# Patient Record
Sex: MALE | NOT HISPANIC OR LATINO | Employment: OTHER | URBAN - METROPOLITAN AREA
[De-identification: names, ages, dates, MRNs, and addresses within clinical notes are randomized per-mention and may not be internally consistent; named-entity substitution may affect disease eponyms.]

---

## 2018-10-22 ENCOUNTER — HOSPITAL ENCOUNTER (OUTPATIENT)
Dept: GENERAL RADIOLOGY | Facility: HOSPITAL | Age: 83
Discharge: HOME | End: 2018-10-22
Attending: INTERNAL MEDICINE | Admitting: INTERNAL MEDICINE

## 2018-10-22 ENCOUNTER — TRANSCRIBE ORDERS (OUTPATIENT)
Dept: ADMINISTRATIVE | Facility: HOSPITAL | Age: 83
End: 2018-10-22

## 2018-10-22 DIAGNOSIS — R05.9 COUGH: Primary | ICD-10-CM

## 2018-10-22 PROCEDURE — 71046 X-RAY EXAM CHEST 2 VIEWS: CPT

## 2019-03-25 PROBLEM — I10 ESSENTIAL HYPERTENSION: Status: ACTIVE | Noted: 2019-03-25

## 2019-03-25 PROBLEM — R73.01 IMPAIRED FASTING GLUCOSE: Status: ACTIVE | Noted: 2019-03-25

## 2019-03-25 PROBLEM — E78.5 HYPERLIPIDEMIA LDL GOAL <100: Status: ACTIVE | Noted: 2019-03-25

## 2019-03-25 PROBLEM — R26.9 GAIT ABNORMALITY: Status: ACTIVE | Noted: 2019-03-25

## 2019-03-25 PROBLEM — I25.10 ASHD (ARTERIOSCLEROTIC HEART DISEASE): Status: ACTIVE | Noted: 2019-03-25

## 2019-03-25 PROBLEM — C44.310 FACIAL BASAL CELL CANCER: Status: ACTIVE | Noted: 2019-03-25

## 2019-04-16 PROBLEM — K63.5 POLYP, COLONIC: Status: ACTIVE | Noted: 2019-04-16

## 2019-04-16 PROBLEM — R05.9 COUGH: Status: ACTIVE | Noted: 2019-04-16

## 2019-04-16 PROBLEM — J45.909 ASTHMA: Status: ACTIVE | Noted: 2019-04-16

## 2019-04-16 PROBLEM — E66.9 OBESITY (BMI 30.0-34.9): Status: ACTIVE | Noted: 2019-04-16

## 2019-04-16 PROBLEM — R31.9 HEMATURIA, UNSPECIFIED: Status: ACTIVE | Noted: 2019-04-16

## 2019-04-16 PROBLEM — J30.9 ALLERGIC RHINITIS: Status: ACTIVE | Noted: 2019-04-16

## 2019-04-16 PROBLEM — E55.9 VITAMIN D DEFICIENCY: Status: ACTIVE | Noted: 2019-04-16

## 2019-04-16 PROBLEM — N40.0 BPH WITHOUT URINARY OBSTRUCTION: Status: ACTIVE | Noted: 2019-04-16

## 2019-04-16 PROBLEM — Z00.00 MEDICARE ANNUAL WELLNESS VISIT, SUBSEQUENT: Status: ACTIVE | Noted: 2019-04-16

## 2019-04-16 PROBLEM — M54.50 LOW BACK PAIN AT MULTIPLE SITES: Status: ACTIVE | Noted: 2019-04-16

## 2019-04-16 PROBLEM — R80.9 PROTEINURIA: Status: ACTIVE | Noted: 2019-04-16

## 2019-04-16 PROBLEM — E66.811 OBESITY (BMI 30.0-34.9): Status: ACTIVE | Noted: 2019-04-16

## 2019-04-16 PROBLEM — G56.01 RIGHT CARPAL TUNNEL SYNDROME: Status: ACTIVE | Noted: 2019-04-16

## 2019-04-16 PROBLEM — R60.9 EDEMA: Status: ACTIVE | Noted: 2019-04-16

## 2019-04-16 PROBLEM — E66.9 OBESITY (BMI 30-39.9): Status: ACTIVE | Noted: 2019-04-16

## 2019-04-25 ENCOUNTER — OFFICE VISIT (OUTPATIENT)
Dept: INTERNAL MEDICINE | Facility: CLINIC | Age: 84
End: 2019-04-25

## 2019-04-25 VITALS
BODY MASS INDEX: 34.55 KG/M2 | HEIGHT: 61 IN | HEART RATE: 66 BPM | TEMPERATURE: 98.4 F | DIASTOLIC BLOOD PRESSURE: 66 MMHG | WEIGHT: 183 LBS | SYSTOLIC BLOOD PRESSURE: 126 MMHG

## 2019-04-25 DIAGNOSIS — E78.5 HYPERLIPIDEMIA LDL GOAL <100: ICD-10-CM

## 2019-04-25 DIAGNOSIS — R73.01 IMPAIRED FASTING GLUCOSE: ICD-10-CM

## 2019-04-25 DIAGNOSIS — E66.9 OBESITY (BMI 30.0-34.9): ICD-10-CM

## 2019-04-25 DIAGNOSIS — I10 ESSENTIAL HYPERTENSION: Primary | ICD-10-CM

## 2019-04-25 PROCEDURE — 99213 OFFICE O/P EST LOW 20 MIN: CPT | Performed by: INTERNAL MEDICINE

## 2019-04-25 RX ORDER — MULTIVITAMIN
TABLET ORAL DAILY
COMMUNITY
Start: 2013-03-14 | End: 2019-11-01 | Stop reason: SDUPTHER

## 2019-04-25 RX ORDER — FLUOCINONIDE TOPICAL SOLUTION USP, 0.05% 0.5 MG/ML
SOLUTION TOPICAL
COMMUNITY
Start: 2019-03-01 | End: 2019-08-26

## 2019-04-25 RX ORDER — FLUTICASONE PROPIONATE 50 MCG
SPRAY, SUSPENSION (ML) NASAL
COMMUNITY
Start: 2019-02-08 | End: 2019-08-26

## 2019-04-25 RX ORDER — AZELASTINE 1 MG/ML
SPRAY, METERED NASAL
COMMUNITY
Start: 2019-03-22 | End: 2019-08-26 | Stop reason: SDUPTHER

## 2019-04-25 RX ORDER — PRAVASTATIN SODIUM 40 MG
40 TABLET ORAL DAILY
Qty: 90 TABLET | Refills: 3 | Status: SHIPPED | OUTPATIENT
Start: 2019-04-25 | End: 2019-08-26 | Stop reason: SDUPTHER

## 2019-04-25 NOTE — PROGRESS NOTES
Chief Complaint   Patient presents with   • Follow-up   Follow up hypertension.    History of Present Illness  89 y.o. white male presents for follow up visit. He overall is doing well. He exercises 6 days a week. He is doing well with walking and balance. He does not need help with ADLs. Breathing when exercising doing well.     Review of Systems   Constitutional: Negative for chills, fatigue and fever.   HENT: Negative for congestion, ear pain and sinus pressure.    Respiratory: Negative for cough, chest tightness, shortness of breath and wheezing.    Cardiovascular: Negative for chest pain and palpitations.   Gastrointestinal: Negative for abdominal pain, blood in stool and constipation.   Skin: Negative for color change.   Allergic/Immunologic: Negative for environmental allergies.   Neurological: Negative for dizziness, speech difficulty and headaches.   Psychiatric/Behavioral: Negative for confusion. The patient is not nervous/anxious.      All other ROS reviewed and negative.    PMSFH:  The following portions of the patient's history were reviewed and updated as appropriate: allergies, current medications, past family history, past medical history, past social history, past surgical history and problem list.      Current Outpatient Medications:   •  aspirin (ASPIRIN LOW DOSE) 81 MG tablet, Take  by mouth Daily., Disp: , Rfl:   •  azelastine (ASTELIN) 0.1 % nasal spray, , Disp: , Rfl:   •  Cholecalciferol (VITAMIN D3) 1000 units capsule, Take  by mouth., Disp: , Rfl:   •  fluocinonide (LIDEX) 0.05 % external solution, , Disp: , Rfl:   •  fluticasone (FLONASE) 50 MCG/ACT nasal spray, , Disp: , Rfl:   •  Menthol, Topical Analgesic, 4 % gel, Apply  topically to the appropriate area as directed., Disp: , Rfl:   •  Multiple Vitamin (MULTI-VITAMIN DAILY) tablet, Take  by mouth Daily., Disp: , Rfl:   •  pravastatin (PRAVACHOL) 40 MG tablet, Take 1 tablet by mouth Daily., Disp: 90 tablet, Rfl: 3    VITALS:  /66  "(BP Location: Right arm, Patient Position: Sitting, Cuff Size: Adult)   Pulse 66   Temp 98.4 °F (36.9 °C)   Ht 156.2 cm (61.5\")   Wt 83 kg (183 lb)   BMI 34.02 kg/m²     Physical Exam   Constitutional: He is oriented to person, place, and time. He appears well-developed and well-nourished.   HENT:   Head: Normocephalic.   Mouth/Throat: Oropharynx is clear and moist.   Eyes: Conjunctivae and EOM are normal.   Neck: No JVD present.   Cardiovascular: Normal rate and regular rhythm.   Murmur (Ii/VI SM) heard.  Pulmonary/Chest: Effort normal and breath sounds normal. No respiratory distress.   Abdominal: Soft. Bowel sounds are normal. There is no tenderness.   Musculoskeletal: He exhibits no edema.   Lymphadenopathy:     He has no cervical adenopathy.   Neurological: He is alert and oriented to person, place, and time.   Skin: Skin is warm and dry.   Psychiatric: He has a normal mood and affect. His behavior is normal.   Nursing note and vitals reviewed.      LABS:  No results found for this or any previous visit.    Procedures         ASSESSMENT/PLAN:  Problem List Items Addressed This Visit        Cardiovascular and Mediastinum    Hyperlipidemia LDL goal <100     Discussed improving diet and exercise. Specifically, decrease saturated fats and trans fats and avoid bad carbohydrates (sweet, breads , potatoes, and high caloric drinks).  Also aim for 150 minutes aerobic exercise weekly and resistance exercises 2-3x/week.         Relevant Medications    pravastatin (PRAVACHOL) 40 MG tablet    Essential hypertension - Primary     Discussed with the patient a low sodium diet (<2000mg/day) and discussed increasing regular aerobic exercise.  Recommend monitoring blood pressures with goal < 130 systolic and < 80 diastolic.            Endocrine    Impaired fasting glucose     Discussed decreasing bad carbohydrates, specifically sweets, breads, potatoes, corn and high caloric drinks (juices, sodas, sweet tea).  Also recommend " increasing physical activity, ideally 150 minutes aerobic exercise weekly and resistance exercises 2-3x/week.            Other    Obesity (BMI 30.0-34.9)     Goal to consume large amounts of vegetables and fruits,heart healthy fats and low carbohydrate choices. Encourage aerobic exercise of walking, jogging or biking gradually up to 150 minute a week and 2-3 times of weight resistance. Employe behavioral modifications such as daily meditation and stopping eating and drinking calories after 7 pm.                FOLLOW-UP:  Return in about 4 months (around 8/25/2019) for Labs with next visit.      Electronically signed by:    Dirk Russ MD

## 2019-04-25 NOTE — ASSESSMENT & PLAN NOTE
Discussed improving diet and exercise. Specifically, decrease saturated fats and trans fats and avoid bad carbohydrates (sweet, breads , potatoes, and high caloric drinks).  Also aim for 150 minutes aerobic exercise weekly and resistance exercises 2-3x/week.

## 2019-08-26 ENCOUNTER — OFFICE VISIT (OUTPATIENT)
Dept: INTERNAL MEDICINE | Facility: CLINIC | Age: 84
End: 2019-08-26

## 2019-08-26 ENCOUNTER — TELEPHONE (OUTPATIENT)
Dept: INTERNAL MEDICINE | Facility: CLINIC | Age: 84
End: 2019-08-26

## 2019-08-26 VITALS
SYSTOLIC BLOOD PRESSURE: 122 MMHG | BODY MASS INDEX: 34.78 KG/M2 | WEIGHT: 189 LBS | HEIGHT: 62 IN | DIASTOLIC BLOOD PRESSURE: 74 MMHG | HEART RATE: 80 BPM

## 2019-08-26 DIAGNOSIS — E78.5 HYPERLIPIDEMIA LDL GOAL <100: ICD-10-CM

## 2019-08-26 DIAGNOSIS — E66.01 MORBID OBESITY DUE TO EXCESS CALORIES (HCC): ICD-10-CM

## 2019-08-26 DIAGNOSIS — E55.9 VITAMIN D DEFICIENCY: ICD-10-CM

## 2019-08-26 DIAGNOSIS — I10 ESSENTIAL HYPERTENSION: Primary | ICD-10-CM

## 2019-08-26 DIAGNOSIS — R73.01 IMPAIRED FASTING GLUCOSE: ICD-10-CM

## 2019-08-26 PROCEDURE — 99214 OFFICE O/P EST MOD 30 MIN: CPT | Performed by: INTERNAL MEDICINE

## 2019-08-26 RX ORDER — PRAVASTATIN SODIUM 40 MG
40 TABLET ORAL DAILY
Qty: 90 TABLET | Refills: 3 | Status: SHIPPED | OUTPATIENT
Start: 2019-08-26 | End: 2019-10-07

## 2019-08-26 RX ORDER — AZELASTINE 1 MG/ML
2 SPRAY, METERED NASAL 2 TIMES DAILY
Qty: 30 ML | Refills: 11 | Status: SHIPPED | OUTPATIENT
Start: 2019-08-26 | End: 2019-11-25 | Stop reason: ALTCHOICE

## 2019-08-26 NOTE — ASSESSMENT & PLAN NOTE
Hypertension is improving with lifestyle modifications.  Continue current treatment regimen.  Dietary sodium restriction.  Weight loss.  Regular aerobic exercise.  Blood pressure will be reassessed at the next regular appointment.

## 2019-08-26 NOTE — PROGRESS NOTES
Chief Complaint   Patient presents with   • Hypertension   • Hyperlipidemia     He is fasting       History of Present Illness  89 y.o. white male presents for follow up of HTN. His is exercising 6 days a week and his breathing is ok.He denies chest pain. He has bilateral carpal tunnel improved with biofreeze and splint    Review of Systems   Constitutional: Negative for chills, fatigue and fever.   HENT: Positive for hearing loss and postnasal drip. Negative for congestion, ear pain and sinus pressure.    Respiratory: Negative for cough, chest tightness, shortness of breath and wheezing.    Cardiovascular: Negative for chest pain and palpitations.   Gastrointestinal: Negative for abdominal pain, blood in stool and constipation.   Musculoskeletal: Positive for arthralgias and myalgias.   Skin: Negative for color change.   Allergic/Immunologic: Negative for environmental allergies.   Neurological: Positive for numbness (numbness of hands). Negative for dizziness, speech difficulty and headaches.   Hematological: Does not bruise/bleed easily.   Psychiatric/Behavioral: Negative for confusion. The patient is not nervous/anxious.      All other ROS reviewed and negative.    PMSFH:  The following portions of the patient's history were reviewed and updated as appropriate: allergies, current medications, past family history, past medical history, past social history, past surgical history and problem list.      Current Outpatient Medications:   •  aspirin (ASPIRIN LOW DOSE) 81 MG tablet, Take  by mouth Daily., Disp: , Rfl:   •  azelastine (ASTELIN) 0.1 % nasal spray, 2 sprays into the nostril(s) as directed by provider 2 (Two) Times a Day., Disp: 30 mL, Rfl: 11  •  Cholecalciferol (VITAMIN D3) 1000 units capsule, Take  by mouth., Disp: , Rfl:   •  Menthol, Topical Analgesic, 4 % gel, Apply  topically to the appropriate area as directed., Disp: , Rfl:   •  Multiple Vitamin (MULTI-VITAMIN DAILY) tablet, Take  by mouth Daily.,  "Disp: , Rfl:   •  pravastatin (PRAVACHOL) 40 MG tablet, Take 1 tablet by mouth Daily., Disp: 90 tablet, Rfl: 3    VITALS:  /74 (BP Location: Left arm, Patient Position: Sitting, Cuff Size: Adult)   Pulse 80   Ht 156.2 cm (61.5\")   Wt 85.7 kg (189 lb)   BMI 35.13 kg/m²     Physical Exam   Constitutional: He is oriented to person, place, and time. He appears well-developed and well-nourished.   HENT:   Head: Normocephalic.   Right Ear: External ear normal.   Left Ear: External ear normal.   Mouth/Throat: Oropharynx is clear and moist.   Eyes: Conjunctivae and EOM are normal.   Neck: No JVD present.   Cardiovascular: Normal rate and regular rhythm.   Murmur (II/VI Sm) heard.  Pulmonary/Chest: Effort normal and breath sounds normal. No respiratory distress.   Abdominal: Soft. Bowel sounds are normal. There is no tenderness.   obese   Musculoskeletal: He exhibits tenderness (mild wrist djd with mild pain).   Lymphadenopathy:     He has no cervical adenopathy.   Neurological: He is alert and oriented to person, place, and time.   Fair get up and go but pretty steady with cane   Skin: Skin is warm and dry.   Psychiatric: He has a normal mood and affect. His behavior is normal.   Nursing note and vitals reviewed.      LABS:  No results found for this or any previous visit.    Procedures         ASSESSMENT/PLAN:  Problem List Items Addressed This Visit        Cardiovascular and Mediastinum    Hyperlipidemia LDL goal <100    Relevant Medications    pravastatin (PRAVACHOL) 40 MG tablet    Essential hypertension - Primary     Hypertension is improving with lifestyle modifications.  Continue current treatment regimen.  Dietary sodium restriction.  Weight loss.  Regular aerobic exercise.  Blood pressure will be reassessed at the next regular appointment.            Digestive    Vitamin D deficiency     Check labs.          Morbid obesity due to excess calories (CMS/HCC)     .His blood pressure and joints are worsened by " extra weight and the comorbidies does give him diagnosis with BMI of 35 of morbid obesity. Goal to consume large amounts of vegetables and fruits,heart healthy fats and low carbohydrate choices. Encourage aerobic exercise of walking, jogging or biking gradually up to 150 minute a week and 2-3 times of weight resistance. Employe behavioral modifications such as daily meditation and stopping eating and drinking calories after 7 pm.             Endocrine    Impaired fasting glucose     Discussed decreasing bad carbohydrates, specifically sweets, breads, potatoes, corn and high caloric drinks (juices, sodas, sweet tea).  Also recommend increasing physical activity, ideally 150 minutes aerobic exercise weekly and resistance exercises 2-3x/week.               FOLLOW-UP:  Return in about 4 months (around 12/19/2019).      Electronically signed by:    Dirk Russ MD

## 2019-08-26 NOTE — TELEPHONE ENCOUNTER
Please call the patient and tell him apparently I had not signed the labs and the order was not waiting for him and apologize he can get it later this week or do it at his next visit in Dec

## 2019-08-26 NOTE — ASSESSMENT & PLAN NOTE
.His blood pressure and joints are worsened by extra weight and the comorbidies does give him diagnosis with BMI of 35 of morbid obesity. Goal to consume large amounts of vegetables and fruits,heart healthy fats and low carbohydrate choices. Encourage aerobic exercise of walking, jogging or biking gradually up to 150 minute a week and 2-3 times of weight resistance. Employe behavioral modifications such as daily meditation and stopping eating and drinking calories after 7 pm.

## 2019-08-27 ENCOUNTER — LAB (OUTPATIENT)
Dept: LAB | Facility: HOSPITAL | Age: 84
End: 2019-08-27

## 2019-08-27 DIAGNOSIS — R73.01 IMPAIRED FASTING GLUCOSE: ICD-10-CM

## 2019-08-27 DIAGNOSIS — E78.5 HYPERLIPIDEMIA LDL GOAL <100: ICD-10-CM

## 2019-08-27 DIAGNOSIS — E55.9 VITAMIN D DEFICIENCY: ICD-10-CM

## 2019-08-27 DIAGNOSIS — I10 ESSENTIAL HYPERTENSION: ICD-10-CM

## 2019-08-27 LAB
25(OH)D3 SERPL-MCNC: 44 NG/ML (ref 30–100)
ALBUMIN SERPL-MCNC: 4 G/DL (ref 3.5–5.2)
ALBUMIN/GLOB SERPL: 1.5 G/DL
ALP SERPL-CCNC: 80 U/L (ref 39–117)
ALT SERPL W P-5'-P-CCNC: 9 U/L (ref 1–41)
ANION GAP SERPL CALCULATED.3IONS-SCNC: 12.5 MMOL/L (ref 5–15)
AST SERPL-CCNC: 22 U/L (ref 1–40)
BASOPHILS # BLD AUTO: 0.03 10*3/MM3 (ref 0–0.2)
BASOPHILS NFR BLD AUTO: 0.4 % (ref 0–1.5)
BILIRUB SERPL-MCNC: 1.4 MG/DL (ref 0.2–1.2)
BUN BLD-MCNC: 16 MG/DL (ref 8–23)
BUN/CREAT SERPL: 16.2 (ref 7–25)
CALCIUM SPEC-SCNC: 9.3 MG/DL (ref 8.2–9.6)
CHLORIDE SERPL-SCNC: 102 MMOL/L (ref 98–107)
CHOLEST SERPL-MCNC: 171 MG/DL (ref 0–200)
CO2 SERPL-SCNC: 26.5 MMOL/L (ref 22–29)
CREAT BLD-MCNC: 0.99 MG/DL (ref 0.76–1.27)
DEPRECATED RDW RBC AUTO: 52.9 FL (ref 37–54)
EOSINOPHIL # BLD AUTO: 0.1 10*3/MM3 (ref 0–0.4)
EOSINOPHIL NFR BLD AUTO: 1.5 % (ref 0.3–6.2)
ERYTHROCYTE [DISTWIDTH] IN BLOOD BY AUTOMATED COUNT: 14 % (ref 12.3–15.4)
GFR SERPL CREATININE-BSD FRML MDRD: 70 ML/MIN/1.73
GFR SERPL CREATININE-BSD FRML MDRD: 85 ML/MIN/1.73
GLOBULIN UR ELPH-MCNC: 2.7 GM/DL
GLUCOSE BLD-MCNC: 91 MG/DL (ref 65–99)
HBA1C MFR BLD: 5.9 % (ref 4.8–5.6)
HCT VFR BLD AUTO: 43.5 % (ref 37.5–51)
HDLC SERPL-MCNC: 70 MG/DL (ref 40–60)
HGB BLD-MCNC: 13.4 G/DL (ref 13–17.7)
IMM GRANULOCYTES # BLD AUTO: 0.02 10*3/MM3 (ref 0–0.05)
IMM GRANULOCYTES NFR BLD AUTO: 0.3 % (ref 0–0.5)
LDLC SERPL CALC-MCNC: 78 MG/DL (ref 0–100)
LDLC/HDLC SERPL: 1.11 {RATIO}
LYMPHOCYTES # BLD AUTO: 0.86 10*3/MM3 (ref 0.7–3.1)
LYMPHOCYTES NFR BLD AUTO: 12.7 % (ref 19.6–45.3)
MCH RBC QN AUTO: 31.5 PG (ref 26.6–33)
MCHC RBC AUTO-ENTMCNC: 30.8 G/DL (ref 31.5–35.7)
MCV RBC AUTO: 102.1 FL (ref 79–97)
MONOCYTES # BLD AUTO: 0.88 10*3/MM3 (ref 0.1–0.9)
MONOCYTES NFR BLD AUTO: 13 % (ref 5–12)
NEUTROPHILS # BLD AUTO: 4.86 10*3/MM3 (ref 1.7–7)
NEUTROPHILS NFR BLD AUTO: 72.1 % (ref 42.7–76)
NRBC BLD AUTO-RTO: 0 /100 WBC (ref 0–0.2)
PLATELET # BLD AUTO: 261 10*3/MM3 (ref 140–450)
PMV BLD AUTO: 10.8 FL (ref 6–12)
POTASSIUM BLD-SCNC: 4.5 MMOL/L (ref 3.5–5.2)
PROT SERPL-MCNC: 6.7 G/DL (ref 6–8.5)
RBC # BLD AUTO: 4.26 10*6/MM3 (ref 4.14–5.8)
SODIUM BLD-SCNC: 141 MMOL/L (ref 136–145)
TRIGL SERPL-MCNC: 116 MG/DL (ref 0–150)
TSH SERPL DL<=0.05 MIU/L-ACNC: 1.39 MIU/ML (ref 0.27–4.2)
VLDLC SERPL-MCNC: 23.2 MG/DL (ref 5–40)
WBC NRBC COR # BLD: 6.75 10*3/MM3 (ref 3.4–10.8)

## 2019-08-27 PROCEDURE — 82043 UR ALBUMIN QUANTITATIVE: CPT

## 2019-08-27 PROCEDURE — 83036 HEMOGLOBIN GLYCOSYLATED A1C: CPT

## 2019-08-27 PROCEDURE — 82570 ASSAY OF URINE CREATININE: CPT

## 2019-08-27 PROCEDURE — 85025 COMPLETE CBC W/AUTO DIFF WBC: CPT

## 2019-08-27 PROCEDURE — 80061 LIPID PANEL: CPT

## 2019-08-27 PROCEDURE — 80053 COMPREHEN METABOLIC PANEL: CPT

## 2019-08-27 PROCEDURE — 82306 VITAMIN D 25 HYDROXY: CPT

## 2019-08-27 PROCEDURE — 84443 ASSAY THYROID STIM HORMONE: CPT

## 2019-08-28 LAB
ALBUMIN UR-MCNC: 39.3 MG/DL
CREAT UR-MCNC: 123.3 MG/DL
MICROALBUMIN/CREAT UR: 318.7 MG/G

## 2019-09-02 ENCOUNTER — APPOINTMENT (OUTPATIENT)
Dept: GENERAL RADIOLOGY | Facility: HOSPITAL | Age: 84
End: 2019-09-02

## 2019-09-02 ENCOUNTER — HOSPITAL ENCOUNTER (INPATIENT)
Facility: HOSPITAL | Age: 84
LOS: 13 days | Discharge: NURSING, REHAB OR LONG-TERM CARE | End: 2019-09-16
Attending: EMERGENCY MEDICINE | Admitting: UROLOGY

## 2019-09-02 DIAGNOSIS — N17.9 AKI (ACUTE KIDNEY INJURY) (HCC): Primary | ICD-10-CM

## 2019-09-02 DIAGNOSIS — R77.8 ELEVATED TROPONIN: ICD-10-CM

## 2019-09-02 DIAGNOSIS — Z78.9 IMPAIRED MOBILITY AND ADLS: ICD-10-CM

## 2019-09-02 DIAGNOSIS — R31.9 HEMATURIA, UNSPECIFIED TYPE: ICD-10-CM

## 2019-09-02 DIAGNOSIS — N40.0 BPH (BENIGN PROSTATIC HYPERPLASIA): ICD-10-CM

## 2019-09-02 DIAGNOSIS — R13.10 DYSPHAGIA, UNSPECIFIED TYPE: ICD-10-CM

## 2019-09-02 DIAGNOSIS — Z74.09 IMPAIRED MOBILITY AND ADLS: ICD-10-CM

## 2019-09-02 DIAGNOSIS — N32.0 BLADDER OUTLET OBSTRUCTION: ICD-10-CM

## 2019-09-02 LAB
ALBUMIN SERPL-MCNC: 4 G/DL (ref 3.5–5.2)
ALBUMIN/GLOB SERPL: 1.3 G/DL
ALP SERPL-CCNC: 83 U/L (ref 39–117)
ALT SERPL W P-5'-P-CCNC: 12 U/L (ref 1–41)
ANION GAP SERPL CALCULATED.3IONS-SCNC: 16 MMOL/L (ref 5–15)
AST SERPL-CCNC: 27 U/L (ref 1–40)
BASOPHILS # BLD AUTO: 0.01 10*3/MM3 (ref 0–0.2)
BASOPHILS NFR BLD AUTO: 0.1 % (ref 0–1.5)
BILIRUB SERPL-MCNC: 1 MG/DL (ref 0.2–1.2)
BUN BLD-MCNC: 33 MG/DL (ref 8–23)
BUN/CREAT SERPL: 18.6 (ref 7–25)
CALCIUM SPEC-SCNC: 9.5 MG/DL (ref 8.2–9.6)
CHLORIDE SERPL-SCNC: 102 MMOL/L (ref 98–107)
CO2 SERPL-SCNC: 24 MMOL/L (ref 22–29)
CREAT BLD-MCNC: 1.77 MG/DL (ref 0.76–1.27)
D-LACTATE SERPL-SCNC: 2.3 MMOL/L (ref 0.5–2)
DEPRECATED RDW RBC AUTO: 47.2 FL (ref 37–54)
EOSINOPHIL # BLD AUTO: 0 10*3/MM3 (ref 0–0.4)
EOSINOPHIL NFR BLD AUTO: 0 % (ref 0.3–6.2)
ERYTHROCYTE [DISTWIDTH] IN BLOOD BY AUTOMATED COUNT: 13.2 % (ref 12.3–15.4)
GFR SERPL CREATININE-BSD FRML MDRD: 36 ML/MIN/1.73
GFR SERPL CREATININE-BSD FRML MDRD: 43 ML/MIN/1.73
GLOBULIN UR ELPH-MCNC: 3.1 GM/DL
GLUCOSE BLD-MCNC: 168 MG/DL (ref 65–99)
HCT VFR BLD AUTO: 38.7 % (ref 37.5–51)
HGB BLD-MCNC: 12.4 G/DL (ref 13–17.7)
IMM GRANULOCYTES # BLD AUTO: 0.06 10*3/MM3 (ref 0–0.05)
IMM GRANULOCYTES NFR BLD AUTO: 0.5 % (ref 0–0.5)
LYMPHOCYTES # BLD AUTO: 0.64 10*3/MM3 (ref 0.7–3.1)
LYMPHOCYTES NFR BLD AUTO: 5.5 % (ref 19.6–45.3)
MCH RBC QN AUTO: 31.2 PG (ref 26.6–33)
MCHC RBC AUTO-ENTMCNC: 32 G/DL (ref 31.5–35.7)
MCV RBC AUTO: 97.2 FL (ref 79–97)
MONOCYTES # BLD AUTO: 1.05 10*3/MM3 (ref 0.1–0.9)
MONOCYTES NFR BLD AUTO: 9.1 % (ref 5–12)
NEUTROPHILS # BLD AUTO: 9.83 10*3/MM3 (ref 1.7–7)
NEUTROPHILS NFR BLD AUTO: 84.8 % (ref 42.7–76)
NRBC BLD AUTO-RTO: 0 /100 WBC (ref 0–0.2)
NT-PROBNP SERPL-MCNC: 1189 PG/ML (ref 5–1800)
PLATELET # BLD AUTO: 336 10*3/MM3 (ref 140–450)
PMV BLD AUTO: 10 FL (ref 6–12)
POTASSIUM BLD-SCNC: 4.8 MMOL/L (ref 3.5–5.2)
PROT SERPL-MCNC: 7.1 G/DL (ref 6–8.5)
RBC # BLD AUTO: 3.98 10*6/MM3 (ref 4.14–5.8)
SODIUM BLD-SCNC: 142 MMOL/L (ref 136–145)
TROPONIN T SERPL-MCNC: 0.15 NG/ML (ref 0–0.03)
TROPONIN T SERPL-MCNC: 0.16 NG/ML (ref 0–0.03)
WBC NRBC COR # BLD: 11.59 10*3/MM3 (ref 3.4–10.8)

## 2019-09-02 PROCEDURE — 71046 X-RAY EXAM CHEST 2 VIEWS: CPT

## 2019-09-02 PROCEDURE — 51702 INSERT TEMP BLADDER CATH: CPT

## 2019-09-02 PROCEDURE — 51798 US URINE CAPACITY MEASURE: CPT

## 2019-09-02 PROCEDURE — 83880 ASSAY OF NATRIURETIC PEPTIDE: CPT | Performed by: NURSE PRACTITIONER

## 2019-09-02 PROCEDURE — 83605 ASSAY OF LACTIC ACID: CPT | Performed by: NURSE PRACTITIONER

## 2019-09-02 PROCEDURE — 85025 COMPLETE CBC W/AUTO DIFF WBC: CPT | Performed by: NURSE PRACTITIONER

## 2019-09-02 PROCEDURE — 84484 ASSAY OF TROPONIN QUANT: CPT | Performed by: NURSE PRACTITIONER

## 2019-09-02 PROCEDURE — 80053 COMPREHEN METABOLIC PANEL: CPT | Performed by: NURSE PRACTITIONER

## 2019-09-02 PROCEDURE — 87040 BLOOD CULTURE FOR BACTERIA: CPT | Performed by: NURSE PRACTITIONER

## 2019-09-02 PROCEDURE — 93005 ELECTROCARDIOGRAM TRACING: CPT | Performed by: NURSE PRACTITIONER

## 2019-09-02 PROCEDURE — 99285 EMERGENCY DEPT VISIT HI MDM: CPT

## 2019-09-02 RX ORDER — SODIUM CHLORIDE 0.9 % (FLUSH) 0.9 %
10 SYRINGE (ML) INJECTION AS NEEDED
Status: DISCONTINUED | OUTPATIENT
Start: 2019-09-02 | End: 2019-09-16 | Stop reason: HOSPADM

## 2019-09-02 RX ADMIN — SODIUM CHLORIDE 1000 ML: 9 INJECTION, SOLUTION INTRAVENOUS at 21:49

## 2019-09-03 ENCOUNTER — APPOINTMENT (OUTPATIENT)
Dept: CARDIOLOGY | Facility: HOSPITAL | Age: 84
End: 2019-09-03

## 2019-09-03 ENCOUNTER — APPOINTMENT (OUTPATIENT)
Dept: CT IMAGING | Facility: HOSPITAL | Age: 84
End: 2019-09-03

## 2019-09-03 PROBLEM — N39.0 ACUTE UTI (URINARY TRACT INFECTION): Status: ACTIVE | Noted: 2019-09-03

## 2019-09-03 PROBLEM — N28.9 ACUTE RENAL INSUFFICIENCY: Status: ACTIVE | Noted: 2019-09-03

## 2019-09-03 PROBLEM — R79.89 ELEVATED TROPONIN: Status: ACTIVE | Noted: 2019-09-03

## 2019-09-03 PROBLEM — R31.0 GROSS HEMATURIA: Status: ACTIVE | Noted: 2019-09-03

## 2019-09-03 PROBLEM — A41.9 SEPSIS DUE TO URINARY TRACT INFECTION: Status: ACTIVE | Noted: 2019-09-03

## 2019-09-03 PROBLEM — R77.8 ELEVATED TROPONIN: Status: ACTIVE | Noted: 2019-09-03

## 2019-09-03 PROBLEM — N39.0 SEPSIS DUE TO URINARY TRACT INFECTION: Status: ACTIVE | Noted: 2019-09-03

## 2019-09-03 LAB
ANION GAP SERPL CALCULATED.3IONS-SCNC: 13 MMOL/L (ref 5–15)
AV HCM GRAD VALS: 346 MMHG
AV LVOT PEAK GRADIENT: 225 MMHG
BACTERIA UR QL AUTO: ABNORMAL /HPF
BASOPHILS # BLD AUTO: 0.01 10*3/MM3 (ref 0–0.2)
BASOPHILS NFR BLD AUTO: 0.1 % (ref 0–1.5)
BH CV ECHO MEAS - AO MAX PG (FULL): 2 MMHG
BH CV ECHO MEAS - AO MAX PG: 14.9 MMHG
BH CV ECHO MEAS - AO MEAN PG (FULL): 5.5 MMHG
BH CV ECHO MEAS - AO MEAN PG: 8.5 MMHG
BH CV ECHO MEAS - AO ROOT AREA (BSA CORRECTED): 1.4
BH CV ECHO MEAS - AO ROOT AREA: 5.3 CM^2
BH CV ECHO MEAS - AO ROOT DIAM: 2.6 CM
BH CV ECHO MEAS - AO V2 MAX: 193.3 CM/SEC
BH CV ECHO MEAS - AO V2 MEAN: 138.6 CM/SEC
BH CV ECHO MEAS - AO V2 VTI: 45.9 CM
BH CV ECHO MEAS - AVA(I,A): 1.6 CM^2
BH CV ECHO MEAS - AVA(I,D): 1.6 CM^2
BH CV ECHO MEAS - AVA(V,A): 3 CM^2
BH CV ECHO MEAS - AVA(V,D): 3 CM^2
BH CV ECHO MEAS - BSA(HAYCOCK): 2 M^2
BH CV ECHO MEAS - BSA: 1.9 M^2
BH CV ECHO MEAS - BZI_BMI: 30 KILOGRAMS/M^2
BH CV ECHO MEAS - BZI_METRIC_HEIGHT: 165.1 CM
BH CV ECHO MEAS - BZI_METRIC_WEIGHT: 81.6 KG
BH CV ECHO MEAS - EDV(CUBED): 48 ML
BH CV ECHO MEAS - EDV(TEICH): 55.7 ML
BH CV ECHO MEAS - EF(CUBED): 79.1 %
BH CV ECHO MEAS - EF(TEICH): 72.3 %
BH CV ECHO MEAS - ESV(CUBED): 10 ML
BH CV ECHO MEAS - ESV(TEICH): 15.4 ML
BH CV ECHO MEAS - FS: 40.7 %
BH CV ECHO MEAS - IVS/LVPW: 0.96
BH CV ECHO MEAS - IVSD: 1.2 CM
BH CV ECHO MEAS - LA DIMENSION: 3.9 CM
BH CV ECHO MEAS - LA/AO: 1.5
BH CV ECHO MEAS - LAD MAJOR: 6.7 CM
BH CV ECHO MEAS - LAT PEAK E' VEL: 4.3 CM/SEC
BH CV ECHO MEAS - LATERAL E/E' RATIO: 19.3
BH CV ECHO MEAS - LV MASS(C)D: 131.2 GRAMS
BH CV ECHO MEAS - LV MASS(C)DI: 69.3 GRAMS/M^2
BH CV ECHO MEAS - LV MAX PG: 13 MMHG
BH CV ECHO MEAS - LV MEAN PG: 3 MMHG
BH CV ECHO MEAS - LV V1 MAX: 180 CM/SEC
BH CV ECHO MEAS - LV V1 MEAN: 75.6 CM/SEC
BH CV ECHO MEAS - LV V1 VTI: 22.5 CM
BH CV ECHO MEAS - LVIDD: 3.6 CM
BH CV ECHO MEAS - LVIDS: 2.2 CM
BH CV ECHO MEAS - LVOT AREA (M): 3.1 CM^2
BH CV ECHO MEAS - LVOT AREA: 3.2 CM^2
BH CV ECHO MEAS - LVOT DIAM: 2 CM
BH CV ECHO MEAS - LVPWD: 1.2 CM
BH CV ECHO MEAS - MED PEAK E' VEL: 4.9 CM/SEC
BH CV ECHO MEAS - MEDIAL E/E' RATIO: 16.8
BH CV ECHO MEAS - MV A MAX VEL: 145.1 CM/SEC
BH CV ECHO MEAS - MV DEC SLOPE: 352.4 CM/SEC^2
BH CV ECHO MEAS - MV DEC TIME: 0.36 SEC
BH CV ECHO MEAS - MV E MAX VEL: 85.4 CM/SEC
BH CV ECHO MEAS - MV E/A: 0.59
BH CV ECHO MEAS - MV MAX PG: 14.7 MMHG
BH CV ECHO MEAS - MV MEAN PG: 5.1 MMHG
BH CV ECHO MEAS - MV P1/2T MAX VEL: 99.1 CM/SEC
BH CV ECHO MEAS - MV P1/2T: 82.4 MSEC
BH CV ECHO MEAS - MV V2 MAX: 191.4 CM/SEC
BH CV ECHO MEAS - MV V2 MEAN: 103.2 CM/SEC
BH CV ECHO MEAS - MV V2 VTI: 39.6 CM
BH CV ECHO MEAS - MVA P1/2T LCG: 2.2 CM^2
BH CV ECHO MEAS - MVA(P1/2T): 2.7 CM^2
BH CV ECHO MEAS - MVA(VTI): 1.8 CM^2
BH CV ECHO MEAS - PA ACC SLOPE: 426.4 CM/SEC^2
BH CV ECHO MEAS - PA ACC TIME: 0.17 SEC
BH CV ECHO MEAS - PA PR(ACCEL): 2.9 MMHG
BH CV ECHO MEAS - RV MAX PG: 2.3 MMHG
BH CV ECHO MEAS - RV V1 MAX: 76.5 CM/SEC
BH CV ECHO MEAS - SI(AO): 128.6 ML/M^2
BH CV ECHO MEAS - SI(CUBED): 20.1 ML/M^2
BH CV ECHO MEAS - SI(LVOT): 38 ML/M^2
BH CV ECHO MEAS - SI(TEICH): 21.3 ML/M^2
BH CV ECHO MEAS - SV(AO): 243.3 ML
BH CV ECHO MEAS - SV(CUBED): 38 ML
BH CV ECHO MEAS - SV(LVOT): 71.8 ML
BH CV ECHO MEAS - SV(TEICH): 40.3 ML
BH CV ECHO MEAS - TAPSE (>1.6): 2.2 CM2
BH CV ECHO MEASUREMENTS AVERAGE E/E' RATIO: 18.57
BH CV XLRA - RV BASE: 4.1 CM
BH CV XLRA - RV LENGTH: 6 CM
BH CV XLRA - RV MID: 3.4 CM
BH CV XLRA - TDI S': 19.9 CM/SEC
BILIRUB UR QL STRIP: ABNORMAL
BUN BLD-MCNC: 36 MG/DL (ref 8–23)
BUN/CREAT SERPL: 21.3 (ref 7–25)
CALCIUM SPEC-SCNC: 8.4 MG/DL (ref 8.2–9.6)
CHLORIDE SERPL-SCNC: 108 MMOL/L (ref 98–107)
CHOLEST SERPL-MCNC: 122 MG/DL (ref 0–200)
CLARITY UR: ABNORMAL
CO2 SERPL-SCNC: 21 MMOL/L (ref 22–29)
COLOR UR: ABNORMAL
CREAT BLD-MCNC: 1.69 MG/DL (ref 0.76–1.27)
D-LACTATE SERPL-SCNC: 1.7 MMOL/L (ref 0.5–2)
DEPRECATED RDW RBC AUTO: 47.6 FL (ref 37–54)
EOSINOPHIL # BLD AUTO: 0 10*3/MM3 (ref 0–0.4)
EOSINOPHIL NFR BLD AUTO: 0 % (ref 0.3–6.2)
ERYTHROCYTE [DISTWIDTH] IN BLOOD BY AUTOMATED COUNT: 13.3 % (ref 12.3–15.4)
GFR SERPL CREATININE-BSD FRML MDRD: 38 ML/MIN/1.73
GFR SERPL CREATININE-BSD FRML MDRD: 46 ML/MIN/1.73
GLUCOSE BLD-MCNC: 141 MG/DL (ref 65–99)
GLUCOSE UR STRIP-MCNC: NEGATIVE MG/DL
HCT VFR BLD AUTO: 31.1 % (ref 37.5–51)
HDLC SERPL-MCNC: 49 MG/DL (ref 40–60)
HGB BLD-MCNC: 10 G/DL (ref 13–17.7)
HGB UR QL STRIP.AUTO: ABNORMAL
HOLD SPECIMEN: NORMAL
IMM GRANULOCYTES # BLD AUTO: 0.07 10*3/MM3 (ref 0–0.05)
IMM GRANULOCYTES NFR BLD AUTO: 0.7 % (ref 0–0.5)
KETONES UR QL STRIP: NEGATIVE
LDLC SERPL CALC-MCNC: 59 MG/DL (ref 0–100)
LDLC/HDLC SERPL: 1.2 {RATIO}
LEFT ATRIUM VOLUME INDEX: 19 ML/M^2
LEFT ATRIUM VOLUME: 36 ML
LEUKOCYTE ESTERASE UR QL STRIP.AUTO: ABNORMAL
LV EF 2D ECHO EST: 72 %
LYMPHOCYTES # BLD AUTO: 0.91 10*3/MM3 (ref 0.7–3.1)
LYMPHOCYTES NFR BLD AUTO: 8.8 % (ref 19.6–45.3)
MCH RBC QN AUTO: 31.5 PG (ref 26.6–33)
MCHC RBC AUTO-ENTMCNC: 32.2 G/DL (ref 31.5–35.7)
MCV RBC AUTO: 98.1 FL (ref 79–97)
MONOCYTES # BLD AUTO: 1.62 10*3/MM3 (ref 0.1–0.9)
MONOCYTES NFR BLD AUTO: 15.6 % (ref 5–12)
NEUTROPHILS # BLD AUTO: 7.76 10*3/MM3 (ref 1.7–7)
NEUTROPHILS NFR BLD AUTO: 74.8 % (ref 42.7–76)
NITRITE UR QL STRIP: POSITIVE
NRBC BLD AUTO-RTO: 0 /100 WBC (ref 0–0.2)
PH UR STRIP.AUTO: 6.5 [PH] (ref 5–8)
PLATELET # BLD AUTO: 276 10*3/MM3 (ref 140–450)
PMV BLD AUTO: 10.4 FL (ref 6–12)
POTASSIUM BLD-SCNC: 4.5 MMOL/L (ref 3.5–5.2)
PROT UR QL STRIP: ABNORMAL
RBC # BLD AUTO: 3.17 10*6/MM3 (ref 4.14–5.8)
RBC # UR: ABNORMAL /HPF
REF LAB TEST METHOD: ABNORMAL
SODIUM BLD-SCNC: 142 MMOL/L (ref 136–145)
SP GR UR STRIP: 1.03 (ref 1–1.03)
SQUAMOUS #/AREA URNS HPF: ABNORMAL /HPF
TRIGL SERPL-MCNC: 71 MG/DL (ref 0–150)
TROPONIN T SERPL-MCNC: 0.17 NG/ML (ref 0–0.03)
UROBILINOGEN UR QL STRIP: ABNORMAL
VLDLC SERPL-MCNC: 14.2 MG/DL
WBC NRBC COR # BLD: 10.37 10*3/MM3 (ref 3.4–10.8)
WBC UR QL AUTO: ABNORMAL /HPF

## 2019-09-03 PROCEDURE — 25010000002 CEFTRIAXONE PER 250 MG: Performed by: NURSE PRACTITIONER

## 2019-09-03 PROCEDURE — 93010 ELECTROCARDIOGRAM REPORT: CPT | Performed by: INTERNAL MEDICINE

## 2019-09-03 PROCEDURE — 93306 TTE W/DOPPLER COMPLETE: CPT

## 2019-09-03 PROCEDURE — 84153 ASSAY OF PSA TOTAL: CPT | Performed by: FAMILY MEDICINE

## 2019-09-03 PROCEDURE — 81001 URINALYSIS AUTO W/SCOPE: CPT | Performed by: NURSE PRACTITIONER

## 2019-09-03 PROCEDURE — 99223 1ST HOSP IP/OBS HIGH 75: CPT | Performed by: FAMILY MEDICINE

## 2019-09-03 PROCEDURE — 84484 ASSAY OF TROPONIN QUANT: CPT | Performed by: NURSE PRACTITIONER

## 2019-09-03 PROCEDURE — 84154 ASSAY OF PSA FREE: CPT | Performed by: FAMILY MEDICINE

## 2019-09-03 PROCEDURE — 80048 BASIC METABOLIC PNL TOTAL CA: CPT | Performed by: NURSE PRACTITIONER

## 2019-09-03 PROCEDURE — 85025 COMPLETE CBC W/AUTO DIFF WBC: CPT | Performed by: NURSE PRACTITIONER

## 2019-09-03 PROCEDURE — 80061 LIPID PANEL: CPT | Performed by: NURSE PRACTITIONER

## 2019-09-03 PROCEDURE — 74176 CT ABD & PELVIS W/O CONTRAST: CPT

## 2019-09-03 PROCEDURE — 87086 URINE CULTURE/COLONY COUNT: CPT | Performed by: NURSE PRACTITIONER

## 2019-09-03 PROCEDURE — 93005 ELECTROCARDIOGRAM TRACING: CPT | Performed by: NURSE PRACTITIONER

## 2019-09-03 PROCEDURE — 83605 ASSAY OF LACTIC ACID: CPT | Performed by: NURSE PRACTITIONER

## 2019-09-03 PROCEDURE — 51702 INSERT TEMP BLADDER CATH: CPT

## 2019-09-03 PROCEDURE — 93306 TTE W/DOPPLER COMPLETE: CPT | Performed by: INTERNAL MEDICINE

## 2019-09-03 RX ORDER — ASPIRIN 81 MG/1
81 TABLET ORAL DAILY
Status: DISCONTINUED | OUTPATIENT
Start: 2019-09-03 | End: 2019-09-03

## 2019-09-03 RX ORDER — SODIUM CHLORIDE 0.9 % (FLUSH) 0.9 %
10 SYRINGE (ML) INJECTION EVERY 12 HOURS SCHEDULED
Status: DISCONTINUED | OUTPATIENT
Start: 2019-09-03 | End: 2019-09-16 | Stop reason: HOSPADM

## 2019-09-03 RX ORDER — ONDANSETRON 4 MG/1
4 TABLET, FILM COATED ORAL EVERY 6 HOURS PRN
Status: DISCONTINUED | OUTPATIENT
Start: 2019-09-03 | End: 2019-09-16 | Stop reason: HOSPADM

## 2019-09-03 RX ORDER — SODIUM CHLORIDE 0.9 % (FLUSH) 0.9 %
10 SYRINGE (ML) INJECTION AS NEEDED
Status: DISCONTINUED | OUTPATIENT
Start: 2019-09-03 | End: 2019-09-16 | Stop reason: HOSPADM

## 2019-09-03 RX ORDER — ONDANSETRON 2 MG/ML
4 INJECTION INTRAMUSCULAR; INTRAVENOUS EVERY 6 HOURS PRN
Status: DISCONTINUED | OUTPATIENT
Start: 2019-09-03 | End: 2019-09-16 | Stop reason: HOSPADM

## 2019-09-03 RX ORDER — SODIUM CHLORIDE 9 MG/ML
75 INJECTION, SOLUTION INTRAVENOUS ONCE
Status: COMPLETED | OUTPATIENT
Start: 2019-09-03 | End: 2019-09-03

## 2019-09-03 RX ORDER — ATORVASTATIN CALCIUM 10 MG/1
10 TABLET, FILM COATED ORAL DAILY
Status: DISCONTINUED | OUTPATIENT
Start: 2019-09-03 | End: 2019-09-16 | Stop reason: HOSPADM

## 2019-09-03 RX ADMIN — LIDOCAINE HYDROCHLORIDE 1 ML: 20 JELLY TOPICAL at 00:45

## 2019-09-03 RX ADMIN — SODIUM CHLORIDE 75 ML/HR: 9 INJECTION, SOLUTION INTRAVENOUS at 04:00

## 2019-09-03 RX ADMIN — ATORVASTATIN CALCIUM 10 MG: 10 TABLET, FILM COATED ORAL at 08:25

## 2019-09-03 RX ADMIN — CEFTRIAXONE 1 G: 1 INJECTION, POWDER, FOR SOLUTION INTRAMUSCULAR; INTRAVENOUS at 03:32

## 2019-09-03 RX ADMIN — ASPIRIN 81 MG: 81 TABLET, COATED ORAL at 08:25

## 2019-09-03 RX ADMIN — SODIUM CHLORIDE, PRESERVATIVE FREE 10 ML: 5 INJECTION INTRAVENOUS at 22:01

## 2019-09-03 NOTE — H&P
[facility] Medicine Services  HISTORY AND PHYSICAL    Patient Name: Chaitanya Browne  : 1923  MRN: [id number]  Primary Care Physician: Dirk Russ MD    Subjective   Subjective     Chief Complaint:  hematuria    HPI:  Chaitanya Browne is a 89 y.o. male with a past medical history significant for hyperlipidemia, BPH, and essential hypertension presents to the ED with complaints of hematuria.  Patient reports gross hematuria that has been ongoing for three days. He reports that he is very active and goes to the [facility] six days a week but has not been able to because of dysuria. Non-productive cough (improved) and hematuria.  He reports a prior episode of hematuria but reports this was secondary to prostatitis.  He denies any fever, chills, shortness of air, chest pain, nausea, vomiting, diarrhea or flank pain.  He does however note lower abdominal pain.  UA obtained tonight reveals large amount of blood, positive nitrites and moderate leuks.  Patient was started on rocephin in the  ED with continuous bladder irrigation.  Patient will be admitted to [facility] under the care of the Hospitalist for further evaluation and treatment.     Review of Systems   Constitutional: Negative for activity change, appetite change, chills, diaphoresis, fatigue, fever and unexpected weight change.   HENT: Positive for congestion.    Respiratory: Positive for cough (persistent cough, dry ). Negative for shortness of breath and wheezing.    Cardiovascular: Positive for leg swelling. Negative for chest pain and palpitations.   Gastrointestinal: Positive for abdominal pain. Negative for blood in stool, diarrhea, nausea and vomiting.   Genitourinary: Positive for difficulty urinating, frequency, hematuria and urgency.   Musculoskeletal: Negative for back pain.   Skin: Negative.  Negative for wound.   Neurological: Negative for dizziness, tremors and weakness.   Psychiatric/Behavioral: Negative.         Otherwise  10-system ROS reviewed and is negative except as mentioned in the HPI.    Personal History     Past Medical History:   Diagnosis Date   • Cataracts, bilateral     bilateralcataract extraction and lens implantation 2013   • History of disease     bilateral lacrimal gland dysfunction per Dr Fajardo   • Infection     infected big toe; I and D DR Alvares 2013   • Pneumonia 2009       Past Surgical History:   Procedure Laterality Date   • APPENDECTOMY     • CATARACT EXTRACTION, BILATERAL  2013    and lens implantation   • CYSTOSCOPY TRANSURETHRAL RESECTION OF PROSTATE  1989   • INCISION AND DRAINAGE FOOT  2013    infected big toe Dr Alvares       Family History: family history includes Coronary artery disease in his brother and father.     Social History:  reports that he has never smoked. He does not have any smokeless tobacco history on file. He reports that he does not drink alcohol or use drugs.    Medications:    No current facility-administered medications on file prior to encounter.      Current Outpatient Medications on File Prior to Encounter   Medication Sig Dispense Refill   • aspirin (ASPIRIN LOW DOSE) 81 MG tablet Take  by mouth Daily.     • azelastine (ASTELIN) 0.1 % nasal spray 2 sprays into the nostril(s) as directed by provider 2 (Two) Times a Day. 30 mL 11   • Cholecalciferol (VITAMIN D3) 1000 units capsule Take  by mouth.     • Menthol, Topical Analgesic, 4 % gel Apply  topically to the appropriate area as directed.     • Multiple Vitamin (MULTI-VITAMIN DAILY) tablet Take  by mouth Daily.     • pravastatin (PRAVACHOL) 40 MG tablet Take 1 tablet by mouth Daily. 90 tablet 3         No Known Allergies    Objective   Objective     Vital Signs:   Temp:  [98.8 °F (37.1 °C)] 98.8 °F (37.1 °C)  Heart Rate:  [] 101  Resp:  [20] 20  BP: (117-143)/(56-81) 117/56        Physical Exam   Constitutional: He is oriented to person, place, and time. He appears well-developed and well-nourished. No distress.    Alert and oriented x4   HENT:   Head: Normocephalic and atraumatic.   Eyes: Conjunctivae and EOM are normal. Pupils are equal, round, and reactive to light. Right eye exhibits no discharge. Left eye exhibits no discharge. No scleral icterus.   Neck: Normal range of motion. Neck supple. No JVD present. No tracheal deviation present. No thyromegaly present.   Cardiovascular: Normal rate, regular rhythm and intact distal pulses. Exam reveals no gallop and no friction rub.   Murmur heard.  Pulmonary/Chest: Effort normal and breath sounds normal. No stridor. No respiratory distress. He has no wheezes. He has no rales. He exhibits no tenderness.   Abdominal: Soft. Bowel sounds are normal. He exhibits no distension and no mass. There is tenderness. There is no rebound and no guarding. No hernia.   Suprapubic and LUQ tenderness with palpation.    Musculoskeletal: Normal range of motion. He exhibits edema. He exhibits no tenderness or deformity.   2+ pitting edema to bilateral lower extremities L>R   Lymphadenopathy:     He has no cervical adenopathy.   Neurological: He is alert and oriented to person, place, and time.   Skin: Skin is warm and dry. No rash noted. He is not diaphoretic. No erythema. No pallor.   Psychiatric: He has a normal mood and affect. His behavior is normal. Judgment and thought content normal.   Nursing note and vitals reviewed.       Results Reviewed:  I have personally reviewed current lab, radiology, and data and agree.    Results from last 7 days   Lab Units 09/02/19 2052   WBC 10*3/mm3 11.59*   HEMOGLOBIN g/dL 12.4*   HEMATOCRIT % 38.7   PLATELETS 10*3/mm3 336     Results from last 7 days   Lab Units 09/02/19 2052   SODIUM mmol/L 142   POTASSIUM mmol/L 4.8   CHLORIDE mmol/L 102   CO2 mmol/L 24.0   BUN mg/dL 33*   CREATININE mg/dL 1.77*   GLUCOSE mg/dL 168*   CALCIUM mg/dL 9.5   ALT (SGPT) U/L 12   AST (SGOT) U/L 27     No results found for: BNP  No results found for: PHART, PCO2  Imaging  Results (last 24 hours)     Procedure Component Value Units Date/Time    XR Chest 2 View [[id number]] Collected:  09/02/19 2131     Updated:  09/02/19 2133    Narrative:       CR Chest 2 Vws    INDICATION:    89-year-old male in the ED tonight with cough and hypoxia symptoms. Symptoms last couple of days. Paralyzed right diaphragm.    COMPARISON:    10/22/2018    FINDINGS:   PA and lateral views of the chest.  Cardiac silhouette is stable with a tortuous thoracic aorta. Unremarkable vascularity. Low lung volumes. Persistent eventration/elevation of the right hemidiaphragm and probable right basilar atelectasis and chronic  pleural reaction or chronic right effusion. No pneumothorax. Chronic rib fractures. Old healed granulomatous disease. Lateral view demonstrates imaging findings suggestive of diffuse idiopathic skeletal hyperostosis or ankylosing spondylitis.        Impression:         1. Low lung volumes with chronic eventration/elevation of the right hemidiaphragm and persistent opacities in the right lung base. No definite superimposed active disease. Old healed granulomatous disease.    Signer Name: Abe Atkins MD   Signed: 9/2/2019 9:31 PM   Workstation Name: [id number]    [facility]             Assessment/Plan   Assessment / Plan     Active Hospital Problems    Diagnosis   • **Sepsis due to urinary tract infection (CMS/HCC)   • Gross hematuria   • Acute UTI (urinary tract infection)   • BPH without urinary obstruction   • Essential hypertension   • Hyperlipidemia LDL goal <100         Assessment & Plan:  89 year old male presents with gross hematuria ongoing for three days.     1) Urosepsis  -UA as noted above  -Urine culture pending  -continue rocephin  -lactic acid 2.3-1.7 improved with IVF  -continue IVF  -repeat labs in am    2) Gross hematuria  -etiology unclear  -3 way FC placed in the ED, continue CBI  -consult urology in am  -CT of abdomen and pelvis pending  -H&H  stable  -type and screen    3) Elevated troponin  -troponin 0.162, 0.148  -continue ASA  -EKG unremarkable  -consult cardiology in am  -likely type II in setting of sepsis  -continue to trend troponin and EKG  -Get 2D echo in the am    4) ABBY  -creatinine baseline 0.99, currently 1.7  -likely secondary to #1  -continue IVF  -hold nephrotoxic medications  -repeat labs in am    5) Hyperlipidemia  -on pravachol          DVT prophylaxis:scds    CODE STATUS:  Full code   Code Status and Medical Interventions:   Ordered at: 09/03/19 0248     Level Of Support Discussed With:    Patient     Code Status:    CPR     Medical Interventions (Level of Support Prior to Arrest):    Full       Admission Status:  I believe this patient meets INPATIENT status due to UTI and gross hematuria and elevated trop.  I feel patient’s risk for adverse outcomes and need for care warrant INPATIENT evaluation and predict the patient’s care encounter to likely last beyond 2 midnights.    BRUCE Petersen   09/03/19   2:47 AM      Brief Attending Admission Attestation     I have seen and examined the patient, performing an independent face-to-face diagnostic evaluation with plan of care reviewed and developed with the advanced practice clinician (APC).      Brief Summary Statement:   Chaitanya Browne is a vibrant pleasant 89 y.o. male with PMHx basal cell carcinoma, HLD, BPH and HTN. 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 Pt presents to [facility] ED with gross hematuria for 3 days. Also with new nonproductive cough and nasal congestion. Pt has had some suprapubic abdominal pain but not N/V/C/D. UA in ED noted large blood and positive nitrites and LE. Pt was having retension and when he did void passed a large clot. Therefore CBI was initiated and a large amount of blood was noted. Pt was also not to have UTI. Pt will be admitted and we will consult urology in the am. Also will  consult cardiology due to elevated trop.       Remainder of detailed HPI is as noted above and has been reviewed and/or edited by me for completeness.      Attending Physical Exam:  Constitutional: No acute distress, awake, alert  HENT: NCAT, mucous membranes moist  Respiratory: Clear to auscultation bilaterally, respiratory effort normal   Cardiovascular: RRR, no murmurs, rubs, or gallops, palpable pedal pulses bilaterally  Gastrointestinal: Positive bowel sounds, soft, nontender, nondistended  Musculoskeletal: 2 plus pitting edema   Psychiatric: Appropriate affect, cooperative  Neurologic: Oriented x 3, strength symmetric in all extremities, Cranial Nerves grossly intact to confrontation, speech clear  Skin: No rashes        Brief Assessment/Plan :  See above for further detailed assessment and plan developed with APC which I have reviewed and/or edited for completeness.      Electronically signed by Socorro Olson DO, 09/03/19, 3:30 AM.

## 2019-09-03 NOTE — PLAN OF CARE
Problem: Patient Care Overview  Goal: Plan of Care Review  Outcome: Ongoing (interventions implemented as appropriate)   09/03/19 1706   Coping/Psychosocial   Plan of Care Reviewed With patient   Plan of Care Review   Progress no change   OTHER   Outcome Summary Continuous bladder irrigation continues with return cherry to pink. IVF's infusing at 75 per hour. Remains sinus on the monitor.        Problem: Fall Risk (Adult)  Goal: Identify Related Risk Factors and Signs and Symptoms  Outcome: Outcome(s) achieved Date Met: 09/03/19 09/03/19 0522   Fall Risk (Adult)   Related Risk Factors (Fall Risk) age-related changes;bladder function altered;depression/anxiety;gait/mobility problems;sleep pattern alteration;environment unfamiliar   Signs and Symptoms (Fall Risk) presence of risk factors     Goal: Absence of Fall  Outcome: Ongoing (interventions implemented as appropriate)   09/03/19 1706   Fall Risk (Adult)   Absence of Fall making progress toward outcome       Problem: Skin Injury Risk (Adult)  Goal: Identify Related Risk Factors and Signs and Symptoms  Outcome: Ongoing (interventions implemented as appropriate)   09/03/19 0522   Skin Injury Risk (Adult)   Related Risk Factors (Skin Injury Risk) advanced age;fluid intake inadequate;infection;mobility impaired;moisture;nutritional deficiencies;tissue perfusion altered     Goal: Skin Health and Integrity  Outcome: Ongoing (interventions implemented as appropriate)   09/03/19 1706   Skin Injury Risk (Adult)   Skin Health and Integrity making progress toward outcome

## 2019-09-03 NOTE — PROGRESS NOTES
Discharge Planning Assessment  [facility]     Patient Name: Chaitanya Browne  MRN: [id number]  Today's Date: 9/3/2019    Admit Date: 9/2/2019    Discharge Needs Assessment     Row Name 09/03/19 1626       Living Environment    Lives With  alone    Current Living Arrangements  home/apartment/condo    Family Caregiver if Needed  child(aguila), adult    Family Caregiver Names  Daughter:  Nkechi    Quality of Family Relationships  helpful;involved;supportive    Able to Return to Prior Arrangements  yes    Living Arrangement Comments  Mr. Browne lives alone in a one story home, one step to enter, in [facility].        Resource/Environmental Concerns    Transportation Concerns  car, none       Transition Planning    Patient/Family Anticipates Transition to  home with family    Patient/Family Anticipated Services at Transition  none    Transportation Anticipated  family or friend will provide       Discharge Needs Assessment    Readmission Within the Last 30 Days  no previous admission in last 30 days    Equipment Currently Used at Home  none    Equipment Needed After Discharge  none        Discharge Plan     Row Name 09/03/19 1627       Plan    Plan  Home with daughter's assistance    Patient/Family in Agreement with Plan  yes    Plan Comments  Met with Mr. Browne and his daughter, Nkechi, at the bedside for discharge planing.  Mr. Browne lives alone, ambulated independently, prior to admission.  He is a very active 89 yo and excercises 6X per week.  He denies any DME or home health needs.  Nkechi stated that she can assist him at home as needed and will transport him home at discharge.  Mr. Browne has a urology consult pending.  CM will continue to follow and assist with DC needs.      Final Discharge Disposition Code  01 - home or self-care        Destination      No service coordination in this encounter.      Durable Medical Equipment      No service coordination in this encounter.      Dialysis/Infusion      No  service coordination in this encounter.      Home Medical Care      No service coordination in this encounter.      Therapy      No service coordination in this encounter.      Community Resources      No service coordination in this encounter.        Expected Discharge Date and Time     Expected Discharge Date Expected Discharge Time    Sep 5, 2019         Demographic Summary     Row Name 09/03/19 1622       General Information    Admission Type  inpatient    Arrived From  home    Reason for Consult  discharge planning    General Information Comments  PCP:  Dirk Russ       Contact Information    Permission Granted to Share Info With      Contact Information Comments  Daughter:  Nkechi Browne, ph [phone]        Functional Status     Row Name 09/03/19 1625       Functional Status    Usual Activity Tolerance  good    Current Activity Tolerance  good       Functional Status, IADL    Medications  independent    Meal Preparation  independent    Housekeeping  independent    Laundry  independent    Shopping  independent       Mental Status    General Appearance WDL  WDL        Psychosocial    No documentation.       Abuse/Neglect    No documentation.       Legal     Row Name 09/03/19 1626       Financial/Legal    Who Manages Finances if Patient Unable  Advance directives scanned to chart.    Finance Comments  Mr. Browne has prescription drug coverage [facility] Medicare.  Verified insurance with patient.        Substance Abuse    No documentation.       Patient Forms    No documentation.           Lupe Yeager RN

## 2019-09-03 NOTE — ED PROVIDER NOTES
Subjective   Mr. Chaitanya Browne is a 89 y.o. male who presents to the ED with c/o hematuria. He reports for the past 3 days he has experienced hematuria he describes as bright red blood in his urine. He also complains of dysuria, chronic back pain, and nonproductive cough for the past 6 days. He states the cough has been improving and he has not been treated for it. He denies fever, chills, abdominal pain, shortness of breath, chest pain, leg swelling, and flank pain. He has a history of prostate issues. He denies a history of UTI, cancer, COPD, and emphysema. There are no other acute complaints at this time. He takes Aspirin daily and is not on antibiotics or anticoagulants. He had blood and urine testing performed at the office of his primary care provider, Dr. Russ, 5 days ago. He is a nonsmoker. There are no other acute complaints at this time.        History provided by:  Patient  Blood in Urine   This is a new problem. The current episode started in the past 7 days. The problem is unchanged. Urine color: bright red. Associated symptoms include dysuria. Pertinent negatives include no abdominal pain, chills, fever, flank pain, nausea or vomiting. There is no history of tobacco use.       Review of Systems   Constitutional: Negative for chills and fever.   Respiratory: Positive for cough. Negative for shortness of breath.    Cardiovascular: Negative for chest pain.   Gastrointestinal: Negative for abdominal pain, nausea and vomiting.   Genitourinary: Positive for dysuria and hematuria. Negative for flank pain.   Musculoskeletal: Positive for back pain (chronic).   All other systems reviewed and are negative.      Past Medical History:   Diagnosis Date   • Cataracts, bilateral     bilateralcataract extraction and lens implantation 2013   • History of disease     bilateral lacrimal gland dysfunction per Dr Fajardo   • Infection     infected big toe; I and D DR Alvares 2013   • Pneumonia 2009       No Known  Allergies    Past Surgical History:   Procedure Laterality Date   • APPENDECTOMY     • CATARACT EXTRACTION, BILATERAL  2013    and lens implantation   • CYSTOSCOPY TRANSURETHRAL RESECTION OF PROSTATE  1989   • INCISION AND DRAINAGE FOOT  2013    infected big toe Dr Alvares       Family History   Problem Relation Age of Onset   • Coronary artery disease Father    • Coronary artery disease Brother        Social History     Socioeconomic History   • Marital status:      Spouse name: Not on file   • Number of children: Not on file   • Years of education: Not on file   • Highest education level: Not on file   Tobacco Use   • Smoking status: Never Smoker         Objective   Physical Exam   Constitutional: He is oriented to person, place, and time. He appears well-developed and well-nourished. He is cooperative.  Non-toxic appearance. No distress.   HENT:   Head: Normocephalic and atraumatic.   Nose: Nose normal.   Mouth/Throat: Oropharynx is clear and moist.   Eyes: Conjunctivae, EOM and lids are normal. Pupils are equal, round, and reactive to light.   Neck: Trachea normal, normal range of motion and full passive range of motion without pain.   Cardiovascular: Regular rhythm, normal heart sounds, intact distal pulses and normal pulses.   Pulmonary/Chest: Effort normal. No respiratory distress. He has no decreased breath sounds. He has no wheezes. He has rhonchi in the right upper field and the right lower field. He has no rales.   Abdominal: Soft. Normal appearance and bowel sounds are normal. He exhibits no distension. There is no tenderness.   Musculoskeletal: Normal range of motion.   Neurological: He is alert and oriented to person, place, and time. He has normal strength. No cranial nerve deficit.   Skin: Skin is warm, dry and intact. No rash noted.   Psychiatric: He has a normal mood and affect. His speech is normal and behavior is normal.   Nursing note and vitals reviewed.      Procedures         ED  Course  ED Course as of Sep 03 0324   Mon Sep 02, 2019   2140 Lactate: (!!) 2.3 [KG]   2140 Troponin T: (!!) 0.162 [KG]   2140 WBC: (!) 11.59 [KG]   2338 Bladder scanned 314ml.   [KG]   2359 Arnaldo Hilton MD.  I saw and evaluated this patient in the emergency department.  I feel he needs to be admitted for several reasons to include hematuria/bladder outlet obstruction, acute renal insufficiency, elevated troponin.  Patient is nontoxic at this point.  He is agreeable with plan.  [MS]      ED Course User Index  [KG] Tessa Phelps, APRN  [MS] Arnaldo Hilton MD       Recent Results (from the past 24 hour(s))   Comprehensive Metabolic Panel    Collection Time: 09/02/19  8:52 PM   Result Value Ref Range    Glucose 168 (H) 65 - 99 mg/dL    BUN 33 (H) 8 - 23 mg/dL    Creatinine 1.77 (H) 0.76 - 1.27 mg/dL    Sodium 142 136 - 145 mmol/L    Potassium 4.8 3.5 - 5.2 mmol/L    Chloride 102 98 - 107 mmol/L    CO2 24.0 22.0 - 29.0 mmol/L    Calcium 9.5 8.2 - 9.6 mg/dL    Total Protein 7.1 6.0 - 8.5 g/dL    Albumin 4.00 3.50 - 5.20 g/dL    ALT (SGPT) 12 1 - 41 U/L    AST (SGOT) 27 1 - 40 U/L    Alkaline Phosphatase 83 39 - 117 U/L    Total Bilirubin 1.0 0.2 - 1.2 mg/dL    eGFR Non African Amer 36 (L) >60 mL/min/1.73    eGFR  African Amer 43 (L) >60 mL/min/1.73    Globulin 3.1 gm/dL    A/G Ratio 1.3 g/dL    BUN/Creatinine Ratio 18.6 7.0 - 25.0    Anion Gap 16.0 (H) 5.0 - 15.0 mmol/L   CBC Auto Differential    Collection Time: 09/02/19  8:52 PM   Result Value Ref Range    WBC 11.59 (H) 3.40 - 10.80 10*3/mm3    RBC 3.98 (L) 4.14 - 5.80 10*6/mm3    Hemoglobin 12.4 (L) 13.0 - 17.7 g/dL    Hematocrit 38.7 37.5 - 51.0 %    MCV 97.2 (H) 79.0 - 97.0 fL    MCH 31.2 26.6 - 33.0 pg    MCHC 32.0 31.5 - 35.7 g/dL    RDW 13.2 12.3 - 15.4 %    RDW-SD 47.2 37.0 - 54.0 fl    MPV 10.0 6.0 - 12.0 fL    Platelets 336 140 - 450 10*3/mm3    Neutrophil % 84.8 (H) 42.7 - 76.0 %    Lymphocyte % 5.5 (L) 19.6 - 45.3 %    Monocyte % 9.1 5.0 - 12.0 %     Eosinophil % 0.0 (L) 0.3 - 6.2 %    Basophil % 0.1 0.0 - 1.5 %    Immature Grans % 0.5 0.0 - 0.5 %    Neutrophils, Absolute 9.83 (H) 1.70 - 7.00 10*3/mm3    Lymphocytes, Absolute 0.64 (L) 0.70 - 3.10 10*3/mm3    Monocytes, Absolute 1.05 (H) 0.10 - 0.90 10*3/mm3    Eosinophils, Absolute 0.00 0.00 - 0.40 10*3/mm3    Basophils, Absolute 0.01 0.00 - 0.20 10*3/mm3    Immature Grans, Absolute 0.06 (H) 0.00 - 0.05 10*3/mm3    nRBC 0.0 0.0 - 0.2 /100 WBC   Lactic Acid, Plasma    Collection Time: 09/02/19  8:52 PM   Result Value Ref Range    Lactate 2.3 (C) 0.5 - 2.0 mmol/L   BNP    Collection Time: 09/02/19  8:52 PM   Result Value Ref Range    proBNP 1,189.0 5.0-1,800.0 pg/mL   Troponin    Collection Time: 09/02/19  8:52 PM   Result Value Ref Range    Troponin T 0.162 (C) 0.000 - 0.030 ng/mL   Lactic Acid, Reflex Timer (This will reflex a repeat order 3-3:15 hours after ordered.)    Collection Time: 09/02/19  8:52 PM   Result Value Ref Range    Extra Tube Hold for add-ons.    Troponin    Collection Time: 09/02/19 11:03 PM   Result Value Ref Range    Troponin T 0.148 (C) 0.000 - 0.030 ng/mL   Lactic Acid, Reflex    Collection Time: 09/03/19  1:06 AM   Result Value Ref Range    Lactate 1.7 0.5 - 2.0 mmol/L   Urinalysis With Culture If Indicated - Urine, Catheter    Collection Time: 09/03/19  1:12 AM   Result Value Ref Range    Color, UA Orange (A) Yellow, Straw    Appearance, UA Turbid (A) Clear    pH, UA 6.5 5.0 - 8.0    Specific Gravity, UA 1.028 1.001 - 1.030    Glucose, UA Negative Negative    Ketones, UA Negative Negative    Bilirubin, UA Moderate (2+) (A) Negative    Blood, UA Large (3+) (A) Negative    Protein, UA >=300 mg/dL (3+) (A) Negative    Leuk Esterase, UA Moderate (2+) (A) Negative    Nitrite, UA Positive (A) Negative    Urobilinogen, UA 0.2 E.U./dL 0.2 - 1.0 E.U./dL   Urinalysis, Microscopic Only - Urine, Catheter    Collection Time: 09/03/19  1:12 AM   Result Value Ref Range    RBC, UA Too Numerous to Count  (A) None Seen, 0-2 /HPF    WBC, UA Unable to determine due to loaded field (A) None Seen, 0-2 /HPF    Bacteria, UA Unable to determine due to loaded field (A) None Seen, Trace /HPF    Squamous Epithelial Cells, UA Unable to determine due to loaded field (A) None Seen, 0-2 /HPF    Methodology Automated Microscopy      Note: In addition to lab results from this visit, the labs listed above may include labs taken at another facility or during a different encounter within the last 24 hours. Please correlate lab times with ED admission and discharge times for further clarification of the services performed during this visit.    XR Chest 2 View   Final Result      1. Low lung volumes with chronic eventration/elevation of the right hemidiaphragm and persistent opacities in the right lung base. No definite superimposed active disease. Old healed granulomatous disease.      Signer Name: Abe Atkins MD    Signed: 9/2/2019 9:31 PM    Workstation Name: [facility]     [facility]      CT Abdomen Pelvis Without Contrast    (Results Pending)     Vitals:    09/03/19 0000 09/03/19 0200 09/03/19 0230 09/03/19 0300   BP: 140/81 121/57 117/56 103/60   Pulse:  100 101 100   Resp:       Temp:       TempSrc:       SpO2: 95% 95% 92% 93%   Weight:       Height:         Medications   sodium chloride 0.9 % flush 10 mL (not administered)   cefTRIAXone (ROCEPHIN) 1 g/100 mL 0.9% NS (MBP) (not administered)   sodium chloride 0.9 % bolus 1,000 mL (0 mL Intravenous Stopped 9/3/19 0118)   lidocaine (XYLOCAINE) 2 % jelly (1 mL Topical Given 9/3/19 0045)     ECG/EMG Results (last 24 hours)     ** No results found for the last 24 hours. **        ECG 12 Lead         ECG 12 Lead                         MDM    Final diagnoses:   ABBY (acute kidney injury) (CMS/[facility])   Hematuria, unspecified type   Bladder outlet obstruction   Elevated troponin       Documentation assistance provided by yane Lopez.  Information  recorded by the scribe was done at my direction and has been verified and validated by me.     Marlee Lopez  09/02/19 2052       Marlee Lopez  09/02/19 2110       Tessa Phelps APRN  09/03/19 8400

## 2019-09-03 NOTE — PLAN OF CARE
Problem: Patient Care Overview  Goal: Plan of Care Review  Outcome: Ongoing (interventions implemented as appropriate)   09/03/19 0522   Coping/Psychosocial   Plan of Care Reviewed With patient   Plan of Care Review   Progress no change      09/03/19 0522   Coping/Psychosocial   Plan of Care Reviewed With patient   Plan of Care Review   Progress no change   OTHER   Outcome Summary received report from ED staff. Patient arrived to room via ED gurney, AAO, VSS and in no apparent distress. rojas in place and bladder being irrigated draining strawberry colored urine. Rocephin infusing via IV access in RAC. pt in good spirits denies pain. pt oriented to Call button system and room orientation. will continue to monitor       Problem: Fall Risk (Adult)  Goal: Identify Related Risk Factors and Signs and Symptoms  Outcome: Ongoing (interventions implemented as appropriate)    Goal: Absence of Fall  Outcome: Ongoing (interventions implemented as appropriate)      Problem: Skin Injury Risk (Adult)  Goal: Identify Related Risk Factors and Signs and Symptoms  Outcome: Ongoing (interventions implemented as appropriate)

## 2019-09-03 NOTE — CONSULTS
Urology Consult    Referring Provider:Petty  Reason for Consultation:   Hematuria    Patient Care Team:  Dirk Russ MD as PCP - General (Internal Medicine)    Chief complaint blood in the urine for 3 days    Subjective .     History of present illness: Patient presents with a 3-day history of gross hematuria per there is no history of previous bleeding.  Other than prostatitis many years ago the patient has a negative urologic history.  There is no history of previous kidney or bladder problems.  He has been voiding with a fairly good stream he denied any hematuria.  He denied dysuria.  He seemed to void without significant straining.  Nocturia 1-2 times at night.  He has had some blood clot formation since the onset of the bleeding    Review of Systems  Pertinent items are noted in HPI, all other systems reviewed and negative    History  Past Medical History:   Diagnosis Date   • Basal cell carcinoma (BCC) of left side of nose    • BPH (benign prostatic hyperplasia)    • Cataracts, bilateral     bilateralcataract extraction and lens implantation 2013   • High cholesterol    • History of disease     bilateral lacrimal gland dysfunction per Dr Fajardo   • Hypertension    • Infection     infected big toe; I and D DR Alvares 2013   • Pneumonia 2009       Objective     Vital Signs   Temp:  [98 °F (36.7 °C)-98.8 °F (37.1 °C)] 98 °F (36.7 °C)  Heart Rate:  [] 89  Resp:  [16-20] 16  BP: (103-143)/(52-81) 107/69    Physical Exam:     General Appearance:    Alert, cooperative, in no acute distress   Head:    Normocephalic, without obvious abnormality, atraumatic   Eyes:            Lids and lashes normal, conjunctivae and sclerae normal, no   icterus, no pallor, corneas clear, PERRLA   Ears:    Ears appear intact with no abnormalities noted   Throat:   No oral lesions, no thrush, oral mucosa moist   Neck:   No adenopathy, supple, trachea midline, no thyromegaly, no   carotid bruit, no JVD   Back:     No  kyphosis present, no scoliosis present, no skin lesions,      erythema or scars, no tenderness to percussion or                   palpation,   range of motion normal   Lungs:     Clear to auscultation,respirations regular, even and                  unlabored    Heart:    Regular rhythm and normal rate, normal S1 and S2, no            murmur, no gallop, no rub, no click   Chest Wall:    No abnormalities observed   Abdomen:     Normal bowel sounds, no masses, no organomegaly, soft        non-tender, non-distended, no guarding, no rebound                tenderness   Rectal:     Deferred   Extremities:   Moves all extremities well, no edema, no cyanosis, no             redness   Pulses:   Pulses palpable and equal bilaterally   Skin:   No bleeding, bruising or rash   Lymph nodes:   No palpable adenopathy   Neurologic:   Cranial nerves 2 - 12 grossly intact, sensation intact, DTR       present and equal bilaterally       Results Review:   I reviewed the patient's new clinical results.    Lab Results (last 72 hours)     Procedure Component Value Units Date/Time    CBC Auto Differential [[id number]]  (Abnormal) Collected:  09/03/19 0451    Specimen:  Blood Updated:  09/03/19 0637     WBC 10.37 10*3/mm3      RBC 3.17 10*6/mm3      Hemoglobin 10.0 g/dL      Hematocrit 31.1 %      MCV 98.1 fL      MCH 31.5 pg      MCHC 32.2 g/dL      RDW 13.3 %      RDW-SD 47.6 fl      MPV 10.4 fL      Platelets 276 10*3/mm3      Neutrophil % 74.8 %      Lymphocyte % 8.8 %      Monocyte % 15.6 %      Eosinophil % 0.0 %      Basophil % 0.1 %      Immature Grans % 0.7 %      Neutrophils, Absolute 7.76 10*3/mm3      Lymphocytes, Absolute 0.91 10*3/mm3      Monocytes, Absolute 1.62 10*3/mm3      Eosinophils, Absolute 0.00 10*3/mm3      Basophils, Absolute 0.01 10*3/mm3      Immature Grans, Absolute 0.07 10*3/mm3      nRBC 0.0 /100 WBC     Basic Metabolic Panel [[id number]]  (Abnormal) Collected:  09/03/19 0451    Specimen:  Blood Updated:  09/03/19  0623     Glucose 141 mg/dL      BUN 36 mg/dL      Creatinine 1.69 mg/dL      Sodium 142 mmol/L      Potassium 4.5 mmol/L      Chloride 108 mmol/L      CO2 21.0 mmol/L      Calcium 8.4 mg/dL      eGFR  African Amer 46 mL/min/1.73      eGFR Non African Amer 38 mL/min/1.73      BUN/Creatinine Ratio 21.3     Anion Gap 13.0 mmol/L     Narrative:       GFR Normal >60  Chronic Kidney Disease <60  Kidney Failure <15    Lipid Panel [[id number]] Collected:  09/03/19 0451    Specimen:  Blood Updated:  09/03/19 0623     Total Cholesterol 122 mg/dL      Triglycerides 71 mg/dL      HDL Cholesterol 49 mg/dL      LDL Cholesterol  59 mg/dL      VLDL Cholesterol 14.2 mg/dL      LDL/HDL Ratio 1.20    Narrative:       Cholesterol Reference Ranges  (U.S. Department of Health and Human Services ATP III Classifications)    Desirable          <200 mg/dL  Borderline High    200-239 mg/dL  High Risk          >240 mg/dL      Triglyceride Reference Ranges  (U.S. Department of Health and Human Services ATP III Classifications)    Normal           <150 mg/dL  Borderline High  150-199 mg/dL  High             200-499 mg/dL  Very High        >500 mg/dL    HDL Reference Ranges  (U.S. Department of Health and Human Services ATP III Classifcations)    Low     <40 mg/dl (major risk factor for CHD)  High    >60 mg/dl ('negative' risk factor for CHD)        LDL Reference Ranges  (U.S. Department of Health and Human Services ATP III Classifcations)    Optimal          <100 mg/dL  Near Optimal     100-129 mg/dL  Borderline High  130-159 mg/dL  High             160-189 mg/dL  Very High        >189 mg/dL    Troponin [[id number]]  (Abnormal) Collected:  09/03/19 0451    Specimen:  Blood Updated:  09/03/19 0611     Troponin T 0.166 ng/mL     Narrative:       Troponin T Reference Range:  <= 0.03 ng/mL-   Negative for AMI  >0.03 ng/mL-     Abnormal for myocardial necrosis.  Clinicians would have to utilize clinical acumen, EKG, Troponin and serial changes to  determine if it is an Acute Myocardial Infarction or myocardial injury due to an underlying chronic condition.     PSA, Total & Free [[id number]] Collected:  09/03/19 0451    Specimen:  Blood Updated:  09/03/19 0541    Urinalysis, Microscopic Only - Urine, Catheter [[id number]]  (Abnormal) Collected:  09/03/19 0112    Specimen:  Urine, Catheter Updated:  09/03/19 0242     RBC, UA Too Numerous to Count /HPF      WBC, UA       Unable to determine due to loaded field     /HPF     Bacteria, UA       Unable to determine due to loaded field     /HPF     Squamous Epithelial Cells, UA       Unable to determine due to loaded field     /HPF     Methodology Automated Microscopy    Urine Culture - Urine, Urine, Catheter [[id number]] Collected:  09/03/19 0112    Specimen:  Urine, Catheter Updated:  09/03/19 0242    Urinalysis With Culture If Indicated - Urine, Catheter [[id number]]  (Abnormal) Collected:  09/03/19 0112    Specimen:  Urine, Catheter Updated:  09/03/19 0229     Color, UA Dorado     Appearance, UA Turbid     pH, UA 6.5     Specific Gravity, UA 1.028     Glucose, UA Negative     Ketones, UA Negative     Bilirubin, UA Moderate (2+)     Blood, UA Large (3+)     Protein, UA >=300 mg/dL (3+)     Leuk Esterase, UA Moderate (2+)     Nitrite, UA Positive     Urobilinogen, UA 0.2 E.U./dL    Lactic Acid, Reflex [[id number]]  (Normal) Collected:  09/03/19 0106    Specimen:  Blood from Arm, Left Updated:  09/03/19 0212     Lactate 1.7 mmol/L      Comment: Falsely depressed results may occur on samples drawn from patients receiving N-Acetylcysteine (NAC) or Metamizole.       Lactic Acid, Reflex Timer (This will reflex a repeat order 3-3:15 hours after ordered.) [[id number]] Collected:  09/02/19 2052    Specimen:  Blood Updated:  09/03/19 0030     Extra Tube Hold for add-ons.     Comment: Auto resulted.       Troponin [[id number]]  (Abnormal) Collected:  09/02/19 2303    Specimen:  Blood from Arm, Left Updated:  09/02/19 9727      Troponin T 0.148 ng/mL     Narrative:       Troponin T Reference Range:  <= 0.03 ng/mL-   Negative for AMI  >0.03 ng/mL-     Abnormal for myocardial necrosis.  Clinicians would have to utilize clinical acumen, EKG, Troponin and serial changes to determine if it is an Acute Myocardial Infarction or myocardial injury due to an underlying chronic condition.     Blood Culture - Blood, Arm, Right [[id number]] Collected:  09/02/19 2045    Specimen:  Blood from Arm, Right Updated:  09/02/19 2129    Blood Culture - Blood, Arm, Right [[id number]] Collected:  09/02/19 2050    Specimen:  Blood from Arm, Right Updated:  09/02/19 2129    Lactic Acid, Plasma [[id number]]  (Abnormal) Collected:  09/02/19 2052    Specimen:  Blood Updated:  09/02/19 2128     Lactate 2.3 mmol/L      Comment: Falsely depressed results may occur on samples drawn from patients receiving N-Acetylcysteine (NAC) or Metamizole.       Troponin [[id number]]  (Abnormal) Collected:  09/02/19 2052    Specimen:  Blood Updated:  09/02/19 2124     Troponin T 0.162 ng/mL     Narrative:       Troponin T Reference Range:  <= 0.03 ng/mL-   Negative for AMI  >0.03 ng/mL-     Abnormal for myocardial necrosis.  Clinicians would have to utilize clinical acumen, EKG, Troponin and serial changes to determine if it is an Acute Myocardial Infarction or myocardial injury due to an underlying chronic condition.     Comprehensive Metabolic Panel [[id number]]  (Abnormal) Collected:  09/02/19 2052    Specimen:  Blood Updated:  09/02/19 2121     Glucose 168 mg/dL      BUN 33 mg/dL      Creatinine 1.77 mg/dL      Sodium 142 mmol/L      Potassium 4.8 mmol/L      Chloride 102 mmol/L      CO2 24.0 mmol/L      Calcium 9.5 mg/dL      Total Protein 7.1 g/dL      Albumin 4.00 g/dL      ALT (SGPT) 12 U/L      AST (SGOT) 27 U/L      Comment: Specimen hemolyzed.  Results may be affected.        Alkaline Phosphatase 83 U/L      Total Bilirubin 1.0 mg/dL      eGFR Non African Amer 36 mL/min/1.73       eGFR   Amer 43 mL/min/1.73      Globulin 3.1 gm/dL      A/G Ratio 1.3 g/dL      BUN/Creatinine Ratio 18.6     Anion Gap 16.0 mmol/L     Narrative:       GFR Normal >60  Chronic Kidney Disease <60  Kidney Failure <15    BNP [[id number]]  (Normal) Collected:  09/02/19 2052    Specimen:  Blood Updated:  09/02/19 2119     proBNP 1,189.0 pg/mL     Narrative:       Among patients with dyspnea, NT-proBNP is highly sensitive for the detection of acute congestive heart failure. In addition NT-proBNP of <300 pg/ml effectively rules out acute congestive heart failure with 99% negative predictive value.    CBC & Differential [[id number]] Collected:  09/02/19 2052    Specimen:  Blood Updated:  09/02/19 2102    Narrative:       The following orders were created for panel order CBC & Differential.  Procedure                               Abnormality         Status                     ---------                               -----------         ------                     CBC Auto Differential[[id number]]        Abnormal            Final result                 Please view results for these tests on the individual orders.    CBC Auto Differential [[id number]]  (Abnormal) Collected:  09/02/19 2052    Specimen:  Blood Updated:  09/02/19 2102     WBC 11.59 10*3/mm3      RBC 3.98 10*6/mm3      Hemoglobin 12.4 g/dL      Hematocrit 38.7 %      MCV 97.2 fL      MCH 31.2 pg      MCHC 32.0 g/dL      RDW 13.2 %      RDW-SD 47.2 fl      MPV 10.0 fL      Platelets 336 10*3/mm3      Neutrophil % 84.8 %      Lymphocyte % 5.5 %      Monocyte % 9.1 %      Eosinophil % 0.0 %      Basophil % 0.1 %      Immature Grans % 0.5 %      Neutrophils, Absolute 9.83 10*3/mm3      Lymphocytes, Absolute 0.64 10*3/mm3      Monocytes, Absolute 1.05 10*3/mm3      Eosinophils, Absolute 0.00 10*3/mm3      Basophils, Absolute 0.01 10*3/mm3      Immature Grans, Absolute 0.06 10*3/mm3      nRBC 0.0 /100 WBC         Imaging Results (last 24 hours)     Procedure  Component Value Units Date/Time    CT Abdomen Pelvis Without Contrast [[id number]] Collected:  09/03/19 0429     Updated:  09/03/19 0431    Narrative:       INDICATION:   Gross hematuria for 2 to 3 days. Bladder outlet obstruction and history of prostatitis.    TECHNIQUE:   CT of the abdomen and pelvis without contrast. Coronal and sagittal reconstructions were obtained.  Radiation dose reduction techniques included automated exposure control or exposure modulation based on body size. Radiation audit for number of CT and  nuclear cardiology exams performed in the last year: 0.      COMPARISON:   Abdomen and pelvis CT from 7/13/2012.    FINDINGS:  Lung bases: There are emphysematous changes lung bases. There is a noncalcified bilobed nodule in the right middle lobe its 9 mm in dimension. It is stable dating back to 7/13/2012 exam. There is thickening of the peribronchial vascular soft tissues to  the lower lungs and there is airspace disease in the right lower lobe right perihilar region and to lesser extent dependently in the left lower lobe. This could be atelectasis. Please exclude concern for early pneumonia or aspiration. There is evidence  for old granulomatous disease.    Abdomen:  There are multiple granulomata within the liver and spleen.    The adrenal glands are unremarkable.    Pancreas is somewhat atrophic.    There is mild renal parenchymal thinning bilaterally. There is no intrarenal calculus. There is only mild fullness of the renal pelves without karl hydronephrosis. There Is mild left side perinephric stranding    There are gallstones in the gallbladder but nothing to suggest acute cholecystitis.    Atherosclerotic vascular calcifications noted abdominal aorta and branch vessels. No abdominal aortic aneurysm. Ectasia of the infrarenal abdominal aorta measures up to about 2.3 cm AP dimension.    There is no evidence for bowel obstruction.    There is colonic diverticulosis predominantly in the left  colon but there are some cecal diverticula. The appendix is not definitely seen but there is nothing to suggest appendicitis. Minimal stranding in the fat adjacent to the bladder. No free  intraperitoneal air. There are degenerative changes of the spine as well as changes of ankylosing spondylitis.      Pelvis: There is a Torres catheter in the bladder. The bladder is distended and there is likely clot within the bladder with hyperdense material filling the majority of the bladder. This is consistent with gross hematuria history. Additionally the  prostate gland is markedly enlarged suspicion of a nodular component protruding into the bladder base. Urology evaluation recommended to exclude neoplastic disease. There is prostatic enlargement to about 7.4 cm in diameter. The prostate gland measures  as much is 7.8 cm SI dimension. There is no free fluid in the pelvis.      Impression:       #1 there is a large amount of clot within the urinary bladder. There is a Torres catheter in the bladder. The prostate gland is markedly enlarged and somewhat nodular protruding into the bladder base. Urology evaluation recommended. There is fullness of  the renal collecting systems without hydronephrosis. There is no evidence for intrarenal calculus.  2. Colonic diverticulosis without CT evidence for diverticulitis.  3. Ectasia of the infrarenal abdominal aorta to 2.3 cm AP dimension.  4. Gallstones without evidence for cholecystitis.  #5 changes of the spine most consistent with  ankylosing spondylitis.      Signer Name: Karo Fairbanks MD   Signed: 9/3/2019 4:29 AM   Workstation Name: [email or web address]    [facility]    XR Chest 2 View [[id number]] Collected:  09/02/19 2131     Updated:  09/02/19 2133    Narrative:       CR Chest 2 Vws    INDICATION:    89-year-old male in the ED tonight with cough and hypoxia symptoms. Symptoms last couple of days. Paralyzed right diaphragm.    COMPARISON:    10/22/2018    FINDINGS:    PA and lateral views of the chest.  Cardiac silhouette is stable with a tortuous thoracic aorta. Unremarkable vascularity. Low lung volumes. Persistent eventration/elevation of the right hemidiaphragm and probable right basilar atelectasis and chronic  pleural reaction or chronic right effusion. No pneumothorax. Chronic rib fractures. Old healed granulomatous disease. Lateral view demonstrates imaging findings suggestive of diffuse idiopathic skeletal hyperostosis or ankylosing spondylitis.        Impression:         1. Low lung volumes with chronic eventration/elevation of the right hemidiaphragm and persistent opacities in the right lung base. No definite superimposed active disease. Old healed granulomatous disease.    Signer Name: Abe Atkins MD   Signed: 9/2/2019 9:31 PM   Workstation Name: [facility]    [facility]          Medications:  Current Facility-Administered Medications   Medication Dose Route Frequency Provider Last Rate Last Dose   • atorvastatin (LIPITOR) tablet 10 mg  10 mg Oral Daily Sheila Wilson APRN   10 mg at 09/03/19 0825   • [START ON 9/4/2019] cefTRIAXone (ROCEPHIN) 1 g/100 mL 0.9% NS (MBP)  1 g Intravenous Q24H Sheila Wilson APRN       • Menthol (Topical Analgesic) 4 % gel 1 application  1 application Topical 4x Daily PRN Emmett Dove MD   1 application at 09/03/19 1647   • ondansetron (ZOFRAN) tablet 4 mg  4 mg Oral Q6H PRN Sheila Wilson APRN        Or   • ondansetron (ZOFRAN) injection 4 mg  4 mg Intravenous Q6H PRN Sheila Wilson APRN       • sodium chloride 0.9 % flush 10 mL  10 mL Intravenous PRN Tessa Phelps APRN       • sodium chloride 0.9 % flush 10 mL  10 mL Intravenous Q12H Sheila Wilson APRN       • sodium chloride 0.9 % flush 10 mL  10 mL Intravenous PRN Sheila Wilson APRN           Assessment/Plan       Gross hematuria    Hyperlipidemia LDL goal <100    Essential hypertension    BPH without  urinary obstruction    Acute UTI (urinary tract infection)    Acute renal insufficiency    Elevated troponin      Impression: Gross hematuria    Plan: Continue antibiotics for now.  Will need cystoscopy    I discussed the patients findings and my recommendations with patient and family    Ck Woods MD  09/03/19  5:56 PM

## 2019-09-03 NOTE — PROGRESS NOTES
Please see history and physical dated today for full details.  I have seen and examined the patient.  Briefly he is a very active and functional 89-year-old male who presents with 3 days of gross hematuria.  He takes a baby aspirin every other day for general health.  Torres catheter placed with a large amount of blood and is now been started on continuous irrigation.  Creatinine acutely up at 1.77 and was 0.99 less than a week ago.  CT scan showed a large amount of clot within the bladder with a markedly enlarged prostate.  He said he saw urology Dr. Ortiz (sp?)  Approximately 10 years ago for prostatitis and may have had a cystoscopy at that time.  Currently still having hematuria in his back but symptomatically feels fine.  Of note troponin was minimally elevated at 0.162 going to 0.148, going to 0.166.  Denies any chest pain or shortness of breath.  EKG has been unremarkable.  I discussed the case with Dr. Schmitz from cardiology who agrees that this troponin elevation is not concerning given lack of symptoms and functional status.  Given his age and hematuria would not be a candidate for any sort of intervention regardless.  Therefore we will cancel cardiology consult.    Pain will be to continue bladder irrigation and await urology evaluation.  Okay to hold aspirin.  Continue Rocephin empirically.  Continue fluids and recheck BMP in the morning.    Electronically signed by Emmett Dove MD, 09/03/19, 9:41 AM.

## 2019-09-04 PROBLEM — J40 BRONCHITIS: Status: ACTIVE | Noted: 2019-09-04

## 2019-09-04 LAB
ANION GAP SERPL CALCULATED.3IONS-SCNC: 12 MMOL/L (ref 5–15)
BACTERIA SPEC AEROBE CULT: NO GROWTH
BUN BLD-MCNC: 24 MG/DL (ref 8–23)
BUN/CREAT SERPL: 25 (ref 7–25)
CALCIUM SPEC-SCNC: 8.3 MG/DL (ref 8.2–9.6)
CHLORIDE SERPL-SCNC: 109 MMOL/L (ref 98–107)
CO2 SERPL-SCNC: 23 MMOL/L (ref 22–29)
CREAT BLD-MCNC: 0.96 MG/DL (ref 0.76–1.27)
GFR SERPL CREATININE-BSD FRML MDRD: 73 ML/MIN/1.73
GFR SERPL CREATININE-BSD FRML MDRD: 88 ML/MIN/1.73
GLUCOSE BLD-MCNC: 118 MG/DL (ref 65–99)
HCT VFR BLD AUTO: 31.6 % (ref 37.5–51)
HGB BLD-MCNC: 9.8 G/DL (ref 13–17.7)
POTASSIUM BLD-SCNC: 4.2 MMOL/L (ref 3.5–5.2)
PSA FREE MFR SERPL: 30.5 %
PSA FREE SERPL-MCNC: 8.1 NG/ML
PSA SERPL-MCNC: 26.6 NG/ML (ref 0–4)
SODIUM BLD-SCNC: 144 MMOL/L (ref 136–145)

## 2019-09-04 PROCEDURE — 99233 SBSQ HOSP IP/OBS HIGH 50: CPT | Performed by: INTERNAL MEDICINE

## 2019-09-04 PROCEDURE — 85014 HEMATOCRIT: CPT | Performed by: INTERNAL MEDICINE

## 2019-09-04 PROCEDURE — 25010000002 CEFTRIAXONE PER 250 MG: Performed by: NURSE PRACTITIONER

## 2019-09-04 PROCEDURE — 85018 HEMOGLOBIN: CPT | Performed by: INTERNAL MEDICINE

## 2019-09-04 PROCEDURE — 80048 BASIC METABOLIC PNL TOTAL CA: CPT | Performed by: INTERNAL MEDICINE

## 2019-09-04 RX ADMIN — ATORVASTATIN CALCIUM 10 MG: 10 TABLET, FILM COATED ORAL at 08:24

## 2019-09-04 RX ADMIN — SODIUM CHLORIDE, PRESERVATIVE FREE 10 ML: 5 INJECTION INTRAVENOUS at 08:31

## 2019-09-04 RX ADMIN — CEFTRIAXONE SODIUM 1 G: 1 INJECTION, POWDER, FOR SOLUTION INTRAMUSCULAR; INTRAVENOUS at 03:05

## 2019-09-04 NOTE — PLAN OF CARE
Problem: Patient Care Overview  Goal: Plan of Care Review  Outcome: Ongoing (interventions implemented as appropriate)   09/04/19 1514   Coping/Psychosocial   Plan of Care Reviewed With patient   OTHER   Outcome Summary Pt is normal sinus rhythm on the monitor. 3 way rojas intact with continuous bladder irrigation of sterile water infusing. Return is pink to cherry. Color intensifies with activity. Getting up to ambulate to bathroom color changes to cherry red. PT and OT consult order obtained per the daughter request today. She wants to see if they can help with mobility and use of pt's hands. States he has carpal tunnel and his hands get stiff without activity.         Problem: Fall Risk (Adult)  Goal: Identify Related Risk Factors and Signs and Symptoms  Outcome: Outcome(s) achieved Date Met: 09/04/19 09/03/19 0522 09/04/19 0249   Fall Risk (Adult)   Related Risk Factors (Fall Risk) age-related changes;bladder function altered;depression/anxiety;gait/mobility problems;sleep pattern alteration;environment unfamiliar --    Signs and Symptoms (Fall Risk) --  presence of risk factors     Goal: Absence of Fall  Outcome: Ongoing (interventions implemented as appropriate)   09/04/19 1514   Fall Risk (Adult)   Absence of Fall making progress toward outcome       Problem: Skin Injury Risk (Adult)  Goal: Identify Related Risk Factors and Signs and Symptoms  Outcome: Outcome(s) achieved Date Met: 09/04/19 09/03/19 0522   Skin Injury Risk (Adult)   Related Risk Factors (Skin Injury Risk) advanced age;fluid intake inadequate;infection;mobility impaired;moisture;nutritional deficiencies;tissue perfusion altered     Goal: Skin Health and Integrity  Outcome: Ongoing (interventions implemented as appropriate)   09/04/19 1514   Skin Injury Risk (Adult)   Skin Health and Integrity making progress toward outcome

## 2019-09-04 NOTE — PROGRESS NOTES
Continued Stay Note  [facility]     Patient Name: Chaitanya Browne  MRN: [id number]  Today's Date: 9/4/2019    Admit Date: 9/2/2019    Discharge Plan     Row Name 09/04/19 1019       Plan    Plan Comments  CM seen pt at bedside, no changes. Pt still plans to return home with her daughters assistance and denies needs at this time. CM will continue to follow.         Discharge Codes    No documentation.       Expected Discharge Date and Time     Expected Discharge Date Expected Discharge Time    Sep 5, 2019             Jennifer An RN

## 2019-09-04 NOTE — PLAN OF CARE
Problem: Patient Care Overview  Goal: Plan of Care Review  Outcome: Ongoing (interventions implemented as appropriate)   09/04/19 0249   Coping/Psychosocial   Plan of Care Reviewed With patient   Plan of Care Review   Progress no change   OTHER   Outcome Summary received report from previous shift. Pt AAO, VSS and in no apparent distress. Bladder irrigation continues with 3000 cc irrigating @ every hour. returning watermelon red to pinkish fluid. procedure being tolerated well. pt remains in NSR on monitor. daughter to visit for a couple hours. patient in good spirits. patient asking questions about cystoscopy and general answers were given. patient able to rest/sleep >6hours this shift. remains in good spirits. will continue to monitor patient       Problem: Fall Risk (Adult)  Goal: Identify Related Risk Factors and Signs and Symptoms  Outcome: Ongoing (interventions implemented as appropriate)    Goal: Absence of Fall  Outcome: Ongoing (interventions implemented as appropriate)      Problem: Skin Injury Risk (Adult)  Goal: Identify Related Risk Factors and Signs and Symptoms  Outcome: Ongoing (interventions implemented as appropriate)    Goal: Skin Health and Integrity  Outcome: Ongoing (interventions implemented as appropriate)

## 2019-09-04 NOTE — PROGRESS NOTES
[facility] Medicine Services  PROGRESS NOTE    Patient Name: Chaitanya Browne  : 1923  MRN: [id number]    Date of Admission: 2019  Length of Stay: 1  Primary Care Physician: Dirk Russ MD    Subjective   Subjective     CC:  F/u hematuria    HPI:  Dtr present.  He has no complaints.  CBI continues, has not cleared.  He wants to get up and move.  Wants to know when cysto will be.  Continues to have cough, non-productive.    Review of Systems  Gen- No fevers, chills  CV- No chest pain, palpitations  Resp- + cough, - dyspnea  GI- No N/V/D, abd pain    All other systems reviewed and negative except any additional pertinent positives and negatives discussed in HPI.     Objective   Objective     Vital Signs:   Temp:  [97.9 °F (36.6 °C)-98.4 °F (36.9 °C)] 98.4 °F (36.9 °C)  Heart Rate:  [] 121  Resp:  [16-18] 18  BP: (107-133)/(60-75) 128/68        Physical Exam:  Constitutional: No acute distress, awake, alert, looks younger than age  HENT: NCAT, mucous membranes moist  Respiratory: mild bilateral rhonchi, respiratory effort normal   Cardiovascular: tachy, regular, no murmurs, rubs, or gallops   Gastrointestinal: Positive bowel sounds, soft, nontender, nondistended  Musculoskeletal: No bilateral ankle edema  Psychiatric: Appropriate affect, cooperative  Neurologic: Oriented x 3, no focal deficits  Skin: No rashes  : rojas in place, CBI going has not cleared      Results Reviewed:    Results from last 7 days   Lab Units 19  0651 19   WBC 10*3/mm3  --  10.37 11.59*   HEMOGLOBIN g/dL 9.8* 10.0* 12.4*   HEMATOCRIT % 31.6* 31.1* 38.7   PLATELETS 10*3/mm3  --  276 336     Results from last 7 days   Lab Units 19  0651 19  0451 19  2303 19   SODIUM mmol/L 144 142  --  142   POTASSIUM mmol/L 4.2 4.5  --  4.8   CHLORIDE mmol/L 109* 108*  --  102   CO2 mmol/L 23.0 21.0*  --  24.0   BUN mg/dL 24* 36*  --  33*    CREATININE mg/dL 0.96 1.69*  --  1.77*   GLUCOSE mg/dL 118* 141*  --  168*   CALCIUM mg/dL 8.3 8.4  --  9.5   ALT (SGPT) U/L  --   --   --  12   AST (SGOT) U/L  --   --   --  27   TROPONIN T ng/mL  --  0.166* 0.148* 0.162*   PROBNP pg/mL  --   --   --  1,189.0     Estimated Creatinine Clearance: 44.4 mL/min (by C-G formula based on SCr of 0.96 mg/dL).    Microbiology Results Abnormal     Procedure Component Value - Date/Time    Blood Culture - Blood, Arm, Right [[id number]] Collected:  09/02/19 2050    Lab Status:  Preliminary result Specimen:  Blood from Arm, Right Updated:  09/03/19 2141     Blood Culture No growth at 24 hours    Blood Culture - Blood, Arm, Right [[id number]] Collected:  09/02/19 2045    Lab Status:  Preliminary result Specimen:  Blood from Arm, Right Updated:  09/03/19 2141     Blood Culture No growth at 24 hours          Imaging Results (last 24 hours)     ** No results found for the last 24 hours. **          Results for orders placed during the hospital encounter of 09/02/19   Adult Transthoracic Echo Complete W/ Cont if Necessary Per Protocol    Narrative · Mild-to-moderate mitral valve regurgitation is present.  · Estimated EF = 72%.  · Left ventricular systolic function is hyperdynamic (EF > 70).  · Left ventricular diastolic dysfunction (grade I) consistent with   impaired relaxation.  · Left ventricular wall thickness is consistent with mild concentric   hypertrophy.  · Left atrial cavity size is borderline dilated.  · Normal right ventricular cavity size, wall thickness, systolic function   and septal motion noted.  · Mild mitral valve stenosis is present with functional MAC.  · The aortic valve exhibits moderate sclerosis without stenosis.  · No evidence of pulmonary hypertension is present.  · There is no evidence of pericardial effusion.          I have reviewed the medications:  Scheduled Meds:  atorvastatin 10 mg Oral Daily   ceftriaxone 1 g Intravenous Q24H   sodium chloride 10 mL  "Intravenous Q12H     Continuous Infusions:   PRN Meds:.Menthol (Topical Analgesic)  •  ondansetron **OR** ondansetron  •  [COMPLETED] Insert peripheral IV **AND** sodium chloride  •  sodium chloride      Assessment/Plan   Assessment / Plan     Active Hospital Problems    Diagnosis  POA   • **Gross hematuria [R31.0]  Yes     Priority: Medium   • Acute renal insufficiency [N28.9]  Yes     Priority: Medium   • Acute UTI (urinary tract infection) [N39.0]  Yes   • Elevated troponin [R74.8]  Yes   • BPH without urinary obstruction [N40.0]  Yes   • Essential hypertension [I10]  Yes   • Hyperlipidemia LDL goal <100 [E78.5]  Yes      Resolved Hospital Problems   No resolved problems to display.        Brief Hospital Course to date:  Chaitanya Browne is a 89 y.o. male with history of hypertension, hyperlipidemia, but remains very functional and active at his age.  Presented to emergency room with complaints of gross hematuria.  Found to have some acute renal insufficiency and clot in his bladder.  Continuous irrigation started and admitted to hospitalist service.    - appreciate  eval.  Dr. Woods plans cysto in \"next day or two\" per his note today.  - continue CBI.  Was on 81mg qod at home for general health, that has been held.  - he had minimal trop elevation on arrival but no CP or EKG changes.  D/w cardiology and they agree no role for them to see, so cancelled consult.  Echo reviewed.  Some chronic diastolic dysfunction.  - ARF has resolved, suspect related to acute obstruction.  - urine culture no growth, continue rocephin for now.  - Hgb stable this AM  - likely has a viral bronchitis, stable    DVT Prophylaxis:  Mechanical    Disposition: I expect the patient to be discharged home when ok from urology standpoint..    CODE STATUS:   Code Status and Medical Interventions:   Ordered at: 09/03/19 0248     Level Of Support Discussed With:    Patient     Code Status:    CPR     Medical Interventions (Level of Support " Prior to Arrest):    Full       Electronically signed by Emmett Dove MD, 09/04/19, 11:09 AM.

## 2019-09-05 PROCEDURE — 97162 PT EVAL MOD COMPLEX 30 MIN: CPT

## 2019-09-05 PROCEDURE — 97116 GAIT TRAINING THERAPY: CPT

## 2019-09-05 PROCEDURE — 97535 SELF CARE MNGMENT TRAINING: CPT | Performed by: OCCUPATIONAL THERAPIST

## 2019-09-05 PROCEDURE — 25010000002 CEFTRIAXONE PER 250 MG: Performed by: NURSE PRACTITIONER

## 2019-09-05 PROCEDURE — 97165 OT EVAL LOW COMPLEX 30 MIN: CPT | Performed by: OCCUPATIONAL THERAPIST

## 2019-09-05 PROCEDURE — 99233 SBSQ HOSP IP/OBS HIGH 50: CPT | Performed by: HOSPITALIST

## 2019-09-05 RX ADMIN — ATORVASTATIN CALCIUM 10 MG: 10 TABLET, FILM COATED ORAL at 08:40

## 2019-09-05 RX ADMIN — SODIUM CHLORIDE, PRESERVATIVE FREE 10 ML: 5 INJECTION INTRAVENOUS at 14:23

## 2019-09-05 RX ADMIN — POLYETHYLENE GLYCOL 3350 17 G: 17 POWDER, FOR SOLUTION ORAL at 14:19

## 2019-09-05 RX ADMIN — CEFTRIAXONE SODIUM 1 G: 1 INJECTION, POWDER, FOR SOLUTION INTRAMUSCULAR; INTRAVENOUS at 02:44

## 2019-09-05 NOTE — PLAN OF CARE
Problem: Patient Care Overview  Goal: Plan of Care Review  Outcome: Ongoing (interventions implemented as appropriate)   09/05/19 7803   Coping/Psychosocial   Plan of Care Reviewed With patient   OTHER   Outcome Summary PT consult performed. Pt transferred independently from toilet, ambul with r wx and c.g.assist 225 ft with fatigue requiring short standing rest breaks. Pt presents with functional decline from independent mobility with SBQC To benefit from skilled svcs to regain PLOF.Recommend home with HHPT, may need r wx during recovery process to PLOF

## 2019-09-05 NOTE — PROGRESS NOTES
[facility] Medicine Services  PROGRESS NOTE    Patient Name: Chaitanya Browne  : 1923  MRN: [id number]    Date of Admission: 2019  Length of Stay: 2  Primary Care Physician: Dirk Russ MD    Subjective   Subjective     CC:  F/u hematuria    HPI:    No family at bedside. Notes dry cough only. Denies pain. No constipation. Urine appears to be clearing.     Review of Systems  No f/c  No n/v  No constipation  Tolerating PO  Mild dry cough/no shortness of breath    All other systems reviewed and negative except any additional pertinent positives and negatives discussed in HPI.     Objective   Objective     Vital Signs:   Temp:  [98.3 °F (36.8 °C)-99.7 °F (37.6 °C)] 98.3 °F (36.8 °C)  Heart Rate:  [] 102  Resp:  [16-20] 16  BP: (101-136)/(63-96) 130/76        Physical Exam:    NAD, alert and oriented, appears younger than listed age  OP clear, MMM  Neck supple  No LAD  NCAT  PERRL  Tachy  CTAB  +BS, ND, NT  BARAHONA  No c/c/e  ALert and oriented x 3  CBI with rojas in place, urine pink tinged, no clots    Results Reviewed:    Results from last 7 days   Lab Units 19  0651 19   WBC 10*3/mm3  --  10.37 11.59*   HEMOGLOBIN g/dL 9.8* 10.0* 12.4*   HEMATOCRIT % 31.6* 31.1* 38.7   PLATELETS 10*3/mm3  --  276 336     Results from last 7 days   Lab Units 19  0651 19  0451 19  2303 19  205   SODIUM mmol/L 144 142  --  142   POTASSIUM mmol/L 4.2 4.5  --  4.8   CHLORIDE mmol/L 109* 108*  --  102   CO2 mmol/L 23.0 21.0*  --  24.0   BUN mg/dL 24* 36*  --  33*   CREATININE mg/dL 0.96 1.69*  --  1.77*   GLUCOSE mg/dL 118* 141*  --  168*   CALCIUM mg/dL 8.3 8.4  --  9.5   ALT (SGPT) U/L  --   --   --  12   AST (SGOT) U/L  --   --   --  27   TROPONIN T ng/mL  --  0.166* 0.148* 0.162*   PROBNP pg/mL  --   --   --  1,189.0     Estimated Creatinine Clearance: 44.4 mL/min (by C-G formula based on SCr of 0.96 mg/dL).    Microbiology Results  Abnormal     Procedure Component Value - Date/Time    Blood Culture - Blood, Arm, Right [[id number]] Collected:  09/02/19 2050    Lab Status:  Preliminary result Specimen:  Blood from Arm, Right Updated:  09/04/19 2212     Blood Culture No growth at 2 days    Blood Culture - Blood, Arm, Right [[id number]] Collected:  09/02/19 2045    Lab Status:  Preliminary result Specimen:  Blood from Arm, Right Updated:  09/04/19 2212     Blood Culture No growth at 2 days    Urine Culture - Urine, Urine, Catheter [[id number]]  (Normal) Collected:  09/03/19 0112    Lab Status:  Final result Specimen:  Urine, Catheter Updated:  09/04/19 1125     Urine Culture No growth          Imaging Results (last 24 hours)     ** No results found for the last 24 hours. **          Results for orders placed during the hospital encounter of 09/02/19   Adult Transthoracic Echo Complete W/ Cont if Necessary Per Protocol    Narrative · Mild-to-moderate mitral valve regurgitation is present.  · Estimated EF = 72%.  · Left ventricular systolic function is hyperdynamic (EF > 70).  · Left ventricular diastolic dysfunction (grade I) consistent with   impaired relaxation.  · Left ventricular wall thickness is consistent with mild concentric   hypertrophy.  · Left atrial cavity size is borderline dilated.  · Normal right ventricular cavity size, wall thickness, systolic function   and septal motion noted.  · Mild mitral valve stenosis is present with functional MAC.  · The aortic valve exhibits moderate sclerosis without stenosis.  · No evidence of pulmonary hypertension is present.  · There is no evidence of pericardial effusion.          I have reviewed the medications:  Scheduled Meds:    atorvastatin 10 mg Oral Daily   ceftriaxone 1 g Intravenous Q24H   sodium chloride 10 mL Intravenous Q12H     Continuous Infusions:   PRN Meds:.Menthol (Topical Analgesic)  •  ondansetron **OR** ondansetron  •  [COMPLETED] Insert peripheral IV **AND** sodium chloride  •   sodium chloride      Assessment/Plan   Assessment / Plan     Active Hospital Problems    Diagnosis  POA   • **Gross hematuria [R31.0]  Yes   • Bronchitis [J40]  Yes   • Acute UTI (urinary tract infection) [N39.0]  Yes   • Acute renal insufficiency [N28.9]  Yes   • Elevated troponin [R74.8]  Yes   • BPH without urinary obstruction [N40.0]  Yes   • Essential hypertension [I10]  Yes   • Hyperlipidemia LDL goal <100 [E78.5]  Yes      Resolved Hospital Problems   No resolved problems to display.        Brief Hospital Course to date:  Chaitanya Browne is a 89 y.o. male with history of hypertension, hyperlipidemia, but remains very functional and active at his age.  Presented to emergency room with complaints of gross hematuria.  Found to have some acute renal insufficiency and clot in his bladder.  Continuous irrigation started and admitted to hospitalist service.    Hematuria  -CBI per urology, awaiting cystoscopy pending clearing of urine per urology notes    ABBY  -resolved, likely due to obstruction    Possible UTI  -Rocephin, culture negative to date    Mild Anemia    Viral URI  -resolving      DVT Prophylaxis:  Mechanical    Disposition: I expect the patient to be discharged home when ok from urology standpoint..    CODE STATUS:   Code Status and Medical Interventions:   Ordered at: 09/03/19 0248     Level Of Support Discussed With:    Patient     Code Status:    CPR     Medical Interventions (Level of Support Prior to Arrest):    Full       Electronically signed by Bryce Vargas MD, 09/05/19, 7:52 AM.

## 2019-09-05 NOTE — PLAN OF CARE
Problem: Patient Care Overview  Goal: Plan of Care Review  Outcome: Ongoing (interventions implemented as appropriate)   09/05/19 7561   Coping/Psychosocial   Plan of Care Reviewed With patient   Plan of Care Review   Progress improving   OTHER   Outcome Summary Pt reports mild discomfort at rojas catheter site. Pt required supervision for toileting, transfer training and required assist with LB ADLS. Recommend DC home with assist from daughter as needed.

## 2019-09-05 NOTE — THERAPY EVALUATION
Patient Name: Chaitanya Browne  : 1923    MRN: [id number]                              Today's Date: 2019       Admit Date: 2019    Visit Dx:     ICD-10-CM ICD-9-CM   1. ABBY (acute kidney injury) (CMS/[facility]) N17.9 584.9   2. Hematuria, unspecified type R31.9 599.70   3. Bladder outlet obstruction N32.0 596.0   4. Elevated troponin R74.8 790.6     Patient Active Problem List   Diagnosis   • Facial basal cell cancer   • Hyperlipidemia LDL goal <100   • Essential hypertension   • ASHD (arteriosclerotic heart disease)   • Gait abnormality   • Impaired fasting glucose   • Polyp, colonic   • Vitamin D deficiency   • Low back pain at multiple sites   • Proteinuria   • Right carpal tunnel syndrome   • Obesity (BMI 30-39.9)   • Hematuria, unspecified   • Medicare annual wellness visit, subsequent   • BPH without urinary obstruction   • Edema   • Cough   • Morbid obesity due to excess calories (CMS/[facility])   • Asthma   • Allergic rhinitis   • Gross hematuria   • Acute UTI (urinary tract infection)   • Acute renal insufficiency   • Elevated troponin   • Bronchitis     Past Medical History:   Diagnosis Date   • Aortic valve sclerosis    • Basal cell carcinoma (BCC) of left side of nose    • BPH (benign prostatic hyperplasia)    • Cataracts, bilateral     bilateralcataract extraction and lens implantation    • Diastolic dysfunction    • High cholesterol    • History of disease     bilateral lacrimal gland dysfunction per Dr Fajardo   • Hypertension    • Infection     infected big toe; I and D DR Alvares    • Pneumonia      Past Surgical History:   Procedure Laterality Date   • APPENDECTOMY     • CATARACT EXTRACTION, BILATERAL  2013    and lens implantation   • CYSTOSCOPY TRANSURETHRAL RESECTION OF PROSTATE     • HEAD & NECK SKIN LESION EXCISIONAL BIOPSY      Basal cell removed from nose    • INCISION AND DRAINAGE FOOT  2013    infected big toe Dr Alvares     General Information     Row Name 19  0944          PT Evaluation Time/Intention    Document Type  evaluation  -KM     Mode of Treatment  physical therapy  -KM     Row Name 09/05/19 0944          General Information    Patient Profile Reviewed?  yes  -KM     Prior Level of Function  independent:;community mobility;ADL's;driving  -KM     Existing Precautions/Restrictions  fall  -KM     Barriers to Rehab  none identified  -KM     Row Name 09/05/19 0944          Relationship/Environment    Lives With  alone  -KM     Row Name 09/05/19 0944          Resource/Environmental Concerns    Current Living Arrangements  home/apartment/condo  -KM     Row Name 09/05/19 0944          Home Main Entrance    Number of Stairs, Main Entrance  none  -KM     Row Name 09/05/19 0944          Stairs Within Home, Primary    Number of Stairs, Within Home, Primary  none  -KM     Row Name 09/05/19 0944          Cognitive Assessment/Intervention- PT/OT    Orientation Status (Cognition)  oriented x 4  -KM       User Key  (r) = Recorded By, (t) = Taken By, (c) = Cosigned By    Initials Name Provider Type    KM Josselin Caro, PT Physical Therapist        Mobility     Row Name 09/05/19 0946          Bed Mobility Assessment/Treatment    Comment (Bed Mobility)  Pt was oob and in bathroom  -KM     Row Name 09/05/19 0946          Sit-Stand Transfer    Sit-Stand [place] (Transfers)  independent  -KM     Assistive Device (Sit-Stand Transfers)  walker, front-wheeled  -KM     Row Name 09/05/19 0946          Gait/Stairs Assessment/Training    85464 - Gait Training Minutes   8  -KM     Gait/Stairs Assessment/Training  gait/ambulation independence  -KM     [place] Level (Gait)  contact guard;1 person to manage equipment assist for iv pole  -KM     Assistive Device (Gait)  walker, front-wheeled  -KM     Distance in Feet (Gait)  225  -KM     Pattern (Gait)  step-through  -KM     Deviations/Abnormal Patterns (Gait)  gait speed decreased  -KM     Bilateral Gait Deviations  forward  flexed posture  -KM     Comment (Gait/Stairs)  2 brief standing rest breaks  -KM       User Key  (r) = Recorded By, (t) = Taken By, (c) = Cosigned By    Initials Name Provider Type    Josselin Gil, PT Physical Therapist        Obj/Interventions     Row Name 09/05/19 0948          General ROM    GENERAL ROM COMMENTS  B l/es wfls  -KM     Row Name 09/05/19 0948          MMT (Manual Muscle Testing)    General MMT Comments  b l/es 4/5  -KM     Row Name 09/05/19 0948          Static Sitting Balance    Level of [place] (Unsupported Sitting, Static Balance)  independent  -KM     Sitting Position (Unsupported Sitting, Static Balance)  sitting in chair  -KM     Row Name 09/05/19 0948          Static Standing Balance    Level of [place] (Supported Standing, Static Balance)  independent  -KM     Assistive Device Utilized (Supported Standing, Static Balance)  walker, rolling  -KM     Row Name 09/05/19 0948          Dynamic Standing Balance    Level of [place], Reaches Outside Midline (Standing, Dynamic Balance)  contact guard assist  -KM     Assistive Device Utilized (Supported Standing, Dynamic Balance)  walker, rolling  -KM       User Key  (r) = Recorded By, (t) = Taken By, (c) = Cosigned By    Initials Name Provider Type    Josselin Gil, PT Physical Therapist        Goals/Plan     Row Name 09/05/19 0952          Bed Mobility Goal 1 (PT)    Activity/Assistive Device (Bed Mobility Goal 1, PT)  bed mobility activities, all  -KM     [place] Level/Cues Needed (Bed Mobility Goal 1, PT)  independent  -KM     Time Frame (Bed Mobility Goal 1, PT)  10 days  -KM     Row Name 09/05/19 0952          Transfer Goal 1 (PT)    Activity/Assistive Device (Transfer Goal 1, PT)  sit-to-stand/stand-to-sit;bed-to-chair/chair-to-bed;walker, rolling  -KM     [place] Level/Cues Needed (Transfer Goal 1, PT)  independent  -KM     Time Frame (Transfer Goal 1, PT)  10 days  -KM     Row Name 09/05/19 0952           Gait Training Goal 1 (PT)    Activity/Assistive Device (Gait Training Goal 1, PT)  gait (walking locomotion);increase endurance/gait distance;walker, rolling  -KM     [place] Level (Gait Training Goal 1, PT)  independent  -KM     Distance (Gait Goal 1, PT)  300  -KM     Time Frame (Gait Training Goal 1, PT)  10 days  -KM       User Key  (r) = Recorded By, (t) = Taken By, (c) = Cosigned By    Initials Name Provider Type    Josselin Gil, PT Physical Therapist        Clinical Impression     Row Name 09/05/19 0948          Pain Assessment    Additional Documentation  Pain Scale: Numbers Pre/Post-Treatment (Group)  -KM     Row Name 09/05/19 0948          Pain Scale: Numbers Pre/Post-Treatment    Pain Scale: Numbers, Pretreatment  0/10 - no pain  -KM     Pain Scale: Numbers, Post-Treatment  0/10 - no pain  -KM     Row Name 09/05/19 0948          Plan of Care Review    Plan of Care Reviewed With  patient  -KM     Row Name 09/05/19 0948          Physical Therapy Clinical Impression    Patient/Family Goals Statement (PT Clinical Impression)  return to PLOF  -KM     Criteria for Skilled Interventions Met (PT Clinical Impression)  yes  -KM     Rehab Potential (PT Clinical Summary)  good, to achieve stated therapy goals  -KM     Row Name 09/05/19 0948          Positioning and Restraints    Pre-Treatment Position  bathroom  -KM     Post Treatment Position  chair  -KM     In Chair  reclined;call light within reach;encouraged to call for assist;exit alarm on  -KM       User Key  (r) = Recorded By, (t) = Taken By, (c) = Cosigned By    Initials Name Provider Type    Josselin Gil, PT Physical Therapist        Outcome Measures     Row Name 09/05/19 0957          How much help from another person do you currently need...    Turning from your back to your side while in flat bed without using bedrails?  4  -KM     Moving from lying on back to sitting on the side of a flat bed without bedrails?  3   -KM     Moving to and from a bed to a chair (including a wheelchair)?  3  -KM     Standing up from a chair using your arms (e.g., wheelchair, bedside chair)?  3  -KM     Climbing 3-5 steps with a railing?  3  -KM     To walk in hospital room?  3  -KM     AM-PAC 6 Clicks Score (PT)  19  -KM     Row Name 09/05/19 0957          Functional Assessment    Outcome Measure Options  AM-PAC 6 Clicks Basic Mobility (PT)  -KM       User Key  (r) = Recorded By, (t) = Taken By, (c) = Cosigned By    Initials Name Provider Type    Josselin Gil, PT Physical Therapist        Physical Therapy Education     Title: PT OT SLP Therapies (Done)     Topic: Physical Therapy (Done)     Point: Mobility training (Done)     Learning Progress Summary           Patient Raymond ROBERTO ROBISON by  at 9/5/2019  9:54 AM    Comment:  discussed plan of care and dc planning                   Point: Home exercise program (Done)     Learning Progress Summary           Patient ENRIQUETA Andrade VU by  at 9/5/2019  9:54 AM    Comment:  discussed plan of care and dc planning                   Point: Body mechanics (Done)     Learning Progress Summary           Patient ENRIQUETA Andrade VU by  at 9/5/2019  9:54 AM    Comment:  discussed plan of care and dc planning                   Point: Precautions (Done)     Learning Progress Summary           Patient AugustinchandaENRIQUETA VU by  at 9/5/2019  9:54 AM    Comment:  discussed plan of care and dc planning                               User Key     Initials Effective Dates Name Provider Type [facility] 06/19/15 -  Josselin Caro, PT Physical Therapist PT              PT Recommendation and Plan     Outcome Summary/Treatment Plan (PT)  Anticipated Equipment Needs at Discharge (PT): front wheeled walker  Anticipated Discharge Disposition (PT): home with assist, home with home health  Plan of Care Reviewed With: patient  Outcome Summary: PT consult performed. Pt transferred independently from toilet, ambul with r wx  and c.g.assist 225 ft with fatigue requiring short standing rest breaks. Pt presents with functional decline from independent mobility with SBQC To benefit from skilled svcs to regain PLOF.Recommend home with HHPT, may need r wx during recovery process to PLOF     Time Calculation:   PT Charges     Row Name 09/05/19 0957 09/05/19 0946          Time Calculation    Start Time  0831  -KM  --     PT Received On  09/05/19  -KM  --     PT Goal Re-Cert Due Date  09/15/19  -KM  --        Time Calculation- PT    Total Timed Code Minutes- PT  8 minute(s)  -KM  --        Timed Charges    88780 - Gait Training Minutes   --  8  -KM       User Key  (r) = Recorded By, (t) = Taken By, (c) = Cosigned By    Initials Name Provider Type    Josselin Gil, PT Physical Therapist        Therapy Charges for Today     Code Description Service Date Service Provider Modifiers Qty    20691825901 HC GAIT TRAINING EA 15 MIN 9/5/2019 Josselin Caro, PT GP 1    19316852323 HC PT EVAL MOD COMPLEXITY 4 9/5/2019 Josselin Caro, PT GP 1          PT G-Codes  Outcome Measure Options: AM-PAC 6 Clicks Basic Mobility (PT)  AM-PAC 6 Clicks Score (PT): 19    Josselin Caro, PT  9/5/2019

## 2019-09-05 NOTE — THERAPY TREATMENT NOTE
Acute Care - Occupational Therapy Treatment Note  [facility]     Patient Name: Chaitanya Browne  : 1923  MRN: [id number]  Today's Date: 2019  Onset of Illness/Injury or Date of Surgery: 19  Date of Referral to OT: 19  Referring Physician: Dr. Dove    Admit Date: 2019       ICD-10-CM ICD-9-CM   1. ABBY (acute kidney injury) (CMS/[facility]) N17.9 584.9   2. Hematuria, unspecified type R31.9 599.70   3. Bladder outlet obstruction N32.0 596.0   4. Elevated troponin R74.8 790.6   5. Impaired mobility and ADLs Z74.09 799.89     Patient Active Problem List   Diagnosis   • Facial basal cell cancer   • Hyperlipidemia LDL goal <100   • Essential hypertension   • ASHD (arteriosclerotic heart disease)   • Gait abnormality   • Impaired fasting glucose   • Polyp, colonic   • Vitamin D deficiency   • Low back pain at multiple sites   • Proteinuria   • Right carpal tunnel syndrome   • Obesity (BMI 30-39.9)   • Hematuria, unspecified   • Medicare annual wellness visit, subsequent   • BPH without urinary obstruction   • Edema   • Cough   • Morbid obesity due to excess calories (CMS/[facility])   • Asthma   • Allergic rhinitis   • Gross hematuria   • Acute UTI (urinary tract infection)   • Acute renal insufficiency   • Elevated troponin   • Bronchitis     Past Medical History:   Diagnosis Date   • Aortic valve sclerosis    • Basal cell carcinoma (BCC) of left side of nose    • BPH (benign prostatic hyperplasia)    • Cataracts, bilateral     bilateralcataract extraction and lens implantation    • Diastolic dysfunction    • High cholesterol    • History of disease     bilateral lacrimal gland dysfunction per Dr Fajardo   • Hypertension    • Infection     infected big toe; I and D DR Alvares    • Pneumonia      Past Surgical History:   Procedure Laterality Date   • APPENDECTOMY     • CATARACT EXTRACTION, BILATERAL      and lens implantation   • CYSTOSCOPY TRANSURETHRAL RESECTION OF PROSTATE     •  HEAD & NECK SKIN LESION EXCISIONAL BIOPSY      Basal cell removed from nose    • INCISION AND DRAINAGE FOOT  2013    infected big toe Dr Alvares       Therapy Treatment    Rehabilitation Treatment Summary     Row Name 09/05/19 1500             Treatment Time/Intention    Discipline  occupational therapist  -AR      Recorded by [AR] Peyton Eaton, YESENIA 09/05/19 1506        User Key  (r) = Recorded By, (t) = Taken By, (c) = Cosigned By    Initials Name Effective Dates Discipline    Peyton Dowd, YESENIA 06/22/15 -  OT           Rehab Goal Summary     Row Name 09/05/19 0952 09/05/19 0845          Bed Mobility Goal 1 (PT)    Activity/Assistive Device (Bed Mobility Goal 1, PT)  bed mobility activities, all  -KM  --     [place] Level/Cues Needed (Bed Mobility Goal 1, PT)  independent  -KM  --     Time Frame (Bed Mobility Goal 1, PT)  10 days  -KM  --        Transfer Goal 1 (PT)    Activity/Assistive Device (Transfer Goal 1, PT)  sit-to-stand/stand-to-sit;bed-to-chair/chair-to-bed;walker, rolling  -KM  --     [place] Level/Cues Needed (Transfer Goal 1, PT)  independent  -KM  --     Time Frame (Transfer Goal 1, PT)  10 days  -KM  --        Gait Training Goal 1 (PT)    Activity/Assistive Device (Gait Training Goal 1, PT)  gait (walking locomotion);increase endurance/gait distance;walker, rolling  -KM  --     [place] Level (Gait Training Goal 1, PT)  independent  -KM  --     Distance (Gait Goal 1, PT)  300  -KM  --     Time Frame (Gait Training Goal 1, PT)  10 days  -KM  --        Occupational Therapy Goals    Transfer Goal Selection (OT)  --  transfer, OT goal 1  -AR     Dressing Goal Selection (OT)  --  dressing, OT goal 1  -AR     Toileting Goal Selection (OT)  --  toileting, OT goal 1  -AR        Transfer Goal 1 (OT)    Activity/Assistive Device (Transfer Goal 1, OT)  --  sit-to-stand/stand-to-sit;toilet  -AR     [place] Level/Cues Needed (Transfer Goal 1, OT)  --  conditional independence   -AR     Time Frame (Transfer Goal 1, OT)  --  short term goal (STG);4 days  -AR     Progress/Outcome (Transfer Goal 1, OT)  --  goal ongoing  -AR        Dressing Goal 1 (OT)    Activity/Assistive Device (Dressing Goal 1, OT)  --  lower body dressing  -AR     [place]/Cues Needed (Dressing Goal 1, OT)  --  supervision required;verbal cues required  -AR     Time Frame (Dressing Goal 1, OT)  --  short term goal (STG);5 days  -AR     Progress/Outcome (Dressing Goal 1, OT)  --  goal ongoing  -AR        Toileting Goal 1 (OT)    Activity/Device (Toileting Goal 1, OT)  --  toileting skills, all  -AR     [place] Level/Cues Needed (Toileting Goal 1, OT)  --  conditional independence  -AR     Time Frame (Toileting Goal 1, OT)  --  short term goal (STG);5 days  -AR     Progress/Outcome (Toileting Goal 1, OT)  --  goal ongoing  -AR       User Key  (r) = Recorded By, (t) = Taken By, (c) = Cosigned By    Initials Name Provider Type Discipline    Josselin Gil, PT Physical Therapist PT    Peyton Dowd, OT Occupational Therapist OT        Occupational Therapy Education     Title: PT OT SLP Therapies (Done)     Topic: Occupational Therapy (Done)     Point: ADL training (Done)     Description: Instruct learner(s) on proper safety adaptation and remediation techniques during self care or transfers.   Instruct in proper use of assistive devices.    Learning Progress Summary           Patient Eager, E,TB,D, VU,NR by AR at 9/5/2019  8:45 AM   Family Raymond, E,TB,D, VU,NR by AR at 9/5/2019  8:45 AM                   Point: Home exercise program (Done)     Description: Instruct learner(s) on appropriate technique for monitoring, assisting and/or progressing therapeutic exercises/activities.    Learning Progress Summary           Patient Eager, E,TB,D, VU,NR by AR at 9/5/2019  8:45 AM   Family Eager, E,TB,D, VU,NR by AR at 9/5/2019  8:45 AM                   Point: Precautions (Done)     Description: Instruct  learner(s) on prescribed precautions during self-care and functional transfers.    Learning Progress Summary           Patient Eager, E,TB,D, VU,NR by AR at 9/5/2019  8:45 AM   Family Eager, E,TB,D, VU,NR by AR at 9/5/2019  8:45 AM                   Point: Body mechanics (Done)     Description: Instruct learner(s) on proper positioning and spine alignment during self-care, functional mobility activities and/or exercises.    Learning Progress Summary           Patient Eager, E,TB,D, VU,NR by AR at 9/5/2019  8:45 AM   Family Eager, E,TB,D, VU,NR by AR at 9/5/2019  8:45 AM                               User Key     Initials Effective Dates Name Provider Type Discipline    AR 06/22/15 -  Peyton Eaton, OT Occupational Therapist OT                OT Recommendation and Plan  Outcome Summary/Treatment Plan (OT)  Anticipated Discharge Disposition (OT): home with assist  Therapy Frequency (OT Eval): 3 times/wk  Plan of Care Review  Plan of Care Reviewed With: patient  Plan of Care Reviewed With: patient  Outcome Summary: Pt reports mild discomfort at rojas catheter site. Pt required supervision for toileting, transfer training and required assist with LB ADLS. Recommend DC home with assist from daughter as needed.   Outcome Measures     Row Name 09/05/19 0845             How much help from another is currently needed...    Putting on and taking off regular lower body clothing?  3  -AR      Bathing (including washing, rinsing, and drying)  3  -AR      Toileting (which includes using toilet bed pan or urinal)  3  -AR      Putting on and taking off regular upper body clothing  3  -AR      Taking care of personal grooming (such as brushing teeth)  3  -AR      Eating meals  3  -AR      AM-PAC 6 Clicks Score (OT)  18  -AR         Functional Assessment    Outcome Measure Options  AM-PAC 6 Clicks Daily Activity (OT)  -AR        User Key  (r) = Recorded By, (t) = Taken By, (c) = Cosigned By    Initials Name Provider Type    AR  Peyton Eaton OT Occupational Therapist           Time Calculation:   Time Calculation- OT     Row Name 09/05/19 1504 09/05/19 0946 09/05/19 0845       Time Calculation- OT    OT Start Time  --  --  0845  -AR    Total Timed Code Minutes- OT  --  -AR  --  -AR  9 minute(s)  -AR    OT Received On  --  --  09/05/19  -AR    OT Goal Re-Cert Due Date  --  --  09/15/19  -AR       Timed Charges    88789 - Gait Training Minutes   --  8  -KM  --    73109 - OT Self Care/Mgmt Minutes  --  --  9  -AR      User Key  (r) = Recorded By, (t) = Taken By, (c) = Cosigned By    Initials Name Provider Type    Josselin Gil, PT Physical Therapist    AR Peyton Eaton OT Occupational Therapist        Therapy Charges for Today     Code Description Service Date Service Provider Modifiers Qty    [id number]  OT EVAL LOW COMPLEXITY 4 9/5/2019 Peyton Eaton OT GO 1    [id number]  OT SELF CARE/MGMT/TRAIN EA 15 MIN 9/5/2019 Peyton Eaton OT GO 1               Peyton Eaton OT  9/5/2019

## 2019-09-05 NOTE — PLAN OF CARE
Problem: Patient Care Overview  Goal: Plan of Care Review   09/05/19 1643   Coping/Psychosocial   Plan of Care Reviewed With patient   Plan of Care Review   Progress no change   OTHER   Outcome Summary Anxioiusly waiting cystoscopy procedure scheduled for tomorrow. Pt to be NPO after midnight. Continuous bladder irrigation continues. Return is pink tinged. Using biofreeze for his hands. Pt c/o constipation. Miralax given. Will monitor.        Problem: Fall Risk (Adult)  Goal: Absence of Fall  Outcome: Ongoing (interventions implemented as appropriate)   09/05/19 1643   Fall Risk (Adult)   Absence of Fall making progress toward outcome       Problem: Skin Injury Risk (Adult)  Goal: Skin Health and Integrity  Outcome: Ongoing (interventions implemented as appropriate)   09/05/19 1643   Skin Injury Risk (Adult)   Skin Health and Integrity making progress toward outcome

## 2019-09-06 ENCOUNTER — ANESTHESIA (OUTPATIENT)
Dept: PERIOP | Facility: HOSPITAL | Age: 84
End: 2019-09-06

## 2019-09-06 ENCOUNTER — ANESTHESIA EVENT (OUTPATIENT)
Dept: PERIOP | Facility: HOSPITAL | Age: 84
End: 2019-09-06

## 2019-09-06 PROCEDURE — 25010000002 FENTANYL CITRATE (PF) 100 MCG/2ML SOLUTION: Performed by: NURSE ANESTHETIST, CERTIFIED REGISTERED

## 2019-09-06 PROCEDURE — 25010000002 PHENYLEPHRINE PER 1 ML: Performed by: NURSE ANESTHETIST, CERTIFIED REGISTERED

## 2019-09-06 PROCEDURE — 25010000002 DEXAMETHASONE PER 1 MG: Performed by: NURSE ANESTHETIST, CERTIFIED REGISTERED

## 2019-09-06 PROCEDURE — 25010000002 ONDANSETRON PER 1 MG: Performed by: NURSE ANESTHETIST, CERTIFIED REGISTERED

## 2019-09-06 PROCEDURE — 25010000002 PROPOFOL 10 MG/ML EMULSION: Performed by: NURSE ANESTHETIST, CERTIFIED REGISTERED

## 2019-09-06 PROCEDURE — P9612 CATHETERIZE FOR URINE SPEC: HCPCS

## 2019-09-06 PROCEDURE — 0TCB8ZZ EXTIRPATION OF MATTER FROM BLADDER, VIA NATURAL OR ARTIFICIAL OPENING ENDOSCOPIC: ICD-10-PCS | Performed by: UROLOGY

## 2019-09-06 PROCEDURE — 0V508ZZ DESTRUCTION OF PROSTATE, VIA NATURAL OR ARTIFICIAL OPENING ENDOSCOPIC: ICD-10-PCS | Performed by: UROLOGY

## 2019-09-06 PROCEDURE — 99233 SBSQ HOSP IP/OBS HIGH 50: CPT | Performed by: HOSPITALIST

## 2019-09-06 RX ORDER — LIDOCAINE HYDROCHLORIDE 10 MG/ML
0.5 INJECTION, SOLUTION EPIDURAL; INFILTRATION; INTRACAUDAL; PERINEURAL ONCE AS NEEDED
Status: CANCELLED | OUTPATIENT
Start: 2019-09-06

## 2019-09-06 RX ORDER — NALOXONE HCL 0.4 MG/ML
0.4 VIAL (ML) INJECTION AS NEEDED
Status: DISCONTINUED | OUTPATIENT
Start: 2019-09-06 | End: 2019-09-06

## 2019-09-06 RX ORDER — DEXAMETHASONE SODIUM PHOSPHATE 4 MG/ML
INJECTION, SOLUTION INTRA-ARTICULAR; INTRALESIONAL; INTRAMUSCULAR; INTRAVENOUS; SOFT TISSUE AS NEEDED
Status: DISCONTINUED | OUTPATIENT
Start: 2019-09-06 | End: 2019-09-06 | Stop reason: SURG

## 2019-09-06 RX ORDER — ACETAMINOPHEN 325 MG/1
650 TABLET ORAL EVERY 6 HOURS PRN
Status: DISCONTINUED | OUTPATIENT
Start: 2019-09-06 | End: 2019-09-16 | Stop reason: HOSPADM

## 2019-09-06 RX ORDER — PROMETHAZINE HYDROCHLORIDE 25 MG/1
25 SUPPOSITORY RECTAL ONCE AS NEEDED
Status: DISCONTINUED | OUTPATIENT
Start: 2019-09-06 | End: 2019-09-06

## 2019-09-06 RX ORDER — PROMETHAZINE HYDROCHLORIDE 25 MG/ML
6.25 INJECTION, SOLUTION INTRAMUSCULAR; INTRAVENOUS ONCE AS NEEDED
Status: DISCONTINUED | OUTPATIENT
Start: 2019-09-06 | End: 2019-09-06

## 2019-09-06 RX ORDER — LABETALOL HYDROCHLORIDE 5 MG/ML
5 INJECTION, SOLUTION INTRAVENOUS
Status: DISCONTINUED | OUTPATIENT
Start: 2019-09-06 | End: 2019-09-06

## 2019-09-06 RX ORDER — PROMETHAZINE HYDROCHLORIDE 25 MG/1
25 TABLET ORAL ONCE AS NEEDED
Status: DISCONTINUED | OUTPATIENT
Start: 2019-09-06 | End: 2019-09-06

## 2019-09-06 RX ORDER — ONDANSETRON 2 MG/ML
INJECTION INTRAMUSCULAR; INTRAVENOUS AS NEEDED
Status: DISCONTINUED | OUTPATIENT
Start: 2019-09-06 | End: 2019-09-06 | Stop reason: SURG

## 2019-09-06 RX ORDER — SODIUM CHLORIDE, SODIUM LACTATE, POTASSIUM CHLORIDE, CALCIUM CHLORIDE 600; 310; 30; 20 MG/100ML; MG/100ML; MG/100ML; MG/100ML
9 INJECTION, SOLUTION INTRAVENOUS CONTINUOUS
Status: DISCONTINUED | OUTPATIENT
Start: 2019-09-06 | End: 2019-09-08

## 2019-09-06 RX ORDER — FENTANYL CITRATE 50 UG/ML
25 INJECTION, SOLUTION INTRAMUSCULAR; INTRAVENOUS
Status: DISCONTINUED | OUTPATIENT
Start: 2019-09-06 | End: 2019-09-06

## 2019-09-06 RX ORDER — SODIUM CHLORIDE 0.9 % (FLUSH) 0.9 %
3 SYRINGE (ML) INJECTION EVERY 12 HOURS SCHEDULED
Status: CANCELLED | OUTPATIENT
Start: 2019-09-06

## 2019-09-06 RX ORDER — MAGNESIUM HYDROXIDE 1200 MG/15ML
LIQUID ORAL AS NEEDED
Status: DISCONTINUED | OUTPATIENT
Start: 2019-09-06 | End: 2019-09-06 | Stop reason: HOSPADM

## 2019-09-06 RX ORDER — PROPOFOL 10 MG/ML
VIAL (ML) INTRAVENOUS AS NEEDED
Status: DISCONTINUED | OUTPATIENT
Start: 2019-09-06 | End: 2019-09-06 | Stop reason: SURG

## 2019-09-06 RX ORDER — LIDOCAINE HYDROCHLORIDE 10 MG/ML
INJECTION, SOLUTION EPIDURAL; INFILTRATION; INTRACAUDAL; PERINEURAL AS NEEDED
Status: DISCONTINUED | OUTPATIENT
Start: 2019-09-06 | End: 2019-09-06 | Stop reason: SURG

## 2019-09-06 RX ORDER — SODIUM CHLORIDE 0.9 % (FLUSH) 0.9 %
3 SYRINGE (ML) INJECTION EVERY 12 HOURS SCHEDULED
Status: DISCONTINUED | OUTPATIENT
Start: 2019-09-06 | End: 2019-09-06

## 2019-09-06 RX ORDER — FAMOTIDINE 20 MG/1
20 TABLET, FILM COATED ORAL ONCE
Status: COMPLETED | OUTPATIENT
Start: 2019-09-06 | End: 2019-09-06

## 2019-09-06 RX ORDER — FAMOTIDINE 20 MG/1
20 TABLET, FILM COATED ORAL ONCE
Status: DISCONTINUED | OUTPATIENT
Start: 2019-09-06 | End: 2019-09-11

## 2019-09-06 RX ORDER — IPRATROPIUM BROMIDE AND ALBUTEROL SULFATE 2.5; .5 MG/3ML; MG/3ML
3 SOLUTION RESPIRATORY (INHALATION) ONCE AS NEEDED
Status: DISCONTINUED | OUTPATIENT
Start: 2019-09-06 | End: 2019-09-06

## 2019-09-06 RX ORDER — HYDRALAZINE HYDROCHLORIDE 20 MG/ML
5 INJECTION INTRAMUSCULAR; INTRAVENOUS
Status: DISCONTINUED | OUTPATIENT
Start: 2019-09-06 | End: 2019-09-06

## 2019-09-06 RX ORDER — ONDANSETRON 2 MG/ML
4 INJECTION INTRAMUSCULAR; INTRAVENOUS ONCE AS NEEDED
Status: DISCONTINUED | OUTPATIENT
Start: 2019-09-06 | End: 2019-09-06

## 2019-09-06 RX ORDER — SODIUM CHLORIDE 0.9 % (FLUSH) 0.9 %
3-10 SYRINGE (ML) INJECTION AS NEEDED
Status: CANCELLED | OUTPATIENT
Start: 2019-09-06

## 2019-09-06 RX ORDER — SODIUM CHLORIDE 0.9 % (FLUSH) 0.9 %
3-10 SYRINGE (ML) INJECTION AS NEEDED
Status: DISCONTINUED | OUTPATIENT
Start: 2019-09-06 | End: 2019-09-06

## 2019-09-06 RX ORDER — FAMOTIDINE 10 MG/ML
20 INJECTION, SOLUTION INTRAVENOUS ONCE
Status: CANCELLED | OUTPATIENT
Start: 2019-09-06 | End: 2019-09-06

## 2019-09-06 RX ORDER — MEPERIDINE HYDROCHLORIDE 25 MG/ML
12.5 INJECTION INTRAMUSCULAR; INTRAVENOUS; SUBCUTANEOUS
Status: DISCONTINUED | OUTPATIENT
Start: 2019-09-06 | End: 2019-09-06

## 2019-09-06 RX ADMIN — PHENYLEPHRINE HYDROCHLORIDE 80 MCG: 10 INJECTION INTRAVENOUS at 10:23

## 2019-09-06 RX ADMIN — PHENYLEPHRINE HYDROCHLORIDE 80 MCG: 10 INJECTION INTRAVENOUS at 10:30

## 2019-09-06 RX ADMIN — PROPOFOL 30 MG: 10 INJECTION, EMULSION INTRAVENOUS at 10:46

## 2019-09-06 RX ADMIN — SODIUM CHLORIDE, POTASSIUM CHLORIDE, SODIUM LACTATE AND CALCIUM CHLORIDE 9 ML/HR: 600; 310; 30; 20 INJECTION, SOLUTION INTRAVENOUS at 09:10

## 2019-09-06 RX ADMIN — PROPOFOL 100 MG: 10 INJECTION, EMULSION INTRAVENOUS at 10:23

## 2019-09-06 RX ADMIN — FENTANYL CITRATE 25 MCG: 50 INJECTION INTRAMUSCULAR; INTRAVENOUS at 12:00

## 2019-09-06 RX ADMIN — SODIUM CHLORIDE 500 ML: 9 INJECTION, SOLUTION INTRAVENOUS at 23:12

## 2019-09-06 RX ADMIN — FAMOTIDINE 20 MG: 20 TABLET ORAL at 09:17

## 2019-09-06 RX ADMIN — DEXAMETHASONE SODIUM PHOSPHATE 8 MG: 4 INJECTION, SOLUTION INTRAMUSCULAR; INTRAVENOUS at 10:33

## 2019-09-06 RX ADMIN — ONDANSETRON 4 MG: 2 INJECTION INTRAMUSCULAR; INTRAVENOUS at 10:58

## 2019-09-06 RX ADMIN — PHENYLEPHRINE HYDROCHLORIDE 100 MCG: 10 INJECTION INTRAVENOUS at 10:48

## 2019-09-06 RX ADMIN — PHENYLEPHRINE HYDROCHLORIDE 200 MCG: 10 INJECTION INTRAVENOUS at 10:55

## 2019-09-06 RX ADMIN — LIDOCAINE HYDROCHLORIDE 50 MG: 10 INJECTION, SOLUTION EPIDURAL; INFILTRATION; INTRACAUDAL; PERINEURAL at 10:23

## 2019-09-06 RX ADMIN — PHENYLEPHRINE HYDROCHLORIDE 150 MCG: 10 INJECTION INTRAVENOUS at 10:54

## 2019-09-06 RX ADMIN — PHENYLEPHRINE HYDROCHLORIDE 80 MCG: 10 INJECTION INTRAVENOUS at 10:34

## 2019-09-06 RX ADMIN — ACETAMINOPHEN 650 MG: 325 TABLET ORAL at 13:03

## 2019-09-06 NOTE — OP NOTE
Preop diagnosis: Hematuria with clot retention    Postop diagnosis same    Procedure: Cystoscopy, evacuation of clot, fulguration of prostate    After the induction of general anesthesia patient was prepped and draped in the dorsolithotomy position.  A 21 and scope sheath was inserted under direct vision.  Urethra was normal.  Prostate was quite long and obstructing.  The bladder was full of clot.  Bladder evacuator was used to irrigate the clot free from the bladder.  After the clot was removed the bladder was examined.  There is no evidence of bladder tumor.  Ureteral orifices were normal position configuration with clear reflux urine bilaterally.  The bladder wall was trabeculated.  There was active bleeding from the prostatic tissue.  The scope was removed.  A #25 resectoscope sheath was inserted.  A roller-bar was used to fulgurate the bleeding points in the prostate.  At the end of the procedure there was no bleeding.  The bladder was drained of irrigant fluid and the resectoscope was removed.  Patient tolerated procedure well at the operating satisfactory condition

## 2019-09-06 NOTE — PROGRESS NOTES
Continued Stay Note  [facility]     Patient Name: Chaitanya Browne  MRN: [id number]  Today's Date: 9/6/2019    Admit Date: 9/2/2019    Discharge Plan     Row Name 09/06/19 1516       Plan    Plan  Home with daughter's assistance and [facility]    Patient/Family in Agreement with Plan  yes    Plan Comments  Followed up with Mr. Browne and his daughter, Nkechi, at the bedside for discharge planning.  Mr. Browne was evaluated prior to surgery by PT and recommendation was for home health PT.  Discussed with Mr. Browne and he requested [facility].  Referral given to Lauren with [facility].  Mr. Browne has a tripod walker and cane at home.  If Mr. Browne is evaluated by PT and a rolling walker is recommended, please contact weekend CM at .  Mr. Browne will have sufficient assistance from his daughter when he returns home and she will be transporting him home when discharged.    CM will continue to follow.    Final Discharge Disposition Code  06 - home with home health care        Discharge Codes    No documentation.       Expected Discharge Date and Time     Expected Discharge Date Expected Discharge Time    Sep 7, 2019             Lupe Yeager RN

## 2019-09-06 NOTE — ANESTHESIA PREPROCEDURE EVALUATION
Anesthesia Evaluation     Patient summary reviewed and Nursing notes reviewed   NPO Solid Status: > 8 hours  NPO Liquid Status: > 8 hours           Airway   Mallampati: II  TM distance: >3 FB  Neck ROM: full  Possible difficult intubation  Dental      Pulmonary    (+) asthma (MIld ),   (-) COPD, shortness of breath, recent URI, not a smoker, lung cancer    ROS comment: RLL chronic changes on CXR   Cardiovascular     ECG reviewed    (+) hypertension, hyperlipidemia,   (-) CAD (doubt CAD ), dysrhythmias, angina    ROS comment: EKG NSR   ECHO moderate MVR  EF = 72%.  LVDD (grade I) consistent with impaired relaxation.   mild concentric LV hypertrophy. Aortic sclerosis s stenosis    Neuro/Psych  (-) seizures, CVA  GI/Hepatic/Renal/Endo    (+) obesity,     (-) morbid obesity, liver disease, no renal disease, diabetes, hypothyroidism    Musculoskeletal     Abdominal    Substance History      OB/GYN          Other      history of cancer (skin )    ROS/Med Hx Other: Creat normal now ( was elevated )  Hct 31  K normal                 Anesthesia Plan    ASA 3     general     intravenous induction   Anesthetic plan, all risks, benefits, and alternatives have been provided, discussed and informed consent has been obtained with: patient.    Plan discussed with CRNA.

## 2019-09-06 NOTE — ANESTHESIA PROCEDURE NOTES
Airway  Urgency: elective    Date/Time: 9/6/2019 10:25 AM  Airway not difficult    General Information and Staff    Patient location during procedure: OR  CRNA: Hector Zendejas CRNA    Indications and Patient Condition  Indications for airway management: airway protection    Preoxygenated: yes  Mask difficulty assessment: 0 - not attempted    Final Airway Details  Final airway type: supraglottic airway      Successful airway: I-gel  Size 4    Number of attempts at approach: 1    Additional Comments  LMA placed without difficulty, ventilation with assist, equal breath sounds and symmetric chest rise and fall

## 2019-09-06 NOTE — PLAN OF CARE
Problem: Patient Care Overview  Goal: Plan of Care Review  Outcome: Ongoing (interventions implemented as appropriate)   09/06/19 9661   Coping/Psychosocial   Plan of Care Reviewed With patient   Plan of Care Review   Progress improving   OTHER   Outcome Summary Pt had cystscopy today. Torres removed and CBI d/c. Pt has voided small amount, will cont to monitor. Moderate amount of blood noted from urethra will cont to monitor. VSS. Up x1 assist. Will cont to monitor.

## 2019-09-06 NOTE — PROGRESS NOTES
[facility] Medicine Services  PROGRESS NOTE    Patient Name: Chaitanya Browne  : 1923  MRN: [id number]    Date of Admission: 2019  Length of Stay: 3  Primary Care Physician: Dirk Russ MD    Subjective   Subjective     CC:  F/u hematuria    HPI:    Daughter at bedside today. No f/c. No n/v. Occasional dry cough, but no shortness of breath. Notes burning at rojas cath insertion site.    Review of Systems  No f/c  No n/v  No constipation  Tolerating PO, but poor appetite per daugher  Mild dry cough/no shortness of breath    All other systems reviewed and negative except any additional pertinent positives and negatives discussed in HPI.     Objective   Objective     Vital Signs:   Temp:  [98.2 °F (36.8 °C)-99.8 °F (37.7 °C)] 98.5 °F (36.9 °C)  Heart Rate:  [] 106  Resp:  [18-20] 18  BP: (115-138)/(49-74) 128/74        Physical Exam:    NAD, alert and oriented, appears younger than listed age, daughter at bedside today  OP clear, MMM  Neck supple  No LAD  NCAT  PERRL  Tachy  CTAB  +BS, ND, NT  BARAHONA  No c/c/e  ALert and oriented x 3  CBI with rojas in place, clearing and yellow now with minimal debris in tubing    Results Reviewed:    Results from last 7 days   Lab Units 19  0651 19   WBC 10*3/mm3  --  10.37 11.59*   HEMOGLOBIN g/dL 9.8* 10.0* 12.4*   HEMATOCRIT % 31.6* 31.1* 38.7   PLATELETS 10*3/mm3  --  276 336     Results from last 7 days   Lab Units 19  0651 19  0451 19  2303 19   SODIUM mmol/L 144 142  --  142   POTASSIUM mmol/L 4.2 4.5  --  4.8   CHLORIDE mmol/L 109* 108*  --  102   CO2 mmol/L 23.0 21.0*  --  24.0   BUN mg/dL 24* 36*  --  33*   CREATININE mg/dL 0.96 1.69*  --  1.77*   GLUCOSE mg/dL 118* 141*  --  168*   CALCIUM mg/dL 8.3 8.4  --  9.5   ALT (SGPT) U/L  --   --   --  12   AST (SGOT) U/L  --   --   --  27   TROPONIN T ng/mL  --  0.166* 0.148* 0.162*   PROBNP pg/mL  --   --   --  1,189.0      Estimated Creatinine Clearance: 44.4 mL/min (by C-G formula based on SCr of 0.96 mg/dL).    Microbiology Results Abnormal     Procedure Component Value - Date/Time    Blood Culture - Blood, Arm, Right [[id number]] Collected:  09/02/19 2050    Lab Status:  Preliminary result Specimen:  Blood from Arm, Right Updated:  09/05/19 2137     Blood Culture No growth at 3 days    Blood Culture - Blood, Arm, Right [[id number]] Collected:  09/02/19 2045    Lab Status:  Preliminary result Specimen:  Blood from Arm, Right Updated:  09/05/19 2137     Blood Culture No growth at 3 days    Urine Culture - Urine, Urine, Catheter [[id number]]  (Normal) Collected:  09/03/19 0112    Lab Status:  Final result Specimen:  Urine, Catheter Updated:  09/04/19 1125     Urine Culture No growth          Imaging Results (last 24 hours)     ** No results found for the last 24 hours. **          Results for orders placed during the hospital encounter of 09/02/19   Adult Transthoracic Echo Complete W/ Cont if Necessary Per Protocol    Narrative · Mild-to-moderate mitral valve regurgitation is present.  · Estimated EF = 72%.  · Left ventricular systolic function is hyperdynamic (EF > 70).  · Left ventricular diastolic dysfunction (grade I) consistent with   impaired relaxation.  · Left ventricular wall thickness is consistent with mild concentric   hypertrophy.  · Left atrial cavity size is borderline dilated.  · Normal right ventricular cavity size, wall thickness, systolic function   and septal motion noted.  · Mild mitral valve stenosis is present with functional MAC.  · The aortic valve exhibits moderate sclerosis without stenosis.  · No evidence of pulmonary hypertension is present.  · There is no evidence of pericardial effusion.          I have reviewed the medications:  Scheduled Meds:    atorvastatin 10 mg Oral Daily   ceftriaxone 1 g Intravenous Q24H   sodium chloride 10 mL Intravenous Q12H     Continuous Infusions:   PRN Meds:.Menthol  (Topical Analgesic)  •  ondansetron **OR** ondansetron  •  polyethylene glycol  •  [COMPLETED] Insert peripheral IV **AND** sodium chloride  •  sodium chloride      Assessment/Plan   Assessment / Plan     Active Hospital Problems    Diagnosis  POA   • **Gross hematuria [R31.0]  Yes   • Bronchitis [J40]  Yes   • Acute UTI (urinary tract infection) [N39.0]  Yes   • Acute renal insufficiency [N28.9]  Yes   • Elevated troponin [R74.8]  Yes   • BPH without urinary obstruction [N40.0]  Yes   • Essential hypertension [I10]  Yes   • Hyperlipidemia LDL goal <100 [E78.5]  Yes      Resolved Hospital Problems   No resolved problems to display.        Brief Hospital Course to date:  Chaitanya Browne is a 89 y.o. male with history of hypertension, hyperlipidemia, but remains very functional and active at his age.  Presented to emergency room with complaints of gross hematuria.  Found to have some acute renal insufficiency and clot in his bladder.  Continuous irrigation started and admitted to hospitalist service.    Hematuria  -CBI per urology, tentatively today per scheduling    ABBY  -resolved, likely due to obstruction    Possible UTI  -Rocephin, cultures negative to date    Mild Anemia  -stable    Viral URI  -resolving    Tachycardia, monitor      DVT Prophylaxis:  Mechanical    Disposition: I expect the patient to be discharged home when ok from urology standpoint..    CODE STATUS:   Code Status and Medical Interventions:   Ordered at: 09/03/19 0248     Level Of Support Discussed With:    Patient     Code Status:    CPR     Medical Interventions (Level of Support Prior to Arrest):    Full       Electronically signed by Bryce Vargas MD, 09/06/19, 8:46 AM.

## 2019-09-06 NOTE — ANESTHESIA POSTPROCEDURE EVALUATION
Patient: Chaitanya Browne    Procedure Summary     Date:  09/06/19 Room / Location:   [facility]    Anesthesia Start:  1016 Anesthesia Stop:  1116    Procedures:       CYSTOSCOPY (N/A Urethra)      EVACUATION OF BLOOD CLOT, FULGERATION OF PROSTATE (N/A Bladder) Diagnosis:      Surgeon:  Ck Woods MD Provider:  Kash Rasmussen MD    Anesthesia Type:  general ASA Status:  3          Anesthesia Type: general  Last vitals  BP   109/64 (09/06/2019 1117)   Temp   98.8 (09/06/2019 1117)   Pulse   90 (09/06/2019 1117)   Resp   16(09/06/2019 1117)     SpO2   100% (09/06/2019 1117)     Post Anesthesia Care and Evaluation    Patient location during evaluation: PACU  Patient participation: complete - patient participated  Level of consciousness: awake and alert  Pain score: 0  Pain management: adequate  Airway patency: patent  Anesthetic complications: No anesthetic complications  PONV Status: none  Cardiovascular status: hemodynamically stable and acceptable  Respiratory status: nonlabored ventilation, acceptable and face mask  Hydration status: acceptable

## 2019-09-07 LAB
ANION GAP SERPL CALCULATED.3IONS-SCNC: 10 MMOL/L (ref 5–15)
BACTERIA SPEC AEROBE CULT: NORMAL
BACTERIA SPEC AEROBE CULT: NORMAL
BUN BLD-MCNC: 24 MG/DL (ref 8–23)
BUN/CREAT SERPL: 25.3 (ref 7–25)
CALCIUM SPEC-SCNC: 7.8 MG/DL (ref 8.2–9.6)
CHLORIDE SERPL-SCNC: 106 MMOL/L (ref 98–107)
CO2 SERPL-SCNC: 23 MMOL/L (ref 22–29)
CREAT BLD-MCNC: 0.95 MG/DL (ref 0.76–1.27)
DEPRECATED RDW RBC AUTO: 48.7 FL (ref 37–54)
ERYTHROCYTE [DISTWIDTH] IN BLOOD BY AUTOMATED COUNT: 13.2 % (ref 12.3–15.4)
GFR SERPL CREATININE-BSD FRML MDRD: 74 ML/MIN/1.73
GFR SERPL CREATININE-BSD FRML MDRD: 89 ML/MIN/1.73
GLUCOSE BLD-MCNC: 149 MG/DL (ref 65–99)
HCT VFR BLD AUTO: 27.7 % (ref 37.5–51)
HGB BLD-MCNC: 8.5 G/DL (ref 13–17.7)
MCH RBC QN AUTO: 31.3 PG (ref 26.6–33)
MCHC RBC AUTO-ENTMCNC: 30.7 G/DL (ref 31.5–35.7)
MCV RBC AUTO: 101.8 FL (ref 79–97)
PLATELET # BLD AUTO: 301 10*3/MM3 (ref 140–450)
PMV BLD AUTO: 10.9 FL (ref 6–12)
POTASSIUM BLD-SCNC: 4.3 MMOL/L (ref 3.5–5.2)
RBC # BLD AUTO: 2.72 10*6/MM3 (ref 4.14–5.8)
SODIUM BLD-SCNC: 139 MMOL/L (ref 136–145)
WBC NRBC COR # BLD: 18.1 10*3/MM3 (ref 3.4–10.8)

## 2019-09-07 PROCEDURE — 97110 THERAPEUTIC EXERCISES: CPT

## 2019-09-07 PROCEDURE — 94799 UNLISTED PULMONARY SVC/PX: CPT

## 2019-09-07 PROCEDURE — 97116 GAIT TRAINING THERAPY: CPT

## 2019-09-07 PROCEDURE — 94640 AIRWAY INHALATION TREATMENT: CPT

## 2019-09-07 PROCEDURE — 25010000002 CEFTRIAXONE PER 250 MG: Performed by: NURSE PRACTITIONER

## 2019-09-07 PROCEDURE — 80048 BASIC METABOLIC PNL TOTAL CA: CPT | Performed by: HOSPITALIST

## 2019-09-07 PROCEDURE — 85027 COMPLETE CBC AUTOMATED: CPT | Performed by: HOSPITALIST

## 2019-09-07 PROCEDURE — 99233 SBSQ HOSP IP/OBS HIGH 50: CPT | Performed by: HOSPITALIST

## 2019-09-07 RX ORDER — FINASTERIDE 5 MG/1
5 TABLET, FILM COATED ORAL DAILY
Status: DISCONTINUED | OUTPATIENT
Start: 2019-09-07 | End: 2019-09-16 | Stop reason: HOSPADM

## 2019-09-07 RX ORDER — IPRATROPIUM BROMIDE AND ALBUTEROL SULFATE 2.5; .5 MG/3ML; MG/3ML
3 SOLUTION RESPIRATORY (INHALATION)
Status: DISCONTINUED | OUTPATIENT
Start: 2019-09-07 | End: 2019-09-16 | Stop reason: HOSPADM

## 2019-09-07 RX ADMIN — IPRATROPIUM BROMIDE AND ALBUTEROL SULFATE 3 ML: 2.5; .5 SOLUTION RESPIRATORY (INHALATION) at 16:39

## 2019-09-07 RX ADMIN — SODIUM CHLORIDE, PRESERVATIVE FREE 10 ML: 5 INJECTION INTRAVENOUS at 20:17

## 2019-09-07 RX ADMIN — SODIUM CHLORIDE 500 ML: 9 INJECTION, SOLUTION INTRAVENOUS at 05:00

## 2019-09-07 RX ADMIN — IPRATROPIUM BROMIDE AND ALBUTEROL SULFATE 3 ML: 2.5; .5 SOLUTION RESPIRATORY (INHALATION) at 13:39

## 2019-09-07 RX ADMIN — CEFTRIAXONE SODIUM 1 G: 1 INJECTION, POWDER, FOR SOLUTION INTRAMUSCULAR; INTRAVENOUS at 02:15

## 2019-09-07 RX ADMIN — FINASTERIDE 5 MG: 5 TABLET, FILM COATED ORAL at 11:28

## 2019-09-07 RX ADMIN — IPRATROPIUM BROMIDE AND ALBUTEROL SULFATE 3 ML: 2.5; .5 SOLUTION RESPIRATORY (INHALATION) at 19:49

## 2019-09-07 NOTE — PLAN OF CARE
Problem: Patient Care Overview  Goal: Plan of Care Review  Outcome: Ongoing (interventions implemented as appropriate)   09/07/19 0502   Coping/Psychosocial   Plan of Care Reviewed With patient   Plan of Care Review   Progress no change   OTHER   Outcome Summary Pt has had hypotension throughout this shift. 500ml bolus given x2 and BP up to 100s systolic, continuing to monitor. Otherwise VSS on room air. Pt was unable to void spontaneously, attempted to i/o cath and catheter large clots blocked return. Urology notified and CBI reordered. Pt urine now light pink in color after several large clots were evacuated. Up with assist x1, walker. A&O. Plan is to d/c home with daughter when medically ready.

## 2019-09-07 NOTE — PROGRESS NOTES
Patient is doing well this morning  Had significant hematuria following procedure yesterday and three-way catheter reinserted last night  Now on CBI    CBI is clear on moderate drip      Our plan is to wean the CBI over the next couple of days and we will decide about discharge disposition in regards to with or without catheter over the next 24 to 48 hours as we monitor hematuria    Start  finasteride

## 2019-09-07 NOTE — THERAPY TREATMENT NOTE
Patient Name: Chaitanya Browne  : 1923    MRN: [id number]                              Today's Date: 2019       Admit Date: 2019    Visit Dx:     ICD-10-CM ICD-9-CM   1. ABBY (acute kidney injury) (CMS/[facility]) N17.9 584.9   2. Hematuria, unspecified type R31.9 599.70   3. Bladder outlet obstruction N32.0 596.0   4. Elevated troponin R74.8 790.6   5. Impaired mobility and ADLs Z74.09 799.89     Patient Active Problem List   Diagnosis   • Facial basal cell cancer   • Hyperlipidemia LDL goal <100   • Essential hypertension   • ASHD (arteriosclerotic heart disease)   • Gait abnormality   • Impaired fasting glucose   • Polyp, colonic   • Vitamin D deficiency   • Low back pain at multiple sites   • Proteinuria   • Right carpal tunnel syndrome   • Obesity (BMI 30-39.9)   • Hematuria, unspecified   • Medicare annual wellness visit, subsequent   • BPH without urinary obstruction   • Edema   • Cough   • Morbid obesity due to excess calories (CMS/[facility])   • Asthma   • Allergic rhinitis   • Gross hematuria   • Acute UTI (urinary tract infection)   • Acute renal insufficiency   • Elevated troponin   • Bronchitis     Past Medical History:   Diagnosis Date   • Aortic valve sclerosis    • Basal cell carcinoma (BCC) of left side of nose    • BPH (benign prostatic hyperplasia)    • Cataracts, bilateral     bilateralcataract extraction and lens implantation    • Diastolic dysfunction    • High cholesterol    • History of disease     bilateral lacrimal gland dysfunction per Dr Fajardo   • Hypertension    • Infection     infected big toe; I and D DR Alvares    • Pneumonia      Past Surgical History:   Procedure Laterality Date   • APPENDECTOMY     • CATARACT EXTRACTION, BILATERAL  2013    and lens implantation   • CYSTOSCOPY TRANSURETHRAL RESECTION OF PROSTATE     • HEAD & NECK SKIN LESION EXCISIONAL BIOPSY      Basal cell removed from nose    • INCISION AND DRAINAGE FOOT  2013    infected big toe Dr Alvares      General Information     [facility] Name 09/07/19 1053          PT Evaluation Time/Intention    Document Type  therapy note (daily note)  -AS     Mode of Treatment  physical therapy  -AS     [facility] Name 09/07/19 1053          General Information    Patient Profile Reviewed?  yes  -AS     Existing Precautions/Restrictions  fall  -AS     [facility] Name 09/07/19 1053          Cognitive Assessment/Intervention- PT/OT    Orientation Status (Cognition)  oriented x 4  -AS     Row Name 09/07/19 1053          Safety Issues, Functional Mobility    Safety Issues Affecting Function (Mobility)  safety precaution awareness;safety precautions follow-through/compliance  -AS     Impairments Affecting Function (Mobility)  strength;cognition;endurance/activity tolerance  -AS       User Key  (r) = Recorded By, (t) = Taken By, (c) = Cosigned By    Initials Name Provider Type    AS Monie Fletcher PTA Physical Therapy Assistant        Mobility     [facility] Name 09/07/19 1054          Bed Mobility Assessment/Treatment    Supine-Sit [place] (Bed Mobility)  verbal cues;minimum assist (75% patient effort)  -AS     Assistive Device (Bed Mobility)  bed rails;head of bed elevated  -AS     Comment (Bed Mobility)  assist at trunk as patient with bilateral carpal tunnel   -AS     [facility] Name 09/07/19 1054          Transfer Assessment/Treatment    Comment (Transfers)  verbal cues for hand placement  -AS     Row Name 09/07/19 1054          Sit-Stand Transfer    Sit-Stand [place] (Transfers)  verbal cues;conditional independence  -AS     Assistive Device (Sit-Stand Transfers)  walker, front-wheeled  -AS     [facility] Name 09/07/19 1054          Gait/Stairs Assessment/Training    Gait/Stairs Assessment/Training  gait/ambulation assistive device  -AS     [place] Level (Gait)  verbal cues;contact guard  -AS     Assistive Device (Gait)  walker, front-wheeled  -AS     Distance in Feet (Gait)  200  -AS     Deviations/Abnormal Patterns (Gait)  bilateral  deviations;nida decreased;gait speed decreased  -AS     Bilateral Gait Deviations  forward flexed posture  -AS     Comment (Gait/Stairs)  patient ambulated 200 feet with rolling walker for support and CGA x1, verbal cues to stay close to walker and to improve posture. Distance limited by weakness.  -AS       User Key  (r) = Recorded By, (t) = Taken By, (c) = Cosigned By    Initials Name Provider Type    AS Monie Fletcher PTA Physical Therapy Assistant        Obj/Interventions     Row Name 09/07/19 1056          Therapeutic Exercise    Lower Extremity (Therapeutic Exercise)  LAQ (long arc quad), bilateral;marching while seated  -AS     Lower Extremity Range of Motion (Therapeutic Exercise)  ankle dorsiflexion/plantar flexion, bilateral  -AS     Exercise Type (Therapeutic Exercise)  AROM (active range of motion)  -AS     Position (Therapeutic Exercise)  seated  -AS     Sets/Reps (Therapeutic Exercise)  1/10  -AS       User Key  (r) = Recorded By, (t) = Taken By, (c) = Cosigned By    Initials Name Provider Type    AS Monie Fletcher PTA Physical Therapy Assistant        Goals/Plan    No documentation.       Clinical Impression     Row Name 09/07/19 1056          Pain Assessment    Additional Documentation  Pain Scale: Numbers Pre/Post-Treatment (Group)  -AS     Row Name 09/07/19 1056          Pain Scale: Numbers Pre/Post-Treatment    Pain Scale: Numbers, Pretreatment  0/10 - no pain  -AS     Pain Scale: Numbers, Post-Treatment  0/10 - no pain  -AS       User Key  (r) = Recorded By, (t) = Taken By, (c) = Cosigned By    Initials Name Provider Type    AS Monie Fletcher PTA Physical Therapy Assistant        Outcome Measures     Row Name 09/07/19 1057          How much help from another person do you currently need...    Turning from your back to your side while in flat bed without using bedrails?  3  -AS     Moving from lying on back to sitting on the side of a flat bed without bedrails?  3  -AS      Moving to and from a bed to a chair (including a wheelchair)?  3  -AS     Standing up from a chair using your arms (e.g., wheelchair, bedside chair)?  3  -AS     Climbing 3-5 steps with a railing?  2  -AS     To walk in hospital room?  3  -AS     AM-PAC 6 Clicks Score (PT)  17  -AS     Row Name 09/07/19 1057          Functional Assessment    Outcome Measure Options  AM-PAC 6 Clicks Basic Mobility (PT)  -AS       User Key  (r) = Recorded By, (t) = Taken By, (c) = Cosigned By    Initials Name Provider Type    AS Monie Fletcher, RODRICK Physical Therapy Assistant        Physical Therapy Education     Title: PT OT SLP Therapies (In Progress)     Topic: Physical Therapy (In Progress)     Point: Mobility training (In Progress)     Learning Progress Summary           Patient Acceptance, E, NR by AS at 9/7/2019 10:56 AM    ENRIQUETA Andrade VU by KM at 9/5/2019  9:54 AM    Comment:  discussed plan of care and dc planning                   Point: Home exercise program (In Progress)     Learning Progress Summary           Patient Acceptance, E, NR by AS at 9/7/2019 10:56 AM    ENRIQUETA Andrade VU by KM at 9/5/2019  9:54 AM    Comment:  discussed plan of care and dc planning                   Point: Body mechanics (In Progress)     Learning Progress Summary           Patient Acceptance, E, NR by AS at 9/7/2019 10:56 AM    ENRIQUETA Andrade VU by VALERIE at 9/5/2019  9:54 AM    Comment:  discussed plan of care and dc planning                   Point: Precautions (In Progress)     Learning Progress Summary           Patient Acceptance, E, NR by AS at 9/7/2019 10:56 AM    ENRIQUETA Andrade VU by KM at 9/5/2019  9:54 AM    Comment:  discussed plan of care and dc planning                               User Key     Initials Effective Dates Name Provider Type Discipline     06/19/15 -  Josselin Caro, PT Physical Therapist PT    AS 06/22/15 -  Monie Fletcher PTA Physical Therapy Assistant PT              PT Recommendation and Plan     Plan of Care  Reviewed With: patient  Progress: improving  Outcome Summary: patient ambulated 200 feet with rolling walker for support and CGA x1, verbal cues to stay close to walker and to improve posture. Distance limited by weakness     Time Calculation:   PT Charges     Row Name 09/07/19 1057             Time Calculation    Start Time  0958  -AS      PT Received On  09/07/19  -AS      PT Goal Re-Cert Due Date  09/15/19  -AS         Timed Charges    18290 - PT Therapeutic Exercise Minutes  8  -AS      80762 - Gait Training Minutes   15  -AS        User Key  (r) = Recorded By, (t) = Taken By, (c) = Cosigned By    Initials Name Provider Type    AS Monie Fletcher PTA Physical Therapy Assistant        Therapy Charges for Today     Code Description Service Date Service Provider Modifiers Qty    32040108584 HC PT THER PROC EA 15 MIN 9/7/2019 Monie Fletcher PTA GP 1    95102038395 HC GAIT TRAINING EA 15 MIN 9/7/2019 Monie Fletcher PTA GP 1          PT G-Codes  Outcome Measure Options: AM-PAC 6 Clicks Basic Mobility (PT)  AM-PAC 6 Clicks Score (PT): 17  AM-PAC 6 Clicks Score (OT): 18    Monie Fletcher PTA  9/7/2019

## 2019-09-07 NOTE — PROGRESS NOTES
[facility] Medicine Services  PROGRESS NOTE    Patient Name: Chaitanya Browne  : 1923  MRN: [id number]    Date of Admission: 2019  Length of Stay: 4  Primary Care Physician: Dirk Russ MD    Subjective   Subjective     CC:  F/u hematuria    HPI:    No family at bedside. Cystoscopy note reviewed. Rojas replaced with CBI ongoing. Bright red blood noted in rojas tubing. Denies f/c. Cough noted. Pain at rojas insertion site only.     Review of Systems  No f/c  No n/v  No constipation  Tolerating PO, but poor appetite   Pain at rojas site  Cough noted, but no SOA.    All other systems reviewed and negative except any additional pertinent positives and negatives discussed in HPI.     Objective   Objective     Vital Signs:   Temp:  [97.5 °F (36.4 °C)-100 °F (37.8 °C)] 97.9 °F (36.6 °C)  Heart Rate:  [] 76  Resp:  [14-18] 14  BP: ()/(46-73) 111/52        Physical Exam:    NAD, alert and oriented, appears younger than listed age, no family at bedside today  OP clear, MMM  Neck supple  No LAD  NCAT  PERRL  RrR  CTAB  +BS, ND, NT  BARAHONA  No c/c/e  ALert and oriented x 3  CBI with rojas in place, with bright red blood in rojas    Results Reviewed:    Results from last 7 days   Lab Units 19  0651 19   WBC 10*3/mm3  --  10.37 11.59*   HEMOGLOBIN g/dL 9.8* 10.0* 12.4*   HEMATOCRIT % 31.6* 31.1* 38.7   PLATELETS 10*3/mm3  --  276 336     Results from last 7 days   Lab Units 19  0651 191 19  2303 19   SODIUM mmol/L 144 142  --  142   POTASSIUM mmol/L 4.2 4.5  --  4.8   CHLORIDE mmol/L 109* 108*  --  102   CO2 mmol/L 23.0 21.0*  --  24.0   BUN mg/dL 24* 36*  --  33*   CREATININE mg/dL 0.96 1.69*  --  1.77*   GLUCOSE mg/dL 118* 141*  --  168*   CALCIUM mg/dL 8.3 8.4  --  9.5   ALT (SGPT) U/L  --   --   --  12   AST (SGOT) U/L  --   --   --  27   TROPONIN T ng/mL  --  0.166* 0.148* 0.162*   PROBNP pg/mL  --   --    --  1,189.0     Estimated Creatinine Clearance: 44.4 mL/min (by C-G formula based on SCr of 0.96 mg/dL).    Microbiology Results Abnormal     Procedure Component Value - Date/Time    Blood Culture - Blood, Arm, Right [[id number]] Collected:  09/02/19 2050    Lab Status:  Preliminary result Specimen:  Blood from Arm, Right Updated:  09/06/19 2250     Blood Culture No growth at 4 days    Blood Culture - Blood, Arm, Right [[id number]] Collected:  09/02/19 2045    Lab Status:  Preliminary result Specimen:  Blood from Arm, Right Updated:  09/06/19 2250     Blood Culture No growth at 4 days    Urine Culture - Urine, Urine, Catheter [[id number]]  (Normal) Collected:  09/03/19 0112    Lab Status:  Final result Specimen:  Urine, Catheter Updated:  09/04/19 1125     Urine Culture No growth          Imaging Results (last 24 hours)     ** No results found for the last 24 hours. **          Results for orders placed during the hospital encounter of 09/02/19   Adult Transthoracic Echo Complete W/ Cont if Necessary Per Protocol    Narrative · Mild-to-moderate mitral valve regurgitation is present.  · Estimated EF = 72%.  · Left ventricular systolic function is hyperdynamic (EF > 70).  · Left ventricular diastolic dysfunction (grade I) consistent with   impaired relaxation.  · Left ventricular wall thickness is consistent with mild concentric   hypertrophy.  · Left atrial cavity size is borderline dilated.  · Normal right ventricular cavity size, wall thickness, systolic function   and septal motion noted.  · Mild mitral valve stenosis is present with functional MAC.  · The aortic valve exhibits moderate sclerosis without stenosis.  · No evidence of pulmonary hypertension is present.  · There is no evidence of pericardial effusion.          I have reviewed the medications:  Scheduled Meds:    atorvastatin 10 mg Oral Daily   ceftriaxone 1 g Intravenous Q24H   famotidine 20 mg Oral Once   sodium chloride 10 mL Intravenous Q12H      Continuous Infusions:    lactated ringers 9 mL/hr Last Rate: 9 mL/hr (09/06/19 0910)     PRN Meds:.•  acetaminophen  •  Menthol (Topical Analgesic)  •  ondansetron **OR** ondansetron  •  polyethylene glycol  •  [COMPLETED] Insert peripheral IV **AND** sodium chloride  •  sodium chloride      Assessment/Plan   Assessment / Plan     Active Hospital Problems    Diagnosis  POA   • **Gross hematuria [R31.0]  Yes   • Bronchitis [J40]  Yes   • Acute UTI (urinary tract infection) [N39.0]  Yes   • Acute renal insufficiency [N28.9]  Yes   • Elevated troponin [R74.8]  Yes   • BPH without urinary obstruction [N40.0]  Yes   • Essential hypertension [I10]  Yes   • Hyperlipidemia LDL goal <100 [E78.5]  Yes      Resolved Hospital Problems   No resolved problems to display.        Brief Hospital Course to date:  Chaitanya Browne is a 89 y.o. male with history of hypertension, hyperlipidemia, but remains very functional and active at his age.  Presented to emergency room with complaints of gross hematuria.  Found to have some acute renal insufficiency and clot in his bladder.  Continuous irrigation started and admitted to hospitalist service.    Hematuria  -s/p cystoscopy with fulguration of prostate  -post-procedure obstruction with clots, rojas replaced back on CBI    ABBY  -resolved, likely due to obstruction    Possible UTI  -Rocephin, cultures negative to date, continue antibiotics given hematuria, instrumentation    Mild Anemia  -stable    Viral URI  -resolving    Tachycardia, monitor, resolving      DVT Prophylaxis:  Mechanical    Disposition: I expect the patient to be discharged home when ok from urology standpoint..    CODE STATUS:   Code Status and Medical Interventions:   Ordered at: 09/03/19 0248     Level Of Support Discussed With:    Patient     Code Status:    CPR     Medical Interventions (Level of Support Prior to Arrest):    Full       Electronically signed by Bryce Vargas MD, 09/07/19, 7:48 AM.

## 2019-09-07 NOTE — PLAN OF CARE
Problem: Patient Care Overview  Goal: Plan of Care Review  Outcome: Ongoing (interventions implemented as appropriate)   09/07/19 1051   Coping/Psychosocial   Plan of Care Reviewed With patient   Plan of Care Review   Progress improving   OTHER   Outcome Summary patient ambulated 200 feet with rolling walker for support and CGA x1, verbal cues to stay close to walker and to improve posture. Distance limited by weakness

## 2019-09-07 NOTE — PLAN OF CARE
Problem: Patient Care Overview  Goal: Plan of Care Review  Outcome: Ongoing (interventions implemented as appropriate)   09/07/19 1641   Coping/Psychosocial   Plan of Care Reviewed With patient   Plan of Care Review   Progress improving   OTHER   Outcome Summary Pt had uneventful shift. Torres still in place with CBI. Color of urine is improving, clots visible at times. No c/o pain. VSS. BP on lower side, non-symptomatic. Ambulates x1 assist with walker. Will cont to monitor.

## 2019-09-08 ENCOUNTER — APPOINTMENT (OUTPATIENT)
Dept: GENERAL RADIOLOGY | Facility: HOSPITAL | Age: 84
End: 2019-09-08

## 2019-09-08 LAB
ANION GAP SERPL CALCULATED.3IONS-SCNC: 10 MMOL/L (ref 5–15)
BUN BLD-MCNC: 23 MG/DL (ref 8–23)
BUN/CREAT SERPL: 27.7 (ref 7–25)
CALCIUM SPEC-SCNC: 8 MG/DL (ref 8.2–9.6)
CHLORIDE SERPL-SCNC: 107 MMOL/L (ref 98–107)
CO2 SERPL-SCNC: 25 MMOL/L (ref 22–29)
CREAT BLD-MCNC: 0.83 MG/DL (ref 0.76–1.27)
GFR SERPL CREATININE-BSD FRML MDRD: 104 ML/MIN/1.73
GFR SERPL CREATININE-BSD FRML MDRD: 86 ML/MIN/1.73
GLUCOSE BLD-MCNC: 121 MG/DL (ref 65–99)
POTASSIUM BLD-SCNC: 4.2 MMOL/L (ref 3.5–5.2)
SODIUM BLD-SCNC: 142 MMOL/L (ref 136–145)

## 2019-09-08 PROCEDURE — 94799 UNLISTED PULMONARY SVC/PX: CPT

## 2019-09-08 PROCEDURE — 25010000002 CEFTRIAXONE PER 250 MG: Performed by: NURSE PRACTITIONER

## 2019-09-08 PROCEDURE — 99233 SBSQ HOSP IP/OBS HIGH 50: CPT | Performed by: HOSPITALIST

## 2019-09-08 PROCEDURE — 80048 BASIC METABOLIC PNL TOTAL CA: CPT | Performed by: HOSPITALIST

## 2019-09-08 PROCEDURE — 71045 X-RAY EXAM CHEST 1 VIEW: CPT

## 2019-09-08 PROCEDURE — 25010000002 FUROSEMIDE PER 20 MG: Performed by: HOSPITALIST

## 2019-09-08 RX ORDER — BISACODYL 10 MG
10 SUPPOSITORY, RECTAL RECTAL ONCE
Status: DISCONTINUED | OUTPATIENT
Start: 2019-09-08 | End: 2019-09-16 | Stop reason: HOSPADM

## 2019-09-08 RX ORDER — FUROSEMIDE 10 MG/ML
20 INJECTION INTRAMUSCULAR; INTRAVENOUS ONCE
Status: COMPLETED | OUTPATIENT
Start: 2019-09-08 | End: 2019-09-08

## 2019-09-08 RX ORDER — GUAIFENESIN 600 MG/1
600 TABLET, EXTENDED RELEASE ORAL EVERY 12 HOURS SCHEDULED
Status: DISCONTINUED | OUTPATIENT
Start: 2019-09-08 | End: 2019-09-10

## 2019-09-08 RX ORDER — DOCUSATE SODIUM 100 MG/1
100 CAPSULE, LIQUID FILLED ORAL 2 TIMES DAILY
Status: DISCONTINUED | OUTPATIENT
Start: 2019-09-08 | End: 2019-09-16 | Stop reason: HOSPADM

## 2019-09-08 RX ADMIN — SODIUM CHLORIDE, PRESERVATIVE FREE 10 ML: 5 INJECTION INTRAVENOUS at 19:54

## 2019-09-08 RX ADMIN — FINASTERIDE 5 MG: 5 TABLET, FILM COATED ORAL at 09:04

## 2019-09-08 RX ADMIN — GUAIFENESIN 600 MG: 600 TABLET, EXTENDED RELEASE ORAL at 19:54

## 2019-09-08 RX ADMIN — CEFTRIAXONE SODIUM 1 G: 1 INJECTION, POWDER, FOR SOLUTION INTRAMUSCULAR; INTRAVENOUS at 03:20

## 2019-09-08 RX ADMIN — IPRATROPIUM BROMIDE AND ALBUTEROL SULFATE 3 ML: 2.5; .5 SOLUTION RESPIRATORY (INHALATION) at 09:16

## 2019-09-08 RX ADMIN — IPRATROPIUM BROMIDE AND ALBUTEROL SULFATE 3 ML: 2.5; .5 SOLUTION RESPIRATORY (INHALATION) at 13:23

## 2019-09-08 RX ADMIN — SODIUM CHLORIDE, PRESERVATIVE FREE 10 ML: 5 INJECTION INTRAVENOUS at 09:05

## 2019-09-08 RX ADMIN — GUAIFENESIN 600 MG: 600 TABLET, EXTENDED RELEASE ORAL at 09:04

## 2019-09-08 RX ADMIN — IPRATROPIUM BROMIDE AND ALBUTEROL SULFATE 3 ML: 2.5; .5 SOLUTION RESPIRATORY (INHALATION) at 16:51

## 2019-09-08 RX ADMIN — ATORVASTATIN CALCIUM 10 MG: 10 TABLET, FILM COATED ORAL at 09:04

## 2019-09-08 RX ADMIN — FUROSEMIDE 20 MG: 10 INJECTION, SOLUTION INTRAMUSCULAR; INTRAVENOUS at 09:04

## 2019-09-08 RX ADMIN — DOCUSATE SODIUM 100 MG: 100 CAPSULE, LIQUID FILLED ORAL at 09:04

## 2019-09-08 NOTE — PLAN OF CARE
Problem: Patient Care Overview  Goal: Plan of Care Review  Outcome: Ongoing (interventions implemented as appropriate)   09/08/19 7413   Coping/Psychosocial   Plan of Care Reviewed With patient   Plan of Care Review   Progress no change   OTHER   Outcome Summary Pt VSS. CBI continued, urine light pink but a darker pink at times requiring titration to irrigation. Pt with some resistance, flushed with 60ml and irrigation flowing fine. Pt c/o some burning at insertion site of rojas catheter, repositioned and pt reported feeling better. Pt ambulates with 1. Wet productive cough noted but pt encouraged to cough up phlegm and pt stated he will tomorrow. Cough becoming more frequent and wet, CXR ordered and Mucinex BID. Breathing txs already ordered 4 times/day. IS encouraged. Pt buttock without discoloration. 2L NC applied r/t desat when asleep. Rocephin given per order. Cont to monitor.        Problem: Fall Risk (Adult)  Goal: Absence of Fall  Outcome: Ongoing (interventions implemented as appropriate)      Problem: Skin Injury Risk (Adult)  Goal: Skin Health and Integrity  Outcome: Ongoing (interventions implemented as appropriate)

## 2019-09-08 NOTE — PROGRESS NOTES
Patient is doing well this morning  Now on CBI at a slow drip     CBI is pink-tinged       At this point the patient has a very light pink tinge to his constant bladder irrigation.  Continue to wean and when Dr. Woods sees the patient tomorrow he may consider voiding trial.    Patient did undergo chest x-ray,  disposition per primary team

## 2019-09-08 NOTE — PROGRESS NOTES
[facility]  PROGRESS NOTE    Patient Name: Chaitanya Browne  : 1923  MRN: [id number]    Date of Admission: 2019  Length of Stay: 5  Primary Care Physician: Dirk Russ MD    Subjective   Subjective     CC:  F/u hematuria    HPI:    No family at bedside. CBI mildly pink tinged only. No f/c. Notes cough/congestion, but non-productive. Denies F/C. No n/v. Notes constipation.    Review of Systems  No f/c  No n/v  Notes constipation  Tolerating PO, but poor appetite   No pain today  Cough noted, but no SOA, and no f/c    All other systems reviewed and negative except any additional pertinent positives and negatives discussed in HPI.     Objective   Objective     Vital Signs:   Temp:  [97.9 °F (36.6 °C)-98.4 °F (36.9 °C)] 98.3 °F (36.8 °C)  Heart Rate:  [] 81  Resp:  [16-24] 17  BP: ()/(49-69) 127/69        Physical Exam:    NAD, alert and oriented, appears younger than listed age, no family at bedside today  Coarse cough noted  OP clear, MMM  Neck supple  No LAD  NCAT  PERRL  RRR  Decreased at R base, no rhonchi/wheezes/rales  +BS, ND, NT  BARAHONA  No c/c/e  ALert and oriented x 3  CBI with rojas in place, with mild pink tinge only    Results Reviewed:    Results from last 7 days   Lab Units 19  0810 19  0651 19  04519   WBC 10*3/mm3 18.10*  --  10.37 11.59*   HEMOGLOBIN g/dL 8.5* 9.8* 10.0* 12.4*   HEMATOCRIT % 27.7* 31.6* 31.1* 38.7   PLATELETS 10*3/mm3 301  --  276 336     Results from last 7 days   Lab Units 19  0810 19  0651 19  0451 19  2303 19   SODIUM mmol/L 139 144 142  --  142   POTASSIUM mmol/L 4.3 4.2 4.5  --  4.8   CHLORIDE mmol/L 106 109* 108*  --  102   CO2 mmol/L 23.0 23.0 21.0*  --  24.0   BUN mg/dL 24* 24* 36*  --  33*   CREATININE mg/dL 0.95 0.96 1.69*  --  1.77*   GLUCOSE mg/dL 149* 118* 141*  --  168*   CALCIUM mg/dL 7.8* 8.3 8.4  --  9.5   ALT (SGPT) U/L  --   --    --   --  12   AST (SGOT) U/L  --   --   --   --  27   TROPONIN T ng/mL  --   --  0.166* 0.148* 0.162*   PROBNP pg/mL  --   --   --   --  1,189.0     Estimated Creatinine Clearance: 44.8 mL/min (by C-G formula based on SCr of 0.95 mg/dL).    Microbiology Results Abnormal     Procedure Component Value - Date/Time    Blood Culture - Blood, Arm, Right [[id number]] Collected:  09/02/19 2050    Lab Status:  Final result Specimen:  Blood from Arm, Right Updated:  09/07/19 2130     Blood Culture No growth at 5 days    Blood Culture - Blood, Arm, Right [[id number]] Collected:  09/02/19 2045    Lab Status:  Final result Specimen:  Blood from Arm, Right Updated:  09/07/19 2130     Blood Culture No growth at 5 days    Urine Culture - Urine, Urine, Catheter [[id number]]  (Normal) Collected:  09/03/19 0112    Lab Status:  Final result Specimen:  Urine, Catheter Updated:  09/04/19 1125     Urine Culture No growth          Imaging Results (last 24 hours)     Procedure Component Value Units Date/Time    XR Chest 1 View [[id number]] Collected:  09/08/19 0740     Updated:  09/08/19 0742    Narrative:          EXAMINATION: XR CHEST 1 VW-      INDICATION: cough; N17.9-Acute kidney failure, unspecified;  R31.9-Hematuria, unspecified; N32.0-Bladder-neck obstruction;  R74.8-Abnormal levels of other serum enzymes; Z74.09-Other reduced  mobility      COMPARISON: Chest radiograph 9/2/2019     FINDINGS: Single portable chest     A submitted for review. There is been interval worsening of right lower  lobe airspace disease with this trace right pleural effusion. Trace left  pleural effusion suggested. The remainder the lungs appear well aerated.  The heart is obscured by the right lower lobe airspace changes and  volume loss. Similar pulmonary nodule in the left midlung. Visualized  osseous structures appear intact.           Impression:          Interval worsening of right lower lobe airspace disease with a small  right pleural effusion.  Additional questionable trace left pleural  effusion noted. The remainder the lungs appear well aerated.                Results for orders placed during the hospital encounter of 09/02/19   Adult Transthoracic Echo Complete W/ Cont if Necessary Per Protocol    Narrative · Mild-to-moderate mitral valve regurgitation is present.  · Estimated EF = 72%.  · Left ventricular systolic function is hyperdynamic (EF > 70).  · Left ventricular diastolic dysfunction (grade I) consistent with   impaired relaxation.  · Left ventricular wall thickness is consistent with mild concentric   hypertrophy.  · Left atrial cavity size is borderline dilated.  · Normal right ventricular cavity size, wall thickness, systolic function   and septal motion noted.  · Mild mitral valve stenosis is present with functional MAC.  · The aortic valve exhibits moderate sclerosis without stenosis.  · No evidence of pulmonary hypertension is present.  · There is no evidence of pericardial effusion.          I have reviewed the medications:  Scheduled Meds:    atorvastatin 10 mg Oral Daily   bisacodyl 10 mg Rectal Once   ceftriaxone 1 g Intravenous Q24H   docusate sodium 100 mg Oral BID   famotidine 20 mg Oral Once   finasteride 5 mg Oral Daily   furosemide 20 mg Intravenous Once   guaiFENesin 600 mg Oral Q12H   ipratropium-albuterol 3 mL Nebulization 4x Daily - RT   sodium chloride 10 mL Intravenous Q12H     Continuous Infusions:     PRN Meds:.•  acetaminophen  •  Menthol (Topical Analgesic)  •  ondansetron **OR** ondansetron  •  polyethylene glycol  •  [COMPLETED] Insert peripheral IV **AND** sodium chloride  •  sodium chloride      Assessment/Plan   Assessment / Plan     Active Hospital Problems    Diagnosis  POA   • **Gross hematuria [R31.0]  Yes   • Bronchitis [J40]  Yes   • Acute UTI (urinary tract infection) [N39.0]  Yes   • Acute renal insufficiency [N28.9]  Yes   • Elevated troponin [R74.8]  Yes   • BPH without urinary obstruction [N40.0]  Yes    • Essential hypertension [I10]  Yes   • Hyperlipidemia LDL goal <100 [E78.5]  Yes      Resolved Hospital Problems   No resolved problems to display.        Brief Hospital Course to date:  Chaitanya Browne is a 89 y.o. male with history of hypertension, hyperlipidemia, but remains very functional and active at his age.  Presented to emergency room with complaints of gross hematuria.  Found to have some acute renal insufficiency and clot in his bladder.  Continuous irrigation started and admitted to hospitalist service.    Hematuria  -s/p cystoscopy with fulguration of prostate  -post-procedure obstruction with clots, rojas replaced back on CBI  -improving, follow up with Chuck tomorrow    ABBY  -resolved, likely due to obstruction    Possible UTI  -Rocephin, cultures negative to date, continue antibiotics given hematuria, instrumentation    Mild Anemia  -stable    Constipation  -mild bowel regimen    Cough/congestion  -IC  -nebs  -mobilize  -CXR suggests atelectasis, small effusion  -lasix x 1, repeat CXR in AM    Tachycardia, resolved      DVT Prophylaxis:  Mechanical    Disposition: I expect the patient to be discharged home when ok from urology standpoint..    CODE STATUS:   Code Status and Medical Interventions:   Ordered at: 09/03/19 0248     Level Of Support Discussed With:    Patient     Code Status:    CPR     Medical Interventions (Level of Support Prior to Arrest):    Full       Electronically signed by Bryce Vargas MD, 09/08/19, 8:29 AM.

## 2019-09-08 NOTE — PLAN OF CARE
Problem: Patient Care Overview  Goal: Plan of Care Review  Outcome: Ongoing (interventions implemented as appropriate)   09/08/19 9256   Coping/Psychosocial   Plan of Care Reviewed With patient;daughter   Plan of Care Review   Progress no change   OTHER   Outcome Summary Patient alert and oriented. VSS. CBI continued. Attemped to titrate down three times and was only mildly successful. Returned fluid continues to be dark pink to red especially after ambulation. Daughter at bedside and concerned about elevated WBC count and new pleural effusion diagnosis. Ambulates well with standby assist.

## 2019-09-09 ENCOUNTER — APPOINTMENT (OUTPATIENT)
Dept: GENERAL RADIOLOGY | Facility: HOSPITAL | Age: 84
End: 2019-09-09

## 2019-09-09 LAB
ANION GAP SERPL CALCULATED.3IONS-SCNC: 12 MMOL/L (ref 5–15)
BUN BLD-MCNC: 20 MG/DL (ref 8–23)
BUN/CREAT SERPL: 21.5 (ref 7–25)
CALCIUM SPEC-SCNC: 8.3 MG/DL (ref 8.2–9.6)
CHLORIDE SERPL-SCNC: 107 MMOL/L (ref 98–107)
CO2 SERPL-SCNC: 24 MMOL/L (ref 22–29)
CREAT BLD-MCNC: 0.93 MG/DL (ref 0.76–1.27)
DEPRECATED RDW RBC AUTO: 46.6 FL (ref 37–54)
ERYTHROCYTE [DISTWIDTH] IN BLOOD BY AUTOMATED COUNT: 13 % (ref 12.3–15.4)
GFR SERPL CREATININE-BSD FRML MDRD: 75 ML/MIN/1.73
GFR SERPL CREATININE-BSD FRML MDRD: 91 ML/MIN/1.73
GLUCOSE BLD-MCNC: 121 MG/DL (ref 65–99)
HCT VFR BLD AUTO: 30.1 % (ref 37.5–51)
HGB BLD-MCNC: 9.3 G/DL (ref 13–17.7)
MCH RBC QN AUTO: 30.7 PG (ref 26.6–33)
MCHC RBC AUTO-ENTMCNC: 30.9 G/DL (ref 31.5–35.7)
MCV RBC AUTO: 99.3 FL (ref 79–97)
PLATELET # BLD AUTO: 374 10*3/MM3 (ref 140–450)
PMV BLD AUTO: 10.1 FL (ref 6–12)
POTASSIUM BLD-SCNC: 4.3 MMOL/L (ref 3.5–5.2)
RBC # BLD AUTO: 3.03 10*6/MM3 (ref 4.14–5.8)
SODIUM BLD-SCNC: 143 MMOL/L (ref 136–145)
WBC NRBC COR # BLD: 11.14 10*3/MM3 (ref 3.4–10.8)

## 2019-09-09 PROCEDURE — 97535 SELF CARE MNGMENT TRAINING: CPT

## 2019-09-09 PROCEDURE — 94799 UNLISTED PULMONARY SVC/PX: CPT

## 2019-09-09 PROCEDURE — 25010000002 CEFTRIAXONE PER 250 MG: Performed by: NURSE PRACTITIONER

## 2019-09-09 PROCEDURE — 71045 X-RAY EXAM CHEST 1 VIEW: CPT

## 2019-09-09 PROCEDURE — 80048 BASIC METABOLIC PNL TOTAL CA: CPT | Performed by: HOSPITALIST

## 2019-09-09 PROCEDURE — 97110 THERAPEUTIC EXERCISES: CPT

## 2019-09-09 PROCEDURE — 85027 COMPLETE CBC AUTOMATED: CPT | Performed by: HOSPITALIST

## 2019-09-09 PROCEDURE — 99232 SBSQ HOSP IP/OBS MODERATE 35: CPT | Performed by: HOSPITALIST

## 2019-09-09 PROCEDURE — 92610 EVALUATE SWALLOWING FUNCTION: CPT

## 2019-09-09 PROCEDURE — 97116 GAIT TRAINING THERAPY: CPT

## 2019-09-09 RX ADMIN — IPRATROPIUM BROMIDE AND ALBUTEROL SULFATE 3 ML: 2.5; .5 SOLUTION RESPIRATORY (INHALATION) at 19:42

## 2019-09-09 RX ADMIN — CEFTRIAXONE SODIUM 1 G: 1 INJECTION, POWDER, FOR SOLUTION INTRAMUSCULAR; INTRAVENOUS at 02:13

## 2019-09-09 RX ADMIN — ATORVASTATIN CALCIUM 10 MG: 10 TABLET, FILM COATED ORAL at 09:20

## 2019-09-09 RX ADMIN — IPRATROPIUM BROMIDE AND ALBUTEROL SULFATE 3 ML: 2.5; .5 SOLUTION RESPIRATORY (INHALATION) at 12:42

## 2019-09-09 RX ADMIN — IPRATROPIUM BROMIDE AND ALBUTEROL SULFATE 3 ML: 2.5; .5 SOLUTION RESPIRATORY (INHALATION) at 16:07

## 2019-09-09 RX ADMIN — SODIUM CHLORIDE, PRESERVATIVE FREE 10 ML: 5 INJECTION INTRAVENOUS at 09:23

## 2019-09-09 RX ADMIN — FINASTERIDE 5 MG: 5 TABLET, FILM COATED ORAL at 09:20

## 2019-09-09 RX ADMIN — IPRATROPIUM BROMIDE AND ALBUTEROL SULFATE 3 ML: 2.5; .5 SOLUTION RESPIRATORY (INHALATION) at 08:10

## 2019-09-09 RX ADMIN — DOCUSATE SODIUM 100 MG: 100 CAPSULE, LIQUID FILLED ORAL at 20:28

## 2019-09-09 RX ADMIN — SODIUM CHLORIDE, PRESERVATIVE FREE 10 ML: 5 INJECTION INTRAVENOUS at 20:28

## 2019-09-09 NOTE — THERAPY TREATMENT NOTE
Acute Care - Occupational Therapy Treatment Note  [facility]     Patient Name: Chaitanya Browne  : 1923  MRN: [id number]  Today's Date: 2019  Onset of Illness/Injury or Date of Surgery: 19  Date of Referral to OT: 19  Referring Physician: Dr. Dove    Admit Date: 2019       ICD-10-CM ICD-9-CM   1. ABBY (acute kidney injury) (CMS/Formerly [facility]) N17.9 584.9   2. Hematuria, unspecified type R31.9 599.70   3. Bladder outlet obstruction N32.0 596.0   4. Elevated troponin R74.8 790.6   5. Impaired mobility and ADLs Z74.09 799.89     Patient Active Problem List   Diagnosis   • Facial basal cell cancer   • Hyperlipidemia LDL goal <100   • Essential hypertension   • ASHD (arteriosclerotic heart disease)   • Gait abnormality   • Impaired fasting glucose   • Polyp, colonic   • Vitamin D deficiency   • Low back pain at multiple sites   • Proteinuria   • Right carpal tunnel syndrome   • Obesity (BMI 30-39.9)   • Hematuria, unspecified   • Medicare annual wellness visit, subsequent   • BPH without urinary obstruction   • Edema   • Cough   • Morbid obesity due to excess calories (CMS/Formerly [facility])   • Asthma   • Allergic rhinitis   • Gross hematuria   • Acute UTI (urinary tract infection)   • Acute renal insufficiency   • Elevated troponin   • Bronchitis     Past Medical History:   Diagnosis Date   • Aortic valve sclerosis    • Basal cell carcinoma (BCC) of left side of nose    • BPH (benign prostatic hyperplasia)    • Cataracts, bilateral     bilateralcataract extraction and lens implantation    • Diastolic dysfunction    • High cholesterol    • History of disease     bilateral lacrimal gland dysfunction per Dr Fajardo   • Hypertension    • Infection     infected big toe; I and D DR Alvares    • Pneumonia      Past Surgical History:   Procedure Laterality Date   • APPENDECTOMY     • CATARACT EXTRACTION, BILATERAL      and lens implantation   • CYSTOSCOPY N/A 2019    Procedure: CYSTOSCOPY;  Surgeon:  Ck Woods MD;  Location:  [facility];  Service: Urology   • CYSTOSCOPY TRANSURETHRAL RESECTION OF PROSTATE  1989   • HEAD & NECK SKIN LESION EXCISIONAL BIOPSY      Basal cell removed from nose    • INCISION AND DRAINAGE FOOT  2013    infected big toe Dr Alvares   • TRANSURETHRAL RESECTION OF BLADDER TUMOR N/A 9/6/2019    Procedure: EVACUATION OF BLOOD CLOT, FULGERATION OF PROSTATE;  Surgeon: Ck Woods MD;  Location:  [facility];  Service: Urology       Therapy Treatment    Rehabilitation Treatment Summary     Row Name 09/09/19 0951             Treatment Time/Intention    Discipline  occupational therapist  -      Document Type  therapy note (daily note)  -      Subjective Information  complains of;pain  -      Mode of Treatment  individual therapy;occupational therapy  -      Patient/Family Observations  Daughter present, Pt. UIC with IV and nasal cannula intact  -      Care Plan Review  patient/other agree to care plan  -LC      Patient Effort  good  -LC      Comment  RN aware  -LC      Recorded by [LC] Karina Zuluaga OT 09/09/19 1110      Row Name 09/09/19 0951             Cognitive Assessment/Intervention    Additional Documentation  Cognitive Assessment/Intervention (Group)  -LC      Recorded by [LC] Karina Zuluaga OT 09/09/19 1110      Row Name 09/09/19 0951             Cognitive Assessment/Intervention- PT/OT    Affect/Mental Status (Cognitive)  WNL  -LC      Orientation Status (Cognition)  oriented x 4  -LC      Follows Commands (Cognition)  WNL  -LC      Cognitive Function (Cognitive)  WNL  -LC      Recorded by [LC] Karina Zuluaga OT 09/09/19 1110      Row Name 09/09/19 0951             Safety Issues, Functional Mobility    Safety Issues Affecting Function (Mobility)  safety precaution awareness;safety precautions follow-through/compliance  -LC      Impairments Affecting Function (Mobility)  strength;endurance/activity tolerance  -LC      Recorded by [LC] Karina Zuluaga, OT  09/09/19 1110      Row Name 09/09/19 0951             Functional Mobility    Functional Mobility- Ind. Level  supervision required  -LC      Functional Mobility- Device  rolling walker  -LC      Functional Mobility-Distance (Feet)  5  -LC      Functional Mobility- Comment  Pt. ambulated from chair to sink in room to complete grooming tasks   -LC      Recorded by [LC] Karina Zuluaga, OT 09/09/19 1110      Row Name 09/09/19 0951             Transfer Assessment/Treatment    Transfer Assessment/Treatment  sit-stand transfer;stand-sit transfer  -LC      Comment (Transfers)  vc's for hand placement and sequencing t/f. Cues to push up with BUE to stand and to reach back with BUE to lower to sit.   -LC      Recorded by [LC] Karina Zulugaa, OT 09/09/19 1110      Row Name 09/09/19 0951             Sit-Stand Transfer    Sit-Stand [place] (Transfers)  supervision;verbal cues  -LC      Assistive Device (Sit-Stand Transfers)  walker, front-wheeled  -LC      Recorded by [LC] Karina Zuluaga OT 09/09/19 1110      Row Name 09/09/19 0951             Stand-Sit Transfer    Stand-Sit [place] (Transfers)  supervision;verbal cues  -LC      Assistive Device (Stand-Sit Transfers)  walker, front-wheeled  -LC      Recorded by [LC] Karina Zuluaga, OT 09/09/19 1110      Row Name 09/09/19 0951             ADL Assessment/Intervention    81269 - OT Self Care/Mgmt Minutes  20  -LC      BADL Assessment/Intervention  lower body dressing;grooming  -LC      Recorded by [LC] Karina Zuluaga OT 09/09/19 1110      Row Name 09/09/19 0951             Lower Body Dressing Assessment/Training    Lower Body Dressing [place] Level  don;shoes/slippers;moderate assist (50% patient effort)  -LC      Lower Body Dressing Position  supported sitting  -LC      Recorded by [LC] Karina Zuluaga OT 09/09/19 1110      Row Name 09/09/19 0951             Grooming Assessment/Training    [place] Level (Grooming)  wash face, hands;oral care regimen;other  (see comments) SBA  -LC      Grooming Position  supported standing  -LC      Comment (Grooming)  Pt. stood at sink to complete grooming tasks. SBA   -LC      Recorded by [LC] Karina Zuluaga OT 09/09/19 1110      Row Name 09/09/19 0951             BADL Safety/Performance    Impairments, BADL Safety/Performance  balance;endurance/activity tolerance;strength  -LC      Recorded by [] Karina Zuluaga OT 09/09/19 1110      Row Name 09/09/19 0951             Motor Skills Assessment/Interventions    Additional Documentation  Therapeutic Exercise (Group)  -LC      Recorded by [] Karina Zuluaga OT 09/09/19 1110      Row Name 09/09/19 0951             Therapeutic Exercise    87126 - OT Therapeutic Exercise Minutes  5  -LC      Recorded by [] Karina Zuluaga OT 09/09/19 1110      Row Name 09/09/19 0951             Therapeutic Exercise    Comment (Therapeutic Exercise)  Eduated pt. and daughter on therex's using putty to improve hand strength needed for self care tasks.   -LC      Recorded by [LC] Karina Zuluaga OT 09/09/19 1110      Row Name 09/09/19 0951             Dynamic Standing Balance    Level of [place], Reaches Outside Midline (Standing, Dynamic Balance)  standby assist  -LC      Assistive Device Utilized (Supported Standing, Dynamic Balance)  walker, rolling  -LC      Recorded by [LC] Karina Zuluaga OT 09/09/19 1110      Row Name 09/09/19 0951             Positioning and Restraints    Pre-Treatment Position  sitting in chair/recliner  -LC      Post Treatment Position  chair  -LC      In Chair  reclined;call light within reach;encouraged to call for assist;exit alarm on;with family/caregiver;legs elevated  -LC      Recorded by [LC] Karina Zuluaga OT 09/09/19 1110      Row Name 09/09/19 0951             Pain Assessment    Additional Documentation  Pain Scale: Numbers Pre/Post-Treatment (Group)  -LC      Recorded by [] Karina Zuluaga OT 09/09/19 1110      Row Name 09/09/19 0951             Pain Scale:  Numbers Pre/Post-Treatment    Pain Scale: Numbers, Pretreatment  2/10  -LC      Pain Scale: Numbers, Post-Treatment  2/10  -LC      Pain Location - Orientation  lower  -LC      Pain Location  back  -LC      Pain Intervention(s)  Repositioned;Ambulation/increased activity  -      Recorded by [CONTRERAS] Karina Zuluaga OT 09/09/19 1110        User Key  (r) = Recorded By, (t) = Taken By, (c) = Cosigned By    Initials Name Effective Dates Discipline     Karina Zuluaga OT 07/18/19 -  OT             Occupational Therapy Education     Title: PT OT SLP Therapies (In Progress)     Topic: Occupational Therapy (Done)     Point: ADL training (Done)     Description: Instruct learner(s) on proper safety adaptation and remediation techniques during self care or transfers.   Instruct in proper use of assistive devices.    Learning Progress Summary           Patient Acceptance, E, VU by CONTRERAS at 9/9/2019  9:51 AM    Eager, E,TB,D, VU,NR by AR at 9/5/2019  8:45 AM   Family Eager, E,TB,D, VU,NR by AR at 9/5/2019  8:45 AM                   Point: Home exercise program (Done)     Description: Instruct learner(s) on appropriate technique for monitoring, assisting and/or progressing therapeutic exercises/activities.    Learning Progress Summary           Patient Eager, E,TB,D, VU,NR by AR at 9/5/2019  8:45 AM   Family Eager, E,TB,D, VU,NR by AR at 9/5/2019  8:45 AM                   Point: Precautions (Done)     Description: Instruct learner(s) on prescribed precautions during self-care and functional transfers.    Learning Progress Summary           Patient Eager, E,TB,D, VU,NR by AR at 9/5/2019  8:45 AM   Family Eager, E,TB,D, VU,NR by AR at 9/5/2019  8:45 AM                   Point: Body mechanics (Done)     Description: Instruct learner(s) on proper positioning and spine alignment during self-care, functional mobility activities and/or exercises.    Learning Progress Summary           Patient Acceptance, E, VU by CONTRERAS at 9/9/2019  9:51 AM     ENRIQUETA Andrade,MARY KAY,GARTH, VU,NR by AR at 9/5/2019  8:45 AM   Family ENRIQUETA Andrade,MARY KAY,GARTH, VU,NR by AR at 9/5/2019  8:45 AM                               User Key     Initials Effective Dates Name Provider Type Discipline    AR 06/22/15 -  Peyton Eaton OT Occupational Therapist OT    CONTRERAS 07/18/19 -  Karina Zuluaga OT Occupational Therapist OT                OT Recommendation and Plan     Plan of Care Review  Plan of Care Reviewed With: patient, daughter  Plan of Care Reviewed With: patient, daughter  Outcome Summary: Pt. completed STS and mobility w/ SBA. Demonstrated standing at sink to complete grooming tasks w/ S/U and SBA. Recommend Home with assistance and home health at discharge.   Outcome Measures     Row Name 09/09/19 0951             How much help from another is currently needed...    Putting on and taking off regular lower body clothing?  3  -LC      Bathing (including washing, rinsing, and drying)  3  -LC      Toileting (which includes using toilet bed pan or urinal)  3  -LC      Putting on and taking off regular upper body clothing  3  -LC      Taking care of personal grooming (such as brushing teeth)  3  -LC      Eating meals  4  -      AM-PAC 6 Clicks Score (OT)  19  -        User Key  (r) = Recorded By, (t) = Taken By, (c) = Cosigned By    Initials Name Provider Type    Karina Coello OT Occupational Therapist           Time Calculation:   Time Calculation- OT     Row Name 09/09/19 0951             Time Calculation- OT    OT Start Time  0951  -      OT Received On  09/09/19  -      OT Goal Re-Cert Due Date  09/15/19  -         Timed Charges    75093 - OT Therapeutic Exercise Minutes  5  -      24517 - OT Self Care/Mgmt Minutes  20  -        User Key  (r) = Recorded By, (t) = Taken By, (c) = Cosigned By    Initials Name Provider Type    Karina Coello OT Occupational Therapist        Therapy Charges for Today     Code Description Service Date Service Provider Modifiers Qty     [id number]  OT SELF CARE/MGMT/TRAIN EA 15 MIN 9/9/2019 Karina Zuluaga, OT GO 1               Karina Zuluaga OT  9/9/2019

## 2019-09-09 NOTE — PROGRESS NOTES
Continued Stay Note  [facility]     Patient Name: Chaitanya Browne  MRN: [id number]  Today's Date: 9/9/2019    Admit Date: 9/2/2019    Discharge Plan     Row Name 09/09/19 1542       Plan    Plan  [facility] or [facility]    Patient/Family in Agreement with Plan  yes    Plan Comments  Followed up with Dr. Browne and his daughter, Nkechi, at the bedside for dischrge planning.  Although Mr. Browne was quite active, prior to admission, his daughter Nkechi feels that her Father would benefit from rehab.  Dr. Browne and Nkechi were agreeable to referrals to [facility]  or [facility].  Referrals made to facilities.  The patient's rehab admission will require a prior auth from his [facility] Medicare.    CM will follow up.    Final Discharge Disposition Code  03 - skilled nursing facility (SNF)        Discharge Codes    No documentation.       Expected Discharge Date and Time     Expected Discharge Date Expected Discharge Time    Sep 10, 2019             Lupe Yeager RN

## 2019-09-09 NOTE — PROGRESS NOTES
[facility] Medicine Services  PROGRESS NOTE    Patient Name: Chaitanya Browne  : 1923  MRN: [id number]    Date of Admission: 2019  Length of Stay: 6  Primary Care Physician: Dirk Russ MD    Subjective   Subjective     CC:  F/u hematuria    HPI:    Daughter at bedside. No f/c. No n/v. Constipation resolved. Urine clearing. Dry cough only. Nursing concerned about choking when eating.    Review of Systems  No f/c  No n/v  Constipation resolved  Tolerating PO, but poor appetite   No pain today  Cough noted, but no SOA, and no f/c    All other systems reviewed and negative except any additional pertinent positives and negatives discussed in HPI.     Objective   Objective     Vital Signs:   Temp:  [98 °F (36.7 °C)-98.4 °F (36.9 °C)] 98 °F (36.7 °C)  Heart Rate:  [] 94  Resp:  [16-22] 16  BP: (107-121)/(59-72) 120/72        Physical Exam:    NAD, alert and oriented, appears younger than listed age, no family at bedside today  No cough noted today  OP clear, MMM  Neck supple  No LAD  NCAT  PERRL  RRR  Improved BS at bases bilaterally  +BS, ND, NT  BARAHONA  No c/c/e  Alert and oriented x 3  CBI with rojas in place, with mild pink tinge only    Results Reviewed:    Results from last 7 days   Lab Units 19  0558 19  0810 19  0651 19  0451   WBC 10*3/mm3 11.14* 18.10*  --  10.37   HEMOGLOBIN g/dL 9.3* 8.5* 9.8* 10.0*   HEMATOCRIT % 30.1* 27.7* 31.6* 31.1*   PLATELETS 10*3/mm3 374 301  --  276     Results from last 7 days   Lab Units 19  0558 19  0753 19  0810  19  0451 19  2303 19  2052   SODIUM mmol/L 143 142 139   < > 142  --  142   POTASSIUM mmol/L 4.3 4.2 4.3   < > 4.5  --  4.8   CHLORIDE mmol/L 107 107 106   < > 108*  --  102   CO2 mmol/L 24.0 25.0 23.0   < > 21.0*  --  24.0   BUN mg/dL 20 23 24*   < > 36*  --  33*   CREATININE mg/dL 0.93 0.83 0.95   < > 1.69*  --  1.77*   GLUCOSE mg/dL 121* 121* 149*   < > 141*  --   168*   CALCIUM mg/dL 8.3 8.0* 7.8*   < > 8.4  --  9.5   ALT (SGPT) U/L  --   --   --   --   --   --  12   AST (SGOT) U/L  --   --   --   --   --   --  27   TROPONIN T ng/mL  --   --   --   --  0.166* 0.148* 0.162*   PROBNP pg/mL  --   --   --   --   --   --  1,189.0    < > = values in this interval not displayed.     Estimated Creatinine Clearance: 45.8 mL/min (by C-G formula based on SCr of 0.93 mg/dL).    Microbiology Results Abnormal     Procedure Component Value - Date/Time    Blood Culture - Blood, Arm, Right [[id number]] Collected:  09/02/19 2050    Lab Status:  Final result Specimen:  Blood from Arm, Right Updated:  09/07/19 2130     Blood Culture No growth at 5 days    Blood Culture - Blood, Arm, Right [[id number]] Collected:  09/02/19 2045    Lab Status:  Final result Specimen:  Blood from Arm, Right Updated:  09/07/19 2130     Blood Culture No growth at 5 days    Urine Culture - Urine, Urine, Catheter [[id number]]  (Normal) Collected:  09/03/19 0112    Lab Status:  Final result Specimen:  Urine, Catheter Updated:  09/04/19 1125     Urine Culture No growth          Imaging Results (last 24 hours)     Procedure Component Value Units Date/Time    XR Chest 1 View [[id number]] Collected:  09/09/19 0835     Updated:  09/09/19 0836    Narrative:          EXAMINATION: XR CHEST 1 VW-      INDICATION: DYSPNEA, ON EXERTION      COMPARISON: 9/8/2019     FINDINGS: Portable chest reveals calcification of them identified within  the left midlung which is stable. Some improvement seen in the racial  the right lung base. Elevation similar hemidiaphragm with small right  pleural effusion. Mild increased markings again seen within the left  lung base. Spurring of the upper lung fields bilaterally.           Impression:       Slight improvement seen in the racial lung bases  bilaterally. Continued follow up recommended as indicated.          XR Chest 1 View [[id number]] Collected:  09/08/19 0740     Updated:  09/08/19 7053     Narrative:          EXAMINATION: XR CHEST 1 VW - 09/08/2019      INDICATION: N17.9-Acute kidney failure, unspecified; R31.9-Hematuria,  unspecified; N32.0-Bladder-neck obstruction; R74.8-Abnormal levels of  other serum enzymes; Z74.09-Other reduced mobility.      COMPARISON: Chest radiograph 09/02/2019.     FINDINGS: Single portable chest radiograph is submitted for review.  There has been interval worsening of right lower lobe airspace disease  with trace right pleural effusion. Trace left pleural effusion  suggested. The remainder of the lungs appear well aerated. The heart is  obscured by the right lower lobe airspace changes and volume loss.  Similar pulmonary nodule in the left midlung. Visualized osseous  structures appear intact.           Impression:          Interval worsening of right lower lobe airspace disease with a small  right pleural effusion. Additional questionable trace left pleural  effusion noted. The remainder of the lungs appear well aerated.     DICTATED:   09/08/2019  EDITED/ls :   09/08/2019              Results for orders placed during the hospital encounter of 09/02/19   Adult Transthoracic Echo Complete W/ Cont if Necessary Per Protocol    Narrative · Mild-to-moderate mitral valve regurgitation is present.  · Estimated EF = 72%.  · Left ventricular systolic function is hyperdynamic (EF > 70).  · Left ventricular diastolic dysfunction (grade I) consistent with   impaired relaxation.  · Left ventricular wall thickness is consistent with mild concentric   hypertrophy.  · Left atrial cavity size is borderline dilated.  · Normal right ventricular cavity size, wall thickness, systolic function   and septal motion noted.  · Mild mitral valve stenosis is present with functional MAC.  · The aortic valve exhibits moderate sclerosis without stenosis.  · No evidence of pulmonary hypertension is present.  · There is no evidence of pericardial effusion.          I have reviewed the  medications:  Scheduled Meds:    atorvastatin 10 mg Oral Daily   bisacodyl 10 mg Rectal Once   ceftriaxone 1 g Intravenous Q24H   docusate sodium 100 mg Oral BID   famotidine 20 mg Oral Once   finasteride 5 mg Oral Daily   guaiFENesin 600 mg Oral Q12H   ipratropium-albuterol 3 mL Nebulization 4x Daily - RT   sodium chloride 10 mL Intravenous Q12H     Continuous Infusions:     PRN Meds:.•  acetaminophen  •  Menthol (Topical Analgesic)  •  ondansetron **OR** ondansetron  •  polyethylene glycol  •  [COMPLETED] Insert peripheral IV **AND** sodium chloride  •  sodium chloride      Assessment/Plan   Assessment / Plan     Active Hospital Problems    Diagnosis  POA   • **Gross hematuria [R31.0]  Yes   • Bronchitis [J40]  Yes   • Acute UTI (urinary tract infection) [N39.0]  Yes   • Acute renal insufficiency [N28.9]  Yes   • Elevated troponin [R74.8]  Yes   • BPH without urinary obstruction [N40.0]  Yes   • Essential hypertension [I10]  Yes   • Hyperlipidemia LDL goal <100 [E78.5]  Yes      Resolved Hospital Problems   No resolved problems to display.        Brief Hospital Course to date:  Chaitanya Browne is a 89 y.o. male with history of hypertension, hyperlipidemia, but remains very functional and active at his age.  Presented to emergency room with complaints of gross hematuria.  Found to have some acute renal insufficiency and clot in his bladder.  Continuous irrigation started and admitted to hospitalist service.    Hematuria  -s/p cystoscopy with fulguration of prostate  -post-procedure obstruction with clots, rojas replaced back on CBI  -improving, follow up urology today, possible home with rojas  -finasteride ordered over the weekend    ABBY  -resolved, likely due to obstruction    Possible UTI  -s/p Rocephin    Mild Anemia  -stable    Constipation  -mild bowel regimen, better    Cough/congestion  -IC  -nebs  -mobilize  -s/p Lasix  -CXR improved    Tachycardia, resolved      DVT Prophylaxis:   Mechanical    Disposition: I expect the patient to be discharged home when ok from urology standpoint..    CODE STATUS:   Code Status and Medical Interventions:   Ordered at: 09/03/19 0248     Level Of Support Discussed With:    Patient     Code Status:    CPR     Medical Interventions (Level of Support Prior to Arrest):    Full       Electronically signed by Bryce Vargas MD, 09/09/19, 9:30 AM.

## 2019-09-09 NOTE — PLAN OF CARE
Problem: Patient Care Overview  Goal: Plan of Care Review  Outcome: Ongoing (interventions implemented as appropriate)   09/09/19 7172   Coping/Psychosocial   Plan of Care Reviewed With patient;daughter   OTHER   Outcome Summary Pt. completed STS and mobility w/ SBA. Demonstrated standing at sink to complete grooming tasks w/ S/U and SBA. Recommend Home with assistance and home health at discharge.

## 2019-09-09 NOTE — PLAN OF CARE
Problem: Patient Care Overview  Goal: Plan of Care Review  Outcome: Ongoing (interventions implemented as appropriate)   09/09/19 0816   Coping/Psychosocial   Plan of Care Reviewed With patient;daughter   Plan of Care Review   Progress improving   OTHER   Outcome Summary Pt perform STS with SPV and RW with cues for sequencing. Pt ambulated with RW and SBA/ feet with oxygen sat 90% on RA. HEP reviewed with pt and daughter. Recommend DC home with assist and home health

## 2019-09-09 NOTE — PLAN OF CARE
Problem: Patient Care Overview  Goal: Plan of Care Review  Outcome: Ongoing (interventions implemented as appropriate)   09/09/19 4289   Coping/Psychosocial   Plan of Care Reviewed With patient   Plan of Care Review   Progress no change   OTHER   Outcome Summary pt with little rest this shift. pt with multiple bm's. cbi continued but required frequent titrating to keep from becoming dark pink to red. no clots noted. alert and oriented. denies pain.

## 2019-09-09 NOTE — PLAN OF CARE
Problem: Patient Care Overview  Goal: Plan of Care Review  Outcome: Ongoing (interventions implemented as appropriate)   09/09/19 9313   Coping/Psychosocial   Plan of Care Reviewed With patient   SLP evaluation completed. Will continue to address dysphagia through diet tolerance. Please see note for further details and recommendations.

## 2019-09-09 NOTE — THERAPY EVALUATION
Acute Care - Speech Language Pathology   Swallow Initial Evaluation [facility]   Clinical Swallow Evaluation       Patient Name: Chaitanya Browne  : 1923  MRN: [id number]  Today's Date: 2019  Onset of Illness/Injury or Date of Surgery: 19     Referring Physician: Dr. Dove      Admit Date: 2019    Visit Dx:     ICD-10-CM ICD-9-CM   1. ABBY (acute kidney injury) (CMS/[facility]) N17.9 584.9   2. Hematuria, unspecified type R31.9 599.70   3. Bladder outlet obstruction N32.0 596.0   4. Elevated troponin R74.8 790.6   5. Impaired mobility and ADLs Z74.09 799.89     Patient Active Problem List   Diagnosis   • Facial basal cell cancer   • Hyperlipidemia LDL goal <100   • Essential hypertension   • ASHD (arteriosclerotic heart disease)   • Gait abnormality   • Impaired fasting glucose   • Polyp, colonic   • Vitamin D deficiency   • Low back pain at multiple sites   • Proteinuria   • Right carpal tunnel syndrome   • Obesity (BMI 30-39.9)   • Hematuria, unspecified   • Medicare annual wellness visit, subsequent   • BPH without urinary obstruction   • Edema   • Cough   • Morbid obesity due to excess calories (CMS/[facility])   • Asthma   • Allergic rhinitis   • Gross hematuria   • Acute UTI (urinary tract infection)   • Acute renal insufficiency   • Elevated troponin   • Bronchitis     Past Medical History:   Diagnosis Date   • Aortic valve sclerosis    • Basal cell carcinoma (BCC) of left side of nose    • BPH (benign prostatic hyperplasia)    • Cataracts, bilateral     bilateralcataract extraction and lens implantation    • Diastolic dysfunction    • High cholesterol    • History of disease     bilateral lacrimal gland dysfunction per Dr Fajardo   • Hypertension    • Infection     infected big toe; I and D DR Alvares    • Pneumonia      Past Surgical History:   Procedure Laterality Date   • APPENDECTOMY     • CATARACT EXTRACTION, BILATERAL      and lens implantation   • CYSTOSCOPY N/A 2019     Procedure: CYSTOSCOPY;  Surgeon: Ck Woods MD;  Location: [facility] OR;  Service: Urology   • CYSTOSCOPY TRANSURETHRAL RESECTION OF PROSTATE  1989   • HEAD & NECK SKIN LESION EXCISIONAL BIOPSY      Basal cell removed from nose    • INCISION AND DRAINAGE FOOT  2013    infected big toe Dr Alvares   • TRANSURETHRAL RESECTION OF BLADDER TUMOR N/A 9/6/2019    Procedure: EVACUATION OF BLOOD CLOT, FULGERATION OF PROSTATE;  Surgeon: Ck Woods MD;  Location: [facility] OR;  Service: Urology        SWALLOW EVALUATION (last 72 hours)      SLP Adult Swallow Evaluation     Row Name 09/09/19 1300                   Rehab Evaluation    Document Type  evaluation  -VO        Subjective Information  no complaints  -VO        Patient Observations  alert;cooperative  -VO        Patient/Family Observations  sitting in chair  -VO        Patient Effort  good  -VO           General Information    Patient Profile Reviewed  yes  -VO        Pertinent History Of Current Problem  89 yr adm w/ hematuria, UTI. pleural effusion on CXR. Consulted for choking episode w/ meds this AM.  -VO        Current Method of Nutrition  regular textures;thin liquids  -VO        Precautions/Limitations, Vision  WFL with corrective lenses;for purposes of eval  -VO        Precautions/Limitations, Hearing  hearing aid, bilaterally;for purposes of eval  -VO        Prior Level of Function-Communication  WFL  -VO        Prior Level of Function-Swallowing  no diet consistency restrictions  -VO        Plans/Goals Discussed with  patient;agreed upon  -VO        Barriers to Rehab  none identified  -VO        Patient's Goals for Discharge  eat/drink without coughing/choking  -VO           Pain Scale: Numbers Pre/Post-Treatment    Pain Scale: Numbers, Pretreatment  0/10 - no pain  -VO        Pain Scale: Numbers, Post-Treatment  0/10 - no pain  -VO           Oral Motor and Function    Dentition Assessment  natural, present and adequate  -VO        Secretion  Management  WNL/WFL  -VO        Mucosal Quality  moist, healthy  -VO        Volitional Swallow  WFL  -VO        Volitional Cough  WFL  -VO           Oral Musculature and Cranial Nerve Assessment    Oral Motor General Assessment  WFL  -VO           General Eating/Swallowing Observations    Respiratory Support Currently in Use  nasal cannula  -VO        Eating/Swallowing Skills  fed by SLP;other (see comments) difficulty holding utensils/cup  -VO        Positioning During Eating  upright 90 degree;upright in chair  -VO        Utensils Used  spoon;cup;straw  -VO        Consistencies Trialed  regular textures;thin liquids;pureed  -VO           Respiratory    Respiratory Status  reduction in SpO2  -VO           Clinical Swallow Eval    Oral Prep Phase  WFL  -VO        Oral Transit  WFL  -VO        Oral Residue  WFL  -VO        Pharyngeal Phase  WFL  -VO        Esophageal Phase  unremarkable  -VO        Clinical Swallow Evaluation Summary  CSE completed this PM: Pt w/ c/o choking on pill this AM. Coughing prior to PO. Trialed thins via cup/str, puree, and cracker. Oral phase WFL. Inconsistent delayed coughing t/o evaluation w/ and w/o PO (pt w/ pleural effusion, RN reports coughing t/o day). Given concerns w/ choking and inconsistent coughing during eval. Rec: cont regular diet, thin liquids w/ DT. Meds whole w/ pudding. Crush large pills.  -VO           Clinical Impression    SLP Swallowing Diagnosis  functional oral phase;functional pharyngeal phase  -VO        Functional Impact  risk of aspiration/pneumonia  -VO        Rehab Potential/Prognosis, Swallowing  good, to achieve stated therapy goals  -VO        Swallow Criteria for Skilled Therapeutic Interventions Met  demonstrates skilled criteria  -VO           Recommendations    Therapy Frequency (Swallow)  PRN  -VO        Predicted Duration Therapy Intervention (Days)  1 week  -VO        SLP Diet Recommendation  regular textures;thin liquids  -VO        Recommended  Precautions and Strategies  upright posture during/after eating;small bites of food and sips of liquid  -VO        SLP Rec. for Method of Medication Administration  meds whole;meds crushed;with pudding or applesauce;as tolerated  -VO        Monitor for Signs of Aspiration  yes;notify SLP if any concerns  -VO        Anticipated Dischage Disposition  unknown  -          User Key  (r) = Recorded By, (t) = Taken By, (c) = Cosigned By    Initials Name Effective Dates    VO Bri Taveras MA,CCC-SLP 10/24/18 -           EDUCATION  The patient has been educated in the following areas:   Dysphagia (Swallowing Impairment) Oral Care/Hydration.    SLP Recommendation and Plan  SLP Swallowing Diagnosis: functional oral phase, functional pharyngeal phase  SLP Diet Recommendation: regular textures, thin liquids  Recommended Precautions and Strategies: upright posture during/after eating, small bites of food and sips of liquid  SLP Rec. for Method of Medication Administration: meds whole, meds crushed, with pudding or applesauce, as tolerated     Monitor for Signs of Aspiration: yes, notify SLP if any concerns     Swallow Criteria for Skilled Therapeutic Interventions Met: demonstrates skilled criteria  Anticipated Dischage Disposition: unknown  Rehab Potential/Prognosis, Swallowing: good, to achieve stated therapy goals  Therapy Frequency (Swallow): PRN  Predicted Duration Therapy Intervention (Days): 1 week       Plan of Care Reviewed With: patient           Time Calculation:   Time Calculation- SLP     Row Name 09/09/19 1447             Time Calculation- SLP    SLP Start Time  1300  -VO      SLP Received On  09/09/19  -        User Key  (r) = Recorded By, (t) = Taken By, (c) = Cosigned By    Initials Name Provider Type    VO Bri Taveras MA,CCC-SLP Speech and Language Pathologist          Therapy Charges for Today     Code Description Service Date Service Provider Modifiers Qty    [id number] [facility] ORAL  PHARYNG SWALLOW 3 9/9/2019 Bri Taveras MA,CCC-SLP GN 1               Bri Taveras MA,CCC-SLP  9/9/2019

## 2019-09-09 NOTE — THERAPY TREATMENT NOTE
Patient Name: Chaitanya Browne  : 1923    MRN: [id number]                              Today's Date: 2019       Admit Date: 2019    Visit Dx:     ICD-10-CM ICD-9-CM   1. ABBY (acute kidney injury) (CMS/[facility]) N17.9 584.9   2. Hematuria, unspecified type R31.9 599.70   3. Bladder outlet obstruction N32.0 596.0   4. Elevated troponin R74.8 790.6   5. Impaired mobility and ADLs Z74.09 799.89     Patient Active Problem List   Diagnosis   • Facial basal cell cancer   • Hyperlipidemia LDL goal <100   • Essential hypertension   • ASHD (arteriosclerotic heart disease)   • Gait abnormality   • Impaired fasting glucose   • Polyp, colonic   • Vitamin D deficiency   • Low back pain at multiple sites   • Proteinuria   • Right carpal tunnel syndrome   • Obesity (BMI 30-39.9)   • Hematuria, unspecified   • Medicare annual wellness visit, subsequent   • BPH without urinary obstruction   • Edema   • Cough   • Morbid obesity due to excess calories (CMS/[facility])   • Asthma   • Allergic rhinitis   • Gross hematuria   • Acute UTI (urinary tract infection)   • Acute renal insufficiency   • Elevated troponin   • Bronchitis     Past Medical History:   Diagnosis Date   • Aortic valve sclerosis    • Basal cell carcinoma (BCC) of left side of nose    • BPH (benign prostatic hyperplasia)    • Cataracts, bilateral     bilateralcataract extraction and lens implantation    • Diastolic dysfunction    • High cholesterol    • History of disease     bilateral lacrimal gland dysfunction per Dr Fajardo   • Hypertension    • Infection     infected big toe; I and D DR Alvares    • Pneumonia      Past Surgical History:   Procedure Laterality Date   • APPENDECTOMY     • CATARACT EXTRACTION, BILATERAL      and lens implantation   • CYSTOSCOPY N/A 2019    Procedure: CYSTOSCOPY;  Surgeon: Ck Woods MD;  Location: [facility];  Service: Urology   • CYSTOSCOPY TRANSURETHRAL RESECTION OF PROSTATE     • HEAD & NECK SKIN  LESION EXCISIONAL BIOPSY      Basal cell removed from nose    • INCISION AND DRAINAGE FOOT  2013    infected big toe Dr Alvares   • TRANSURETHRAL RESECTION OF BLADDER TUMOR N/A 9/6/2019    Procedure: EVACUATION OF BLOOD CLOT, FULGERATION OF PROSTATE;  Surgeon: Ck Woods MD;  Location: [facility];  Service: Urology     General Information     Row Name 09/09/19 1153          PT Evaluation Time/Intention    Document Type  therapy note (daily note)  -AA     Mode of Treatment  physical therapy;individual therapy  -AA     Row Name 09/09/19 1153          General Information    Patient Profile Reviewed?  yes  -AA     Existing Precautions/Restrictions  fall  -AA     Row Name 09/09/19 1153          Cognitive Assessment/Intervention- PT/OT    Orientation Status (Cognition)  oriented x 4  -AA     Row Name 09/09/19 1153          Safety Issues, Functional Mobility    Safety Issues Affecting Function (Mobility)  insight into deficits/self awareness;safety precautions follow-through/compliance;awareness of need for assistance  -AA     Impairments Affecting Function (Mobility)  strength;endurance/activity tolerance  -AA       User Key  (r) = Recorded By, (t) = Taken By, (c) = Cosigned By    Initials Name Provider Type    AA Aleah Lala, PT Physical Therapist        Mobility     Row Name 09/09/19 1154          Bed Mobility Assessment/Treatment    Comment (Bed Mobility)  pt UIC  -AA     Row Name 09/09/19 1154          Transfer Assessment/Treatment    Comment (Transfers)  VC for hand placement and RW placement  -AA     Row Name 09/09/19 1154          Sit-Stand Transfer    Sit-Stand [place] (Transfers)  supervision;verbal cues  -AA     Assistive Device (Sit-Stand Transfers)  walker, front-wheeled  -AA     Row Name 09/09/19 1154          Gait/Stairs Assessment/Training    Gait/Stairs Assessment/Training  gait/ambulation assistive device;distance ambulated;gait/ambulation independence  -AA     [place] Level (Gait)   verbal cues;contact guard  -AA     Assistive Device (Gait)  walker, front-wheeled  -AA     Distance in Feet (Gait)  260  -AA     Pattern (Gait)  step-through  -AA     Deviations/Abnormal Patterns (Gait)  bilateral deviations;nida decreased;gait speed decreased  -AA     Bilateral Gait Deviations  forward flexed posture  -AA     Comment (Gait/Stairs)  pt ambulated 260 feet with RW and CGA with VC for posture.  Pt oxygen sat 90% post gait on RA.  Increased SOB noted.  No LOB  -AA       User Key  (r) = Recorded By, (t) = Taken By, (c) = Cosigned By    Initials Name Provider Type    AA Aleah Lala, PT Physical Therapist        Obj/Interventions     Row Name 09/09/19 1155          Therapeutic Exercise    Lower Extremity (Therapeutic Exercise)  gluteal sets;LAQ (long arc quad), bilateral;marching while seated;quad sets, bilateral  -AA     Lower Extremity Range of Motion (Therapeutic Exercise)  hip abduction/adduction, bilateral;ankle dorsiflexion/plantar flexion, bilateral  -AA     Exercise Type (Therapeutic Exercise)  AROM (active range of motion)  -AA     Position (Therapeutic Exercise)  seated  -AA     Sets/Reps (Therapeutic Exercise)  15  -AA     Comment (Therapeutic Exercise)  educated pt and daughter to perform 12x/day outside of therapy for strengthening  -AA     Row Name 09/09/19 1155          Static Sitting Balance    Level of [place] (Unsupported Sitting, Static Balance)  independent  -AA     Sitting Position (Unsupported Sitting, Static Balance)  sitting in chair  -AA     Time Able to Maintain Position (Unsupported Sitting, Static Balance)  more than 5 minutes  -AA     Row Name 09/09/19 1155          Static Standing Balance    Level of [place] (Supported Standing, Static Balance)  independent  -AA     Time Able to Maintain Position (Supported Standing, Static Balance)  3 to 4 minutes  -AA     Assistive Device Utilized (Supported Standing, Static Balance)  walker, rolling  -AA     Row Name  09/09/19 1155          Dynamic Standing Balance    Level of [place], Reaches Outside Midline (Standing, Dynamic Balance)  standby assist;contact guard assist  -AA     Assistive Device Utilized (Supported Standing, Dynamic Balance)  walker, rolling  -       User Key  (r) = Recorded By, (t) = Taken By, (c) = Cosigned By    Initials Name Provider Type    Aleah Abarca L, PT Physical Therapist        Goals/Plan     [facility] Name 09/09/19 1157          Bed Mobility Goal 1 (PT)    Progress/Outcomes (Bed Mobility Goal 1, PT)  goal ongoing  -[facility] Name 09/09/19 1157          Transfer Goal 1 (PT)    Progress/Outcome (Transfer Goal 1, PT)  goal ongoing  -[facility] Name 09/09/19 1157          Gait Training Goal 1 (PT)    Progress/Outcome (Gait Training Goal 1, PT)  goal ongoing  [facility]       User Key  (r) = Recorded By, (t) = Taken By, (c) = Cosigned By    Initials Name Provider Type     Aleah Lala, PT Physical Therapist        Clinical Impression     [facility] Name 09/09/19 1156          Pain Assessment    Additional Documentation  Pain Scale: Numbers Pre/Post-Treatment (Group)  -[facility] Name 09/09/19 1156          Pain Scale: Numbers Pre/Post-Treatment    Pain Scale: Numbers, Pretreatment  0/10 - no pain  -AA     Pain Scale: Numbers, Post-Treatment  0/10 - no pain  -AA     Pain Intervention(s)  Repositioned;Ambulation/increased activity  -AA     Row Name 09/09/19 1156          Plan of Care Review    Plan of Care Reviewed With  patient;daughter  -[facility] Name 09/09/19 1156          Vital Signs    Pre SpO2 (%)  96  -AA     O2 Delivery Pre Treatment  supplemental O2  -AA     Intra SpO2 (%)  90  -AA     O2 Delivery Intra Treatment  room air  -AA     Post SpO2 (%)  95  -AA     O2 Delivery Post Treatment  room air  -AA     Pre Patient Position  Sitting  -AA     Intra Patient Position  Standing  -AA     Post Patient Position  Sitting  -AA     [facility] Name 09/09/19 1156          Positioning and Restraints    Pre-Treatment  Position  sitting in chair/recliner  -AA     Post Treatment Position  chair  -AA     In Chair  notified nsg;exit alarm on;reclined;with family/caregiver;call light within reach;encouraged to call for assist  -AA       User Key  (r) = Recorded By, (t) = Taken By, (c) = Cosigned By    Initials Name Provider Type    Aleah Abarca, PT Physical Therapist        Outcome Measures     Row Name 09/09/19 1159          How much help from another person do you currently need...    Turning from your back to your side while in flat bed without using bedrails?  3  -AA     Moving from lying on back to sitting on the side of a flat bed without bedrails?  3  -AA     Moving to and from a bed to a chair (including a wheelchair)?  3  -AA     Standing up from a chair using your arms (e.g., wheelchair, bedside chair)?  3  -AA     Climbing 3-5 steps with a railing?  3  -AA     To walk in hospital room?  3  -AA     AM-PAC 6 Clicks Score (PT)  18  -AA     Row Name 09/09/19 1159          Functional Assessment    Outcome Measure Options  AM-PAC 6 Clicks Basic Mobility (PT)  -AA       User Key  (r) = Recorded By, (t) = Taken By, (c) = Cosigned By    Initials Name Provider Type    Aleah Abarca PT Physical Therapist        Physical Therapy Education     Title: PT OT SLP Therapies (Done)     Topic: Physical Therapy (Done)     Point: Mobility training (Done)     Learning Progress Summary           Patient Acceptance, E,D, VU,DU by ESPERANZA at 9/9/2019 11:58 AM    Acceptance, E, NR by AS at 9/7/2019 10:56 AM    ENRIQUETA Andrade VU by VALERIE at 9/5/2019  9:54 AM    Comment:  discussed plan of care and dc planning   Family Acceptance, E,D, VU,DU by ESPERANZA at 9/9/2019 11:58 AM                   Point: Home exercise program (Done)     Learning Progress Summary           Patient Acceptance, E,D, VU,DU by ESPERANZA at 9/9/2019 11:58 AM    Acceptance, E, NR by AS at 9/7/2019 10:56 AM    ENRIQUETA Andrade VU by VALERIE at 9/5/2019  9:54 AM    Comment:  discussed plan of care and dc  planning   Family Acceptance, E,D, VU,DU by AA at 9/9/2019 11:58 AM                   Point: Body mechanics (Done)     Learning Progress Summary           Patient Acceptance, E,D, VU,DU by AA at 9/9/2019 11:58 AM    Acceptance, E, NR by AS at 9/7/2019 10:56 AM    Eager, ENRIQUETA, VU by KM at 9/5/2019  9:54 AM    Comment:  discussed plan of care and dc planning   Family Acceptance, E,D, VU,DU by AA at 9/9/2019 11:58 AM                   Point: Precautions (Done)     Learning Progress Summary           Patient Acceptance, E,D, VU,DU by AA at 9/9/2019 11:58 AM    Acceptance, E, NR by AS at 9/7/2019 10:56 AM    EagerENRIQUETA, VU by KM at 9/5/2019  9:54 AM    Comment:  discussed plan of care and dc planning   Family Acceptance, E,D, VU,DU by AA at 9/9/2019 11:58 AM                               User Key     Initials Effective Dates Name Provider Type Discipline     06/19/15 -  Josselin Caro, PT Physical Therapist PT    AS 06/22/15 -  Monie Fletcher, PTA Physical Therapy Assistant PT     04/02/18 -  Aleah Lala, PT Physical Therapist PT              PT Recommendation and Plan     Outcome Summary/Treatment Plan (PT)  Anticipated Equipment Needs at Discharge (PT): front wheeled walker  Anticipated Discharge Disposition (PT): home with assist, home with home health  Plan of Care Reviewed With: patient, daughter  Progress: improving  Outcome Summary: Pt perform STS with SPV and RW with cues for sequencing.  Pt ambulated with RW and SBA/ feet with oxygen sat 90% on RA.  HEP reviewed with pt and daughter.  Recommend DC home with assist and home health     Time Calculation:   PT Charges     Row Name 09/09/19 1159             Time Calculation    Start Time  1125  -AA      PT Received On  09/09/19  -AA      PT Goal Re-Cert Due Date  09/15/19  -AA         Time Calculation- PT    Total Timed Code Minutes- PT  25 minute(s)  -AA         Timed Charges    35018 - PT Therapeutic Exercise Minutes  10  -AA      71970 - Gait  Training Minutes   15  -AA        User Key  (r) = Recorded By, (t) = Taken By, (c) = Cosigned By    Initials Name Provider Type    AA Aleah Lala, PT Physical Therapist        Therapy Charges for Today     Code Description Service Date Service Provider Modifiers Qty    36201575471 HC PT THER PROC EA 15 MIN 9/9/2019 Aleah Lala, PT GP 1    42120807257 HC GAIT TRAINING EA 15 MIN 9/9/2019 Aleah Lala, PT GP 1          PT G-Codes  Outcome Measure Options: AM-PAC 6 Clicks Basic Mobility (PT)  AM-PAC 6 Clicks Score (PT): 18  AM-PAC 6 Clicks Score (OT): 19 April KYM Lala PT  9/9/2019

## 2019-09-10 PROCEDURE — 92526 ORAL FUNCTION THERAPY: CPT

## 2019-09-10 PROCEDURE — 94799 UNLISTED PULMONARY SVC/PX: CPT

## 2019-09-10 PROCEDURE — 97110 THERAPEUTIC EXERCISES: CPT

## 2019-09-10 PROCEDURE — 97116 GAIT TRAINING THERAPY: CPT

## 2019-09-10 PROCEDURE — 99232 SBSQ HOSP IP/OBS MODERATE 35: CPT | Performed by: HOSPITALIST

## 2019-09-10 RX ORDER — TEMAZEPAM 15 MG/1
15 CAPSULE ORAL NIGHTLY PRN
Status: DISCONTINUED | OUTPATIENT
Start: 2019-09-10 | End: 2019-09-16 | Stop reason: HOSPADM

## 2019-09-10 RX ORDER — CHOLECALCIFEROL (VITAMIN D3) 125 MCG
5 CAPSULE ORAL NIGHTLY PRN
Status: DISCONTINUED | OUTPATIENT
Start: 2019-09-10 | End: 2019-09-16 | Stop reason: HOSPADM

## 2019-09-10 RX ADMIN — IPRATROPIUM BROMIDE AND ALBUTEROL SULFATE 3 ML: 2.5; .5 SOLUTION RESPIRATORY (INHALATION) at 19:51

## 2019-09-10 RX ADMIN — IPRATROPIUM BROMIDE AND ALBUTEROL SULFATE 3 ML: 2.5; .5 SOLUTION RESPIRATORY (INHALATION) at 07:50

## 2019-09-10 RX ADMIN — TEMAZEPAM 15 MG: 15 CAPSULE ORAL at 20:46

## 2019-09-10 RX ADMIN — GUAIFENESIN 200 MG: 100 SOLUTION ORAL at 11:58

## 2019-09-10 RX ADMIN — IPRATROPIUM BROMIDE AND ALBUTEROL SULFATE 3 ML: 2.5; .5 SOLUTION RESPIRATORY (INHALATION) at 16:27

## 2019-09-10 RX ADMIN — ATORVASTATIN CALCIUM 10 MG: 10 TABLET, FILM COATED ORAL at 08:51

## 2019-09-10 RX ADMIN — GUAIFENESIN 200 MG: 100 SOLUTION ORAL at 20:46

## 2019-09-10 RX ADMIN — GUAIFENESIN 200 MG: 100 SOLUTION ORAL at 17:11

## 2019-09-10 RX ADMIN — SODIUM CHLORIDE, PRESERVATIVE FREE 10 ML: 5 INJECTION INTRAVENOUS at 08:51

## 2019-09-10 RX ADMIN — FINASTERIDE 5 MG: 5 TABLET, FILM COATED ORAL at 08:51

## 2019-09-10 RX ADMIN — IPRATROPIUM BROMIDE AND ALBUTEROL SULFATE 3 ML: 2.5; .5 SOLUTION RESPIRATORY (INHALATION) at 12:33

## 2019-09-10 RX ADMIN — DOCUSATE SODIUM 100 MG: 100 CAPSULE, LIQUID FILLED ORAL at 08:51

## 2019-09-10 NOTE — PLAN OF CARE
Problem: Patient Care Overview  Goal: Plan of Care Review  Outcome: Ongoing (interventions implemented as appropriate)   09/10/19 1159   Coping/Psychosocial   Plan of Care Reviewed With patient;family   SLP treatment completed. Will continue to address for diet change and tolerance. Please see note for further details and recommendations.

## 2019-09-10 NOTE — PLAN OF CARE
"Problem: Patient Care Overview  Goal: Plan of Care Review  Outcome: Ongoing (interventions implemented as appropriate)   09/10/19 5915   Coping/Psychosocial   Plan of Care Reviewed With patient   Plan of Care Review   Progress improving   OTHER   Outcome Summary unable to sleep this shift. no c/o pain just \" cant sleep\". cbi cont . any attempt to titrate down unsuccessful . urine would become cranberry color. no clots noted. vss. tele nsr. will cont to titrate         "

## 2019-09-10 NOTE — PROGRESS NOTES
Continued Stay Note  [facility]     Patient Name: Chaitanya Browne  MRN: [id number]  Today's Date: 9/10/2019    Admit Date: 9/2/2019    Discharge Plan     Row Name 09/10/19 1420       Plan    Plan  [facility]    Patient/Family in Agreement with Plan  yes    Plan Comments  Followed up with Dr. Boggs and his daughter, Nkechi, at the bedside for discharge planning.  [facility] has offered a skilled bed and they will begin prior auth with [facility] Medicare.  [facility] cannot do bladder irrigations, so Mr. Browne's transfer is still dependent on Urology plan.    [facility] is tentatively scheduled for tomorrow, Wednesday, 9/10/19, at 1:30pm.     will follow up.    Final Discharge Disposition Code  03 - skilled nursing facility (SNF)        Discharge Codes    No documentation.       Expected Discharge Date and Time     Expected Discharge Date Expected Discharge Time    Sep 10, 2019             Lupe Yeager RN

## 2019-09-10 NOTE — PLAN OF CARE
Problem: Patient Care Overview  Goal: Plan of Care Review  Outcome: Ongoing (interventions implemented as appropriate)   09/10/19 0994   Coping/Psychosocial   Plan of Care Reviewed With patient;daughter   Plan of Care Review   Progress improving   OTHER   Outcome Summary Pt ambulated 220 feet with CGA and RW, limited by fatigue. Pt demo good participation with ther ex and mobility. Will continue to progress mobility as able.

## 2019-09-10 NOTE — THERAPY TREATMENT NOTE
Acute Care - Physical Therapy Treatment Note  [facility]     Patient Name: Chaitanya Browne  : 1923  MRN: [id number]  Today's Date: 9/10/2019  Onset of Illness/Injury or Date of Surgery: 19     Referring Physician: Dr. Dove    Admit Date: 2019    Visit Dx:    ICD-10-CM ICD-9-CM   1. ABBY (acute kidney injury) (CMS/[facility]) N17.9 584.9   2. Hematuria, unspecified type R31.9 599.70   3. Bladder outlet obstruction N32.0 596.0   4. Elevated troponin R74.8 790.6   5. Impaired mobility and ADLs Z74.09 799.89     Patient Active Problem List   Diagnosis   • Facial basal cell cancer   • Hyperlipidemia LDL goal <100   • Essential hypertension   • ASHD (arteriosclerotic heart disease)   • Gait abnormality   • Impaired fasting glucose   • Polyp, colonic   • Vitamin D deficiency   • Low back pain at multiple sites   • Proteinuria   • Right carpal tunnel syndrome   • Obesity (BMI 30-39.9)   • Hematuria, unspecified   • Medicare annual wellness visit, subsequent   • BPH without urinary obstruction   • Edema   • Cough   • Morbid obesity due to excess calories (CMS/[facility])   • Asthma   • Allergic rhinitis   • Gross hematuria   • Acute UTI (urinary tract infection)   • Acute renal insufficiency   • Elevated troponin   • Bronchitis       Therapy Treatment    Rehabilitation Treatment Summary     Row Name 09/10/19 0942             Treatment Time/Intention    Discipline  physical therapist  -      Document Type  therapy note (daily note)  -MJ      Subjective Information  no complaints  -MJ      Mode of Treatment  physical therapy  -MJ      Patient/Family Observations  Pt sitting UIC  -      Care Plan Review  patient/other agree to care plan  -MJ      Care Plan Review, Other Participant(s)  daughter  -MJ      Patient Effort  excellent  -MJ      Existing Precautions/Restrictions  fall  -MJ      Recorded by [MJ] Adolph Arenas, PT 09/10/19 1011      Row Name 09/10/19 0942             Vital Signs    Pre SpO2 (%)  95  -MJ       O2 Delivery Pre Treatment  room air  -MJ      Post SpO2 (%)  91  -MJ      O2 Delivery Post Treatment  room air  -MJ      Pre Patient Position  Sitting  -MJ      Post Patient Position  Sitting  -MJ      Recorded by [MJ] Adolph Arenas, PT 09/10/19 1014      Row Name 09/10/19 0942             Cognitive Assessment/Intervention- PT/OT    Affect/Mental Status (Cognitive)  WNL  -MJ      Orientation Status (Cognition)  oriented x 4  -MJ      Follows Commands (Cognition)  WNL  -MJ      Recorded by [MJ] Adolph Arenas, PT 09/10/19 1011      Row Name 09/10/19 0942             Bed Mobility Assessment/Treatment    Comment (Bed Mobility)  NT- pt UIC  -MJ      Recorded by [MJ] Adolph Arenas, PT 09/10/19 1011      Row Name 09/10/19 0942             Transfer Assessment/Treatment    Comment (Transfers)  Verbal cues for correct hand placement  -MJ      Recorded by [MJ] Adolph Arenas, PT 09/10/19 1011      Row Name 09/10/19 0942             Sit-Stand Transfer    Sit-Stand [place] (Transfers)  supervision;verbal cues  -MJ      Assistive Device (Sit-Stand Transfers)  walker, front-wheeled  -MJ      Recorded by [MJ] Adolph Arenas, PT 09/10/19 1011      Row Name 09/10/19 0942             Stand-Sit Transfer    Stand-Sit [place] (Transfers)  supervision;verbal cues  -MJ      Assistive Device (Stand-Sit Transfers)  walker, front-wheeled  -MJ      Recorded by [MJ] Adolph Arenas, PT 09/10/19 1011      Row Name 09/10/19 0942             Gait/Stairs Assessment/Training    88211 - Gait Training Minutes   12  -MJ      [place] Level (Gait)  contact guard;verbal cues  -MJ      Assistive Device (Gait)  walker, front-wheeled  -MJ      Distance in Feet (Gait)  220  -MJ      Pattern (Gait)  step-through  -MJ      Deviations/Abnormal Patterns (Gait)  bilateral deviations;nida decreased;stride length decreased  -MJ      Bilateral Gait Deviations  forward flexed posture;heel strike decreased  -MJ      Comment (Gait/Stairs)  Pt demo step through  gait pattern at slow pace. Verbal cues for upright posture and to stay in middle of RW as pt tends to let RW get too far ahead with fatigue. Gait limited by fatigue  -MJ      Recorded by [MJ] Adolph Arenas, PT 09/10/19 1011      Row Name 09/10/19 0942             Therapeutic Exercise    87319 - PT Therapeutic Exercise Minutes  11  -MJ      Recorded by [MJ] Adolph Arenas, PT 09/10/19 1011      Row Name 09/10/19 0942             Therapeutic Exercise    Lower Extremity (Therapeutic Exercise)  gluteal sets;heel slides, bilateral;LAQ (long arc quad), bilateral;SLR (straight leg raise), bilateral  -MJ      Lower Extremity Range of Motion (Therapeutic Exercise)  hip flexion/extension, bilateral;hip abduction/adduction, bilateral;ankle dorsiflexion/plantar flexion, bilateral  -MJ      Exercise Type (Therapeutic Exercise)  AROM (active range of motion)  -MJ      Position (Therapeutic Exercise)  seated;supine  -MJ      Sets/Reps (Therapeutic Exercise)  15x each  -MJ      Comment (Therapeutic Exercise)  Cues for technique. No physical assist required  -MJ      Recorded by [MJ] Adolph Arenas, PT 09/10/19 1011      Row Name 09/10/19 0942             Positioning and Restraints    Pre-Treatment Position  sitting in chair/recliner  -MJ      Post Treatment Position  chair  -MJ      In Chair  notified nsg;reclined;call light within reach;encouraged to call for assist;exit alarm on;with family/caregiver  -MJ      Recorded by [MJ] Adolph Arenas, PT 09/10/19 1011      Row Name 09/10/19 0942             Plan of Care Review    Plan of Care Reviewed With  patient;daughter  -MJ      Recorded by [MJ] Adolph Arenas, PT 09/10/19 1011      Row Name 09/10/19 0942             Outcome Summary/Treatment Plan (PT)    Daily Summary of Progress (PT)  progress toward functional goals is good  -MJ      Recorded by [ANNELISE] Adolph Arenas, PT 09/10/19 1011        User Key  (r) = Recorded By, (t) = Taken By, (c) = Cosigned By    Initials Name Effective Dates  Discipline    Adolph Schofield, PT 04/03/18 -  PT                   Physical Therapy Education     Title: PT OT SLP Therapies (Done)     Topic: Physical Therapy (Done)     Point: Mobility training (Done)     Learning Progress Summary           Patient Acceptance, E,D, VU,NR by ANNELISE at 9/10/2019  9:42 AM    Comment:  Reviewed HEP, gait mechanics, benefits of mobility    Acceptance, E,D, VU,DU by ESPERANZA at 9/9/2019 11:58 AM    Acceptance, E, NR by AS at 9/7/2019 10:56 AM    Augustiner E, VU by VALERIE at 9/5/2019  9:54 AM    Comment:  discussed plan of care and dc planning   Family Acceptance, E,D, VU,NR by ANNELISE at 9/10/2019  9:42 AM    Comment:  Reviewed HEP, gait mechanics, benefits of mobility    Acceptance, E,D, VU,DU by ESPERANZA at 9/9/2019 11:58 AM                   Point: Home exercise program (Done)     Learning Progress Summary           Patient Acceptance, E,D, VU,NR by ANNELISE at 9/10/2019  9:42 AM    Comment:  Reviewed HEP, gait mechanics, benefits of mobility    Acceptance, E,D, VU,DU by ESPERANZA at 9/9/2019 11:58 AM    Acceptance, E, NR by AS at 9/7/2019 10:56 AM    Raymond E, VU by VALERIE at 9/5/2019  9:54 AM    Comment:  discussed plan of care and dc planning   Family Acceptance, E,D, VU,NR by ANNELISE at 9/10/2019  9:42 AM    Comment:  Reviewed HEP, gait mechanics, benefits of mobility    Acceptance, E,D, VU,DU by ESPERANZA at 9/9/2019 11:58 AM                   Point: Body mechanics (Done)     Learning Progress Summary           Patient Acceptance, E,D, VU,NR by ANNELISE at 9/10/2019  9:42 AM    Comment:  Reviewed HEP, gait mechanics, benefits of mobility    Acceptance, E,D, VU,DU by ESPERANZA at 9/9/2019 11:58 AM    Acceptance, E, NR by AS at 9/7/2019 10:56 AM    Augustiner, E, VU by VALERIE at 9/5/2019  9:54 AM    Comment:  discussed plan of care and dc planning   Family Acceptance, E,D, VU,NR by ANNELISE at 9/10/2019  9:42 AM    Comment:  Reviewed HEP, gait mechanics, benefits of mobility    Acceptance, E,D, VU,DU by ESPERANZA at 9/9/2019 11:58 AM                   Point: Precautions  (Done)     Learning Progress Summary           Patient Acceptance, E,D, VU,NR by ANNELISE at 9/10/2019  9:42 AM    Comment:  Reviewed HEP, gait mechanics, benefits of mobility    Acceptance, E,D, VU,DU by AA at 9/9/2019 11:58 AM    Acceptance, E, NR by AS at 9/7/2019 10:56 AM    Raymond, ENRIQUETA, VU by  at 9/5/2019  9:54 AM    Comment:  discussed plan of care and dc planning   Family Acceptance, E,D, VU,NR by ANNELISE at 9/10/2019  9:42 AM    Comment:  Reviewed HEP, gait mechanics, benefits of mobility    Acceptance, E,D, VU,DU by AA at 9/9/2019 11:58 AM                               User Key     Initials Effective Dates Name Provider Type Discipline     06/19/15 -  Josselin Caro, PT Physical Therapist PT    AS 06/22/15 -  Monie Fletcher, PTA Physical Therapy Assistant PT     04/03/18 -  Adolph Arenas, PT Physical Therapist PT     04/02/18 -  Aleah Lala, PT Physical Therapist PT                PT Recommendation and Plan     Outcome Summary/Treatment Plan (PT)  Daily Summary of Progress (PT): progress toward functional goals is good  Plan of Care Reviewed With: patient, daughter  Progress: improving  Outcome Summary: Pt ambulated 220 feet with CGA and RW, limited by fatigue. Pt demo good participation with ther ex and mobility. Will continue to progress mobility as able.   Outcome Measures     Row Name 09/10/19 0942 09/09/19 0951          How much help from another person do you currently need...    Turning from your back to your side while in flat bed without using bedrails?  3  -MJ  --     Moving from lying on back to sitting on the side of a flat bed without bedrails?  3  -MJ  --     Moving to and from a bed to a chair (including a wheelchair)?  3  -MJ  --     Standing up from a chair using your arms (e.g., wheelchair, bedside chair)?  3  -MJ  --     Climbing 3-5 steps with a railing?  3  -MJ  --     To walk in hospital room?  3  -MJ  --     AM-PAC 6 Clicks Score (PT)  18  -MJ  --        How much help from  another is currently needed...    Putting on and taking off regular lower body clothing?  --  3  -LC     Bathing (including washing, rinsing, and drying)  --  3  -LC     Toileting (which includes using toilet bed pan or urinal)  --  3  -LC     Putting on and taking off regular upper body clothing  --  3  -LC     Taking care of personal grooming (such as brushing teeth)  --  3  -LC     Eating meals  --  4  -LC     AM-PAC 6 Clicks Score (OT)  --  19  -LC        Functional Assessment    Outcome Measure Options  AM-PAC 6 Clicks Basic Mobility (PT)  -  --       User Key  (r) = Recorded By, (t) = Taken By, (c) = Cosigned By    Initials Name Provider Type    Adolph Schofield, PT Physical Therapist    Karina Coello, OT Occupational Therapist         Time Calculation:   PT Charges     Row Name 09/10/19 0942             Time Calculation    Start Time  0942  -MJ      PT Received On  09/10/19  -      PT Goal Re-Cert Due Date  09/15/19  -MJ         Time Calculation- PT    Total Timed Code Minutes- PT  23 minute(s)  -         Timed Charges    75568 - PT Therapeutic Exercise Minutes  11  -MJ      13815 - Gait Training Minutes   12  -MJ        User Key  (r) = Recorded By, (t) = Taken By, (c) = Cosigned By    Initials Name Provider Type    Adolph Schofield, PT Physical Therapist        Therapy Charges for Today     Code Description Service Date Service Provider Modifiers Qty    85944998238 HC PT THER PROC EA 15 MIN 9/10/2019 Adolph Arenas, PT GP 1    75030804838 HC GAIT TRAINING EA 15 MIN 9/10/2019 Adolph Arenas, PT GP 1          PT G-Codes  Outcome Measure Options: AM-PAC 6 Clicks Basic Mobility (PT)  AM-PAC 6 Clicks Score (PT): 18  AM-PAC 6 Clicks Score (OT): 19    Adolph Arenas PT  9/10/2019

## 2019-09-10 NOTE — PROGRESS NOTES
"                  Clinical Nutrition     Nutrition Assessment  Reason for Visit:   [facility]    Patient Name: Chaitanya Browne  YOB: 1923  MRN: [id number]  Date of Encounter: 09/10/19 1:55 PM  Admission date: 9/2/2019    Nutrition Assessment   Assessment     Admission Diagnosis  Gross hematuria    Hospital Problem List  Bronchitis  Acute UTI  Elevated troponin   Acute renal insufficiency    Additional applicable diagnosis/conditions/procedures this adm:  (9/6) s/p Cystoscopy; and Transurethral resection of bladder tumor    Applicable PMH/PSxH:   HTN  HLD  FREEDOM cataracts   Basal cell carcinoma  BPH without urinary obstruction   Appendectomy  TURP    Reported/Observed/Food/Nutrition Related History:     Anthropometrics     Height: 165.1 cm (65\")  Last filed wt: Weight: 82 kg (180 lb 11.2 oz) (09/03/19 0345)  Weight Method: Bed scale    BMI: BMI (Calculated): 30.1  Obese Class I: 30-34.9kg/m2    Ideal Body Weight (IBW) (kg): 62.51     Labs reviewed   Yes   Results from last 7 days   Lab Units 09/09/19  0558 09/08/19  0753 09/07/19  0810   GLUCOSE mg/dL 121* 121* 149*   BUN mg/dL 20 23 24*   CREATININE mg/dL 0.93 0.83 0.95   SODIUM mmol/L 143 142 139   CHLORIDE mmol/L 107 107 106   POTASSIUM mmol/L 4.3 4.2 4.3     Lab Results   Lab Value Date/Time    HGBA1C 5.90 (H) 08/27/2019 1159     Medications reviewed   Pertinent: dulcolax, colace, pepcid  PRN: [email or web address]     Current Nutrition Prescription     PO: Diet Regular    Intake: 71% of 6 meals (9/4-9/8)    Nutrition Diagnosis     9/10  Problem No nutrition diagnosis at this time   Etiology    Signs/Symptoms      Nutrition Intervention   1.  Follow treatment progress, Care plan reviewed    Goal:   General: Nutrition support treatment  PO: Maintain intake, Continue positive trend    Monitoring/Evaluation:   Per protocol, PO intake, Pertinent labs, Skin status, GI status, Symptoms    Will Continue to follow per protocol    Kayla Lyle RD  Time Spent: 20 minutes       "

## 2019-09-10 NOTE — PLAN OF CARE
Problem: Patient Care Overview  Goal: Plan of Care Review  Outcome: Ongoing (interventions implemented as appropriate)   09/09/19 2036   Coping/Psychosocial   Plan of Care Reviewed With patient   Plan of Care Review   Progress improving   OTHER   Outcome Summary Patient alert and oriented. Requests to remain in recliner the majority of the time. Eager to go home while daughter wants to ensure her dad is 100% ready before he is discharged. Continually tried to titrate bladder irrigation down today, every time it was slowed it would return to dark cranberry color. Close to 9000 administered throughout the 12 hours.

## 2019-09-10 NOTE — PROGRESS NOTES
[facility] Medicine Services  PROGRESS NOTE    Patient Name: Chaitanya Browne  : 1923  MRN: [id number]    Date of Admission: 2019  Length of Stay: 7  Primary Care Physician: Dirk Russ MD    Subjective   Subjective     CC:  F/u hematuria    HPI:    Daughter at bedside. No f/c. No n/v. Constipation recurring. Coarse cough noted, but denies SOA. Large clots in rojas tubing. Tolerating PO. Complaining of insomnia.    Review of Systems  No f/c  No n/v  Constipation again  Okay PO intake  Coarse cough noted, but denies SOA  Insomnia noted  No pain today    All other systems reviewed and negative except any additional pertinent positives and negatives discussed in HPI.     Objective   Objective     Vital Signs:   Temp:  [98.5 °F (36.9 °C)-98.7 °F (37.1 °C)] 98.6 °F (37 °C)  Heart Rate:  [] 102  Resp:  [16-20] 18  BP: (107-146)/(64-95) 129/67        Physical Exam:    NAD, alert and oriented, appears younger than listed age, daughter at bedside, just walked with PT and is up in chair, tachy  OP clear, MMM  Neck supple  No LAD  NCAT  PERRL  Tachy  Coarse upper airway breath sounds, but otherwise clear, no wheezing/rales  +BS, ND, NT  BARAHONA  No c/c/e  Pale  Alert and oriented x 3  CBI with rojas in place, with large dark clots in tubing    Results Reviewed:    Results from last 7 days   Lab Units 19  0810 19  0651   WBC 10*3/mm3 11.14* 18.10*  --    HEMOGLOBIN g/dL 9.3* 8.5* 9.8*   HEMATOCRIT % 30.1* 27.7* 31.6*   PLATELETS 10*3/mm3 374 301  --      Results from last 7 days   Lab Units 1958 19  0753 19  0810   SODIUM mmol/L 143 142 139   POTASSIUM mmol/L 4.3 4.2 4.3   CHLORIDE mmol/L 107 107 106   CO2 mmol/L 24.0 25.0 23.0   BUN mg/dL 20 23 24*   CREATININE mg/dL 0.93 0.83 0.95   GLUCOSE mg/dL 121* 121* 149*   CALCIUM mg/dL 8.3 8.0* 7.8*     Estimated Creatinine Clearance: 45.8 mL/min (by C-G formula based on SCr of 0.93  mg/dL).    Microbiology Results Abnormal     Procedure Component Value - Date/Time    Blood Culture - Blood, Arm, Right [[id number]] Collected:  09/02/19 2050    Lab Status:  Final result Specimen:  Blood from Arm, Right Updated:  09/07/19 2130     Blood Culture No growth at 5 days    Blood Culture - Blood, Arm, Right [[id number]] Collected:  09/02/19 2045    Lab Status:  Final result Specimen:  Blood from Arm, Right Updated:  09/07/19 2130     Blood Culture No growth at 5 days    Urine Culture - Urine, Urine, Catheter [[id number]]  (Normal) Collected:  09/03/19 0112    Lab Status:  Final result Specimen:  Urine, Catheter Updated:  09/04/19 1125     Urine Culture No growth          Imaging Results (last 24 hours)     Procedure Component Value Units Date/Time    XR Chest 1 View [[id number]] Collected:  09/09/19 0835     Updated:  09/09/19 1756    Narrative:       EXAMINATION: XR CHEST 1 VW-09/09/2019:      INDICATION: DYSPNEA ON EXERTION.      COMPARISON: 09/08/2019.     FINDINGS: Portable chest reveals calcified granuloma identified within  the left midlung which is stable. Some improvement seen of the aeration  of the right lung base. Elevation seen of the right hemidiaphragm with  small right pleural effusion. Mild increased markings again seen within  the left lung base. Spurring of the upper lung fields bilaterally.           Impression:       Slight improvement seen of the aeration of the lung bases  bilaterally. Continued followup recommended as indicated.     D:  09/09/2019  E:  09/09/2019     This report was finalized on 9/9/2019 5:53 PM by Dr. Roxanna Carter MD.             Results for orders placed during the hospital encounter of 09/02/19   Adult Transthoracic Echo Complete W/ Cont if Necessary Per Protocol    Narrative · Mild-to-moderate mitral valve regurgitation is present.  · Estimated EF = 72%.  · Left ventricular systolic function is hyperdynamic (EF > 70).  · Left ventricular diastolic dysfunction  (grade I) consistent with   impaired relaxation.  · Left ventricular wall thickness is consistent with mild concentric   hypertrophy.  · Left atrial cavity size is borderline dilated.  · Normal right ventricular cavity size, wall thickness, systolic function   and septal motion noted.  · Mild mitral valve stenosis is present with functional MAC.  · The aortic valve exhibits moderate sclerosis without stenosis.  · No evidence of pulmonary hypertension is present.  · There is no evidence of pericardial effusion.          I have reviewed the medications:  Scheduled Meds:    atorvastatin 10 mg Oral Daily   bisacodyl 10 mg Rectal Once   docusate sodium 100 mg Oral BID   famotidine 20 mg Oral Once   finasteride 5 mg Oral Daily   guaiFENesin 600 mg Oral Q12H   ipratropium-albuterol 3 mL Nebulization 4x Daily - RT   sodium chloride 10 mL Intravenous Q12H     Continuous Infusions:     PRN Meds:.•  acetaminophen  •  melatonin  •  Menthol (Topical Analgesic)  •  ondansetron **OR** ondansetron  •  polyethylene glycol  •  [COMPLETED] Insert peripheral IV **AND** sodium chloride  •  sodium chloride  •  temazepam      Assessment/Plan   Assessment / Plan     Active Hospital Problems    Diagnosis  POA   • **Gross hematuria [R31.0]  Yes   • Bronchitis [J40]  Yes   • Acute UTI (urinary tract infection) [N39.0]  Yes   • Acute renal insufficiency [N28.9]  Yes   • Elevated troponin [R74.8]  Yes   • BPH without urinary obstruction [N40.0]  Yes   • Essential hypertension [I10]  Yes   • Hyperlipidemia LDL goal <100 [E78.5]  Yes      Resolved Hospital Problems   No resolved problems to display.        Brief Hospital Course to date:  Chaitanya Browne is a 89 y.o. male with history of hypertension, hyperlipidemia, but remains very functional and active at his age.  Presented to emergency room with complaints of gross hematuria.  Found to have some acute renal insufficiency and clot in his bladder.  Continuous irrigation started and admitted to  hospitalist service.    Hematuria  -s/p cystoscopy with fulguration of prostate  -post-procedure obstruction with clots, rojas replaced back on CBI  -improving, follow up urology today  -finasteride started over weekend  -on continued irrigation, ask urology for the plan today    ABBY  -resolved, likely due to obstruction    Possible UTI  -s/p Rocephin    Mild Anemia  -stable    Constipation  -mild bowel regimen, overall better, continue regimen, hopefully has BM today    Insomnia  -prn meds ordered    Cough/congestion  -IC  -nebs  -mobilize  -s/p Lasix  -CXR improved  -continue to monitor cough however    Tachycardia, noted with ambulation      DVT Prophylaxis:  Mechanical    Disposition: I expect the patient to be discharged home when ok from urology standpoint..    CODE STATUS:   Code Status and Medical Interventions:   Ordered at: 09/03/19 0248     Level Of Support Discussed With:    Patient     Code Status:    CPR     Medical Interventions (Level of Support Prior to Arrest):    Full       Electronically signed by Bryce Vargas MD, 09/10/19, 9:59 AM.

## 2019-09-10 NOTE — THERAPY TREATMENT NOTE
Acute Care - Speech Language Pathology   Swallow Treatment Note [facility]     Patient Name: Chaitanya Browne  : 1923  MRN: [id number]  Today's Date: 9/10/2019  Onset of Illness/Injury or Date of Surgery: 19     Referring Physician: Dr. Dove      Admit Date: 2019    Visit Dx:      ICD-10-CM ICD-9-CM   1. ABBY (acute kidney injury) (CMS/[facility]) N17.9 584.9   2. Hematuria, unspecified type R31.9 599.70   3. Bladder outlet obstruction N32.0 596.0   4. Elevated troponin R74.8 790.6   5. Impaired mobility and ADLs Z74.09 799.89     Patient Active Problem List   Diagnosis   • Facial basal cell cancer   • Hyperlipidemia LDL goal <100   • Essential hypertension   • ASHD (arteriosclerotic heart disease)   • Gait abnormality   • Impaired fasting glucose   • Polyp, colonic   • Vitamin D deficiency   • Low back pain at multiple sites   • Proteinuria   • Right carpal tunnel syndrome   • Obesity (BMI 30-39.9)   • Hematuria, unspecified   • Medicare annual wellness visit, subsequent   • BPH without urinary obstruction   • Edema   • Cough   • Morbid obesity due to excess calories (CMS/[facility])   • Asthma   • Allergic rhinitis   • Gross hematuria   • Acute UTI (urinary tract infection)   • Acute renal insufficiency   • Elevated troponin   • Bronchitis       Therapy Treatment  Rehabilitation Treatment Summary     Row Name 09/10/19 1600 09/10/19 0942          Treatment Time/Intention    Discipline  speech language pathologist  -  physical therapist  -     Document Type  therapy note (daily note)  -  therapy note (daily note)  -     Subjective Information  no complaints  -  no complaints  -     Mode of Treatment  individual therapy;speech-language pathology  -  physical therapy  -     Patient/Family Observations  Pt sitting upright in chair with daughter present.  -  Pt sitting UI  -     Care Plan Review  care plan/treatment goals reviewed  -  patient/other agree to care plan  -     Care Plan  Review, Other Participant(s)  daughter  -HG  daughter  -MJ     Therapy Frequency (Swallow)  PRN  -HG  --     Patient Effort  excellent  -HG  excellent  -MJ     Existing Precautions/Restrictions  --  fall  -MJ     Recorded by [HG] Zeina Keenan MS CCC-SLP 09/10/19 1607 [MJ] Adolph Arenas, PT 09/10/19 1011     Row Name 09/10/19 0942             Vital Signs    Pre SpO2 (%)  95  -MJ      O2 Delivery Pre Treatment  room air  -MJ      Post SpO2 (%)  91  -MJ      O2 Delivery Post Treatment  room air  -MJ      Pre Patient Position  Sitting  -MJ      Post Patient Position  Sitting  -MJ      Recorded by [MJ] Adolph Arenas, PT 09/10/19 1014      Row Name 09/10/19 0942             Cognitive Assessment/Intervention- PT/OT    Affect/Mental Status (Cognitive)  WNL  -MJ      Orientation Status (Cognition)  oriented x 4  -MJ      Follows Commands (Cognition)  WNL  -MJ      Recorded by [MJ] Adolph Arenas, PT 09/10/19 1011      Row Name 09/10/19 0942             Bed Mobility Assessment/Treatment    Comment (Bed Mobility)  NT- pt UIC  -MJ      Recorded by [MJ] Adolph Arenas, PT 09/10/19 1011      Row Name 09/10/19 0942             Transfer Assessment/Treatment    Comment (Transfers)  Verbal cues for correct hand placement  -MJ      Recorded by [MJ] Adolph Arenas, PT 09/10/19 1011      Row Name 09/10/19 0942             Sit-Stand Transfer    Sit-Stand [place] (Transfers)  supervision;verbal cues  -MJ      Assistive Device (Sit-Stand Transfers)  walker, front-wheeled  -MJ      Recorded by [MJ] Adolph Arenas, PT 09/10/19 1011      Row Name 09/10/19 0942             Stand-Sit Transfer    Stand-Sit [place] (Transfers)  supervision;verbal cues  -MJ      Assistive Device (Stand-Sit Transfers)  walker, front-wheeled  -MJ      Recorded by [MJ] Adolph Arenas, PT 09/10/19 1011      Row Name 09/10/19 0942             Gait/Stairs Assessment/Training    10926 - Gait Training Minutes   12  -MJ      [place] Level (Gait)  contact  guard;verbal cues  -MJ      Assistive Device (Gait)  walker, front-wheeled  -MJ      Distance in Feet (Gait)  220  -MJ      Pattern (Gait)  step-through  -MJ      Deviations/Abnormal Patterns (Gait)  bilateral deviations;nida decreased;stride length decreased  -MJ      Bilateral Gait Deviations  forward flexed posture;heel strike decreased  -MJ      Comment (Gait/Stairs)  Pt demo step through gait pattern at slow pace. Verbal cues for upright posture and to stay in middle of RW as pt tends to let RW get too far ahead with fatigue. Gait limited by fatigue  -MJ      Recorded by [MJ] Adolph Arenas, PT 09/10/19 1011      Row Name 09/10/19 0942             Therapeutic Exercise    59214 - PT Therapeutic Exercise Minutes  11  -MJ      Recorded by [MJ] Adolph Arenas, PT 09/10/19 1011      Row Name 09/10/19 0942             Therapeutic Exercise    Lower Extremity (Therapeutic Exercise)  gluteal sets;heel slides, bilateral;LAQ (long arc quad), bilateral;SLR (straight leg raise), bilateral  -MJ      Lower Extremity Range of Motion (Therapeutic Exercise)  hip flexion/extension, bilateral;hip abduction/adduction, bilateral;ankle dorsiflexion/plantar flexion, bilateral  -MJ      Exercise Type (Therapeutic Exercise)  AROM (active range of motion)  -MJ      Position (Therapeutic Exercise)  seated;supine  -MJ      Sets/Reps (Therapeutic Exercise)  15x each  -MJ      Comment (Therapeutic Exercise)  Cues for technique. No physical assist required  -MJ      Recorded by [MJ] Adolph Arenas, PT 09/10/19 1011      Row Name 09/10/19 0942             Positioning and Restraints    Pre-Treatment Position  sitting in chair/recliner  -MJ      Post Treatment Position  chair  -MJ      In Chair  notified nsg;reclined;call light within reach;encouraged to call for assist;exit alarm on;with family/caregiver  -MJ      Recorded by [MJ] Adolph Arenas, PT 09/10/19 1011      Row Name 09/10/19 1600             Pain Scale: Numbers Pre/Post-Treatment    Pain  Scale: Numbers, Pretreatment  5/10  -HG      Pain Location  shoulder position in chair  -HG      Recorded by [HG] Zeina Keenan MS CCC-SLP 09/10/19 1609      Row Name 09/10/19 0942             Plan of Care Review    Plan of Care Reviewed With  patient;daughter  -MJ      Recorded by [MJ] Adolph Arenas, PT 09/10/19 1011      Row Name 09/10/19 0942             Outcome Summary/Treatment Plan (PT)    Daily Summary of Progress (PT)  progress toward functional goals is good  -MJ      Recorded by [MJ] Adolph Arenas, PT 09/10/19 1011        User Key  (r) = Recorded By, (t) = Taken By, (c) = Cosigned By    Initials Name Effective Dates Discipline     Adolph Arenas, PT 04/03/18 -  PT    HG Zeina Keenan MS CCC-SLP 06/22/15 -  SLP          Outcome Summary         SLP GOALS     Row Name 09/10/19 1600 09/09/19 1300          Oral Nutrition/Hydration Goal 1 (SLP)    Oral Nutrition/Hydration Goal 1, SLP  LTG: Pt will tolerate diet w/o s/sxs aspiration or difficulty.  -HG  LTG: Pt will tolerate diet w/o s/sxs aspiration or difficulty.  -VO     Time Frame (Oral Nutrition/Hydration Goal 1, SLP)  by discharge  -HG  by discharge  -VO     Progress/Outcomes (Oral Nutrition/Hydration Goal 1, SLP)  goal ongoing  -HG  goal ongoing  -VO        Oral Nutrition/Hydration Goal 2 (SLP)    Oral Nutrition/Hydration Goal 2, SLP  Pt will tolerate mechanical soft diet (ground), thin liquids w/o s/sxs aspiration.  -HG  Pt will tolerate regular diet, thin liquids w/o s/sxs aspiration.  -VO     Time Frame (Oral Nutrition/Hydration Goal 2, SLP)  short term goal (STG)  -HG  short term goal (STG)  -VO     Progress/Outcomes (Oral Nutrition/Hydration Goal 2, SLP)  goal revised this date;goal ongoing  -HG  goal ongoing  -VO       User Key  (r) = Recorded By, (t) = Taken By, (c) = Cosigned By    Initials Name Provider Type    Zeina Paul, MS CCC-SLP Speech and Language Pathologist    VO Bri Taveras MA,CCC-SLP Speech and Language  Pathologist          EDUCATION  The patient has been educated in the following areas:   Dysphagia (Swallowing Impairment).    SLP Recommendation and Plan                                Time Calculation:   Time Calculation- SLP     Row Name 09/10/19 1622             Time Calculation- SLP    SLP Start Time  1600  -HG      SLP Received On  09/10/19  -HG        User Key  (r) = Recorded By, (t) = Taken By, (c) = Cosigned By    Initials Name Provider Type     Zeina Keenan MS CCC-SLP Speech and Language Pathologist          Therapy Charges for Today     Code Description Service Date Service Provider Modifiers Qty    [id number] [facility] TREATMENT SWALLOW 2 9/10/2019 Zeina Keenan MS CCC-SLP GN 1                 Zeina Keenan MS CCC-JUANCARLOS  9/10/2019

## 2019-09-11 ENCOUNTER — ANESTHESIA (OUTPATIENT)
Dept: PERIOP | Facility: HOSPITAL | Age: 84
End: 2019-09-11

## 2019-09-11 ENCOUNTER — ANESTHESIA EVENT (OUTPATIENT)
Dept: PERIOP | Facility: HOSPITAL | Age: 84
End: 2019-09-11

## 2019-09-11 LAB
ANION GAP SERPL CALCULATED.3IONS-SCNC: 9 MMOL/L (ref 5–15)
BUN BLD-MCNC: 14 MG/DL (ref 8–23)
BUN/CREAT SERPL: 15.6 (ref 7–25)
CALCIUM SPEC-SCNC: 8.4 MG/DL (ref 8.2–9.6)
CHLORIDE SERPL-SCNC: 103 MMOL/L (ref 98–107)
CO2 SERPL-SCNC: 29 MMOL/L (ref 22–29)
CREAT BLD-MCNC: 0.9 MG/DL (ref 0.76–1.27)
DEPRECATED RDW RBC AUTO: 46.1 FL (ref 37–54)
ERYTHROCYTE [DISTWIDTH] IN BLOOD BY AUTOMATED COUNT: 13.1 % (ref 12.3–15.4)
GFR SERPL CREATININE-BSD FRML MDRD: 78 ML/MIN/1.73
GFR SERPL CREATININE-BSD FRML MDRD: 95 ML/MIN/1.73
GLUCOSE BLD-MCNC: 116 MG/DL (ref 65–99)
HCT VFR BLD AUTO: 27.5 % (ref 37.5–51)
HGB BLD-MCNC: 8.7 G/DL (ref 13–17.7)
MCH RBC QN AUTO: 31.1 PG (ref 26.6–33)
MCHC RBC AUTO-ENTMCNC: 31.6 G/DL (ref 31.5–35.7)
MCV RBC AUTO: 98.2 FL (ref 79–97)
PLATELET # BLD AUTO: 354 10*3/MM3 (ref 140–450)
PMV BLD AUTO: 10.1 FL (ref 6–12)
POTASSIUM BLD-SCNC: 4.2 MMOL/L (ref 3.5–5.2)
RBC # BLD AUTO: 2.8 10*6/MM3 (ref 4.14–5.8)
SODIUM BLD-SCNC: 141 MMOL/L (ref 136–145)
WBC NRBC COR # BLD: 11.09 10*3/MM3 (ref 3.4–10.8)

## 2019-09-11 PROCEDURE — 25010000002 ONDANSETRON PER 1 MG: Performed by: NURSE ANESTHETIST, CERTIFIED REGISTERED

## 2019-09-11 PROCEDURE — 94799 UNLISTED PULMONARY SVC/PX: CPT

## 2019-09-11 PROCEDURE — 25010000002 DEXAMETHASONE PER 1 MG: Performed by: NURSE ANESTHETIST, CERTIFIED REGISTERED

## 2019-09-11 PROCEDURE — 25010000002 PHENYLEPHRINE PER 1 ML: Performed by: NURSE ANESTHETIST, CERTIFIED REGISTERED

## 2019-09-11 PROCEDURE — 99233 SBSQ HOSP IP/OBS HIGH 50: CPT | Performed by: HOSPITALIST

## 2019-09-11 PROCEDURE — 25010000002 LEVOFLOXACIN PER 250 MG: Performed by: UROLOGY

## 2019-09-11 PROCEDURE — 25010000002 FENTANYL CITRATE (PF) 100 MCG/2ML SOLUTION: Performed by: NURSE ANESTHETIST, CERTIFIED REGISTERED

## 2019-09-11 PROCEDURE — 85027 COMPLETE CBC AUTOMATED: CPT | Performed by: HOSPITALIST

## 2019-09-11 PROCEDURE — 25010000002 PROPOFOL 10 MG/ML EMULSION: Performed by: NURSE ANESTHETIST, CERTIFIED REGISTERED

## 2019-09-11 PROCEDURE — 0T9B8ZZ DRAINAGE OF BLADDER, VIA NATURAL OR ARTIFICIAL OPENING ENDOSCOPIC: ICD-10-PCS | Performed by: UROLOGY

## 2019-09-11 PROCEDURE — 0VT08ZZ RESECTION OF PROSTATE, VIA NATURAL OR ARTIFICIAL OPENING ENDOSCOPIC: ICD-10-PCS | Performed by: UROLOGY

## 2019-09-11 PROCEDURE — 80048 BASIC METABOLIC PNL TOTAL CA: CPT | Performed by: HOSPITALIST

## 2019-09-11 PROCEDURE — 0T9B70Z DRAINAGE OF BLADDER WITH DRAINAGE DEVICE, VIA NATURAL OR ARTIFICIAL OPENING: ICD-10-PCS | Performed by: UROLOGY

## 2019-09-11 PROCEDURE — 88305 TISSUE EXAM BY PATHOLOGIST: CPT | Performed by: UROLOGY

## 2019-09-11 RX ORDER — LIDOCAINE HYDROCHLORIDE 10 MG/ML
0.2 INJECTION, SOLUTION INFILTRATION; PERINEURAL ONCE
Status: COMPLETED | OUTPATIENT
Start: 2019-09-11 | End: 2019-09-11

## 2019-09-11 RX ORDER — PROPOFOL 10 MG/ML
VIAL (ML) INTRAVENOUS AS NEEDED
Status: DISCONTINUED | OUTPATIENT
Start: 2019-09-11 | End: 2019-09-11 | Stop reason: SURG

## 2019-09-11 RX ORDER — FENTANYL CITRATE 50 UG/ML
50 INJECTION, SOLUTION INTRAMUSCULAR; INTRAVENOUS
Status: DISCONTINUED | OUTPATIENT
Start: 2019-09-11 | End: 2019-09-11 | Stop reason: HOSPADM

## 2019-09-11 RX ORDER — FAMOTIDINE 20 MG/1
20 TABLET, FILM COATED ORAL ONCE
Status: COMPLETED | OUTPATIENT
Start: 2019-09-11 | End: 2019-09-11

## 2019-09-11 RX ORDER — DEXAMETHASONE SODIUM PHOSPHATE 4 MG/ML
INJECTION, SOLUTION INTRA-ARTICULAR; INTRALESIONAL; INTRAMUSCULAR; INTRAVENOUS; SOFT TISSUE AS NEEDED
Status: DISCONTINUED | OUTPATIENT
Start: 2019-09-11 | End: 2019-09-11 | Stop reason: SURG

## 2019-09-11 RX ORDER — IPRATROPIUM BROMIDE AND ALBUTEROL SULFATE 2.5; .5 MG/3ML; MG/3ML
3 SOLUTION RESPIRATORY (INHALATION) ONCE
Status: COMPLETED | OUTPATIENT
Start: 2019-09-11 | End: 2019-09-11

## 2019-09-11 RX ORDER — FENTANYL CITRATE 50 UG/ML
INJECTION, SOLUTION INTRAMUSCULAR; INTRAVENOUS AS NEEDED
Status: DISCONTINUED | OUTPATIENT
Start: 2019-09-11 | End: 2019-09-11 | Stop reason: SURG

## 2019-09-11 RX ORDER — SODIUM CHLORIDE, SODIUM LACTATE, POTASSIUM CHLORIDE, CALCIUM CHLORIDE 600; 310; 30; 20 MG/100ML; MG/100ML; MG/100ML; MG/100ML
9 INJECTION, SOLUTION INTRAVENOUS CONTINUOUS
Status: DISCONTINUED | OUTPATIENT
Start: 2019-09-11 | End: 2019-09-12

## 2019-09-11 RX ORDER — LIDOCAINE HYDROCHLORIDE 10 MG/ML
INJECTION, SOLUTION EPIDURAL; INFILTRATION; INTRACAUDAL; PERINEURAL AS NEEDED
Status: DISCONTINUED | OUTPATIENT
Start: 2019-09-11 | End: 2019-09-11 | Stop reason: SURG

## 2019-09-11 RX ORDER — MAGNESIUM HYDROXIDE 1200 MG/15ML
LIQUID ORAL AS NEEDED
Status: DISCONTINUED | OUTPATIENT
Start: 2019-09-11 | End: 2019-09-11 | Stop reason: HOSPADM

## 2019-09-11 RX ORDER — LEVOFLOXACIN 5 MG/ML
500 INJECTION, SOLUTION INTRAVENOUS ONCE
Status: COMPLETED | OUTPATIENT
Start: 2019-09-11 | End: 2019-09-11

## 2019-09-11 RX ORDER — ONDANSETRON 2 MG/ML
INJECTION INTRAMUSCULAR; INTRAVENOUS AS NEEDED
Status: DISCONTINUED | OUTPATIENT
Start: 2019-09-11 | End: 2019-09-11 | Stop reason: SURG

## 2019-09-11 RX ORDER — GLYCINE 1.5 G/100ML
SOLUTION IRRIGATION AS NEEDED
Status: DISCONTINUED | OUTPATIENT
Start: 2019-09-11 | End: 2019-09-11 | Stop reason: HOSPADM

## 2019-09-11 RX ORDER — ONDANSETRON 2 MG/ML
4 INJECTION INTRAMUSCULAR; INTRAVENOUS ONCE AS NEEDED
Status: DISCONTINUED | OUTPATIENT
Start: 2019-09-11 | End: 2019-09-11 | Stop reason: HOSPADM

## 2019-09-11 RX ADMIN — IPRATROPIUM BROMIDE AND ALBUTEROL SULFATE 3 ML: 2.5; .5 SOLUTION RESPIRATORY (INHALATION) at 20:19

## 2019-09-11 RX ADMIN — LIDOCAINE HYDROCHLORIDE 50 MG: 10 INJECTION, SOLUTION EPIDURAL; INFILTRATION; INTRACAUDAL; PERINEURAL at 15:11

## 2019-09-11 RX ADMIN — FENTANYL CITRATE 50 MCG: 50 INJECTION, SOLUTION INTRAMUSCULAR; INTRAVENOUS at 15:11

## 2019-09-11 RX ADMIN — FENTANYL CITRATE 50 MCG: 50 INJECTION, SOLUTION INTRAMUSCULAR; INTRAVENOUS at 16:08

## 2019-09-11 RX ADMIN — LEVOFLOXACIN 500 MG: 5 INJECTION, SOLUTION INTRAVENOUS at 15:26

## 2019-09-11 RX ADMIN — PROPOFOL 80 MG: 10 INJECTION, EMULSION INTRAVENOUS at 15:11

## 2019-09-11 RX ADMIN — SODIUM CHLORIDE, POTASSIUM CHLORIDE, SODIUM LACTATE AND CALCIUM CHLORIDE 9 ML/HR: 600; 310; 30; 20 INJECTION, SOLUTION INTRAVENOUS at 13:40

## 2019-09-11 RX ADMIN — FAMOTIDINE 20 MG: 20 TABLET ORAL at 13:56

## 2019-09-11 RX ADMIN — IPRATROPIUM BROMIDE AND ALBUTEROL SULFATE 3 ML: 2.5; .5 SOLUTION RESPIRATORY (INHALATION) at 12:12

## 2019-09-11 RX ADMIN — PHENYLEPHRINE HYDROCHLORIDE 100 MCG: 10 INJECTION INTRAVENOUS at 15:30

## 2019-09-11 RX ADMIN — LIDOCAINE HYDROCHLORIDE 1 ML: 20 JELLY TOPICAL at 01:45

## 2019-09-11 RX ADMIN — DEXAMETHASONE SODIUM PHOSPHATE 4 MG: 4 INJECTION, SOLUTION INTRAMUSCULAR; INTRAVENOUS at 15:19

## 2019-09-11 RX ADMIN — TEMAZEPAM 15 MG: 15 CAPSULE ORAL at 23:02

## 2019-09-11 RX ADMIN — IPRATROPIUM BROMIDE AND ALBUTEROL SULFATE 3 ML: 2.5; .5 SOLUTION RESPIRATORY (INHALATION) at 07:31

## 2019-09-11 RX ADMIN — EPHEDRINE SULFATE 10 MG: 50 INJECTION INTRAMUSCULAR; INTRAVENOUS; SUBCUTANEOUS at 15:11

## 2019-09-11 RX ADMIN — GUAIFENESIN 200 MG: 100 SOLUTION ORAL at 21:23

## 2019-09-11 RX ADMIN — ONDANSETRON 4 MG: 2 INJECTION INTRAMUSCULAR; INTRAVENOUS at 16:15

## 2019-09-11 RX ADMIN — FENTANYL CITRATE 50 MCG: 50 INJECTION, SOLUTION INTRAMUSCULAR; INTRAVENOUS at 15:15

## 2019-09-11 RX ADMIN — DOCUSATE SODIUM 100 MG: 100 CAPSULE, LIQUID FILLED ORAL at 21:23

## 2019-09-11 RX ADMIN — SODIUM CHLORIDE, POTASSIUM CHLORIDE, SODIUM LACTATE AND CALCIUM CHLORIDE: 600; 310; 30; 20 INJECTION, SOLUTION INTRAVENOUS at 16:05

## 2019-09-11 RX ADMIN — LIDOCAINE HYDROCHLORIDE 0.2 ML: 10 INJECTION, SOLUTION EPIDURAL; INFILTRATION; INTRACAUDAL; PERINEURAL at 13:40

## 2019-09-11 RX ADMIN — IPRATROPIUM BROMIDE AND ALBUTEROL SULFATE 3 ML: 2.5; .5 SOLUTION RESPIRATORY (INHALATION) at 17:19

## 2019-09-11 RX ADMIN — SODIUM CHLORIDE, POTASSIUM CHLORIDE, SODIUM LACTATE AND CALCIUM CHLORIDE: 600; 310; 30; 20 INJECTION, SOLUTION INTRAVENOUS at 15:05

## 2019-09-11 NOTE — OP NOTE
Preop diagnosis: BPH with obstruction    Postop diagnosis: Same    Procedure: Cystoscopy transurethral resection of the prostate    After induction of general anesthesia patient was prepped and draped in the dorsolithotomy position.  27 reset scope sheath was inserted.  The prostate showed anatomical obstruction from trilobar hyperplasia.  The bladder mucosa was normal without evidence of tumor.  Ureteral orifices were normal position configuration.  Resection was begun by making a cut through the anterior surface of the prostate.  Next the right lateral lobe was resected.  Following this the left lateral lobe was resected.  The floor the prostate was resected finally.  Bleeding points coagulated letter cautery.  Prostate fragments irrigated free from the bladder using bladder evacuator.  The reset scope was removed.  A 22 three-way Torres catheter was inserted.  Catheter was placed on traction.  The urine draining was light pink in color patients are procedure well at the operating satisfactory condition

## 2019-09-11 NOTE — PROGRESS NOTES
[facility] Medicine Services  PROGRESS NOTE    Patient Name: Chaitanya Browne  : 1923  MRN: [id number]    Date of Admission: 2019  Length of Stay: 8  Primary Care Physician: Dirk Russ MD    Subjective   Subjective     CC:  F/u hematuria    HPI:    No family at bedside. Unable to void. Rojas replaced. Still bleeding. Going for TURP today. No f/c. Slept better with restoril. No dyspnea. No n/v. Just tired.    Review of Systems  Gen- No fevers, chills  CV- No chest pain, palpitations  Resp- Dry cough  GI- No N/V/D, abd pain  As above    All other systems reviewed and negative except any additional pertinent positives and negatives discussed in HPI.       Objective   Objective     Vital Signs:   Temp:  [98 °F (36.7 °C)-98.3 °F (36.8 °C)] 98 °F (36.7 °C)  Heart Rate:  [] 102  Resp:  [16-20] 18  BP: ()/(60-97) 137/97        Physical Exam:    NAD, alert and oriented, appears younger than listed age, no family at bedside  OP clear, MMM  Neck supple  No LAD  NCAT  PERRL  Tachy  CTAB  +BS, ND, NT  BARAHONA  No c/c/e  Pale  Alert and oriented x 3  CBI with rojas in place, blood tinged urine    Results Reviewed:    Results from last 7 days   Lab Units 19  0558 19  0810   WBC 10*3/mm3 11.14* 18.10*   HEMOGLOBIN g/dL 9.3* 8.5*   HEMATOCRIT % 30.1* 27.7*   PLATELETS 10*3/mm3 374 301     Results from last 7 days   Lab Units 19  0558 19  0753 19  0810   SODIUM mmol/L 143 142 139   POTASSIUM mmol/L 4.3 4.2 4.3   CHLORIDE mmol/L 107 107 106   CO2 mmol/L 24.0 25.0 23.0   BUN mg/dL 20 23 24*   CREATININE mg/dL 0.93 0.83 0.95   GLUCOSE mg/dL 121* 121* 149*   CALCIUM mg/dL 8.3 8.0* 7.8*     Estimated Creatinine Clearance: 45.8 mL/min (by C-G formula based on SCr of 0.93 mg/dL).    Microbiology Results Abnormal     Procedure Component Value - Date/Time    Blood Culture - Blood, Arm, Right [[id number]] Collected:  19    Lab Status:  Final result  Specimen:  Blood from Arm, Right Updated:  09/07/19 2130     Blood Culture No growth at 5 days    Blood Culture - Blood, Arm, Right [[id number]] Collected:  09/02/19 2045    Lab Status:  Final result Specimen:  Blood from Arm, Right Updated:  09/07/19 2130     Blood Culture No growth at 5 days    Urine Culture - Urine, Urine, Catheter [[id number]]  (Normal) Collected:  09/03/19 0112    Lab Status:  Final result Specimen:  Urine, Catheter Updated:  09/04/19 1125     Urine Culture No growth          Imaging Results (last 24 hours)     ** No results found for the last 24 hours. **          Results for orders placed during the hospital encounter of 09/02/19   Adult Transthoracic Echo Complete W/ Cont if Necessary Per Protocol    Narrative · Mild-to-moderate mitral valve regurgitation is present.  · Estimated EF = 72%.  · Left ventricular systolic function is hyperdynamic (EF > 70).  · Left ventricular diastolic dysfunction (grade I) consistent with   impaired relaxation.  · Left ventricular wall thickness is consistent with mild concentric   hypertrophy.  · Left atrial cavity size is borderline dilated.  · Normal right ventricular cavity size, wall thickness, systolic function   and septal motion noted.  · Mild mitral valve stenosis is present with functional MAC.  · The aortic valve exhibits moderate sclerosis without stenosis.  · No evidence of pulmonary hypertension is present.  · There is no evidence of pericardial effusion.          I have reviewed the medications:  Scheduled Meds:    atorvastatin 10 mg Oral Daily   bisacodyl 10 mg Rectal Once   docusate sodium 100 mg Oral BID   famotidine 20 mg Oral Once   finasteride 5 mg Oral Daily   guaiFENesin 200 mg Oral 4x Daily   ipratropium-albuterol 3 mL Nebulization 4x Daily - RT   sodium chloride 10 mL Intravenous Q12H     Continuous Infusions:     PRN Meds:.•  acetaminophen  •  lidocaine  •  melatonin  •  Menthol (Topical Analgesic)  •  ondansetron **OR** ondansetron  •   polyethylene glycol  •  [COMPLETED] Insert peripheral IV **AND** sodium chloride  •  sodium chloride  •  temazepam      Assessment/Plan   Assessment / Plan     Active Hospital Problems    Diagnosis  POA   • **Gross hematuria [R31.0]  Yes   • Bronchitis [J40]  Yes   • Acute UTI (urinary tract infection) [N39.0]  Yes   • Acute renal insufficiency [N28.9]  Yes   • Elevated troponin [R74.8]  Yes   • BPH without urinary obstruction [N40.0]  Yes   • Essential hypertension [I10]  Yes   • Hyperlipidemia LDL goal <100 [E78.5]  Yes      Resolved Hospital Problems   No resolved problems to display.        Brief Hospital Course to date:  Chaitanya Browne is a 89 y.o. male with history of hypertension, hyperlipidemia, but remains very functional and active at his age.  Presented to emergency room with complaints of gross hematuria.  Found to have some acute renal insufficiency and clot in his bladder.  Continuous irrigation started and admitted to hospitalist service.    Hematuria  -s/p cystoscopy with fulguration of prostate  -post-procedure obstruction with clots, rojas replaced back on CBI  -for TURP today    ABBY  -resolved, likely due to obstruction    Possible UTI  -s/p Rocephin  -monitor for recurrent infection/multiple instrumentation with obstruction    Mild Anemia  -stable    Constipation  -mild bowel regimen, overall better, continue regimen    Insomnia  -prn meds ordered, better    Cough/congestion  -IC  -nebs  -mobilize  -s/p Lasix  -CXR improved  -continue to monitor cough however    Tachycardia, noted with ambulation      DVT Prophylaxis:  Mechanical    Disposition: I expect the patient to be discharged home when ok from urology standpoint..    CODE STATUS:   Code Status and Medical Interventions:   Ordered at: 09/03/19 0248     Level Of Support Discussed With:    Patient     Code Status:    CPR     Medical Interventions (Level of Support Prior to Arrest):    Full       Electronically signed by Bryce Vargas MD,  09/11/19, 7:52 AM.

## 2019-09-11 NOTE — PROGRESS NOTES
Continued Stay Note  [facility]     Patient Name: Chaitanya Browne  MRN: [id number]  Today's Date: 9/11/2019    Admit Date: 9/2/2019    Discharge Plan     Row Name 09/11/19 1348       Plan    Plan Comments  Pt not medically ready for discharge today. CM updated Elza at the [facility] pt to have TURP procedure today.  [facility] van transportation cancelled and CM will need to arrange [facility] Van when pt medically ready for discharge. CM will continue to follow.         Discharge Codes    No documentation.       Expected Discharge Date and Time     Expected Discharge Date Expected Discharge Time    Sep 10, 2019             Jennifer An RN

## 2019-09-11 NOTE — SIGNIFICANT NOTE
09/11/19 1316   SLP Deferred Reason   SLP Deferred Reason Patient unavailable for treatment  (Pt NPO for TURP this PM, so unable to see for dysphagia treatment/diet tolerance. SLP will follow-up as pt appropriate/able.)

## 2019-09-11 NOTE — PLAN OF CARE
Problem: Patient Care Overview  Goal: Plan of Care Review  Outcome: Ongoing (interventions implemented as appropriate)   09/10/19 2130   Coping/Psychosocial   Plan of Care Reviewed With patient   Plan of Care Review   Progress no change   OTHER   Outcome Summary no complaints of pain, mucinex changed to liquid, pt willing to take, has been refusing the pill form. Cough improving, remains on 2L. CBI drip slowed down to a minimum, urine became dark red, dr sommers notified, came and saw pt this evening. Torres cath d/c'd, if unable to void or having bloody urine will schedule for TURP vee, NPO after midnight. Able to take a 2-3 nap this pm. VSS, Small BM today       Problem: Fall Risk (Adult)  Goal: Absence of Fall  Outcome: Ongoing (interventions implemented as appropriate)   09/10/19 2130   Fall Risk (Adult)   Absence of Fall making progress toward outcome       Problem: Skin Injury Risk (Adult)  Goal: Skin Health and Integrity  Outcome: Ongoing (interventions implemented as appropriate)   09/10/19 2130   Skin Injury Risk (Adult)   Skin Health and Integrity making progress toward outcome

## 2019-09-11 NOTE — ANESTHESIA PROCEDURE NOTES
Airway  Urgency: elective    Date/Time: 9/11/2019 3:12 PM  Airway not difficult    General Information and Staff    Patient location during procedure: OR  CRNA: Mckenna Wilder CRNA    Indications and Patient Condition  Indications for airway management: airway protection    Preoxygenated: yes  Mask difficulty assessment: 1 - vent by mask    Final Airway Details  Final airway type: supraglottic airway      Successful airway: classic and I-gel  Size 4    Number of attempts at approach: 1    Additional Comments  LMA placed without difficulty, ventilation with assist, equal breath sounds and symmetric chest rise and fall

## 2019-09-11 NOTE — ANESTHESIA POSTPROCEDURE EVALUATION
Patient: Chaitanya Browne    Procedure Summary     Date:  09/11/19 Room / Location:   [facility] 07 / [facility]    Anesthesia Start:  1505 Anesthesia Stop:  1630    Procedure:  CYSTOSCOPY TRANSURETHRAL RESECTION OF PROSTATE (N/A ) Diagnosis:      Surgeon:  Ck Woods MD Provider:  Reynaldo Torres MD    Anesthesia Type:  general ASA Status:  4          Anesthesia Type: general  Last vitals  BP   94/43   Temp   97.0   Pulse   88   Resp   16     SpO2   90     Post Anesthesia Care and Evaluation    Patient location during evaluation: PACU  Patient participation: complete - patient participated  Level of consciousness: sleepy but conscious  Pain score: 0  Pain management: adequate  Airway patency: patent  Anesthetic complications: No anesthetic complications  PONV Status: none  Cardiovascular status: hemodynamically stable and acceptable  Respiratory status: nonlabored ventilation, acceptable, nasal cannula and oral airway  Hydration status: acceptable

## 2019-09-11 NOTE — ANESTHESIA PREPROCEDURE EVALUATION
Anesthesia Evaluation                  Airway   Mallampati: II  Dental      Pulmonary    Cardiovascular     (+) hypertension,       Neuro/Psych  GI/Hepatic/Renal/Endo      Musculoskeletal     Abdominal    Substance History      OB/GYN          Other                        Anesthesia Plan    ASA 4     general     intravenous induction   Anesthetic plan, all risks, benefits, and alternatives have been provided, discussed and informed consent has been obtained with: patient.    Plan discussed with CRNA.

## 2019-09-12 ENCOUNTER — APPOINTMENT (OUTPATIENT)
Dept: GENERAL RADIOLOGY | Facility: HOSPITAL | Age: 84
End: 2019-09-12

## 2019-09-12 LAB
ALBUMIN SERPL-MCNC: 2.4 G/DL (ref 3.5–5.2)
ANION GAP SERPL CALCULATED.3IONS-SCNC: 10 MMOL/L (ref 5–15)
BUN BLD-MCNC: 19 MG/DL (ref 8–23)
BUN/CREAT SERPL: 19.4 (ref 7–25)
CALCIUM SPEC-SCNC: 8 MG/DL (ref 8.2–9.6)
CHLORIDE SERPL-SCNC: 103 MMOL/L (ref 98–107)
CO2 SERPL-SCNC: 28 MMOL/L (ref 22–29)
CREAT BLD-MCNC: 0.98 MG/DL (ref 0.76–1.27)
DEPRECATED RDW RBC AUTO: 47.2 FL (ref 37–54)
ERYTHROCYTE [DISTWIDTH] IN BLOOD BY AUTOMATED COUNT: 13.2 % (ref 12.3–15.4)
GFR SERPL CREATININE-BSD FRML MDRD: 71 ML/MIN/1.73
GFR SERPL CREATININE-BSD FRML MDRD: 86 ML/MIN/1.73
GLUCOSE BLD-MCNC: 134 MG/DL (ref 65–99)
HCT VFR BLD AUTO: 24.5 % (ref 37.5–51)
HGB BLD-MCNC: 7.6 G/DL (ref 13–17.7)
MCH RBC QN AUTO: 30.5 PG (ref 26.6–33)
MCHC RBC AUTO-ENTMCNC: 31 G/DL (ref 31.5–35.7)
MCV RBC AUTO: 98.4 FL (ref 79–97)
PHOSPHATE SERPL-MCNC: 3.4 MG/DL (ref 2.5–4.5)
PLATELET # BLD AUTO: 356 10*3/MM3 (ref 140–450)
PMV BLD AUTO: 10 FL (ref 6–12)
POTASSIUM BLD-SCNC: 4.8 MMOL/L (ref 3.5–5.2)
RBC # BLD AUTO: 2.49 10*6/MM3 (ref 4.14–5.8)
SODIUM BLD-SCNC: 141 MMOL/L (ref 136–145)
WBC NRBC COR # BLD: 14.83 10*3/MM3 (ref 3.4–10.8)

## 2019-09-12 PROCEDURE — 94799 UNLISTED PULMONARY SVC/PX: CPT

## 2019-09-12 PROCEDURE — 80069 RENAL FUNCTION PANEL: CPT | Performed by: HOSPITALIST

## 2019-09-12 PROCEDURE — 71046 X-RAY EXAM CHEST 2 VIEWS: CPT

## 2019-09-12 PROCEDURE — 85027 COMPLETE CBC AUTOMATED: CPT | Performed by: HOSPITALIST

## 2019-09-12 PROCEDURE — 99233 SBSQ HOSP IP/OBS HIGH 50: CPT | Performed by: INTERNAL MEDICINE

## 2019-09-12 PROCEDURE — 25010000002 CEFTRIAXONE PER 250 MG: Performed by: INTERNAL MEDICINE

## 2019-09-12 RX ORDER — L.ACID,PARA/B.BIFIDUM/S.THERM 8B CELL
1 CAPSULE ORAL DAILY
Status: DISCONTINUED | OUTPATIENT
Start: 2019-09-12 | End: 2019-09-16 | Stop reason: HOSPADM

## 2019-09-12 RX ADMIN — IPRATROPIUM BROMIDE AND ALBUTEROL SULFATE 3 ML: 2.5; .5 SOLUTION RESPIRATORY (INHALATION) at 12:17

## 2019-09-12 RX ADMIN — ATORVASTATIN CALCIUM 10 MG: 10 TABLET, FILM COATED ORAL at 08:20

## 2019-09-12 RX ADMIN — SODIUM CHLORIDE, PRESERVATIVE FREE 10 ML: 5 INJECTION INTRAVENOUS at 21:03

## 2019-09-12 RX ADMIN — IPRATROPIUM BROMIDE AND ALBUTEROL SULFATE 3 ML: 2.5; .5 SOLUTION RESPIRATORY (INHALATION) at 16:20

## 2019-09-12 RX ADMIN — IPRATROPIUM BROMIDE AND ALBUTEROL SULFATE 3 ML: 2.5; .5 SOLUTION RESPIRATORY (INHALATION) at 19:16

## 2019-09-12 RX ADMIN — Medication 1 CAPSULE: at 10:52

## 2019-09-12 RX ADMIN — GUAIFENESIN 200 MG: 100 SOLUTION ORAL at 21:03

## 2019-09-12 RX ADMIN — GUAIFENESIN 200 MG: 100 SOLUTION ORAL at 08:20

## 2019-09-12 RX ADMIN — GUAIFENESIN 200 MG: 100 SOLUTION ORAL at 17:43

## 2019-09-12 RX ADMIN — GUAIFENESIN 200 MG: 100 SOLUTION ORAL at 11:47

## 2019-09-12 RX ADMIN — FINASTERIDE 5 MG: 5 TABLET, FILM COATED ORAL at 08:20

## 2019-09-12 RX ADMIN — IPRATROPIUM BROMIDE AND ALBUTEROL SULFATE 3 ML: 2.5; .5 SOLUTION RESPIRATORY (INHALATION) at 07:37

## 2019-09-12 RX ADMIN — DOCUSATE SODIUM 100 MG: 100 CAPSULE, LIQUID FILLED ORAL at 21:03

## 2019-09-12 RX ADMIN — TEMAZEPAM 15 MG: 15 CAPSULE ORAL at 21:03

## 2019-09-12 RX ADMIN — CEFTRIAXONE 1 G: 1 INJECTION, POWDER, FOR SOLUTION INTRAMUSCULAR; INTRAVENOUS at 10:54

## 2019-09-12 RX ADMIN — SODIUM CHLORIDE, PRESERVATIVE FREE 10 ML: 5 INJECTION INTRAVENOUS at 08:20

## 2019-09-12 RX ADMIN — DOCUSATE SODIUM 100 MG: 100 CAPSULE, LIQUID FILLED ORAL at 08:20

## 2019-09-12 NOTE — PLAN OF CARE
Problem: Patient Care Overview  Goal: Plan of Care Review  Outcome: Ongoing (interventions implemented as appropriate)   09/12/19 0943   Coping/Psychosocial   Plan of Care Reviewed With patient;family   Plan of Care Review   Progress improving   OTHER   Outcome Summary Patient rested well with requested sleep aid medication. VSS. Tolerated 3L nasal cannula well throughout the night shift. No complaints of pain. CBI titrated down to a slower drip rate and output remains clear pink.        Problem: Fall Risk (Adult)  Goal: Absence of Fall  Outcome: Ongoing (interventions implemented as appropriate)

## 2019-09-12 NOTE — PLAN OF CARE
Problem: Patient Care Overview  Goal: Plan of Care Review  Outcome: Ongoing (interventions implemented as appropriate)   09/12/19 3865   Coping/Psychosocial   Plan of Care Reviewed With patient   Plan of Care Review   Progress improving   OTHER   Outcome Summary Pt. alert, oriented x 4, and pleasant. VSS. His CBI is still and is a light pink in color. He has had no c/o pain. He has rested well. He has O2 at 3 L per NC. Will continue to monitor.       Problem: Fall Risk (Adult)  Goal: Absence of Fall  Outcome: Ongoing (interventions implemented as appropriate)      Problem: Skin Injury Risk (Adult)  Goal: Skin Health and Integrity  Outcome: Ongoing (interventions implemented as appropriate)

## 2019-09-12 NOTE — SIGNIFICANT NOTE
09/12/19 1528   SLP Deferred Reason   SLP Deferred Reason (Pt going down for xray. No concerns with swallowing, per RN. SLP will check back tomorrow to ensure no further issues/concerns. )

## 2019-09-12 NOTE — PROGRESS NOTES
[facility] Medicine Services  PROGRESS NOTE    Patient Name: Chaitanya Browne  : 1923  MRN: [id number]    Date of Admission: 2019  Primary Care Physician: Dirk Russ MD    Subjective   Subjective     CC:  Follow-up hematuria and lower abdominal pain    HPI:  Patient says he feels much better.  Denies new complaints.  Tolerating continuous bladder irrigation but finds the Torres cath irritating.  Hoping he will get the catheter removed in the near future.  Also hoping to transfer to rehabilitation as soon as possible.  No other new complaints.    Review of Systems  No current fevers or chills  No current shortness of breath or cough  No current nausea, vomiting, or diarrhea  No current chest pain or palpitations    Objective   Objective     Vital Signs:   Temp:  [96.6 °F (35.9 °C)-99.5 °F (37.5 °C)] 97.9 °F (36.6 °C)  Heart Rate:  [] 91  Resp:  [16-20] 18  BP: ()/(37-78) 109/56        Physical Exam:  Constitutional:Awake, alert  HENT: NCAT, mucous membranes moist, neck supple  Respiratory: Clear to auscultation bilaterally, respiratory effort normal, nonlabored breathing   Cardiovascular: RRR,  normal radial pulses  Gastrointestinal: Positive bowel sounds, soft, nontender, nondistended  Musculoskeletal: Elderly but normal musculature for age, no lower extremity edema, BMI 30  Psychiatric: Appropriate affect, cooperative, conversational  Neurologic: No slurred speech or facial droop, follows commands, not confused   Skin: No rashes or jaundice, warm    Results Reviewed:    Results from last 7 days   Lab Units 19  0712 19  0711 19  0558   WBC 10*3/mm3 14.83* 11.09* 11.14*   HEMOGLOBIN g/dL 7.6* 8.7* 9.3*   HEMATOCRIT % 24.5* 27.5* 30.1*   PLATELETS 10*3/mm3 356 354 374     Results from last 7 days   Lab Units 19  0711 19  0558 19  0753   SODIUM mmol/L 141 143 142   POTASSIUM mmol/L 4.2 4.3 4.2   CHLORIDE mmol/L 103 107 107    CO2 mmol/L 29.0 24.0 25.0   BUN mg/dL 14 20 23   CREATININE mg/dL 0.90 0.93 0.83   GLUCOSE mg/dL 116* 121* 121*   CALCIUM mg/dL 8.4 8.3 8.0*     Estimated Creatinine Clearance: 47.3 mL/min (by C-G formula based on SCr of 0.9 mg/dL).    Microbiology Results Abnormal     Procedure Component Value - Date/Time    Blood Culture - Blood, Arm, Right [[id number]] Collected:  09/02/19 2050    Lab Status:  Final result Specimen:  Blood from Arm, Right Updated:  09/07/19 2130     Blood Culture No growth at 5 days    Blood Culture - Blood, Arm, Right [[id number]] Collected:  09/02/19 2045    Lab Status:  Final result Specimen:  Blood from Arm, Right Updated:  09/07/19 2130     Blood Culture No growth at 5 days    Urine Culture - Urine, Urine, Catheter [[id number]]  (Normal) Collected:  09/03/19 0112    Lab Status:  Final result Specimen:  Urine, Catheter Updated:  09/04/19 1125     Urine Culture No growth          Imaging Results (last 24 hours)     Procedure Component Value Units Date/Time    XR Chest 1 View [[id number]] Collected:  09/08/19 0740     Updated:  09/11/19 1117    Narrative:          EXAMINATION: XR CHEST 1 VW - 09/08/2019      INDICATION: N17.9-Acute kidney failure, unspecified; R31.9-Hematuria,  unspecified; N32.0-Bladder-neck obstruction; R74.8-Abnormal levels of  other serum enzymes; Z74.09-Other reduced mobility.      COMPARISON: Chest radiograph 09/02/2019.     FINDINGS: Single portable chest radiograph is submitted for review.  There has been interval worsening of right lower lobe airspace disease  with trace right pleural effusion. Trace left pleural effusion  suggested. The remainder of the lungs appear well aerated. The heart is  obscured by the right lower lobe airspace changes and volume loss.  Similar pulmonary nodule in the left midlung. Visualized osseous  structures appear intact.           Impression:          Interval worsening of right lower lobe airspace disease with a small  right pleural  effusion. Additional questionable trace left pleural  effusion noted. The remainder of the lungs appear well aerated.     DICTATED:   09/08/2019  EDITED/ls :   09/08/2019      This report was finalized on 9/11/2019 11:14 AM by Dr. Osvaldo Ordaz MD.             Results for orders placed during the hospital encounter of 09/02/19   Adult Transthoracic Echo Complete W/ Cont if Necessary Per Protocol    Narrative · Mild-to-moderate mitral valve regurgitation is present.  · Estimated EF = 72%.  · Left ventricular systolic function is hyperdynamic (EF > 70).  · Left ventricular diastolic dysfunction (grade I) consistent with   impaired relaxation.  · Left ventricular wall thickness is consistent with mild concentric   hypertrophy.  · Left atrial cavity size is borderline dilated.  · Normal right ventricular cavity size, wall thickness, systolic function   and septal motion noted.  · Mild mitral valve stenosis is present with functional MAC.  · The aortic valve exhibits moderate sclerosis without stenosis.  · No evidence of pulmonary hypertension is present.  · There is no evidence of pericardial effusion.          I have reviewed the medications:  Scheduled Meds:  atorvastatin 10 mg Oral Daily   bisacodyl 10 mg Rectal Once   ceftriaxone 1 g Intravenous Q24H   docusate sodium 100 mg Oral BID   finasteride 5 mg Oral Daily   guaiFENesin 200 mg Oral 4x Daily   ipratropium-albuterol 3 mL Nebulization 4x Daily - RT   lactobacillus acidophilus 1 capsule Oral Daily   sodium chloride 10 mL Intravenous Q12H     Continuous Infusions:   PRN Meds:.•  acetaminophen  •  lidocaine  •  melatonin  •  Menthol (Topical Analgesic)  •  ondansetron **OR** ondansetron  •  polyethylene glycol  •  [COMPLETED] Insert peripheral IV **AND** sodium chloride  •  sodium chloride  •  temazepam      Assessment/Plan   Assessment / Plan     Active Hospital Problems    Diagnosis  POA   • **Gross hematuria [R31.0]  Yes   • Bronchitis [J40]  Yes   • Acute  "UTI (urinary tract infection) [N39.0]  Yes   • Acute renal insufficiency [N28.9]  Yes   • Elevated troponin [R74.8]  Yes   • BPH without urinary obstruction [N40.0]  Yes   • Essential hypertension [I10]  Yes   • Hyperlipidemia LDL goal <100 [E78.5]  Yes      Resolved Hospital Problems   No resolved problems to display.        Brief Hospital Course to date:  Chaitanya Browne is a 89 y.o. male \"with history of hypertension, hyperlipidemia, but remains very functional and active at his age.  Presented to emergency room with complaints of gross hematuria.  Found to have some acute renal insufficiency and clot in his bladder.  Continuous irrigation started and admitted to hospitalist service.\"  Patient has completed cystoscopy with evacuation of clot fulguration of prostate on 9/6 and TURP for BPH with obstruction and hematuria on 9/11.  He has been treated with ceftriaxone for possible urinary tract infection and possible prostatitis.  He also has chest x-ray for earlier dyspnea that showed bilateral possible infiltrate with effusion most notable on the right and CT scan was concerning for possible early pneumonia.    Gross hematuria:  Status post urology procedures as per above.  Required continuous bladder irrigation.  Postoperative management as per urology.    Urinary tract infection and possible prostatitis:  Continue ceftriaxone.  May require prolonged course with possible prostatitis.  Abdominal pain symptoms have improved.    Bronchitis with possible early right lower lobe pneumonia:  Continue ceftriaxone as per above.  Imaging findings of possible pneumonia noted on initial CT of abdomen on 9/3.  Breathing improved.    Anemia, acute blood loss present on admission due to hematuria:  Continue to trend blood counts.  Likely loss is from hematuria and surgical.  May need transfusion soon if continues.    Constipation: Continue bowel regimen.  Better.    Insomnia: Continue current medication.  Patient reports " improvement.    Tachycardia with ambulation: Likely related to anemia and deconditioning.    Elevated troponin without chest pain: Felt to be supply demand mismatch due to acute illness.  Denies current complaints.  Stable.    40 minutes spent on chart review, coronation care, and counseling.  Patient seen in evaluation twice this morning and 20 minutes spent total at the bedside during 2 visits in direct counseling regarding imaging findings, infection, and treatment/rehabilitation plans.    DVT Prophylaxis: Mechanical in the setting of bleeding    Disposition: I expect the patient to be discharged to skilled facility once cleared by urology    CODE STATUS:   Code Status and Medical Interventions:   Ordered at: 09/03/19 0248     Level Of Support Discussed With:    Patient     Code Status:    CPR     Medical Interventions (Level of Support Prior to Arrest):    Full         Electronically signed by Dirk Cortes MD, 09/12/19, 9:01 AM.

## 2019-09-12 NOTE — PROGRESS NOTES
Urology Progress note     LOS: 9 days   Patient Care Team:  Dirk Russ MD as PCP - General (Internal Medicine)    Chief Complaint:  hematuria    Subjective     Interval History:     Patient Complaints: none      Review of Systems:    The following systems were reviewed and negative;  gastrointestinal    Objective     Vital Signs  Temp:  [96.6 °F (35.9 °C)-99.5 °F (37.5 °C)] 97.9 °F (36.6 °C)  Heart Rate:  [] 91  Resp:  [16-20] 18  BP: ()/(37-78) 109/56    Physical Exam:     General Appearance:    Alert, cooperative, in no acute distress   Head:    Normocephalic, without obvious abnormality, atraumatic   Eyes:            Lids and lashes normal, conjunctivae and sclerae normal, no   icterus, no pallor, corneas clear, PERRLA   Ears:    Ears appear intact with no abnormalities noted   Throat:   No oral lesions, no thrush, oral mucosa moist   Neck:   No adenopathy, supple, trachea midline, no thyromegaly, no   carotid bruit, no JVD   Back:     No kyphosis present, no scoliosis present, no skin lesions,      erythema or scars, no tenderness to percussion or                   palpation,   range of motion normal   Lungs:     Clear to auscultation,respirations regular, even and                  unlabored    Heart:    Regular rhythm and normal rate, normal S1 and S2, no            murmur, no gallop, no rub, no click   Chest Wall:    No abnormalities observed   Abdomen:     Normal bowel sounds, no masses, no organomegaly, soft        non-tender, non-distended, no guarding, no rebound                tenderness   Rectal:     Deferred   Extremities:   Moves all extremities well, no edema, no cyanosis, no             redness   Pulses:   Pulses palpable and equal bilaterally   Skin:   No bleeding, bruising or rash   Lymph nodes:   No palpable adenopathy   Neurologic:   Cranial nerves 2 - 12 grossly intact, sensation intact, DTR       present and equal bilaterally        Results Review:     I reviewed the  patient's new clinical results.  Lab Results (last 24 hours)     Procedure Component Value Units Date/Time    Renal Function Panel [[id number]]  (Abnormal) Collected:  09/12/19 0712    Specimen:  Blood Updated:  09/12/19 0906     Glucose 134 mg/dL      BUN 19 mg/dL      Creatinine 0.98 mg/dL      Sodium 141 mmol/L      Potassium 4.8 mmol/L      Chloride 103 mmol/L      CO2 28.0 mmol/L      Calcium 8.0 mg/dL      Albumin 2.40 g/dL      Phosphorus 3.4 mg/dL      Anion Gap 10.0 mmol/L      BUN/Creatinine Ratio 19.4     eGFR Non African Amer 71 mL/min/1.73      eGFR  African Amer 86 mL/min/1.73     Narrative:       GFR Normal >60  Chronic Kidney Disease <60  Kidney Failure <15    CBC (No Diff) [[id number]]  (Abnormal) Collected:  09/12/19 0712    Specimen:  Blood Updated:  09/12/19 0823     WBC 14.83 10*3/mm3      RBC 2.49 10*6/mm3      Hemoglobin 7.6 g/dL      Hematocrit 24.5 %      MCV 98.4 fL      MCH 30.5 pg      MCHC 31.0 g/dL      RDW 13.2 %      RDW-SD 47.2 fl      MPV 10.0 fL      Platelets 356 10*3/mm3     Tissue Pathology Exam [[id number]] Collected:  09/11/19 1614    Specimen:  Tissue from Prostate Updated:  09/12/19 0648        Imaging Results (last 7 days)     Procedure Component Value Units Date/Time    XR Chest 1 View [[id number]] Collected:  09/08/19 0740     Updated:  09/11/19 1117    Narrative:          EXAMINATION: XR CHEST 1 VW - 09/08/2019      INDICATION: N17.9-Acute kidney failure, unspecified; R31.9-Hematuria,  unspecified; N32.0-Bladder-neck obstruction; R74.8-Abnormal levels of  other serum enzymes; Z74.09-Other reduced mobility.      COMPARISON: Chest radiograph 09/02/2019.     FINDINGS: Single portable chest radiograph is submitted for review.  There has been interval worsening of right lower lobe airspace disease  with trace right pleural effusion. Trace left pleural effusion  suggested. The remainder of the lungs appear well aerated. The heart is  obscured by the right lower lobe airspace  changes and volume loss.  Similar pulmonary nodule in the left midlung. Visualized osseous  structures appear intact.           Impression:          Interval worsening of right lower lobe airspace disease with a small  right pleural effusion. Additional questionable trace left pleural  effusion noted. The remainder of the lungs appear well aerated.     DICTATED:   09/08/2019  EDITED/ls :   09/08/2019      This report was finalized on 9/11/2019 11:14 AM by Dr. Osvaldo Ordaz MD.       XR Chest 1 View [[id number]] Collected:  09/09/19 0835     Updated:  09/09/19 1756    Narrative:       EXAMINATION: XR CHEST 1 VW-09/09/2019:      INDICATION: DYSPNEA ON EXERTION.      COMPARISON: 09/08/2019.     FINDINGS: Portable chest reveals calcified granuloma identified within  the left midlung which is stable. Some improvement seen of the aeration  of the right lung base. Elevation seen of the right hemidiaphragm with  small right pleural effusion. Mild increased markings again seen within  the left lung base. Spurring of the upper lung fields bilaterally.           Impression:       Slight improvement seen of the aeration of the lung bases  bilaterally. Continued followup recommended as indicated.     D:  09/09/2019  E:  09/09/2019     This report was finalized on 9/9/2019 5:53 PM by Dr. Roxanna Carter MD.           Medication Review:   Current Facility-Administered Medications   Medication Dose Route Frequency Provider Last Rate Last Dose   • acetaminophen (TYLENOL) tablet 650 mg  650 mg Oral Q6H PRN Bryce Vargas MD   650 mg at 09/06/19 1303   • atorvastatin (LIPITOR) tablet 10 mg  10 mg Oral Daily Sheila Wilson, APRN   10 mg at 09/12/19 0820   • bisacodyl (DULCOLAX) suppository 10 mg  10 mg Rectal Once Bryce Vargas MD       • cefTRIAXone (ROCEPHIN) 1 g/100 mL 0.9% NS (MBP)  1 g Intravenous Q24H Dirk Cortes MD   1 g at 09/12/19 1054   • docusate sodium (COLACE) capsule 100 mg  100 mg Oral BID  Bryce Vargas MD   100 mg at 09/12/19 0820   • finasteride (PROSCAR) tablet 5 mg  5 mg Oral Daily Fabricio Lim Jr., MD   5 mg at 09/12/19 0820   • guaiFENesin (ROBITUSSIN) 100 MG/5ML oral solution 200 mg  200 mg Oral 4x Daily Bryce Vargas MD   200 mg at 09/12/19 0820   • ipratropium-albuterol (DUO-NEB) nebulizer solution 3 mL  3 mL Nebulization 4x Daily - RT Bryce Vargas MD   3 mL at 09/12/19 0737   • lactobacillus acidophilus (RISAQUAD) capsule 1 capsule  1 capsule Oral Daily Dirk Cortes MD   1 capsule at 09/12/19 1052   • lidocaine (XYLOCAINE) 2 % jelly   Topical PRN Ck Woods MD   1 mL at 09/11/19 0145   • melatonin tablet 5 mg  5 mg Oral Nightly PRN Bryce Vargas MD       • Menthol (Topical Analgesic) 4 % gel 1 application  1 application Topical 4x Daily PRN Emmett Dove MD   1 application at 09/05/19 1419   • ondansetron (ZOFRAN) tablet 4 mg  4 mg Oral Q6H PRN Sheila Wilson APRN        Or   • ondansetron (ZOFRAN) injection 4 mg  4 mg Intravenous Q6H PRN Sheila Wilson APRN       • polyethylene glycol 3350 powder (packet)  17 g Oral Daily PRN Bryce Vargas MD   17 g at 09/05/19 1419   • sodium chloride 0.9 % flush 10 mL  10 mL Intravenous PRN Tessa Phelps APRN       • sodium chloride 0.9 % flush 10 mL  10 mL Intravenous Q12H Sheila Wilson APRN   10 mL at 09/12/19 0820   • sodium chloride 0.9 % flush 10 mL  10 mL Intravenous PRN Sheila Wilson APRN       • temazepam (RESTORIL) capsule 15 mg  15 mg Oral Nightly PRN Bryce Vargas MD   15 mg at 09/11/19 2302       Assessment/Plan       Gross hematuria    Hyperlipidemia LDL goal <100    Essential hypertension    BPH without urinary obstruction    Acute UTI (urinary tract infection)    Acute renal insufficiency    Elevated troponin    Bronchitis      Urine minimally bloody.  H&H low but hopefully no further blood loss. Will continue irrigation    Plan for disposition:Where:  home    Ck Woods MD  09/12/19  11:03 AM

## 2019-09-13 PROBLEM — R53.81 DEBILITY: Status: ACTIVE | Noted: 2019-09-13

## 2019-09-13 PROBLEM — R33.8 ACUTE URINARY RETENTION: Status: ACTIVE | Noted: 2019-09-13

## 2019-09-13 PROBLEM — J18.9 PNEUMONIA OF LEFT LOWER LOBE DUE TO INFECTIOUS ORGANISM: Status: ACTIVE | Noted: 2019-09-13

## 2019-09-13 LAB
DEPRECATED RDW RBC AUTO: 48.1 FL (ref 37–54)
ERYTHROCYTE [DISTWIDTH] IN BLOOD BY AUTOMATED COUNT: 13.2 % (ref 12.3–15.4)
HCT VFR BLD AUTO: 26 % (ref 37.5–51)
HGB BLD-MCNC: 8 G/DL (ref 13–17.7)
MCH RBC QN AUTO: 30.7 PG (ref 26.6–33)
MCHC RBC AUTO-ENTMCNC: 30.8 G/DL (ref 31.5–35.7)
MCV RBC AUTO: 99.6 FL (ref 79–97)
PLATELET # BLD AUTO: 354 10*3/MM3 (ref 140–450)
PMV BLD AUTO: 9.9 FL (ref 6–12)
RBC # BLD AUTO: 2.61 10*6/MM3 (ref 4.14–5.8)
WBC NRBC COR # BLD: 14.03 10*3/MM3 (ref 3.4–10.8)

## 2019-09-13 PROCEDURE — 85027 COMPLETE CBC AUTOMATED: CPT | Performed by: INTERNAL MEDICINE

## 2019-09-13 PROCEDURE — 25010000002 CEFTRIAXONE PER 250 MG: Performed by: INTERNAL MEDICINE

## 2019-09-13 PROCEDURE — 92526 ORAL FUNCTION THERAPY: CPT

## 2019-09-13 PROCEDURE — 99233 SBSQ HOSP IP/OBS HIGH 50: CPT | Performed by: INTERNAL MEDICINE

## 2019-09-13 PROCEDURE — 94799 UNLISTED PULMONARY SVC/PX: CPT

## 2019-09-13 RX ADMIN — IPRATROPIUM BROMIDE AND ALBUTEROL SULFATE 3 ML: 2.5; .5 SOLUTION RESPIRATORY (INHALATION) at 19:41

## 2019-09-13 RX ADMIN — IPRATROPIUM BROMIDE AND ALBUTEROL SULFATE 3 ML: 2.5; .5 SOLUTION RESPIRATORY (INHALATION) at 16:12

## 2019-09-13 RX ADMIN — GUAIFENESIN 200 MG: 100 SOLUTION ORAL at 08:23

## 2019-09-13 RX ADMIN — DOXYCYCLINE 100 MG: 100 INJECTION, POWDER, LYOPHILIZED, FOR SOLUTION INTRAVENOUS at 21:45

## 2019-09-13 RX ADMIN — Medication 1 CAPSULE: at 08:23

## 2019-09-13 RX ADMIN — GUAIFENESIN 200 MG: 100 SOLUTION ORAL at 13:17

## 2019-09-13 RX ADMIN — SODIUM CHLORIDE, PRESERVATIVE FREE 10 ML: 5 INJECTION INTRAVENOUS at 21:45

## 2019-09-13 RX ADMIN — IPRATROPIUM BROMIDE AND ALBUTEROL SULFATE 3 ML: 2.5; .5 SOLUTION RESPIRATORY (INHALATION) at 07:29

## 2019-09-13 RX ADMIN — CEFTRIAXONE 1 G: 1 INJECTION, POWDER, FOR SOLUTION INTRAMUSCULAR; INTRAVENOUS at 08:23

## 2019-09-13 RX ADMIN — DOXYCYCLINE 100 MG: 100 INJECTION, POWDER, LYOPHILIZED, FOR SOLUTION INTRAVENOUS at 10:55

## 2019-09-13 RX ADMIN — DOCUSATE SODIUM 100 MG: 100 CAPSULE, LIQUID FILLED ORAL at 21:45

## 2019-09-13 RX ADMIN — POLYETHYLENE GLYCOL 3350 17 G: 17 POWDER, FOR SOLUTION ORAL at 08:23

## 2019-09-13 RX ADMIN — ATORVASTATIN CALCIUM 10 MG: 10 TABLET, FILM COATED ORAL at 08:23

## 2019-09-13 RX ADMIN — GUAIFENESIN 200 MG: 100 SOLUTION ORAL at 21:45

## 2019-09-13 RX ADMIN — DOCUSATE SODIUM 100 MG: 100 CAPSULE, LIQUID FILLED ORAL at 08:23

## 2019-09-13 RX ADMIN — GUAIFENESIN 200 MG: 100 SOLUTION ORAL at 17:57

## 2019-09-13 RX ADMIN — FINASTERIDE 5 MG: 5 TABLET, FILM COATED ORAL at 08:23

## 2019-09-13 NOTE — PLAN OF CARE
Problem: Patient Care Overview  Goal: Plan of Care Review  Outcome: Ongoing (interventions implemented as appropriate)   09/13/19 1128   Coping/Psychosocial   Plan of Care Reviewed With patient;daughter   SLP treatment completed. Will continue to address/monitor for dysphagia. Please see note for further details and recommendations.

## 2019-09-13 NOTE — PLAN OF CARE
Problem: Patient Care Overview  Goal: Plan of Care Review  Outcome: Ongoing (interventions implemented as appropriate)   09/13/19 0353   Coping/Psychosocial   Plan of Care Reviewed With patient   Plan of Care Review   Progress improving   OTHER   Outcome Summary VSS, pt refuses to be woke up between the hours of 11pm and 6 am. CBI continued, urine pale pink tinged. oxygen 3L per NC.        Problem: Fall Risk (Adult)  Goal: Absence of Fall  Outcome: Ongoing (interventions implemented as appropriate)      Problem: Skin Injury Risk (Adult)  Goal: Skin Health and Integrity  Outcome: Ongoing (interventions implemented as appropriate)

## 2019-09-13 NOTE — PROGRESS NOTES
Continued Stay Note  [facility]     Patient Name: Chaitanya Browne  MRN: [id number]  Today's Date: 9/13/2019    Admit Date: 9/2/2019    Discharge Plan     Row Name 09/13/19 1331       Plan    Plan  [facility] [place]    Patient/Family in Agreement with Plan  yes    Plan Comments  Met with Dr. Neely and his daughter, Nkechi, at the bedside for discharge planning.  Dr. Browne will have a skilled bed at [facility] [place] on Monday, 9/16/19, if medically ready.  Insurance authorization has been received.  The patients transfer will be pending clearance by urology.    CM will continue to follow.    Final Discharge Disposition Code  03 - skilled nursing facility (SNF)                                          Expected Discharge Date and Time     Expected Discharge Date Expected Discharge Time    Sep 14, 2019             Lupe Yeager RN

## 2019-09-13 NOTE — PROGRESS NOTES
[facility] Medicine Services  PROGRESS NOTE    Patient Name: Chaitanya Browne  : 1923  MRN: [id number]    Date of Admission: 2019  Primary Care Physician: Dirk Russ MD    Subjective   Subjective     CC:  Follow-up hematuria and lower abdominal pain    HPI:  Lower abdominal pain mostly resolved.  Tolerating continuous bladder irrigation but does not like Torres catheter.  Requests Torres catheter removed in the near future.  Continuing to look forward to transfer to rehabilitation soon.  Denies other new complaints.    Review of Systems  No current fevers or chills  No current shortness of breath or cough  No current nausea, vomiting, or diarrhea  No current chest pain or palpitations    Objective   Objective     Vital Signs:   Temp:  [97.9 °F (36.6 °C)-99.1 °F (37.3 °C)] 99.1 °F (37.3 °C)  Heart Rate:  [] 105  Resp:  [16-20] 16  BP: (100-109)/(56-64) 108/57        Physical Exam:  Constitutional:Awake, alert  HENT: NCAT, mucous membranes moist, neck supple  Respiratory: Clear to auscultation bilaterally, respiratory effort normal, nonlabored breathing   Cardiovascular: RRR,  normal radial pulses  Gastrointestinal: Positive bowel sounds, soft, nontender, nondistended  Musculoskeletal: Elderly but normal musculature for age, no lower extremity edema, BMI 30  Psychiatric: Appropriate affect, cooperative, conversational  Neurologic: No slurred speech or facial droop, follows commands, not confused   Skin: No rashes or jaundice, warm    Results Reviewed:    Results from last 7 days   Lab Units 19  0558   WBC 10*3/mm3 14.83* 11.09* 11.14*   HEMOGLOBIN g/dL 7.6* 8.7* 9.3*   HEMATOCRIT % 24.5* 27.5* 30.1*   PLATELETS 10*3/mm3 356 354 374     Results from last 7 days   Lab Units 19  0558   SODIUM mmol/L 141 141 143   POTASSIUM mmol/L 4.8 4.2 4.3   CHLORIDE mmol/L 103 103 107   CO2 mmol/L 28.0 29.0 24.0    BUN mg/dL 19 14 20   CREATININE mg/dL 0.98 0.90 0.93   GLUCOSE mg/dL 134* 116* 121*   CALCIUM mg/dL 8.0* 8.4 8.3     Estimated Creatinine Clearance: 43.5 mL/min (by C-G formula based on SCr of 0.98 mg/dL).    Microbiology Results Abnormal     Procedure Component Value - Date/Time    Blood Culture - Blood, Arm, Right [[id number]] Collected:  09/02/19 2050    Lab Status:  Final result Specimen:  Blood from Arm, Right Updated:  09/07/19 2130     Blood Culture No growth at 5 days    Blood Culture - Blood, Arm, Right [[id number]] Collected:  09/02/19 2045    Lab Status:  Final result Specimen:  Blood from Arm, Right Updated:  09/07/19 2130     Blood Culture No growth at 5 days    Urine Culture - Urine, Urine, Catheter [[id number]]  (Normal) Collected:  09/03/19 0112    Lab Status:  Final result Specimen:  Urine, Catheter Updated:  09/04/19 1125     Urine Culture No growth          Imaging Results (last 24 hours)     Procedure Component Value Units Date/Time    XR Chest PA & Lateral [[id number]] Collected:  09/12/19 1818     Updated:  09/12/19 1830    Narrative:          EXAMINATION: XR CHEST, PA AND LATERAL - 09/12/2019     INDICATION:  N17.9-Acute kidney failure, unspecified; R31.9-Hematuria,  unspecified; N32.0-Bladder-neck obstruction; R74.8-Abnormal levels of  other serum enzymes; Z74.09-Other reduced mobility; N40.0-Benign  prostatic hyperplasia without lower urinary tract symptoms.      COMPARISON: 09/09/2019.     FINDINGS: Elevated right hemidiaphragm and densely calcified left lung  nodule. Left hilar lymph nodes are again noted, present back to at least  2012. Lung volumes are decreased overall from the prior study, which may  account for mild left basilar discoid atelectasis. No new pulmonary  parenchymal disease is seen elsewhere. No effusion or pneumothorax is  seen.       Impression:       1. Mild increased patchy disease in the left lung base, perhaps  atelectasis due to lower lung volumes. If pneumonia is  present. It is  very limited in extent.  2. 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     DICTATED:   09/12/2019  EDITED/ls :   09/12/2019              Results for orders placed during the hospital encounter of 09/02/19   Adult Transthoracic Echo Complete W/ Cont if Necessary Per Protocol    Narrative · Mild-to-moderate mitral valve regurgitation is present.  · Estimated EF = 72%.  · Left ventricular systolic function is hyperdynamic (EF > 70).  · Left ventricular diastolic dysfunction (grade I) consistent with   impaired relaxation.  · Left ventricular wall thickness is consistent with mild concentric   hypertrophy.  · Left atrial cavity size is borderline dilated.  · Normal right ventricular cavity size, wall thickness, systolic function   and septal motion noted.  · Mild mitral valve stenosis is present with functional MAC.  · The aortic valve exhibits moderate sclerosis without stenosis.  · No evidence of pulmonary hypertension is present.  · There is no evidence of pericardial effusion.          I have reviewed the medications:  Scheduled Meds:    atorvastatin 10 mg Oral Daily   bisacodyl 10 mg Rectal Once   ceftriaxone 1 g Intravenous Q24H   docusate sodium 100 mg Oral BID   finasteride 5 mg Oral Daily   guaiFENesin 200 mg Oral 4x Daily   ipratropium-albuterol 3 mL Nebulization 4x Daily - RT   lactobacillus acidophilus 1 capsule Oral Daily   sodium chloride 10 mL Intravenous Q12H     Continuous Infusions:   PRN Meds:.•  acetaminophen  •  lidocaine  •  melatonin  •  Menthol (Topical Analgesic)  •  ondansetron **OR** ondansetron  •  polyethylene glycol  •  [COMPLETED] Insert peripheral IV **AND** sodium chloride  •  sodium chloride  •  temazepam      Assessment/Plan   Assessment / Plan     Active Hospital Problems    Diagnosis  POA   • **Gross hematuria [R31.0]  Yes   • Acute urinary retention [R33.8]  Yes   • Possible pneumonia of left lower lobe due to infectious  "organism (CMS/HCC) [J18.1]  Yes   • Debility [R53.81]  Yes   • Bronchitis [J40]  Yes   • Acute UTI (urinary tract infection) [N39.0]  Yes   • Acute renal insufficiency [N28.9]  Yes   • Elevated troponin [R74.8]  Yes   • BPH without urinary obstruction [N40.0]  Yes   • Asthma [J45.909]  Yes   • Essential hypertension [I10]  Yes   • Hyperlipidemia LDL goal <100 [E78.5]  Yes      Resolved Hospital Problems   No resolved problems to display.        Brief Hospital Course to date:  Chaitanya Browne is a 89 y.o. male \"with history of hypertension, hyperlipidemia, but remains very functional and active at his age.  Presented to emergency room with complaints of gross hematuria.  Found to have some acute renal insufficiency and clot in his bladder.  Continuous irrigation started and admitted to hospitalist service.\"  Patient has completed cystoscopy with evacuation of clot fulguration of prostate on 9/6 and TURP for BPH with obstruction and hematuria on 9/11.  He has been treated with ceftriaxone for possible urinary tract infection and possible prostatitis.  He also has chest x-ray for earlier dyspnea that showed bilateral possible infiltrate with effusion most notable on the right and CT scan was concerning for possible early pneumonia.    Gross hematuria with BPH and urinary obstruction:  Status post urology procedures as per above.  Required continuous bladder irrigation.  Postoperative management as per urology.    Urinary tract infection and possible prostatitis:  Continue ceftriaxone.  May require prolonged course with possible prostatitis.  Abdominal pain symptoms have improved.    Bronchitis with possible early right lower lobe pneumonia:  Continue ceftriaxone as per above.  Imaging findings of possible pneumonia noted on initial CT of abdomen on 9/3.  Repeat x-ray on 9/12 also concerning for left lower lobe pneumonia.  Breathing improved.    Anemia, acute blood loss present on admission due to hematuria:  Continue " to trend blood counts.  Likely loss is from hematuria and surgical.  May need transfusion soon if continues.    Constipation: Continue bowel regimen.  Better.    Insomnia: Continue current medication.  Patient reports improvement.    Tachycardia with ambulation: Likely related to anemia and deconditioning.    Elevated troponin without chest pain: Felt to be supply demand mismatch due to acute illness.  Denies current complaints.  Stable.    Debility: PT OT.  Skilled rehab when possible.    Chest x-ray images personally reviewed and felt to be consistent with possible left lower lobe pneumonia.  Plan to discuss case with urology today.  Continue bladder irrigation for now.  Laboratory studies currently pending we will follow-up on results.  If leukocytosis continues to trend up could consider further broadening antibiotic coverage.    DVT Prophylaxis: Mechanical in the setting of bleeding    Disposition: I expect the patient to be discharged to skilled facility once cleared by urology    CODE STATUS:   Code Status and Medical Interventions:   Ordered at: 09/03/19 0248     Level Of Support Discussed With:    Patient     Code Status:    CPR     Medical Interventions (Level of Support Prior to Arrest):    Full         Electronically signed by Dirk Cortes MD, 09/13/19, 7:36 AM.

## 2019-09-13 NOTE — THERAPY TREATMENT NOTE
Acute Care - Speech Language Pathology   Swallow Treatment Note [facility]     Patient Name: Chaitanya Browne  : 1923  MRN: [id number]  Today's Date: 2019  Onset of Illness/Injury or Date of Surgery: 19     Referring Physician: Dr. Dove      Admit Date: 2019    Visit Dx:      ICD-10-CM ICD-9-CM   1. ABBY (acute kidney injury) (CMS/[facility]) N17.9 584.9   2. Hematuria, unspecified type R31.9 599.70   3. Bladder outlet obstruction N32.0 596.0   4. Elevated troponin R74.8 790.6   5. Impaired mobility and ADLs Z74.09 799.89   6. BPH (benign prostatic hyperplasia) N40.0 600.00     Patient Active Problem List   Diagnosis   • Facial basal cell cancer   • Hyperlipidemia LDL goal <100   • Essential hypertension   • ASHD (arteriosclerotic heart disease)   • Gait abnormality   • Impaired fasting glucose   • Polyp, colonic   • Vitamin D deficiency   • Low back pain at multiple sites   • Proteinuria   • Right carpal tunnel syndrome   • Obesity (BMI 30-39.9)   • Hematuria, unspecified   • Medicare annual wellness visit, subsequent   • BPH without urinary obstruction   • Edema   • Cough   • Morbid obesity due to excess calories (CMS/[facility])   • Asthma   • Allergic rhinitis   • Gross hematuria   • Acute UTI (urinary tract infection)   • Acute renal insufficiency   • Elevated troponin   • Bronchitis   • Acute urinary retention   • Possible pneumonia of left lower lobe due to infectious organism (CMS/[facility])   • Debility       Therapy Treatment  Rehabilitation Treatment Summary     Row Name 19 1045             Treatment Time/Intention    Discipline  speech language pathologist  -      Document Type  therapy note (daily note)  -      Subjective Information  complains of;pain  -      Care Plan Review  evaluation/treatment results reviewed;care plan/treatment goals reviewed;risks/benefits reviewed;current/potential barriers reviewed;patient/other agree to care plan  -      Care Plan Review, Other  Participant(s)  daughter  -SM      Patient Effort  good  -SM      Recorded by [SM] Marilu Garcia MS CCC-SLP 09/13/19 1126      Row Name 09/13/19 1045             Pain Scale: Numbers Pre/Post-Treatment    Pain Scale: Numbers, Pretreatment  4/10  -SM      Pain Scale: Numbers, Post-Treatment  4/10  -SM      Pain Location  -- IV site  -SM      Pain Intervention(s)  -- Notified RN  -SM      Recorded by [SM] Marilu Garcia, MS CCC-SLP 09/13/19 1126      Row Name 09/13/19 1045             Outcome Summary/Treatment Plan (SLP)    Plan for Continued Treatment (SLP)  Continue to monitor diet tolerance. Pt does not wish to currently pursue MBS. Willing to further discuss if any change/concern, but first wishes to address IV and catheter issues. Pt/dtr understands risks of potential aspiration, pt's wife had to undergo swallow studies in past.   -SM      Anticipated Dischage Disposition  unknown  -SM      Recorded by [] Marilu Garcia MS CCC-SLP 09/13/19 1126        User Key  (r) = Recorded By, (t) = Taken By, (c) = Cosigned By    Initials Name Effective Dates Discipline     Marilu Garcia, MS CCC-SLP 06/22/15 -  SLP          Outcome Summary  Outcome Summary/Treatment Plan (SLP)  Plan for Continued Treatment (SLP): Continue to monitor diet tolerance. Pt does not wish to currently pursue MBS. Willing to further discuss if any change/concern, but first wishes to address IV and catheter issues. Pt/dtr understands risks of potential aspiration, pt's wife had to undergo swallow studies in past.  (09/13/19 1045 : Marilu Garcia, MS CCC-SLP)  Anticipated Dischage Disposition: unknown (09/13/19 1045 : Marilu Garcia, MS [facility]-SLP)      SLP GOALS     Row Name 09/13/19 1045 09/10/19 1600          Oral Nutrition/Hydration Goal 1 (SLP)    Oral Nutrition/Hydration Goal 1, SLP  LTG: Pt will tolerate diet w/o s/sxs aspiration or difficulty.  -  LTG: Pt will tolerate diet w/o s/sxs aspiration or  difficulty.  -HG     Time Frame (Oral Nutrition/Hydration Goal 1, SLP)  by discharge  -SM  by discharge  -HG     Progress/Outcomes (Oral Nutrition/Hydration Goal 1, SLP)  continuing progress toward goal  -  goal ongoing  -HG        Oral Nutrition/Hydration Goal 2 (SLP)    Oral Nutrition/Hydration Goal 2, SLP  Pt will tolerate mechanical soft diet (ground), thin liquids w/o s/sxs aspiration.  -SM  Pt will tolerate mechanical soft diet (ground), thin liquids w/o s/sxs aspiration.  -HG     Time Frame (Oral Nutrition/Hydration Goal 2, SLP)  short term goal (STG)  -SM  short term goal (STG)  -HG     Barriers (Oral Nutrition/Hydration Goal 2, SLP)  No concerns per RN. Increased opacities on CXR. Wet cough baseline, cannot rule out aspiration. Discussed results, options, and risks with both pt/dtr.   -SM  --     Progress/Outcomes (Oral Nutrition/Hydration Goal 2, SLP)  continuing progress toward goal  -SM  goal revised this date;goal ongoing  -HG       User Key  (r) = Recorded By, (t) = Taken By, (c) = Cosigned By    Initials Name Provider Type    Marilu Laura, MS CCC-SLP Speech and Language Pathologist    Zeina Paul, MS CCC-SLP Speech and Language Pathologist          EDUCATION  The patient has been educated in the following areas:   Dysphagia (Swallowing Impairment).    SLP Recommendation and Plan        Plan for Continued Treatment (SLP): Continue to monitor diet tolerance. Pt does not wish to currently pursue MBS. Willing to further discuss if any change/concern, but first wishes to address IV and catheter issues. Pt/dtr understands risks of potential aspiration, pt's wife had to undergo swallow studies in past.   Anticipated Dischage Disposition: unknown                    Time Calculation:   Time Calculation- SLP     Row Name 09/13/19 1128             Time Calculation- SLP    SLP Start Time  1045  -      SLP Received On  09/13/19  -        User Key  (r) = Recorded By, (t) = Taken By, (c) =  Cosigned By    Initials Name Provider Type     Marilu Garcia, MS CCC-SLP Speech and Language Pathologist          Therapy Charges for Today     Code Description Service Date Service Provider Modifiers Qty    [id number]  ST TREATMENT SWALLOW 2 9/13/2019 Marilu Garcia, MS CCC-SLP GN 1                 Marilu Garcia MS CCC-SLP  9/13/2019

## 2019-09-14 PROBLEM — R13.10 DYSPHAGIA: Status: ACTIVE | Noted: 2019-09-14

## 2019-09-14 LAB
DEPRECATED RDW RBC AUTO: 49.2 FL (ref 37–54)
ERYTHROCYTE [DISTWIDTH] IN BLOOD BY AUTOMATED COUNT: 13.3 % (ref 12.3–15.4)
HCT VFR BLD AUTO: 26.1 % (ref 37.5–51)
HGB BLD-MCNC: 7.8 G/DL (ref 13–17.7)
MCH RBC QN AUTO: 30.4 PG (ref 26.6–33)
MCHC RBC AUTO-ENTMCNC: 29.9 G/DL (ref 31.5–35.7)
MCV RBC AUTO: 101.6 FL (ref 79–97)
PLATELET # BLD AUTO: 293 10*3/MM3 (ref 140–450)
PMV BLD AUTO: 9.6 FL (ref 6–12)
RBC # BLD AUTO: 2.57 10*6/MM3 (ref 4.14–5.8)
WBC NRBC COR # BLD: 11.48 10*3/MM3 (ref 3.4–10.8)

## 2019-09-14 PROCEDURE — 99232 SBSQ HOSP IP/OBS MODERATE 35: CPT | Performed by: INTERNAL MEDICINE

## 2019-09-14 PROCEDURE — 85027 COMPLETE CBC AUTOMATED: CPT | Performed by: INTERNAL MEDICINE

## 2019-09-14 PROCEDURE — 94799 UNLISTED PULMONARY SVC/PX: CPT

## 2019-09-14 PROCEDURE — 97164 PT RE-EVAL EST PLAN CARE: CPT

## 2019-09-14 PROCEDURE — 97110 THERAPEUTIC EXERCISES: CPT

## 2019-09-14 PROCEDURE — 25010000002 CEFTRIAXONE PER 250 MG: Performed by: INTERNAL MEDICINE

## 2019-09-14 RX ADMIN — DOXYCYCLINE 100 MG: 100 INJECTION, POWDER, LYOPHILIZED, FOR SOLUTION INTRAVENOUS at 21:18

## 2019-09-14 RX ADMIN — SODIUM CHLORIDE, PRESERVATIVE FREE 10 ML: 5 INJECTION INTRAVENOUS at 08:47

## 2019-09-14 RX ADMIN — DOCUSATE SODIUM 100 MG: 100 CAPSULE, LIQUID FILLED ORAL at 08:44

## 2019-09-14 RX ADMIN — IPRATROPIUM BROMIDE AND ALBUTEROL SULFATE 3 ML: 2.5; .5 SOLUTION RESPIRATORY (INHALATION) at 19:34

## 2019-09-14 RX ADMIN — GUAIFENESIN 200 MG: 100 SOLUTION ORAL at 08:46

## 2019-09-14 RX ADMIN — GUAIFENESIN 200 MG: 100 SOLUTION ORAL at 12:05

## 2019-09-14 RX ADMIN — FINASTERIDE 5 MG: 5 TABLET, FILM COATED ORAL at 08:46

## 2019-09-14 RX ADMIN — SODIUM CHLORIDE, PRESERVATIVE FREE 10 ML: 5 INJECTION INTRAVENOUS at 21:18

## 2019-09-14 RX ADMIN — CEFTRIAXONE 1 G: 1 INJECTION, POWDER, FOR SOLUTION INTRAMUSCULAR; INTRAVENOUS at 09:10

## 2019-09-14 RX ADMIN — LIDOCAINE HYDROCHLORIDE: 20 JELLY TOPICAL at 17:00

## 2019-09-14 RX ADMIN — Medication 1 CAPSULE: at 08:45

## 2019-09-14 RX ADMIN — ATORVASTATIN CALCIUM 10 MG: 10 TABLET, FILM COATED ORAL at 08:45

## 2019-09-14 RX ADMIN — GUAIFENESIN 200 MG: 100 SOLUTION ORAL at 18:42

## 2019-09-14 RX ADMIN — DOXYCYCLINE 100 MG: 100 INJECTION, POWDER, LYOPHILIZED, FOR SOLUTION INTRAVENOUS at 11:17

## 2019-09-14 RX ADMIN — TEMAZEPAM 15 MG: 15 CAPSULE ORAL at 23:23

## 2019-09-14 RX ADMIN — DOCUSATE SODIUM 100 MG: 100 CAPSULE, LIQUID FILLED ORAL at 21:17

## 2019-09-14 RX ADMIN — GUAIFENESIN 200 MG: 100 SOLUTION ORAL at 21:17

## 2019-09-14 RX ADMIN — IPRATROPIUM BROMIDE AND ALBUTEROL SULFATE 3 ML: 2.5; .5 SOLUTION RESPIRATORY (INHALATION) at 12:34

## 2019-09-14 RX ADMIN — IPRATROPIUM BROMIDE AND ALBUTEROL SULFATE 3 ML: 2.5; .5 SOLUTION RESPIRATORY (INHALATION) at 16:39

## 2019-09-14 RX ADMIN — IPRATROPIUM BROMIDE AND ALBUTEROL SULFATE 3 ML: 2.5; .5 SOLUTION RESPIRATORY (INHALATION) at 06:26

## 2019-09-14 NOTE — PLAN OF CARE
Problem: Patient Care Overview  Goal: Plan of Care Review  Outcome: Ongoing (interventions implemented as appropriate)   09/14/19 8661   Coping/Psychosocial   Plan of Care Reviewed With patient   Plan of Care Review   Progress improving   OTHER   Outcome Summary CBI continues without clots and urine is clear. VSS       Problem: Fall Risk (Adult)  Goal: Absence of Fall  Outcome: Ongoing (interventions implemented as appropriate)      Problem: Skin Injury Risk (Adult)  Goal: Skin Health and Integrity  Outcome: Ongoing (interventions implemented as appropriate)

## 2019-09-14 NOTE — THERAPY RE-EVALUATION
Patient Name: Chaitanya Browne  : 1923    MRN: [id number]                              Today's Date: 2019       Admit Date: 2019    Visit Dx:     ICD-10-CM ICD-9-CM   1. ABBY (acute kidney injury) (CMS/[facility]) N17.9 584.9   2. Hematuria, unspecified type R31.9 599.70   3. Bladder outlet obstruction N32.0 596.0   4. Elevated troponin R74.8 790.6   5. Impaired mobility and ADLs Z74.09 799.89   6. BPH (benign prostatic hyperplasia) N40.0 600.00     Patient Active Problem List   Diagnosis   • Facial basal cell cancer   • Hyperlipidemia LDL goal <100   • Essential hypertension   • ASHD (arteriosclerotic heart disease)   • Gait abnormality   • Impaired fasting glucose   • Polyp, colonic   • Vitamin D deficiency   • Low back pain at multiple sites   • Proteinuria   • Right carpal tunnel syndrome   • Obesity (BMI 30-39.9)   • Hematuria, unspecified   • Medicare annual wellness visit, subsequent   • BPH without urinary obstruction   • Edema   • Cough   • Morbid obesity due to excess calories (CMS/[facility])   • Asthma   • Allergic rhinitis   • Gross hematuria   • Acute UTI (urinary tract infection)   • Acute renal insufficiency   • Elevated troponin   • Bronchitis   • Acute urinary retention   • Possible pneumonia of left lower lobe due to infectious organism (CMS/[facility])   • Debility   • Dysphagia     Past Medical History:   Diagnosis Date   • Aortic valve sclerosis    • Basal cell carcinoma (BCC) of left side of nose    • BPH (benign prostatic hyperplasia)    • Cataracts, bilateral     bilateralcataract extraction and lens implantation    • Diastolic dysfunction    • High cholesterol    • History of disease     bilateral lacrimal gland dysfunction per Dr Fajardo   • Hypertension    • Infection     infected big toe; I and D DR Alvares    • Pneumonia      Past Surgical History:   Procedure Laterality Date   • APPENDECTOMY     • CATARACT EXTRACTION, BILATERAL      and lens implantation   • CYSTOSCOPY N/A  9/6/2019    Procedure: CYSTOSCOPY;  Surgeon: Ck Woods MD;  Location:  [facility];  Service: Urology   • CYSTOSCOPY TRANSURETHRAL RESECTION OF PROSTATE  1989   • CYSTOSCOPY TRANSURETHRAL RESECTION OF PROSTATE N/A 9/11/2019    Procedure: CYSTOSCOPY TRANSURETHRAL RESECTION OF PROSTATE;  Surgeon: Ck Woods MD;  Location:  [facility];  Service: Urology   • HEAD & NECK SKIN LESION EXCISIONAL BIOPSY      Basal cell removed from nose    • INCISION AND DRAINAGE FOOT  2013    infected big toe Dr Alvares   • TRANSURETHRAL RESECTION OF BLADDER TUMOR N/A 9/6/2019    Procedure: EVACUATION OF BLOOD CLOT, FULGERATION OF PROSTATE;  Surgeon: Ck Woods MD;  Location:  [facility];  Service: Urology     General Information     Row Name 09/14/19 1606          PT Evaluation Time/Intention    Document Type  re-evaluation  -SR     Mode of Treatment  physical therapy  -SR     Row Name 09/14/19 1606          General Information    Patient Profile Reviewed?  yes  -SR     Prior Level of Function  -- see eval for PLOF and environment  -SR     Row Name 09/14/19 1606          Cognitive Assessment/Intervention- PT/OT    Orientation Status (Cognition)  oriented x 3  -SR     Row Name 09/14/19 1606          Safety Issues, Functional Mobility    Safety Issues Affecting Function (Mobility)  ability to follow commands;insight into deficits/self awareness;judgment;safety precaution awareness;awareness of need for assistance  -SR     Impairments Affecting Function (Mobility)  balance;cognition;endurance/activity tolerance;strength  -SR       User Key  (r) = Recorded By, (t) = Taken By, (c) = Cosigned By    Initials Name Provider Type    SR Leslie Fajardo, PT Physical Therapist        Mobility     Row Name 09/14/19 1607          Bed Mobility Assessment/Treatment    Comment (Bed Mobility)  UIC  -SR     Row Name 09/14/19 1607          Transfer Assessment/Treatment    Comment (Transfers)  cues for hand placement and safety awareness  -SR      Row Name 09/14/19 1607          Bed-Chair Transfer    Bed-Chair Index (Transfers)  moderate assist (50% patient effort);2 person assist toilet transfer  -SR     Assistive Device (Bed-Chair Transfers)  walker, front-wheeled  -SR     Row Name 09/14/19 1607          Sit-Stand Transfer    Sit-Stand Index (Transfers)  minimum assist (75% patient effort);2 person assist  -SR     Assistive Device (Sit-Stand Transfers)  walker, front-wheeled  -SR     Row Name 09/14/19 1607          Gait/Stairs Assessment/Training    Gait/Stairs Assessment/Training  gait/ambulation assistive device  -SR     Index Level (Gait)  minimum assist (75% patient effort);2 person assist  -SR     Assistive Device (Gait)  walker, front-wheeled  -SR     Distance in Feet (Gait)  200  -SR     Pattern (Gait)  step-to  -SR     Deviations/Abnormal Patterns (Gait)  nida decreased;stride length decreased;gait speed decreased  -SR     Bilateral Gait Deviations  forward flexed posture  -SR       User Key  (r) = Recorded By, (t) = Taken By, (c) = Cosigned By    Initials Name Provider Type    SR Leslie Fajardo, PT Physical Therapist        Obj/Interventions     Row Name 09/14/19 1609          General ROM    GENERAL ROM COMMENTS  BLE AROM WFL  -SR     Row Name 09/14/19 1609          MMT (Manual Muscle Testing)    General MMT Comments  BLE grossly 4/5  -SR     Row Name 09/14/19 1609          Therapeutic Exercise    Upper Extremity (Therapeutic Exercise)  bicep curl, bilateral  -SR     Upper Extremity Range of Motion (Therapeutic Exercise)  shoulder flexion/extension, bilateral;shoulder abduction/adduction, bilateral  -SR     Lower Extremity (Therapeutic Exercise)  marching while seated;LAQ (long arc quad), bilateral  -SR     Lower Extremity Range of Motion (Therapeutic Exercise)  ankle dorsiflexion/plantar flexion, bilateral  -SR     Sets/Reps (Therapeutic Exercise)  10 x each   -SR     Comment (Therapeutic Exercise)  PT also educated pt  on stretches and pain relief mechanisms for carpal tunnel of R wrist per pt request.   -SR     Row Name 09/14/19 1609          Static Sitting Balance    Level of [place] (Unsupported Sitting, Static Balance)  contact guard assist  -SR     Sitting Position (Unsupported Sitting, Static Balance)  sitting in chair;other (see comments) toilet  -SR     Time Able to Maintain Position (Unsupported Sitting, Static Balance)  more than 5 minutes  -SR     Row Name 09/14/19 1609          Sensory Assessment/Intervention    Sensory General Assessment  no sensation deficits identified  -SR       User Key  (r) = Recorded By, (t) = Taken By, (c) = Cosigned By    Initials Name Provider Type    SR Leslie Fajardo, PT Physical Therapist        Goals/Plan     Row Name 09/14/19 1613          Bed Mobility Goal 1 (PT)    Activity/Assistive Device (Bed Mobility Goal 1, PT)  bed mobility activities, all  -SR     [place] Level/Cues Needed (Bed Mobility Goal 1, PT)  contact guard assist  -SR     Time Frame (Bed Mobility Goal 1, PT)  short term goal (STG);5 days  -SR     Progress/Outcomes (Bed Mobility Goal 1, PT)  goal ongoing  -SR     Row Name 09/14/19 1613          Transfer Goal 1 (PT)    Activity/Assistive Device (Transfer Goal 1, PT)  sit-to-stand/stand-to-sit;bed-to-chair/chair-to-bed;walker, rolling  -SR     [place] Level/Cues Needed (Transfer Goal 1, PT)  contact guard assist  -SR     Time Frame (Transfer Goal 1, PT)  short term goal (STG);5 days  -SR     Progress/Outcome (Transfer Goal 1, PT)  goal ongoing  -SR     Row Name 09/14/19 1613          Gait Training Goal 1 (PT)    Activity/Assistive Device (Gait Training Goal 1, PT)  gait (walking locomotion);assistive device use;walker, rolling  -SR     [place] Level (Gait Training Goal 1, PT)  contact guard assist  -SR     Distance (Gait Goal 1, PT)  300  -SR     Time Frame (Gait Training Goal 1, PT)  long term goal (LTG);10 days  -SR     Progress/Outcome (Gait Training  Goal 1, PT)  goal ongoing  -SR       User Key  (r) = Recorded By, (t) = Taken By, (c) = Cosigned By    Initials Name Provider Type    SR Leslie Fajardo, PT Physical Therapist        Clinical Impression     Row Name 09/14/19 1611          Pain Scale: Numbers Pre/Post-Treatment    Pain Scale: Numbers, Pretreatment  0/10 - no pain  -SR     Pain Scale: Numbers, Post-Treatment  0/10 - no pain  -SR     Pain Intervention(s)  Repositioned;Ambulation/increased activity  -SR     Row Name 09/14/19 1611          Plan of Care Review    Plan of Care Reviewed With  patient  -SR     Row Name 09/14/19 1611          Physical Therapy Clinical Impression    Patient/Family Goals Statement (PT Clinical Impression)  to get stronger, to be able to urinate  -SR     Criteria for Skilled Interventions Met (PT Clinical Impression)  yes;treatment indicated  -SR     Rehab Potential (PT Clinical Summary)  good, to achieve stated therapy goals  -SR     Row Name 09/14/19 1611          Vital Signs    Pre Systolic BP Rehab  -- VSS, RN cleared for tx  -SR     Row Name 09/14/19 1611          Positioning and Restraints    Pre-Treatment Position  sitting in chair/recliner  -SR     Post Treatment Position  chair  -SR     In Chair  notified nsg;sitting;reclined;call light within reach;encouraged to call for assist;exit alarm on;waffle cushion  -SR       User Key  (r) = Recorded By, (t) = Taken By, (c) = Cosigned By    Initials Name Provider Type    SR Leslie Fajardo, PT Physical Therapist        Outcome Measures     Row Name 09/14/19 1616          How much help from another person do you currently need...    Turning from your back to your side while in flat bed without using bedrails?  4  -SR     Moving from lying on back to sitting on the side of a flat bed without bedrails?  3  -SR     Moving to and from a bed to a chair (including a wheelchair)?  2  -SR     Standing up from a chair using your arms (e.g., wheelchair, bedside chair)?  3  -SR      Climbing 3-5 steps with a railing?  2  -SR     To walk in hospital room?  3  -SR     AM-PAC 6 Clicks Score (PT)  17  -SR     Row Name 09/14/19 1616          Functional Assessment    Outcome Measure Options  AM-PAC 6 Clicks Basic Mobility (PT)  -SR       User Key  (r) = Recorded By, (t) = Taken By, (c) = Cosigned By    Initials Name Provider Type    Leslie Mascorro PT Physical Therapist        Physical Therapy Education     Title: PT OT SLP Therapies (In Progress)     Topic: Physical Therapy (In Progress)     Point: Mobility training (In Progress)     Learning Progress Summary           Patient Acceptance, E,TB, NR by  at 9/14/2019  4:15 PM    Acceptance, E,D, VU,NR by ANNELISE at 9/10/2019  9:42 AM    Comment:  Reviewed HEP, gait mechanics, benefits of mobility    Acceptance, E,D, VU,DU by ESPERANZA at 9/9/2019 11:58 AM    Acceptance, E, NR by AS at 9/7/2019 10:56 AM    Augustiner E, VU by VALERIE at 9/5/2019  9:54 AM    Comment:  discussed plan of care and dc planning   Family Acceptance, E,D, VU,NR by ANNELISE at 9/10/2019  9:42 AM    Comment:  Reviewed HEP, gait mechanics, benefits of mobility    Acceptance, E,D, VU,DU by ESPERANZA at 9/9/2019 11:58 AM                   Point: Home exercise program (In Progress)     Learning Progress Summary           Patient Acceptance, E,TB, NR by  at 9/14/2019  4:15 PM    Acceptance, E,D, VU,NR by ANNELISE at 9/10/2019  9:42 AM    Comment:  Reviewed HEP, gait mechanics, benefits of mobility    Acceptance, E,D, VU,DU by ESPERANZA at 9/9/2019 11:58 AM    Acceptance, E, NR by AS at 9/7/2019 10:56 AM    Augustiner E, VU by VALERIE at 9/5/2019  9:54 AM    Comment:  discussed plan of care and dc planning   Family Acceptance, E,D, VU,NR by ANNELISE at 9/10/2019  9:42 AM    Comment:  Reviewed HEP, gait mechanics, benefits of mobility    Acceptance, E,D, VU,DU by ESPERANZA at 9/9/2019 11:58 AM                   Point: Body mechanics (In Progress)     Learning Progress Summary           Patient Acceptance, E,TB, NR by  at 9/14/2019  4:15 PM     Acceptance, E,D, VU,NR by ANNELISE at 9/10/2019  9:42 AM    Comment:  Reviewed HEP, gait mechanics, benefits of mobility    Acceptance, E,D, VU,DU by ESPERANZA at 9/9/2019 11:58 AM    Acceptance, E, NR by AS at 9/7/2019 10:56 AM    Eager, E, VU by VALERIE at 9/5/2019  9:54 AM    Comment:  discussed plan of care and dc planning   Family Acceptance, E,D, VU,NR by ANNELISE at 9/10/2019  9:42 AM    Comment:  Reviewed HEP, gait mechanics, benefits of mobility    Acceptance, E,D, VU,DU by AA at 9/9/2019 11:58 AM                   Point: Precautions (In Progress)     Learning Progress Summary           Patient Acceptance, E,TB, NR by  at 9/14/2019  4:15 PM    Acceptance, E,D, VU,NR by ANNELISE at 9/10/2019  9:42 AM    Comment:  Reviewed HEP, gait mechanics, benefits of mobility    Acceptance, E,D, VU,DU by ESPERANZA at 9/9/2019 11:58 AM    Acceptance, E, NR by AS at 9/7/2019 10:56 AM    Eager, E, VU by VALERIE at 9/5/2019  9:54 AM    Comment:  discussed plan of care and dc planning   Family Acceptance, E,D, VU,NR by ANNELISE at 9/10/2019  9:42 AM    Comment:  Reviewed HEP, gait mechanics, benefits of mobility    Acceptance, E,D, VU,DU by ESPERANZA at 9/9/2019 11:58 AM                               User Key     Initials Effective Dates Name Provider Type Discipline     06/19/15 -  Josselin Caro, PT Physical Therapist PT    SR 06/19/15 -  Leslie Fajardo, PT Physical Therapist PT    AS 06/22/15 -  Monie Fletcher, PTA Physical Therapy Assistant PT     04/03/18 -  Adolph Arenas, PT Physical Therapist PT     04/02/18 -  Aleah Lala, PT Physical Therapist PT              PT Recommendation and Plan  Planned Therapy Interventions (PT Eval): balance training, bed mobility training, gait training, home exercise program, patient/family education, strengthening, transfer training  Outcome Summary/Treatment Plan (PT)  Anticipated Discharge Disposition (PT): skilled nursing facility  Plan of Care Reviewed With: patient  Progress: improving  Outcome Summary: PT  re-eval completed. Pt needs mod A x 2 with tranfers using rw and min A x 2 with rw to ambulate x 200ft. Freq cues needed for safety awareness. Cont with IPPT, recommend SNF to improve independence and safety with mobility.      Time Calculation:   PT Charges     Row Name 09/14/19 1617             Time Calculation    Start Time  1400  -SR      PT Received On  09/14/19  -SR      PT Goal Re-Cert Due Date  09/24/19  -SR         Time Calculation- PT    Total Timed Code Minutes- PT  10 minute(s)  -SR        User Key  (r) = Recorded By, (t) = Taken By, (c) = Cosigned By    Initials Name Provider Type    SR Leslie Fajardo, PT Physical Therapist        Therapy Charges for Today     Code Description Service Date Service Provider Modifiers Qty    82386466966 HC PT THER PROC EA 15 MIN 9/14/2019 Leslie Fajardo, PT GP 1    42146367052 HC PT RE-EVAL ESTABLISHED PLAN 2 9/14/2019 Leslie Fajardo, PT GP 1    37997672964 HC PT THER SUPP EA 15 MIN 9/14/2019 Leslie Fajardo, PT GP 2          PT G-Codes  Outcome Measure Options: AM-PAC 6 Clicks Basic Mobility (PT)  AM-PAC 6 Clicks Score (PT): 17  AM-PAC 6 Clicks Score (OT): 19    Leslie Fajardo PT  9/14/2019

## 2019-09-14 NOTE — PLAN OF CARE
Problem: Patient Care Overview  Goal: Plan of Care Review  Outcome: Ongoing (interventions implemented as appropriate)   09/14/19 6209   Coping/Psychosocial   Plan of Care Reviewed With patient   Plan of Care Review   Progress improving   OTHER   Outcome Summary PT re-eval completed. Pt needs mod A x 2 with tranfers using rw and min A x 2 with rw to ambulate x 200ft. Freq cues needed for safety awareness. Cont with IPPT, recommend SNF to improve independence and safety with mobility.

## 2019-09-14 NOTE — PROGRESS NOTES
[facility]  PROGRESS NOTE    Patient Name: Chaitanya Browne  : 1923  MRN: [id number]    Date of Admission: 2019  Primary Care Physician: Dirk Russ MD    Subjective   Subjective     CC:  Follow-up hematuria and lower abdominal pain    HPI:  Abdominal pain resolved.  Patient is pleased the catheter is removed however he is nervous that he may have trouble voiding later today.  He continues to want to go to rehabilitation soon.  He denies other new complaints.      Review of Systems  No current fevers or chills  No current shortness of breath or cough  No current nausea, vomiting, or diarrhea  No current chest pain or palpitations    Objective   Objective     Vital Signs:   Temp:  [98.1 °F (36.7 °C)-99.4 °F (37.4 °C)] 98.1 °F (36.7 °C)  Heart Rate:  [] 109  Resp:  [15-20] 16  BP: (101-111)/(47-55) 106/51        Physical Exam:  Constitutional:Awake, alert  HENT: NCAT, mucous membranes moist, neck supple  Respiratory: Clear to auscultation bilaterally, respiratory effort normal, nonlabored breathing   Cardiovascular: RRR,  normal radial pulses  Gastrointestinal: Positive bowel sounds, soft, nontender, nondistended  Musculoskeletal: Elderly but normal musculature for age, no lower extremity edema, BMI 30  Psychiatric: Appropriate affect, cooperative, conversational  Neurologic: No slurred speech or facial droop, follows commands, not confused   Skin: No rashes or jaundice, warm    Results Reviewed:    Results from last 7 days   Lab Units 19  0821 19  0703 19  0712   WBC 10*3/mm3 11.48* 14.03* 14.83*   HEMOGLOBIN g/dL 7.8* 8.0* 7.6*   HEMATOCRIT % 26.1* 26.0* 24.5*   PLATELETS 10*3/mm3 293 354 356     Results from last 7 days   Lab Units 19  0712 19  0711 19  0558   SODIUM mmol/L 141 141 143   POTASSIUM mmol/L 4.8 4.2 4.3   CHLORIDE mmol/L 103 103 107   CO2 mmol/L 28.0 29.0 24.0   BUN mg/dL 19 14 20   CREATININE mg/dL  0.98 0.90 0.93   GLUCOSE mg/dL 134* 116* 121*   CALCIUM mg/dL 8.0* 8.4 8.3     Estimated Creatinine Clearance: 43.5 mL/min (by C-G formula based on SCr of 0.98 mg/dL).    Microbiology Results Abnormal     Procedure Component Value - Date/Time    Blood Culture - Blood, Arm, Right [[id number]] Collected:  09/02/19 2050    Lab Status:  Final result Specimen:  Blood from Arm, Right Updated:  09/07/19 2130     Blood Culture No growth at 5 days    Blood Culture - Blood, Arm, Right [[id number]] Collected:  09/02/19 2045    Lab Status:  Final result Specimen:  Blood from Arm, Right Updated:  09/07/19 2130     Blood Culture No growth at 5 days    Urine Culture - Urine, Urine, Catheter [[id number]]  (Normal) Collected:  09/03/19 0112    Lab Status:  Final result Specimen:  Urine, Catheter Updated:  09/04/19 1125     Urine Culture No growth          Imaging Results (last 24 hours)     ** No results found for the last 24 hours. **          Results for orders placed during the hospital encounter of 09/02/19   Adult Transthoracic Echo Complete W/ Cont if Necessary Per Protocol    Narrative · Mild-to-moderate mitral valve regurgitation is present.  · Estimated EF = 72%.  · Left ventricular systolic function is hyperdynamic (EF > 70).  · Left ventricular diastolic dysfunction (grade I) consistent with   impaired relaxation.  · Left ventricular wall thickness is consistent with mild concentric   hypertrophy.  · Left atrial cavity size is borderline dilated.  · Normal right ventricular cavity size, wall thickness, systolic function   and septal motion noted.  · Mild mitral valve stenosis is present with functional MAC.  · The aortic valve exhibits moderate sclerosis without stenosis.  · No evidence of pulmonary hypertension is present.  · There is no evidence of pericardial effusion.          I have reviewed the medications:  Scheduled Meds:    atorvastatin 10 mg Oral Daily   bisacodyl 10 mg Rectal Once   ceftriaxone 1 g Intravenous  "Q24H   docusate sodium 100 mg Oral BID   doxycycline 100 mg Intravenous Q12H   finasteride 5 mg Oral Daily   guaiFENesin 200 mg Oral 4x Daily   ipratropium-albuterol 3 mL Nebulization 4x Daily - RT   lactobacillus acidophilus 1 capsule Oral Daily   sodium chloride 10 mL Intravenous Q12H     Continuous Infusions:   PRN Meds:.•  acetaminophen  •  lidocaine  •  melatonin  •  Menthol (Topical Analgesic)  •  ondansetron **OR** ondansetron  •  polyethylene glycol  •  [COMPLETED] Insert peripheral IV **AND** sodium chloride  •  sodium chloride  •  temazepam      Assessment/Plan   Assessment / Plan     Active Hospital Problems    Diagnosis  POA   • **Gross hematuria [R31.0]  Yes   • Dysphagia [R13.10]  Yes   • Acute urinary retention [R33.8]  Yes   • Possible pneumonia of left lower lobe due to infectious organism (CMS/HCC) [J18.1]  Yes   • Debility [R53.81]  Yes   • Bronchitis [J40]  Yes   • Acute UTI (urinary tract infection) [N39.0]  Yes   • Acute renal insufficiency [N28.9]  Yes   • Elevated troponin [R74.8]  Yes   • BPH without urinary obstruction [N40.0]  Yes   • Asthma [J45.909]  Yes   • Essential hypertension [I10]  Yes   • Hyperlipidemia LDL goal <100 [E78.5]  Yes      Resolved Hospital Problems   No resolved problems to display.        Brief Hospital Course to date:  Chaitanya Browne is a 89 y.o. male \"with history of hypertension, hyperlipidemia, but remains very functional and active at his age.  Presented to emergency room with complaints of gross hematuria.  Found to have some acute renal insufficiency and clot in his bladder.  Continuous irrigation started and admitted to hospitalist service.\"  Patient has completed cystoscopy with evacuation of clot fulguration of prostate on 9/6 and TURP for BPH with obstruction and hematuria on 9/11.  He has been treated with ceftriaxone for possible urinary tract infection and possible prostatitis.  He also has chest x-ray for earlier dyspnea that showed bilateral possible " infiltrate with effusion most notable on the right and CT scan was concerning for possible early pneumonia.    Gross hematuria with BPH and urinary obstruction:  Status post urology procedures as per above.  Required continuous bladder irrigation.  Postoperative management as per urology.  Hematuria now resolved.    Urinary tract infection and possible prostatitis:  Continue ceftriaxone.  May require prolonged antibiotic course with possible prostatitis and prostate surgery.  Abdominal pain symptoms have improved.    Bronchitis with possible early right lower lobe pneumonia:  Continue ceftriaxone as per above.  Imaging findings of possible pneumonia noted on initial CT of abdomen on 9/3.  Repeat x-ray on 9/12 also concerning for left lower lobe pneumonia with infiltrate present.  Breathing improved.  Doxycycline added on 9/13 for broader coverage    Anemia, acute blood loss present on admission due to hematuria:  Continue to trend blood counts.  Likely loss is from hematuria and surgical.  May need transfusion soon if continues.    Constipation: Continue bowel regimen.  Better.    Insomnia: Continue current medication.  Patient reports improvement.    Tachycardia with ambulation: Likely related to anemia and deconditioning.    Elevated troponin without chest pain: Felt to be supply demand mismatch due to anemia and acute illness.  Denies current complaints.  Stable.    Debility: PT OT.  Skilled rehab when possible.    Torres catheter removed.  Continue IV antibiotics.  Leukocytosis improved now with the addition of doxycycline.  Plan to follow-up voiding trial following catheter removal.  Laboratory studies reviewed today.  Possible discharge soon.    DVT Prophylaxis: Mechanical in the setting of bleeding    Disposition: I expect the patient to be discharged to skilled facility once cleared by urology    CODE STATUS:   Code Status and Medical Interventions:   Ordered at: 09/03/19 0248     Level Of Support Discussed  With:    Patient     Code Status:    CPR     Medical Interventions (Level of Support Prior to Arrest):    Full         Electronically signed by Dirk Cortes MD, 09/14/19, 9:57 AM.

## 2019-09-14 NOTE — PROGRESS NOTES
Torres catheter was removed earlier today.  Patient was unable to void significantly.  Initially he was without suprapubic discomfort.  After several hours of not voiding he had over 600 mL per bladder scan.  He had attempted to void several times unsuccessfully and was developing some discomfort.  An 18 [place] coudé tip Torres catheter was placed and approximately 700 cc of old blood-tinged urine was obtained.    He is afebrile  Hemoglobin was 7.9    Status post TUR P continue Torres catheter for now.

## 2019-09-15 PROBLEM — N13.8 BPH WITH OBSTRUCTION/LOWER URINARY TRACT SYMPTOMS: Status: ACTIVE | Noted: 2019-09-15

## 2019-09-15 PROBLEM — N40.1 BPH WITH OBSTRUCTION/LOWER URINARY TRACT SYMPTOMS: Status: ACTIVE | Noted: 2019-09-15

## 2019-09-15 LAB
DEPRECATED RDW RBC AUTO: 47.2 FL (ref 37–54)
ERYTHROCYTE [DISTWIDTH] IN BLOOD BY AUTOMATED COUNT: 13.2 % (ref 12.3–15.4)
HCT VFR BLD AUTO: 25.8 % (ref 37.5–51)
HGB BLD-MCNC: 7.9 G/DL (ref 13–17.7)
MCH RBC QN AUTO: 30.2 PG (ref 26.6–33)
MCHC RBC AUTO-ENTMCNC: 30.6 G/DL (ref 31.5–35.7)
MCV RBC AUTO: 98.5 FL (ref 79–97)
PLATELET # BLD AUTO: 291 10*3/MM3 (ref 140–450)
PMV BLD AUTO: 9.5 FL (ref 6–12)
RBC # BLD AUTO: 2.62 10*6/MM3 (ref 4.14–5.8)
WBC NRBC COR # BLD: 11.05 10*3/MM3 (ref 3.4–10.8)

## 2019-09-15 PROCEDURE — 94799 UNLISTED PULMONARY SVC/PX: CPT

## 2019-09-15 PROCEDURE — 99232 SBSQ HOSP IP/OBS MODERATE 35: CPT | Performed by: INTERNAL MEDICINE

## 2019-09-15 PROCEDURE — 85027 COMPLETE CBC AUTOMATED: CPT | Performed by: INTERNAL MEDICINE

## 2019-09-15 PROCEDURE — 25010000002 CEFTRIAXONE PER 250 MG: Performed by: INTERNAL MEDICINE

## 2019-09-15 RX ORDER — ECHINACEA PURPUREA EXTRACT 125 MG
1 TABLET ORAL AS NEEDED
Status: DISCONTINUED | OUTPATIENT
Start: 2019-09-15 | End: 2019-09-16 | Stop reason: HOSPADM

## 2019-09-15 RX ORDER — FLUTICASONE PROPIONATE 50 MCG
2 SPRAY, SUSPENSION (ML) NASAL DAILY
Status: DISCONTINUED | OUTPATIENT
Start: 2019-09-15 | End: 2019-09-15

## 2019-09-15 RX ORDER — FLUTICASONE PROPIONATE 50 MCG
1 SPRAY, SUSPENSION (ML) NASAL DAILY
Status: DISCONTINUED | OUTPATIENT
Start: 2019-09-15 | End: 2019-09-16 | Stop reason: HOSPADM

## 2019-09-15 RX ORDER — DOXYCYCLINE 100 MG/1
100 CAPSULE ORAL EVERY 12 HOURS SCHEDULED
Status: DISCONTINUED | OUTPATIENT
Start: 2019-09-15 | End: 2019-09-16 | Stop reason: HOSPADM

## 2019-09-15 RX ADMIN — CEFTRIAXONE 1 G: 1 INJECTION, POWDER, FOR SOLUTION INTRAMUSCULAR; INTRAVENOUS at 09:57

## 2019-09-15 RX ADMIN — GUAIFENESIN 200 MG: 100 SOLUTION ORAL at 13:17

## 2019-09-15 RX ADMIN — IPRATROPIUM BROMIDE AND ALBUTEROL SULFATE 3 ML: 2.5; .5 SOLUTION RESPIRATORY (INHALATION) at 15:51

## 2019-09-15 RX ADMIN — Medication 1 CAPSULE: at 09:57

## 2019-09-15 RX ADMIN — GUAIFENESIN 200 MG: 100 SOLUTION ORAL at 17:31

## 2019-09-15 RX ADMIN — DOXYCYCLINE 100 MG: 100 CAPSULE ORAL at 09:57

## 2019-09-15 RX ADMIN — MELATONIN TAB 5 MG 5 MG: 5 TAB at 21:10

## 2019-09-15 RX ADMIN — DOXYCYCLINE 100 MG: 100 CAPSULE ORAL at 21:10

## 2019-09-15 RX ADMIN — ATORVASTATIN CALCIUM 10 MG: 10 TABLET, FILM COATED ORAL at 09:57

## 2019-09-15 RX ADMIN — DOCUSATE SODIUM 100 MG: 100 CAPSULE, LIQUID FILLED ORAL at 09:57

## 2019-09-15 RX ADMIN — IPRATROPIUM BROMIDE AND ALBUTEROL SULFATE 3 ML: 2.5; .5 SOLUTION RESPIRATORY (INHALATION) at 06:35

## 2019-09-15 RX ADMIN — IPRATROPIUM BROMIDE AND ALBUTEROL SULFATE 3 ML: 2.5; .5 SOLUTION RESPIRATORY (INHALATION) at 11:45

## 2019-09-15 RX ADMIN — TEMAZEPAM 15 MG: 15 CAPSULE ORAL at 21:10

## 2019-09-15 RX ADMIN — FLUTICASONE PROPIONATE 1 SPRAY: 50 SPRAY, METERED NASAL at 10:08

## 2019-09-15 RX ADMIN — FINASTERIDE 5 MG: 5 TABLET, FILM COATED ORAL at 09:57

## 2019-09-15 RX ADMIN — GUAIFENESIN 200 MG: 100 SOLUTION ORAL at 09:57

## 2019-09-15 RX ADMIN — DOCUSATE SODIUM 100 MG: 100 CAPSULE, LIQUID FILLED ORAL at 21:10

## 2019-09-15 RX ADMIN — GUAIFENESIN 200 MG: 100 SOLUTION ORAL at 21:10

## 2019-09-15 NOTE — PLAN OF CARE
Problem: Patient Care Overview  Goal: Plan of Care Review  Outcome: Ongoing (interventions implemented as appropriate)   09/15/19 2660   Plan of Care Review   Progress improving   OTHER   Outcome Summary VSS, Pt ambulates with assist x1 and walker with minimal assist. F/C contines to drain bloody urine, no clots, patent and draining. Scrotum edamatous. Hard of hearing despite bilateral hearing aids.        Problem: Fall Risk (Adult)  Goal: Absence of Fall  Outcome: Ongoing (interventions implemented as appropriate)      Problem: Skin Injury Risk (Adult)  Goal: Skin Health and Integrity  Outcome: Ongoing (interventions implemented as appropriate)

## 2019-09-15 NOTE — PROGRESS NOTES
Patient is comfortable.  He has had no catheter obstruction.  He is catheter tubing has cleared with increase p.o. fluid consumption.  Impression urinary retention following drainage with resection of the prostate.  Continue catheter drainage for now.  Dr. Woods will see tomorrow to discuss further options and plan.

## 2019-09-15 NOTE — PLAN OF CARE
Problem: Patient Care Overview  Goal: Plan of Care Review  Outcome: Ongoing (interventions implemented as appropriate)   09/15/19 7970   Coping/Psychosocial   Plan of Care Reviewed With patient   Plan of Care Review   Progress improving   OTHER   Outcome Summary Patient doing well this shift. Ambulates with assist x1 and walker. 3L nasal cannula in place, humidification added. Nasal spray also ordered per pt request. Torres in place, urine is pink but clearing up. No significant clots noted. Scrotal edema noted. Will continue to monitor.

## 2019-09-15 NOTE — PROGRESS NOTES
[facility] Medicine Services  PROGRESS NOTE    Patient Name: Chaitanya Browne  : 1923  MRN: [id number]    Date of Admission: 2019  Primary Care Physician: Dirk Russ MD    Subjective   Subjective     CC:  Follow-up hematuria and lower abdominal pain    HPI:  Denies abdominal pain.  States he was disappointed that he was unable to urinate without catheter yesterday.  Currently tolerating catheter well.  Reports his appetite has been a little poor recently.  States his daughter will help him feed later today.  Still eager to go to rehabilitation soon.  Requesting Flonase.     Review of Systems  No current fevers or chills  No current shortness of breath or cough  No current nausea, vomiting, or diarrhea  No current chest pain or palpitations    Objective   Objective     Vital Signs:   Temp:  [97.9 °F (36.6 °C)-98.2 °F (36.8 °C)] 98.1 °F (36.7 °C)  Heart Rate:  [] 99  Resp:  [16-20] 18  BP: (111-124)/(53-66) 111/61        Physical Exam:  Constitutional:Awake, alert  HENT: Poor hearing noted, NCAT, mucous membranes moist, neck supple  Respiratory: Clear to auscultation bilaterally, respiratory effort normal, nonlabored breathing   Cardiovascular: RRR,  normal radial pulses  Gastrointestinal: Positive bowel sounds, soft, nontender, nondistended  Musculoskeletal: Elderly but normal musculature for age, no lower extremity edema, BMI 30  Psychiatric: Appropriate affect, cooperative, conversational  Neurologic: No slurred speech or facial droop, follows commands, oriented  Skin: No rashes or jaundice, warm    Results Reviewed:    Results from last 7 days   Lab Units 19  0821 19  0703 19  0712   WBC 10*3/mm3 11.48* 14.03* 14.83*   HEMOGLOBIN g/dL 7.8* 8.0* 7.6*   HEMATOCRIT % 26.1* 26.0* 24.5*   PLATELETS 10*3/mm3 293 354 356     Results from last 7 days   Lab Units 19  0712 19  0711 19  0558   SODIUM mmol/L 141 141 143   POTASSIUM mmol/L  4.8 4.2 4.3   CHLORIDE mmol/L 103 103 107   CO2 mmol/L 28.0 29.0 24.0   BUN mg/dL 19 14 20   CREATININE mg/dL 0.98 0.90 0.93   GLUCOSE mg/dL 134* 116* 121*   CALCIUM mg/dL 8.0* 8.4 8.3     Estimated Creatinine Clearance: 43.5 mL/min (by C-G formula based on SCr of 0.98 mg/dL).    Microbiology Results Abnormal     Procedure Component Value - Date/Time    Blood Culture - Blood, Arm, Right [[id number]] Collected:  09/02/19 2050    Lab Status:  Final result Specimen:  Blood from Arm, Right Updated:  09/07/19 2130     Blood Culture No growth at 5 days    Blood Culture - Blood, Arm, Right [[id number]] Collected:  09/02/19 2045    Lab Status:  Final result Specimen:  Blood from Arm, Right Updated:  09/07/19 2130     Blood Culture No growth at 5 days    Urine Culture - Urine, Urine, Catheter [[id number]]  (Normal) Collected:  09/03/19 0112    Lab Status:  Final result Specimen:  Urine, Catheter Updated:  09/04/19 1125     Urine Culture No growth          Imaging Results (last 24 hours)     Procedure Component Value Units Date/Time    XR Chest PA & Lateral [[id number]] Collected:  09/12/19 1818     Updated:  09/14/19 0958    Narrative:          EXAMINATION: XR CHEST, PA AND LATERAL - 09/12/2019     INDICATION:  N17.9-Acute kidney failure, unspecified; R31.9-Hematuria,  unspecified; N32.0-Bladder-neck obstruction; R74.8-Abnormal levels of  other serum enzymes; Z74.09-Other reduced mobility; N40.0-Benign  prostatic hyperplasia without lower urinary tract symptoms.      COMPARISON: 09/09/2019.     FINDINGS: Elevated right hemidiaphragm and densely calcified left lung  nodule. Left hilar lymph nodes are again noted, present back to at least  2012. Lung volumes are decreased overall from the prior study, which may  account for mild left basilar discoid atelectasis. No new pulmonary  parenchymal disease is seen elsewhere. No effusion or pneumothorax is  seen.       Impression:       1. Mildly increased patchy disease in the left lung  base, perhaps  atelectasis due to lower lung volumes. If pneumonia is present, it is  very limited in extent.  2. 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     DICTATED:   09/12/2019  EDITED/ls :   09/12/2019      This report was finalized on 9/14/2019 9:55 AM by DR. Bryce Pedroza MD.             Results for orders placed during the hospital encounter of 09/02/19   Adult Transthoracic Echo Complete W/ Cont if Necessary Per Protocol    Narrative · Mild-to-moderate mitral valve regurgitation is present.  · Estimated EF = 72%.  · Left ventricular systolic function is hyperdynamic (EF > 70).  · Left ventricular diastolic dysfunction (grade I) consistent with   impaired relaxation.  · Left ventricular wall thickness is consistent with mild concentric   hypertrophy.  · Left atrial cavity size is borderline dilated.  · Normal right ventricular cavity size, wall thickness, systolic function   and septal motion noted.  · Mild mitral valve stenosis is present with functional MAC.  · The aortic valve exhibits moderate sclerosis without stenosis.  · No evidence of pulmonary hypertension is present.  · There is no evidence of pericardial effusion.          I have reviewed the medications:  Scheduled Meds:    atorvastatin 10 mg Oral Daily   bisacodyl 10 mg Rectal Once   ceftriaxone 1 g Intravenous Q24H   docusate sodium 100 mg Oral BID   doxycycline 100 mg Oral Q12H   finasteride 5 mg Oral Daily   guaiFENesin 200 mg Oral 4x Daily   ipratropium-albuterol 3 mL Nebulization 4x Daily - RT   lactobacillus acidophilus 1 capsule Oral Daily   sodium chloride 10 mL Intravenous Q12H     Continuous Infusions:   PRN Meds:.•  acetaminophen  •  lidocaine  •  lidocaine  •  melatonin  •  Menthol (Topical Analgesic)  •  ondansetron **OR** ondansetron  •  polyethylene glycol  •  [COMPLETED] Insert peripheral IV **AND** sodium chloride  •  sodium chloride  •  temazepam      Assessment/Plan   Assessment /  "Plan     Active Hospital Problems    Diagnosis  POA   • **Gross hematuria [R31.0]  Yes   • BPH with obstruction/lower urinary tract symptoms [N40.1, N13.8]  Yes   • Dysphagia [R13.10]  Yes   • Acute urinary retention [R33.8]  Yes   • Possible pneumonia of left lower lobe due to infectious organism (CMS/HCC) [J18.1]  Yes   • Debility [R53.81]  Yes   • Bronchitis [J40]  Yes   • Acute UTI (urinary tract infection) [N39.0]  Yes   • Acute renal insufficiency [N28.9]  Yes   • Elevated troponin [R74.8]  Yes   • Asthma [J45.909]  Yes   • Essential hypertension [I10]  Yes   • Hyperlipidemia LDL goal <100 [E78.5]  Yes      Resolved Hospital Problems   No resolved problems to display.        Brief Hospital Course to date:  Chaitanya Browne is a 89 y.o. male \"with history of hypertension, hyperlipidemia, but remains very functional and active at his age.  Presented to emergency room with complaints of gross hematuria.  Found to have some acute renal insufficiency and clot in his bladder.  Continuous irrigation started and admitted to hospitalist service.\"  Patient has completed cystoscopy with evacuation of clot fulguration of prostate on 9/6 and TURP for BPH with obstruction and hematuria on 9/11.  He has been treated with ceftriaxone for possible urinary tract infection and possible prostatitis.  He also has chest x-ray for earlier dyspnea that showed bilateral possible infiltrate with effusion most notable on the right and CT scan was concerning for possible early pneumonia.    Gross hematuria with BPH and urinary obstruction:  Status post urology procedures as per above.  Required continuous bladder irrigation which is now been discontinued  Postoperative management as per urology.  Voiding trial on 9/14 was unsuccessful.  Torres catheter replaced.    Urinary tract infection and possible prostatitis:  Continue ceftriaxone.  May require prolonged antibiotic course with possible prostatitis and prostate surgery.  Abdominal pain " symptoms have improved.    Bronchitis with possible early right lower lobe pneumonia:  Continue ceftriaxone as per above.  Imaging findings of possible pneumonia noted on initial CT of abdomen on 9/3.  Repeat x-ray on 9/12 also concerning for left lower lobe pneumonia with infiltrate present.  Breathing improved.  Doxycycline added on 9/13 for broader coverage    Anemia, acute blood loss present on admission due to hematuria:  Continue to trend blood counts.  Likely loss is from hematuria and surgical.  May need transfusion soon if continues.    Constipation: Continue bowel regimen.  Better.    Insomnia: Continue current medication.  Patient reports improvement.    Tachycardia with ambulation: Likely related to anemia and deconditioning.    Elevated troponin without chest pain: Felt to be supply demand mismatch due to anemia and acute illness.  Denies current complaints.  Stable.    Debility: PT OT.  Skilled rehab when possible.    Voiding trial unsuccessful.  Torres catheter replaced and some hematuria noted without clots currently.  Plan to repeat CBC today.  Continue doxycycline and ceftriaxone for now.  Possible discharge soon to skilled facility.  Nasal spray and Flonase added for nasal congestion today.    DVT Prophylaxis: Mechanical in the setting of bleeding    Disposition: I expect the patient to be discharged to skilled facility once cleared by urology    CODE STATUS:   Code Status and Medical Interventions:   Ordered at: 09/03/19 0248     Level Of Support Discussed With:    Patient     Code Status:    CPR     Medical Interventions (Level of Support Prior to Arrest):    Full         Electronically signed by Dirk Cortes MD, 09/15/19, 9:19 AM.

## 2019-09-16 VITALS
OXYGEN SATURATION: 96 % | WEIGHT: 180.7 LBS | DIASTOLIC BLOOD PRESSURE: 46 MMHG | HEIGHT: 65 IN | HEART RATE: 94 BPM | SYSTOLIC BLOOD PRESSURE: 119 MMHG | BODY MASS INDEX: 30.1 KG/M2 | TEMPERATURE: 98 F | RESPIRATION RATE: 16 BRPM

## 2019-09-16 PROBLEM — N41.9 PROSTATITIS: Status: ACTIVE | Noted: 2019-09-16

## 2019-09-16 LAB
ANION GAP SERPL CALCULATED.3IONS-SCNC: 11 MMOL/L (ref 5–15)
BUN BLD-MCNC: 14 MG/DL (ref 8–23)
BUN/CREAT SERPL: 17.5 (ref 7–25)
CALCIUM SPEC-SCNC: 8.6 MG/DL (ref 8.2–9.6)
CHLORIDE SERPL-SCNC: 102 MMOL/L (ref 98–107)
CO2 SERPL-SCNC: 27 MMOL/L (ref 22–29)
CREAT BLD-MCNC: 0.8 MG/DL (ref 0.76–1.27)
CYTO UR: NORMAL
DEPRECATED RDW RBC AUTO: 51.1 FL (ref 37–54)
ERYTHROCYTE [DISTWIDTH] IN BLOOD BY AUTOMATED COUNT: 13.2 % (ref 12.3–15.4)
GFR SERPL CREATININE-BSD FRML MDRD: 109 ML/MIN/1.73
GFR SERPL CREATININE-BSD FRML MDRD: 90 ML/MIN/1.73
GLUCOSE BLD-MCNC: 161 MG/DL (ref 65–99)
HCT VFR BLD AUTO: 30.7 % (ref 37.5–51)
HGB BLD-MCNC: 8.6 G/DL (ref 13–17.7)
LAB AP CASE REPORT: NORMAL
LAB AP CLINICAL INFORMATION: NORMAL
MCH RBC QN AUTO: 29.8 PG (ref 26.6–33)
MCHC RBC AUTO-ENTMCNC: 28 G/DL (ref 31.5–35.7)
MCV RBC AUTO: 106.2 FL (ref 79–97)
PATH REPORT.FINAL DX SPEC: NORMAL
PATH REPORT.GROSS SPEC: NORMAL
PLATELET # BLD AUTO: 287 10*3/MM3 (ref 140–450)
PMV BLD AUTO: 9.7 FL (ref 6–12)
POTASSIUM BLD-SCNC: 4.4 MMOL/L (ref 3.5–5.2)
RBC # BLD AUTO: 2.89 10*6/MM3 (ref 4.14–5.8)
SODIUM BLD-SCNC: 140 MMOL/L (ref 136–145)
WBC NRBC COR # BLD: 9.92 10*3/MM3 (ref 3.4–10.8)

## 2019-09-16 PROCEDURE — 92526 ORAL FUNCTION THERAPY: CPT

## 2019-09-16 PROCEDURE — 85027 COMPLETE CBC AUTOMATED: CPT | Performed by: INTERNAL MEDICINE

## 2019-09-16 PROCEDURE — 97116 GAIT TRAINING THERAPY: CPT

## 2019-09-16 PROCEDURE — 80048 BASIC METABOLIC PNL TOTAL CA: CPT | Performed by: INTERNAL MEDICINE

## 2019-09-16 PROCEDURE — 97530 THERAPEUTIC ACTIVITIES: CPT

## 2019-09-16 PROCEDURE — 25010000002 CEFTRIAXONE PER 250 MG: Performed by: INTERNAL MEDICINE

## 2019-09-16 PROCEDURE — 99239 HOSP IP/OBS DSCHRG MGMT >30: CPT | Performed by: INTERNAL MEDICINE

## 2019-09-16 RX ORDER — TAMSULOSIN HYDROCHLORIDE 0.4 MG/1
0.4 CAPSULE ORAL DAILY
Status: DISCONTINUED | OUTPATIENT
Start: 2019-09-16 | End: 2019-09-16 | Stop reason: HOSPADM

## 2019-09-16 RX ORDER — FINASTERIDE 5 MG/1
5 TABLET, FILM COATED ORAL DAILY
Start: 2019-09-17 | End: 2022-02-22 | Stop reason: SDUPTHER

## 2019-09-16 RX ORDER — PSEUDOEPHEDRINE HCL 30 MG
100 TABLET ORAL 2 TIMES DAILY
Start: 2019-09-16 | End: 2020-02-05 | Stop reason: ALTCHOICE

## 2019-09-16 RX ORDER — ACETAMINOPHEN 325 MG/1
650 TABLET ORAL EVERY 6 HOURS PRN
Start: 2019-09-16 | End: 2019-11-25

## 2019-09-16 RX ORDER — CEFPODOXIME PROXETIL 200 MG/1
200 TABLET, FILM COATED ORAL EVERY 12 HOURS
Start: 2019-09-16 | End: 2019-09-30

## 2019-09-16 RX ORDER — L.ACID,PARA/B.BIFIDUM/S.THERM 8B CELL
1 CAPSULE ORAL DAILY
Start: 2019-09-17 | End: 2019-11-01

## 2019-09-16 RX ORDER — IPRATROPIUM BROMIDE AND ALBUTEROL SULFATE 2.5; .5 MG/3ML; MG/3ML
3 SOLUTION RESPIRATORY (INHALATION)
Qty: 360 ML
Start: 2019-09-16 | End: 2019-10-09

## 2019-09-16 RX ORDER — CHOLECALCIFEROL (VITAMIN D3) 125 MCG
5 CAPSULE ORAL NIGHTLY PRN
Start: 2019-09-16 | End: 2019-11-01

## 2019-09-16 RX ORDER — TAMSULOSIN HYDROCHLORIDE 0.4 MG/1
0.4 CAPSULE ORAL DAILY
Qty: 30 CAPSULE
Start: 2019-09-17 | End: 2019-10-07

## 2019-09-16 RX ORDER — ONDANSETRON 4 MG/1
4 TABLET, FILM COATED ORAL EVERY 6 HOURS PRN
Start: 2019-09-16 | End: 2019-11-01 | Stop reason: SDUPTHER

## 2019-09-16 RX ADMIN — DOXYCYCLINE 100 MG: 100 CAPSULE ORAL at 08:32

## 2019-09-16 RX ADMIN — SODIUM CHLORIDE, PRESERVATIVE FREE 10 ML: 5 INJECTION INTRAVENOUS at 08:33

## 2019-09-16 RX ADMIN — CEFTRIAXONE 1 G: 1 INJECTION, POWDER, FOR SOLUTION INTRAMUSCULAR; INTRAVENOUS at 08:45

## 2019-09-16 RX ADMIN — Medication 1 CAPSULE: at 08:32

## 2019-09-16 RX ADMIN — GUAIFENESIN 200 MG: 100 SOLUTION ORAL at 08:32

## 2019-09-16 RX ADMIN — FINASTERIDE 5 MG: 5 TABLET, FILM COATED ORAL at 08:34

## 2019-09-16 RX ADMIN — TAMSULOSIN HYDROCHLORIDE 0.4 MG: 0.4 CAPSULE ORAL at 12:03

## 2019-09-16 RX ADMIN — ATORVASTATIN CALCIUM 10 MG: 10 TABLET, FILM COATED ORAL at 08:33

## 2019-09-16 RX ADMIN — DOCUSATE SODIUM 100 MG: 100 CAPSULE, LIQUID FILLED ORAL at 08:32

## 2019-09-16 RX ADMIN — FLUTICASONE PROPIONATE 1 SPRAY: 50 SPRAY, METERED NASAL at 08:32

## 2019-09-16 NOTE — DISCHARGE SUMMARY
"    [facility]  DISCHARGE SUMMARY    Patient Name: Chaitanya Browne  : 1923  MRN: [id number]    Date of Admission: 2019  Date of Discharge:  2019  Primary Care Physician: Dirk Russ MD    Consults     Date and Time Order Name Status Description    9/3/2019 0359 Inpatient Urology Consult            Hospital Course     Presenting Problem:   Gross hematuria [R31.0]    Active Hospital Problems    Diagnosis  POA   • **Gross hematuria [R31.0]  Yes   • Prostatitis [N41.9]  Yes   • BPH with obstruction/lower urinary tract symptoms [N40.1, N13.8]  Yes   • Dysphagia [R13.10]  Yes   • Acute urinary retention [R33.8]  Yes   • Possible pneumonia of left lower lobe due to infectious organism (CMS/HCC) [J18.1]  Yes   • Debility [R53.81]  Yes   • Bronchitis [J40]  Yes   • Acute UTI (urinary tract infection) [N39.0]  Yes   • Acute renal insufficiency [N28.9]  Yes   • Elevated troponin [R74.8]  Yes   • Asthma [J45.909]  Yes   • Essential hypertension [I10]  Yes   • Hyperlipidemia LDL goal <100 [E78.5]  Yes      Resolved Hospital Problems   No resolved problems to display.          Hospital Course:  Chaitanya Browne is a 89 y.o. male \"with history of hypertension, hyperlipidemia, but remains very functional and active at his age.  Presented to emergency room with complaints of gross hematuria.  Found to have some acute renal insufficiency and clot in his bladder.  Continuous irrigation started and admitted to hospitalist service.\"  Patient has completed cystoscopy with evacuation of clot fulguration of prostate on  and TURP for BPH with obstruction and hematuria on .  He has been treated with ceftriaxone for possible urinary tract infection and possible prostatitis.  He also has chest x-ray for earlier dyspnea that showed bilateral possible infiltrate with effusion most notable on the right and CT scan was concerning for possible early pneumonia.     Gross " hematuria with BPH and urinary obstruction:  Status post urology procedures as per above.  Required continuous bladder irrigation which is now been discontinued  Postoperative management as per urology.  Voiding trial on 9/14 was unsuccessful.  Torres catheter with coudé tip replaced.  [facility]rology, Dr Woods, recommends to continue Torres catheter at transfer.  He has been in contact with patient's daughter and has asked the patient's daughter notify him of patient's clinical update in 1 week.  He is considering voiding trial at the skilled facility that he will coordinate per my conversation.  Alternatively if patient and his daughter prefer they may follow-up in his office in 1 week.     Urinary tract infection and possible prostatitis:  Continued ceftriaxone and plan to transition to oral Vantin 200 mg twice daily for 14 more days recommended by urology.  [place] to require prolonged antibiotic course with possible prostatitis and prostate surgery.  Abdominal pain symptoms have improved with further antibiotic therapy.     Bronchitis with possible early right lower lobe pneumonia:  Continued ceftriaxone with transition to oral Vantin at discharge.  Imaging findings of possible pneumonia noted on initial CT of abdomen on 9/3.  Repeat x-ray on 9/12 also concerning for left lower lobe pneumonia with infiltrate present.  Breathing improved.  Doxycycline added short-term for broader coverage of possible pneumonia and is felt to be completed at discharge and he will continue on antibiotic monotherapy.  respiratory status now greatly improved.     Anemia, acute blood loss present on admission due to hematuria:  Continue to trend blood counts.  Likely loss is from hematuria and surgical.    Blood counts most recently stable and bleeding has stopped.     Constipation: Continue bowel regimen.  Better.     Insomnia: Continue current medication.  Patient reports improvement with melatonin.     Tachycardia with  ambulation: Likely related to anemia and deconditioning.  Improving     Elevated troponin without chest pain: Felt to be supply demand mismatch due to anemia and acute illness.  Denies current complaints.  Stable.  Continue statin.    Debility: PT OT.  Skilled rehab when possible.     Flomax added today and discussed with urology who agreed.  Continue Proscar as well..  Case discussed over the phone with urologist at length as per above. Nasal congestion improved with nasal spray and Flonase.      Cleared to transfer per urology.  Discussed with patient and his daughter at the bedside who also agree with transfer plan and understand the need to be in contact closely with urology office.    At the time of discharge patient was told to take all medications as prescribed, keep all follow-up appointments, and call their doctor or return to the hospital with any worsening or concerning symptoms.    Please note that dragon voice recognition software was used to create this note and that transcription errors are possible.    Discharge Follow Up Recommendations for labs/diagnostics:  Urologist Dr. Woods is requesting that patient's daughter call him in a week and update patient's condition for possible voiding trial at skilled facility in 1 week.  Alternatively voiding trial could be at urology's office depending on their decision.  I explained this to his daughter who agrees with the plan.  Also recommend primary care follow-up within 1 week after discharge from skilled facility.    Day of Discharge     HPI:   Patient states he is tolerating the Torres catheter pretty well currently.  He denies any pain.  He states his appetite is about the same but that he is eating.  He is eager to go to his rehab facility when possible.  He says his nose feels better with the Flonase.      Vital Signs:   Temp:  [97.7 °F (36.5 °C)-99 °F (37.2 °C)] 98 °F (36.7 °C)  Heart Rate:  [] 94  Resp:  [16-20] 16  BP: (102-129)/(46-73) 119/46      Physical Exam:  Constitutional:Awake, alert  HENT: Poor hearing noted, NCAT, mucous membranes moist, neck supple  Respiratory: Clear to auscultation bilaterally, respiratory effort normal, nonlabored breathing   Cardiovascular: RRR,  normal radial pulses  Gastrointestinal: Positive bowel sounds, soft, nontender, nondistended  Musculoskeletal: Elderly but normal musculature for age, no lower extremity edema, BMI 30  Psychiatric: Appropriate affect, cooperative, conversational  Neurologic: No slurred speech or facial droop, follows commands, oriented  Skin: No rashes or jaundice, warm    Pertinent  and/or Most Recent Results     Results from last 7 days   Lab Units 09/15/19  1010 09/14/19  0821 09/13/19  0703 09/12/19  0712 09/11/19  0711   WBC 10*3/mm3 11.05* 11.48* 14.03* 14.83* 11.09*   HEMOGLOBIN g/dL 7.9* 7.8* 8.0* 7.6* 8.7*   HEMATOCRIT % 25.8* 26.1* 26.0* 24.5* 27.5*   PLATELETS 10*3/mm3 291 293 354 356 354   SODIUM mmol/L  --   --   --  141 141   POTASSIUM mmol/L  --   --   --  4.8 4.2   CHLORIDE mmol/L  --   --   --  103 103   CO2 mmol/L  --   --   --  28.0 29.0   BUN mg/dL  --   --   --  19 14   CREATININE mg/dL  --   --   --  0.98 0.90   GLUCOSE mg/dL  --   --   --  134* 116*   CALCIUM mg/dL  --   --   --  8.0* 8.4           Invalid input(s): PROT, LABALBU        Invalid input(s): TG, LDLCALC, LDLREALC        Brief Urine Lab Results  (Last result in the past 365 days)      Color   Clarity   Blood   Leuk Est   Nitrite   Protein   CREAT   Urine HCG        09/03/19 0112 Orange Turbid Large (3+) Moderate (2+) Positive >=300 mg/dL (3+)               Microbiology Results Abnormal     Procedure Component Value - Date/Time    Blood Culture - Blood, Arm, Right [[id number]] Collected:  09/02/19 2050    Lab Status:  Final result Specimen:  Blood from Arm, Right Updated:  09/07/19 2130     Blood Culture No growth at 5 days    Blood Culture - Blood, Arm, Right [[id number]] Collected:  09/02/19 2045    Lab Status:   Final result Specimen:  Blood from Arm, Right Updated:  09/07/19 2130     Blood Culture No growth at 5 days    Urine Culture - Urine, Urine, Catheter [[id number]]  (Normal) Collected:  09/03/19 0112    Lab Status:  Final result Specimen:  Urine, Catheter Updated:  09/04/19 1125     Urine Culture No growth          Imaging Results (all)     Procedure Component Value Units Date/Time    XR Chest PA & Lateral [[id number]] Collected:  09/12/19 1818     Updated:  09/14/19 0958    Narrative:          EXAMINATION: XR CHEST, PA AND LATERAL - 09/12/2019     INDICATION:  N17.9-Acute kidney failure, unspecified; R31.9-Hematuria,  unspecified; N32.0-Bladder-neck obstruction; R74.8-Abnormal levels of  other serum enzymes; Z74.09-Other reduced mobility; N40.0-Benign  prostatic hyperplasia without lower urinary tract symptoms.      COMPARISON: 09/09/2019.     FINDINGS: Elevated right hemidiaphragm and densely calcified left lung  nodule. Left hilar lymph nodes are again noted, present back to at least  2012. Lung volumes are decreased overall from the prior study, which may  account for mild left basilar discoid atelectasis. No new pulmonary  parenchymal disease is seen elsewhere. No effusion or pneumothorax is  seen.       Impression:       1. Mildly increased patchy disease in the left lung base, perhaps  atelectasis due to lower lung volumes. If pneumonia is present, it is  very limited in extent.  2. 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     DICTATED:   09/12/2019  EDITED/ls :   09/12/2019      This report was finalized on 9/14/2019 9:55 AM by DR. Bryce Pedroza MD.       XR Chest 1 View [[id number]] Collected:  09/08/19 0740     Updated:  09/11/19 1117    Narrative:          EXAMINATION: XR CHEST 1 VW - 09/08/2019      INDICATION: N17.9-Acute kidney failure, unspecified; R31.9-Hematuria,  unspecified; N32.0-Bladder-neck obstruction; R74.8-Abnormal levels of  other serum enzymes;  Z74.09-Other reduced mobility.      COMPARISON: Chest radiograph 09/02/2019.     FINDINGS: Single portable chest radiograph is submitted for review.  There has been interval worsening of right lower lobe airspace disease  with trace right pleural effusion. Trace left pleural effusion  suggested. The remainder of the lungs appear well aerated. The heart is  obscured by the right lower lobe airspace changes and volume loss.  Similar pulmonary nodule in the left midlung. Visualized osseous  structures appear intact.           Impression:          Interval worsening of right lower lobe airspace disease with a small  right pleural effusion. Additional questionable trace left pleural  effusion noted. The remainder of the lungs appear well aerated.     DICTATED:   09/08/2019  EDITED/ls :   09/08/2019      This report was finalized on 9/11/2019 11:14 AM by Dr. Osvaldo Ordaz MD.       XR Chest 1 View [[id number]] Collected:  09/09/19 0835     Updated:  09/09/19 1756    Narrative:       EXAMINATION: XR CHEST 1 VW-09/09/2019:      INDICATION: DYSPNEA ON EXERTION.      COMPARISON: 09/08/2019.     FINDINGS: Portable chest reveals calcified granuloma identified within  the left midlung which is stable. Some improvement seen of the aeration  of the right lung base. Elevation seen of the right hemidiaphragm with  small right pleural effusion. Mild increased markings again seen within  the left lung base. Spurring of the upper lung fields bilaterally.           Impression:       Slight improvement seen of the aeration of the lung bases  bilaterally. Continued followup recommended as indicated.     D:  09/09/2019  E:  09/09/2019     This report was finalized on 9/9/2019 5:53 PM by Dr. Roxanna Carter MD.       CT Abdomen Pelvis Without Contrast [[id number]] Collected:  09/03/19 0429     Updated:  09/03/19 0431    Narrative:       INDICATION:   Gross hematuria for 2 to 3 days. Bladder outlet obstruction and history of  prostatitis.    TECHNIQUE:   CT of the abdomen and pelvis without contrast. Coronal and sagittal reconstructions were obtained.  Radiation dose reduction techniques included automated exposure control or exposure modulation based on body size. Radiation audit for number of CT and  nuclear cardiology exams performed in the last year: 0.      COMPARISON:   Abdomen and pelvis CT from 7/13/2012.    FINDINGS:  Lung bases: There are emphysematous changes lung bases. There is a noncalcified bilobed nodule in the right middle lobe its 9 mm in dimension. It is stable dating back to 7/13/2012 exam. There is thickening of the peribronchial vascular soft tissues to  the lower lungs and there is airspace disease in the right lower lobe right perihilar region and to lesser extent dependently in the left lower lobe. This could be atelectasis. Please exclude concern for early pneumonia or aspiration. There is evidence  for old granulomatous disease.    Abdomen:  There are multiple granulomata within the liver and spleen.    The adrenal glands are unremarkable.    Pancreas is somewhat atrophic.    There is mild renal parenchymal thinning bilaterally. There is no intrarenal calculus. There is only mild fullness of the renal pelves without karl hydronephrosis. There Is mild left side perinephric stranding    There are gallstones in the gallbladder but nothing to suggest acute cholecystitis.    Atherosclerotic vascular calcifications noted abdominal aorta and branch vessels. No abdominal aortic aneurysm. Ectasia of the infrarenal abdominal aorta measures up to about 2.3 cm AP dimension.    There is no evidence for bowel obstruction.    There is colonic diverticulosis predominantly in the left colon but there are some cecal diverticula. The appendix is not definitely seen but there is nothing to suggest appendicitis. Minimal stranding in the fat adjacent to the bladder. No free  intraperitoneal air. There are degenerative changes of  the spine as well as changes of ankylosing spondylitis.      Pelvis: There is a Torres catheter in the bladder. The bladder is distended and there is likely clot within the bladder with hyperdense material filling the majority of the bladder. This is consistent with gross hematuria history. Additionally the  prostate gland is markedly enlarged suspicion of a nodular component protruding into the bladder base. Urology evaluation recommended to exclude neoplastic disease. There is prostatic enlargement to about 7.4 cm in diameter. The prostate gland measures  as much is 7.8 cm SI dimension. There is no free fluid in the pelvis.      Impression:       #1 there is a large amount of clot within the urinary bladder. There is a Torres catheter in the bladder. The prostate gland is markedly enlarged and somewhat nodular protruding into the bladder base. Urology evaluation recommended. There is fullness of  the renal collecting systems without hydronephrosis. There is no evidence for intrarenal calculus.  2. Colonic diverticulosis without CT evidence for diverticulitis.  3. Ectasia of the infrarenal abdominal aorta to 2.3 cm AP dimension.  4. Gallstones without evidence for cholecystitis.  #5 changes of the spine most consistent with  ankylosing spondylitis.      Signer Name: Karo Fairbanks MD   Signed: 9/3/2019 4:29 AM   Workstation Name: [email or web address]    [facility]    XR Chest 2 View [[id number]] Collected:  09/02/19 2131     Updated:  09/02/19 2133    Narrative:       CR Chest 2 Vws    INDICATION:    89-year-old male in the ED tonight with cough and hypoxia symptoms. Symptoms last couple of days. Paralyzed right diaphragm.    COMPARISON:    10/22/2018    FINDINGS:   PA and lateral views of the chest.  Cardiac silhouette is stable with a tortuous thoracic aorta. Unremarkable vascularity. Low lung volumes. Persistent eventration/elevation of the right hemidiaphragm and probable right basilar atelectasis and  chronic  pleural reaction or chronic right effusion. No pneumothorax. Chronic rib fractures. Old healed granulomatous disease. Lateral view demonstrates imaging findings suggestive of diffuse idiopathic skeletal hyperostosis or ankylosing spondylitis.        Impression:         1. Low lung volumes with chronic eventration/elevation of the right hemidiaphragm and persistent opacities in the right lung base. No definite superimposed active disease. Old healed granulomatous disease.    Signer Name: Abe Atkins MD   Signed: 9/2/2019 9:31 PM   Workstation Name: [email or web address]    [facility]                  Results for orders placed during the hospital encounter of 09/02/19   Adult Transthoracic Echo Complete W/ Cont if Necessary Per Protocol    Narrative · Mild-to-moderate mitral valve regurgitation is present.  · Estimated EF = 72%.  · Left ventricular systolic function is hyperdynamic (EF > 70).  · Left ventricular diastolic dysfunction (grade I) consistent with   impaired relaxation.  · Left ventricular wall thickness is consistent with mild concentric   hypertrophy.  · Left atrial cavity size is borderline dilated.  · Normal right ventricular cavity size, wall thickness, systolic function   and septal motion noted.  · Mild mitral valve stenosis is present with functional MAC.  · The aortic valve exhibits moderate sclerosis without stenosis.  · No evidence of pulmonary hypertension is present.  · There is no evidence of pericardial effusion.           Order Current Status    Basic Metabolic Panel In process    CBC (No Diff) In process        Discharge Details        Discharge Medications      New Medications      Instructions Start Date   acetaminophen 325 MG tablet  Commonly known as:  TYLENOL   650 mg, Oral, Every 6 Hours PRN      cefpodoxime 200 MG tablet  Commonly known as:  VANTIN   200 mg, Oral, Every 12 Hours, Take for 14 more days      docusate sodium 100 MG capsule   100 mg, Oral, 2  Times Daily      finasteride 5 MG tablet  Commonly known as:  PROSCAR   5 mg, Oral, Daily   Start Date:  9/17/2019     guaiFENesin 100 MG/5ML solution oral solution  Commonly known as:  ROBITUSSIN   200 mg, Oral, Every 6 Hours PRN      ipratropium-albuterol 0.5-2.5 mg/3 ml nebulizer  Commonly known as:  DUO-NEB   3 mL, Nebulization, 4 Times Daily - RT      lactobacillus acidophilus capsule capsule   1 capsule, Oral, Daily   Start Date:  9/17/2019     melatonin 5 MG tablet tablet   5 mg, Oral, Nightly PRN      ondansetron 4 MG tablet  Commonly known as:  ZOFRAN   4 mg, Oral, Every 6 Hours PRN      polyethylene glycol pack packet  Commonly known as:  MIRALAX   17 g, Oral, Daily PRN      tamsulosin 0.4 MG capsule 24 hr capsule  Commonly known as:  FLOMAX   0.4 mg, Oral, Daily   Start Date:  9/17/2019        Continue These Medications      Instructions Start Date   ASPIRIN LOW DOSE 81 MG tablet  Generic drug:  aspirin   40.5 mg, Oral, Every Other Day, Pt takes 1/2 tab every other day      azelastine 0.1 % nasal spray  Commonly known as:  ASTELIN   2 sprays, Nasal, 2 Times Daily      Menthol (Topical Analgesic) 4 % gel   Topical      MULTI-VITAMIN DAILY tablet   Oral, Daily      pravastatin 40 MG tablet  Commonly known as:  PRAVACHOL   40 mg, Oral, Daily      Vitamin D3 1000 units capsule   Oral             No Known Allergies      Discharge Disposition:  Skilled Nursing Facility (DC - External)    Diet:  Hospital:  Diet Order   Procedures   • Diet Soft Texture; [employer]; Cardiac     Discharge:   Diet Instructions     Diet: Regular, Cardiac      Discharge Diet:   Regular  Cardiac       Soft texture diet, whole foods          Discharge Activity:   Activity Instructions     Up WIth Assist              CODE STATUS:    Code Status and Medical Interventions:   Ordered at: 09/03/19 0248     Level Of Support Discussed With:    Patient     Code Status:    CPR     Medical Interventions (Level of Support Prior to Arrest):     Full         Future Appointments   Date Time Provider Department Center   1/2/2020  9:45 AM Dirk Russ MD [facility]       Additional Instructions for the Follow-ups that You Need to Schedule     Ambulatory Referral to Home Health   As directed      Face to Face Visit Date:  9/6/2019    Follow-up provider for Plan of Care?:  I treated the patient in an acute care facility and will not continue treatment after discharge.    Follow-up provider:  DIRK RUSS [[id number]]    Reason/Clinical Findings:  s/p hospitalization for gross hematuria    Describe mobility limitations that make leaving home difficult:  Impaired functional mobility, balance, gait and endurance.    Nursing/Therapeutic Services Requested:  Physical Therapy    PT orders:  Therapeutic exercise Gait Training Transfer training Strengthening Home safety assessment    Weight Bearing Status:  As Tolerated    Frequency:  Other         Discharge Follow-up with PCP   As directed       Currently Documented PCP:    Dirk Russ MD    PCP Phone Number:    [phone]     Follow Up Details:  Recommend follow-up with primary care provider within 1 week after discharge from skilled facility         Discharge Follow-up with Specified Provider: Urology follow-up recommended in their clinic within 2 to 4 weeks.  As we discussed Dr Woods your urologist requests update from your daughter in 1 week for possible voiding trial at skilled facility.   As directed      To:  Urology follow-up recommended in their clinic within 2 to 4 weeks.  As we discussed Dr Woods your urologist requests update from your daughter in 1 week for possible voiding trial at skilled facility.               Time Spent on Discharge:  45 minutes    Electronically signed by Dirk Cortes MD, 09/16/19, 12:50 PM.

## 2019-09-16 NOTE — THERAPY TREATMENT NOTE
Acute Care - Physical Therapy Treatment Note  [facility]     Patient Name: Chaitanya Browne  : 1923  MRN: [id number]  Today's Date: 2019  Onset of Illness/Injury or Date of Surgery: 19     Referring Physician: Dr. Dove    Admit Date: 2019    Visit Dx:    ICD-10-CM ICD-9-CM   1. ABBY (acute kidney injury) (CMS/[facility]) N17.9 584.9   2. Hematuria, unspecified type R31.9 599.70   3. Bladder outlet obstruction N32.0 596.0   4. Elevated troponin R74.8 790.6   5. Impaired mobility and ADLs Z74.09 799.89   6. BPH (benign prostatic hyperplasia) N40.0 600.00   7. Dysphagia, unspecified type R13.10 787.20     Patient Active Problem List   Diagnosis   • Facial basal cell cancer   • Hyperlipidemia LDL goal <100   • Essential hypertension   • ASHD (arteriosclerotic heart disease)   • Gait abnormality   • Impaired fasting glucose   • Polyp, colonic   • Vitamin D deficiency   • Low back pain at multiple sites   • Proteinuria   • Right carpal tunnel syndrome   • Obesity (BMI 30-39.9)   • Hematuria, unspecified   • Medicare annual wellness visit, subsequent   • Edema   • Cough   • Morbid obesity due to excess calories (CMS/[facility])   • Asthma   • Allergic rhinitis   • Gross hematuria   • Acute UTI (urinary tract infection)   • Acute renal insufficiency   • Elevated troponin   • Bronchitis   • Acute urinary retention   • Possible pneumonia of left lower lobe due to infectious organism (CMS/[facility])   • Debility   • Dysphagia   • BPH with obstruction/lower urinary tract symptoms   • Prostatitis       Therapy Treatment    Rehabilitation Treatment Summary     Row Name 19 1059 19 0930          Treatment Time/Intention    Discipline  physical therapist  -MJ  speech language pathologist  -CH     Document Type  therapy note (daily note)  -MJ  therapy note (daily note)  -CH     Subjective Information  no complaints  -MJ  no complaints  -CH     Mode of Treatment  physical therapy  -MJ  --     Patient/Family  Observations  Pt sitting UIC  -MJ  Patient sitting upright in chair  -     Care Plan Review  patient/other agree to care plan  -  care plan/treatment goals reviewed;risks/benefits reviewed  -     Patient Effort  excellent  -MJ  --     Existing Precautions/Restrictions  fall;other (see comments) [place]  -MJ  --     Recorded by [MJ] Adolph Arenas, PT 09/16/19 1314 [] Melodie Mosqueda, MS CCC-SLP 09/16/19 1036     Row Name 09/16/19 1059             Cognitive Assessment/Intervention- PT/OT    Affect/Mental Status (Cognitive)  WNL  -MJ      Orientation Status (Cognition)  oriented x 4  -MJ      Follows Commands (Cognition)  WNL  -MJ      Recorded by [MJ] Adolph Arenas, PT 09/16/19 1314      Row Name 09/16/19 1059             Bed Mobility Assessment/Treatment    Comment (Bed Mobility)  NT- pt UIC  -MJ      Recorded by [] Adolph Arenas, PT 09/16/19 1314      Row Name 09/16/19 1059             Transfer Assessment/Treatment    Comment (Transfers)  Verbal cues for correct hand placement. Assisted pt to bathroom  -MJ      Recorded by [MJ] Adolph Arenas, PT 09/16/19 1314      Row Name 09/16/19 1059             Sit-Stand Transfer    Sit-Stand [place] (Transfers)  contact guard;verbal cues  -MJ      Assistive Device (Sit-Stand Transfers)  walker, front-wheeled  -MJ      Recorded by [] Adolph Arenas, PT 09/16/19 1314      Row Name 09/16/19 1059             Stand-Sit Transfer    Stand-Sit [place] (Transfers)  contact guard;verbal cues  -      Assistive Device (Stand-Sit Transfers)  walker, front-wheeled  -MJ      Recorded by [MJ] Adolph Arenas, PT 09/16/19 1314      Row Name 09/16/19 1059             Gait/Stairs Assessment/Training    13738 - Gait Training Minutes   12  -MJ      [place] Level (Gait)  contact guard;verbal cues  -MJ      Assistive Device (Gait)  walker, front-wheeled  -MJ      Distance in Feet (Gait)  160  -MJ      Pattern (Gait)  step-through  -MJ      Deviations/Abnormal Patterns (Gait)   bilateral deviations;nida decreased;stride length decreased  -MJ      Bilateral Gait Deviations  heel strike decreased;weight shift ability decreased  -MJ      Comment (Gait/Stairs)  Pt demo step through gait pattern at slow pace. Verbal cues for upright posture and to stay in middle of RW. Gait limited by fatigue  -MJ      Recorded by [MJ] Adolph Arenas, PT 09/16/19 1314      Row Name 09/16/19 1059             Motor Skills Assessment/Interventions    Additional Documentation  Therapeutic Exercise (Group);Balance (Group)  -MJ      Recorded by [MJ] Adolph Arenas, PT 09/16/19 1314      Row Name 09/16/19 1059             Therapeutic Exercise    64845 - PT Therapeutic Activity Minutes  12  -MJ      Recorded by [MJ] Adolph Arenas, PT 09/16/19 1314      Row Name 09/16/19 1059             Balance    Balance  static standing balance  -MJ      Recorded by [MJ] Adolph Arenas, PT 09/16/19 1314      Row Name 09/16/19 1059             Static Standing Balance    Level of [place] (Supported Standing, Static Balance)  supervision  -MJ      Time Able to Maintain Position (Supported Standing, Static Balance)  more than 5 minutes  -MJ      Assistive Device Utilized (Supported Standing, Static Balance)  walker, rolling  -MJ      Comment (Supported Standing, Static Balance)  Pt stood at sink to shave  -MJ      Recorded by [MJ] Adolph Arenas, PT 09/16/19 1314      Row Name 09/16/19 1059             Positioning and Restraints    Pre-Treatment Position  sitting in chair/recliner  -MJ      Post Treatment Position  chair  -MJ      In Chair  notified nsg;reclined;call light within reach;encouraged to call for assist;exit alarm on  -MJ      Recorded by [MJ] Adolph Arenas, PT 09/16/19 1314      Row Name 09/16/19 0930             Pain Assessment    Additional Documentation  Pain Scale: FACES Pre/Post-Treatment (Group)  -CH      Recorded by [CH] Melodie Mosqueda MS CCC-SLP 09/16/19 1036      Row Name 09/16/19 1059             Pain Scale:  Numbers Pre/Post-Treatment    Pain Scale: Numbers, Pretreatment  0/10 - no pain  -MJ      Pain Scale: Numbers, Post-Treatment  0/10 - no pain  -MJ      Recorded by [MJ] Adolph Arenas, PT 09/16/19 1314      Row Name 09/16/19 0930             Pain Scale: FACES Pre/Post-Treatment    Pain: FACES Scale, Pretreatment  0-->no hurt  -CH      Pain: FACES Scale, Post-Treatment  0-->no hurt  -CH      Recorded by [CH] Melodie Mosqueda, MS CCC-SLP 09/16/19 1036      Row Name 09/16/19 1059             Plan of Care Review    Plan of Care Reviewed With  patient  -MJ      Recorded by [MJ] Adolph Arenas, PT 09/16/19 1314      Row Name 09/16/19 1059             Outcome Summary/Treatment Plan (PT)    Daily Summary of Progress (PT)  progress toward functional goals is good  -      Recorded by [MJ] Adolph Arenas, PT 09/16/19 1314      Row Name 09/16/19 0930             Outcome Summary/Treatment Plan (SLP)    Daily Summary of Progress (SLP)  progress toward functional goals as expected  -      Plan for Continued Treatment (SLP)  Continue to monitor for diet tolerance as patient reported difficulty swallowing a large pill over the weekend. He reported no difficulty with MS and thin liquids. ST reviewed s/s of aspiration with patient who voiced understanding. Pt continues with a wet cough at baseline. Tolerated MS and thin trials without difficulty or s/s of aspiration.   -CH2      Anticipated Dischage Disposition  unknown  -      Recorded by [CH] Melodie Mosqueda MS CCC-SLP 09/16/19 1036  [CH2] Melodie Mosqueda MS CCC-SLP 09/16/19 1037        User Key  (r) = Recorded By, (t) = Taken By, (c) = Cosigned By    Initials Name Effective Dates Discipline    Adolph Schofield, PT 04/03/18 -  PT     Melodie Mosqueda, MS CCC-SLP 02/14/19 -  SLP               Rehab Goal Summary     Row Name 09/16/19 0930             Swallow Goals (SLP)    Oral Nutrition/Hydration Goal Selection (SLP)  oral nutrition/hydration, SLP goal 1;oral  nutrition/hydration, SLP goal 2  -CH         Oral Nutrition/Hydration Goal 1 (SLP)    Oral Nutrition/Hydration Goal 1, SLP  LTG: Pt will tolerate diet w/o s/sxs aspiration or difficulty.  -CH      Time Frame (Oral Nutrition/Hydration Goal 1, SLP)  by discharge  -CH      Progress/Outcomes (Oral Nutrition/Hydration Goal 1, SLP)  continuing progress toward goal  -CH         Oral Nutrition/Hydration Goal 2 (SLP)    Oral Nutrition/Hydration Goal 2, SLP  Pt will tolerate mechanical soft diet (ground), thin liquids w/o s/sxs aspiration.  -CH      Time Frame (Oral Nutrition/Hydration Goal 2, SLP)  short term goal (STG)  -CH      Barriers (Oral Nutrition/Hydration Goal 2, SLP)  Pt. reported difficulty swallowing a large pill over the weekend. He reported no difficulty with MS and thin liquids. ST reviewed s/s of aspiration with patient who voiced understanding. Pt continues with a wet cough at baseline. Tolerated MS and thin trials without difficulty or s/s of aspiration.   -CH      Progress/Outcomes (Oral Nutrition/Hydration Goal 2, SLP)  continuing progress toward goal  -CH        User Key  (r) = Recorded By, (t) = Taken By, (c) = Cosigned By    Initials Name Provider Type Discipline    Melodie Bajwa MS CCC-SLP Speech and Language Pathologist SLP          Physical Therapy Education     Title: PT OT SLP Therapies (Done)     Topic: Physical Therapy (Done)     Point: Mobility training (Done)     Learning Progress Summary           Patient Acceptance, E,D, VU,NR by ANNELISE at 9/16/2019 10:59 AM    Comment:  Reviewed gait mechanics, benefits of mobility    Acceptance, E,TB, NR by SR at 9/14/2019  4:15 PM    Acceptance, E,D, VU,NR by ANNELISE at 9/10/2019  9:42 AM    Comment:  Reviewed HEP, gait mechanics, benefits of mobility    Acceptance, E,D, VU,DU by ESPERANZA at 9/9/2019 11:58 AM    Acceptance, E, NR by AS at 9/7/2019 10:56 AM    Eager, E, VU by VALERIE at 9/5/2019  9:54 AM    Comment:  discussed plan of care and dc planning   Family  Acceptance, E,D, VU,NR by ANNELISE at 9/10/2019  9:42 AM    Comment:  Reviewed HEP, gait mechanics, benefits of mobility    Acceptance, E,D, VU,DU by ESPERANZA at 9/9/2019 11:58 AM                   Point: Home exercise program (Done)     Learning Progress Summary           Patient Acceptance, E,D, VU,NR by ANNELISE at 9/16/2019 10:59 AM    Comment:  Reviewed gait mechanics, benefits of mobility    Acceptance, E,TB, NR by SR at 9/14/2019  4:15 PM    Acceptance, E,D, VU,NR by ANNELISE at 9/10/2019  9:42 AM    Comment:  Reviewed HEP, gait mechanics, benefits of mobility    Acceptance, E,D, VU,DU by ESPERANZA at 9/9/2019 11:58 AM    Acceptance, E, NR by AS at 9/7/2019 10:56 AM    Eager, E, VU by VALERIE at 9/5/2019  9:54 AM    Comment:  discussed plan of care and dc planning   Family Acceptance, E,D, VU,NR by ANNELISE at 9/10/2019  9:42 AM    Comment:  Reviewed HEP, gait mechanics, benefits of mobility    Acceptance, E,D, VU,DU by ESPERANZA at 9/9/2019 11:58 AM                   Point: Body mechanics (Done)     Learning Progress Summary           Patient Acceptance, E,D, VU,NR by ANNELISE at 9/16/2019 10:59 AM    Comment:  Reviewed gait mechanics, benefits of mobility    Acceptance, E,TB, NR by  at 9/14/2019  4:15 PM    Acceptance, E,D, VU,NR by ANNELISE at 9/10/2019  9:42 AM    Comment:  Reviewed HEP, gait mechanics, benefits of mobility    Acceptance, E,D, VU,DU by ESPERANZA at 9/9/2019 11:58 AM    Acceptance, E, NR by AS at 9/7/2019 10:56 AM    [place], E, VU by VALERIE at 9/5/2019  9:54 AM    Comment:  discussed plan of care and dc planning   Family Acceptance, E,D, VU,NR by ANNELISE at 9/10/2019  9:42 AM    Comment:  Reviewed HEP, gait mechanics, benefits of mobility    Acceptance, E,D, VU,DU by ESPERANZA at 9/9/2019 11:58 AM                   Point: Precautions (Done)     Learning Progress Summary           Patient Acceptance, E,D, VU,NR by ANNELISE at 9/16/2019 10:59 AM    Comment:  Reviewed gait mechanics, benefits of mobility    Acceptance, MARY KAY ROBISON, NR by SR at 9/14/2019  4:15 PM    Acceptance, GARTH ROBISON,  ROBERTO,NR by ANNELISE at 9/10/2019  9:42 AM    Comment:  Reviewed HEP, gait mechanics, benefits of mobility    Acceptance, E,D, VU,DU by AA at 9/9/2019 11:58 AM    Acceptance, ENRIQUETA, NR by AS at 9/7/2019 10:56 AM    ENRIQUETA Andrade VU by KM at 9/5/2019  9:54 AM    Comment:  discussed plan of care and dc planning   Family Acceptance, GARTH ROBISON, ROBERTO,NR by ANNELISE at 9/10/2019  9:42 AM    Comment:  Reviewed HEP, gait mechanics, benefits of mobility    Acceptance, E,D, VU,DU by AA at 9/9/2019 11:58 AM                               User Key     Initials Effective Dates Name Provider Type Discipline    KM 06/19/15 -  Josselin Caro, PT Physical Therapist PT    SR 06/19/15 -  Leslie Fajardo, PT Physical Therapist PT    AS 06/22/15 -  Monie Fletcher, PTA Physical Therapy Assistant PT     04/03/18 -  Adolph Arenas, PT Physical Therapist PT     04/02/18 -  Aleah Lala, PT Physical Therapist PT                PT Recommendation and Plan     Outcome Summary/Treatment Plan (PT)  Daily Summary of Progress (PT): progress toward functional goals is good  Plan of Care Reviewed With: patient  Progress: improving  Outcome Summary: Pt ambulated 160 feet with CGA and RW. Pt required less assist with all functional mobility. Will continue to progress mobility as able.   Outcome Measures     Row Name 09/16/19 1058             How much help from another person do you currently need...    Turning from your back to your side while in flat bed without using bedrails?  3  -MJ      Moving from lying on back to sitting on the side of a flat bed without bedrails?  3  -MJ      Moving to and from a bed to a chair (including a wheelchair)?  3  -MJ      Standing up from a chair using your arms (e.g., wheelchair, bedside chair)?  3  -MJ      Climbing 3-5 steps with a railing?  2  -MJ      To walk in hospital room?  3  -MJ      AM-PAC 6 Clicks Score (PT)  17  -MJ         Functional Assessment    Outcome Measure Options  AM-PAC 6 Clicks Basic Mobility (PT)  -MJ         User Key  (r) = Recorded By, (t) = Taken By, (c) = Cosigned By    Initials Name Provider Type    Adolph Schofield PT Physical Therapist         Time Calculation:   PT Charges     Row Name 09/16/19 1059             Time Calculation    Start Time  1059  -MJ      PT Received On  09/16/19  -MJ      PT Goal Re-Cert Due Date  09/24/19  -MJ         Time Calculation- PT    Total Timed Code Minutes- PT  24 minute(s)  -MJ         Timed Charges    11701 - Gait Training Minutes   12  -MJ      87063 - PT Therapeutic Activity Minutes  12  -MJ        User Key  (r) = Recorded By, (t) = Taken By, (c) = Cosigned By    Initials Name Provider Type    Adolph Schofield, PT Physical Therapist        Therapy Charges for Today     Code Description Service Date Service Provider Modifiers Qty    80436221255 HC GAIT TRAINING EA 15 MIN 9/16/2019 Adolph Arenas, PT GP 1    15864313557 HC PT THERAPEUTIC ACT EA 15 MIN 9/16/2019 Adolph Arenas, PT GP 1          PT G-Codes  Outcome Measure Options: AM-PAC 6 Clicks Basic Mobility (PT)  AM-PAC 6 Clicks Score (PT): 17  AM-PAC 6 Clicks Score (OT): 19    Adolph Arenas PT  9/16/2019

## 2019-09-16 NOTE — THERAPY TREATMENT NOTE
Acute Care - Speech Language Pathology   Swallow Treatment Note [facility]     Patient Name: Chaitanya Browne  : 1923  MRN: [id number]  Today's Date: 2019  Onset of Illness/Injury or Date of Surgery: 19     Referring Physician: Dr. Dove      Admit Date: 2019    Visit Dx:      ICD-10-CM ICD-9-CM   1. ABBY (acute kidney injury) (CMS/[facility]) N17.9 584.9   2. Hematuria, unspecified type R31.9 599.70   3. Bladder outlet obstruction N32.0 596.0   4. Elevated troponin R74.8 790.6   5. Impaired mobility and ADLs Z74.09 799.89   6. BPH (benign prostatic hyperplasia) N40.0 600.00   7. Dysphagia, unspecified type R13.10 787.20     Patient Active Problem List   Diagnosis   • Facial basal cell cancer   • Hyperlipidemia LDL goal <100   • Essential hypertension   • ASHD (arteriosclerotic heart disease)   • Gait abnormality   • Impaired fasting glucose   • Polyp, colonic   • Vitamin D deficiency   • Low back pain at multiple sites   • Proteinuria   • Right carpal tunnel syndrome   • Obesity (BMI 30-39.9)   • Hematuria, unspecified   • Medicare annual wellness visit, subsequent   • Edema   • Cough   • Morbid obesity due to excess calories (CMS/[facility])   • Asthma   • Allergic rhinitis   • Gross hematuria   • Acute UTI (urinary tract infection)   • Acute renal insufficiency   • Elevated troponin   • Bronchitis   • Acute urinary retention   • Possible pneumonia of left lower lobe due to infectious organism (CMS/[facility])   • Debility   • Dysphagia   • BPH with obstruction/lower urinary tract symptoms       Therapy Treatment  Rehabilitation Treatment Summary     Row Name 19 0930             Treatment Time/Intention    Discipline  speech language pathologist  -      Document Type  therapy note (daily note)  -      Subjective Information  no complaints  -      Patient/Family Observations  Patient sitting upright in chair  -      Care Plan Review  care plan/treatment goals reviewed;risks/benefits reviewed  -       Recorded by [CH] Melodie Mosqueda MS CCC-SLP 09/16/19 1036      Row Name 09/16/19 0930             Pain Assessment    Additional Documentation  Pain Scale: FACES Pre/Post-Treatment (Group)  -CH      Recorded by [] Jaylin Melodie MS CCC-SLP 09/16/19 1036      Row Name 09/16/19 0930             Pain Scale: FACES Pre/Post-Treatment    Pain: FACES Scale, Pretreatment  0-->no hurt  -CH      Pain: FACES Scale, Post-Treatment  0-->no hurt  -CH      Recorded by [] JaylinMelodie MS CCC-SLP 09/16/19 1036      Row Name 09/16/19 0930             Outcome Summary/Treatment Plan (SLP)    Daily Summary of Progress (SLP)  progress toward functional goals as expected  -      Plan for Continued Treatment (SLP)  Continue to monitor for diet tolerance as patient reported difficulty swallowing a large pill over the weekend. He reported no difficulty with MS and thin liquids. ST reviewed s/s of aspiration with patient who voiced understanding. Pt continues with a wet cough at baseline. Tolerated MS and thin trials without difficulty or s/s of aspiration.   -CH2      Anticipated Dischage Disposition  unknown  -CH      Recorded by [CH] Melodie Mosquead MS CCC-SLP 09/16/19 1036  [CH2] Melodie Mosqueda MS CCC-SLP 09/16/19 1037        User Key  (r) = Recorded By, (t) = Taken By, (c) = Cosigned By    Initials Name Effective Dates Discipline    CH Jaylin Melodie MS CCC-SLP 02/14/19 -  [facility]          Outcome Summary  Outcome Summary/Treatment Plan (SLP)  Daily Summary of Progress (SLP): progress toward functional goals as expected (09/16/19 0930 : Melodie Mosqueda MS CCC-SLP)  Plan for Continued Treatment (SLP): Continue to monitor for diet tolerance as patient reported difficulty swallowing a large pill over the weekend. He reported no difficulty with MS and thin liquids. ST reviewed s/s of aspiration with patient who voiced understanding. Pt continues with a wet cough at baseline. Tolerated MS and thin trials  without difficulty or s/s of aspiration.  (09/16/19 0930 : Melodie Mosqueda, MS CCC-SLP)  Anticipated Dischage Disposition: unknown (09/16/19 0930 : Melodie Mosqueda, MS CCC-SLP)      SLP GOALS     Row Name 09/16/19 0930 09/13/19 1045          Oral Nutrition/Hydration Goal 1 (SLP)    Oral Nutrition/Hydration Goal 1, SLP  LTG: Pt will tolerate diet w/o s/sxs aspiration or difficulty.  -CH  LTG: Pt will tolerate diet w/o s/sxs aspiration or difficulty.  -SM     Time Frame (Oral Nutrition/Hydration Goal 1, SLP)  by discharge  -CH  by discharge  -SM     Progress/Outcomes (Oral Nutrition/Hydration Goal 1, SLP)  continuing progress toward goal  -CH  continuing progress toward goal  -SM        Oral Nutrition/Hydration Goal 2 (SLP)    Oral Nutrition/Hydration Goal 2, SLP  Pt will tolerate mechanical soft diet (ground), thin liquids w/o s/sxs aspiration.  -CH  Pt will tolerate mechanical soft diet (ground), thin liquids w/o s/sxs aspiration.  -SM     Time Frame (Oral Nutrition/Hydration Goal 2, SLP)  short term goal (STG)  -CH  short term goal (STG)  -SM     Barriers (Oral Nutrition/Hydration Goal 2, SLP)  Pt. reported difficulty swallowing a large pill over the weekend. He reported no difficulty with MS and thin liquids. ST reviewed s/s of aspiration with patient who voiced understanding. Pt continues with a wet cough at baseline. Tolerated MS and thin trials without difficulty or s/s of aspiration.   -CH  No concerns per RN. Increased opacities on CXR. Wet cough baseline, cannot rule out aspiration. Discussed results, options, and risks with both pt/dtr.   -SM     Progress/Outcomes (Oral Nutrition/Hydration Goal 2, SLP)  continuing progress toward goal  -CH  continuing progress toward goal  -SM       User Key  (r) = Recorded By, (t) = Taken By, (c) = Cosigned By    Initials Name Provider Type    Marilu Laura MS CCC-SLP Speech and Language Pathologist    CH Melodie Mosqueda, MS CCC-SLP Speech and Language  Pathologist          EDUCATION  The patient has been educated in the following areas:   Dysphagia (Swallowing Impairment) Oral Care/Hydration Modified Diet Instruction.    SLP Recommendation and Plan  Daily Summary of Progress (SLP): progress toward functional goals as expected     Plan for Continued Treatment (SLP): Continue to monitor for diet tolerance as patient reported difficulty swallowing a large pill over the weekend. He reported no difficulty with MS and thin liquids. ST reviewed s/s of aspiration with patient who voiced understanding. Pt continues with a wet cough at baseline. Tolerated MS and thin trials without difficulty or s/s of aspiration.   Anticipated Dischage Disposition: unknown                    Time Calculation:   Time Calculation- SLP     Row Name 09/16/19 1038             Time Calculation- SLP    SLP Start Time  0930  -      SLP Received On  09/16/19  -        User Key  (r) = Recorded By, (t) = Taken By, (c) = Cosigned By    Initials Name Provider Type    Melodie Bajwa MS CCC-SLP Speech and Language Pathologist          Therapy Charges for Today     Code Description Service Date Service Provider Modifiers Qty    19613985098  ST TREATMENT SWALLOW 3 9/16/2019 Melodie Mosqueda MS CCC-SLP GN 1                 Melodie Mosqueda MS CCC-JUANCARLOS  9/16/2019

## 2019-09-16 NOTE — PROGRESS NOTES
[facility] Medicine Services  PROGRESS NOTE    Patient Name: Chaitanya Browne  : 1923  MRN: [id number]    Date of Admission: 2019  Primary Care Physician: Dirk Russ MD    Subjective   Subjective     CC:  Follow-up hematuria and lower abdominal pain    HPI:  Patient states he is tolerating the Torres catheter pretty well currently.  He denies any pain.  He states his appetite is about the same but that he is eating.  He is eager to go to his rehab facility when possible.  He says his nose feels better with the Flonase.      Review of Systems  No current fevers or chills  No current shortness of breath or cough  No current nausea, vomiting, or diarrhea  No current chest pain or palpitations    Objective   Objective     Vital Signs:   Temp:  [97.7 °F (36.5 °C)-99 °F (37.2 °C)] 98.7 °F (37.1 °C)  Heart Rate:  [] 94  Resp:  [16-20] 16  BP: (102-129)/(52-73) 129/73        Physical Exam:  Constitutional:Awake, alert  HENT: Poor hearing noted, NCAT, mucous membranes moist, neck supple  Respiratory: Clear to auscultation bilaterally, respiratory effort normal, nonlabored breathing   Cardiovascular: RRR,  normal radial pulses  Gastrointestinal: Positive bowel sounds, soft, nontender, nondistended  Musculoskeletal: Elderly but normal musculature for age, no lower extremity edema, BMI 30  Psychiatric: Appropriate affect, cooperative, conversational  Neurologic: No slurred speech or facial droop, follows commands, oriented  Skin: No rashes or jaundice, warm    Results Reviewed:    Results from last 7 days   Lab Units 09/15/19  1010 19  0821 19  0703   WBC 10*3/mm3 11.05* 11.48* 14.03*   HEMOGLOBIN g/dL 7.9* 7.8* 8.0*   HEMATOCRIT % 25.8* 26.1* 26.0*   PLATELETS 10*3/mm3 291 293 354     Results from last 7 days   Lab Units 19  0712 19  0711   SODIUM mmol/L 141 141   POTASSIUM mmol/L 4.8 4.2   CHLORIDE mmol/L 103 103   CO2 mmol/L 28.0 29.0   BUN mg/dL 19  14   CREATININE mg/dL 0.98 0.90   GLUCOSE mg/dL 134* 116*   CALCIUM mg/dL 8.0* 8.4     Estimated Creatinine Clearance: 43.5 mL/min (by C-G formula based on SCr of 0.98 mg/dL).    Microbiology Results Abnormal     Procedure Component Value - Date/Time    Blood Culture - Blood, Arm, Right [[id number]] Collected:  09/02/19 2050    Lab Status:  Final result Specimen:  Blood from Arm, Right Updated:  09/07/19 2130     Blood Culture No growth at 5 days    Blood Culture - Blood, Arm, Right [[id number]] Collected:  09/02/19 2045    Lab Status:  Final result Specimen:  Blood from Arm, Right Updated:  09/07/19 2130     Blood Culture No growth at 5 days    Urine Culture - Urine, Urine, Catheter [[id number]]  (Normal) Collected:  09/03/19 0112    Lab Status:  Final result Specimen:  Urine, Catheter Updated:  09/04/19 1125     Urine Culture No growth          Imaging Results (last 24 hours)     ** No results found for the last 24 hours. **          Results for orders placed during the hospital encounter of 09/02/19   Adult Transthoracic Echo Complete W/ Cont if Necessary Per Protocol    Narrative · Mild-to-moderate mitral valve regurgitation is present.  · Estimated EF = 72%.  · Left ventricular systolic function is hyperdynamic (EF > 70).  · Left ventricular diastolic dysfunction (grade I) consistent with   impaired relaxation.  · Left ventricular wall thickness is consistent with mild concentric   hypertrophy.  · Left atrial cavity size is borderline dilated.  · Normal right ventricular cavity size, wall thickness, systolic function   and septal motion noted.  · Mild mitral valve stenosis is present with functional MAC.  · The aortic valve exhibits moderate sclerosis without stenosis.  · No evidence of pulmonary hypertension is present.  · There is no evidence of pericardial effusion.          I have reviewed the medications:  Scheduled Meds:    atorvastatin 10 mg Oral Daily   bisacodyl 10 mg Rectal Once   ceftriaxone 1 g  "Intravenous Q24H   docusate sodium 100 mg Oral BID   doxycycline 100 mg Oral Q12H   finasteride 5 mg Oral Daily   fluticasone 1 spray Each Nare Daily   guaiFENesin 200 mg Oral 4x Daily   ipratropium-albuterol 3 mL Nebulization 4x Daily - RT   lactobacillus acidophilus 1 capsule Oral Daily   sodium chloride 10 mL Intravenous Q12H   tamsulosin 0.4 mg Oral Daily     Continuous Infusions:   PRN Meds:.•  acetaminophen  •  lidocaine  •  lidocaine  •  melatonin  •  Menthol (Topical Analgesic)  •  ondansetron **OR** ondansetron  •  polyethylene glycol  •  [COMPLETED] Insert peripheral IV **AND** sodium chloride  •  sodium chloride  •  sodium chloride  •  temazepam      Assessment/Plan   Assessment / Plan     Active Hospital Problems    Diagnosis  POA   • **Gross hematuria [R31.0]  Yes   • BPH with obstruction/lower urinary tract symptoms [N40.1, N13.8]  Yes   • Dysphagia [R13.10]  Yes   • Acute urinary retention [R33.8]  Yes   • Possible pneumonia of left lower lobe due to infectious organism (CMS/HCC) [J18.1]  Yes   • Debility [R53.81]  Yes   • Bronchitis [J40]  Yes   • Acute UTI (urinary tract infection) [N39.0]  Yes   • Acute renal insufficiency [N28.9]  Yes   • Elevated troponin [R74.8]  Yes   • Asthma [J45.909]  Yes   • Essential hypertension [I10]  Yes   • Hyperlipidemia LDL goal <100 [E78.5]  Yes      Resolved Hospital Problems   No resolved problems to display.        Brief Hospital Course to date:  Chaitanya Browne is a 89 y.o. male \"with history of hypertension, hyperlipidemia, but remains very functional and active at his age.  Presented to emergency room with complaints of gross hematuria.  Found to have some acute renal insufficiency and clot in his bladder.  Continuous irrigation started and admitted to hospitalist service.\"  Patient has completed cystoscopy with evacuation of clot fulguration of prostate on 9/6 and TURP for BPH with obstruction and hematuria on 9/11.  He has been treated with ceftriaxone for " possible urinary tract infection and possible prostatitis.  He also has chest x-ray for earlier dyspnea that showed bilateral possible infiltrate with effusion most notable on the right and CT scan was concerning for possible early pneumonia.    Gross hematuria with BPH and urinary obstruction:  Status post urology procedures as per above.  Required continuous bladder irrigation which is now been discontinued  Postoperative management as per urology.  Voiding trial on 9/14 was unsuccessful.  Torres catheter with coudé tip replaced.  Flomax initiated    Urinary tract infection and possible prostatitis:  Continue ceftriaxone.  May require prolonged antibiotic course with possible prostatitis and prostate surgery.  Abdominal pain symptoms have improved with further antibiotic therapy.    Bronchitis with possible early right lower lobe pneumonia:  Continue ceftriaxone as per above.  Imaging findings of possible pneumonia noted on initial CT of abdomen on 9/3.  Repeat x-ray on 9/12 also concerning for left lower lobe pneumonia with infiltrate present.  Breathing improved.  Doxycycline added on 9/13 for broader coverage which has improved respiratory status    Anemia, acute blood loss present on admission due to hematuria:  Continue to trend blood counts.  Likely loss is from hematuria and surgical.  May need transfusion soon if continues.    Constipation: Continue bowel regimen.  Better.    Insomnia: Continue current medication.  Patient reports improvement.    Tachycardia with ambulation: Likely related to anemia and deconditioning.    Elevated troponin without chest pain: Felt to be supply demand mismatch due to anemia and acute illness.  Denies current complaints.  Stable.    Debility: PT OT.  Skilled rehab when possible.    Flomax added today.  Plan to discuss urinary management with urology today.  Repeat laboratory studies.  Continue antibiotics for now.  Nasal congestion improved with nasal spray and Flonase.     Plan for transfer once cleared by urology.    DVT Prophylaxis: Mechanical in the setting of bleeding    Disposition: I expect the patient to be discharged to skilled facility once cleared by urology    CODE STATUS:   Code Status and Medical Interventions:   Ordered at: 09/03/19 0248     Level Of Support Discussed With:    Patient     Code Status:    CPR     Medical Interventions (Level of Support Prior to Arrest):    Full         Electronically signed by Dirk Cortes MD, 09/16/19, 10:37 AM.

## 2019-09-16 NOTE — PLAN OF CARE
Problem: Patient Care Overview  Goal: Plan of Care Review  Outcome: Ongoing (interventions implemented as appropriate)   09/16/19 6090   Coping/Psychosocial   Plan of Care Reviewed With patient   Plan of Care Review   Progress no change   OTHER   Outcome Summary 3L NC, VSS, rojas remains in place, pt ambulates to the bathroom, will continue to monitor for changes.

## 2019-09-16 NOTE — PLAN OF CARE
Problem: Patient Care Overview  Goal: Plan of Care Review  Outcome: Ongoing (interventions implemented as appropriate)   09/16/19 1040   Coping/Psychosocial   Plan of Care Reviewed With patient   SLP treatment completed. Will continue to address dysphagia through diet consistency adjustments and education. Please see note for further details and recommendations.

## 2019-09-16 NOTE — PLAN OF CARE
Problem: Patient Care Overview  Goal: Plan of Care Review  Outcome: Ongoing (interventions implemented as appropriate)   09/16/19 8113   Coping/Psychosocial   Plan of Care Reviewed With patient   Plan of Care Review   Progress improving   OTHER   Outcome Summary Pt ambulated 160 feet with CGA and RW. Pt required less assist with all functional mobility. Will continue to progress mobility as able.

## 2019-09-24 ENCOUNTER — TELEPHONE (OUTPATIENT)
Dept: INTERNAL MEDICINE | Facility: CLINIC | Age: 84
End: 2019-09-24

## 2019-09-24 NOTE — TELEPHONE ENCOUNTER
PT'S DAUGHTER CALLED STATING THAT PT WAS IN [facility] FOR 2 WEEKS FOR PROSTATE AND URINARY ISSUES AND IS NOW IN REHAB. SHE SAID WHILE IN THE HOSPITAL PT CONTRACTED A SLIGHT BIT OF PNEUMONIA AND HIS HEMOGLOBIN WENT WAY DOWN   SHE WANTS TO KNOW  HOW LONG THEY HAVE TO WAIT TO BE ABLE TO SEE YOU

## 2019-10-04 NOTE — PROGRESS NOTES
[facility]      Encounter Date:10/07/2019     Chaitanya Browne  [address] [facility] [address]  [unfilled]    2/23/1923    Dirk Russ MD    Chaitanya Browne is a 89 y.o. male.      Subjective:     Chief Complaint:  Establish Care (fluid overload)       HPI   Patient is a 89-year-old male with past medical history significant for hypertension, hyperlipidemia, asthma and BPH who [facility] for evaluation of fluid overload.  He was recently admitted 9/2-9/16/2019 with hematuria status post cystoscopy with evacuation of clot and TURP.  He had a chest x-ray ordered for dyspnea that showed bilateral possible infiltrates with effusion and CT scan was concerning for possible early pneumonia.  Patient was discharged to the [facility].  His daughter reports that he has had a chronic cough and chest congestion since prior to hospitalization.  She feels the cough may be getting worse.  It is typically nonproductive but he has audible chest congestion.  He denies shortness of breath or orthopnea, but sleeps on an inclined mattress.  Daughter notes worsening bilateral lower extremity swelling.  He previously was on Lasix and recently was changed to Bumex, which was at 1mg decreased to 0.5 mg after worsening renal insufficiency.  He notes poor appetite and fatigue/weakness.  His daughter believes that he is gained 4 pounds as he is been at the [facility]. He has had 2 CXRs at the [facility] due to persistent chest congestion and recently started on augmentin. He has been on multiple antibiotics for possible PNA. He did aspirate on a pill prior to hospitalization. Denies dysphagia.  He also does breathing treatments    Patient Active Problem List   Diagnosis   • Facial basal cell cancer   • Hyperlipidemia LDL goal <100   • Essential hypertension   • ASHD (arteriosclerotic heart disease)   • Gait abnormality   • Impaired fasting glucose   • Polyp, colonic   •  Vitamin D deficiency   • Low back pain at multiple sites   • Proteinuria   • Right carpal tunnel syndrome   • Obesity (BMI 30-39.9)   • Hematuria, unspecified   • Medicare annual wellness visit, subsequent   • Edema   • Cough   • Morbid obesity due to excess calories (CMS/HCC)   • Asthma   • Allergic rhinitis   • Gross hematuria   • Acute UTI (urinary tract infection)   • Acute renal insufficiency   • Elevated troponin   • Bronchitis   • Acute urinary retention   • Possible pneumonia of left lower lobe due to infectious organism (CMS/HCC)   • Debility   • Dysphagia   • BPH with obstruction/lower urinary tract symptoms   • Prostatitis       Past Medical History:   Diagnosis Date   • Aortic valve sclerosis    • Basal cell carcinoma (BCC) of left side of nose    • BPH (benign prostatic hyperplasia)    • Cataracts, bilateral     bilateralcataract extraction and lens implantation 2013   • Diastolic dysfunction    • High cholesterol    • History of disease     bilateral lacrimal gland dysfunction per Dr Fajardo   • Hypertension    • Infection     infected big toe; I and D DR Alvares 2013   • Pneumonia 2009       Past Surgical History:   Procedure Laterality Date   • APPENDECTOMY     • CATARACT EXTRACTION, BILATERAL  2013    and lens implantation   • CYSTOSCOPY N/A 9/6/2019    Procedure: CYSTOSCOPY;  Surgeon: Ck Woods MD;  Location: [facility];  Service: Urology   • CYSTOSCOPY TRANSURETHRAL RESECTION OF PROSTATE  1989   • CYSTOSCOPY TRANSURETHRAL RESECTION OF PROSTATE N/A 9/11/2019    Procedure: CYSTOSCOPY TRANSURETHRAL RESECTION OF PROSTATE;  Surgeon: Ck Woods MD;  Location: [facility];  Service: Urology   • HEAD & NECK SKIN LESION EXCISIONAL BIOPSY      Basal cell removed from nose    • INCISION AND DRAINAGE FOOT  2013    infected big toe Dr Alvares   • TRANSURETHRAL RESECTION OF BLADDER TUMOR N/A 9/6/2019    Procedure: EVACUATION OF BLOOD CLOT, FULGERATION OF PROSTATE;  Surgeon: Ck Woods MD;   Location: [facility] OR;  Service: Urology       Family History   Problem Relation Age of Onset   • Coronary artery disease Father    • Coronary artery disease Brother    • Stroke Brother        Social History     Socioeconomic History   • Marital status:      Spouse name: Not on file   • Number of children: Not on file   • Years of education: Not on file   • Highest education level: Not on file   Tobacco Use   • Smoking status: Never Smoker   • Smokeless tobacco: Never Used   Substance and Sexual Activity   • Alcohol use: No     Frequency: Never   • Drug use: No   • Sexual activity: Not Currently   Social History Narrative    Retired [employer] no caffeine use       No Known Allergies      Current Outpatient Medications:   •  acetaminophen (TYLENOL) 325 MG tablet, Take 2 tablets by mouth Every 6 (Six) Hours As Needed for Mild Pain ., Disp: , Rfl:   •  aspirin (ASPIRIN LOW DOSE) 81 MG tablet, Take 40.5 mg by mouth Every Other Day. Pt takes 1/2 tab every other day, Disp: , Rfl:   •  azelastine (ASTELIN) 0.1 % nasal spray, 2 sprays into the nostril(s) as directed by provider 2 (Two) Times a Day., Disp: 30 mL, Rfl: 11  •  bumetanide (BUMEX) 0.5 MG tablet, Take 0.5 mg by mouth Daily., Disp: , Rfl:   •  Camphor-Menthol 0.2-3.5 % gel, As Needed., Disp: , Rfl:   •  cefpodoxime (VANTIN) 200 MG tablet, Take 1 tablet by mouth Every 12 (Twelve) Hours., Disp: , Rfl:   •  Cholecalciferol (VITAMIN D3) 1000 units capsule, Take  by mouth., Disp: , Rfl:   •  docusate sodium (COLACE) 100 MG capsule, Take 1 capsule by mouth Daily., Disp: , Rfl:   •  docusate sodium 100 MG capsule, Take 100 mg by mouth 2 (Two) Times a Day., Disp: , Rfl:   •  finasteride (PROSCAR) 5 MG tablet, Take 1 tablet by mouth Daily., Disp: , Rfl:   •  fluocinonide (LIDEX) 0.05 % external solution, Apply 1 application topically to the appropriate area as directed As Needed., Disp: , Rfl:   •  guaiFENesin (ROBITUSSIN) 100 MG/5ML solution oral solution,  Take 10 mL by mouth Every 6 (Six) Hours As Needed (cough)., Disp: , Rfl:   •  guaiFENesin 200 MG tablet, Take 1 tablet by mouth Every 4 (Four) Hours., Disp: , Rfl:   •  ipratropium-albuterol (DUO-NEB) 0.5-2.5 mg/3 ml nebulizer, Take 3 mL by nebulization 4 (Four) Times a Day., Disp: 360 mL, Rfl:   •  ketoconazole (NIZORAL) 2 % cream, Apply 1 application topically to the appropriate area as directed As Needed., Disp: , Rfl:   •  Lactobacillus (PROBIOTIC ACIDOPHILUS) capsule, Take 1 capsule by mouth Daily., Disp: , Rfl:   •  lactobacillus acidophilus (RISAQUAD) capsule capsule, Take 1 capsule by mouth Daily., Disp: , Rfl:   •  melatonin 5 MG tablet tablet, Take 1 tablet by mouth At Night As Needed (sleep)., Disp: , Rfl:   •  Multiple Vitamin (MULTI-VITAMIN DAILY) tablet, Take  by mouth Daily., Disp: , Rfl:   •  Multiple Vitamins-Minerals (CENTRUM FLAVOR BURST ADULT PO), Take 1 tablet by mouth Daily., Disp: , Rfl:   •  ondansetron (ZOFRAN) 4 MG tablet, Take 1 tablet by mouth Every 6 (Six) Hours As Needed for Nausea or Vomiting., Disp: , Rfl:   •  ondansetron ODT (ZOFRAN-ODT) 4 MG disintegrating tablet, Take 2 tablets by mouth Every 12 (Twelve) Hours., Disp: , Rfl:   •  polyethylene glycol (MIRALAX) pack packet, Take 17 g by mouth Daily As Needed (constipation)., Disp: , Rfl:   •  pravastatin (PRAVACHOL) 40 MG tablet, Take 1 tablet by mouth Daily., Disp: , Rfl:   •  tamsulosin (FLOMAX) 0.4 MG capsule 24 hr capsule, Take 1 capsule by mouth Daily., Disp: , Rfl:   •  traZODone (DESYREL) 50 MG tablet, Take 1 tablet by mouth Daily., Disp: , Rfl:   •  tuberculin (APLISOL) 5 UNIT/0.1ML injection, Aplisol 5 tub. unit/0.1 mL intradermal injection solution  Inject by intradermal route., Disp: , Rfl:     The following portions of the patient's history were reviewed today and updated as appropriate: allergies, current medications, past family history, past medical history, past social history, past surgical history and problem list  "    Review of Systems   Constitution: Positive for weakness and malaise/fatigue. Negative for chills and fever.   HENT: Positive for congestion.    Eyes: Negative.    Cardiovascular: Positive for dyspnea on exertion, leg swelling, orthopnea and paroxysmal nocturnal dyspnea. Negative for chest pain, claudication, cyanosis, irregular heartbeat, near-syncope, palpitations and syncope.   Respiratory: Positive for cough, shortness of breath and wheezing. Negative for snoring.    Endocrine: Negative.    Hematologic/Lymphatic: Bruises/bleeds easily.   Skin: Negative for poor wound healing.   Musculoskeletal: Negative.    Gastrointestinal: Positive for constipation. Negative for abdominal pain, heartburn, hematemesis, melena, nausea and vomiting.   Genitourinary: Negative.  Negative for hematuria.   Psychiatric/Behavioral: Negative.    Allergic/Immunologic: Negative.        Objective:     Vitals:    10/07/19 1055 10/07/19 1057   BP: 118/63 120/62   BP Location: Right arm Left arm   Patient Position: Sitting Sitting   Cuff Size: Adult Adult   Pulse: 94 96   Resp: 18    Temp: 97.8 °F (36.6 °C)    TempSrc: Temporal    SpO2: 95% 94%   Weight: 82.1 kg (181 lb)    Height: 165.1 cm (65\")        Physical Exam   Constitutional: He is oriented to person, place, and time. He appears well-developed and well-nourished. No distress.   HENT:   Head: Normocephalic.   Eyes: Conjunctivae are normal. Pupils are equal, round, and reactive to light.   Neck: Neck supple. No JVD present. No thyromegaly present.   Cardiovascular: Normal rate, regular rhythm and intact distal pulses. Exam reveals no gallop and no friction rub.   Murmur heard.  Pulmonary/Chest: Effort normal and breath sounds normal. No respiratory distress. He has no wheezes. He has no rales. He exhibits no tenderness.   Abdominal: Soft. Bowel sounds are normal.   Musculoskeletal: Normal range of motion. He exhibits edema (3+ BLE).   Neurological: He is alert and oriented to " person, place, and time.   Skin: Skin is warm and dry.   Psychiatric: He has a normal mood and affect. His behavior is normal. Thought content normal.   Vitals reviewed.      Lab and Diagnostic Review:  Echo 9/3/19  · Mild-to-moderate mitral valve regurgitation is present.  · Estimated EF = 72%.  · Left ventricular systolic function is hyperdynamic (EF > 70).  · Left ventricular diastolic dysfunction (grade I) consistent with impaired relaxation.  · Left ventricular wall thickness is consistent with mild concentric hypertrophy.  · Left atrial cavity size is borderline dilated.  · Normal right ventricular cavity size, wall thickness, systolic function and septal motion noted.  · Mild mitral valve stenosis is present with functional MAC.  · The aortic valve exhibits moderate sclerosis without stenosis.  · No evidence of pulmonary hypertension is present.  · There is no evidence of pericardial effusion.     Lab Results   Component Value Date    GLUCOSE 161 (H) 09/16/2019    CALCIUM 8.6 09/16/2019     09/16/2019    K 4.4 09/16/2019    CO2 27.0 09/16/2019     09/16/2019    BUN 14 09/16/2019    CREATININE 0.80 09/16/2019    EGFRIFAFRI 109 09/16/2019    EGFRIFNONA 90 09/16/2019    BCR 17.5 09/16/2019    ANIONGAP 11.0 09/16/2019         Assessment and Plan:   1. Acute diastolic congestive heart failure (CMS/HCC)  IV diuresis today in office. Patient received 40mg Lasix today through a butterfly in the RAC over slow IV push. During IV diuresis, vitals were monitored and stable. Please see IV diuresis record for those vitals. Patient voided 150ml in the office prior to discharge from the office. Butterfly was d/c'd and area was free of erythema, ecchymosis, or drainage.  Patient will receive a follow up call from the HF center in 24 hours to evaluate urinary output and reassess signs and symptoms.   HR elevated today, likely secondary to anemia and FVO. May benefit from beta blocker if elevated HR contributing to  FVO. However, prone to hypotension so would need to make sure BP stable   - BNP  - Basic Metabolic Panel    2. Essential hypertension  Controlled without medications. Prone to hypotension    3. VHD (valvular heart disease)  Mild to moderate MR, mild mitral valve stenosis    4. Anemia, unspecified type  CBC this morning  Likely contributing to FVO    5. Bronchitis  Currently on antibiotic. ? Aspiration PNA treated in the hospital  Recommend pulmonary evaluation for chronic cough/chest congestion. Hopefully diuresis will help    Keep fu with Dr. Ledbetter  Further plans pending how he responds to IV diuresis    It has been a pleasure to participate in the care of this patient.  Patient was instructed to call the [facility] with any questions, concerns, or worsening symptoms.    *Please note that portions of this note were completed with a voice recognition program. Efforts were made to edit the dictations, but occasionally words are mistranscribed.

## 2019-10-07 ENCOUNTER — OFFICE VISIT (OUTPATIENT)
Dept: CARDIOLOGY | Facility: HOSPITAL | Age: 84
End: 2019-10-07

## 2019-10-07 VITALS
OXYGEN SATURATION: 94 % | RESPIRATION RATE: 18 BRPM | TEMPERATURE: 97.8 F | SYSTOLIC BLOOD PRESSURE: 120 MMHG | BODY MASS INDEX: 30.16 KG/M2 | HEIGHT: 65 IN | DIASTOLIC BLOOD PRESSURE: 62 MMHG | HEART RATE: 96 BPM | WEIGHT: 181 LBS

## 2019-10-07 DIAGNOSIS — D64.9 ANEMIA, UNSPECIFIED TYPE: ICD-10-CM

## 2019-10-07 DIAGNOSIS — I50.31 ACUTE DIASTOLIC CONGESTIVE HEART FAILURE (HCC): Primary | ICD-10-CM

## 2019-10-07 DIAGNOSIS — I38 VHD (VALVULAR HEART DISEASE): ICD-10-CM

## 2019-10-07 DIAGNOSIS — I10 ESSENTIAL HYPERTENSION: ICD-10-CM

## 2019-10-07 DIAGNOSIS — J40 BRONCHITIS: ICD-10-CM

## 2019-10-07 LAB
ANION GAP SERPL CALCULATED.3IONS-SCNC: 13.5 MMOL/L (ref 5–15)
BUN BLD-MCNC: 23 MG/DL (ref 8–23)
BUN/CREAT SERPL: 13.5 (ref 7–25)
CALCIUM SPEC-SCNC: 9 MG/DL (ref 8.2–9.6)
CHLORIDE SERPL-SCNC: 97 MMOL/L (ref 98–107)
CO2 SERPL-SCNC: 29.5 MMOL/L (ref 22–29)
CREAT BLD-MCNC: 1.71 MG/DL (ref 0.76–1.27)
GFR SERPL CREATININE-BSD FRML MDRD: 37 ML/MIN/1.73
GFR SERPL CREATININE-BSD FRML MDRD: 45 ML/MIN/1.73
GLUCOSE BLD-MCNC: 95 MG/DL (ref 65–99)
NT-PROBNP SERPL-MCNC: 637.9 PG/ML (ref 5–1800)
POTASSIUM BLD-SCNC: 4.5 MMOL/L (ref 3.5–5.2)
SODIUM BLD-SCNC: 140 MMOL/L (ref 136–145)

## 2019-10-07 PROCEDURE — 83880 ASSAY OF NATRIURETIC PEPTIDE: CPT | Performed by: NURSE PRACTITIONER

## 2019-10-07 PROCEDURE — 99214 OFFICE O/P EST MOD 30 MIN: CPT | Performed by: NURSE PRACTITIONER

## 2019-10-07 PROCEDURE — 80048 BASIC METABOLIC PNL TOTAL CA: CPT | Performed by: NURSE PRACTITIONER

## 2019-10-07 RX ORDER — TRAZODONE HYDROCHLORIDE 50 MG/1
1 TABLET ORAL DAILY
COMMUNITY
End: 2019-11-25 | Stop reason: ALTCHOICE

## 2019-10-07 RX ORDER — FINASTERIDE 5 MG/1
1 TABLET, FILM COATED ORAL DAILY
COMMUNITY
End: 2019-10-07

## 2019-10-07 RX ORDER — CHOLECALCIFEROL (VITAMIN D3) 125 MCG
1 CAPSULE ORAL DAILY
COMMUNITY
End: 2020-02-05 | Stop reason: ALTCHOICE

## 2019-10-07 RX ORDER — BUMETANIDE 0.5 MG/1
1 TABLET ORAL DAILY
COMMUNITY
End: 2019-11-01 | Stop reason: DRUGHIGH

## 2019-10-07 RX ORDER — CEFPODOXIME PROXETIL 200 MG/1
1 TABLET, FILM COATED ORAL EVERY 12 HOURS
COMMUNITY
End: 2019-11-01

## 2019-10-07 RX ORDER — POLYETHYLENE GLYCOL 3350 17 G/17G
17 POWDER, FOR SOLUTION ORAL AS NEEDED
COMMUNITY
End: 2019-10-07

## 2019-10-07 RX ORDER — FLUOCINONIDE TOPICAL SOLUTION USP, 0.05% 0.5 MG/ML
1 SOLUTION TOPICAL AS NEEDED
COMMUNITY
Start: 2019-08-30 | End: 2019-11-01

## 2019-10-07 RX ORDER — DOCUSATE SODIUM 100 MG/1
1 CAPSULE, LIQUID FILLED ORAL DAILY
COMMUNITY
End: 2019-11-01 | Stop reason: SDUPTHER

## 2019-10-07 RX ORDER — ONDANSETRON 4 MG/1
2 TABLET, ORALLY DISINTEGRATING ORAL EVERY 12 HOURS
COMMUNITY
End: 2019-11-25 | Stop reason: ALTCHOICE

## 2019-10-07 RX ORDER — KETOCONAZOLE 20 MG/G
1 CREAM TOPICAL AS NEEDED
COMMUNITY
Start: 2019-08-30 | End: 2019-11-01

## 2019-10-07 RX ORDER — TAMSULOSIN HYDROCHLORIDE 0.4 MG/1
1 CAPSULE ORAL DAILY
COMMUNITY
End: 2019-11-25 | Stop reason: ALTCHOICE

## 2019-10-07 RX ORDER — PRAVASTATIN SODIUM 40 MG
1 TABLET ORAL DAILY
COMMUNITY
End: 2021-02-16 | Stop reason: SDUPTHER

## 2019-10-07 RX ORDER — GUAIFENESIN 200 MG/1
1 TABLET ORAL EVERY 4 HOURS
COMMUNITY
End: 2019-11-01

## 2019-10-08 ENCOUNTER — TELEPHONE (OUTPATIENT)
Dept: CARDIOLOGY | Facility: HOSPITAL | Age: 84
End: 2019-10-08

## 2019-10-08 NOTE — TELEPHONE ENCOUNTER
Results for orders placed or performed in visit on 10/07/19   BNP   Result Value Ref Range    proBNP 637.9 5.0-1,800.0 pg/mL   Basic Metabolic Panel   Result Value Ref Range    Glucose 95 65 - 99 mg/dL    BUN 23 8 - 23 mg/dL    Creatinine 1.71 (H) 0.76 - 1.27 mg/dL    Sodium 140 136 - 145 mmol/L    Potassium 4.5 3.5 - 5.2 mmol/L    Chloride 97 (L) 98 - 107 mmol/L    CO2 29.5 (H) 22.0 - 29.0 mmol/L    Calcium 9.0 8.2 - 9.6 mg/dL    eGFR  African Amer 45 (L) >60 mL/min/1.73    eGFR Non African Amer 37 (L) >60 mL/min/1.73    BUN/Creatinine Ratio 13.5 7.0 - 25.0    Anion Gap 13.5 5.0 - 15.0 mmol/L     Called patient's daughter over the phone. She has not seen him yet to see if he is doing better after IV diuresis. Based on his labs, he actually looks dry. He is going to see pulmonary tomorrow. Sent order to the [facility] to hold bumex for 3 days and resume at 0.5mg daily. Repeat BMP on Monday. Continue protein supplementation in diet. Swelling likely exacerbated by hypoalbuminemia

## 2019-10-09 ENCOUNTER — OFFICE VISIT (OUTPATIENT)
Dept: PULMONOLOGY | Facility: CLINIC | Age: 84
End: 2019-10-09

## 2019-10-09 VITALS
DIASTOLIC BLOOD PRESSURE: 78 MMHG | HEART RATE: 91 BPM | HEIGHT: 66 IN | OXYGEN SATURATION: 93 % | TEMPERATURE: 98.8 F | WEIGHT: 181 LBS | SYSTOLIC BLOOD PRESSURE: 122 MMHG | BODY MASS INDEX: 29.09 KG/M2

## 2019-10-09 DIAGNOSIS — R13.10 DYSPHAGIA, UNSPECIFIED TYPE: ICD-10-CM

## 2019-10-09 DIAGNOSIS — J30.89 SEASONAL ALLERGIC RHINITIS DUE TO OTHER ALLERGIC TRIGGER: ICD-10-CM

## 2019-10-09 DIAGNOSIS — E43 EDEMA DUE TO MALNUTRITION, DUE TO UNSPECIFIED MALNUTRITION TYPE (HCC): ICD-10-CM

## 2019-10-09 DIAGNOSIS — I50.32 CHRONIC DIASTOLIC CHF (CONGESTIVE HEART FAILURE) (HCC): ICD-10-CM

## 2019-10-09 DIAGNOSIS — D50.0 IRON DEFICIENCY ANEMIA DUE TO CHRONIC BLOOD LOSS: ICD-10-CM

## 2019-10-09 DIAGNOSIS — R05.9 COUGH: Primary | ICD-10-CM

## 2019-10-09 PROBLEM — E66.01 MORBID OBESITY DUE TO EXCESS CALORIES: Status: RESOLVED | Noted: 2019-04-16 | Resolved: 2019-10-09

## 2019-10-09 PROBLEM — J18.9 PNEUMONIA OF LEFT LOWER LOBE DUE TO INFECTIOUS ORGANISM: Status: RESOLVED | Noted: 2019-09-13 | Resolved: 2019-10-09

## 2019-10-09 PROBLEM — J45.909 ASTHMA: Status: RESOLVED | Noted: 2019-04-16 | Resolved: 2019-10-09

## 2019-10-09 PROBLEM — R77.8 ELEVATED TROPONIN: Status: RESOLVED | Noted: 2019-09-03 | Resolved: 2019-10-09

## 2019-10-09 PROBLEM — R79.89 ELEVATED TROPONIN: Status: RESOLVED | Noted: 2019-09-03 | Resolved: 2019-10-09

## 2019-10-09 PROBLEM — J40 BRONCHITIS: Status: RESOLVED | Noted: 2019-09-04 | Resolved: 2019-10-09

## 2019-10-09 PROCEDURE — 94729 DIFFUSING CAPACITY: CPT | Performed by: INTERNAL MEDICINE

## 2019-10-09 PROCEDURE — 99204 OFFICE O/P NEW MOD 45 MIN: CPT | Performed by: INTERNAL MEDICINE

## 2019-10-09 PROCEDURE — 94726 PLETHYSMOGRAPHY LUNG VOLUMES: CPT | Performed by: INTERNAL MEDICINE

## 2019-10-09 PROCEDURE — 94375 RESPIRATORY FLOW VOLUME LOOP: CPT | Performed by: INTERNAL MEDICINE

## 2019-10-09 NOTE — PROGRESS NOTES
New Patient Pulmonary Office Visit      Patient Name: Chaitanya Browne    Referring Physician: Fabricio Zavala MD    Chief Complaint:    Chief Complaint   Patient presents with   • Cough       History of Present Illness: Chaitanya Browne is a 89 y.o. male who is here today to establish care with Pulmonary.  He has a past medical history significant for aortic valve sclerosis, diastolic dysfunction, hypertension and aspiration events.  Who was recently hospitalized back on September 16, 2019 with gross hematuria and urinary obstruction.  This is believed to be secondary to urinary tract infection/possible prostatitis.  During that hospitalization he underwent continuous bladder irrigation.  He had a CT scan done of the abdomen during the admission and which showed a left lower lobe infiltrate concerning for possible pneumonia.  He was treated with doxycycline.  As well after the admission he continued to have a cough which is what he is here for today.  He was seen by his cardiologist on 10/7/2019.  Patient was started on Bumex at that time, and an echo was ordered, and the BNP was normal.  He has had history of aspiration events, and was seen by speech pathology during his admission and there was concern for aspiration, but he refused a barium swallow.    He was referred to pulmonary for evaluation of his cough.  He notes the cough has been present for 5 weeks since he was hospitalized.  The cough is minimally productive, worse in the morning.  He has been on Flonase for the last 5 weeks without any significant improvement.  He was treated for pneumonia back in September during his hematuria hospitalization.  He denies any fevers, chest pain, shortness of breath, or abdominal pain.  He denies any orthopnea.  He does note some lower extremity edema that has been present since his hospitalization.  He also notes that he has had significant problems with anemia ever since the hospitalization as well, and has not  had a hemoglobin higher than 8 over the last 5 weeks.  But he denies any recent bleeding episodes.    Review of Systems:   Review of Systems   Constitutional: Negative for activity change, appetite change, chills and diaphoresis.   HENT: Positive for postnasal drip. Negative for congestion, sinus pressure and voice change.    Eyes: Negative for blurred vision.   Respiratory: Positive for cough and choking. Negative for shortness of breath and wheezing.    Cardiovascular: Positive for leg swelling. Negative for chest pain.   Gastrointestinal: Negative for abdominal pain.   Musculoskeletal: Negative for myalgias.   Skin: Negative for color change and dry skin.   Allergic/Immunologic: Negative for environmental allergies.   Neurological: Negative for weakness and confusion.   Hematological: Negative for adenopathy.   Psychiatric/Behavioral: Negative for sleep disturbance and depressed mood.       Past Medical History:   Past Medical History:   Diagnosis Date   • Aortic valve sclerosis    • Basal cell carcinoma (BCC) of left side of nose    • BPH (benign prostatic hyperplasia)    • Cataracts, bilateral     bilateralcataract extraction and lens implantation 2013   • Diastolic dysfunction    • High cholesterol    • History of disease     bilateral lacrimal gland dysfunction per Dr Fajardo   • Hypertension    • Infection     infected big toe; I and D DR Alvares 2013   • Pneumonia 2009       Past Surgical History:   Past Surgical History:   Procedure Laterality Date   • APPENDECTOMY     • CATARACT EXTRACTION, BILATERAL  2013    and lens implantation   • CYSTOSCOPY N/A 9/6/2019    Procedure: CYSTOSCOPY;  Surgeon: Ck Woods MD;  Location: [facility] OR;  Service: Urology   • CYSTOSCOPY TRANSURETHRAL RESECTION OF PROSTATE  1989   • CYSTOSCOPY TRANSURETHRAL RESECTION OF PROSTATE N/A 9/11/2019    Procedure: CYSTOSCOPY TRANSURETHRAL RESECTION OF PROSTATE;  Surgeon: Ck Woods MD;  Location: [facility] OR;  Service:  Urology   • HEAD & NECK SKIN LESION EXCISIONAL BIOPSY      Basal cell removed from nose    • INCISION AND DRAINAGE FOOT  2013    infected big toe Dr Alvares   • TRANSURETHRAL RESECTION OF BLADDER TUMOR N/A 9/6/2019    Procedure: EVACUATION OF BLOOD CLOT, FULGERATION OF PROSTATE;  Surgeon: Ck Woods MD;  Location: [facility];  Service: Urology       Family History:   Family History   Problem Relation Age of Onset   • Coronary artery disease Father    • Coronary artery disease Brother    • Stroke Brother        Social History:   Social History     Socioeconomic History   • Marital status:      Spouse name: Not on file   • Number of children: Not on file   • Years of education: Not on file   • Highest education level: Not on file   Tobacco Use   • Smoking status: Never Smoker   • Smokeless tobacco: Never Used   Substance and Sexual Activity   • Alcohol use: No     Frequency: Never   • Drug use: No   • Sexual activity: Not Currently   Social History Narrative    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 Pt takes 1/2 tab every other day, Disp: , Rfl:   •  azelastine (ASTELIN) 0.1 % nasal spray, 2 sprays into the nostril(s) as directed by provider 2 (Two) Times a Day., Disp: 30 mL, Rfl: 11  •  bumetanide (BUMEX) 0.5 MG tablet, Take 0.5 mg by mouth Daily., Disp: , Rfl:   •  Camphor-Menthol 0.2-3.5 % gel, As Needed., Disp: , Rfl:   •  cefpodoxime (VANTIN) 200 MG tablet, Take 1 tablet by mouth Every 12 (Twelve) Hours., Disp: , Rfl:   •  Cholecalciferol (VITAMIN D3) 1000 units capsule, Take  by mouth., Disp: , Rfl:   •  docusate sodium (COLACE) 100 MG capsule, Take 1 capsule by mouth Daily., Disp: , Rfl:   •  docusate sodium 100 MG capsule, Take 100  mg by mouth 2 (Two) Times a Day., Disp: , Rfl:   •  finasteride (PROSCAR) 5 MG tablet, Take 1 tablet by mouth Daily., Disp: , Rfl:   •  fluocinonide (LIDEX) 0.05 % external solution, Apply 1 application topically to the appropriate area as directed As Needed., Disp: , Rfl:   •  guaiFENesin (ROBITUSSIN) 100 MG/5ML solution oral solution, Take 10 mL by mouth Every 6 (Six) Hours As Needed (cough)., Disp: , Rfl:   •  guaiFENesin 200 MG tablet, Take 1 tablet by mouth Every 4 (Four) Hours., Disp: , Rfl:   •  ketoconazole (NIZORAL) 2 % cream, Apply 1 application topically to the appropriate area as directed As Needed., Disp: , Rfl:   •  Lactobacillus (PROBIOTIC ACIDOPHILUS) capsule, Take 1 capsule by mouth Daily., Disp: , Rfl:   •  lactobacillus acidophilus (RISAQUAD) capsule capsule, Take 1 capsule by mouth Daily., Disp: , Rfl:   •  melatonin 5 MG tablet tablet, Take 1 tablet by mouth At Night As Needed (sleep)., Disp: , Rfl:   •  Multiple Vitamin (MULTI-VITAMIN DAILY) tablet, Take  by mouth Daily., Disp: , Rfl:   •  Multiple Vitamins-Minerals (CENTRUM FLAVOR BURST ADULT PO), Take 1 tablet by mouth Daily., Disp: , Rfl:   •  ondansetron (ZOFRAN) 4 MG tablet, Take 1 tablet by mouth Every 6 (Six) Hours As Needed for Nausea or Vomiting., Disp: , Rfl:   •  ondansetron ODT (ZOFRAN-ODT) 4 MG disintegrating tablet, Take 2 tablets by mouth Every 12 (Twelve) Hours., Disp: , Rfl:   •  polyethylene glycol (MIRALAX) pack packet, Take 17 g by mouth Daily As Needed (constipation)., Disp: , Rfl:   •  pravastatin (PRAVACHOL) 40 MG tablet, Take 1 tablet by mouth Daily., Disp: , Rfl:   •  tamsulosin (FLOMAX) 0.4 MG capsule 24 hr capsule, Take 1 capsule by mouth Daily., Disp: , Rfl:   •  traZODone (DESYREL) 50 MG tablet, Take 1 tablet by mouth Daily., Disp: , Rfl:   •  tuberculin (APLISOL) 5 UNIT/0.1ML injection, Aplisol 5 tub. unit/0.1 mL intradermal injection solution  Inject by intradermal route., Disp: , Rfl:     Allergies:   No Known  "Allergies    Physical Exam:  Vital Signs:   Vitals:    10/09/19 1219   BP: 122/78   BP Location: Left arm   Patient Position: Sitting   Pulse: 91   Temp: 98.8 °F (37.1 °C)   SpO2: 93%  Comment: resting on room air   Weight: 82.1 kg (181 lb)   Height: 167.6 cm (66\")       Physical Exam   Constitutional: He is oriented to person, place, and time. He appears well-developed and well-nourished.   HENT:   Head: Normocephalic and atraumatic.   Nose: Nose normal.   Mouth/Throat: Oropharynx is clear and moist.   Eyes: Conjunctivae and EOM are normal. Pupils are equal, round, and reactive to light.   Neck: Normal range of motion. Neck supple.   Cardiovascular: Normal rate and regular rhythm. Exam reveals no gallop and no friction rub.   No murmur heard.  Pulmonary/Chest: Effort normal. No respiratory distress. He has no wheezes. He has rales.   Abdominal: Soft. Bowel sounds are normal.   Musculoskeletal: Normal range of motion. He exhibits edema.   Neurological: He is alert and oriented to person, place, and time.   Skin: Skin is warm and dry. No erythema.   Psychiatric: He has a normal mood and affect. His behavior is normal.   Nursing note and vitals reviewed.      Results Review:   - I personally reviewed the pts imaging from 9/12/2019 showed increased interstitial markings with left lower lobe airspace disease and bilateral pleural effusions.  Lung windows of CT scan of the abdomen from 9/3/2019 showed lower lobe bilateral dependent airspace disease.  - I personally reviewed the pts labs which was significant for a normal BNP  - I personally reviewed the pts PFT from 10/9/2019 were poor quality and uninterpretable.  - I personally reviewed the pts Echo from 9/3/2019 showed an EF of 72%, with hyperdynamic left ventricular systolic function, left ventricular hypertrophy, and grade 1 diastolic dysfunction.       Assessment / Plan:   Cough  -His cough is currently very acute in nature and is likely secondary to increased " volume status in the setting of poor nutritional status and diastolic heart failure, or also could be secondary to post infectious cough given recent possibility of pneumonia.  Although, the causes of chronic cough could also be in play which would include allergic rhinitis, and less likely asthma or reflux.   -I feel the most likely cause for his cough is secondary to increased volume status as noted above and allergic rhinitis.  -Currently do not feel that the patient has asthma he has never smoked as well as is never had a history of asthma, therefore would recommend to stop the albuterol nebs.  -I agree with the Flonase nightly, and recommended that he add a second generation antihistamine.  I would avoid any decongestants which would likely resolve his symptoms, but given his age and medical comorbidities I do not feel that this is will add much benefit.    Seasonal allergic rhinitis due to other allergic trigger  - Continue Flonase 1 puff per nostril nightly.  -Recommend to add second-generation antihistamine    Dysphagia, unspecified type  -Denies any recent symptoms of dysphagia, although it is possible he is having aspiration events which could also explain the lower lobe infiltrates and cough.  He is already seen speech pathology as an inpatient and had dietary modifications.  I recommend he continue with his dietary modifications.    Edema due to malnutrition, due to unspecified malnutrition type (CMS/HCC)  Chronic diastolic CHF (congestive heart failure) (CMS/HCC)  -It is difficult to tell whether or not his edema secondary to diastolic heart failure which was seen on his echo from 9/3/2019, versus malnutrition in the setting of his age with recent illnesses and anemia.  I agree with nutritional supplementation as you have been doing.  And close monitoring of diuretic therapy.  Most recently he is holding his Bumex which I agree with as his most recent creatinine was 1.7.    Anemia  -He was noted to be  8.1 at his last check in his nursing home records which I did review, I feel that this is likely secondary to acute blood loss other chronic illness is also possible.  I explained to him that in general red blood cells are produced every 90 days, so if there are no signs of active bleeding and his hemoglobin has not started to trend back up after 90 days that he could consider reevaluation with his PCP or hematologist.    I offered to make the patient a follow-up appointment for his cough in 2 to 3 months, but they preferred to call with an appointment if his cough did not resolve by January.    Follow Up:   Return if symptoms worsen or fail to improve.     COLLIN Avila, DO  Pulmonary and Critical Care Medicine  Note Electronically Signed    Please note that portions of this note may have been completed with a voice recognition program. Efforts were made to edit the dictations, but occasionally words are mistranscribed.

## 2019-11-01 ENCOUNTER — CONSULT (OUTPATIENT)
Dept: CARDIOLOGY | Facility: CLINIC | Age: 84
End: 2019-11-01

## 2019-11-01 ENCOUNTER — LAB (OUTPATIENT)
Dept: LAB | Facility: HOSPITAL | Age: 84
End: 2019-11-01

## 2019-11-01 VITALS
SYSTOLIC BLOOD PRESSURE: 112 MMHG | HEIGHT: 65 IN | HEART RATE: 92 BPM | DIASTOLIC BLOOD PRESSURE: 70 MMHG | BODY MASS INDEX: 28.62 KG/M2 | WEIGHT: 171.8 LBS

## 2019-11-01 DIAGNOSIS — I50.32 CHRONIC DIASTOLIC CHF (CONGESTIVE HEART FAILURE) (HCC): Primary | ICD-10-CM

## 2019-11-01 DIAGNOSIS — E43 EDEMA DUE TO MALNUTRITION, DUE TO UNSPECIFIED MALNUTRITION TYPE (HCC): ICD-10-CM

## 2019-11-01 DIAGNOSIS — I50.32 CHRONIC DIASTOLIC CHF (CONGESTIVE HEART FAILURE) (HCC): ICD-10-CM

## 2019-11-01 DIAGNOSIS — I10 ESSENTIAL HYPERTENSION: ICD-10-CM

## 2019-11-01 DIAGNOSIS — E78.5 HYPERLIPIDEMIA LDL GOAL <100: ICD-10-CM

## 2019-11-01 LAB
ANION GAP SERPL CALCULATED.3IONS-SCNC: 10.5 MMOL/L (ref 5–15)
BUN BLD-MCNC: 20 MG/DL (ref 8–23)
BUN/CREAT SERPL: 14.6 (ref 7–25)
CALCIUM SPEC-SCNC: 9.2 MG/DL (ref 8.2–9.6)
CHLORIDE SERPL-SCNC: 101 MMOL/L (ref 98–107)
CO2 SERPL-SCNC: 30.5 MMOL/L (ref 22–29)
CREAT BLD-MCNC: 1.37 MG/DL (ref 0.76–1.27)
DEPRECATED RDW RBC AUTO: 52.4 FL (ref 37–54)
ERYTHROCYTE [DISTWIDTH] IN BLOOD BY AUTOMATED COUNT: 15.8 % (ref 12.3–15.4)
GFR SERPL CREATININE-BSD FRML MDRD: 48 ML/MIN/1.73
GFR SERPL CREATININE-BSD FRML MDRD: 58 ML/MIN/1.73
GLUCOSE BLD-MCNC: 110 MG/DL (ref 65–99)
HCT VFR BLD AUTO: 35.3 % (ref 37.5–51)
HGB BLD-MCNC: 11.3 G/DL (ref 13–17.7)
MCH RBC QN AUTO: 29.5 PG (ref 26.6–33)
MCHC RBC AUTO-ENTMCNC: 32 G/DL (ref 31.5–35.7)
MCV RBC AUTO: 92.2 FL (ref 79–97)
PLATELET # BLD AUTO: 262 10*3/MM3 (ref 140–450)
PMV BLD AUTO: 11 FL (ref 6–12)
POTASSIUM BLD-SCNC: 4.3 MMOL/L (ref 3.5–5.2)
RBC # BLD AUTO: 3.83 10*6/MM3 (ref 4.14–5.8)
SODIUM BLD-SCNC: 142 MMOL/L (ref 136–145)
WBC NRBC COR # BLD: 4.99 10*3/MM3 (ref 3.4–10.8)

## 2019-11-01 PROCEDURE — 93000 ELECTROCARDIOGRAM COMPLETE: CPT | Performed by: INTERNAL MEDICINE

## 2019-11-01 PROCEDURE — 80048 BASIC METABOLIC PNL TOTAL CA: CPT

## 2019-11-01 PROCEDURE — 36415 COLL VENOUS BLD VENIPUNCTURE: CPT

## 2019-11-01 PROCEDURE — 99204 OFFICE O/P NEW MOD 45 MIN: CPT | Performed by: INTERNAL MEDICINE

## 2019-11-01 PROCEDURE — 85027 COMPLETE CBC AUTOMATED: CPT

## 2019-11-01 RX ORDER — FLUTICASONE PROPIONATE 50 MCG
2 SPRAY, SUSPENSION (ML) NASAL DAILY
COMMUNITY
End: 2019-11-25 | Stop reason: ALTCHOICE

## 2019-11-01 RX ORDER — DOXAZOSIN MESYLATE 4 MG/1
4 TABLET ORAL NIGHTLY
COMMUNITY
End: 2019-11-25 | Stop reason: SDUPTHER

## 2019-11-01 RX ORDER — DOXYCYCLINE HYCLATE 100 MG/1
100 CAPSULE ORAL 2 TIMES DAILY
COMMUNITY
End: 2019-11-25 | Stop reason: ALTCHOICE

## 2019-11-01 RX ORDER — METOLAZONE 2.5 MG/1
2.5 TABLET ORAL DAILY PRN
Qty: 30 TABLET | Refills: 11 | Status: SHIPPED | OUTPATIENT
Start: 2019-11-01 | End: 2019-11-25 | Stop reason: ALTCHOICE

## 2019-11-01 RX ORDER — BUMETANIDE 1 MG/1
1 TABLET ORAL DAILY
Qty: 90 TABLET | Refills: 3 | Status: SHIPPED | OUTPATIENT
Start: 2019-11-01 | End: 2019-12-18

## 2019-11-25 ENCOUNTER — LAB (OUTPATIENT)
Dept: LAB | Facility: HOSPITAL | Age: 84
End: 2019-11-25

## 2019-11-25 ENCOUNTER — HOSPITAL ENCOUNTER (OUTPATIENT)
Dept: GENERAL RADIOLOGY | Facility: HOSPITAL | Age: 84
Discharge: HOME | End: 2019-11-25
Admitting: INTERNAL MEDICINE

## 2019-11-25 ENCOUNTER — OFFICE VISIT (OUTPATIENT)
Dept: INTERNAL MEDICINE | Facility: CLINIC | Age: 84
End: 2019-11-25

## 2019-11-25 ENCOUNTER — TELEPHONE (OUTPATIENT)
Dept: INTERNAL MEDICINE | Facility: CLINIC | Age: 84
End: 2019-11-25

## 2019-11-25 VITALS
DIASTOLIC BLOOD PRESSURE: 68 MMHG | TEMPERATURE: 98.6 F | HEART RATE: 91 BPM | HEIGHT: 65 IN | WEIGHT: 172 LBS | BODY MASS INDEX: 28.66 KG/M2 | SYSTOLIC BLOOD PRESSURE: 115 MMHG

## 2019-11-25 DIAGNOSIS — I10 ESSENTIAL HYPERTENSION: ICD-10-CM

## 2019-11-25 DIAGNOSIS — I25.10 ASHD (ARTERIOSCLEROTIC HEART DISEASE): ICD-10-CM

## 2019-11-25 DIAGNOSIS — D50.0 IRON DEFICIENCY ANEMIA DUE TO CHRONIC BLOOD LOSS: ICD-10-CM

## 2019-11-25 DIAGNOSIS — I50.32 CHRONIC DIASTOLIC CHF (CONGESTIVE HEART FAILURE) (HCC): ICD-10-CM

## 2019-11-25 DIAGNOSIS — R05.9 COUGH: Primary | ICD-10-CM

## 2019-11-25 DIAGNOSIS — R05.9 COUGH: ICD-10-CM

## 2019-11-25 DIAGNOSIS — N28.9 ACUTE RENAL INSUFFICIENCY: ICD-10-CM

## 2019-11-25 DIAGNOSIS — R26.9 GAIT ABNORMALITY: ICD-10-CM

## 2019-11-25 LAB
BASOPHILS # BLD AUTO: 0.03 10*3/MM3 (ref 0–0.2)
BASOPHILS NFR BLD AUTO: 0.6 % (ref 0–1.5)
DEPRECATED RDW RBC AUTO: 51.6 FL (ref 37–54)
EOSINOPHIL # BLD AUTO: 0.07 10*3/MM3 (ref 0–0.4)
EOSINOPHIL NFR BLD AUTO: 1.4 % (ref 0.3–6.2)
ERYTHROCYTE [DISTWIDTH] IN BLOOD BY AUTOMATED COUNT: 15.7 % (ref 12.3–15.4)
HCT VFR BLD AUTO: 37.2 % (ref 37.5–51)
HGB BLD-MCNC: 12 G/DL (ref 13–17.7)
IMM GRANULOCYTES # BLD AUTO: 0.01 10*3/MM3 (ref 0–0.05)
IMM GRANULOCYTES NFR BLD AUTO: 0.2 % (ref 0–0.5)
LYMPHOCYTES # BLD AUTO: 0.73 10*3/MM3 (ref 0.7–3.1)
LYMPHOCYTES NFR BLD AUTO: 15.1 % (ref 19.6–45.3)
MCH RBC QN AUTO: 28.8 PG (ref 26.6–33)
MCHC RBC AUTO-ENTMCNC: 32.3 G/DL (ref 31.5–35.7)
MCV RBC AUTO: 89.2 FL (ref 79–97)
MONOCYTES # BLD AUTO: 0.8 10*3/MM3 (ref 0.1–0.9)
MONOCYTES NFR BLD AUTO: 16.6 % (ref 5–12)
NEUTROPHILS # BLD AUTO: 3.19 10*3/MM3 (ref 1.7–7)
NEUTROPHILS NFR BLD AUTO: 66.1 % (ref 42.7–76)
NRBC BLD AUTO-RTO: 0 /100 WBC (ref 0–0.2)
PLATELET # BLD AUTO: 283 10*3/MM3 (ref 140–450)
PMV BLD AUTO: 10.7 FL (ref 6–12)
RBC # BLD AUTO: 4.17 10*6/MM3 (ref 4.14–5.8)
WBC NRBC COR # BLD: 4.83 10*3/MM3 (ref 3.4–10.8)

## 2019-11-25 PROCEDURE — 85025 COMPLETE CBC W/AUTO DIFF WBC: CPT

## 2019-11-25 PROCEDURE — 83540 ASSAY OF IRON: CPT

## 2019-11-25 PROCEDURE — 83880 ASSAY OF NATRIURETIC PEPTIDE: CPT

## 2019-11-25 PROCEDURE — 71046 X-RAY EXAM CHEST 2 VIEWS: CPT

## 2019-11-25 PROCEDURE — 99215 OFFICE O/P EST HI 40 MIN: CPT | Performed by: INTERNAL MEDICINE

## 2019-11-25 PROCEDURE — 80053 COMPREHEN METABOLIC PANEL: CPT

## 2019-11-25 PROCEDURE — 84466 ASSAY OF TRANSFERRIN: CPT

## 2019-11-25 RX ORDER — DOXAZOSIN MESYLATE 4 MG/1
4 TABLET ORAL NIGHTLY
Qty: 90 TABLET | Refills: 3 | Status: SHIPPED | OUTPATIENT
Start: 2019-11-25 | End: 2020-02-05 | Stop reason: ALTCHOICE

## 2019-11-25 NOTE — PROGRESS NOTES
"No chief complaint on file.  follow up hospitalization    History of Present Illness  89 y.o. white male presents for follow up from the hospital.  Presented to emergency room with complaints of gross hematuria.  Found to have some acute renal insufficiency and clot in his bladder.  Continuous irrigation started and admitted to hospitalist service.\"  Patient has completed cystoscopy with evacuation of clot fulguration of prostate on 9/6 and TURP for BPH with obstruction and hematuria on 9/11.  He has been treated with ceftriaxone for possible urinary tract infection and possible prostatitis.  He also has chest x-ray for earlier dyspnea that showed bilateral possible infiltrate with effusion most notable on the right and CT scan was concerning for possible early pneumonia.  He was followed by Dr Woods for gross hematuria and had rojas but is ok now/ He is taking cardura and proscar. He has had some constipation with the iron for his anemia. He has seen Dr Ledbetter and his breathing is ok and on bumex. He has persistent cough and has taken antibiotics.     Review of Systems   Constitutional: Positive for fatigue. Negative for chills, diaphoresis and fever.   HENT: Positive for hearing loss and postnasal drip.    Eyes: Negative for visual disturbance.   Respiratory: Positive for cough. Negative for shortness of breath.    Cardiovascular: Positive for leg swelling (less leg swelling). Negative for chest pain and palpitations.   Gastrointestinal: Positive for constipation. Negative for abdominal pain, diarrhea and nausea.   Endocrine: Negative for polyuria.   Genitourinary: Negative for dysuria and hematuria.   Musculoskeletal: Positive for gait problem. Negative for arthralgias.   Allergic/Immunologic: Positive for environmental allergies.   Neurological: Positive for weakness. Negative for dizziness and headaches.   Hematological: Negative for adenopathy.   Psychiatric/Behavioral: Negative for dysphoric mood and sleep " "disturbance. The patient is not nervous/anxious.      All other ROS reviewed and negative.    PMSFH:  The following portions of the patient's history were reviewed and updated as appropriate: allergies, current medications, past family history, past medical history, past social history, past surgical history and problem list.      Current Outpatient Medications:   •  aspirin (ASPIRIN LOW DOSE) 81 MG tablet, Take 40.5 mg by mouth Every Other Day. Pt takes 1/2 tab every other day, Disp: , Rfl:   •  bumetanide (BUMEX) 1 MG tablet, Take 1 tablet by mouth Daily., Disp: 90 tablet, Rfl: 3  •  docusate sodium 100 MG capsule, Take 100 mg by mouth 2 (Two) Times a Day., Disp: , Rfl:   •  doxazosin (CARDURA) 4 MG tablet, Take 4 mg by mouth Every Night., Disp: , Rfl:   •  finasteride (PROSCAR) 5 MG tablet, Take 1 tablet by mouth Daily., Disp: , Rfl:   •  Lactobacillus (PROBIOTIC ACIDOPHILUS) capsule, Take 1 capsule by mouth Daily., Disp: , Rfl:   •  pravastatin (PRAVACHOL) 40 MG tablet, Take 1 tablet by mouth Daily., Disp: , Rfl:     VITALS:  /68 (BP Location: Left arm)   Pulse 91   Temp 98.6 °F (37 °C)   Ht 165.1 cm (65\")   Wt 78 kg (172 lb)   BMI 28.62 kg/m²     Physical Exam   Constitutional: He is oriented to person, place, and time. He appears well-developed and well-nourished.   HENT:   Head: Normocephalic and atraumatic.   Eyes: Conjunctivae are normal.   Neck: No JVD present.   Pulmonary/Chest: Effort normal. He has no wheezes.   Scatter rhonchi at bases   Abdominal: Soft. Bowel sounds are normal. There is no tenderness.   Musculoskeletal: He exhibits edema (1+ edema).   Lymphadenopathy:     He has no cervical adenopathy.   Neurological: He is alert and oriented to person, place, and time.   Skin: Skin is warm and dry.   Psychiatric: He has a normal mood and affect. His behavior is normal.       LABS:  Results for orders placed or performed in visit on 11/01/19   CBC (No Diff)   Result Value Ref Range    WBC " 4.99 3.40 - 10.80 10*3/mm3    RBC 3.83 (L) 4.14 - 5.80 10*6/mm3    Hemoglobin 11.3 (L) 13.0 - 17.7 g/dL    Hematocrit 35.3 (L) 37.5 - 51.0 %    MCV 92.2 79.0 - 97.0 fL    MCH 29.5 26.6 - 33.0 pg    MCHC 32.0 31.5 - 35.7 g/dL    RDW 15.8 (H) 12.3 - 15.4 %    RDW-SD 52.4 37.0 - 54.0 fl    MPV 11.0 6.0 - 12.0 fL    Platelets 262 140 - 450 10*3/mm3   Basic Metabolic Panel   Result Value Ref Range    Glucose 110 (H) 65 - 99 mg/dL    BUN 20 8 - 23 mg/dL    Creatinine 1.37 (H) 0.76 - 1.27 mg/dL    Sodium 142 136 - 145 mmol/L    Potassium 4.3 3.5 - 5.2 mmol/L    Chloride 101 98 - 107 mmol/L    CO2 30.5 (H) 22.0 - 29.0 mmol/L    Calcium 9.2 8.2 - 9.6 mg/dL    eGFR  African Amer 58 (L) >60 mL/min/1.73    eGFR Non African Amer 48 (L) >60 mL/min/1.73    BUN/Creatinine Ratio 14.6 7.0 - 25.0    Anion Gap 10.5 5.0 - 15.0 mmol/L       Procedures       Current outpatient and discharge medications have been reconciled for the patient.  Reviewed by: Dirk Russ MD  ASSESSMENT/PLAN:  Problem List Items Addressed This Visit        Cardiovascular and Mediastinum    Essential hypertension     Hypertension is unchanged.  Continue current treatment regimen.  Dietary sodium restriction.  Weight loss.  Regular aerobic exercise.  Blood pressure will be reassessed at the next regular appointment.         Relevant Orders    Comprehensive Metabolic Panel    ASHD (arteriosclerotic heart disease)     Coronary artery disease is improving with treatment.  Continue current treatment regimen.  Weight loss.  Regular aerobic exercise.  Cardiac status will be reassessed 2 months.         Chronic diastolic CHF (congestive heart failure) (CMS/HCC)     Congestive heart failure due to hypertension.  Heart failure is improving with treatment.  NYHA Class I.  Continue current treatment regimen.  Encouraged daily monitoring of the patient's weight.  Regular aerobic exercise.  Heart failure will be reassessed 2 months.         Relevant Orders    XR Chest PA  & Lateral    BNP       Respiratory    Cough - Primary     Likely resume the astelin and consider pepcid. Get chest xray.          Relevant Orders    XR Chest PA & Lateral       Genitourinary    Acute renal insufficiency     Renal condition is improving with treatment.  Continue current treatment regimen.  Monitor daily weight.  Return in about 2 months (around 1/25/2020) for Medicare Wellness, Labs this visit. up labs today and consider reducing the bumex.             Hematopoietic and Hemostatic    Iron deficiency anemia due to chronic blood loss     Improved and will check labs today. Stop iron.          Relevant Orders    CBC & Differential    Iron Profile       Other    Gait abnormality     Encourage to get up slowly and get bearings before taking first step. Keep home well lit with clear floors to avoid tripping. Use assist devices for all walking especially from bed to bathroom. Do PT and OT at [facility]. I have spent extensive time face to face with he and his daughter going over [place] and [place] and Dr Anatoly ramirez. Counseling was given to patient and family for the following topics: diagnostic results, instructions for management, risk factor reductions, prognosis, patient and family education and impressions . Total time of the encounter was 45 minutes and 35 minutes was spend counseling which was all face to face with he and daughter.                  FOLLOW-UP:  Return in about 2 months (around 1/25/2020) for Medicare Wellness, Labs this visit.      Electronically signed by:    Dirk Russ MD

## 2019-11-25 NOTE — ASSESSMENT & PLAN NOTE
Encourage to get up slowly and get bearings before taking first step. Keep home well lit with clear floors to avoid tripping. Use assist devices for all walking especially from bed to bathroom. Do PT and OT at [place]. I have spent extensive time face to face with he and his daughter going over [place] and [place] and Dr Anatoly ramirez. Counseling was given to patient and family for the following topics: diagnostic results, instructions for management, risk factor reductions, prognosis, patient and family education and impressions . Total time of the encounter was 45 minutes and 35 minutes was spend counseling which was all face to face with he and daughter.

## 2019-11-25 NOTE — ASSESSMENT & PLAN NOTE
Coronary artery disease is improving with treatment.  Continue current treatment regimen.  Weight loss.  Regular aerobic exercise.  Cardiac status will be reassessed 2 months.

## 2019-11-25 NOTE — ASSESSMENT & PLAN NOTE
Congestive heart failure due to hypertension.  Heart failure is improving with treatment.  NYHA Class I.  Continue current treatment regimen.  Encouraged daily monitoring of the patient's weight.  Regular aerobic exercise.  Heart failure will be reassessed 2 months.

## 2019-11-25 NOTE — ASSESSMENT & PLAN NOTE
Renal condition is improving with treatment.  Continue current treatment regimen.  Monitor daily weight.  Return in about 2 months (around 1/25/2020) for Medicare Wellness, Labs this visit. up labs today and consider reducing the bumex.

## 2019-11-26 LAB
ALBUMIN SERPL-MCNC: 3.9 G/DL (ref 3.5–5.2)
ALBUMIN/GLOB SERPL: 1.3 G/DL
ALP SERPL-CCNC: 76 U/L (ref 39–117)
ALT SERPL W P-5'-P-CCNC: 10 U/L (ref 1–41)
ANION GAP SERPL CALCULATED.3IONS-SCNC: 13.6 MMOL/L (ref 5–15)
AST SERPL-CCNC: 26 U/L (ref 1–40)
BILIRUB SERPL-MCNC: 0.5 MG/DL (ref 0.2–1.2)
BUN BLD-MCNC: 17 MG/DL (ref 8–23)
BUN/CREAT SERPL: 13.7 (ref 7–25)
CALCIUM SPEC-SCNC: 9.5 MG/DL (ref 8.2–9.6)
CHLORIDE SERPL-SCNC: 100 MMOL/L (ref 98–107)
CO2 SERPL-SCNC: 30.4 MMOL/L (ref 22–29)
CREAT BLD-MCNC: 1.24 MG/DL (ref 0.76–1.27)
GFR SERPL CREATININE-BSD FRML MDRD: 54 ML/MIN/1.73
GFR SERPL CREATININE-BSD FRML MDRD: 66 ML/MIN/1.73
GLOBULIN UR ELPH-MCNC: 2.9 GM/DL
GLUCOSE BLD-MCNC: 97 MG/DL (ref 65–99)
IRON 24H UR-MRATE: 36 MCG/DL (ref 59–158)
IRON SATN MFR SERPL: 9 % (ref 20–50)
NT-PROBNP SERPL-MCNC: 254 PG/ML (ref 5–1800)
POTASSIUM BLD-SCNC: 4 MMOL/L (ref 3.5–5.2)
PROT SERPL-MCNC: 6.8 G/DL (ref 6–8.5)
SODIUM BLD-SCNC: 144 MMOL/L (ref 136–145)
TIBC SERPL-MCNC: 416 MCG/DL (ref 298–536)
TRANSFERRIN SERPL-MCNC: 279 MG/DL (ref 200–360)

## 2019-12-02 ENCOUNTER — TELEPHONE (OUTPATIENT)
Dept: INTERNAL MEDICINE | Facility: CLINIC | Age: 84
End: 2019-12-02

## 2019-12-02 NOTE — TELEPHONE ENCOUNTER
"Pt daughter \"Nkechi\" called stating she needs a call regarding pt medication Bumetanide 1mg she stated dr Russ was going to discuss with Dr Ledbetter about taking pt off this med, please advise Nkechi and on medication Doxazosin 4mg she was wanting to let Dr Russ know she took pt off this medication his last dose was last Thursday , please call Nkechi @  [phone]  "

## 2019-12-02 NOTE — TELEPHONE ENCOUNTER
Tell them I emailed Dr Ledbetter and for them to reach out to him later this week for advice and I could see him back if not doing well

## 2019-12-03 ENCOUNTER — TELEPHONE (OUTPATIENT)
Dept: INTERNAL MEDICINE | Facility: CLINIC | Age: 84
End: 2019-12-03

## 2019-12-03 NOTE — TELEPHONE ENCOUNTER
The urine overall did not look bad. If he did a very clean catch I could do a urine culture  But if not suggest follow with Urology and have them do a clean catch  I will do  another urine if need be. I do not want to do urine culture if not a really good clean catch though

## 2019-12-03 NOTE — TELEPHONE ENCOUNTER
"Pt is here in office and was under the impression that he was suppose to come in for urine sample. I read to him what dr. Russ has said and he just asked if someone would give him a call if a urine sample is needed.   Cecilia who is with the pt came up to the window and stated that \"Nkechi\" wants pt checked for UTI. He is waiting in the lobby, please advise?  "

## 2019-12-03 NOTE — TELEPHONE ENCOUNTER
Pt's daughter states she will bring dad by to give a urine. She states she spoke with Katty about this.    Please advise

## 2019-12-18 ENCOUNTER — TELEPHONE (OUTPATIENT)
Dept: CARDIOLOGY | Facility: CLINIC | Age: 84
End: 2019-12-18

## 2019-12-18 NOTE — TELEPHONE ENCOUNTER
Pt's daughter, Nkechi, called and stated that she wanted to know if pt can stop Bumex 1 mg daily. Pt's swelling is gone, as well as his cough.    Per KTS- pt can stop Bumex, they need to monitor his swelling and weight.    Gave pt's daughter KTS recommendations above.    Advised pt's daughter to call us if pt's weight increases and/or he begins to have any swelling.    Pt's daughter verbalizes understanding and agreeable to plan.

## 2019-12-23 RX ORDER — BUMETANIDE 1 MG/1
1 TABLET ORAL DAILY
Qty: 90 TABLET | Refills: 1 | Status: SHIPPED | OUTPATIENT
Start: 2019-12-23 | End: 2019-12-30 | Stop reason: DRUGHIGH

## 2019-12-23 NOTE — TELEPHONE ENCOUNTER
Called pt's daughter and gave KTS recommendations above.    Pt's daughter verbalizes understanding and agreeable to plan.

## 2019-12-23 NOTE — TELEPHONE ENCOUNTER
"Pt's daughter, Nkechi, called back and stated that pt is swelling in his feet and legs that is pitting. They have no been weighing pt so they are unsure if pt has gained weight.     Pt denies chest pain, SOB.    Pt's daughter states \"blood pressure is great\". Last reading 111/60 11/18, the same day, before Bumex was stopped.     Can pt restart Bumex 1 mg daily?    Please advise.        Pt's daughter requests that RX be sent to [facility].   "

## 2019-12-30 RX ORDER — BUMETANIDE 1 MG/1
1 TABLET ORAL 2 TIMES DAILY
Qty: 180 TABLET | Refills: 1
Start: 2019-12-30 | End: 2020-02-14 | Stop reason: DRUGHIGH

## 2019-12-30 NOTE — TELEPHONE ENCOUNTER
Noted; would increase Bumex to 1 mg twice a day and have him limit his sodium intake and update us concerning weight, blood pressure, and edema as well as cough on the morning of 3 January 2019; if symptoms become more progressive and problematic in the interim would have him come to [facility] ED for chest x-ray and laboratory studies.    Thanks!

## 2019-12-30 NOTE — TELEPHONE ENCOUNTER
Pt's daughter, Nkechi, called and stated that in past three days pt's swelling and cough has increased. Cough is non-productive, however sounds congested. Pt does not have fever, appetite has increased.    Pt's daughter states that they have not been checking weights.    Pt is currently taking:  Bumex 1 mg daily  Finasteride 5 mg daily  Vit D daily  ASA 81 mg 0.5 tablet MWF  Pravastatin 40 mg MWF    Pt's daughter states they have no been monitoring BP.    BP currently 121/53 91    Advised pt's daughter to check BP at least once daily.     Please advise.

## 2019-12-30 NOTE — TELEPHONE ENCOUNTER
Called pt's daughter and gave KTS recommendations above.    Daughter will call Friday morning, 1/3/20, to update with requested information. Advised pt's daughter to take pt to [facility] ER if pt's symptoms become more progressive or problematic before Friday.     Pt's daughter verbalizes understanding and agreeable to plan.

## 2020-01-10 ENCOUNTER — APPOINTMENT (OUTPATIENT)
Dept: CT IMAGING | Facility: HOSPITAL | Age: 85
End: 2020-01-10

## 2020-01-10 ENCOUNTER — APPOINTMENT (OUTPATIENT)
Dept: GENERAL RADIOLOGY | Facility: HOSPITAL | Age: 85
End: 2020-01-10

## 2020-01-10 ENCOUNTER — HOSPITAL ENCOUNTER (EMERGENCY)
Facility: HOSPITAL | Age: 85
Discharge: HOME | End: 2020-01-10
Attending: EMERGENCY MEDICINE | Admitting: EMERGENCY MEDICINE

## 2020-01-10 ENCOUNTER — EPISODE CHANGES (OUTPATIENT)
Dept: CASE MANAGEMENT | Facility: OTHER | Age: 85
End: 2020-01-10

## 2020-01-10 VITALS
WEIGHT: 235 LBS | OXYGEN SATURATION: 97 % | HEIGHT: 64 IN | TEMPERATURE: 97.3 F | RESPIRATION RATE: 18 BRPM | SYSTOLIC BLOOD PRESSURE: 124 MMHG | HEART RATE: 77 BPM | DIASTOLIC BLOOD PRESSURE: 65 MMHG | BODY MASS INDEX: 40.12 KG/M2

## 2020-01-10 DIAGNOSIS — R06.02 SHORTNESS OF BREATH: ICD-10-CM

## 2020-01-10 DIAGNOSIS — I50.9 CONGESTIVE HEART FAILURE, UNSPECIFIED HF CHRONICITY, UNSPECIFIED HEART FAILURE TYPE (HCC): Primary | ICD-10-CM

## 2020-01-10 LAB
ALBUMIN SERPL-MCNC: 3.5 G/DL (ref 3.5–5.2)
ALBUMIN/GLOB SERPL: 1.1 G/DL
ALP SERPL-CCNC: 91 U/L (ref 39–117)
ALT SERPL W P-5'-P-CCNC: 9 U/L (ref 1–41)
ANION GAP SERPL CALCULATED.3IONS-SCNC: 11 MMOL/L (ref 5–15)
AST SERPL-CCNC: 20 U/L (ref 1–40)
BACTERIA UR QL AUTO: ABNORMAL /HPF
BASOPHILS # BLD AUTO: 0.04 10*3/MM3 (ref 0–0.2)
BASOPHILS NFR BLD AUTO: 0.8 % (ref 0–1.5)
BILIRUB SERPL-MCNC: 0.5 MG/DL (ref 0.2–1.2)
BILIRUB UR QL STRIP: NEGATIVE
BUN BLD-MCNC: 30 MG/DL (ref 8–23)
BUN/CREAT SERPL: 23.6 (ref 7–25)
CALCIUM SPEC-SCNC: 9.1 MG/DL (ref 8.2–9.6)
CHLORIDE SERPL-SCNC: 98 MMOL/L (ref 98–107)
CLARITY UR: CLEAR
CO2 SERPL-SCNC: 31 MMOL/L (ref 22–29)
COLOR UR: YELLOW
CREAT BLD-MCNC: 1.27 MG/DL (ref 0.76–1.27)
DEPRECATED RDW RBC AUTO: 56.8 FL (ref 37–54)
EOSINOPHIL # BLD AUTO: 0.04 10*3/MM3 (ref 0–0.4)
EOSINOPHIL NFR BLD AUTO: 0.8 % (ref 0.3–6.2)
ERYTHROCYTE [DISTWIDTH] IN BLOOD BY AUTOMATED COUNT: 16.4 % (ref 12.3–15.4)
FLUAV AG NPH QL: NEGATIVE
FLUBV AG NPH QL IA: NEGATIVE
GFR SERPL CREATININE-BSD FRML MDRD: 53 ML/MIN/1.73
GFR SERPL CREATININE-BSD FRML MDRD: 64 ML/MIN/1.73
GLOBULIN UR ELPH-MCNC: 3.2 GM/DL
GLUCOSE BLD-MCNC: 191 MG/DL (ref 65–99)
GLUCOSE UR STRIP-MCNC: NEGATIVE MG/DL
HCT VFR BLD AUTO: 38.8 % (ref 37.5–51)
HGB BLD-MCNC: 12.3 G/DL (ref 13–17.7)
HGB UR QL STRIP.AUTO: NEGATIVE
HOLD SPECIMEN: NORMAL
HOLD SPECIMEN: NORMAL
HYALINE CASTS UR QL AUTO: ABNORMAL /LPF
IMM GRANULOCYTES # BLD AUTO: 0.01 10*3/MM3 (ref 0–0.05)
IMM GRANULOCYTES NFR BLD AUTO: 0.2 % (ref 0–0.5)
KETONES UR QL STRIP: NEGATIVE
LEUKOCYTE ESTERASE UR QL STRIP.AUTO: ABNORMAL
LYMPHOCYTES # BLD AUTO: 0.72 10*3/MM3 (ref 0.7–3.1)
LYMPHOCYTES NFR BLD AUTO: 13.7 % (ref 19.6–45.3)
MCH RBC QN AUTO: 29.9 PG (ref 26.6–33)
MCHC RBC AUTO-ENTMCNC: 31.7 G/DL (ref 31.5–35.7)
MCV RBC AUTO: 94.2 FL (ref 79–97)
MONOCYTES # BLD AUTO: 0.61 10*3/MM3 (ref 0.1–0.9)
MONOCYTES NFR BLD AUTO: 11.6 % (ref 5–12)
NEUTROPHILS # BLD AUTO: 3.84 10*3/MM3 (ref 1.7–7)
NEUTROPHILS NFR BLD AUTO: 72.9 % (ref 42.7–76)
NITRITE UR QL STRIP: NEGATIVE
NRBC BLD AUTO-RTO: 0 /100 WBC (ref 0–0.2)
NT-PROBNP SERPL-MCNC: 271.8 PG/ML (ref 5–1800)
PH UR STRIP.AUTO: 6 [PH] (ref 5–8)
PLATELET # BLD AUTO: 263 10*3/MM3 (ref 140–450)
PMV BLD AUTO: 10.4 FL (ref 6–12)
POTASSIUM BLD-SCNC: 3.7 MMOL/L (ref 3.5–5.2)
PROT SERPL-MCNC: 6.7 G/DL (ref 6–8.5)
PROT UR QL STRIP: NEGATIVE
RBC # BLD AUTO: 4.12 10*6/MM3 (ref 4.14–5.8)
RBC # UR: ABNORMAL /HPF
REF LAB TEST METHOD: ABNORMAL
SODIUM BLD-SCNC: 140 MMOL/L (ref 136–145)
SP GR UR STRIP: 1.01 (ref 1–1.03)
SQUAMOUS #/AREA URNS HPF: ABNORMAL /HPF
TROPONIN T SERPL-MCNC: <0.01 NG/ML (ref 0–0.03)
UROBILINOGEN UR QL STRIP: ABNORMAL
WBC NRBC COR # BLD: 5.26 10*3/MM3 (ref 3.4–10.8)
WBC UR QL AUTO: ABNORMAL /HPF
WHOLE BLOOD HOLD SPECIMEN: NORMAL
WHOLE BLOOD HOLD SPECIMEN: NORMAL

## 2020-01-10 PROCEDURE — 94640 AIRWAY INHALATION TREATMENT: CPT

## 2020-01-10 PROCEDURE — 84484 ASSAY OF TROPONIN QUANT: CPT | Performed by: NURSE PRACTITIONER

## 2020-01-10 PROCEDURE — 81001 URINALYSIS AUTO W/SCOPE: CPT | Performed by: NURSE PRACTITIONER

## 2020-01-10 PROCEDURE — 71045 X-RAY EXAM CHEST 1 VIEW: CPT

## 2020-01-10 PROCEDURE — 85025 COMPLETE CBC W/AUTO DIFF WBC: CPT | Performed by: NURSE PRACTITIONER

## 2020-01-10 PROCEDURE — 80053 COMPREHEN METABOLIC PANEL: CPT | Performed by: NURSE PRACTITIONER

## 2020-01-10 PROCEDURE — 96374 THER/PROPH/DIAG INJ IV PUSH: CPT

## 2020-01-10 PROCEDURE — 83880 ASSAY OF NATRIURETIC PEPTIDE: CPT | Performed by: NURSE PRACTITIONER

## 2020-01-10 PROCEDURE — 71250 CT THORAX DX C-: CPT

## 2020-01-10 PROCEDURE — 99284 EMERGENCY DEPT VISIT MOD MDM: CPT

## 2020-01-10 PROCEDURE — 87804 INFLUENZA ASSAY W/OPTIC: CPT | Performed by: NURSE PRACTITIONER

## 2020-01-10 PROCEDURE — 93005 ELECTROCARDIOGRAM TRACING: CPT | Performed by: NURSE PRACTITIONER

## 2020-01-10 RX ORDER — BUMETANIDE 0.25 MG/ML
1 INJECTION INTRAMUSCULAR; INTRAVENOUS ONCE
Status: COMPLETED | OUTPATIENT
Start: 2020-01-10 | End: 2020-01-10

## 2020-01-10 RX ORDER — SODIUM CHLORIDE 0.9 % (FLUSH) 0.9 %
10 SYRINGE (ML) INJECTION AS NEEDED
Status: DISCONTINUED | OUTPATIENT
Start: 2020-01-10 | End: 2020-01-10 | Stop reason: HOSPADM

## 2020-01-10 RX ORDER — IPRATROPIUM BROMIDE AND ALBUTEROL SULFATE 2.5; .5 MG/3ML; MG/3ML
3 SOLUTION RESPIRATORY (INHALATION) ONCE
Status: COMPLETED | OUTPATIENT
Start: 2020-01-10 | End: 2020-01-10

## 2020-01-10 RX ADMIN — IPRATROPIUM BROMIDE AND ALBUTEROL SULFATE 3 ML: 2.5; .5 SOLUTION RESPIRATORY (INHALATION) at 17:49

## 2020-01-10 RX ADMIN — BUMETANIDE 1 MG: 0.25 INJECTION INTRAMUSCULAR; INTRAVENOUS at 20:12

## 2020-01-10 NOTE — ED PROVIDER NOTES
Subjective   Chaitanya Browne is a 89 y.o. male who presents to the ED with c/o leg swelling. The patient has reportedly gained 10 pounds in the last seven days. The patient's daughter reports shortness of breath and a cough. The patient has a medical history of congestive heart failure, but he denies a medical history of chronic obstructive pulmonary disease and emphysema. The patient's cardiologist is Dr. Ledbetter. His medication list includes Bumetanide and an 81 mg Asprin. He states that he has been on Lasix intermittently, but is not taking any at the moment. He denies the use of any anticoagulation medications. He reportedly presents to the [facility] twice a week for physical therapy. There are no other acute complains at this time.          History provided by:  Patient and relative  Leg Swelling   Location:  Bilateral  Severity:  Moderate  Onset quality:  Sudden  Duration:  1 week  Timing:  Constant  Progression:  Worsening  Chronicity:  Chronic  Associated symptoms: cough and shortness of breath        Review of Systems   Respiratory: Positive for cough and shortness of breath.    Cardiovascular: Positive for leg swelling.   All other systems reviewed and are negative.      Past Medical History:   Diagnosis Date   • Abnormal heart rhythm    • Anemia    • Aortic valve sclerosis    • Asthma    • Basal cell carcinoma (BCC) of left side of nose    • BPH (benign prostatic hyperplasia)    • Cataracts, bilateral     bilateralcataract extraction and lens implantation 2013   • Diastolic dysfunction    • Easy bruising    • Heart murmur    • High cholesterol    • History of disease     bilateral lacrimal gland dysfunction per Dr Fajardo   • Hypertension    • Infection     infected big toe; I and D DR Alvares 2013   • Phlebitis    • Pneumonia 2009       No Known Allergies    Past Surgical History:   Procedure Laterality Date   • APPENDECTOMY     • CATARACT EXTRACTION, BILATERAL  2013    and lens implantation   • CYSTOSCOPY  N/A 9/6/2019    Procedure: CYSTOSCOPY;  Surgeon: Ck Woods MD;  Location:  [facility];  Service: Urology   • CYSTOSCOPY TRANSURETHRAL RESECTION OF PROSTATE  1989   • CYSTOSCOPY TRANSURETHRAL RESECTION OF PROSTATE N/A 9/11/2019    Procedure: CYSTOSCOPY TRANSURETHRAL RESECTION OF PROSTATE;  Surgeon: Ck Woods MD;  Location:  [facility];  Service: Urology   • HEAD & NECK SKIN LESION EXCISIONAL BIOPSY      Basal cell removed from nose    • INCISION AND DRAINAGE FOOT  2013    infected big toe Dr Alvares   • TRANSURETHRAL RESECTION OF BLADDER TUMOR N/A 9/6/2019    Procedure: EVACUATION OF BLOOD CLOT, FULGERATION OF PROSTATE;  Surgeon: Ck Woods MD;  Location:  [facility];  Service: Urology       Family History   Problem Relation Age of Onset   • Coronary artery disease Father    • Heart attack Father    • Coronary artery disease Brother    • Heart disease Brother    • Atrial fibrillation Brother    • Stroke Brother    • Heart attack Brother    • Heart attack Mother        Social History     Socioeconomic History   • Marital status:      Spouse name: Not on file   • Number of children: Not on file   • Years of education: Not on file   • Highest education level: Not on file   Tobacco Use   • Smoking status: Never Smoker   • Smokeless tobacco: Never Used   Substance and Sexual Activity   • Alcohol use: No     Frequency: Never   • Drug use: No   • Sexual activity: Defer   Social History Narrative    Retired [employer] no caffeine use         Objective   Physical Exam   Constitutional: He is oriented to person, place, and time. He appears well-developed and well-nourished. No distress.   Frail, elderly male.   HENT:   Head: Normocephalic and atraumatic.   Nose: Nose normal.   Eyes: Conjunctivae are normal. No scleral icterus.   Neck: Normal range of motion. Neck supple.   Cardiovascular: Normal rate, regular rhythm and normal heart sounds.   Mild to moderate edema to both legs. Good pulses.     Pulmonary/Chest: Effort normal. No respiratory distress.   Decreased lung sounds.   Abdominal: Soft. There is no tenderness.   Musculoskeletal: Normal range of motion. He exhibits edema.   Neurological: He is alert and oriented to person, place, and time.   Skin: Skin is warm and dry.   Psychiatric: He has a normal mood and affect. His behavior is normal.   Nursing note and vitals reviewed.      Procedures         ED Course  ED Course as of Bashir 10 2034   Fri Bashir 10, 2020   2006 Had a very long sitdown conversation with the patient and the family.  He is eager to go home.  His labs are reassuring as well as CT of his chest.  They have decreased his Bumex over the last several days from 1 mg twice a day to 1 mg.  I will give him another dose here before he leaves.  The family declined admission they feel comfortable with him going home he also wants to go home.  I will give him a follow-up to our heart failure clinic.  All of very well aware the signs symptoms of worsening condition.  They understand this is a balancing act between being volume overloaded as well as being dehydrated.  Very well educated family daughters about [facility].  Very responsible.  Patient appears well nontoxic very eager to be discharged.  They describe waiting for a couple hours in the lobby and were close to eloping.  I also spoke with the patient and family members about pulmonary embolism.  He has not tachycardic he is not hypoxic.  The family who is the  also thinks that this is not the underlying problem but still part of the differential.  They are still okay with going home but I did have a conversation on the differential causes of his difficulty breathing.    [JM]   2021 I had another long conversation with the family again after my repeat evaluation.  He is off the oxygen and is satting 93%.  The family tells me that he typically sats around there on room air and his breathing is normal.  He is not having any pain to his  lower extremities and I able to palpate his pulses.  He is very eager to go home.  I did discuss with him possibilities of a pulmonary embolism but they declined further testing at this point would like to go home considering he was essentially at his baseline.  All thankful and agreeable well aware the signs and worse condition.    [JM]      ED Course User Index  [JM] Miller Galvez APRN     Recent Results (from the past 24 hour(s))   Light Blue Top    Collection Time: 01/10/20  1:54 PM   Result Value Ref Range    Extra Tube hold for add-on    Green Top (Gel)    Collection Time: 01/10/20  1:54 PM   Result Value Ref Range    Extra Tube Hold for add-ons.    Lavender Top    Collection Time: 01/10/20  1:54 PM   Result Value Ref Range    Extra Tube hold for add-on    Gold Top - SST    Collection Time: 01/10/20  1:54 PM   Result Value Ref Range    Extra Tube Hold for add-ons.    Comprehensive Metabolic Panel    Collection Time: 01/10/20  1:54 PM   Result Value Ref Range    Glucose 191 (H) 65 - 99 mg/dL    BUN 30 (H) 8 - 23 mg/dL    Creatinine 1.27 0.76 - 1.27 mg/dL    Sodium 140 136 - 145 mmol/L    Potassium 3.7 3.5 - 5.2 mmol/L    Chloride 98 98 - 107 mmol/L    CO2 31.0 (H) 22.0 - 29.0 mmol/L    Calcium 9.1 8.2 - 9.6 mg/dL    Total Protein 6.7 6.0 - 8.5 g/dL    Albumin 3.50 3.50 - 5.20 g/dL    ALT (SGPT) 9 1 - 41 U/L    AST (SGOT) 20 1 - 40 U/L    Alkaline Phosphatase 91 39 - 117 U/L    Total Bilirubin 0.5 0.2 - 1.2 mg/dL    eGFR Non African Amer 53 (L) >60 mL/min/1.73    eGFR  African Amer 64 >60 mL/min/1.73    Globulin 3.2 gm/dL    A/G Ratio 1.1 g/dL    BUN/Creatinine Ratio 23.6 7.0 - 25.0    Anion Gap 11.0 5.0 - 15.0 mmol/L   BNP    Collection Time: 01/10/20  1:54 PM   Result Value Ref Range    proBNP 271.8 5.0-1,800.0 pg/mL   Troponin    Collection Time: 01/10/20  1:54 PM   Result Value Ref Range    Troponin T <0.010 0.000 - 0.030 ng/mL   CBC Auto Differential    Collection Time: 01/10/20  1:54 PM   Result Value Ref  Range    WBC 5.26 3.40 - 10.80 10*3/mm3    RBC 4.12 (L) 4.14 - 5.80 10*6/mm3    Hemoglobin 12.3 (L) 13.0 - 17.7 g/dL    Hematocrit 38.8 37.5 - 51.0 %    MCV 94.2 79.0 - 97.0 fL    MCH 29.9 26.6 - 33.0 pg    MCHC 31.7 31.5 - 35.7 g/dL    RDW 16.4 (H) 12.3 - 15.4 %    RDW-SD 56.8 (H) 37.0 - 54.0 fl    MPV 10.4 6.0 - 12.0 fL    Platelets 263 140 - 450 10*3/mm3    Neutrophil % 72.9 42.7 - 76.0 %    Lymphocyte % 13.7 (L) 19.6 - 45.3 %    Monocyte % 11.6 5.0 - 12.0 %    Eosinophil % 0.8 0.3 - 6.2 %    Basophil % 0.8 0.0 - 1.5 %    Immature Grans % 0.2 0.0 - 0.5 %    Neutrophils, Absolute 3.84 1.70 - 7.00 10*3/mm3    Lymphocytes, Absolute 0.72 0.70 - 3.10 10*3/mm3    Monocytes, Absolute 0.61 0.10 - 0.90 10*3/mm3    Eosinophils, Absolute 0.04 0.00 - 0.40 10*3/mm3    Basophils, Absolute 0.04 0.00 - 0.20 10*3/mm3    Immature Grans, Absolute 0.01 0.00 - 0.05 10*3/mm3    nRBC 0.0 0.0 - 0.2 /100 WBC   Urinalysis With Microscopic If Indicated (No Culture) - Urine, Clean Catch    Collection Time: 01/10/20  5:27 PM   Result Value Ref Range    Color, UA Yellow Yellow, Straw    Appearance, UA Clear Clear    pH, UA 6.0 5.0 - 8.0    Specific Gravity, UA 1.011 1.001 - 1.030    Glucose, UA Negative Negative    Ketones, UA Negative Negative    Bilirubin, UA Negative Negative    Blood, UA Negative Negative    Protein, UA Negative Negative    Leuk Esterase, UA Small (1+) (A) Negative    Nitrite, UA Negative Negative    Urobilinogen, UA 0.2 E.U./dL 0.2 - 1.0 E.U./dL   Urinalysis, Microscopic Only - Urine, Clean Catch    Collection Time: 01/10/20  5:27 PM   Result Value Ref Range    RBC, UA 0-2 None Seen, 0-2 /HPF    WBC, UA 3-5 (A) None Seen, 0-2 /HPF    Bacteria, UA None Seen None Seen, Trace /HPF    Squamous Epithelial Cells, UA 0-2 None Seen, 0-2 /HPF    Hyaline Casts, UA 0-6 0 - 6 /LPF    Methodology Manual Light Microscopy    Influenza Antigen, Rapid - Swab, Nasopharynx    Collection Time: 01/10/20  6:18 PM   Result Value Ref Range     "Influenza A Ag, EIA Negative Negative    Influenza B Ag, EIA Negative Negative     Note: In addition to lab results from this visit, the labs listed above may include labs taken at another facility or during a different encounter within the last 24 hours. Please correlate lab times with ED admission and discharge times for further clarification of the services performed during this visit.    CT Chest Without Contrast   Preliminary Result   No acute disease seen within the chest and upper abdomen.   There is slight progression of the chronic changes seen within the lung   bases bilaterally. Mild bronchiectatic changes again seen centrally. No   definite superimposed infectious process.       DICTATED:   01/10/2020   EDITED/ls :   01/10/2020           XR Chest 1 View   Preliminary Result   Mild increased markings at the lung bases bilaterally   slightly worsened in the interval with slight prominence of the   pulmonary vascularity. Heart remains borderline enlarged. Small   bilateral pleural effusions.       DICTATED:   01/10/2020   EDITED/ls :   01/10/2020             Vitals:    01/10/20 1326 01/10/20 1700 01/10/20 1822 01/10/20 2012   BP: 122/98 108/56 109/65 124/65   BP Location: Left arm      Patient Position: Sitting      Pulse: 96 75 75 77   Resp: 18      Temp: 97.3 °F (36.3 °C)      TempSrc: Oral      SpO2: 95% 98% 97%    Weight: 107 kg (235 lb)      Height: 162.6 cm (64\")        Medications   sodium chloride 0.9 % flush 10 mL (has no administration in time range)   ipratropium-albuterol (DUO-NEB) nebulizer solution 3 mL (3 mL Nebulization Given 1/10/20 1749)   bumetanide (BUMEX) injection 1 mg (1 mg Intravenous Given 1/10/20 2012)     ECG/EMG Results (last 24 hours)     Procedure Component Value Units Date/Time    ECG 12 Lead [[id number]] Collected:  01/10/20 1706     Updated:  01/10/20 1723        ECG 12 Lead                             MDM    Final diagnoses:   Congestive heart failure, unspecified HF " chronicity, unspecified heart failure type (CMS/HCC)   Shortness of breath       Documentation assistance provided by yane Crowley.  Information recorded by the scribe was done at my direction and has been verified and validated by me.     Leslie Crowley  01/10/20 1755       Miller Galvez APRN  01/10/20 2014       Miller Galvez APRN  01/10/20 2034

## 2020-01-13 ENCOUNTER — PATIENT OUTREACH (OUTPATIENT)
Dept: CASE MANAGEMENT | Facility: OTHER | Age: 85
End: 2020-01-13

## 2020-01-13 NOTE — OUTREACH NOTE
Care Coordination Assessment    Documented/Reviewed By:  Kaur King RN Date/time:  1/13/2020  3:11 PM   Assessment completed with:  children (Comment: Daughter, Nkechi)  Enrolled in care management program:  No  Living arrangement:  alone (Comment: Family has arranged people in the home to assist patient, 12-hours/ day.  )  Support system:  family  Type of residence:  private residence  Home care services:  No  Equipment used at home:  walker (Comment: Pulse Oximeter)  Communication device:  No  Bed or wheelchair confined:  No  Inadequate nutrition:  No  Medication adherence problem:  No  Experiencing side effects from current medications:  No  History of fall(s) in last 6 months:  No  Difficulty keeping appointments:  No  Family aware of the patient's advance care planning wishes:  Yes

## 2020-01-13 NOTE — OUTREACH NOTE
Care Plan Note      Responses   Annual Wellness Visit:   -- [AWV is scheduled for 2-5-20]   Care Gaps Addressed  Flu Shot, Pneumonia Vaccine   Flu Shot Status  Up to Date   Pneumonia Vaccine Status  Up to Date   Specific Disease Process Teaching  Heart Failure   Does patient have depression diagnosis?  No   Advanced Directives:  Patient Has   Ed Visits past 12 months:  1   Hospitalizations past 12 months  1   Medication Adherence  Medications understood        The main concerns and/or symptoms the patient would like to address are:   Had ED visit 1-10-20 with increased LE swelling, weight gain- CHF.  Talked with DaughterNkechi, who answered the phone.  She said patient's swelling is less; he still has a cough; does not have home Oxygen; does have a pulse oximeter and states his O2Sat. Is staying up.  Reports patient is at home; family has arranged help in the home 12-hours/day.  Caregivers give him his medications when due every day.  Patient uses a Walker to ambulate; elevates his LE's when sitting or in bed; follows a ESTEVAN diet.  DaughterNkechi goes with patient to appts.    Education/instruction provided by Care Coordinator:   Explained role of Ambulatory  and contact information given.  Discussed ED discharge recommendations.  Daughter reports compliance with medications as directed; [facility]; after Wed's appt, daughter said she will contact Dr. Ledbetter (Cardio) for f/u; stated patient sees PCP on 2-5-20 for Annual Wellness Visit.  Noted, Advanced Directives are on file.  Daughter and family, with caregivers exhibit good management of patient's healthcare needs.  No questions or concerns voiced at this time.    Follow Up Outreach Due:   As needed.    Kaur King RN  Ambulatory     1/13/2020, 3:16 PM

## 2020-01-14 NOTE — PROGRESS NOTES
[facility]  [facility]      01/15/2020       Chaitanya Browne  [address] [facility] [address]  [unfilled]    2/23/1923    Dirk Russ MD Milton ENRIQUETA Browne is a 89 y.o. male.      Subjective:     Chief Complaint:  Congestive Heart Failure and Follow-up       HPI   Patient is a 89-year-old male with past medical history significant for hypertension, hyperlipidemia, asthma and BPH who presents to the [facility] at the request of Miller BARRERA for evaluation of CHF. Patient presented to the ED on 1/10 with complaints of leg swelling.  He reportedly had gained 10 pounds in 7 days.  proBNP was normal, chest x-ray showed small bilateral pleural effusion and slight increase in pulmonary vascularity, CT chest negative for acute disease.  He was given IV Bumex and put back on 1mg and 2mg of bumex QOD. Symptoms have improved, weight back down to 171 (baseline around 170). Edema present but improved.  He is doing PT twice a week at the [facility] and does exercises at home.  He feels very good doing his regular PT exercises and [facility]        Patient Active Problem List   Diagnosis   • Facial basal cell cancer   • Hyperlipidemia LDL goal <100   • Essential hypertension   • ASHD (arteriosclerotic heart disease)   • Gait abnormality   • Impaired fasting glucose   • Polyp, colonic   • Vitamin D deficiency   • Low back pain at multiple sites   • Proteinuria   • Right carpal tunnel syndrome   • Obesity (BMI 30-39.9)   • Hematuria, unspecified   • Medicare annual wellness visit, subsequent   • Edema   • Cough   • Allergic rhinitis   • Gross hematuria   • Acute UTI (urinary tract infection)   • Acute renal insufficiency   • Acute urinary retention   • Debility   • Dysphagia   • BPH with obstruction/lower urinary tract symptoms   • Prostatitis   • Chronic diastolic CHF (congestive heart failure) (CMS/Formerly [facility])   • Iron deficiency anemia due to  chronic blood loss       Past Medical History:   Diagnosis Date   • Abnormal heart rhythm    • Anemia    • Aortic valve sclerosis    • Asthma    • Basal cell carcinoma (BCC) of left side of nose    • BPH (benign prostatic hyperplasia)    • Cataracts, bilateral     bilateralcataract extraction and lens implantation 2013   • Diastolic dysfunction    • Easy bruising    • Heart murmur    • High cholesterol    • History of disease     bilateral lacrimal gland dysfunction per Dr Fajardo   • Hypertension    • Infection     infected big toe; I and D DR Alvares 2013   • Phlebitis    • Pneumonia 2009       Past Surgical History:   Procedure Laterality Date   • APPENDECTOMY     • CATARACT EXTRACTION, BILATERAL  2013    and lens implantation   • CYSTOSCOPY N/A 9/6/2019    Procedure: CYSTOSCOPY;  Surgeon: Ck Woods MD;  Location:  [facility];  Service: Urology   • CYSTOSCOPY TRANSURETHRAL RESECTION OF PROSTATE  1989   • CYSTOSCOPY TRANSURETHRAL RESECTION OF PROSTATE N/A 9/11/2019    Procedure: CYSTOSCOPY TRANSURETHRAL RESECTION OF PROSTATE;  Surgeon: Ck Woods MD;  Location:  [facility];  Service: Urology   • HEAD & NECK SKIN LESION EXCISIONAL BIOPSY      Basal cell removed from nose    • INCISION AND DRAINAGE FOOT  2013    infected big toe Dr Alvares   • TRANSURETHRAL RESECTION OF BLADDER TUMOR N/A 9/6/2019    Procedure: EVACUATION OF BLOOD CLOT, FULGERATION OF PROSTATE;  Surgeon: Ck Woods MD;  Location:  [facility];  Service: Urology       Family History   Problem Relation Age of Onset   • Coronary artery disease Father    • Heart attack Father    • Coronary artery disease Brother    • Heart disease Brother    • Atrial fibrillation Brother    • Stroke Brother    • Heart attack Brother    • Heart attack Mother        Social History     Socioeconomic History   • Marital status:      Spouse name: Not on file   • Number of children: Not on file   • Years of education: Not on file   • Highest education  level: Not on file   Tobacco Use   • Smoking status: Never Smoker   • Smokeless tobacco: Never Used   Substance and Sexual Activity   • Alcohol use: No     Frequency: Never   • Drug use: No   • Sexual activity: Defer   Social History Narrative    Retired [employer] no caffeine use       No Known Allergies      Current Outpatient Medications:   •  aspirin (ASPIRIN LOW DOSE) 81 MG tablet, Take 40.5 mg by mouth Every Other Day. Pt takes 1/2 tab every other day, Disp: , Rfl:   •  Cholecalciferol (VITAMIN D3) 25 MCG (1000 UT) capsule, Take 1,000 Units by mouth Every Other Day., Disp: , Rfl:   •  docusate sodium 100 MG capsule, Take 100 mg by mouth 2 (Two) Times a Day., Disp: , Rfl:   •  doxazosin (CARDURA) 4 MG tablet, Take 1 tablet by mouth Every Night., Disp: 90 tablet, Rfl: 3  •  finasteride (PROSCAR) 5 MG tablet, Take 1 tablet by mouth Daily., Disp: , Rfl:   •  fluocinonide (LIDEX) 0.05 % external solution, , Disp: , Rfl:   •  hydrocortisone 2.5 % ointment, , Disp: , Rfl:   •  ketoconazole (NIZORAL) 2 % shampoo, , Disp: , Rfl:   •  metroNIDAZOLE (METROGEL) 0.75 % gel, , Disp: , Rfl:   •  pravastatin (PRAVACHOL) 40 MG tablet, Take 1 tablet by mouth Daily., Disp: , Rfl:   •  bumetanide (BUMEX) 1 MG tablet, Take 1 tablet by mouth 2 (Two) Times a Day. (Patient taking differently: Take 1 mg by mouth Daily.), Disp: 180 tablet, Rfl: 1  •  Lactobacillus (PROBIOTIC ACIDOPHILUS) capsule, Take 1 capsule by mouth Daily., Disp: , Rfl:     The following portions of the patient's history were reviewed today and updated as appropriate: allergies, current medications, past family history, past medical history, past social history, past surgical history and problem list     Review of Systems   Constitution: Negative for chills, fever and malaise/fatigue.   HENT: Positive for hearing loss.    Eyes: Negative.    Cardiovascular: Positive for dyspnea on exertion and leg swelling. Negative for chest pain, claudication, cyanosis,  "irregular heartbeat, near-syncope, orthopnea, palpitations, paroxysmal nocturnal dyspnea and syncope.   Respiratory: Positive for cough. Negative for shortness of breath, snoring and wheezing.    Endocrine: Negative.    Hematologic/Lymphatic: Bruises/bleeds easily.   Skin: Negative for poor wound healing.   Musculoskeletal: Negative.    Gastrointestinal: Negative for abdominal pain, heartburn, hematemesis, melena, nausea and vomiting.   Genitourinary: Negative.  Negative for hematuria.   Psychiatric/Behavioral: Negative.    Allergic/Immunologic: Negative.        Objective:     Vitals:    01/15/20 1024   BP: 115/54   BP Location: Right arm   Patient Position: Sitting   Cuff Size: Adult   Pulse: 80   Resp: 20   Temp: 97.6 °F (36.4 °C)   TempSrc: Temporal   SpO2: 94%   Weight: 81 kg (178 lb 8 oz)   Height: 162.6 cm (64\")       Physical Exam   Constitutional: He is oriented to person, place, and time. He appears well-developed and well-nourished. No distress.   HENT:   Head: Normocephalic.   Eyes: Pupils are equal, round, and reactive to light. Conjunctivae are normal.   Neck: Neck supple. No JVD present. No thyromegaly present.   Cardiovascular: Normal rate, regular rhythm and intact distal pulses. Exam reveals no gallop and no friction rub.   Murmur heard.  Pulmonary/Chest: Effort normal and breath sounds normal. No respiratory distress. He has no wheezes. He has no rales. He exhibits no tenderness.   Abdominal: Soft. Bowel sounds are normal.   Musculoskeletal: Normal range of motion. He exhibits edema (2+ BLE).   Neurological: He is alert and oriented to person, place, and time.   Skin: Skin is warm and dry.   Psychiatric: He has a normal mood and affect. His behavior is normal. Thought content normal.   Vitals reviewed.      Lab and Diagnostic Review:  Echo 9/3/19  · Mild-to-moderate mitral valve regurgitation is present.  · Estimated EF = 72%.  · Left ventricular systolic function is hyperdynamic (EF > 70).  · Left " ventricular diastolic dysfunction (grade I) consistent with impaired relaxation.  · Left ventricular wall thickness is consistent with mild concentric hypertrophy.  · Left atrial cavity size is borderline dilated.  · Normal right ventricular cavity size, wall thickness, systolic function and septal motion noted.  · Mild mitral valve stenosis is present with functional MAC.  · The aortic valve exhibits moderate sclerosis without stenosis.  · No evidence of pulmonary hypertension is present.  · There is no evidence of pericardial effusion.     Results for orders placed or performed during the hospital encounter of 01/10/20   Influenza Antigen, Rapid - Swab, Nasopharynx   Result Value Ref Range    Influenza A Ag, EIA Negative Negative    Influenza B Ag, EIA Negative Negative   Comprehensive Metabolic Panel   Result Value Ref Range    Glucose 191 (H) 65 - 99 mg/dL    BUN 30 (H) 8 - 23 mg/dL    Creatinine 1.27 0.76 - 1.27 mg/dL    Sodium 140 136 - 145 mmol/L    Potassium 3.7 3.5 - 5.2 mmol/L    Chloride 98 98 - 107 mmol/L    CO2 31.0 (H) 22.0 - 29.0 mmol/L    Calcium 9.1 8.2 - 9.6 mg/dL    Total Protein 6.7 6.0 - 8.5 g/dL    Albumin 3.50 3.50 - 5.20 g/dL    ALT (SGPT) 9 1 - 41 U/L    AST (SGOT) 20 1 - 40 U/L    Alkaline Phosphatase 91 39 - 117 U/L    Total Bilirubin 0.5 0.2 - 1.2 mg/dL    eGFR Non African Amer 53 (L) >60 mL/min/1.73    eGFR  African Amer 64 >60 mL/min/1.73    Globulin 3.2 gm/dL    A/G Ratio 1.1 g/dL    BUN/Creatinine Ratio 23.6 7.0 - 25.0    Anion Gap 11.0 5.0 - 15.0 mmol/L   Urinalysis With Microscopic If Indicated (No Culture) - Urine, Clean Catch   Result Value Ref Range    Color, UA Yellow Yellow, Straw    Appearance, UA Clear Clear    pH, UA 6.0 5.0 - 8.0    Specific Gravity, UA 1.011 1.001 - 1.030    Glucose, UA Negative Negative    Ketones, UA Negative Negative    Bilirubin, UA Negative Negative    Blood, UA Negative Negative    Protein, UA Negative Negative    Leuk Esterase, UA Small (1+) (A)  Negative    Nitrite, UA Negative Negative    Urobilinogen, UA 0.2 E.U./dL 0.2 - 1.0 E.U./dL   BNP   Result Value Ref Range    proBNP 271.8 5.0-1,800.0 pg/mL   Troponin   Result Value Ref Range    Troponin T <0.010 0.000 - 0.030 ng/mL   CBC Auto Differential   Result Value Ref Range    WBC 5.26 3.40 - 10.80 10*3/mm3    RBC 4.12 (L) 4.14 - 5.80 10*6/mm3    Hemoglobin 12.3 (L) 13.0 - 17.7 g/dL    Hematocrit 38.8 37.5 - 51.0 %    MCV 94.2 79.0 - 97.0 fL    MCH 29.9 26.6 - 33.0 pg    MCHC 31.7 31.5 - 35.7 g/dL    RDW 16.4 (H) 12.3 - 15.4 %    RDW-SD 56.8 (H) 37.0 - 54.0 fl    MPV 10.4 6.0 - 12.0 fL    Platelets 263 140 - 450 10*3/mm3    Neutrophil % 72.9 42.7 - 76.0 %    Lymphocyte % 13.7 (L) 19.6 - 45.3 %    Monocyte % 11.6 5.0 - 12.0 %    Eosinophil % 0.8 0.3 - 6.2 %    Basophil % 0.8 0.0 - 1.5 %    Immature Grans % 0.2 0.0 - 0.5 %    Neutrophils, Absolute 3.84 1.70 - 7.00 10*3/mm3    Lymphocytes, Absolute 0.72 0.70 - 3.10 10*3/mm3    Monocytes, Absolute 0.61 0.10 - 0.90 10*3/mm3    Eosinophils, Absolute 0.04 0.00 - 0.40 10*3/mm3    Basophils, Absolute 0.04 0.00 - 0.20 10*3/mm3    Immature Grans, Absolute 0.01 0.00 - 0.05 10*3/mm3    nRBC 0.0 0.0 - 0.2 /100 WBC   Urinalysis, Microscopic Only - Urine, Clean Catch   Result Value Ref Range    RBC, UA 0-2 None Seen, 0-2 /HPF    WBC, UA 3-5 (A) None Seen, 0-2 /HPF    Bacteria, UA None Seen None Seen, Trace /HPF    Squamous Epithelial Cells, UA 0-2 None Seen, 0-2 /HPF    Hyaline Casts, UA 0-6 0 - 6 /LPF    Methodology Manual Light Microscopy      EKG 1/10/20 NSR      Assessment and Plan:   1. Acute on chronic diastolic congestive heart failure (CMS/HCC)  Symptoms significantly improved  Continue alternating 2 mg and 1 mg of Bumex daily.  Advised to take 2 mg with any 3 pound weight gain in 24 hours or 5 pounds in a week and to take 2 mg until he is back to his baseline weight of 170 pounds  Continue daily weights and low-sodium diet  Continue PT and home exercises    2.  Essential hypertension  Controlled without medications. Prone to hypotension    3. VHD (valvular heart disease)  Mild to moderate MR, mild mitral valve stenosis    We will get him a follow-up with Dr. Ledbetter  Patient to follow-up in [facility] as needed      It has been a pleasure to participate in the care of this patient.  Patient was instructed to call the [facility] with any questions, concerns, or worsening symptoms.    *Please note that portions of this note were completed with a voice recognition program. Efforts were made to edit the dictations, but occasionally words are mistranscribed.

## 2020-01-15 ENCOUNTER — OFFICE VISIT (OUTPATIENT)
Dept: CARDIOLOGY | Facility: HOSPITAL | Age: 85
End: 2020-01-15

## 2020-01-15 VITALS
TEMPERATURE: 97.6 F | HEIGHT: 64 IN | HEART RATE: 80 BPM | SYSTOLIC BLOOD PRESSURE: 115 MMHG | DIASTOLIC BLOOD PRESSURE: 54 MMHG | OXYGEN SATURATION: 94 % | WEIGHT: 178.5 LBS | BODY MASS INDEX: 30.48 KG/M2 | RESPIRATION RATE: 20 BRPM

## 2020-01-15 DIAGNOSIS — I10 ESSENTIAL HYPERTENSION: ICD-10-CM

## 2020-01-15 DIAGNOSIS — I38 VHD (VALVULAR HEART DISEASE): ICD-10-CM

## 2020-01-15 DIAGNOSIS — I50.33 ACUTE ON CHRONIC DIASTOLIC CONGESTIVE HEART FAILURE (HCC): Primary | ICD-10-CM

## 2020-01-15 PROCEDURE — 99214 OFFICE O/P EST MOD 30 MIN: CPT | Performed by: NURSE PRACTITIONER

## 2020-01-15 RX ORDER — FLUOCINONIDE TOPICAL SOLUTION USP, 0.05% 0.5 MG/ML
SOLUTION TOPICAL
COMMUNITY
Start: 2019-12-18 | End: 2020-05-27

## 2020-01-15 RX ORDER — METRONIDAZOLE 7.5 MG/G
1 GEL TOPICAL AS NEEDED
COMMUNITY
Start: 2019-12-18 | End: 2023-01-04

## 2020-01-15 RX ORDER — KETOCONAZOLE 20 MG/ML
SHAMPOO TOPICAL AS NEEDED
COMMUNITY
Start: 2019-12-18

## 2020-01-20 ENCOUNTER — EPISODE CHANGES (OUTPATIENT)
Dept: CASE MANAGEMENT | Facility: OTHER | Age: 85
End: 2020-01-20

## 2020-02-05 ENCOUNTER — OFFICE VISIT (OUTPATIENT)
Dept: INTERNAL MEDICINE | Facility: CLINIC | Age: 85
End: 2020-02-05

## 2020-02-05 ENCOUNTER — LAB (OUTPATIENT)
Dept: LAB | Facility: HOSPITAL | Age: 85
End: 2020-02-05

## 2020-02-05 VITALS
DIASTOLIC BLOOD PRESSURE: 68 MMHG | BODY MASS INDEX: 30.05 KG/M2 | HEART RATE: 80 BPM | TEMPERATURE: 98.5 F | HEIGHT: 64 IN | WEIGHT: 176 LBS | SYSTOLIC BLOOD PRESSURE: 112 MMHG

## 2020-02-05 DIAGNOSIS — R73.01 IMPAIRED FASTING GLUCOSE: ICD-10-CM

## 2020-02-05 DIAGNOSIS — R53.83 OTHER FATIGUE: ICD-10-CM

## 2020-02-05 DIAGNOSIS — I10 ESSENTIAL HYPERTENSION: ICD-10-CM

## 2020-02-05 DIAGNOSIS — E55.9 VITAMIN D DEFICIENCY: ICD-10-CM

## 2020-02-05 DIAGNOSIS — I50.32 CHRONIC DIASTOLIC CHF (CONGESTIVE HEART FAILURE) (HCC): ICD-10-CM

## 2020-02-05 DIAGNOSIS — E43 EDEMA DUE TO MALNUTRITION, DUE TO UNSPECIFIED MALNUTRITION TYPE (HCC): ICD-10-CM

## 2020-02-05 DIAGNOSIS — H61.22 IMPACTED CERUMEN OF LEFT EAR: ICD-10-CM

## 2020-02-05 DIAGNOSIS — R05.9 COUGH: ICD-10-CM

## 2020-02-05 DIAGNOSIS — E66.9 OBESITY (BMI 30-39.9): ICD-10-CM

## 2020-02-05 DIAGNOSIS — R26.9 GAIT ABNORMALITY: ICD-10-CM

## 2020-02-05 DIAGNOSIS — H90.6 MIXED CONDUCTIVE AND SENSORINEURAL HEARING LOSS OF BOTH EARS: ICD-10-CM

## 2020-02-05 DIAGNOSIS — D50.0 IRON DEFICIENCY ANEMIA DUE TO CHRONIC BLOOD LOSS: ICD-10-CM

## 2020-02-05 DIAGNOSIS — R60.0 BILATERAL LOWER EXTREMITY EDEMA: ICD-10-CM

## 2020-02-05 DIAGNOSIS — N40.1 BPH WITH OBSTRUCTION/LOWER URINARY TRACT SYMPTOMS: ICD-10-CM

## 2020-02-05 DIAGNOSIS — N13.8 BPH WITH OBSTRUCTION/LOWER URINARY TRACT SYMPTOMS: ICD-10-CM

## 2020-02-05 DIAGNOSIS — Z00.00 MEDICARE ANNUAL WELLNESS VISIT, SUBSEQUENT: Primary | ICD-10-CM

## 2020-02-05 LAB
25(OH)D3 SERPL-MCNC: 48.7 NG/ML (ref 30–100)
ALBUMIN SERPL-MCNC: 3.7 G/DL (ref 3.5–5.2)
ALBUMIN/GLOB SERPL: 1.2 G/DL
ALP SERPL-CCNC: 86 U/L (ref 39–117)
ALT SERPL W P-5'-P-CCNC: 8 U/L (ref 1–41)
ANION GAP SERPL CALCULATED.3IONS-SCNC: 13 MMOL/L (ref 5–15)
AST SERPL-CCNC: 17 U/L (ref 1–40)
BASOPHILS # BLD AUTO: 0.04 10*3/MM3 (ref 0–0.2)
BASOPHILS NFR BLD AUTO: 0.8 % (ref 0–1.5)
BILIRUB SERPL-MCNC: 1.1 MG/DL (ref 0.2–1.2)
BUN BLD-MCNC: 27 MG/DL (ref 8–23)
BUN/CREAT SERPL: 22 (ref 7–25)
CALCIUM SPEC-SCNC: 9.4 MG/DL (ref 8.2–9.6)
CHLORIDE SERPL-SCNC: 102 MMOL/L (ref 98–107)
CHOLEST SERPL-MCNC: 197 MG/DL (ref 0–200)
CO2 SERPL-SCNC: 28 MMOL/L (ref 22–29)
CREAT BLD-MCNC: 1.23 MG/DL (ref 0.76–1.27)
DEPRECATED RDW RBC AUTO: 44.9 FL (ref 37–54)
EOSINOPHIL # BLD AUTO: 0.05 10*3/MM3 (ref 0–0.4)
EOSINOPHIL NFR BLD AUTO: 1 % (ref 0.3–6.2)
ERYTHROCYTE [DISTWIDTH] IN BLOOD BY AUTOMATED COUNT: 13.1 % (ref 12.3–15.4)
GFR SERPL CREATININE-BSD FRML MDRD: 55 ML/MIN/1.73
GFR SERPL CREATININE-BSD FRML MDRD: 66 ML/MIN/1.73
GLOBULIN UR ELPH-MCNC: 3 GM/DL
GLUCOSE BLD-MCNC: 98 MG/DL (ref 65–99)
HBA1C MFR BLD: 6.2 % (ref 4.8–5.6)
HCT VFR BLD AUTO: 38.1 % (ref 37.5–51)
HDLC SERPL-MCNC: 78 MG/DL (ref 40–60)
HGB BLD-MCNC: 13 G/DL (ref 13–17.7)
IMM GRANULOCYTES # BLD AUTO: 0.02 10*3/MM3 (ref 0–0.05)
IMM GRANULOCYTES NFR BLD AUTO: 0.4 % (ref 0–0.5)
IRON 24H UR-MRATE: 123 MCG/DL (ref 59–158)
IRON SATN MFR SERPL: 26 % (ref 20–50)
LDLC SERPL CALC-MCNC: 100 MG/DL (ref 0–100)
LDLC/HDLC SERPL: 1.29 {RATIO}
LYMPHOCYTES # BLD AUTO: 0.91 10*3/MM3 (ref 0.7–3.1)
LYMPHOCYTES NFR BLD AUTO: 18.1 % (ref 19.6–45.3)
MCH RBC QN AUTO: 31.8 PG (ref 26.6–33)
MCHC RBC AUTO-ENTMCNC: 34.1 G/DL (ref 31.5–35.7)
MCV RBC AUTO: 93.2 FL (ref 79–97)
MONOCYTES # BLD AUTO: 0.71 10*3/MM3 (ref 0.1–0.9)
MONOCYTES NFR BLD AUTO: 14.1 % (ref 5–12)
NEUTROPHILS # BLD AUTO: 3.29 10*3/MM3 (ref 1.7–7)
NEUTROPHILS NFR BLD AUTO: 65.6 % (ref 42.7–76)
NRBC BLD AUTO-RTO: 0 /100 WBC (ref 0–0.2)
PLATELET # BLD AUTO: 279 10*3/MM3 (ref 140–450)
PMV BLD AUTO: 11.1 FL (ref 6–12)
POTASSIUM BLD-SCNC: 3.9 MMOL/L (ref 3.5–5.2)
PROT SERPL-MCNC: 6.7 G/DL (ref 6–8.5)
RBC # BLD AUTO: 4.09 10*6/MM3 (ref 4.14–5.8)
SODIUM BLD-SCNC: 143 MMOL/L (ref 136–145)
TIBC SERPL-MCNC: 465 MCG/DL (ref 298–536)
TRANSFERRIN SERPL-MCNC: 312 MG/DL (ref 200–360)
TRIGL SERPL-MCNC: 93 MG/DL (ref 0–150)
TSH SERPL DL<=0.05 MIU/L-ACNC: 1.18 UIU/ML (ref 0.27–4.2)
VIT B12 BLD-MCNC: 533 PG/ML (ref 211–946)
VLDLC SERPL-MCNC: 18.6 MG/DL (ref 5–40)
WBC NRBC COR # BLD: 5.02 10*3/MM3 (ref 3.4–10.8)

## 2020-02-05 PROCEDURE — 83540 ASSAY OF IRON: CPT

## 2020-02-05 PROCEDURE — 83036 HEMOGLOBIN GLYCOSYLATED A1C: CPT

## 2020-02-05 PROCEDURE — 84443 ASSAY THYROID STIM HORMONE: CPT

## 2020-02-05 PROCEDURE — 80061 LIPID PANEL: CPT

## 2020-02-05 PROCEDURE — G0439 PPPS, SUBSEQ VISIT: HCPCS | Performed by: INTERNAL MEDICINE

## 2020-02-05 PROCEDURE — 84466 ASSAY OF TRANSFERRIN: CPT

## 2020-02-05 PROCEDURE — 82306 VITAMIN D 25 HYDROXY: CPT

## 2020-02-05 PROCEDURE — 82607 VITAMIN B-12: CPT

## 2020-02-05 PROCEDURE — 80053 COMPREHEN METABOLIC PANEL: CPT

## 2020-02-05 PROCEDURE — 85025 COMPLETE CBC W/AUTO DIFF WBC: CPT

## 2020-02-05 PROCEDURE — 99397 PER PM REEVAL EST PAT 65+ YR: CPT | Performed by: INTERNAL MEDICINE

## 2020-02-05 NOTE — PROGRESS NOTES
QUICK REFERENCE INFORMATION:  The ABCs of the Annual Wellness Visit    Subsequent Medicare Wellness Visit    HEALTH RISK ASSESSMENT    2/23/1923    Recent Hospitalizations:  Recently treated at the following:  [facility].        Current Medical Providers:  Patient Care Team:  Dirk Russ MD as PCP - General (Internal Medicine)  Fabricio Zavala MD as Consulting Physician (Internal Medicine)        Smoking Status:  Social History     Tobacco Use   Smoking Status Never Smoker   Smokeless Tobacco Never Used       Alcohol Consumption:  Social History     Substance and Sexual Activity   Alcohol Use No   • Frequency: Never       Depression Screen:   PHQ-2/PHQ-9 Depression Screening 2/5/2020   Little interest or pleasure in doing things 0   Feeling down, depressed, or hopeless 0   Total Score 0       Health Habits and Functional and Cognitive Screening:  Functional & Cognitive Status 2/5/2020   Do you have difficulty preparing food and eating? No   Do you have difficulty bathing yourself, getting dressed or grooming yourself? No   Do you have difficulty using the toilet? No   Do you have difficulty moving around from place to place? No   Do you have trouble with steps or getting out of a bed or a chair? No   Current Diet Well Balanced Diet   Dental Exam Up to date   Eye Exam Up to date   Exercise (times per week) 2 times per week   Current Exercise Activities Include Walking   Do you need help using the phone?  No   Are you deaf or do you have serious difficulty hearing?  No   Do you need help with transportation? Yes   Do you need help shopping? Yes   Do you need help preparing meals?  Yes   Do you need help with housework?  Yes   Do you need help with laundry? Yes   Do you need help taking your medications? Yes   Do you need help managing money? No   Do you ever drive or ride in a car without wearing a seat belt? No   Have you felt unusual stress, anger or loneliness in the last month? No   Who  do you live with? Alone   If you need help, do you have trouble finding someone available to you? No   Have you been bothered in the last four weeks by sexual problems? No   Do you have difficulty concentrating, remembering or making decisions? No       Fall Risk Screen:  MURALI Fall Risk Assessment was completed, and patient is at LOW risk for falls.Assessment completed on:2/5/2020    ACE III MINI        Does the patient have evidence of cognitive impairment? Yes    Aspirin use counseling: Taking ASA appropriately as indicated    Recent Lab Results:  CMP:  Lab Results   Component Value Date    BUN 27 (H) 02/05/2020    CREATININE 1.23 02/05/2020    EGFRIFNONA 55 (L) 02/05/2020    EGFRIFAFRI 66 02/05/2020    BCR 22.0 02/05/2020     02/05/2020    K 3.9 02/05/2020    CO2 28.0 02/05/2020    CALCIUM 9.4 02/05/2020    ALBUMIN 3.70 02/05/2020    BILITOT 1.1 02/05/2020    ALKPHOS 86 02/05/2020    AST 17 02/05/2020    ALT 8 02/05/2020     HbA1c:  Lab Results   Component Value Date    HGBA1C 6.20 (H) 02/05/2020    HGBA1C 5.90 (H) 08/27/2019     Microalbumin:  Lab Results   Component Value Date    MICROALBUR 39.3 08/27/2019     Lipid Panel  Lab Results   Component Value Date    CHOL 197 02/05/2020    TRIG 93 02/05/2020    HDL 78 (H) 02/05/2020     02/05/2020    AST 17 02/05/2020    ALT 8 02/05/2020       Visual Acuity:  No exam data present    Age-appropriate Screening Schedule:  Refer to the list below for future screening recommendations based on patient's age, sex and/or medical conditions. Orders for these recommended tests are listed in the plan section. The patient has been provided with a written plan.    Health Maintenance   Topic Date Due   • ZOSTER VACCINE (1 of 2) 02/23/1973   • TDAP/TD VACCINES (2 - Td) 10/18/2018   • LIPID PANEL  09/03/2020   • PNEUMOCOCCAL VACCINE (65+ HIGH RISK)  Completed   • INFLUENZA VACCINE  Completed        Subjective   History of Present Illness. Javier ROBISON  Pavan is a 89 y.o. male who presents for a Subsequent Wellness Visit.He has hearing loss and recently had audiology adjust them He had the cerumen removed today. He does follow with eye exams and is ok. I have gone over his medications with he and his daughter and they voiced understanding. His mood is good overall and encouraged  multimodal approach with healthy nutrition, healthy sleep, regular physical activity, social activities, and meditation.His memory is good with good recall 3 of 3 and a good clock. He saw Dr Hendrix in the past few months for skin cancer surveillance.. He will follow with Dr Ledbetter regarding his diuretic and blood pressure and edema. He was given prescription for compression stockings for the swelling.     CHRONIC CONDITIONS    The following portions of the patient's history were reviewed and updated as appropriate: allergies, current medications, past family history, past medical history, past social history, past surgical history and problem list.    Outpatient Medications Prior to Visit   Medication Sig Dispense Refill   • aspirin (ASPIRIN LOW DOSE) 81 MG tablet Take 40.5 mg by mouth Every Other Day. Pt takes 1/2 tab every other day     • bumetanide (BUMEX) 1 MG tablet Take 1 tablet by mouth 2 (Two) Times a Day. (Patient taking differently: Take 1 mg by mouth Daily.) 180 tablet 1   • Cholecalciferol (VITAMIN D3) 25 MCG (1000 UT) capsule Take 1,000 Units by mouth Every Other Day.     • finasteride (PROSCAR) 5 MG tablet Take 1 tablet by mouth Daily.     • fluocinonide (LIDEX) 0.05 % external solution      • hydrocortisone 2.5 % ointment      • ketoconazole (NIZORAL) 2 % shampoo      • metroNIDAZOLE (METROGEL) 0.75 % gel      • pravastatin (PRAVACHOL) 40 MG tablet Take 1 tablet by mouth Daily.     • docusate sodium 100 MG capsule Take 100 mg by mouth 2 (Two) Times a Day.     • doxazosin (CARDURA) 4 MG tablet Take 1 tablet by mouth Every Night. 90 tablet 3   • Lactobacillus (PROBIOTIC  ACIDOPHILUS) capsule Take 1 capsule by mouth Daily.       No facility-administered medications prior to visit.        Patient Active Problem List   Diagnosis   • Facial basal cell cancer   • Hyperlipidemia LDL goal <100   • Essential hypertension   • ASHD (arteriosclerotic heart disease)   • Gait abnormality   • Impaired fasting glucose   • Polyp, colonic   • Vitamin D deficiency   • Low back pain at multiple sites   • Proteinuria   • Right carpal tunnel syndrome   • Obesity (BMI 30-39.9)   • Hematuria, unspecified   • Medicare annual wellness visit, subsequent   • Edema   • Cough   • Allergic rhinitis   • Gross hematuria   • Acute UTI (urinary tract infection)   • Acute renal insufficiency   • Acute urinary retention   • Debility   • Dysphagia   • BPH with obstruction/lower urinary tract symptoms   • Prostatitis   • Chronic diastolic CHF (congestive heart failure) (CMS/HCC)   • Iron deficiency anemia due to chronic blood loss   • Edema due to malnutrition (CMS/[facility])       Advance Care Planning:  ACP discussion was held with the patient during this visit. Patient has an advance directive that is valid in EMR.     Identification of Risk Factors:  Risk factors include: Advance Directive Discussion  Cardiovascular risk  Hearing Problem  Obesity/Overweight   Polypharmacy.    Review of Systems   Constitutional: Positive for fatigue (enrgy improved). Negative for chills, diaphoresis and fever.   HENT: Positive for hearing loss and postnasal drip.    Eyes: Negative for visual disturbance.   Respiratory: Positive for cough (cough improved). Negative for shortness of breath.    Cardiovascular: Positive for leg swelling. Negative for chest pain and palpitations.   Gastrointestinal: Negative for abdominal pain and diarrhea.   Endocrine: Negative for polyuria.   Musculoskeletal: Positive for arthralgias and gait problem.   Neurological: Negative for dizziness, speech difficulty and headaches.   Hematological: Negative for  "adenopathy.   Psychiatric/Behavioral: Negative for decreased concentration, dysphoric mood and sleep disturbance. The patient is not nervous/anxious.        Compared to one year ago, the patient feels his physical health is the same.  Compared to one year ago, the patient feels his mental health is the same.    Objective     Physical Exam   Constitutional: He is oriented to person, place, and time. He appears well-developed and well-nourished.   HENT:   Head: Normocephalic and atraumatic.   Right Ear: External ear normal.   Left Ear: External ear normal.   Mouth/Throat: Oropharynx is clear and moist.   Cerumen impaction initially  left ear   Eyes: Conjunctivae and EOM are normal.   Neck: Normal range of motion. Neck supple.   Cardiovascular: Normal rate and regular rhythm.   Murmur (II/VI SM ) heard.  Pulmonary/Chest: Effort normal and breath sounds normal.   Abdominal: Soft. Bowel sounds are normal.   obese   Musculoskeletal: Normal range of motion. He exhibits edema (1+ edema of legs ).   Neurological: He is alert and oriented to person, place, and time.   Slow get up and go and good recall 3 of 3 and good clock    Skin: Skin is warm and dry.   Psychiatric: He has a normal mood and affect. His behavior is normal. Thought content normal.        Procedures     Vitals:    02/05/20 1033   BP: 112/68   Pulse: 80   Temp: 98.5 °F (36.9 °C)   Weight: 79.8 kg (176 lb)   Height: 162.6 cm (64.02\")   PainSc: 0-No pain       Patient's Body mass index is 30.2 kg/m². BMI is above normal parameters. Recommendations include: educational material, exercise counseling and nutrition counseling.      Assessment/Plan   Problem List Items Addressed This Visit        Cardiovascular and Mediastinum    Essential hypertension    Current Assessment & Plan     Hypertension is unchanged.  Continue current treatment regimen.  Regular aerobic exercise.  Blood pressure will be reassessed at the next regular appointment.         Relevant " Medications    bumetanide (BUMEX) 1 MG tablet    Other Relevant Orders    Lipid Panel (Completed)    Comprehensive Metabolic Panel (Completed)    Chronic diastolic CHF (congestive heart failure) (CMS/HCC)    Current Assessment & Plan     Congestive heart failure due to hypertension.  Heart failure is unchanged.  NYHA Class I.  Continue current treatment regimen.  Dietary sodium restriction.  Encouraged daily monitoring of the patient's weight.  Regular aerobic exercise.  Heart failure will be reassessed 4 months.            Respiratory    Cough    Current Assessment & Plan     Improved and will follow.             Digestive    Vitamin D deficiency    Current Assessment & Plan     Check labs.          Relevant Orders    Vitamin D 25 Hydroxy    Obesity (BMI 30-39.9)    Current Assessment & Plan     Obesity is unchanged.  Discussed the patient's BMI.  The BMI is above average; BMI management plan is completed.  General weight loss/lifestyle modification strategies discussed (elicit support from others; identify saboteurs; non-food rewards, etc).  Regular aerobic exercise program discussed.Per plan of care to help blood pressure he will try and lose 5 pounds. Goal to consume large amounts of vegetables and fruits,heart healthy fats and low carbohydrate choices. Encourage aerobic exercise of walking, jogging or biking gradually up to 150 minute a week and 2-3 times of weight resistance. Employe behavioral modifications such as daily meditation and stopping eating and drinking calories after 7 pm.          Edema due to malnutrition (CMS/[facility])    Current Assessment & Plan     His nutritional status has improved.             Endocrine    Impaired fasting glucose    Current Assessment & Plan     Discussed decreasing bad carbohydrates, specifically sweets, breads, potatoes, corn and high caloric drinks (juices, sodas, sweet tea).  Also recommend increasing physical activity, ideally 150 minutes aerobic exercise weekly and  resistance exercises 2-3x/week.         Relevant Orders    Hemoglobin A1c (Completed)       Genitourinary    BPH with obstruction/lower urinary tract symptoms    Current Assessment & Plan     Continue medications.          Relevant Medications    finasteride (PROSCAR) 5 MG tablet       Hematopoietic and Hemostatic    Iron deficiency anemia due to chronic blood loss    Current Assessment & Plan     No blood loss         Relevant Orders    Iron Profile (Completed)       Other    Gait abnormality    Current Assessment & Plan     Encourage to get up slowly and get bearings before taking first step. Keep home well lit with clear floors to avoid tripping. Use assist devices for all walking especially from bed to bathroom. Do the [place] for PT and OT.          Medicare annual wellness visit, subsequent - Primary    Current Assessment & Plan     He has hearing loss and recently had audiology adjust them He had the cerumen removed today. He does follow with eye exams and is ok. I have gone over his medications with he and his daughter and they voiced understanding. His mood is good overall and encouraged  multimodal approach with healthy nutrition, healthy sleep, regular physical activity, social activities, and meditation.His memory is good with good recall 3 of 3 and a good clock. He saw Dr Hendrix in the past few months for skin cancer surveillance.. He will follow with Dr Ledbetter regarding his diuretic and blood pressure and edema. He was given prescription for compression stockings for the swelling. Age-appropriate Counseling:  Discussed preventative medicine issues with patient including regular exercise, healthy diet, stress reduction, adequate sleep and recommended age-appropriate screening studies.  Immunizations reviewed.                  Other Visit Diagnoses     Other fatigue        Improved but check labs.     Relevant Orders    Vitamin B12    TSH Rfx On Abnormal To Free T4 (Completed)    CBC & Differential  (Completed)    Impacted cerumen of left ear        Use debrox and remove the wax with water pick    Mixed conductive and sensorineural hearing loss of both ears        Use hearing aides and remove wax to help    Bilateral lower extremity edema        Use compression stockings        Patient Self-Management and Personalized Health Advice  The patient has been provided with information about: diet, exercise, weight management, prevention of cardiac or vascular disease and fall prevention and preventive services including:   · Annual Wellness Visit (AWV).    Outpatient Encounter Medications as of 2/5/2020   Medication Sig Dispense Refill   • aspirin (ASPIRIN LOW DOSE) 81 MG tablet Take 40.5 mg by mouth Every Other Day. Pt takes 1/2 tab every other day     • bumetanide (BUMEX) 1 MG tablet Take 1 tablet by mouth 2 (Two) Times a Day. (Patient taking differently: Take 1 mg by mouth Daily.) 180 tablet 1   • Cholecalciferol (VITAMIN D3) 25 MCG (1000 UT) capsule Take 1,000 Units by mouth Every Other Day.     • finasteride (PROSCAR) 5 MG tablet Take 1 tablet by mouth Daily.     • fluocinonide (LIDEX) 0.05 % external solution      • hydrocortisone 2.5 % ointment      • ketoconazole (NIZORAL) 2 % shampoo      • metroNIDAZOLE (METROGEL) 0.75 % gel      • pravastatin (PRAVACHOL) 40 MG tablet Take 1 tablet by mouth Daily.     • [DISCONTINUED] docusate sodium 100 MG capsule Take 100 mg by mouth 2 (Two) Times a Day.     • [DISCONTINUED] doxazosin (CARDURA) 4 MG tablet Take 1 tablet by mouth Every Night. 90 tablet 3   • [DISCONTINUED] Lactobacillus (PROBIOTIC ACIDOPHILUS) capsule Take 1 capsule by mouth Daily.       No facility-administered encounter medications on file as of 2/5/2020.        Reviewed use of high risk medication in the elderly: yes  Reviewed for potential of harmful drug interactions in the elderly: yes    Follow Up:  Return in about 4 months (around 6/5/2020) for Labs this visit, Recheck.     There are no Patient  Instructions on file for this visit.    An After Visit Summary and PPPS with all of these plans were given to the patient.

## 2020-02-06 ENCOUNTER — TELEPHONE (OUTPATIENT)
Dept: INTERNAL MEDICINE | Facility: CLINIC | Age: 85
End: 2020-02-06

## 2020-02-06 NOTE — ASSESSMENT & PLAN NOTE
Obesity is unchanged.  Discussed the patient's BMI.  The BMI is above average; BMI management plan is completed.  General weight loss/lifestyle modification strategies discussed (elicit support from others; identify saboteurs; non-food rewards, etc).  Regular aerobic exercise program discussed.Per plan of care to help blood pressure he will try and lose 5 pounds. Goal to consume large amounts of vegetables and fruits,heart healthy fats and low carbohydrate choices. Encourage aerobic exercise of walking, jogging or biking gradually up to 150 minute a week and 2-3 times of weight resistance. Employe behavioral modifications such as daily meditation and stopping eating and drinking calories after 7 pm.

## 2020-02-06 NOTE — ASSESSMENT & PLAN NOTE
Encourage to get up slowly and get bearings before taking first step. Keep home well lit with clear floors to avoid tripping. Use assist devices for all walking especially from bed to bathroom. Do the [place] for PT and OT.

## 2020-02-06 NOTE — ASSESSMENT & PLAN NOTE
He has hearing loss and recently had audiology adjust them He had the cerumen removed today. He does follow with eye exams and is ok. I have gone over his medications with he and his daughter and they voiced understanding. His mood is good overall and encouraged  multimodal approach with healthy nutrition, healthy sleep, regular physical activity, social activities, and meditation.His memory is good with good recall 3 of 3 and a good clock. He saw Dr Hendrix in the past few months for skin cancer surveillance.. He will follow with Dr Ledbetter regarding his diuretic and blood pressure and edema. He was given prescription for compression stockings for the swelling. Age-appropriate Counseling:  Discussed preventative medicine issues with patient including regular exercise, healthy diet, stress reduction, adequate sleep and recommended age-appropriate screening studies.  Immunizations reviewed.

## 2020-02-06 NOTE — ASSESSMENT & PLAN NOTE
Congestive heart failure due to hypertension.  Heart failure is unchanged.  NYHA Class I.  Continue current treatment regimen.  Dietary sodium restriction.  Encouraged daily monitoring of the patient's weight.  Regular aerobic exercise.  Heart failure will be reassessed 4 months.

## 2020-02-13 RX ORDER — BUMETANIDE 1 MG/1
1 TABLET ORAL DAILY
Qty: 180 TABLET | Refills: 1 | Status: CANCELLED
Start: 2020-02-13

## 2020-02-13 NOTE — TELEPHONE ENCOUNTER
Pt's daughter called and stated that pt saw BRUCE Doty and was advised to take Bumex 1 mg daily, with additional 1 mg every other evening.    Pt's daughter reports that this regimen is keeping pt's swelling down.    Can I order Bumex with these instructions?

## 2020-02-14 RX ORDER — BUMETANIDE 1 MG/1
1 TABLET ORAL DAILY
Qty: 135 TABLET | Refills: 1 | Status: SHIPPED | OUTPATIENT
Start: 2020-02-14 | End: 2020-08-10

## 2020-02-14 NOTE — TELEPHONE ENCOUNTER
Per [facility]- Bumex 1 mg tablet ever day with additional 1 mg every other day RX can be called in.    RX sent.

## 2020-05-26 ENCOUNTER — TELEPHONE (OUTPATIENT)
Dept: INTERNAL MEDICINE | Facility: CLINIC | Age: 85
End: 2020-05-26

## 2020-05-26 NOTE — TELEPHONE ENCOUNTER
Daughter called pt has hurt his back in his sleep.  Woke up Sunday & today in severe pain.    Took Advil Sunday & was good thru the day.  She is trying to not bring him in due to his age & would prefer to get advice on what he should take OTC.    I didn't notice until I hung up that he has an appt next Friday at 10 AM.

## 2020-05-27 ENCOUNTER — HOSPITAL ENCOUNTER (OUTPATIENT)
Dept: GENERAL RADIOLOGY | Facility: HOSPITAL | Age: 85
Discharge: HOME | End: 2020-05-27
Admitting: NURSE PRACTITIONER

## 2020-05-27 ENCOUNTER — OFFICE VISIT (OUTPATIENT)
Dept: INTERNAL MEDICINE | Facility: CLINIC | Age: 85
End: 2020-05-27

## 2020-05-27 VITALS
WEIGHT: 176.8 LBS | HEART RATE: 72 BPM | TEMPERATURE: 98.7 F | BODY MASS INDEX: 30.19 KG/M2 | DIASTOLIC BLOOD PRESSURE: 62 MMHG | SYSTOLIC BLOOD PRESSURE: 108 MMHG | HEIGHT: 64 IN

## 2020-05-27 DIAGNOSIS — M54.50 ACUTE LEFT-SIDED LOW BACK PAIN WITHOUT SCIATICA: Primary | ICD-10-CM

## 2020-05-27 DIAGNOSIS — M54.50 ACUTE LEFT-SIDED LOW BACK PAIN WITHOUT SCIATICA: ICD-10-CM

## 2020-05-27 PROCEDURE — 72100 X-RAY EXAM L-S SPINE 2/3 VWS: CPT

## 2020-05-27 PROCEDURE — 99213 OFFICE O/P EST LOW 20 MIN: CPT | Performed by: NURSE PRACTITIONER

## 2020-05-27 RX ORDER — KETOCONAZOLE 20 MG/G
1 CREAM TOPICAL DAILY PRN
COMMUNITY
Start: 2020-04-26

## 2020-05-27 RX ORDER — AZELASTINE 1 MG/ML
2 SPRAY, METERED NASAL 2 TIMES DAILY
Qty: 3 EACH | Refills: 3 | Status: SHIPPED | OUTPATIENT
Start: 2020-05-27 | End: 2021-05-25 | Stop reason: SDUPTHER

## 2020-05-27 NOTE — PROGRESS NOTES
Chaitanya Browne  2/23/1923  [id number]  Patient Care Team:  Dirk Russ MD as PCP - General (Internal Medicine)  Fabricio Zavala MD as Consulting Physician (Internal Medicine)    Chaitanya Browne is a pleasant 89 y.o. male who presents for evaluation of Back Pain    This patient is accompanied by their daughter who contributes to the history of their care.  Chief Complaint   Patient presents with   • Back Pain       HPI:   Low back pain starting Sat.  He was walking more outside than he has been in a while. Typically walks around the house indoors. Uses walker in and out of the house.  Also had difficulty getting out of the heavy covers in bed Sun am and struggled somewhat to get them off..  Denies urinary sx or leg pain.  Took one Advil Sun which helped.  Fertile better Monday but Tues am had similar onset of sig pain/spasm trying to get out of chair. Used tylenol 650 mg every 6 hours and heat most of the day. Slept well last night and felt ok this am but another episode of pain this afternoon.  Past Medical History:   Diagnosis Date   • Abnormal heart rhythm    • Anemia    • Aortic valve sclerosis    • Asthma    • Basal cell carcinoma (BCC) of left side of nose    • BPH (benign prostatic hyperplasia)    • Cataracts, bilateral     bilateralcataract extraction and lens implantation 2013   • Diastolic dysfunction    • Easy bruising    • Heart murmur    • High cholesterol    • History of disease     bilateral lacrimal gland dysfunction per Dr Fajardo   • Hypertension    • Infection     infected big toe; I and D DR Alvares 2013   • Phlebitis    • Pneumonia 2009     Past Surgical History:   Procedure Laterality Date   • APPENDECTOMY     • CATARACT EXTRACTION, BILATERAL  2013    and lens implantation   • CYSTOSCOPY N/A 9/6/2019    Procedure: CYSTOSCOPY;  Surgeon: Ck Woods MD;  Location: [facility];  Service: Urology   • CYSTOSCOPY TRANSURETHRAL RESECTION OF PROSTATE  1989   • CYSTOSCOPY TRANSURETHRAL  RESECTION OF PROSTATE N/A 9/11/2019    Procedure: CYSTOSCOPY TRANSURETHRAL RESECTION OF PROSTATE;  Surgeon: Ck Woods MD;  Location:  [facility];  Service: Urology   • HEAD & NECK SKIN LESION EXCISIONAL BIOPSY      Basal cell removed from nose    • INCISION AND DRAINAGE FOOT  2013    infected big toe Dr Alvares   • TRANSURETHRAL RESECTION OF BLADDER TUMOR N/A 9/6/2019    Procedure: EVACUATION OF BLOOD CLOT, FULGERATION OF PROSTATE;  Surgeon: Ck Woods MD;  Location:  [facility];  Service: Urology     Family History   Problem Relation Age of Onset   • Coronary artery disease Father    • Heart attack Father    • Coronary artery disease Brother    • Heart disease Brother    • Atrial fibrillation Brother    • Stroke Brother    • Heart attack Brother    • Heart attack Mother      Social History     Tobacco Use   Smoking Status Never Smoker   Smokeless Tobacco Never Used     No Known Allergies    Current Outpatient Medications:   •  aspirin (ASPIRIN LOW DOSE) 81 MG tablet, Take 40.5 mg by mouth Every Other Day. Pt takes 1/2 tab every other day, Disp: , Rfl:   •  bumetanide (BUMEX) 1 MG tablet, Take 1 tablet by mouth Daily. 1 additional tablet every other evening, Disp: 135 tablet, Rfl: 1  •  Cholecalciferol (VITAMIN D3) 25 MCG (1000 UT) capsule, Take 1,000 Units by mouth Every Other Day., Disp: , Rfl:   •  finasteride (PROSCAR) 5 MG tablet, Take 1 tablet by mouth Daily., Disp: , Rfl:   •  ketoconazole (NIZORAL) 2 % cream, Apply 1 application topically to the appropriate area as directed Daily As Needed., Disp: , Rfl:   •  ketoconazole (NIZORAL) 2 % shampoo, , Disp: , Rfl:   •  metroNIDAZOLE (METROGEL) 0.75 % gel, , Disp: , Rfl:   •  pravastatin (PRAVACHOL) 40 MG tablet, Take 1 tablet by mouth Daily., Disp: , Rfl:   •  azelastine (ASTELIN) 0.1 % nasal spray, 2 sprays into the nostril(s) as directed by provider 2 (Two) Times a Day. Use in each nostril as directed, Disp: 3 each, Rfl: 3  •  valACYclovir  "(VALTREX) 1000 MG tablet, Take 1 tablet by mouth 3 (Three) Times a Day for 7 days., Disp: 21 tablet, Rfl: 0    Review of Systems   Constitutional: Negative for chills, fatigue and fever.   HENT: Negative for congestion, ear pain and sinus pressure.    Respiratory: Negative for cough, chest tightness, shortness of breath and wheezing.    Cardiovascular: Negative for chest pain and palpitations.   Gastrointestinal: Negative for abdominal pain, blood in stool and constipation.   Musculoskeletal: Positive for back pain.   Skin: Negative for color change.   Allergic/Immunologic: Negative for environmental allergies.   Neurological: Negative for dizziness, speech difficulty and headache.   Psychiatric/Behavioral: Negative for decreased concentration. The patient is not nervous/anxious.      /62 (BP Location: Left arm, Patient Position: Sitting, Cuff Size: Adult)   Pulse 72   Temp 98.7 °F (37.1 °C)   Ht 162.6 cm (64.02\")   Wt 80.2 kg (176 lb 12.8 oz) Comment: at home  BMI 30.33 kg/m²     Physical Exam   Constitutional: He appears well-developed and well-nourished.   HENT:   Head: Normocephalic and atraumatic.   Right Ear: External ear normal.   Left Ear: External ear normal.   Mouth/Throat: Oropharynx is clear and moist.   Eyes: Conjunctivae and EOM are normal.   Neck: Normal range of motion. Neck supple.   Cardiovascular: Normal rate, regular rhythm and normal heart sounds.   Pulmonary/Chest: Effort normal and breath sounds normal.   Abdominal: Soft. Bowel sounds are normal.   Musculoskeletal: Normal range of motion.        Lumbar back: He exhibits tenderness. He exhibits no swelling and no deformity.   Neurological: He is alert.   Skin: Skin is warm and dry.   Psychiatric: He has a normal mood and affect. His behavior is normal. Thought content normal.       Results Review:  None    Assessment/Plan:  Chaitanya was seen today for back pain.    Diagnoses and all orders for this visit:    Acute left-sided low back " pain without sciatica  Comments:  tylenol every 4-6 hrs, heat 30min on and 30min off.  xray today.   Orders:  -     XR Spine Lumbar 2 or 3 View; Future    Other orders  -     azelastine (ASTELIN) 0.1 % nasal spray; 2 sprays into the nostril(s) as directed by provider 2 (Two) Times a Day. Use in each nostril as directed       There are no Patient Instructions on file for this visit.  Plan of care reviewed with patient at the conclusion of today's visit. Education was provided regarding diagnosis, management and any prescribed or recommended OTC medications.  Patient verbalizes understanding of and agreement with management plan.    Return if symptoms worsen or fail to improve.    *Note that portions of this note were completed with a voice recognition program.  Efforts were made to edit the dictation but occasionally words are transcribed.    BRUCE Padilla

## 2020-05-28 ENCOUNTER — TELEPHONE (OUTPATIENT)
Dept: INTERNAL MEDICINE | Facility: CLINIC | Age: 85
End: 2020-05-28

## 2020-05-28 RX ORDER — VALACYCLOVIR HYDROCHLORIDE 1 G/1
1000 TABLET, FILM COATED ORAL 3 TIMES DAILY
Qty: 21 TABLET | Refills: 0 | Status: SHIPPED | OUTPATIENT
Start: 2020-05-28 | End: 2020-06-05 | Stop reason: ALTCHOICE

## 2020-05-28 NOTE — TELEPHONE ENCOUNTER
Patient's daughter, Nkechi, states that he had x-rays done yesterday an was wondering if the results were in.  Also, she says he has a rash and thinks it might be shingles.  He can be reached at [phone]

## 2020-05-28 NOTE — TELEPHONE ENCOUNTER
Xray showed some arthriis but basically ok and will treat for shingles valtrex and I will need to see rash tomorrow

## 2020-05-28 NOTE — TELEPHONE ENCOUNTER
Pt daughter advised per Dr. Russ, she verbalized understanding. Sent her to the front to be scheduled for video visit tomorrow.

## 2020-05-29 ENCOUNTER — TELEMEDICINE (OUTPATIENT)
Dept: INTERNAL MEDICINE | Facility: CLINIC | Age: 85
End: 2020-05-29

## 2020-05-29 ENCOUNTER — TELEPHONE (OUTPATIENT)
Dept: INTERNAL MEDICINE | Facility: CLINIC | Age: 85
End: 2020-05-29

## 2020-05-29 DIAGNOSIS — M62.81 GENERALIZED MUSCLE WEAKNESS: ICD-10-CM

## 2020-05-29 DIAGNOSIS — R26.9 GAIT ABNORMALITY: ICD-10-CM

## 2020-05-29 DIAGNOSIS — R21 RASH: ICD-10-CM

## 2020-05-29 DIAGNOSIS — M54.50 LOW BACK PAIN AT MULTIPLE SITES: Primary | ICD-10-CM

## 2020-05-29 PROCEDURE — 99214 OFFICE O/P EST MOD 30 MIN: CPT | Performed by: INTERNAL MEDICINE

## 2020-05-29 NOTE — TELEPHONE ENCOUNTER
LORETO, PT'S DAUGHTER, CALLED TO LET  KNOW THAT [facility] ARE NOT DOING PHYSICAL THERAPY FOR OUT PATIENTS AT THIS TIME.

## 2020-05-29 NOTE — TELEPHONE ENCOUNTER
Sp/w daughter and gave her the [facility] [place] [facility] # [phone] and [facility] # [phone].  Daughter verbalized and understood to call the [facility] to see who can get him in the soonest.

## 2020-05-29 NOTE — PROGRESS NOTES
Chief Complaint   Patient presents with   • Rash   • Back Pain     He agrees yes to consent for zoom visit. This was an audio and video enabled telemedicine encounter.  History of Present Illness  89 y.o. white male presents for low back pain and rash. His pain does have some mild weakness with it. His rash it not draining and is not itching. His back pain improves some with heating pad and worsens with prolonged sitting.     Review of Systems   Constitutional: Negative for chills, diaphoresis and fever.   HENT: Positive for hearing loss.    Respiratory: Negative for cough, shortness of breath and wheezing.    Cardiovascular: Negative for chest pain and palpitations.   Gastrointestinal: Negative for abdominal pain.   Musculoskeletal: Positive for back pain and gait problem.   Skin: Positive for rash.   Allergic/Immunologic: Positive for environmental allergies.   Neurological: Negative for dizziness.   Hematological: Negative for adenopathy.   Psychiatric/Behavioral: Positive for sleep disturbance. Negative for dysphoric mood. The patient is not nervous/anxious.      All other ROS reviewed and negative.    PMSFH:  The following portions of the patient's history were reviewed and updated as appropriate: allergies, current medications, past family history, past medical history, past social history, past surgical history and problem list.      Current Outpatient Medications:   •  aspirin (ASPIRIN LOW DOSE) 81 MG tablet, Take 40.5 mg by mouth Every Other Day. Pt takes 1/2 tab every other day, Disp: , Rfl:   •  azelastine (ASTELIN) 0.1 % nasal spray, 2 sprays into the nostril(s) as directed by provider 2 (Two) Times a Day. Use in each nostril as directed, Disp: 3 each, Rfl: 3  •  bumetanide (BUMEX) 1 MG tablet, Take 1 tablet by mouth Daily. 1 additional tablet every other evening, Disp: 135 tablet, Rfl: 1  •  Cholecalciferol (VITAMIN D3) 25 MCG (1000 UT) capsule, Take 1,000 Units by mouth Every Other Day., Disp: , Rfl:    •  finasteride (PROSCAR) 5 MG tablet, Take 1 tablet by mouth Daily., Disp: , Rfl:   •  ketoconazole (NIZORAL) 2 % cream, Apply 1 application topically to the appropriate area as directed Daily As Needed., Disp: , Rfl:   •  ketoconazole (NIZORAL) 2 % shampoo, , Disp: , Rfl:   •  metroNIDAZOLE (METROGEL) 0.75 % gel, , Disp: , Rfl:   •  pravastatin (PRAVACHOL) 40 MG tablet, Take 1 tablet by mouth Daily., Disp: , Rfl:   •  valACYclovir (VALTREX) 1000 MG tablet, Take 1 tablet by mouth 3 (Three) Times a Day for 7 days., Disp: 21 tablet, Rfl: 0    VITALS:  There were no vitals taken for this visit.    Physical Exam   Constitutional: He is oriented to person, place, and time.   Abdominal: Soft. There is no tenderness (no tenderness to his exam ).   Musculoskeletal: He exhibits tenderness (low  to his daughter palpation).   Neurological: He is alert and oriented to person, place, and time.   Pretty good get up and go and wide based gait. Non focal exam.    Skin:   Mild erythematous patchy area right low back no vessicles appreciated.    Psychiatric: He has a normal mood and affect. His behavior is normal.       LABS:  Results for orders placed or performed in visit on 02/05/20   Vitamin B12   Result Value Ref Range    Vitamin B-12 533 211 - 946 pg/mL   Vitamin D 25 Hydroxy   Result Value Ref Range    25 Hydroxy, Vitamin D 48.7 30.0 - 100.0 ng/ml   TSH Rfx On Abnormal To Free T4   Result Value Ref Range    TSH 1.180 0.270 - 4.200 uIU/mL   Lipid Panel   Result Value Ref Range    Total Cholesterol 197 0 - 200 mg/dL    Triglycerides 93 0 - 150 mg/dL    HDL Cholesterol 78 (H) 40 - 60 mg/dL    LDL Cholesterol  100 0 - 100 mg/dL    VLDL Cholesterol 18.6 5 - 40 mg/dL    LDL/HDL Ratio 1.29    Comprehensive Metabolic Panel   Result Value Ref Range    Glucose 98 65 - 99 mg/dL    BUN 27 (H) 8 - 23 mg/dL    Creatinine 1.23 0.76 - 1.27 mg/dL    Sodium 143 136 - 145 mmol/L    Potassium 3.9 3.5 - 5.2 mmol/L    Chloride 102 98 -  107 mmol/L    CO2 28.0 22.0 - 29.0 mmol/L    Calcium 9.4 8.2 - 9.6 mg/dL    Total Protein 6.7 6.0 - 8.5 g/dL    Albumin 3.70 3.50 - 5.20 g/dL    ALT (SGPT) 8 1 - 41 U/L    AST (SGOT) 17 1 - 40 U/L    Alkaline Phosphatase 86 39 - 117 U/L    Total Bilirubin 1.1 0.2 - 1.2 mg/dL    eGFR Non African Amer 55 (L) >60 mL/min/1.73    eGFR  African Amer 66 >60 mL/min/1.73    Globulin 3.0 gm/dL    A/G Ratio 1.2 g/dL    BUN/Creatinine Ratio 22.0 7.0 - 25.0    Anion Gap 13.0 5.0 - 15.0 mmol/L   Hemoglobin A1c   Result Value Ref Range    Hemoglobin A1C 6.20 (H) 4.80 - 5.60 %   Iron Profile   Result Value Ref Range    Iron 123 59 - 158 mcg/dL    Iron Saturation 26 20 - 50 %    Transferrin 312 200 - 360 mg/dL    TIBC 465 298 - 536 mcg/dL   CBC Auto Differential   Result Value Ref Range    WBC 5.02 3.40 - 10.80 10*3/mm3    RBC 4.09 (L) 4.14 - 5.80 10*6/mm3    Hemoglobin 13.0 13.0 - 17.7 g/dL    Hematocrit 38.1 37.5 - 51.0 %    MCV 93.2 79.0 - 97.0 fL    MCH 31.8 26.6 - 33.0 pg    MCHC 34.1 31.5 - 35.7 g/dL    RDW 13.1 12.3 - 15.4 %    RDW-SD 44.9 37.0 - 54.0 fl    MPV 11.1 6.0 - 12.0 fL    Platelets 279 140 - 450 10*3/mm3    Neutrophil % 65.6 42.7 - 76.0 %    Lymphocyte % 18.1 (L) 19.6 - 45.3 %    Monocyte % 14.1 (H) 5.0 - 12.0 %    Eosinophil % 1.0 0.3 - 6.2 %    Basophil % 0.8 0.0 - 1.5 %    Immature Grans % 0.4 0.0 - 0.5 %    Neutrophils, Absolute 3.29 1.70 - 7.00 10*3/mm3    Lymphocytes, Absolute 0.91 0.70 - 3.10 10*3/mm3    Monocytes, Absolute 0.71 0.10 - 0.90 10*3/mm3    Eosinophils, Absolute 0.05 0.00 - 0.40 10*3/mm3    Basophils, Absolute 0.04 0.00 - 0.20 10*3/mm3    Immature Grans, Absolute 0.02 0.00 - 0.05 10*3/mm3    nRBC 0.0 0.0 - 0.2 /100 WBC       Procedures         ASSESSMENT/PLAN:  Problem List Items Addressed This Visit        Nervous and Auditory    Low back pain at multiple sites - Primary     He does have arthritic changes on xray. Proceed with PT         Relevant Orders    Ambulatory Referral to Physical Therapy  Evaluate and treat       Other    Gait abnormality     Proceed with PT. Encourage to get up slowly and get bearings before taking first step. Keep home well lit with clear floors to avoid tripping. Use assist devices for all walking especially from bed to bathroom.          Relevant Orders    Ambulatory Referral to Physical Therapy Evaluate and treat      Other Visit Diagnoses     Rash        It does not look like typical shingles but will treat with valtrex for 1 week.     Generalized muscle weakness        Proceed with PT    Relevant Orders    Ambulatory Referral to Physical Therapy Evaluate and treat          FOLLOW-UP:  No follow-ups on file.      Electronically signed by:    Dirk Russ MD

## 2020-05-29 NOTE — TELEPHONE ENCOUNTER
Please see message from Dr Russ-  He would like the patient to have PT at [facility] or [facility], whichever can seen him first.  PT order is in the chart.

## 2020-05-29 NOTE — TELEPHONE ENCOUNTER
Please get them the number for [facility] and main campus for [facility] and the order is in the computer for PT. They could call both places and see which can get him in faster

## 2020-06-01 ENCOUNTER — TELEPHONE (OUTPATIENT)
Dept: CARDIOLOGY | Facility: HOSPITAL | Age: 85
End: 2020-06-01

## 2020-06-01 NOTE — TELEPHONE ENCOUNTER
Patient's daughter called and states that patient has had a 6 lb weigh gain since last night. Patient's daughter states that he has not had any SOB but has had some edema in his feet. Patient has been taking a dieretic every other night and did not have any last night. I spoke to Jennifer about patient and she ask that patient takes Bumex 2 mg for 2-3 days. If no improvement, patient's daughter will call office in the middle of the week.

## 2020-06-02 ENCOUNTER — TRANSCRIBE ORDERS (OUTPATIENT)
Dept: PHYSICAL THERAPY | Facility: CLINIC | Age: 85
End: 2020-06-02

## 2020-06-02 ENCOUNTER — TREATMENT (OUTPATIENT)
Dept: PHYSICAL THERAPY | Facility: CLINIC | Age: 85
End: 2020-06-02

## 2020-06-02 DIAGNOSIS — Z78.9 IMPAIRED MOBILITY AND ADLS: Primary | ICD-10-CM

## 2020-06-02 DIAGNOSIS — M54.50 MIDLINE LOW BACK PAIN WITHOUT SCIATICA, UNSPECIFIED CHRONICITY: Primary | ICD-10-CM

## 2020-06-02 DIAGNOSIS — Z74.09 IMPAIRED MOBILITY AND ADLS: Primary | ICD-10-CM

## 2020-06-02 DIAGNOSIS — Z74.09 IMPAIRED FUNCTIONAL MOBILITY, BALANCE, GAIT, AND ENDURANCE: ICD-10-CM

## 2020-06-02 PROCEDURE — 97162 PT EVAL MOD COMPLEX 30 MIN: CPT | Performed by: PHYSICAL THERAPIST

## 2020-06-02 NOTE — PROGRESS NOTES
Physical Therapy Initial Evaluation and Plan of Care      Patient: Chaitanya Browne   : 1923  Diagnosis/ICD-10 Code:  Midline low back pain without sciatica, unspecified chronicity [M54.5]  Referring practitioner: Dirk Russ MD  Date of Initial Visit: 2020  Today's Date: 2020  Patient seen for 1 sessions      Subjective:     Subjective Questionnaire: TUG 24.75  25 Ft Walk 34.06      Subjective Evaluation    History of Present Illness  Mechanism of injury: Patient relates last  he got out of bed and he could barely walk. He could not get himself up and move well at all. His daughter and caregiver gave advil and they had adjust seats for him to move. Monday was better, however Tuesday was bad again. They did visit his PCP and she administered 650mg tylenol. Pt does live alone, but has a caregiver throughout the day. He is by himself at night. His daughter is active in his life and she sees him daily. He is using a rollator but would like to use the cane.     Quality of life: fair    Pain  Current pain rating: 3  At best pain ratin  At worst pain ratin  Quality: dull ache  Relieving factors: heat    Social Support  Lives in: one-story house  Lives with: alone             Objective          Tenderness     Right Hip   Tenderness in the PSIS.     Additional Tenderness Details  PSIS and lateral musculature     Strength/Myotome Testing     Left Hip   Planes of Motion   Flexion: 4+  Abduction: 4    Right Hip   Planes of Motion   Flexion: 4+  Abduction: 4    Left Knee   Flexion: 5  Extension: 5    Right Knee   Flexion: 5  Extension: 5    Left Ankle/Foot   Dorsiflexion: 5    Right Ankle/Foot   Dorsiflexion: 5    Additional Strength Details  Pain with R hip abd     Ambulation     Observational Gait     Additional Observational Gait Details  Forward flexed using rollator for ambulation currently. Has a cane with 3 legs         PT Neuro         Assessment & Plan     Assessment  Impairments:  abnormal coordination, abnormal gait, activity intolerance, impaired balance, impaired physical strength and safety issue  Assessment details: Patient is a 89 YOM that presents with new onset of LBP, as well as generalized LE weakness. He is using a rollator currently, however he would like to progress back to his cane. He will require skilled PT intervention to address deficits in order to improve function and global mobility.   Prognosis: fair  Functional Limitations: carrying objects, walking, standing and stooping  Goals  Plan Goals: STG (6 visits)  1. Patient will report compliance with initial HEP.   2. Patient to perform TUG within 20 sec without LOB for improved functional mobility.  3. Patient to ambulate 25 ft within 30 sec without LOB for improved gait nida and functional mobility.  4. Patient will report pain </= 2/10 throughout the day in his low back.     LTG (12 visits)  1. Patient will be I with final HEP.   2. Patient to perform TUG within 18 sec without LOB for improved functional mobility.  3. Patient to ambulate 25 ft with cane within 25 sec without LOB for improved gait nida and functional mobility.  4. Patient will report no pain throughout the day in LB.           Plan  Therapy options: will be seen for skilled physical therapy services  Planned modality interventions: TENS  Planned therapy interventions: ADL retraining, balance/weight-bearing training, flexibility, gait training, manual therapy, neuromuscular re-education, motor coordination training, postural training, strengthening, stretching, therapeutic activities, transfer training and home exercise program  Duration in visits: 8  Treatment plan discussed with: patient and caregiver  Plan details: Patient will be seen 1x/wk x 8wks with treatment to include strengthening, stretching, manual therapy, neuromuscular re-education, balance, gait and endurance training.           Timed:  Manual Therapy:    0     mins  41657;  Therapeutic  Exercise:    0     mins  69154;     Neuromuscular Tahmina:    0    mins  05299;    Therapeutic Activity:     0     mins  15722;     Gait Trainin     mins  67662;     Electrical Stimulation:    0     mins  39637 ( );    Untimed:  Canalith Repositioning    0     mins 46092    Timed Treatment:   0   mins   Total Treatment:     60   mins    PT SIGNATURE: Vianca Durant, PT, DPT, MSCS, CDP  [place] License #[id number]  DATE TREATMENT INITIATED: 2020    Initial Certification Certification Period: 2020  I certify that the therapy services are furnished while this patient is under my care.  The services outlined above are required by this patient, and will be reviewed every 90 days.     PHYSICIAN: Dirk Russ MD      DATE:     Please sign and return via fax to [phone].   Thank you,   [facility].

## 2020-06-05 ENCOUNTER — OFFICE VISIT (OUTPATIENT)
Dept: INTERNAL MEDICINE | Facility: CLINIC | Age: 85
End: 2020-06-05

## 2020-06-05 ENCOUNTER — LAB (OUTPATIENT)
Dept: LAB | Facility: HOSPITAL | Age: 85
End: 2020-06-05

## 2020-06-05 VITALS
TEMPERATURE: 97.5 F | DIASTOLIC BLOOD PRESSURE: 58 MMHG | HEIGHT: 64 IN | BODY MASS INDEX: 30.39 KG/M2 | WEIGHT: 178 LBS | HEART RATE: 68 BPM | SYSTOLIC BLOOD PRESSURE: 98 MMHG

## 2020-06-05 DIAGNOSIS — M54.50 ACUTE MIDLINE LOW BACK PAIN WITHOUT SCIATICA: ICD-10-CM

## 2020-06-05 DIAGNOSIS — I49.40 PREMATURE HEARTBEATS: ICD-10-CM

## 2020-06-05 DIAGNOSIS — E78.5 HYPERLIPIDEMIA LDL GOAL <100: ICD-10-CM

## 2020-06-05 DIAGNOSIS — I10 ESSENTIAL HYPERTENSION: ICD-10-CM

## 2020-06-05 DIAGNOSIS — R73.01 IMPAIRED FASTING GLUCOSE: ICD-10-CM

## 2020-06-05 DIAGNOSIS — I10 ESSENTIAL HYPERTENSION: Primary | ICD-10-CM

## 2020-06-05 LAB
ANION GAP SERPL CALCULATED.3IONS-SCNC: 9.6 MMOL/L (ref 5–15)
BUN BLD-MCNC: 23 MG/DL (ref 8–23)
BUN/CREAT SERPL: 18.9 (ref 7–25)
CALCIUM SPEC-SCNC: 9.3 MG/DL (ref 8.2–9.6)
CHLORIDE SERPL-SCNC: 98 MMOL/L (ref 98–107)
CO2 SERPL-SCNC: 30.4 MMOL/L (ref 22–29)
CREAT BLD-MCNC: 1.22 MG/DL (ref 0.76–1.27)
GFR SERPL CREATININE-BSD FRML MDRD: 55 ML/MIN/1.73
GFR SERPL CREATININE-BSD FRML MDRD: 67 ML/MIN/1.73
GLUCOSE BLD-MCNC: 101 MG/DL (ref 65–99)
MAGNESIUM SERPL-MCNC: 2.4 MG/DL (ref 1.7–2.3)
POTASSIUM BLD-SCNC: 4 MMOL/L (ref 3.5–5.2)
SODIUM BLD-SCNC: 138 MMOL/L (ref 136–145)

## 2020-06-05 PROCEDURE — 80048 BASIC METABOLIC PNL TOTAL CA: CPT

## 2020-06-05 PROCEDURE — 83735 ASSAY OF MAGNESIUM: CPT

## 2020-06-05 PROCEDURE — 99214 OFFICE O/P EST MOD 30 MIN: CPT | Performed by: INTERNAL MEDICINE

## 2020-06-05 NOTE — ASSESSMENT & PLAN NOTE
Improving with PT. Rash resolved and do not think shingles and he did not take treatment with valtrex

## 2020-06-09 ENCOUNTER — TREATMENT (OUTPATIENT)
Dept: PHYSICAL THERAPY | Facility: CLINIC | Age: 85
End: 2020-06-09

## 2020-06-09 DIAGNOSIS — M54.50 MIDLINE LOW BACK PAIN WITHOUT SCIATICA, UNSPECIFIED CHRONICITY: Primary | ICD-10-CM

## 2020-06-09 DIAGNOSIS — Z74.09 IMPAIRED FUNCTIONAL MOBILITY, BALANCE, GAIT, AND ENDURANCE: ICD-10-CM

## 2020-06-09 PROCEDURE — 97110 THERAPEUTIC EXERCISES: CPT | Performed by: PHYSICAL THERAPIST

## 2020-06-09 PROCEDURE — 97112 NEUROMUSCULAR REEDUCATION: CPT | Performed by: PHYSICAL THERAPIST

## 2020-06-09 NOTE — PROGRESS NOTES
"Physical Therapy Daily Progress Note        Patient: Chaitanya Browne   : 1923  Diagnosis/ICD-10 Code:  Midline low back pain without sciatica, unspecified chronicity [M54.5]  Referring practitioner: Dirk Russ MD  Date of Initial Visit: Type: THERAPY  Noted: 2020  Today's Date: 2020  Patient seen for 2 sessions         Patient reports: no difficulty with HEP; however, piriformis stretch is most challenging and can be uncomfortable but does not increase LBP. Pt states his back feels much better than it did on initial eval. His pain level is 3/10 upon arrival; post-tx 3/10.    Treatments included this date: therapeutic exercise and neuromuscular re-education      Objective   Exercise 1  Exercise Name 1: (P) NuStep  Equipment/Resistance 1: (P) L6  Time: (P) 10  Exercise 2  Exercise Name 2: (P) LTR on TB  Time 2: (P) 3 min  Exercise 3  Exercise Name 3: (P) standing hip ABD  Sets/Reps 3: (P) 10*1 B  Exercise 4  Exercise Name 4: (P) lateral stepping, emphasis on big steps  Sets/Reps 4: (P) 10 laps in // bars  Exercise 5  Exercise Name 5: (P) standing marches // bars  Sets/Reps 5: (P) 10*1 B  Exercise 6  Exercise Name 6: (P) step-ups from solid surface to airex to 4\" step  Sets/Reps 6: (P) 1*10 B  Exercise 7  Exercise Name 7: (P) HEP instruction: clamshelbri, supine to SL to sitting EOM, mod tandem in front of rollator  Exercise 8  Exercise Name 8: (P) stepping over HFR and back  Sets/Reps 8: (P) 1*10 B  Exercise 9  Exercise Name 9: (P) stepping forward/backward w/ standing hip circumduction around cone w/ swing leg  Sets/Reps 9: (P) 1*10 B         See Exercise, Manual, and Modality Logs for complete treatment.       Assessment & Plan     Assessment  Assessment details: Pt's demonstrates improvement in performance of STS this date and did not experience exacerbation with exs performance. As pt performs several supine and standing exs as part of HEP, activities utilized this date emphasized more " dynamic and functional balance re-education and strengthening activities. Pt was able to perform all selected activities this date and required occasional Zac for LOB correction. Pt also demonstrates moderate activity tolerance as he is able to perform 2-3 activities consecutively prior to requiring seated rest break. Pt and pt's daughter was instructed in new HEP activities, to which both verbalized understanding.    Plan  Plan details: Plan to progress dynamic, functional balance re-education and core/LE strengthening activities for decreased LBP and greater safety with functional mobility.                  Timed:  Manual Therapy:    0     mins  25116;  Therapeutic Exercise:    30     mins  81713;     Neuromuscular Tahmina:    15    mins  25871;    Therapeutic Activity:     0     mins  51249;     Gait Trainin     mins  96142;     Ultrasound:     0     mins  06949;    Electrical Stimulation:    0     mins  79258 ( );    Untimed:  Electrical Stimulation:    0     mins  26489 ( );  Mechanical Traction:    0     mins  03503;     Timed Treatment:   45   mins   Total Treatment:     45   mins  Ela Dennis Physical Therapist Student completed treatment under my direct supervision.     2020    Vianca Durant, PT  Physical Therapist

## 2020-06-11 ENCOUNTER — TREATMENT (OUTPATIENT)
Dept: PHYSICAL THERAPY | Facility: CLINIC | Age: 85
End: 2020-06-11

## 2020-06-11 DIAGNOSIS — R27.8 DECREASED COORDINATION: Primary | ICD-10-CM

## 2020-06-11 DIAGNOSIS — R29.898 DECREASED GRIP STRENGTH: ICD-10-CM

## 2020-06-11 DIAGNOSIS — R20.8 DECREASED SENSATION OF HAND AND ARM: ICD-10-CM

## 2020-06-11 PROCEDURE — 97530 THERAPEUTIC ACTIVITIES: CPT | Performed by: OCCUPATIONAL THERAPIST

## 2020-06-11 PROCEDURE — 97166 OT EVAL MOD COMPLEX 45 MIN: CPT | Performed by: OCCUPATIONAL THERAPIST

## 2020-06-11 NOTE — PROGRESS NOTES
Occupational Therapy Initial Evaluation and Plan of Care      Patient: Chaitanya Browne   : 1923  Diagnosis/ICD-10 Code:  Decreased coordination [R27.9]  Referring practitioner: Dirk Russ MD  Date of Initial Visit: Type: THERAPY  Noted: 2020  Today's Date: 2020  Patient seen for 1 sessions      Subjective:     Subjective Questionnaire: 9HPT R: 82.2    L: 87.6  [facility] OT: 20    Subjective Evaluation    History of Present Illness  Mechanism of injury: Pt with recent h/o weakness with visit to PCP who gave pt dose of pain reliever. Pt does live alone, but has a caregiver throughout the day. He is by himself at night but his daughter is active in his life and she sees him daily. He is currently using a rollator but would like to use his quad cane.   He requires assist for donning/doffing compression hose bue can complete all other ADLs on his own with increased time and modifications. He sits to dress himself and has grab bars installed around toilet along with hand-held shower and tub bench for tub shower. He is required to use 2 steps from garage to enter home with no handrail but uses cane for stability. He currently denies trouble with that activity.   Pt sleeps in an adjustable bed and reports no difficulty with getting in/OOB at this time. He finds he has the most difficulty with his fingertips being numb and this affects his ability turning pgs, holding ipad styllus and holding onto spoon, fork and knife. He wears magnetic button front shirts, sweat pants and elastic shoe laces to increase his independence and efficiency with UB and LB dressing.        Patient Occupation: retired pediatric dentist; enjoys reading news on Ipad, playing on computer and watching [employer] Quality of life: fair    Pain  Current pain rating: 3  At best pain ratin  At worst pain ratin  Quality: dull ache  Relieving factors: heat    Social Support  Lives in: one-story house  Lives with: alone    Patient  Goals  Patient goals for therapy: increased strength, independence with ADLs/IADLs, improved balance and increased motion  Patient goal: increase FMC and sensation in fingers          Objective          Static Posture     Comments  Able to perform STS with hand rests from chair     Observations     Additional Observation Details  Light touch, proprioception and stereognosis all impaired bilaterally in fingertips    Active Range of Motion   Left Shoulder   Flexion: 125 degrees     Right Shoulder   Flexion: 137 degrees     Left Elbow   Flexion: WFL  Extension: WFL    Right Elbow   Flexion: WFL  Extension: WFL    Strength/Myotome Testing     Additional Strength Details  Mildly painful wrist movement, 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   Can oppose digits fxnly  Stiff digits with f/e    Ambulation     Ambulation: Level Surfaces     Additional Level Surfaces Ambulation Details  Forward flexed posture with use of rollator    Functional Assessment     Comments  Able to don/doff shoes with elastic laces        OT Neuro         OT Exercises     Row Name 06/11/20 1000             Precautions    Existing Precautions/Restrictions  fall  -ST         Exercise 1    Exercise Name 1  OT IE completed per consult  -ST         Exercise 2    Exercise Name 2  sensory re-ed activity requiring pt to identify varying objects by tactile system only alternating R/L hands; educated pt and caregiver on creating sensory bin for home use and indications  -ST         Exercise 3    Exercise Name 3  FMC activity used for controlling speed/accuracy of movement of pen b/t fingers with progression to increase difficulty as pt able; R/L hands   -ST        User Key  (r) = Recorded By, (t) = Taken By, (c) = Cosigned By    Initials Name Provider Type    Sofya Zacarias OTR Occupational Therapist           Assessment & Plan     Assessment  Impairments: abnormal coordination, abnormal gait, abnormal or restricted ROM, activity intolerance,  impaired balance, impaired physical strength and lacks appropriate home exercise program  Assessment details: Pt presents with deficits in bilateral hand FMC, strength and sensory impairments impacting ability to grasp,  and perform ADL and IADL tasks. Pt in particular has difficulty with handling his iPad stylus and eating utensils and often drops these items. Pt to benefit from skilled OT services to address these areas and promote strengthening, ROM, FMC, sensory re-ed, ADL/IADL retraining and home/activity mods to increase pt QOL, satisfaction, independence and engagement in daily tasks.   Prognosis: fair  Functional Limitations: carrying objects, lifting, walking and unable to perform repetitive tasks  Goals  Plan Goals:   Pt will score 55 seconds or less on the 9HPT to demonstrate improved accuracy and speed with fine motor coordination by 8 wks.     Pt will be independent with hand strengthening HEP to increase independence with ADL/IADL performance tasks by 4 wks.    Pt will be independent with hand FMC HEP to increase independence with ADL/IADL performance tasks by 4 wks.     Pt will be independent with B UE strengthening HEP to increase performance in ADL/IADL tasks by 8 wks.     Pt will be independent with sensory re-ed HEP to increase safety and engagement in ADL/IADL tasks by 8 wks.         Plan  Planned modality interventions: thermotherapy (paraffin bath)  Planned therapy interventions: ADL retraining, balance/weight-bearing training, body mechanics training, fine motor coordination training, flexibility, functional ROM exercises, home exercise program, IADL retraining, motor coordination training, strengthening, stretching, therapeutic activities and transfer training  Frequency: 1x week  Duration in weeks: 10  Treatment plan discussed with: patient and caregiver  Plan details: Est OT POC and goals to reflect above deficits and promote pt satisfaction, independence and safety.          Timed:  Manual Therapy:    0     mins  03959;  Therapeutic Exercise:    0     mins  61392;     Neuromuscular Tahmina:    0    mins  69815;    Therapeutic Activity:     23     mins  94295;     Self-Care/ADL     0     mins  64986;   Sensory Int. Tech      0     mins 23624;  Ultrasound:     0     mins  88586;    Electrical Stimulation:    0     mins  44086 ( );    Untimed:  Electrical Stimulation:    0     mins  15251 ( );    Timed Treatment:   23   mins   Total Treatment:     45   mins    OT Signature: Sofya Bond MS, OTR/L, CDP  [place] License #: [id number]  DATE TREATMENT INITIATED: 6/11/2020    Initial Certification Certification Period: 9/9/2020  I certify that the therapy services are furnished while this patient is under my care. The services outlined above are required by this patient and will be reviewed every 90 days.     Physician Signature: __________________________________  Dirk Russ MD    Please sign and return via fax to [phone]  Thank you,   [facility]

## 2020-06-16 ENCOUNTER — TREATMENT (OUTPATIENT)
Dept: PHYSICAL THERAPY | Facility: CLINIC | Age: 85
End: 2020-06-16

## 2020-06-16 DIAGNOSIS — M54.50 MIDLINE LOW BACK PAIN WITHOUT SCIATICA, UNSPECIFIED CHRONICITY: Primary | ICD-10-CM

## 2020-06-16 DIAGNOSIS — Z74.09 IMPAIRED FUNCTIONAL MOBILITY, BALANCE, GAIT, AND ENDURANCE: ICD-10-CM

## 2020-06-16 PROCEDURE — 97112 NEUROMUSCULAR REEDUCATION: CPT | Performed by: PHYSICAL THERAPIST

## 2020-06-16 PROCEDURE — 97110 THERAPEUTIC EXERCISES: CPT | Performed by: PHYSICAL THERAPIST

## 2020-06-16 NOTE — PROGRESS NOTES
"Physical Therapy Daily Progress Note  Visit: 3  Date of Initial Visit: Type: THERAPY  Noted: 2020    Patient: Chaitanya Browne   : 1923  Diagnosis/ICD-10 Code:  Midline low back pain without sciatica, unspecified chronicity [M54.5]  Referring practitioner: Dirk Russ MD  Date of Initial Visit: Type: THERAPY  Noted: 2020  Today's Date: 2020  Patient seen for 3 sessions      Subjective:   Patient reports: he experienced an increase in LBP w/ performance of STS during HEP; wanting to clarify he was doing this correctly.  Pain: 3/10  Clinical Progress: improved  Home Program Compliance: Yes  Treatment has included: therapeutic exercise and neuromuscular re-education    Objective   Exercise 1  Exercise Name 1: (P) NuStep  Equipment/Resistance 1: (P) L6  Time: (P) 8 mins  Exercise 2  Exercise Name 2: (P) STS from mat table  Sets/Reps 2: (P) 10  Exercise 3  Exercise Name 3: (P) side stepping, stepping over hurdles  Sets/Reps 3: (P) 5 laps each  Exercise 4  Exercise Name 4: (P) side step down/up from airex  Sets/Reps 4: (P) 2  Time 4: (P) 60 s  Exercise 5  Exercise Name 5: (P) gait training with cane  Sets/Reps 5: (P) 101'  Exercise 6  Exercise Name 6: (P) standing marches w/ 3 second count  Equipment/Resistance 6: (P) airex  Sets/Reps 6: (P) 2  Time 6: (P) 60 s              See Exercise, Manual, and Modality Logs for complete treatment.    PT Neuro          Assessment & Plan     Assessment  Assessment details: Pt had difficulty with clearing 6\" obstacles on the floor and required periods of modA for LOB correction. Pt also very challenged with performing functional activities on unstable surfaces. Pt instructed on proper body mechanics with performance of STS as pt had been over-utilizing back mm which be likely cause of increase in LBP with this activity. Pt verbalized and demonstrated correct technique after instruction. Pt performed trial of ambulation around clinic with cane; recommend to " continue using rollator d/t increased WB through RUE and cane to which pt verbalized understanding.    Plan  Plan details: Plan to continue challenging balance activities pt would otherwise not perform at home as well as gradually increasing functional endurance w/ fewer seated rest breaks per pt tolerance.        Timed:  Manual Therapy:            0     mins  46454;  Therapeutic Exercise:    8    mins  47069;     Neuromuscular Tahmina:    37    mins  77162;    Therapeutic Activity:      0     mins  62870;     Gait Trainin    mins  61472;     Electrical Stimulation:    0    mins  65389 ( );     Untimed:  Canalith Repositioning techniques _0_ [id number]      Timed Treatment:   45   mins   Total Treatment:     45   mins    Ela Dennis Physical Therapist Student completed treatment under my direct supervision.     2020  Vianca Durant, PT, DPT, MSCS, CDP  [place] License #: [id number]  Physical Therapist

## 2020-06-18 ENCOUNTER — TREATMENT (OUTPATIENT)
Dept: PHYSICAL THERAPY | Facility: CLINIC | Age: 85
End: 2020-06-18

## 2020-06-18 DIAGNOSIS — R29.898 DECREASED GRIP STRENGTH: ICD-10-CM

## 2020-06-18 DIAGNOSIS — R27.8 DECREASED COORDINATION: Primary | ICD-10-CM

## 2020-06-18 DIAGNOSIS — R20.8 DECREASED SENSATION OF HAND AND ARM: ICD-10-CM

## 2020-06-18 PROCEDURE — 97530 THERAPEUTIC ACTIVITIES: CPT | Performed by: OCCUPATIONAL THERAPIST

## 2020-06-18 NOTE — PROGRESS NOTES
"Occupational Therapy Daily Progress Note  Visit: 2  Date of Initial Visit: Type: THERAPY  Noted: 2020      Patient: Chaitanya Browne   : 1923  Diagnosis/ICD-10 Code:  Decreased coordination [R27.9]  Referring practitioner: Dirk Russ MD  Date of Initial Visit: Type: THERAPY  Noted: 2020  Today's Date: 2020  Patient seen for 2 sessions      Subjective:   Patient reports: \"Man I did that one exercise for 30 mins straight. It made my hand real tired\"  Pain: 0/10  Subjective Questionnaire: n/a  Clinical Progress: improved  Home Program Compliance: Yes  Treatment has included: therapeutic activity    Subjective   Objective     OT Neuro         OT Exercises     Row Name 20 1430             Precautions    Existing Precautions/Restrictions  fall  -ST         Exercise 1    Exercise Name 1  translation of beads from table using pincer grasp and into palm of hand alternating b/t R and L hands  -ST         Exercise 2    Exercise Name 2  sensory re-ed activity requiring pt to identify varying objects by tactile system only alternating R/L hands, increased difficulty by adding additional objects  -ST         Exercise 3    Exercise Name 3  in-hand manipulation and translation to increase accuracy and speed of PIP and DIP joints using flexi puzzle R/L hands   -ST         Exercise 4    Exercise Name 4  tweezing small objects from table surface to improve speed/accuracy with prehension  -ST         Exercise 5    Exercise Name 5  use of olive grabber to increase proprioception, strength and FMC for carry over with daily tasks   -ST        User Key  (r) = Recorded By, (t) = Taken By, (c) = Cosigned By    Initials Name Provider Type    Sofya Zacarias OTR Occupational Therapist           Assessment & Plan     Assessment  Assessment details: Discussed limited exercises to avoid over-fatigue as pt becomes over engrossed in task and has a loss in track of time. Pt demonstrates good control and " accuracy with tweezer tasks but continues to struggle with in-hand manipulation and translation among digits. Pt with significant numbness throughout B hands, primarily in fingertips but pt is improving with discrimination of textures of objects. Will continue to progress sensory re-ed, FMC and strength/ROM to increase independence and satisfaction with daily tasks.     Plan  Plan details: Continue POC and goals as previously est to progress pt engagement in, satisfaction and independence with ADLs and IADLs.         Visit Diagnoses:    ICD-10-CM ICD-9-CM   1. Decreased coordination R27.9 781.3   2. Decreased  strength R29.898 729.89   3. Decreased sensation of hand and arm R20.8 782.0             Timed:  Manual Therapy:    0     mins  09076;  Therapeutic Exercise:    0     mins  78396;     Neuromuscular Tahmina:    0    mins  02398;    Therapeutic Activity:     40     mins  90143;     Self-Care/ADL     0     mins  28881;   Sensory Int. Tech      0     mins 82663;  Ultrasound:     0     mins  40687;    Electrical Stimulation:    0     mins  76963 ( );    Untimed:  Electrical Stimulation:    0     mins  19598 ( );    Timed Treatment:   40   mins   Total Treatment:     40   mins    OT Signature: Sofya Bond MS, OTR/L, CDP  [place] License #: [id number]

## 2020-06-23 ENCOUNTER — TREATMENT (OUTPATIENT)
Dept: PHYSICAL THERAPY | Facility: CLINIC | Age: 85
End: 2020-06-23

## 2020-06-23 DIAGNOSIS — M54.50 MIDLINE LOW BACK PAIN WITHOUT SCIATICA, UNSPECIFIED CHRONICITY: Primary | ICD-10-CM

## 2020-06-23 DIAGNOSIS — Z74.09 IMPAIRED FUNCTIONAL MOBILITY, BALANCE, GAIT, AND ENDURANCE: ICD-10-CM

## 2020-06-23 PROCEDURE — 97112 NEUROMUSCULAR REEDUCATION: CPT | Performed by: PHYSICAL THERAPIST

## 2020-06-23 PROCEDURE — 97110 THERAPEUTIC EXERCISES: CPT | Performed by: PHYSICAL THERAPIST

## 2020-06-23 NOTE — PROGRESS NOTES
Physical Therapy Daily Progress Note  Visit: 4  Date of Initial Visit: Type: THERAPY  Noted: 2020    Patient: Chaitanya Browne   : 1923  Diagnosis/ICD-10 Code:  Midline low back pain without sciatica, unspecified chronicity [M54.5]  Referring practitioner: Dirk Russ MD  Date of Initial Visit: Type: THERAPY  Noted: 2020  Today's Date: 2020  Patient seen for 4 sessions      Subjective:   Patient reports: HEP going well, nothing new to report.  Pain: 2/10  Clinical Progress: improved  Home Program Compliance: Yes  Treatment has included: therapeutic exercise and neuromuscular re-education    Objective   Exercise 1  Exercise Name 1: (P) NuStep  Equipment/Resistance 1: (P) L6  Time: (P) 8 mins  Exercise 2  Exercise Name 2: (P) airex, step to green foam pad, cross body to  object, repeat opp side to place cone cross body  Sets/Reps 2: (P) 7 cones*1  Exercise 3  Exercise Name 3: (P) FSST practicing no UE support in // bars  Time 3: (P) 2 mins B  Exercise 4  Exercise Name 4: (P) airex one foot on green foam pad reaching cross body and OH for cones back to airex trunk rot to stack cross body  Sets/Reps 4: (P) 7 cones B  Exercise 5  Exercise Name 5: (P) stepping over bars in // bars 3 holes between each one limited UE support  Sets/Reps 5: (P) 4 laps  Exercise 6  Exercise Name 6: (P) one foot stepping from airex toe tap box, limited UE support  Sets/Reps 6: (P) 10 B       See Exercise, Manual, and Modality Logs for complete treatment.    PT Neuro          Assessment & Plan     Assessment  Assessment details: Increased emphasis on limiting UE support in parallel bars with challenging dynamic balance activities this date. Pt did well with activities involving reaching OH and across body though pt required adequate seated rest break after performance. Pt demonstrating decreased B LE clearance as evidenced by difficulty with stepping over objects which will continue to be addressed in subsequent  sessions.    Plan  Plan details: Plan to progress LE clearance activities, balance re-education w/o UE support in safe environment, and LE strengthening exercises per pt tolerance for improved safety and efficiency with functional mobility.        Timed:  Manual Therapy:            0     mins  23044;  Therapeutic Exercise:    8    mins  09877;     Neuromuscular Tahmina:    37    mins  43394;    Therapeutic Activity:      0     mins  57628;     Gait Trainin    mins  24977;     Electrical Stimulation:    0    mins  76695 ( );     Untimed:  Canalith Repositioning techniques _0_ 18749      Timed Treatment:   45   mins   Total Treatment:     45   mins    Ela Dennis Physical Therapist Student completed treatment under my direct supervision.     2020  Vianca Durant, PT, DPT, MSCS, CDP  [place] License #: [id number]  Physical Therapist

## 2020-06-25 ENCOUNTER — TREATMENT (OUTPATIENT)
Dept: PHYSICAL THERAPY | Facility: CLINIC | Age: 85
End: 2020-06-25

## 2020-06-25 DIAGNOSIS — R27.8 DECREASED COORDINATION: Primary | ICD-10-CM

## 2020-06-25 DIAGNOSIS — R20.8 DECREASED SENSATION OF HAND AND ARM: ICD-10-CM

## 2020-06-25 DIAGNOSIS — R29.898 DECREASED GRIP STRENGTH: ICD-10-CM

## 2020-06-25 PROCEDURE — 97530 THERAPEUTIC ACTIVITIES: CPT | Performed by: OCCUPATIONAL THERAPIST

## 2020-06-25 NOTE — PROGRESS NOTES
"Occupational Therapy Daily Progress Note  Visit: 3  Date of Initial Visit: Type: THERAPY  Noted: 2020      Patient: Chaitanya Browne   : 1923  Diagnosis/ICD-10 Code:  Decreased coordination [R27.9]  Referring practitioner: Dirk Russ MD  Date of Initial Visit: Type: THERAPY  Noted: 2020  Today's Date: 2020  Patient seen for 3 sessions      Subjective:   Patient reports: no complaints  Pain: 0/10  Subjective Questionnaire: n/a  Clinical Progress: improved  Home Program Compliance: Yes  Treatment has included: therapeutic activity    Subjective   Objective     OT Neuro         OT Exercises     Row Name 20 1115             Exercise 1    Exercise Name 1  paraffin for warm-up to aid with increasing flexibility and ROM prior to stretching and fxnl activity  -ST      Reps 1  5 dips  -ST      Time (Minutes) 1  5  -ST         Exercise 2    Exercise Name 2  f/e using towel and table surface, focus on achieving full extension as possible; progression to individual digits   -ST      Sets 2  2  -ST      Reps 2  10  -ST         Exercise 3    Exercise Name 3  sensory re-ed with identifying objects by feel and sight only R/L hands alternating  -ST         Exercise 4    Exercise Name 4  Prayer stretch/inverted prayer stretch  -ST      Sets 4  2  -ST      Reps 4  5  -ST         Exercise 5    Exercise Name 5  HW activity, identifying letters to improve legibility and consistency with shape and size   -ST        User Key  (r) = Recorded By, (t) = Taken By, (c) = Cosigned By    Initials Name Provider Type    Sofya Zacarias, OTR Occupational Therapist           Assessment & Plan     Assessment  Assessment details: Pt reports ease in stiffness and improved flexibility with use of paraffin. Will continue to use at beginning of sessions in order to improve ROM and flexibility. Pt with improved letter formation and legibility of letters s/p education on forming letters \"p,\" \"I,\" and \"k.\" Pt " demonstrates appropriate teach-back and understanding which will carry over to increased satisfaction at home with list making for his daughter. Pt has been compliant with his HEPs and will continue to progress difficulty and complexity of FMC, ROM and strengthening along with sensory re-ed to aid with fnxl engagement in daily tasks.     Plan  Plan details: Continue with POC and goals as previously est.         Visit Diagnoses:    ICD-10-CM ICD-9-CM   1. Decreased coordination R27.9 781.3   2. Decreased  strength R29.898 729.89   3. Decreased sensation of hand and arm R20.8 782.0             Timed:  Manual Therapy:    0     mins  36319;  Therapeutic Exercise:    0     mins  48709;     Neuromuscular Tahmina:    0    mins  50845;    Therapeutic Activity:     43     mins  41451;     Self-Care/ADL     0     mins  28738;   Sensory Int. Tech      0     mins 79841;  Ultrasound:     0     mins  01093;    Electrical Stimulation:    0     mins  87119 ( );    Untimed:  Electrical Stimulation:    0     mins  23177 ( );    Timed Treatment:   43   mins   Total Treatment:     43   mins    OT Signature: Sofya Bond MS, OTR/L, CDP  [place] License #: [id number]

## 2020-06-30 ENCOUNTER — TREATMENT (OUTPATIENT)
Dept: PHYSICAL THERAPY | Facility: CLINIC | Age: 85
End: 2020-06-30

## 2020-06-30 DIAGNOSIS — Z74.09 IMPAIRED FUNCTIONAL MOBILITY, BALANCE, GAIT, AND ENDURANCE: ICD-10-CM

## 2020-06-30 DIAGNOSIS — M54.50 MIDLINE LOW BACK PAIN WITHOUT SCIATICA, UNSPECIFIED CHRONICITY: Primary | ICD-10-CM

## 2020-06-30 PROCEDURE — 97112 NEUROMUSCULAR REEDUCATION: CPT | Performed by: PHYSICAL THERAPIST

## 2020-06-30 PROCEDURE — 97110 THERAPEUTIC EXERCISES: CPT | Performed by: PHYSICAL THERAPIST

## 2020-06-30 NOTE — PROGRESS NOTES
Physical Therapy Daily Progress Note  Visit: 5  Date of Initial Visit: Type: THERAPY  Noted: 2020    Patient: Chaitanya Browne   : 1923  Diagnosis/ICD-10 Code:  Midline low back pain without sciatica, unspecified chronicity [M54.5]  Referring practitioner: Dirk Russ MD  Date of Initial Visit: Type: THERAPY  Noted: 2020  Today's Date: 2020  Patient seen for 5 sessions      Subjective:   Patient reports: he elected not to perform the bed exercises yesterday due to increased LBP.   Pain: 0/10  Clinical Progress: improved  Home Program Compliance: Yes  Treatment has included: therapeutic exercise and neuromuscular re-education    Objective   TU.13 s  25': 10.34  DIXON/56    See Exercise, Manual, and Modality Logs for complete treatment.    PT Neuro  Exercise 1  Exercise Name 1: (P) NuStep  Equipment/Resistance 1: (P) L6  Time: (P) 8 mins  Exercise 2  Exercise Name 2: (P) Re-Assessment performed - see functional tests  Exercise 3  Exercise Name 3: (P) one foot up on BOSU, one foot down solid surface w/ trunk rot 1# UE bar  Sets/Reps 3: (P) 90 s B  Exercise 4  Exercise Name 4: (P) alternating toe taps  Equipment/Resistance 4: (P) green step  Time 4: (P) 2 mins   Exercise 5  Exercise Name 5: Ambulation around clinic using tripod - 100 ft     Assessment & Plan     Assessment  Impairments: abnormal coordination, abnormal gait, activity intolerance, impaired balance, impaired physical strength and safety issue  Assessment details: Pt demonstrates significantly improved safety and efficiency with functional mobility this date as exhibited by drastically improved TUG and 10 M times. Pt also exhibits greater capacity for dynamic balance as he is tolerating increased challenge without LOB. Pt continues to experience LBP intermittently though his pain level remains nil to minimal throughout the last few sessions. Pt would benefit from continued skilled OP PT services to optimize safety during  community mobility.  Prognosis: fair  Functional Limitations: carrying objects, walking, standing and stooping  Goals  Plan Goals: STG (2 visits)  1. Patient will report compliance with initial HEP. MET  2. Patient to perform TUG within 20 sec without LOB for improved functional mobility. MET  3. Patient to ambulate 25 ft within 30 sec without LOB for improved gait nida and functional mobility. MET  4. Patient will report pain </= 2/10 throughout the day in his low back. ONGOING    LTG (4 visits)  1. Patient will be I with final HEP. ONGOING  2. Patient to perform TUG within 18 sec without LOB for improved functional mobility. MET  3. Patient to ambulate 25 ft with cane within 25 sec without LOB for improved gait nida and functional mobility. MET  4. Patient will report no pain throughout the day in LB. ONGOING  5. Patient will demonstrate a 50/56 on the DIXON in order to improve static and functional balance.         Plan  Therapy options: will be seen for skilled physical therapy services  Planned modality interventions: TENS  Planned therapy interventions: ADL retraining, balance/weight-bearing training, flexibility, gait training, manual therapy, neuromuscular re-education, motor coordination training, postural training, strengthening, stretching, therapeutic activities, transfer training and home exercise program  Frequency: 1x week  Duration in visits: 4  Treatment plan discussed with: patient and caregiver  Plan details: Progress strengthening, stretching, manual therapy, neuromuscular re-education, balance, gait and endurance training to improve safety and reduce LBP.          Timed:  Manual Therapy:            0     mins  07962;  Therapeutic Exercise:    8    mins  40003;     Neuromuscular Tahmina:    37    mins  39668;    Therapeutic Activity:      0     mins  22748;     Gait Trainin    mins  65805;     Electrical Stimulation:    0    mins  94584 ( );     Untimed:  Flor  Repositioning techniques _0_ [id number]      Timed Treatment:   45   mins   Total Treatment:     45   mins      Vianca Durant PT, DPT, MSCS, CDP  [place] License #: [id number]  Physical Therapist

## 2020-07-02 ENCOUNTER — TREATMENT (OUTPATIENT)
Dept: PHYSICAL THERAPY | Facility: CLINIC | Age: 85
End: 2020-07-02

## 2020-07-02 DIAGNOSIS — R29.898 DECREASED GRIP STRENGTH: ICD-10-CM

## 2020-07-02 DIAGNOSIS — R20.8 DECREASED SENSATION OF HAND AND ARM: ICD-10-CM

## 2020-07-02 DIAGNOSIS — R27.8 DECREASED COORDINATION: Primary | ICD-10-CM

## 2020-07-02 PROCEDURE — 97530 THERAPEUTIC ACTIVITIES: CPT | Performed by: OCCUPATIONAL THERAPIST

## 2020-07-02 NOTE — PROGRESS NOTES
Occupational Therapy Daily Progress Note  Visit: 4  Date of Initial Visit: Type: THERAPY  Noted: 2020      Patient: Chaitanya Browne   : 1923  Diagnosis/ICD-10 Code:  Decreased coordination [R27.9]  Referring practitioner: Dirk Russ MD  Date of Initial Visit: Type: THERAPY  Noted: 2020  Today's Date: 2020  Patient seen for 4 sessions      Subjective:   Patient reports: no complaints   Pain: 0/10  Subjective Questionnaire: n/a  Clinical Progress: improved  Home Program Compliance: Yes  Treatment has included: therapeutic activity    Subjective   Objective     OT Neuro         OT Exercises     Row Name 20 1300             Exercise 1    Exercise Name 1  paraffin for warm-up to aid with increasing flexibility and ROM prior to stretching and fxnl activity  -ST      Reps 1  5 dips  -ST      Time (Minutes) 1  7  -ST         Exercise 2    Exercise Name 2  improving FMC speed and accuracy w/focus on translation and in-hand manipulation using notched pegboard, alternating b/t R and L hands  -ST         Exercise 3    Exercise Name 3  HW, focus on cursive, increasing size for improving legibility and pt satisfaction   -ST        User Key  (r) = Recorded By, (t) = Taken By, (c) = Cosigned By    Initials Name Provider Type    Sofya Zacarias OTR Occupational Therapist           Assessment & Plan     Assessment  Assessment details: Pt returned with much improved printed after practice at home and carry over from last session. Pt now wanting to improve legibility of cursive writing; addressed increasing size and clarity of certain letters/shapes. Pt requiring cuing for increasing use of translation vs gross motor pattern when performing notched pegboard. Will continue to progress difficulty and complexity of activities to aid with pt engagement and satis cation with daily tasks.     Plan  Plan details: Continue with POC and goals as previously est.         Visit Diagnoses:    ICD-10-CM  ICD-9-CM   1. Decreased coordination R27.9 781.3   2. Decreased  strength R29.898 729.89   3. Decreased sensation of hand and arm R20.8 782.0             Timed:  Manual Therapy:    0     mins  34295;  Therapeutic Exercise:    0     mins  61470;     Neuromuscular Tahmina:    0    mins  00749;    Therapeutic Activity:     38     mins  50647;     Self-Care/ADL     0     mins  07883;   Sensory Int. Tech      0     mins 62689;  Ultrasound:     0     mins  23642;    Electrical Stimulation:    0     mins  31413 ( );    Untimed:  Electrical Stimulation:    0     mins  16107 ( );    Timed Treatment:   38   mins   Total Treatment:     45   mins    OT Signature: Sofya Bond MS, OTR/L, CDP  [place] License #: [id number]

## 2020-07-07 ENCOUNTER — TREATMENT (OUTPATIENT)
Dept: PHYSICAL THERAPY | Facility: CLINIC | Age: 85
End: 2020-07-07

## 2020-07-07 DIAGNOSIS — Z74.09 IMPAIRED FUNCTIONAL MOBILITY, BALANCE, GAIT, AND ENDURANCE: ICD-10-CM

## 2020-07-07 DIAGNOSIS — M54.50 MIDLINE LOW BACK PAIN WITHOUT SCIATICA, UNSPECIFIED CHRONICITY: Primary | ICD-10-CM

## 2020-07-07 PROCEDURE — 97112 NEUROMUSCULAR REEDUCATION: CPT | Performed by: PHYSICAL THERAPIST

## 2020-07-07 PROCEDURE — 97110 THERAPEUTIC EXERCISES: CPT | Performed by: PHYSICAL THERAPIST

## 2020-07-07 NOTE — PROGRESS NOTES
Physical Therapy Daily Progress Note  Visit: 6  Date of Initial Visit: Type: THERAPY  Noted: 2020    Patient: Chaitanya Browne   : 1923  Diagnosis/ICD-10 Code:  Midline low back pain without sciatica, unspecified chronicity [M54.5]  Referring practitioner: Dirk Russ MD  Date of Initial Visit: Type: THERAPY  Noted: 2020  Today's Date: 2020  Patient seen for 6 sessions      Subjective:   Patient reports: he misplaced his printout for the double knees to chest and supine piriformis stretches and requests a new copy.  Pain: 0/10  Clinical Progress: improved  Home Program Compliance: Yes  Treatment has included: therapeutic exercise and neuromuscular re-education    Objective   Exercise 1  Exercise Name 1: (P) NuStep  Equipment/Resistance 1: (P) L6  Time: (P) 8 mins  Exercise 2  Exercise Name 2: (P) hip IR/ER cone tap  Exercise 3  Exercise Name 3: (P) body blade on foam  Exercise 4  Exercise Name 4: (P) hand weights on foam  Exercise 5  Exercise Name 5: (P) foam medball throwdowns, HTH, toss ups    PT Neuro          Assessment & Plan     Assessment  Assessment details: Pt tolerated incorporation of UE resistance into dynamic balance activities well though he required adequate seated or standing rest breaks between sets. Pt is especially challenged with external perturbations with narrow KRYSTLE on compliant surface.     Plan  Plan details: Continue to incorporate UE resistance exercise into balance re-education; progress LE strengthening exercise; instruct in stretching as needed.        Timed:  Manual Therapy:            0     mins  55306;  Therapeutic Exercise:    8    mins  09811;     Neuromuscular Tahmina:    37    mins  53447;    Therapeutic Activity:      0     mins  64106;     Gait Trainin    mins  87788;     Electrical Stimulation:    0    mins  28277 ( );     Untimed:  Canalith Repositioning techniques _0_ [id number]      Timed Treatment:   45   mins   Total Treatment:      45   mins    Ela Dennis, Physical Therapist Student completed treatment under my direct supervision.     07/07/2020  Vianca Durant, PT, DPT, MSCS, CDP  [place] License #: [id number]  Physical Therapist

## 2020-07-09 ENCOUNTER — TREATMENT (OUTPATIENT)
Dept: PHYSICAL THERAPY | Facility: CLINIC | Age: 85
End: 2020-07-09

## 2020-07-09 DIAGNOSIS — R20.8 DECREASED SENSATION OF HAND AND ARM: ICD-10-CM

## 2020-07-09 DIAGNOSIS — R27.8 DECREASED COORDINATION: Primary | ICD-10-CM

## 2020-07-09 DIAGNOSIS — R29.898 DECREASED GRIP STRENGTH: ICD-10-CM

## 2020-07-09 PROCEDURE — 97530 THERAPEUTIC ACTIVITIES: CPT | Performed by: OCCUPATIONAL THERAPIST

## 2020-07-09 NOTE — PROGRESS NOTES
"Occupational Therapy Daily Progress Note  Visit: 5  Date of Initial Visit: Type: THERAPY  Noted: 2020      Patient: Chaitanya Browne   : 1923  Diagnosis/ICD-10 Code:  Decreased coordination [R27.9]  Referring practitioner: Dirk Russ MD  Date of Initial Visit: Type: THERAPY  Noted: 2020  Today's Date: 2020  Patient seen for 5 sessions      Subjective:   Patient reports: \"I think my HW is getting much better\"  Pain: 0/10  Subjective Questionnaire: n/a  Clinical Progress: improved  Home Program Compliance: Yes  Treatment has included: therapeutic activity    Subjective   Objective     OT Neuro         OT Exercises     Row Name 20 0915             Precautions    Existing Precautions/Restrictions  no known precautions/restrictions  -ST         Exercise 1    Exercise Name 1  paraffin for warm-up to aid with increasing flexibility and ROM prior to stretching and fxnl activity  -ST      Reps 1  5 dips  -ST      Time (Minutes) 1  5  -ST         Exercise 2    Exercise Name 2  HW with focus on cursive to improve legibility with spacing and letter formation; carry over from lg lined paper to regular sized lined paper  -ST         Exercise 3    Exercise Name 3  pincer and lateral grasp strengthening using green tputty to remove small, mult/varied sized items; initially with B hand togethers with eyes closed to include tactile system then alternating from R/L hands with focus on singular digits  -ST        User Key  (r) = Recorded By, (t) = Taken By, (c) = Cosigned By    Initials Name Provider Type    Sofya Zacarias, OTR Occupational Therapist           Assessment & Plan     Assessment  Assessment details: Pt with significantly improved cursive HW s/p use of large lined paper which increased legibility and spacing. Completed appropriate carry over to normal sized writing. Pt continues to demonstrate decreased strength with pincer and lateral grasps with putty activity. Issued with written " HEP for improving overall strength and coordination.     Plan  Plan details: Continue with POC and goals as previously est to promote independence, safety and engagement/satisfaction with daily tasks. POC due next session.        Visit Diagnoses:    ICD-10-CM ICD-9-CM   1. Decreased coordination R27.9 781.3   2. Decreased  strength R29.898 729.89   3. Decreased sensation of hand and arm R20.8 782.0             Timed:  Manual Therapy:    0     mins  32548;  Therapeutic Exercise:    0     mins  83196;     Neuromuscular Tahmina:    0    mins  51704;    Therapeutic Activity:     40     mins  22255;     Self-Care/ADL     0     mins  89872;   Sensory Int. Tech      0     mins 02286;  Ultrasound:     0     mins  71450;    Electrical Stimulation:    0     mins  37227 ( );    Untimed:  Electrical Stimulation:    0     mins  73611 ( );    Timed Treatment:   40   mins   Total Treatment:     45   mins    OT Signature: Sofya Bond MS, OTR/L, CDP  [place] License #: [id number]

## 2020-07-14 ENCOUNTER — TREATMENT (OUTPATIENT)
Dept: PHYSICAL THERAPY | Facility: CLINIC | Age: 85
End: 2020-07-14

## 2020-07-14 DIAGNOSIS — Z74.09 IMPAIRED FUNCTIONAL MOBILITY, BALANCE, GAIT, AND ENDURANCE: Primary | ICD-10-CM

## 2020-07-14 PROCEDURE — 97110 THERAPEUTIC EXERCISES: CPT | Performed by: PHYSICAL THERAPIST

## 2020-07-14 PROCEDURE — 97112 NEUROMUSCULAR REEDUCATION: CPT | Performed by: PHYSICAL THERAPIST

## 2020-07-14 NOTE — PROGRESS NOTES
Physical Therapy Daily Progress Note  Visit: 7  Date of Initial Visit: Type: THERAPY  Noted: 2020    Patient: Chaitanya Browne   : 1923  Diagnosis/ICD-10 Code:  Impaired functional mobility, balance, gait, and endurance [Z74.09]  Referring practitioner: Dirk Russ MD  Date of Initial Visit: Type: THERAPY  Noted: 2020  Today's Date: 2020  Patient seen for 7 sessions      Subjective:   Patient reports: he did not do much this weekend.  Pain: 0/10  Clinical Progress: improved  Home Program Compliance: Yes  Treatment has included: therapeutic exercise and neuromuscular re-education    Objective   Exercise 1  Exercise Name 1: (P) NuStep  Equipment/Resistance 1: (P) L6  Time: (P) 10 mins  Exercise 2  Exercise Name 2: (P) one foot up on 3 stacked pads, other on floor; 1# UE bar asymmtric D2  Sets/Reps 2: (P) 2 B  Time 2: (P) 30 s  Exercise 3  Exercise Name 3: (P) HR foam pad  Sets/Reps 3: (P) 2*10  Exercise 4  Exercise Name 4: (P) chair to chair 10'  Sets/Reps 4: (P) 2*4  Exercise 5  Exercise Name 5: (P) mod sit ups 2# medball from BOSU/pillow  Sets/Reps 5: (P) 2*10    PT Neuro          Assessment & Plan     Assessment  Assessment details: Pt challenged with continuous performance of progressed dynamic balance activities this date, requiring multiple seated rest breaks. Pt instructed in performance of new exs as part of HEP to which pt verbalized understanding.    Plan  Plan details: Plan to progress balance, strengthening, stretching, and endurance per pt tolerance for enhanced functional mobility.        Timed:  Manual Therapy:            0     mins  50147;  Therapeutic Exercise:    23    mins  27946;     Neuromuscular Tahmina:    22    mins  46014;    Therapeutic Activity:      0     mins  40020;     Gait Trainin    mins  84085;     Electrical Stimulation:    0    mins  28273 ( );     Untimed:  Canalith Repositioning techniques _0_ [id number]      Timed Treatment:   45    mins   Total Treatment:     45   mins    Ela Dennis Physical Therapist Student completed treatment under my direct supervision.     07/14/2020  Vianca Durant, PT, DPT, MSCS, CDP  [place] License #: [id number]  Physical Therapist

## 2020-07-16 ENCOUNTER — TREATMENT (OUTPATIENT)
Dept: PHYSICAL THERAPY | Facility: CLINIC | Age: 85
End: 2020-07-16

## 2020-07-16 DIAGNOSIS — R27.8 DECREASED COORDINATION: Primary | ICD-10-CM

## 2020-07-16 DIAGNOSIS — R20.8 DECREASED SENSATION OF HAND AND ARM: ICD-10-CM

## 2020-07-16 DIAGNOSIS — R29.898 DECREASED GRIP STRENGTH: ICD-10-CM

## 2020-07-16 PROCEDURE — 97530 THERAPEUTIC ACTIVITIES: CPT | Performed by: OCCUPATIONAL THERAPIST

## 2020-07-16 NOTE — PROGRESS NOTES
OT Re-Assessment / Re-Certification        Patient: Chaitanya Browne   : 1923  Diagnosis/ICD-10 Code:  Decreased coordination [R27.9]  Referring practitioner: Dirk Russ MD  Date of Initial Visit: Type: THERAPY  Noted: 2020  Today's Date: 2020  Patient seen for 6 sessions      Subjective:   Patient reports: Mild catching in R hand digits II and III once a week/every other week that sounds like trigger finger. Otherwise, pt with no complaints  Pain: 0/10  Subjective Questionnaire: 9HPT R: 71.4 L: 61.2  Clinical Progress: improved  Home Program Compliance: Yes  Treatment has included: therapeutic activity    Subjective   Objective     OT Neuro         Assessment & Plan     Assessment  Impairments: abnormal coordination, abnormal gait, abnormal or restricted ROM, activity intolerance, impaired balance, impaired physical strength and lacks appropriate home exercise program  Assessment details: OT re-assessment completed per POC; pt made significant improvement in times bilaterally with 9HPT, exhibiting better accuracy, speed and DIP/PIP ROM w/peg handling. Pt also functionally is better able to grasp and  items with mild modifications. Pt reports paraffin aids with stiffness in B hands and may purchase a home unit. Pt to benefit from continued skilled OT services to address these areas and promote strengthening, ROM, FMC, sensory re-ed, ADL/IADL retraining and home/activity mods to increase pt QOL, satisfaction, independence and engagement in daily tasks.   Prognosis: fair  Functional Limitations: carrying objects, lifting, walking and unable to perform repetitive tasks  Goals  Plan Goals:   Pt will score 55 seconds or less on the 9HPT to demonstrate improved accuracy and speed with fine motor coordination by 8 wks. PROGRESSING; from 82.2 to 71.4 R hand and from 87.6 to 61.2 left hand    Pt will be independent with hand strengthening HEP to increase independence with ADL/IADL performance  tasks by 4 wks. MET    Pt will be independent with hand FMC HEP to increase independence with ADL/IADL performance tasks by 4 wks. PARTIALLY MET, PROGRESSING DIFFICULTY     Pt will be independent with B UE strengthening HEP to increase performance in ADL/IADL tasks by 8 wks. ONGOING    Pt will be independent with sensory re-ed HEP to increase safety and engagement in ADL/IADL tasks by 8 wks. MET         Plan  Planned modality interventions: thermotherapy (paraffin bath)  Planned therapy interventions: ADL retraining, balance/weight-bearing training, body mechanics training, fine motor coordination training, flexibility, functional ROM exercises, home exercise program, IADL retraining, motor coordination training, strengthening, stretching, therapeutic activities and transfer training  Frequency: 1x week  Duration in weeks: 10  Treatment plan discussed with: patient and caregiver  Plan details: Continue w/ OT POC and goals to reflect above deficits and promote pt satisfaction, independence and safety.       OT Exercises     Row Name 07/16/20 1000             Precautions    Existing Precautions/Restrictions  no known precautions/restrictions  -ST         Exercise 1    Exercise Name 1  paraffin for warm-up to aid with increasing flexibility and ROM prior to stretching and fxnl activity  -ST      Reps 1  5 dips  -ST      Time (Minutes) 1  5  -ST         Exercise 2    Exercise Name 2  OT re-assessment per POC, see flow sheets for additional details   -ST         Exercise 3    Exercise Name 3  focused pincer grasp w/increasing circular closure to improve accuracy and speed of picking up and manipulating small items  -ST         Exercise 4    Exercise Name 4  modifications to paper clips to improve speed and efficiency of grasp  -ST         Exercise 5    Exercise Name 5  education and practice with power grasp on larger/heavier items for joint protection and comfort and prevention of ulnar drift positioning (holding  w/power grasp under handle on mug vs just handle)  -        User Key  (r) = Recorded By, (t) = Taken By, (c) = Cosigned By    Initials Name Provider Type    Sofya Zacarias OTR Occupational Therapist        Visit Diagnoses:    ICD-10-CM ICD-9-CM   1. Decreased coordination R27.9 781.3   2. Decreased  strength R29.898 729.89   3. Decreased sensation of hand and arm R20.8 782.0       Progress toward previous goals: Partially Met      Recommendations: Continue as planned  Timeframe: 1 month  Prognosis to achieve goals: good    OT Signature: Sofya Bond MS, OTR/L, [facility] License #: [id number]    Based upon review of the patient's progress and continued therapy plan, it is my medical opinion that Chaiatnya Browne should continue occupational therapy treatment at [facility]  [address] [place] [id number] [phone].    Signature: __________________________________  Dirk Russ MD    Timed:  Manual Therapy:    0     mins  26363;  Therapeutic Exercise:    0     mins  72292;     Neuromuscular Tahmina:    0    mins  43433;    Therapeutic Activity:     53     mins  19214;     Self-Care/ADL     0     mins  43084;   Sensory Int. Tech      0     mins 18234;  Ultrasound:     0     mins  11405;    Electrical Stimulation:    0     mins  50891 ( );    Untimed:  Electrical Stimulation:    0     mins  94353 ( );    Timed Treatment:   53   mins   Total Treatment:     58   mins

## 2020-07-21 ENCOUNTER — TREATMENT (OUTPATIENT)
Dept: PHYSICAL THERAPY | Facility: CLINIC | Age: 85
End: 2020-07-21

## 2020-07-21 DIAGNOSIS — Z74.09 IMPAIRED FUNCTIONAL MOBILITY, BALANCE, GAIT, AND ENDURANCE: ICD-10-CM

## 2020-07-21 DIAGNOSIS — M54.50 MIDLINE LOW BACK PAIN WITHOUT SCIATICA, UNSPECIFIED CHRONICITY: Primary | ICD-10-CM

## 2020-07-21 PROCEDURE — 97110 THERAPEUTIC EXERCISES: CPT | Performed by: PHYSICAL THERAPIST

## 2020-07-21 PROCEDURE — 97112 NEUROMUSCULAR REEDUCATION: CPT | Performed by: PHYSICAL THERAPIST

## 2020-07-21 NOTE — PROGRESS NOTES
Physical Therapy Daily Progress Note  Visit: 8  Date of Initial Visit: Type: THERAPY  Noted: 2020    Patient: Chaitanya Browne   : 1923  Diagnosis/ICD-10 Code:  Midline low back pain without sciatica, unspecified chronicity [M54.5]  Referring practitioner: Dirk Russ MD  Date of Initial Visit: Type: THERAPY  Noted: 2020  Today's Date: 2020  Patient seen for 8 sessions      Subjective:   Patient reports: he did not get enough sleep last night.  Pain: 2/10 LB  Clinical Progress: improved  Home Program Compliance: Yes  Treatment has included: therapeutic exercise and neuromuscular re-education    Objective   Exercise 1  Exercise Name 1: (P) NuStep  Equipment/Resistance 1: (P) L6  Time: (P) 8 mins  Exercise 2  Exercise Name 2: (P) assisted supine stretching - knee to chest  Sets/Reps 2: (P) 2 B  Time 2: (P) 30 s  Exercise 3  Exercise Name 3: (P) assisted supine stretching - piriformis  Sets/Reps 3: (P) 2 B  Time 3: (P) 30 s  Exercise 4  Exercise Name 4: (P) DLS foam, read TB below knees, STS w/ 2# medball  Sets/Reps 4: (P) 2*8  Exercise 5  Exercise Name 5: (P) DLS foam, trunk rot 2# freeweights B  Sets/Reps 5: (P) 2  Time 5: (P) 60 s  Exercise 6  Exercise Name 6: (P) DLS foam, HABD/ADD/F  Sets/Reps 6: (P) 2  Time 6: (P) 60 s    PT Neuro          Assessment & Plan     Assessment  Assessment details: Pt challenged with incorporation of functional UE strengthening on compliant surface. Pt's LBP decreased after application of assisted stretching techniques. Pt instructed in performance of similar activities to do as part of HEP to which he verbalized understanding.    Plan  Plan details: Progress balance re-education, NMR, functional strengthening, stretching per pt tolerance for improved functional mobility.        Timed:  Manual Therapy:            0     mins  98581;  Therapeutic Exercise:    22    mins  52973;     Neuromuscular Tahmina:    23    mins  93830;    Therapeutic Activity:      0      mins  67246;     Gait Trainin    mins  16897;     Electrical Stimulation:    0    mins  94573 ( );     Untimed:  Canalith Repositioning techniques _0_ [id number]      Timed Treatment:   45   mins   Total Treatment:     45   mins    Ela Dennis, Physical Therapist Student completed treatment under my direct supervision.     2020  Vianca Durant PT, DPT, MSCS, CDP  [place] License #: [id number]  Physical Therapist

## 2020-07-23 ENCOUNTER — TREATMENT (OUTPATIENT)
Dept: PHYSICAL THERAPY | Facility: CLINIC | Age: 85
End: 2020-07-23

## 2020-07-23 DIAGNOSIS — R27.8 DECREASED COORDINATION: Primary | ICD-10-CM

## 2020-07-23 DIAGNOSIS — R29.898 DECREASED GRIP STRENGTH: ICD-10-CM

## 2020-07-23 DIAGNOSIS — R20.8 DECREASED SENSATION OF HAND AND ARM: ICD-10-CM

## 2020-07-23 PROCEDURE — 97530 THERAPEUTIC ACTIVITIES: CPT | Performed by: OCCUPATIONAL THERAPIST

## 2020-07-23 PROCEDURE — 97535 SELF CARE MNGMENT TRAINING: CPT | Performed by: OCCUPATIONAL THERAPIST

## 2020-07-23 NOTE — PROGRESS NOTES
"Occupational Therapy Daily Progress Note  Visit: 7  Date of Initial Visit: Type: THERAPY  Noted: 2020      Patient: Chaitanya Browne   : 1923  Diagnosis/ICD-10 Code:  Decreased coordination [R27.9]  Referring practitioner: Dirk Russ MD  Date of Initial Visit: Type: THERAPY  Noted: 2020  Today's Date: 2020  Patient seen for 7 sessions      Subjective:   Patient reports: \"I'm having trouble still with my phone. I hit the wrong buttons\"  Pain: 0/10  Subjective Questionnaire: n/a  Clinical Progress: improved  Home Program Compliance: Yes  Treatment has included: therapeutic activity and self-care/ADL retraining    Subjective   Objective     OT Neuro         OT Exercises     Row Name 20 0945             Precautions    Existing Precautions/Restrictions  no known precautions/restrictions  -ST         Exercise 1    Exercise Name 1  paraffin for warm-up to aid with increasing flexibility and ROM prior to stretching and fxnl activity  -ST      Reps 1  5 dips  -ST      Time (Minutes) 1  5  -ST         Exercise 2    Exercise Name 2  modified grasp patterns to  and handle land line phone to avoid pt muting and blocking phone numbers on accident d/t pt's poor sensation; discussed varying handles and textures to add to phone to assist pt with hand hold-pt's daughter to explore  -ST         Exercise 3    Exercise Name 3  one-handed knot-tying/untying with  w/focus on translation/in-hand manipulation, pincer grasp, alternating R/L hands  -ST         Exercise 4    Exercise Name 4  digit f/e, ab/adduction and circumduction- R/L   -ST        User Key  (r) = Recorded By, (t) = Taken By, (c) = Cosigned By    Initials Name Provider Type    Sofya Zacarias OTR Occupational Therapist           Assessment & Plan     Assessment  Assessment details: Pt's daughter to check into varying handles/glue dot options to add to pt's land line phone after today's session as pt demonstrates " decreased  on phone d/c slick plastic material and pt's decreased sensation from CTS. In the mean time, pt to work on picking up end of phone with modified grasp to avoid hitting buttons that accidentally mute and block his daughter's number.   Pt demonstrated decreased coordination with speed/accuracy performing one handed knot tying with . Pt to continue to practice at home for improving ROM/flexibitility for carry over with ADL and IADL tasks.     Plan  Plan details: Continue with POC and goals as est.         Visit Diagnoses:    ICD-10-CM ICD-9-CM   1. Decreased coordination R27.9 781.3   2. Decreased  strength R29.898 729.89   3. Decreased sensation of hand and arm R20.8 782.0             Timed:  Manual Therapy:    0     mins  35628;  Therapeutic Exercise:    0     mins  47518;     Neuromuscular Tahmina:    0    mins  58717;    Therapeutic Activity:     39     mins  55082;     Self-Care/ADL     20     mins  86886;   Sensory Int. Tech      0     mins 25056;  Ultrasound:     0     mins  24432;    Electrical Stimulation:    0     mins  14224 ( );    Untimed:  Electrical Stimulation:    0     mins  92088 ( );    Timed Treatment:   54   mins   Total Treatment:     59   mins    OT Signature: Sofya Bond MS, OTR/L, CDP  [place] License #: [id number]

## 2020-07-28 ENCOUNTER — TREATMENT (OUTPATIENT)
Dept: PHYSICAL THERAPY | Facility: CLINIC | Age: 85
End: 2020-07-28

## 2020-07-28 DIAGNOSIS — M54.50 MIDLINE LOW BACK PAIN WITHOUT SCIATICA, UNSPECIFIED CHRONICITY: Primary | ICD-10-CM

## 2020-07-28 DIAGNOSIS — Z74.09 IMPAIRED FUNCTIONAL MOBILITY, BALANCE, GAIT, AND ENDURANCE: ICD-10-CM

## 2020-07-28 PROCEDURE — 97110 THERAPEUTIC EXERCISES: CPT | Performed by: PHYSICAL THERAPIST

## 2020-07-28 PROCEDURE — 97112 NEUROMUSCULAR REEDUCATION: CPT | Performed by: PHYSICAL THERAPIST

## 2020-07-28 NOTE — PROGRESS NOTES
Physical Therapy Daily Note   Visit: 9  Date of Initial Visit: Type: THERAPY  Noted: 2020    Patient: Chaitanya Browne   : 1923  Diagnosis/ICD-10 Code:  Midline low back pain without sciatica, unspecified chronicity [M54.5]  Referring practitioner: Dirk Russ MD  Date of Initial Visit: Type: THERAPY  Noted: 2020  Today's Date: 2020  Patient seen for 9 sessions      Subjective:   Patient reports: his back was sore for a few hours after last session though it resolved after. HEP is not causing him LBP. Pt's daughter stating he sometimes shuffles his feet. Pt's daughter also requesting to work on negotiating 2 stairs with cane w/o use of HR as the fastest way out of the house is 2 steps w/o HR.   Pain: 0/10  Clinical Progress: improved  Home Program Compliance: Yes  Treatment has included: therapeutic exercise and neuromuscular re-education    Objective   25': 10.66  TUG: 15.12  DIXON/56      PT Neuro          Assessment & Plan     Assessment  Impairments: abnormal coordination, abnormal gait, abnormal or restricted ROM, activity intolerance, impaired balance, impaired physical strength, lacks appropriate home exercise program, pain with function and safety issue  Assessment details: As pt has achieved functional goals related to gait speed and safety with functional mobility and experienced consistent relief in LBP over the last 2 progress periods. The pt has appropriate HEP for retention of functional gains. Pt has not achieved DIXON goal as he requires supervision for standing balance tasks. The pt demonstrated good LE clearance with navigating obstacles on the floor with VC to get close to obstacle prior to step initiation. Pt required mod VC for proper cane use with stair negotiation. Pt would benefit from continued skilled OP PT services for optimal safety with functional mobility.  Prognosis: fair  Functional Limitations: carrying objects, lifting, walking, pulling, pushing,  uncomfortable because of pain, standing, stooping and unable to perform repetitive tasks  Goals  Plan Goals: STG (2 visits)  1. Patient will report compliance with initial HEP. MET  2. Patient to perform TUG within 20 sec without LOB for improved functional mobility. MET  3. Patient to ambulate 25 ft within 30 sec without LOB for improved gait nida and functional mobility. MET  4. Patient will report pain </= 2/10 throughout the day in his low back. MET    LTG (4 visits)  1. Patient will be I with final HEP. ONGOING  2. Patient to perform TUG within 18 sec without LOB for improved functional mobility. MET  3. Patient to ambulate 25 ft with cane within 25 sec without LOB for improved gait nida and functional mobility. MET  4. Patient will report no pain throughout the day in LB. MET  5. Patient will demonstrate a 50/56 on the DIXON in order to improve static and functional balance. ONGOING        Plan  Therapy options: will be seen for skilled physical therapy services  Planned modality interventions: cryotherapy and electrical stimulation/Russian stimulation  Planned therapy interventions: abdominal trunk stabilization, ADL retraining, balance/weight-bearing training, body mechanics training, fine motor coordination training, flexibility, functional ROM exercises, gait training, home exercise program, IADL retraining, manual therapy, motor coordination training, neuromuscular re-education, postural training, stretching, strengthening and therapeutic activities  Frequency: 1x week  Duration in visits: 4  Treatment plan discussed with: patient and caregiver  Plan details: Progress strengthening, stretching, manual therapy, neuromuscular re-education, balance, gait and endurance training to improve safety and reduce LBP.          Timed:  Manual Therapy:            0     mins  38919;  Therapeutic Exercise:    8    mins  82375;     Neuromuscular Tahmina:    37    mins  70287;    Therapeutic Activity:      0     mins   12451;     Gait Trainin    mins  84629;     Electrical Stimulation:    0    mins  76208 ( );     Untimed:  Canalith Repositioning techniques _0_ [id number]      Timed Treatment:   45   mins   Total Treatment:     45   mins    Ela Dennis, Physical Therapist Student completed treatment under my direct supervision.     2020  Vianca Durant, PT, DPT, MSCS, CDP  [place] License #: [id number]  Physical Therapist

## 2020-07-30 ENCOUNTER — TREATMENT (OUTPATIENT)
Dept: PHYSICAL THERAPY | Facility: CLINIC | Age: 85
End: 2020-07-30

## 2020-07-30 DIAGNOSIS — R27.8 DECREASED COORDINATION: Primary | ICD-10-CM

## 2020-07-30 DIAGNOSIS — R20.8 DECREASED SENSATION OF HAND AND ARM: ICD-10-CM

## 2020-07-30 DIAGNOSIS — R29.898 DECREASED GRIP STRENGTH: ICD-10-CM

## 2020-07-30 PROCEDURE — 97530 THERAPEUTIC ACTIVITIES: CPT | Performed by: OCCUPATIONAL THERAPIST

## 2020-07-30 NOTE — PROGRESS NOTES
Occupational Therapy Daily Progress Note  Visit: 8  Date of Initial Visit: Type: THERAPY  Noted: 2020      Patient: Chaitanya Browne   : 1923  Diagnosis/ICD-10 Code:  Decreased coordination [R27.9]  Referring practitioner: Dirk Russ MD  Date of Initial Visit: Type: THERAPY  Noted: 2020  Today's Date: 2020  Patient seen for 8 sessions      Subjective:   Patient reports: pt reports no new complaints  Pain: 0/10  Subjective Questionnaire: n/a  Clinical Progress: improved  Home Program Compliance: Yes  Treatment has included: therapeutic activity    Subjective   Objective     OT Neuro         OT Exercises     Row Name 20 1000             Precautions    Existing Precautions/Restrictions  no known precautions/restrictions  -ST         Exercise 1    Exercise Name 1  paraffin for warm-up to aid with increasing flexibility and ROM prior to stretching and fxnl activity  -ST      Reps 1  5 dips  -ST      Time (Minutes) 1  5  -ST         Exercise 2    Exercise Name 2  tweezer prehension w/retrieval of varying sized objects from slots to address steadiness of R/L hands  -ST         Exercise 3    Exercise Name 3  manipulation and retrieval of varying objects (pins, washers, collars) and performing various sequences to increase sensory awareness and pincer/lateral grasp; R/L hands   -ST         Exercise 4    Exercise Name 4  digit extension stretching on table surface   -ST        User Key  (r) = Recorded By, (t) = Taken By, (c) = Cosigned By    Initials Name Provider Type    Sofya Zacarias OTR Occupational Therapist           Assessment & Plan     Assessment  Assessment details: Pt progressing with pincer and lateral grasp accuracy and speed with sustained practice and repetitions bilaterally. R hand is more difficult d/t arthritic changes in joints and straightness of digits. Will continue to progress modifications/changes to activities/environment/home to increase  independence/satisifcation with ADLs/IADLs and FMC skills.     Plan  Plan details: Continue with POC and goals as est.         Visit Diagnoses:    ICD-10-CM ICD-9-CM   1. Decreased coordination R27.9 781.3   2. Decreased  strength R29.898 729.89   3. Decreased sensation of hand and arm R20.8 782.0             Timed:  Manual Therapy:    0     mins  53755;  Therapeutic Exercise:    0     mins  95741;     Neuromuscular Tahmina:    0    mins  49460;    Therapeutic Activity:     45     mins  52170;     Self-Care/ADL     0     mins  88882;   Sensory Int. Tech      0     mins 75741;  Ultrasound:     0     mins  24805;    Electrical Stimulation:    0     mins  58325 ( );    Untimed:  Electrical Stimulation:    0     mins  10570 ( );    Timed Treatment:   45   mins   Total Treatment:     45   mins    OT Signature: Sofya Bond MS, OTR/L, CDP  [place] License #: [id number]

## 2020-08-04 ENCOUNTER — TREATMENT (OUTPATIENT)
Dept: PHYSICAL THERAPY | Facility: CLINIC | Age: 85
End: 2020-08-04

## 2020-08-04 DIAGNOSIS — M54.50 MIDLINE LOW BACK PAIN WITHOUT SCIATICA, UNSPECIFIED CHRONICITY: Primary | ICD-10-CM

## 2020-08-04 DIAGNOSIS — Z74.09 IMPAIRED FUNCTIONAL MOBILITY, BALANCE, GAIT, AND ENDURANCE: ICD-10-CM

## 2020-08-04 PROCEDURE — 97112 NEUROMUSCULAR REEDUCATION: CPT | Performed by: PHYSICAL THERAPIST

## 2020-08-04 PROCEDURE — 97116 GAIT TRAINING THERAPY: CPT | Performed by: PHYSICAL THERAPIST

## 2020-08-04 PROCEDURE — 97110 THERAPEUTIC EXERCISES: CPT | Performed by: PHYSICAL THERAPIST

## 2020-08-04 NOTE — PROGRESS NOTES
Physical Therapy Daily Progress Note  Visit: 10  Date of Initial Visit: Type: THERAPY  Noted: 2020    Patient: Chaitanya Browne   : 1923  Diagnosis/ICD-10 Code:  Midline low back pain without sciatica, unspecified chronicity [M54.5]  Referring practitioner: Dirk Russ MD  Date of Initial Visit: Type: THERAPY  Noted: 2020  Today's Date: 2020  Patient seen for 10 sessions      Subjective:   Patient reports: nothing new to report. He is more rested today.  Pain: 0/10  Clinical Progress: improved  Home Program Compliance: Yes  Treatment has included: therapeutic exercise and neuromuscular re-education    Objective   Exercise 1  Exercise Name 1: (P) NuStep  Equipment/Resistance 1: (P) L6  Time: (P) 8 mins  Exercise 2  Exercise Name 2: (P) simulating large step to get into house  Equipment/Resistance 2: (P) 1 step 2 risers, large HFR  Sets/Reps 2: (P) 8*2  Exercise 3  Exercise Name 3: (P) FSST w/ CDT  Sets/Reps 3: (P) 8 laps B directions  Exercise 4  Exercise Name 4: (P) airex step ups/downs w/ CDT  Sets/Reps 4: (P) 90 s *2  Exercise 5  Exercise Name 5: (P) FT airex 2# medball toss ups CDT  Sets/Reps 5: (P) 2  Time 5: (P) 30 s  Exercise 6  Exercise Name 6: (P) staggered stance 2# medball toss ups CDT  Sets/Reps 6: (P) 2 B  Time 6: (P) 30 s    PT Neuro          Assessment & Plan     Assessment  Assessment details: As pt has made great improvement in performance of challenging balance tasks, emphasis transitioned to performance of verbal and arithmetic cognitive dual tasks with these activities. Pt demonstrating decreased static and dynamic stability with arithmetic>verbal dual-task which will continue to addressed in subsequent sessions.    Plan  Plan details: Plan to progress CDT with balance re-education and NMR and continue functional strengthening to improve safety with functional mobility.        Timed:  Manual Therapy:            0     mins  02219;  Therapeutic Exercise:    8    mins  26485;      Neuromuscular Tahmina:    22    mins  18139;    Therapeutic Activity:      0     mins  49471;     Gait Training:                 15    mins  85014;     Electrical Stimulation:    0    mins  46233 ( );     Untimed:  Canalith Repositioning techniques _0_ [id number]      Timed Treatment:   45   mins   Total Treatment:     45   mins    Ela Dennis Physical Therapist Student completed treatment under my direct supervision.     08/04/2020  Vianca Durant PT, DPT, MSCS, CDP  [place] License #: [id number]  Physical Therapist

## 2020-08-06 ENCOUNTER — TREATMENT (OUTPATIENT)
Dept: PHYSICAL THERAPY | Facility: CLINIC | Age: 85
End: 2020-08-06

## 2020-08-06 DIAGNOSIS — R27.8 DECREASED COORDINATION: Primary | ICD-10-CM

## 2020-08-06 DIAGNOSIS — R20.8 DECREASED SENSATION OF HAND AND ARM: ICD-10-CM

## 2020-08-06 DIAGNOSIS — R29.898 DECREASED GRIP STRENGTH: ICD-10-CM

## 2020-08-06 PROCEDURE — 97535 SELF CARE MNGMENT TRAINING: CPT | Performed by: OCCUPATIONAL THERAPIST

## 2020-08-06 NOTE — PROGRESS NOTES
Occupational Therapy Daily Progress Note  Visit: 9  Date of Initial Visit: Type: THERAPY  Noted: 2020      Patient: Chaitanya Browne   : 1923  Diagnosis/ICD-10 Code:  Decreased coordination [R27.9]  Referring practitioner: Dirk Russ MD  Date of Initial Visit: Type: THERAPY  Noted: 2020  Today's Date: 2020  Patient seen for 9 sessions      Subjective:   Patient reports: difficulty with opening vitamin containers along with milk jug d/t hand weakness  Pain: 3/10 pre and post, B wrist discomfort   Subjective Questionnaire: n/a  Clinical Progress: improved  Home Program Compliance: Yes  Treatment has included: self-care/ADL retraining    Subjective   Objective     OT Neuro         OT Exercises     Row Name 20 1015             Precautions    Existing Precautions/Restrictions  no known precautions/restrictions  -ST         Exercise 1    Exercise Name 1  paraffin for warm-up to aid with increasing flexibility and ROM prior to stretching and fxnl activity  -ST      Reps 1  5 dips  -ST      Time (Minutes) 1  5  -ST         Exercise 2    Exercise Name 2  opening varying sized and shaped jars/containers using jar opening, cuing for correct technique in order to have success  -ST         Exercise 3    Exercise Name 3  identified varying containers with easier on/off lids for home use to avoid increasing discomfort with CTS along with improving independence d/t weakness  -ST         Exercise 4    Exercise Name 4  pincer strengthening using clothes pins; [place] A at times required d/t pt's mild ulnar drift affecting proper digit positioning   -ST         Exercise 5    Exercise Name 5  issued and practiced pincer grasp strengthening using cotton ball; see media tab for details   -ST        User Key  (r) = Recorded By, (t) = Taken By, (c) = Cosigned By    Initials Name Provider Type    ST Sofya Bond OTR Occupational Therapist           Assessment & Plan     Assessment  Assessment details:  Educated on varying methods to modify grasp/ to aid with discomfort and independence. Trialed jar opener in which pt required min cuing for correct use and technique. Pt with improved ability to perform opening mult containers and jars w/minimal strength. Discussed and educated pt and caregiver (daughter) on varying tops (pop tops, thicker lids) for easier grasp and removal for increased overall independence.     Plan  Plan details: Continue with POC and goals as previously est.         Visit Diagnoses:    ICD-10-CM ICD-9-CM   1. Decreased coordination R27.9 781.3   2. Decreased  strength R29.898 729.89   3. Decreased sensation of hand and arm R20.8 782.0             Timed:  Manual Therapy:    0     mins  99265;  Therapeutic Exercise:    0     mins  19300;     Neuromuscular Tahmina:    0    mins  05841;    Therapeutic Activity:     0     mins  09897;     Self-Care/ADL     40     mins  53698;   Sensory Int. Tech      0     mins 20760;  Ultrasound:     0     mins  28533;    Electrical Stimulation:    0     mins  97551 ( );    Untimed:  Electrical Stimulation:    0     mins  27947 ( );    Timed Treatment:   40   mins   Total Treatment:     45   mins    OT Signature: Sofya Bond MS, OTR/L, CDP  [place] License #: [id number]

## 2020-08-10 RX ORDER — BUMETANIDE 1 MG/1
TABLET ORAL
Qty: 135 TABLET | Refills: 1 | Status: SHIPPED | OUTPATIENT
Start: 2020-08-10 | End: 2021-02-09 | Stop reason: SDUPTHER

## 2020-08-10 NOTE — PROGRESS NOTES
Subjective:     Encounter Date:08/13/2020      Patient ID: Chaitanya Browne is a 89 y.o.   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     REFERRING PHYSICIAN: Dirk Russ MD  UROLOGIST: Ck Woods MD  PULMONOLOGIST:  COLLIN Avila DO.    Chief Complaint:   Chief Complaint   Patient presents with   • Congestive Heart Failure     f/u     Problem List:  1. Diastolic congestive heart failure:  a. Treadmill stress test, 10/10/2011: 7 METS, CYRUS -32, acceptable exercise stress test without anginal type chest discomfort or ischemic EKG changes with acceptable Maxwell treadmill score and normal blood pressure response following exercise to 109% predicted maximum heart rate 132% of predicted exercise capacity  b. Echocardiogram, 7/14/2012: LVEF 0.55-0.60, abnormal LV.diastolic filling is observed consistent with impaired LV relaxation, moderate mitral annular calcification with no evidence of mitral stenosis or significant regurgitation  c. Echocardiogram, 9/3/2019: Mild to moderate MR, LVEF 0.72, LV diastolic dysfunction grade 1 consistent with impaired relaxation, LV wall thickness consistent with mild concentric hypertrophy, LA borderline dilated, normal RV cavity size, wall thickness, systolic function and septal motion noted, mild mitral stenosis is present with functional MAC.  Aortic valve exhibits moderate sclerosis without stenosis.  No pulmonary hypertension, no pericardial effusion   d. BNP normal October 2019  e. Residual CCS class 0 angina pectoris/NYHA class II biventricular CHF, November 2019  2. Valvular heart disease/mild to moderate mitral regurgitation, mild mitral stenosis, September 2019  3. Hypertension  4. Hyperlipidemia; on statin therapy  5. Chronic kidney disease stage III  6. Anemia  7. Asthma/COPD  8. Lower extremity edema with negative venous duplex July  2012  9. BPH  10. Bilateral carpal tunnel syndrome  11. History of basal cell carcinoma on left side of nose  12. [facility] 14-day hospitalization September 2019 for TURP, complicated by postoperative aspiration pneumonia, hematuria, and anemia  13. Prediabetes with hemoglobin A1c 6.2% February 2020  14. Surgical history:   a. Appendectomy  b. TURP 1989  c. Peritonitis as a child  d. Basal cell carcinoma excision from nose  e. I&D great toe  f. Cystoscopy  g. TURP 2019  h. Bilateral cataracts     No Known Allergies      Current Outpatient Medications:   •  acetaminophen (TYLENOL) 325 MG tablet, Take 650 mg by mouth At Night As Needed for Mild Pain ., Disp: , Rfl:   •  azelastine (ASTELIN) 0.1 % nasal spray, 2 sprays into the nostril(s) as directed by provider 2 (Two) Times a Day. Use in each nostril as directed, Disp: 3 each, Rfl: 3  •  bumetanide (BUMEX) 1 MG tablet, TAKE 1 TABLET BY MOUTH DAILY. TAKE 1 ADDITIONAL TABLET EVERY OTHER EVENING, Disp: 135 tablet, Rfl: 1  •  Cholecalciferol (VITAMIN D3) 25 MCG (1000 UT) capsule, Take 1,000 Units by mouth Every Other Day., Disp: , Rfl:   •  finasteride (PROSCAR) 5 MG tablet, Take 1 tablet by mouth Daily., Disp: , Rfl:   •  ketoconazole (NIZORAL) 2 % cream, Apply 1 application topically to the appropriate area as directed Daily As Needed., Disp: , Rfl:   •  ketoconazole (NIZORAL) 2 % shampoo, , Disp: , Rfl:   •  metroNIDAZOLE (METROGEL) 0.75 % gel, , Disp: , Rfl:   •  pravastatin (PRAVACHOL) 40 MG tablet, Take 1 tablet by mouth Daily. 3x a week, Disp: , Rfl:     HISTORY OF PRESENT ILLNESS:  The patient is here for 9-month follow-up via telephone visit.  At his last appointment we altered Bumex 1 mg daily and started Zaroxolyn 2.5 mg daily PRN for increased shortness of breath or edema and to use Zaroxolyn sparingly.  The patient has not had any increase in lower extremity edema since we changed his medications.  He is compliant with compression stockings daily.  He has not  "noticed any shortness of breath, chest pain, palpitations, dizziness, presyncope, or syncope.  He has been doing PT/OT twice a week in [place] for back pain which has helped, as well as with his balance.  He sent his blood pressure log in for me to review from February 2020 to August 2020 and his blood pressures have been very stable ranging with an average 100-125/55-65 mmHg.  He has not had any other hospitalizations or surgeries since his last appointment.  He is scheduled to see Dr. Russ in October 2020 for repeat laboratory testing.       Review of Systems   All other systems reviewed and are negative.     All other systems reviewed and otherwise negative.    Procedures       Objective:       Vitals:    08/13/20 1527   BP: 116/64   BP Location: Right arm   Patient Position: Sitting   Pulse: 73   Weight: 80.8 kg (178 lb 3.2 oz)   Height: 162.6 cm (64.02\")     Body mass index is 30.57 kg/m².  Last weight November 2019 was 171 pounds  Physical Exam  Physical exam not able to be performed due to telehealth visit in view of coronavirus.    Lab Review:   Lab Results   Component Value Date    GLUCOSE 101 (H) 06/05/2020    BUN 23 06/05/2020    CREATININE 1.22 06/05/2020    EGFRIFNONA 55 (L) 06/05/2020    EGFRIFAFRI 67 06/05/2020    BCR 18.9 06/05/2020    CO2 30.4 (H) 06/05/2020    CALCIUM 9.3 06/05/2020    ALBUMIN 3.70 02/05/2020    AST 17 02/05/2020    ALT 8 02/05/2020       Lab Results   Component Value Date    WBC 5.02 02/05/2020    HGB 13.0 02/05/2020    HCT 38.1 02/05/2020    MCV 93.2 02/05/2020     02/05/2020       Lab Results   Component Value Date    HGBA1C 6.20 (H) 02/05/2020       Lab Results   Component Value Date    TSH 1.180 02/05/2020       Lab Results   Component Value Date    CHOL 197 02/05/2020    CHOL 122 09/03/2019     Lab Results   Component Value Date    TRIG 93 02/05/2020    TRIG 71 09/03/2019     Lab Results   Component Value Date    HDL 78 (H) 02/05/2020    HDL 49 09/03/2019     Lab " Results   Component Value Date     02/05/2020    LDL 59 09/03/2019     This patient has consented to a telehealth visit via telephone. The visit was scheduled as a telephone visit to comply with patient safety concerns in accordance with CDC recommendations.  All vitals recorded within this visit are reported by the patient.  I spent  25 minutes in total including but not limited to the 5-10 minutes spent in direct conversation with this patient.     Assessment:     Overall continued acceptable course with no new interim cardiopulmonary complaints with acceptable functional status. We will defer additional diagnostic or therapeutic intervention from a cardiac perspective at this time.  I encouraged the patient to continue monitoring his blood pressure at home and to continue using his compression stockings daily.  I am pleased that he is doing Physical Therapy and Occupational Therapy to help with his gait disturbances and lumbar back pain.     Diagnosis Plan   1. Chronic diastolic CHF (congestive heart failure) (CMS/HCC)  No recurrent angina pectoris or CHF on current activity schedule; continue current treatment   2. Essential hypertension   Controlled, continue current cardiac medications, encouraged the patient to continue monitoring his blood pressure at home   3. Hyperlipidemia LDL goal <100   Excellent lipid panel February 2020, continue pravastatin   4. Edema due to malnutrition, due to unspecified malnutrition type (CMS/HCC)   No increased lower extremity edema, patient is compliant with compression stockings daily          Plan:         1. Patient to continue current medications and close follow up with the above providers.  2. Tentative cardiology follow up in February 2021 or patient may return sooner PRN.      Electronically signed by BRUCE Peters, 08/13/20, 3:47 PM.

## 2020-08-11 ENCOUNTER — TREATMENT (OUTPATIENT)
Dept: PHYSICAL THERAPY | Facility: CLINIC | Age: 85
End: 2020-08-11

## 2020-08-11 DIAGNOSIS — M54.50 MIDLINE LOW BACK PAIN WITHOUT SCIATICA, UNSPECIFIED CHRONICITY: Primary | ICD-10-CM

## 2020-08-11 DIAGNOSIS — Z74.09 IMPAIRED FUNCTIONAL MOBILITY, BALANCE, GAIT, AND ENDURANCE: ICD-10-CM

## 2020-08-11 PROCEDURE — 97112 NEUROMUSCULAR REEDUCATION: CPT | Performed by: PHYSICAL THERAPIST

## 2020-08-11 PROCEDURE — 97110 THERAPEUTIC EXERCISES: CPT | Performed by: PHYSICAL THERAPIST

## 2020-08-11 NOTE — PROGRESS NOTES
"Physical Therapy Daily Progress Note  Visit: 11  Date of Initial Visit: Type: THERAPY  Noted: 2020    Patient: Chaitanya Browne   : 1923  Diagnosis/ICD-10 Code:  Midline low back pain without sciatica, unspecified chronicity [M54.5]  Referring practitioner: Dirk Russ MD  Date of Initial Visit: Type: THERAPY  Noted: 2020  Today's Date: 2020  Patient seen for 11 sessions      Subjective:   Patient reports: he has discomfort after his bed exercises and wants to know if he should continue with them.  Pain: 0/10  Clinical Progress: improved  Home Program Compliance: Yes  Treatment has included: therapeutic exercise and neuromuscular re-education    Objective   Exercise 1  Exercise Name 1: (P) NuStep  Equipment/Resistance 1: (P) L6  Time: (P) 8 mins  Exercise 2  Exercise Name 2: (P) simulating large step to get into house using cane in LUE  Equipment/Resistance 2: (P) 1 step 2 risers, airex  Sets/Reps 2: (P) 2*10  Exercise 3  Exercise Name 3: (P) HEP review  Time 3: (P) 10 mins  Exercise 4  Exercise Name 4: (P) tadeo stepping w/o UE support  Sets/Reps 4: (P) 10 laps       PT Neuro    Assessment & Plan     Assessment  Assessment details: Pt demonstrating correct cane use with step negotiation this date. Pt practiced cane use in RUE then LUE to give him options for negotiating step to get into his home. Pt exhibiting improved dynamic balance as he was able to step over 6\" hurdles w/ no UE support.    Plan  Plan details: Progress HEP in preparation for upcoming D/C. Progress stepping activities, functional strengthening, stretching, balance re-ed, NMR for improved household and community safety.        Timed:  Manual Therapy:            0     mins  07842;  Therapeutic Exercise:    22    mins  97137;     Neuromuscular Tahmina:    23    mins  39013;    Therapeutic Activity:      0     mins  70407;     Gait Trainin    mins  46334;     Electrical Stimulation:    0    mins  25656 (MC " );     Untimed:  Canalith Repositioning techniques _0_ 73372      Timed Treatment:   45   mins   Total Treatment:     45   mins    Ela Dennis, Physical Therapist Student completed treatment under my direct supervision.     08/11/2020  Vianca Durant PT, DPT, MSCS, CDP  [place] License #: [id number]  Physical Therapist

## 2020-08-13 ENCOUNTER — TREATMENT (OUTPATIENT)
Dept: PHYSICAL THERAPY | Facility: CLINIC | Age: 85
End: 2020-08-13

## 2020-08-13 ENCOUNTER — OFFICE VISIT (OUTPATIENT)
Dept: CARDIOLOGY | Facility: CLINIC | Age: 85
End: 2020-08-13

## 2020-08-13 VITALS
WEIGHT: 178.2 LBS | DIASTOLIC BLOOD PRESSURE: 64 MMHG | SYSTOLIC BLOOD PRESSURE: 116 MMHG | BODY MASS INDEX: 30.42 KG/M2 | HEIGHT: 64 IN | HEART RATE: 73 BPM

## 2020-08-13 DIAGNOSIS — I50.32 CHRONIC DIASTOLIC CHF (CONGESTIVE HEART FAILURE) (HCC): Primary | ICD-10-CM

## 2020-08-13 DIAGNOSIS — E43 EDEMA DUE TO MALNUTRITION, DUE TO UNSPECIFIED MALNUTRITION TYPE (HCC): ICD-10-CM

## 2020-08-13 DIAGNOSIS — R20.8 DECREASED SENSATION OF HAND AND ARM: ICD-10-CM

## 2020-08-13 DIAGNOSIS — R27.8 DECREASED COORDINATION: Primary | ICD-10-CM

## 2020-08-13 DIAGNOSIS — I10 ESSENTIAL HYPERTENSION: ICD-10-CM

## 2020-08-13 DIAGNOSIS — E78.5 HYPERLIPIDEMIA LDL GOAL <100: ICD-10-CM

## 2020-08-13 DIAGNOSIS — R29.898 DECREASED GRIP STRENGTH: ICD-10-CM

## 2020-08-13 PROCEDURE — 99441 PR PHYS/QHP TELEPHONE EVALUATION 5-10 MIN: CPT | Performed by: NURSE PRACTITIONER

## 2020-08-13 PROCEDURE — 97530 THERAPEUTIC ACTIVITIES: CPT | Performed by: OCCUPATIONAL THERAPIST

## 2020-08-13 RX ORDER — ACETAMINOPHEN 325 MG/1
650 TABLET ORAL NIGHTLY PRN
COMMUNITY
End: 2020-10-06

## 2020-08-13 NOTE — PROGRESS NOTES
"Occupational Therapy Daily Progress Note  Visit: 10  Date of Initial Visit: Type: THERAPY  Noted: 2020      Patient: Chaitanya Browne   : 1923  Diagnosis/ICD-10 Code:  Decreased coordination [R27.9]  Referring practitioner: Dirk Russ MD  Date of Initial Visit: Type: THERAPY  Noted: 2020  Today's Date: 2020  Patient seen for 10 sessions      Subjective:   Patient reports: \"I'm having more sticking in my right hand\"  Pain: 0/10  Subjective Questionnaire: n/a  Clinical Progress: improved  Home Program Compliance: Yes  Treatment has included: therapeutic activity    Subjective   Objective     OT Neuro         OT Exercises     Row Name 20 1015             Precautions    Existing Precautions/Restrictions  no known precautions/restrictions  -ST         Exercise 1    Exercise Name 1  paraffin for warm-up to aid with increasing flexibility and ROM prior to stretching and fxnl activity  -ST      Reps 1  5 dips  -ST      Time (Minutes) 1  5  -ST         Exercise 2    Exercise Name 2  trialed varying rubber grasps to improve grasping on ipad stylus to avoid over-gripping to improve comfort and joint protection; pt reports increased success  -ST         Exercise 3    Exercise Name 3  modified stretching program, extension only d/t increasing trigger finger symptoms  -ST         Exercise 4    Exercise Name 4  sensory re-ed with tactile system only; identifcation of items ALT R/L to improve proprioception, light/deep touch and stereognosis  -ST        User Key  (r) = Recorded By, (t) = Taken By, (c) = Cosigned By    Initials Name Provider Type    Sofya Zacarias OTR Occupational Therapist           Assessment & Plan     Assessment  Assessment details: Modified grasp reinforced/rubber  issued to aid with pt holding ipad stylus to avoid over-gripping and grasping w/timproved tactile awareness. Pt continues to have numbness in hands but able to identify objects w/tactile system only " w/~75% accuracy. Will continue to progress modifications, grasp/, strengthening and coordination to improve overall independence and participation.    Plan  Plan details: Continue OT POC and goals as est.        Visit Diagnoses:    ICD-10-CM ICD-9-CM   1. Decreased coordination R27.9 781.3   2. Decreased  strength R29.898 729.89   3. Decreased sensation of hand and arm R20.8 782.0             Timed:  Manual Therapy:    0     mins  51433;  Therapeutic Exercise:    0     mins  07461;     Neuromuscular Tahmina:    0    mins  44924;    Therapeutic Activity:     40     mins  77795;     Self-Care/ADL     0     mins  14557;   Sensory Int. Tech      0     mins 64768;  Ultrasound:     0     mins  50635;    Electrical Stimulation:    0     mins  21788 ( );    Untimed:  Electrical Stimulation:    0     mins  47535 ( );    Timed Treatment:   40   mins   Total Treatment:     45   mins    OT Signature: Sofya Bond MS, OTR/L, CDP  [place] License #: [id number]

## 2020-08-18 ENCOUNTER — TREATMENT (OUTPATIENT)
Dept: PHYSICAL THERAPY | Facility: CLINIC | Age: 85
End: 2020-08-18

## 2020-08-18 DIAGNOSIS — M54.50 MIDLINE LOW BACK PAIN WITHOUT SCIATICA, UNSPECIFIED CHRONICITY: Primary | ICD-10-CM

## 2020-08-18 DIAGNOSIS — Z74.09 IMPAIRED FUNCTIONAL MOBILITY, BALANCE, GAIT, AND ENDURANCE: ICD-10-CM

## 2020-08-18 PROCEDURE — 97110 THERAPEUTIC EXERCISES: CPT | Performed by: PHYSICAL THERAPIST

## 2020-08-18 PROCEDURE — 97112 NEUROMUSCULAR REEDUCATION: CPT | Performed by: PHYSICAL THERAPIST

## 2020-08-18 NOTE — PROGRESS NOTES
Physical Therapy Re-Assessment  Visit: 12  Date of Initial Visit: Type: THERAPY  Noted: 2020    Patient: Chaitanya Browne   : 1923  Diagnosis/ICD-10 Code:  Midline low back pain without sciatica, unspecified chronicity [M54.5]  Referring practitioner: Dirk Russ MD  Date of Initial Visit: Type: THERAPY  Noted: 2020  Today's Date: 2020  Patient seen for 12 sessions      Subjective:   Patient reports: he feels good.  Pain: 0/10  Clinical Progress: improved  Home Program Compliance: Yes  Treatment has included: therapeutic exercise and neuromuscular re-education    Objective   TU.72 s  25': 11.03 s  DIXON/56     Exercise 1  Exercise Name 1: (P) NuStep  Equipment/Resistance 1: (P) L6  Time: (P) 8 mins  Exercise 2  Exercise Name 2: (P) static standing BOSU  Sets/Reps 2: (P) 2  Time 2: (P) 30 s  Exercise 3  Exercise Name 3: (P) static standing BOSU w/ head turns  Sets/Reps 3: (P) 2  Time 3: (P) 30 s  Exercise 4  Exercise Name 4: (P) BOSU marches  Sets/Reps 4: (P) 2*30  Exercise 5  Exercise Name 5: (P) BOSU step forward/backward  Sets/Reps 5: (P) 8 B  Exercise 6  Exercise Name 6: (P) chair to chair, 25'  Sets/Reps 6: (P) 6 laps  Exercise 7  Exercise Name 7: (P) chair to chair navigating cones  Sets/Reps 7: (P) 6 laps  Exercise 8  Exercise Name 8: (P) Re-Assessment performed - see functional tests  Time 8: (P) 8 mins    PT Neuro     Assessment & Plan     Assessment  Impairments: abnormal coordination, abnormal gait, abnormal or restricted ROM, activity intolerance, impaired balance, impaired physical strength, lacks appropriate home exercise program, pain with function and safety issue  Assessment details: Pt has achieved functional goals related to gait speed and safety with functional mobility and experienced no LBP over the last 2 progress periods. The pt has appropriate HEP for retention of functional gains. Pt has also achieved DIXON goal though he requires supervision for some  standing balance tasks. The pt demonstrated good LE clearance with navigating obstacles on the floor with VC to get close to obstacle prior to step initiation. Pt demonstrates proper cane use with stair negotiation w/o VC to safely enter/exit his home. Plan to discuss d/c next session.  Prognosis: fair  Functional Limitations: carrying objects, lifting, walking, pulling, pushing, uncomfortable because of pain, standing, stooping and unable to perform repetitive tasks  Goals  Plan Goals: STG (2 visits)  1. Patient will report compliance with initial HEP. MET  2. Patient to perform TUG within 20 sec without LOB for improved functional mobility. MET  3. Patient to ambulate 25 ft within 30 sec without LOB for improved gait nida and functional mobility. MET  4. Patient will report pain </= 2/10 throughout the day in his low back. MET    LTG (4 visits)  1. Patient will be I with final HEP. ONGOING  2. Patient to perform TUG within 18 sec without LOB for improved functional mobility. MET  3. Patient to ambulate 25 ft with cane within 25 sec without LOB for improved gait nida and functional mobility. MET  4. Patient will report no pain throughout the day in LB. MET  5. Patient will demonstrate a 50/56 on the DIXON in order to improve static and functional balance. MET        Plan  Therapy options: will be seen for skilled physical therapy services  Planned modality interventions: cryotherapy and electrical stimulation/Russian stimulation  Planned therapy interventions: abdominal trunk stabilization, ADL retraining, balance/weight-bearing training, body mechanics training, fine motor coordination training, flexibility, functional ROM exercises, gait training, home exercise program, IADL retraining, manual therapy, motor coordination training, neuromuscular re-education, postural training, stretching, strengthening and therapeutic activities  Frequency: 1x week  Duration in visits: 4  Treatment plan discussed with:  patient and caregiver  Plan details: Progress HEP strengthening, stretching, manual therapy, neuromuscular re-education, balance, gait and endurance training for optimization of safety and maintenance of LBP relief; plan to d/c next session.        Timed:  Manual Therapy:            0     mins  96021;  Therapeutic Exercise:    8    mins  13082;     Neuromuscular Tahmina:    37    mins  15183;    Therapeutic Activity:      0     mins  04371;     Gait Trainin    mins  83273;     Electrical Stimulation:    0    mins  96242 ( );     Untimed:  Canalith Repositioning techniques _0_ [id number]      Timed Treatment:   45   mins   Total Treatment:     45   mins    Ela Dennis Physical Therapist Student completed treatment under my direct supervision.     2020  Vianca Durant, PT, DPT, MSCS, CDP  [place] License #: [id number]  Physical Therapist

## 2020-08-20 ENCOUNTER — TREATMENT (OUTPATIENT)
Dept: PHYSICAL THERAPY | Facility: CLINIC | Age: 85
End: 2020-08-20

## 2020-08-20 DIAGNOSIS — R29.898 DECREASED GRIP STRENGTH: ICD-10-CM

## 2020-08-20 DIAGNOSIS — R27.8 DECREASED COORDINATION: Primary | ICD-10-CM

## 2020-08-20 DIAGNOSIS — R20.8 DECREASED SENSATION OF HAND AND ARM: ICD-10-CM

## 2020-08-20 PROCEDURE — 97110 THERAPEUTIC EXERCISES: CPT | Performed by: OCCUPATIONAL THERAPIST

## 2020-08-20 PROCEDURE — 97530 THERAPEUTIC ACTIVITIES: CPT | Performed by: OCCUPATIONAL THERAPIST

## 2020-08-20 NOTE — PROGRESS NOTES
OT Re-Assessment / Re-Certification        Patient: Chaitanya Browne   : 1923  Diagnosis/ICD-10 Code:  Decreased coordination [R27.9]  Referring practitioner: Dirk Russ MD  Date of Initial Visit: Type: THERAPY  Noted: 2020  Today's Date: 2020  Patient seen for 11 sessions      Subjective:   Patient reports: pt reports pencil gripper is helping with grasp on stylus.  Pain: 0/10  Subjective Questionnaire: 9HPT R: 68.4 L: 58.92  Clinical Progress: improved  Home Program Compliance: Yes  Treatment has included: therapeutic exercise and therapeutic activity    Subjective   Objective     OT Neuro         Assessment & Plan     Assessment  Impairments: abnormal coordination, abnormal gait, abnormal or restricted ROM, activity intolerance, impaired balance, impaired physical strength and lacks appropriate home exercise program  Assessment details: OT re-assessment completed per POC; pt continues to improve on 9HPT score with increased speed, accuracy and manipulation skills of all digits bilaterally. Pt is most limited by permanent arthritic changes that affect full ROM. Initiated wrist and forearm strengthening with pt tolerating well. Will continue UE strengthening to aid with transfers, bed mobility and general ADLs along with FMC/strengthening program for hands and home/activity/enivornmental mods.   Prognosis: fair  Functional Limitations: carrying objects, lifting, walking and unable to perform repetitive tasks  Goals  Plan Goals:   Pt will score 55 seconds or less on the 9HPT to demonstrate improved accuracy and speed with fine motor coordination by 8 wks. PROGRESSING; from 82.2 to 71.4 R hand and from 87.6 to 61.2 left hand;  pt R: 68.4 & L: 58.92    Pt will be independent with hand strengthening HEP to increase independence with ADL/IADL performance tasks by 4 wks. MET    Pt will be independent with hand FMC HEP to increase independence with ADL/IADL performance tasks by 4 wks. PARTIALLY  MET, PROGRESSING DIFFICULTY     Pt will be independent with B UE strengthening HEP to increase performance in ADL/IADL tasks by 8 wks. PARTIALLY MET    Pt will be independent with sensory re-ed HEP to increase safety and engagement in ADL/IADL tasks by 8 wks. MET         Plan  Planned modality interventions: thermotherapy (paraffin bath)  Planned therapy interventions: ADL retraining, balance/weight-bearing training, body mechanics training, fine motor coordination training, flexibility, functional ROM exercises, home exercise program, IADL retraining, motor coordination training, strengthening, stretching, therapeutic activities and transfer training  Frequency: 1x week  Duration in weeks: 4  Treatment plan discussed with: patient and caregiver  Plan details: Continue w/ OT POC and goals to reflect above deficits and promote pt satisfaction, independence and safety.       OT Exercises     Row Name 08/20/20 1015             Precautions    Existing Precautions/Restrictions  no known precautions/restrictions  -ST         Exercise 1    Exercise Name 1  paraffin for warm-up to aid with increasing flexibility and ROM prior to stretching and fxnl activity  -ST      Reps 1  5 dips  -ST      Time (Minutes) 1  5  -ST         Exercise 2    Exercise Name 2  OT re-assessment completed per POC, see flowsheets for additional details   -ST         Exercise 3    Exercise Name 3  pronation/supination, flexi-bar  -ST      Resistance 3  Yellow  -ST      Sets 3  2  -ST      Reps 3  10  -ST         Exercise 4    Exercise Name 4  FMC pincer grasp focus w/manipulation of pins and washers w/stacking and unstacking for isolating items with sensory re-ed  -ST         Exercise 5    Exercise Name 5  digit flicks unwted ball B hands together  -ST      Reps 5  15  -ST         Exercise 6    Exercise Name 6  power grasp squeezes, blue, unwted ball, progression to individual digits  -ST      Sets 6  2  -ST      Reps 6  10  -ST         Exercise 7     Exercise Name 7  isometric ball squeezes, down and laterally, B hands; use of unwted blue ball   -ST      Sets 7  2  -ST      Reps 7  10  -ST        User Key  (r) = Recorded By, (t) = Taken By, (c) = Cosigned By    Initials Name Provider Type    Sofya Zacarias, OTR Occupational Therapist          Visit Diagnoses:    ICD-10-CM ICD-9-CM   1. Decreased coordination R27.9 781.3   2. Decreased  strength R29.898 729.89   3. Decreased sensation of hand and arm R20.8 782.0       Progress toward previous goals: Partially Met      Recommendations: Continue as planned  Timeframe: 1 month  Prognosis to achieve goals: good    OT Signature: Sofya Bond MS, OTR/L, [facility] License #: [id number]    Based upon review of the patient's progress and continued therapy plan, it is my medical opinion that Chaitanya Browne should continue occupational therapy treatment at [facility]  [address] [place] [id number] [phone].    Signature: __________________________________  Dirk Russ MD    Timed:  Manual Therapy:    0     mins  43858;  Therapeutic Exercise:    17     mins  43962;     Neuromuscular Tahmina:    0    mins  32308;    Therapeutic Activity:     25     mins  04844;     Self-Care/ADL     0     mins  84332;   Sensory Int. Tech      0     mins 53010;  Ultrasound:     0     mins  29640;    Electrical Stimulation:    0     mins  23396 ( );    Untimed:  Electrical Stimulation:    0     mins  33854 ( );    Timed Treatment:   42   mins   Total Treatment:     42   mins

## 2020-08-25 ENCOUNTER — TREATMENT (OUTPATIENT)
Dept: PHYSICAL THERAPY | Facility: CLINIC | Age: 85
End: 2020-08-25

## 2020-08-25 DIAGNOSIS — Z74.09 IMPAIRED FUNCTIONAL MOBILITY, BALANCE, GAIT, AND ENDURANCE: Primary | ICD-10-CM

## 2020-08-25 DIAGNOSIS — M54.50 MIDLINE LOW BACK PAIN WITHOUT SCIATICA, UNSPECIFIED CHRONICITY: ICD-10-CM

## 2020-08-25 PROCEDURE — 97110 THERAPEUTIC EXERCISES: CPT | Performed by: PHYSICAL THERAPIST

## 2020-08-25 PROCEDURE — 97112 NEUROMUSCULAR REEDUCATION: CPT | Performed by: PHYSICAL THERAPIST

## 2020-08-25 NOTE — PROGRESS NOTES
PT Discharge   Visit: 13  Date of Initial Visit: Type: THERAPY  Noted: 2020    Patient: Chaitanya Browne   : 1923  Diagnosis/ICD-10 Code:  Impaired functional mobility, balance, gait, and endurance [Z74.09]  Referring practitioner: Dirk Russ MD  Date of Initial Visit: Type: THERAPY  Noted: 2020  Today's Date: 2020  Patient seen for 13 sessions      Subjective:   Patient reports: he is here and feeling ok.   Pain: 1/10  Clinical Progress: improved  Home Program Compliance: Yes  Treatment has included: therapeutic exercise and neuromuscular re-education    Objective   See Exercise, Manual, and Modality Logs for complete treatment.    PT Neuro  Exercise 1  Exercise Name 1: NuStep  Equipment/Resistance 1: L6  Time: 8 mins  Exercise 2  Exercise Name 2: static standing BOSU  Sets/Reps 2: 2  Time 2: 30 s  Exercise 3  Exercise Name 3: static standing BOSU w/ head turns  Sets/Reps 3: 2  Time 3: 30 s  Exercise 4  Exercise Name 4: BOSU marches  Sets/Reps 4: 2*30  Exercise 5  Exercise Name 5: BOSU step forward/backward  Sets/Reps 5: 8 B  Exercise 6  Exercise Name 6: chair to chair, 25'  Sets/Reps 6: 6 laps  Exercise 7  Exercise Name 7: chair to chair navigating cones  Sets/Reps 7: 6 laps  Exercise 8  Exercise Name 8: updated HEP     Assessment & Plan     Assessment  Assessment details: Pt has achieved functional goals related to gait speed and safety with functional mobility and experienced no LBP over the last 2 treatment sessions. He has met all goals at this time and has an updated, appropriate HEP for retention of functional gains. Patient and patient's daughter were educated that patient did meet the criteria for DC, however they would have preferred to stay for treatment. He does not meet a skilled service at this time. He was instructed to return should any changes in his balance or strength occur.   Prognosis: fair    Goals  Plan Goals: STG (2 visits)  1. Patient will report compliance with  initial HEP. MET  2. Patient to perform TUG within 20 sec without LOB for improved functional mobility. MET  3. Patient to ambulate 25 ft within 30 sec without LOB for improved gait nida and functional mobility. MET  4. Patient will report pain </= 2/10 throughout the day in his low back. MET    LTG (4 visits)  1. Patient will be I with final HEP.MET  2. Patient to perform TUG within 18 sec without LOB for improved functional mobility. MET  3. Patient to ambulate 25 ft with cane within 25 sec without LOB for improved gait nida and functional mobility. MET  4. Patient will report no pain throughout the day in LB. MET  5. Patient will demonstrate a 50/56 on the DIXON in order to improve static and functional balance. MET        Plan  Treatment plan discussed with: patient and caregiver  Plan details: Patient will be discharged to independent management of program.         Visit Diagnoses:    ICD-10-CM ICD-9-CM   1. Impaired functional mobility, balance, gait, and endurance Z74.09 V49.89   2. Midline low back pain without sciatica, unspecified chronicity M54.5 724.2       Progress toward previous goals: Partially Met      Recommendations: Discharge    PT Signature: Vianca Durant, PT, DPT, MSCS, CDP  [place] License #: [id number]    Based upon review of the patient's progress and continued therapy plan, it is my medical opinion that Chaitanya Browne should continue physical therapy treatment at [facility]  [address] [place] [id number] [phone].    Signature: __________________________________  Dirk Russ MD    Timed:  Manual Therapy:    0     mins  24664;  Therapeutic Exercise:    30     mins  29715;     Neuromuscular Tahmina:    15    mins  52026;    Therapeutic Activity:     0     mins  77019;     Gait Trainin     mins  04279;     Electrical Stimulation:    0     mins  77951 ( );    Untimed:  Canalith Repositioning   0 mins   20420    Timed Treatment:   45   mins   Total Treatment:     45   mins

## 2020-08-27 ENCOUNTER — TREATMENT (OUTPATIENT)
Dept: PHYSICAL THERAPY | Facility: CLINIC | Age: 85
End: 2020-08-27

## 2020-08-27 DIAGNOSIS — R20.8 DECREASED SENSATION OF HAND AND ARM: ICD-10-CM

## 2020-08-27 DIAGNOSIS — R29.898 DECREASED GRIP STRENGTH: ICD-10-CM

## 2020-08-27 DIAGNOSIS — R27.8 DECREASED COORDINATION: Primary | ICD-10-CM

## 2020-08-27 PROCEDURE — 97530 THERAPEUTIC ACTIVITIES: CPT | Performed by: OCCUPATIONAL THERAPIST

## 2020-08-27 PROCEDURE — 97110 THERAPEUTIC EXERCISES: CPT | Performed by: OCCUPATIONAL THERAPIST

## 2020-09-03 ENCOUNTER — TREATMENT (OUTPATIENT)
Dept: PHYSICAL THERAPY | Facility: CLINIC | Age: 85
End: 2020-09-03

## 2020-09-03 DIAGNOSIS — R27.8 DECREASED COORDINATION: Primary | ICD-10-CM

## 2020-09-03 DIAGNOSIS — R20.8 DECREASED SENSATION OF HAND AND ARM: ICD-10-CM

## 2020-09-03 DIAGNOSIS — R29.898 DECREASED GRIP STRENGTH: ICD-10-CM

## 2020-09-03 PROCEDURE — 97530 THERAPEUTIC ACTIVITIES: CPT | Performed by: OCCUPATIONAL THERAPIST

## 2020-09-03 PROCEDURE — 97110 THERAPEUTIC EXERCISES: CPT | Performed by: OCCUPATIONAL THERAPIST

## 2020-09-04 NOTE — PROGRESS NOTES
Occupational Therapy Daily Progress Note  Visit: 13  Date of Initial Visit: Type: THERAPY  Noted: 2020      Patient: Chaitanya Browne   : 1923  Diagnosis/ICD-10 Code:  Decreased coordination [R27.9]  Referring practitioner: Dirk Russ MD  Date of Initial Visit: Type: THERAPY  Noted: 2020  Today's Date: 2020  Patient seen for 13 sessions      Subjective:   Patient reports: pt notes he felt some minimal soreness after last session in shoulders from use of flexi-bar   Pain: 0/10  Subjective Questionnaire: n/a  Clinical Progress: improved  Home Program Compliance: Yes  Treatment has included: therapeutic exercise and therapeutic activity    Subjective   Objective     OT Neuro         OT Exercises     Row Name 20 1100             Exercise 1    Exercise Name 1  NuStep BUEs only for strengthening and ROM; L 4  -ST      Time (Minutes) 1  4  -ST         Exercise 2    Exercise Name 2  paraffin for warm-up to aid with increasing flexibility and ROM prior to stretching and fxnl activity  -ST      Reps 2  5 dips  -ST      Time (Minutes) 2  5  -ST         Exercise 3    Exercise Name 3  pincer grasp w/translation, retrieving coins from table surface, attempting to increase speed then returning to table surface ALT R/L hands   -ST         Exercise 4    Exercise Name 4  sensory re-ed using coins to identify via shape/size and texture w/eyes closed    -ST         Exercise 5    Exercise Name 5  2# short therabar, CP, OH press to 90 and bicep curls  -ST      Sets 5  2  -ST      Reps 5  10  -ST        User Key  (r) = Recorded By, (t) = Taken By, (c) = Cosigned By    Initials Name Provider Type    Sofya Zacarias OTR Occupational Therapist           Assessment & Plan     Assessment  Assessment details: Pt improving with pincer grasp but is limited by arthritic deformities. Addressed joint protection with focus on avoiding any ulnar drift at MCP joints when performing grasping, retrieving and  picking up items. Pt fatigues very quickly when performing any shoulder strengthening HEPs-will continue to progress for aid with transfers and other daily tasks.     Plan  Plan details: Continue w/POC and goals as previously est. Will continue adding shoulder exercises for UE strengthening.         Visit Diagnoses:    ICD-10-CM ICD-9-CM   1. Decreased coordination R27.9 781.3   2. Decreased  strength R29.898 729.89   3. Decreased sensation of hand and arm R20.8 782.0             Timed:  Manual Therapy:    0     mins  50974;  Therapeutic Exercise:    10     mins  20921;     Neuromuscular Tahmina:    0    mins  68749;    Therapeutic Activity:     30     mins  13142;     Self-Care/ADL     0     mins  51695;   Sensory Int. Tech      0     mins 86596;  Ultrasound:     0     mins  74601;    Electrical Stimulation:    0     mins  63030 ( );    Untimed:  Electrical Stimulation:    0     mins  94600 ( );    Timed Treatment:   40   mins   Total Treatment:     45   mins    OT Signature: Sofya Bond MS, OTR/L, [facility] License #: [id number]     Yes

## 2020-09-10 ENCOUNTER — TREATMENT (OUTPATIENT)
Dept: PHYSICAL THERAPY | Facility: CLINIC | Age: 85
End: 2020-09-10

## 2020-09-10 DIAGNOSIS — R27.8 DECREASED COORDINATION: Primary | ICD-10-CM

## 2020-09-10 DIAGNOSIS — R29.898 DECREASED GRIP STRENGTH: ICD-10-CM

## 2020-09-10 DIAGNOSIS — R20.8 DECREASED SENSATION OF HAND AND ARM: ICD-10-CM

## 2020-09-10 PROCEDURE — 97530 THERAPEUTIC ACTIVITIES: CPT | Performed by: OCCUPATIONAL THERAPIST

## 2020-09-10 NOTE — PROGRESS NOTES
"Occupational Therapy Daily Progress Note  Visit: 14  Date of Initial Visit: Type: THERAPY  Noted: 2020      Patient: Chaitanya Browne   : 1923  Diagnosis/ICD-10 Code:  Decreased coordination [R27.9]  Referring practitioner: Dirk Russ MD  Date of Initial Visit: Type: THERAPY  Noted: 2020  Today's Date: 9/10/2020  Patient seen for 14 sessions      Subjective:   Patient reports: \"I was 'just right sore' from last time\"  Pain: 0/10  Subjective Questionnaire: n/a  Clinical Progress: improved  Home Program Compliance: Yes  Treatment has included: therapeutic activity    Subjective   Objective     OT Neuro         OT Exercises     Row Name 09/10/20 1130             Exercise 1    Exercise Name 1  NuStep, L 6 for UE strengthening, ROM and activity building  -ST      Time (Minutes) 1  6  -ST         Exercise 2    Exercise Name 2  Button/unbutton B hands, increasing DIP and PIP movement  -ST         Exercise 3    Exercise Name 3  threading beads ALT R/L hands, attempting to increase speed  -ST         Exercise 4    Exercise Name 4  tactile system only for identifying varying sized/shaped beads   -ST        User Key  (r) = Recorded By, (t) = Taken By, (c) = Cosigned By    Initials Name Provider Type    Sofya Zacarias OTR Occupational Therapist           Assessment & Plan     Assessment  Assessment details: Pt improving w/pincer grasp and speed/accuracy of FMC dexterity and overall coordination. Pt requiring intermittent brief rest breaks for digits d/t fatigue. Notable improved f/e of DIP and PIP movement.     Plan  Plan details: Continue w/POC and goals as est.         Visit Diagnoses:    ICD-10-CM ICD-9-CM   1. Decreased coordination R27.9 781.3   2. Decreased  strength R29.898 729.89   3. Decreased sensation of hand and arm R20.8 782.0             Timed:  Manual Therapy:    0     mins  03419;  Therapeutic Exercise:    0     mins  00536;     Neuromuscular Tahmina:    0    mins  12289;  "   Therapeutic Activity:     55     mins  54438;     Self-Care/ADL     0     mins  93277;   Sensory Int. Tech      0     mins 22136;  Ultrasound:     0     mins  87014;    Electrical Stimulation:    0     mins  00664 ( );    Untimed:  Electrical Stimulation:    0     mins  45536 ( );    Timed Treatment:   55   mins   Total Treatment:     55   mins    OT Signature: Sofya Bond MS, OTR/L, CDP  [place] License #: [id number]

## 2020-09-17 ENCOUNTER — TREATMENT (OUTPATIENT)
Dept: PHYSICAL THERAPY | Facility: CLINIC | Age: 85
End: 2020-09-17

## 2020-09-17 DIAGNOSIS — R27.8 DECREASED COORDINATION: Primary | ICD-10-CM

## 2020-09-17 DIAGNOSIS — R29.898 DECREASED GRIP STRENGTH: ICD-10-CM

## 2020-09-17 DIAGNOSIS — R20.8 DECREASED SENSATION OF HAND AND ARM: ICD-10-CM

## 2020-09-17 PROCEDURE — 97530 THERAPEUTIC ACTIVITIES: CPT | Performed by: OCCUPATIONAL THERAPIST

## 2020-09-17 NOTE — PROGRESS NOTES
"Occupational Therapy Daily Progress Note  Visit: 15  Date of Initial Visit: Type: THERAPY  Noted: 2020      Patient: Chaitanya Browne   : 1923  Diagnosis/ICD-10 Code:  Decreased coordination [R27.9]  Referring practitioner: Dirk Russ MD  Date of Initial Visit: Type: THERAPY  Noted: 2020  Today's Date: 2020  Patient seen for 15 sessions      Subjective:   Patient reports: \"My hands are very stiff today\"  Pain: 0/10  Subjective Questionnaire: n/a  Clinical Progress: improved  Home Program Compliance: Yes  Treatment has included: therapeutic activity    Subjective   Objective     OT Neuro         OT Exercises     Row Name 20 1020             Precautions    Existing Precautions/Restrictions  no known precautions/restrictions  -ST         Exercise 1    Exercise Name 1  NuStep, BUE/BLE strengthening and ROM, L6  -ST      Time (Minutes) 1  6  -ST         Exercise 2    Exercise Name 2  paraffin for warm-up and preparatory tasks  -ST      Sets 2  5 dips  -ST      Time (Minutes) 2  5  -ST         Exercise 3    Exercise Name 3  threading beads onto lace using tweezers, ALT R/L hands   -ST         Exercise 4    Exercise Name 4  placing nut onto bolt one handed (required to grade activity down in order to complete correctly then able to perform correctly) ALT R/L hands   -ST        User Key  (r) = Recorded By, (t) = Taken By, (c) = Cosigned By    Initials Name Provider Type    Sofya Zacarias OTR Occupational Therapist           Assessment & Plan     Assessment  Assessment details: Pt progressed with difficulty of activities this date after repetitive practice and modifications with grading up. Pt improves DIP and PIP movement s/p use of paraffin. Pt looking to into paraffin machine for home use vs heated moisture gloves to aid with arthritis stiffness and discomfort.     Plan  Plan details: Continue w/OT POC and goals as est. Re-assessment due next session.        Visit Diagnoses:    " ICD-10-CM ICD-9-CM   1. Decreased coordination  R27.9 781.3   2. Decreased  strength  R29.898 729.89   3. Decreased sensation of hand and arm  R20.8 782.0             Timed:  Manual Therapy:    0     mins  69780;  Therapeutic Exercise:    0     mins  06578;     Neuromuscular Tahmina:    0    mins  58676;    Therapeutic Activity:     39     mins  38766;     Self-Care/ADL     0     mins  33196;   Sensory Int. Tech      0     mins 97122;  Ultrasound:     0     mins  66083;    Electrical Stimulation:    0     mins  09219 ( );    Untimed:  Electrical Stimulation:    0     mins  85696 ( );    Timed Treatment:   39   mins   Total Treatment:     43   mins    OT Signature: Sofya Bond MS, OTR/L, CDP  [place] License #: [id number]

## 2020-09-22 ENCOUNTER — TELEPHONE (OUTPATIENT)
Dept: INTERNAL MEDICINE | Facility: CLINIC | Age: 85
End: 2020-09-22

## 2020-09-22 NOTE — TELEPHONE ENCOUNTER
The questionairre For me to put in the order they need a face to face appointment- so he could wait until oct 6 or could come in sooner    Bcc Infiltrative Histology Text: There were numerous aggregates of basaloid cells demonstrating an infiltrative pattern.

## 2020-09-22 NOTE — TELEPHONE ENCOUNTER
PATIENT'S DAUGHTER CALLED REQUESTING AN ORDER FOR LIFT CHAIR TO BE FAXED TO [facility]  FAX NUMBER [phone]    SALES  TAX WILL BE REMOVED FROM PURCHASE PRICE IF ORDER IS FAXED      PATIENT CALL BACK NUMBER    [phone]

## 2020-09-24 ENCOUNTER — TREATMENT (OUTPATIENT)
Dept: PHYSICAL THERAPY | Facility: CLINIC | Age: 85
End: 2020-09-24

## 2020-09-24 DIAGNOSIS — R20.8 DECREASED SENSATION OF HAND AND ARM: ICD-10-CM

## 2020-09-24 DIAGNOSIS — R29.898 DECREASED GRIP STRENGTH: ICD-10-CM

## 2020-09-24 DIAGNOSIS — R27.8 DECREASED COORDINATION: Primary | ICD-10-CM

## 2020-09-24 PROCEDURE — 97530 THERAPEUTIC ACTIVITIES: CPT | Performed by: OCCUPATIONAL THERAPIST

## 2020-09-24 NOTE — PROGRESS NOTES
"OT Re-Assessment / Re-Certification        Patient: Chaitanya Browne   : 1923  Diagnosis/ICD-10 Code:  Decreased coordination [R27.9]  Referring practitioner: Dirk Russ MD  Date of Initial Visit: Type: THERAPY  Noted: 2020  Today's Date: 2020  Patient seen for 16 sessions      Subjective:   Patient reports: \"I really like the warm hand mitts my daughter got me\"  Pain: 0/10  Subjective Questionnaire: 9HPT R: 67.2 L: 55.86  Clinical Progress: improved  Home Program Compliance: Yes  Treatment has included: therapeutic activity    Subjective   Objective     OT Neuro         Assessment & Plan     Assessment  Impairments: abnormal coordination, abnormal gait, abnormal or restricted ROM, activity intolerance, impaired balance, impaired physical strength and lacks appropriate home exercise program  Assessment details: OT re-assessment completed per POC; pt met all goals except for R hand speed with 9HPT. Pt still improving with speed and accuracy but continues to be affected by arthritis. Completed d/c this date as pt has achieved max potential and nearing end of cap for the 2020. Pt to continue with modifications, exercise HEPs and activities as previously instructed w/assist as needed from his daughter.   Prognosis: fair  Functional Limitations: carrying objects, lifting, walking and unable to perform repetitive tasks  Goals  Plan Goals:   Pt will score 55 seconds or less on the 9HPT to demonstrate improved accuracy and speed with fine motor coordination by 8 wks.PARTIALLY MET; met L hand, progressing R hand.    Pt will be independent with hand strengthening HEP to increase independence with ADL/IADL performance tasks by 4 wks. MET    Pt will be independent with hand FMC HEP to increase independence with ADL/IADL performance tasks by 4 wks. MET    Pt will be independent with B UE strengthening HEP to increase performance in ADL/IADL tasks by 8 wks. MET    Pt will be independent with sensory " re-ed HEP to increase safety and engagement in ADL/IADL tasks by 8 wks. MET         Plan  Planned modality interventions: thermotherapy (paraffin bath)  Planned therapy interventions: ADL retraining, balance/weight-bearing training, body mechanics training, fine motor coordination training, flexibility, functional ROM exercises, home exercise program, IADL retraining, motor coordination training, strengthening, stretching, therapeutic activities and transfer training  Frequency: 1x week  Duration in weeks: 4  Treatment plan discussed with: patient and caregiver  Plan details: Continue w/ OT POC and goals to reflect above deficits and promote pt satisfaction, independence and safety.       OT Exercises     Row Name 09/24/20 1015             Precautions    Existing Precautions/Restrictions  no known precautions/restrictions  -ST         Exercise 1    Exercise Name 1  NuStep, BUE/BLE strengthening and ROM, L6  -ST      Time (Minutes) 1  8  -ST         Exercise 2    Exercise Name 2  paraffin for warm-up and preparatory tasks  -ST      Sets 2  5 dips  -ST      Time (Minutes) 2  5  -ST         Exercise 3    Exercise Name 3  OT re-assessment completed per POC; see flow sheets for details   -ST         Exercise 4    Exercise Name 4  performed notched peg board, ALT R/L, increasing speed/accuracy w/translation  -ST         Exercise 5    Exercise Name 5  recommended built-up utensils if needed for increased comfort and grasp with self-feeding  -ST         Exercise 6    Exercise Name 6  review of previous HEPs and modifications   -ST        User Key  (r) = Recorded By, (t) = Taken By, (c) = Cosigned By    Initials Name Provider Type    Sofya Zacarias, OTR Occupational Therapist          Visit Diagnoses:    ICD-10-CM ICD-9-CM   1. Decreased coordination  R27.9 781.3   2. Decreased  strength  R29.898 729.89   3. Decreased sensation of hand and arm  R20.8 782.0       Progress toward previous goals: Partially Met       Recommendations: Discharge    OT Signature: Sofya Bond, MS, OTR/L, CDP  [place] License #: [id number]    Based upon review of the patient's progress and continued therapy plan, it is my medical opinion that Chaitanya Browne should continue occupational therapy treatment at [facility]  [address] [facility] [place] [address] [phone].    Signature: __________________________________  Dirk Russ MD    Timed:  Manual Therapy:    0     mins  49508;  Therapeutic Exercise:    0     mins  71368;     Neuromuscular Tahmina:    0    mins  64304;    Therapeutic Activity:     40     mins  87013;     Self-Care/ADL     0     mins  74654;   Sensory Int. Tech      0     mins 99749;  Ultrasound:     0     mins  50150;    Electrical Stimulation:    0     mins  67774 ( );    Untimed:  Electrical Stimulation:    0     mins  19689 ( );    Timed Treatment:   40   mins   Total Treatment:     45   mins

## 2020-10-06 ENCOUNTER — OFFICE VISIT (OUTPATIENT)
Dept: INTERNAL MEDICINE | Facility: CLINIC | Age: 85
End: 2020-10-06

## 2020-10-06 VITALS
DIASTOLIC BLOOD PRESSURE: 58 MMHG | TEMPERATURE: 98 F | BODY MASS INDEX: 30.73 KG/M2 | HEIGHT: 64 IN | WEIGHT: 180 LBS | SYSTOLIC BLOOD PRESSURE: 116 MMHG | HEART RATE: 78 BPM

## 2020-10-06 DIAGNOSIS — M62.81 GENERALIZED MUSCLE WEAKNESS: ICD-10-CM

## 2020-10-06 DIAGNOSIS — M79.674 PAIN OF TOE OF RIGHT FOOT: ICD-10-CM

## 2020-10-06 DIAGNOSIS — M54.50 ACUTE MIDLINE LOW BACK PAIN WITHOUT SCIATICA: ICD-10-CM

## 2020-10-06 DIAGNOSIS — I10 ESSENTIAL HYPERTENSION: Primary | ICD-10-CM

## 2020-10-06 DIAGNOSIS — R26.9 GAIT ABNORMALITY: ICD-10-CM

## 2020-10-06 PROCEDURE — 90694 VACC AIIV4 NO PRSRV 0.5ML IM: CPT | Performed by: INTERNAL MEDICINE

## 2020-10-06 PROCEDURE — 99214 OFFICE O/P EST MOD 30 MIN: CPT | Performed by: INTERNAL MEDICINE

## 2020-10-06 PROCEDURE — G0008 ADMIN INFLUENZA VIRUS VAC: HCPCS | Performed by: INTERNAL MEDICINE

## 2020-10-06 RX ORDER — ASPIRIN 81 MG/1
81 TABLET ORAL WEEKLY
COMMUNITY
End: 2022-02-22 | Stop reason: SDUPTHER

## 2020-10-06 NOTE — ASSESSMENT & PLAN NOTE
Episodic and will encourage PT with [facility] and maybe  at the [facility] or [facility]. Get lift chair for exacerbations.

## 2020-10-06 NOTE — ASSESSMENT & PLAN NOTE
Encourage to get up slowly and get bearings before taking first step. Keep home well lit with clear floors to avoid tripping. Use assist devices for all walking especially from bed to bathroom. Proceed with PT and lift chair.

## 2020-10-06 NOTE — PROGRESS NOTES
Chief Complaint   Patient presents with   • Back Pain     would like another order to [facility]    • Toe Pain     Little toe on R foot   • lift chair prescription       History of Present Illness  89 y.o. white male presents for evaluation of lift chair. He has episodes of back pain and is unable to get out of chair without assistance. He does have associated generalized weakness but able to ambulate with walker if gets up.   He has had a spell of right 5th toe pain.    Review of Systems   Constitutional: Negative for chills and fever.   HENT: Positive for hearing loss. Negative for sore throat.    Respiratory: Negative for cough and shortness of breath.    Cardiovascular: Negative for chest pain and palpitations.   Gastrointestinal: Negative for abdominal pain, nausea and vomiting.   Musculoskeletal: Positive for arthralgias, back pain and gait problem.   Skin: Negative for rash.   Neurological: Negative for dizziness, light-headedness and headaches.   Psychiatric/Behavioral: Negative for dysphoric mood and sleep disturbance. The patient is not nervous/anxious.    All other systems reviewed and are negative.    All other ROS reviewed and negative.    PMSFH:  The following portions of the patient's history were reviewed and updated as appropriate: allergies, current medications, past family history, past medical history, past social history, past surgical history and problem list.      Current Outpatient Medications:   •  aspirin 81 MG EC tablet, Take 81 mg by mouth Daily., Disp: , Rfl:   •  azelastine (ASTELIN) 0.1 % nasal spray, 2 sprays into the nostril(s) as directed by provider 2 (Two) Times a Day. Use in each nostril as directed, Disp: 3 each, Rfl: 3  •  bumetanide (BUMEX) 1 MG tablet, TAKE 1 TABLET BY MOUTH DAILY. TAKE 1 ADDITIONAL TABLET EVERY OTHER EVENING, Disp: 135 tablet, Rfl: 1  •  Cholecalciferol (VITAMIN D3) 25 MCG (1000 UT) capsule, Take 1,000 Units by mouth Every Other Day., Disp: , Rfl:   •   "finasteride (PROSCAR) 5 MG tablet, Take 1 tablet by mouth Daily., Disp: , Rfl:   •  ketoconazole (NIZORAL) 2 % cream, Apply 1 application topically to the appropriate area as directed Daily As Needed., Disp: , Rfl:   •  ketoconazole (NIZORAL) 2 % shampoo, , Disp: , Rfl:   •  metroNIDAZOLE (METROGEL) 0.75 % gel, , Disp: , Rfl:   •  pravastatin (PRAVACHOL) 40 MG tablet, Take 1 tablet by mouth Daily. 3x a week, Disp: , Rfl:     VITALS:  /58   Pulse 78   Temp 98 °F (36.7 °C)   Ht 162.6 cm (64.02\")   Wt 81.6 kg (180 lb)   BMI 30.88 kg/m²     Physical Exam  Constitutional:       Appearance: Normal appearance.   Cardiovascular:      Rate and Rhythm: Normal rate and regular rhythm.      Heart sounds: Murmur (II/VI SM) present.   Pulmonary:      Effort: Pulmonary effort is normal.      Breath sounds: Normal breath sounds.   Abdominal:      General: Abdomen is flat. Bowel sounds are normal.      Palpations: Abdomen is soft.   Musculoskeletal:         General: No swelling or tenderness (5th toe without pain and low back mild pain with getting up).   Skin:     General: Skin is warm and dry.   Neurological:      General: No focal deficit present.      Mental Status: He is alert and oriented to person, place, and time.   Psychiatric:         Mood and Affect: Mood normal.         Behavior: Behavior normal.         LABS:  Results for orders placed or performed in visit on 06/05/20   Magnesium    Specimen: Blood   Result Value Ref Range    Magnesium 2.4 (H) 1.7 - 2.3 mg/dL   Basic Metabolic Panel    Specimen: Blood   Result Value Ref Range    Glucose 101 (H) 65 - 99 mg/dL    BUN 23 8 - 23 mg/dL    Creatinine 1.22 0.76 - 1.27 mg/dL    Sodium 138 136 - 145 mmol/L    Potassium 4.0 3.5 - 5.2 mmol/L    Chloride 98 98 - 107 mmol/L    CO2 30.4 (H) 22.0 - 29.0 mmol/L    Calcium 9.3 8.2 - 9.6 mg/dL    eGFR  African Amer 67 >60 mL/min/1.73    eGFR Non African Amer 55 (L) >60 mL/min/1.73    BUN/Creatinine Ratio 18.9 7.0 - 25.0    " Anion Gap 9.6 5.0 - 15.0 mmol/L       Procedures         ASSESSMENT/PLAN:  Problem List Items Addressed This Visit        Cardiovascular and Mediastinum    Essential hypertension - Primary     Hypertension is improving with treatment.  Continue current treatment regimen.  Weight loss.  Regular aerobic exercise.  Blood pressure will be reassessed at the next regular appointment.            Nervous and Auditory    Acute midline low back pain without sciatica     Episodic and will encourage PT with [facility] and maybe  at the [facility] or [facility]. Get lift chair for exacerbations.          Relevant Orders    Patient Lift    Ambulatory Referral to Physical Therapy Evaluate and treat       Other    Gait abnormality     Encourage to get up slowly and get bearings before taking first step. Keep home well lit with clear floors to avoid tripping. Use assist devices for all walking especially from bed to bathroom. Proceed with PT and lift chair.          Relevant Orders    Patient Lift    Ambulatory Referral to Physical Therapy Evaluate and treat      Other Visit Diagnoses     Generalized muscle weakness        Proceed with PT and lift chair.     Relevant Orders    Patient Lift    Ambulatory Referral to Physical Therapy Evaluate and treat    Pain of toe of right foot        No pain now and benign exam. If has another spell would work up for gout          FOLLOW-UP:  Return in about 4 months (around 2/6/2021) for Medicare Wellness.      Electronically signed by:    Dirk Russ MD

## 2020-10-22 ENCOUNTER — TREATMENT (OUTPATIENT)
Dept: PHYSICAL THERAPY | Facility: CLINIC | Age: 85
End: 2020-10-22

## 2020-10-22 DIAGNOSIS — R26.9 GAIT ABNORMALITY: Primary | ICD-10-CM

## 2020-10-22 PROCEDURE — 97110 THERAPEUTIC EXERCISES: CPT | Performed by: PHYSICAL THERAPIST

## 2020-10-22 PROCEDURE — 97162 PT EVAL MOD COMPLEX 30 MIN: CPT | Performed by: PHYSICAL THERAPIST

## 2020-10-22 NOTE — PROGRESS NOTES
Physical Therapy Initial Evaluation and Plan of Care      Patient: Chaitanya Browne   : 1923  Diagnosis/ICD-10 Code:  Gait abnormality [R26.9]  Referring practitioner: Dirk Russ MD  Date of Initial Visit: 10/22/2020  Today's Date: 10/22/2020  Patient seen for 1 sessions      Subjective:     Subjective Questionnaire: TUG 16.52 sec   10 Meter 12.06 sec       Subjective Evaluation    History of Present Illness  Mechanism of injury: Patient relates he returns to therapy because he is having back pain that is about 1-2/10 most the time. He states his walking is ok. Daughter states that he must use both hands to get up and sometimes requires assistance. In/out bed and bathing is independent. He would like to re-start therapy to get his HEP organized and continue strengthening LE's, as well as improve balance.     Quality of life: good    Pain  Current pain ratin  At best pain ratin  At worst pain ratin    Patient Goals  Patient goals for therapy: decreased pain and increased strength             Objective          Strength/Myotome Testing     Left Hip   Planes of Motion   Flexion: 4  Extension: 3+  Abduction: 4  Adduction: 3+    Right Hip   Planes of Motion   Flexion: 4  Extension: 3+  Abduction: 4  Adduction: 3+    Left Knee   Flexion: 4+  Extension: 4+    Right Knee   Flexion: 4+  Extension: 4+    Left Ankle/Foot   Dorsiflexion: 5    Right Ankle/Foot   Dorsiflexion: 5    Ambulation     Observational Gait     Additional Observational Gait Details  Normalized gait pattern with forward flexed posture using rollator         PT Neuro         Assessment & Plan     Assessment  Impairments: abnormal coordination, abnormal gait, activity intolerance, impaired balance, impaired physical strength and safety issue  Assessment details: Patient is a 89 YOM that presents with a decrease in LE strength, along with unsteady balance and lower back pain with standing and walking. Patient will require skilled  PT intervention to address deficits in order to improve gait and overall mobility.   Prognosis: fair  Functional Limitations: carrying objects, walking, standing and stooping  Goals  Plan Goals: STG (6 visits)  1. Patient will report compliance with initial HEP.   2. Patient to improve DIXON balance score to >/= 45 /56 to decrease client's risk of falls.  3. Patient to perform TUG within 14 sec without LOB for improved functional mobility.  4. Patient to ambulate 10 meters without AD within 11 sec without LOB for improved       gait nida and functional mobility.      LTG (12 visits)  1. Patient will be I with final HEP.   2. Patient to improve DIXON balance score to >/= 50 /56 to decrease client's risk of falls.  3. Patient to perform TUG within 12 sec without LOB for improved functional mobility.  4. Patient to ambulate 10 meters without AD within 10 sec without LOB for improved gait nida and functional mobility.        Plan  Therapy options: will be seen for skilled physical therapy services  Planned modality interventions: TENS  Planned therapy interventions: ADL retraining, balance/weight-bearing training, flexibility, gait training, manual therapy, neuromuscular re-education, motor coordination training, postural training, strengthening, stretching, therapeutic activities, transfer training and home exercise program  Frequency: 1x week  Duration in visits: 8  Treatment plan discussed with: patient and caregiver  Plan details: Patient will be seen 1x/wk x 8wks with treatment to include strengthening, stretching, manual therapy, neuromuscular re-education, balance, gait and endurance training.           Timed:  Manual Therapy:    0     mins  34763;  Therapeutic Exercise:    15     mins  28854;     Neuromuscular Tahmina:    0    mins  91663;    Therapeutic Activity:     0     mins  65585;     Gait Trainin     mins  76741;     Electrical Stimulation:    0     mins  80003 ( );    Untimed:  Flor  Repositioning    0     mins 36559    Timed Treatment:   15   mins   Total Treatment:     45   mins    PT SIGNATURE: Vianca Durant, PT, DPT, MSCS, CDP  [place] License #[id number]  DATE TREATMENT INITIATED: 10/22/2020    Medicare Initial Certification Certification Period: 1/20/2021  I certify that the therapy services are furnished while this patient is under my care.  The services outlined above are required by this patient, and will be reviewed every 90 days.     PHYSICIAN: Dirk Russ MD      DATE:     Please sign and return via fax to [phone].   Thank you,   [facility].

## 2020-10-29 ENCOUNTER — TREATMENT (OUTPATIENT)
Dept: PHYSICAL THERAPY | Facility: CLINIC | Age: 85
End: 2020-10-29

## 2020-10-29 DIAGNOSIS — R26.9 GAIT ABNORMALITY: Primary | ICD-10-CM

## 2020-10-29 DIAGNOSIS — Z74.09 IMPAIRED FUNCTIONAL MOBILITY, BALANCE, GAIT, AND ENDURANCE: ICD-10-CM

## 2020-10-29 PROCEDURE — 97110 THERAPEUTIC EXERCISES: CPT | Performed by: PHYSICAL THERAPIST

## 2020-10-29 PROCEDURE — 97112 NEUROMUSCULAR REEDUCATION: CPT | Performed by: PHYSICAL THERAPIST

## 2020-10-29 NOTE — PROGRESS NOTES
Physical Therapy Daily Progress Note  Visit: 2  Date of Initial Visit: Type: THERAPY  Noted: 10/22/2020    Patient: Chaitanya Browne   : 1923  Diagnosis/ICD-10 Code:  Gait abnormality [R26.9]  Referring practitioner: Dirk Russ MD  Date of Initial Visit: Type: THERAPY  Noted: 10/22/2020  Today's Date: 10/29/2020  Patient seen for 2 sessions      Subjective:   Patient reports: he is having mild LBP this morning.   Pain: 1/10  Clinical Progress: improved  Home Program Compliance: Yes  Treatment has included: therapeutic exercise and neuromuscular re-education    Objective   See Exercise, Manual, and Modality Logs for complete treatment.    PT Neuro   Exercise 1  Exercise Name 1: Nu-Step   Equipment/Resistance 1: L6  Time: 10 min - increased endurance   Exercise 2  Exercise Name 2: airex to TD stepping   Sets/Reps 2: 20  Exercise 3  Exercise Name 3: airex to TD followed by step onto step with 1 riser   Sets/Reps 3: 20  Exercise 4  Exercise Name 4: lateral step ups with 1 riser   Sets/Reps 4: 10 ea   Exercise 5  Exercise Name 5: heel raises from HFR   Sets/Reps 5: 15  Exercise 6  Exercise Name 6: calf stretch on HFR   Time 6: 30 sec   Exercise 7  Exercise Name 7: calf stretch while holding to bar - simulating performance at home   Time 7: 30 sec   Exercise 8  Exercise Name 8: airex standing with 1# bar forward press   Sets/Reps 8: 10    Assessment & Plan     Assessment  Assessment details: Patient performed all exercises well with minimal UE assist and SBA from therapist. He did c/o R shoulder pain following chest press using bar. We will reassess his position at next visit in order to limit pain.     Plan  Plan details: Patient to continue with PT services to improve gait, balance, strength, transfers and overall functional mobility.          Timed:  Manual Therapy:            0     mins  32003;  Therapeutic Exercise:    15    mins  70083;     Neuromuscular Tahmina:    30    mins  91136;    Therapeutic  Activity:      0     mins  54754;     Gait Trainin    mins  26980;     Electrical Stimulation:    0    mins  13603 ( );     Untimed:  Canalith Repositioning techniques _[id number]_ [id number]      Timed Treatment:   45   mins   Total Treatment:     45   mins      Vianca Durant PT, DPT, MSCS, CDP  [place] License #: [id number]  Physical Therapist

## 2020-11-05 ENCOUNTER — TREATMENT (OUTPATIENT)
Dept: PHYSICAL THERAPY | Facility: CLINIC | Age: 85
End: 2020-11-05

## 2020-11-05 DIAGNOSIS — R26.9 GAIT ABNORMALITY: Primary | ICD-10-CM

## 2020-11-05 DIAGNOSIS — Z74.09 IMPAIRED FUNCTIONAL MOBILITY, BALANCE, GAIT, AND ENDURANCE: ICD-10-CM

## 2020-11-05 PROCEDURE — 97112 NEUROMUSCULAR REEDUCATION: CPT | Performed by: PHYSICAL THERAPIST

## 2020-11-05 PROCEDURE — 97110 THERAPEUTIC EXERCISES: CPT | Performed by: PHYSICAL THERAPIST

## 2020-11-05 NOTE — PROGRESS NOTES
Physical Therapy Daily Progress Note  Visit: 3  Date of Initial Visit: Type: THERAPY  Noted: 10/22/2020    Patient: Chaitanya Browne   : 1923  Diagnosis/ICD-10 Code:  Gait abnormality [R26.9]  Referring practitioner: Dirk Russ MD  Date of Initial Visit: Type: THERAPY  Noted: 10/22/2020  Today's Date: 2020  Patient seen for 3 sessions      Subjective:   Patient reports: his shoulders have been bothering him this week.   Pain: 2/10 - pre, 0/10 - post   Clinical Progress: improved  Home Program Compliance: Yes  Treatment has included: therapeutic exercise and neuromuscular re-education    Objective   See Exercise, Manual, and Modality Logs for complete treatment.    PT Neuro  Exercise 1  Exercise Name 1: Nu-Step   Equipment/Resistance 1: L6  Time: 10 min - increased endurance   Exercise 2  Exercise Name 2: Rb standing   Equipment/Resistance 2: caused inc fatigue at LE's   Sets/Reps 2: 2  Time 2: 1 min   Exercise 3  Exercise Name 3: HS stretch   Time 3: 1 min ea   Exercise 4  Exercise Name 4: airex standing with scap squeeze   Sets/Reps 4: 20  Exercise 5  Exercise Name 5: seated scap squeeze with RTB   Sets/Reps 5: 15  Exercise 6  Exercise Name 6: airex standing with lateral tapping   Sets/Reps 6: >20    Assessment & Plan     Assessment  Assessment details: Patient demonstrates good balance on dynamic surfaces. He also had improved shoulder pain with scapular squeezes, as well as resisted scapular squeezes. He will be progressed as indicated.     Plan  Plan details: Patient to continue with PT services to improve gait, balance, strength, transfers and overall functional mobility.          Timed:  Manual Therapy:            0     mins  46028;  Therapeutic Exercise:    30    mins  42228;     Neuromuscular Tahmina:    10    mins  53908;    Therapeutic Activity:      0     mins  24085;     Gait Trainin    mins  27307;     Electrical Stimulation:    0    mins  47024 ( );      Untimed:  Canalith Repositioning techniques _[id number]_ [id number]      Timed Treatment:   40   mins   Total Treatment:     40   mins      Vianca Durant PT, DPT, MSCS, CDP  [place] License #: [id number]  Physical Therapist

## 2020-11-12 ENCOUNTER — TREATMENT (OUTPATIENT)
Dept: PHYSICAL THERAPY | Facility: CLINIC | Age: 85
End: 2020-11-12

## 2020-11-12 DIAGNOSIS — R26.9 GAIT ABNORMALITY: Primary | ICD-10-CM

## 2020-11-12 DIAGNOSIS — Z74.09 IMPAIRED FUNCTIONAL MOBILITY, BALANCE, GAIT, AND ENDURANCE: ICD-10-CM

## 2020-11-12 PROCEDURE — 97112 NEUROMUSCULAR REEDUCATION: CPT | Performed by: PHYSICAL THERAPIST

## 2020-11-12 PROCEDURE — 97110 THERAPEUTIC EXERCISES: CPT | Performed by: PHYSICAL THERAPIST

## 2020-11-13 NOTE — PROGRESS NOTES
Physical Therapy Daily Progress Note  Visit: 4  Date of Initial Visit: Type: THERAPY  Noted: 10/22/2020    Patient: Chaitanya Browne   : 1923  Diagnosis/ICD-10 Code:  Gait abnormality [R26.9]  Referring practitioner: Dirk Russ MD  Date of Initial Visit: Type: THERAPY  Noted: 10/22/2020  Today's Date: 2020  Patient seen for 4 sessions      Subjective:   Patient reports: he is feeling ok. The stretches helped his neck.   Pain: 0/10  Clinical Progress: improved  Home Program Compliance: Yes  Treatment has included: therapeutic exercise and neuromuscular re-education    Objective   See Exercise, Manual, and Modality Logs for complete treatment.    PT Neuro   Exercise 1  Exercise Name 1: Nu-Step   Equipment/Resistance 1: L6  Time: 10 min - increased endurance   Exercise 2  Exercise Name 2: sit to stands with scap squeeze   Sets/Reps 2: 8  Exercise 3  Exercise Name 3: airex standing: bicep curls   Equipment/Resistance 3: 3# bar   Sets/Reps 3: 2/15  Exercise 4  Exercise Name 4: airex standing: punches   Equipment/Resistance 4: 1# bar   Sets/Reps 4: 10 ea side   Exercise 5  Exercise Name 5: airex standing with step up onto step folowed by box tap   Sets/Reps 5: 10  Exercise 6  Exercise Name 6: step up with crossover cone tap   Sets/Reps 6: 20    Assessment & Plan     Assessment  Assessment details: Patient demonstrates improved pain in neck and shoulders with stretching. He described muscle fatigue following arm exercises while standing on unstable surface. He will continue to be progressed.     Plan  Plan details: Patient to continue with PT services to improve gait, balance, strength, transfers and overall functional mobility.          Timed:  Manual Therapy:            0     mins  61012;  Therapeutic Exercise:    10    mins  64279;     Neuromuscular Tahmina:    30    mins  11049;    Therapeutic Activity:      0     mins  68111;     Gait Trainin    mins  09030;     Electrical  Stimulation:    0    mins  74863 ( );     Untimed:  Canalith Repositioning techniques _0_ 78198      Timed Treatment:   40   mins   Total Treatment:     40   mins      Vianca Durant PT, SUSANAT, MSCS, CDP  [place] License #: [id number]  Physical Therapist

## 2020-12-03 ENCOUNTER — TREATMENT (OUTPATIENT)
Dept: PHYSICAL THERAPY | Facility: CLINIC | Age: 85
End: 2020-12-03

## 2020-12-03 DIAGNOSIS — Z74.09 IMPAIRED FUNCTIONAL MOBILITY, BALANCE, GAIT, AND ENDURANCE: ICD-10-CM

## 2020-12-03 DIAGNOSIS — R26.9 GAIT ABNORMALITY: Primary | ICD-10-CM

## 2020-12-03 PROCEDURE — 97110 THERAPEUTIC EXERCISES: CPT | Performed by: PHYSICAL THERAPIST

## 2020-12-03 PROCEDURE — 97530 THERAPEUTIC ACTIVITIES: CPT | Performed by: PHYSICAL THERAPIST

## 2020-12-04 NOTE — PROGRESS NOTES
PT Re-Assessment / Re-Certification  Visit: 5  Date of Initial Visit: Type: THERAPY  Noted: 10/22/2020    Patient: Chaitanya Browne   : 1923  Diagnosis/ICD-10 Code:  Gait abnormality [R26.9]  Referring practitioner: Dirk Russ MD  Date of Initial Visit: Type: THERAPY  Noted: 10/22/2020  Today's Date: 2020  Patient seen for 5 sessions      Subjective:   Patient reports: his L shoulder is really hurting him.   Pain: 5/10  Home Program Compliance: Yes  Treatment has included: therapeutic exercise    Objective   See Exercise, Manual, and Modality Logs for complete treatment.    PT Neuro         Assessment & Plan     Assessment  Impairments: abnormal coordination, abnormal gait, activity intolerance, impaired balance, impaired physical strength and safety issue  Assessment details: Patient is having an increased amount of pain in the L shoulder that is causing pain at rest and with movement. He is able to sleep with medication. He will continue to be seen for balance, gait and strengthening, as well as new L shoulder pain. Daughter is unsure if they would like to report to clinic due to the recent surge in COVID, however telehealth is an option that they will look in to.   Prognosis: fair  Functional Limitations: carrying objects, walking, standing and stooping  Goals  Plan Goals: STG (6 visits)  1. Patient will report compliance with initial HEP. MET  2. Patient to improve DIXON balance score to >/= 45 /56 to decrease client's risk of falls. ONGOING  3. Patient to perform TUG within 14 sec without LOB for improved functional mobility.ONGOING  4. Patient to ambulate 10 meters without AD within 11 sec without LOB for improved       gait nida and functional mobility.ONGOING      LTG (12 visits)  1. Patient will be I with final HEP. ONGOING  2. Patient to improve DIXON balance score to >/= 50 /56 to decrease client's risk of falls.ONGOING  3. Patient to perform TUG within 12 sec without LOB for improved  functional mobility.ONGOING  4. Patient to ambulate 10 meters without AD within 10 sec without LOB for improved gait nida and functional mobility.ONGOING        Plan  Therapy options: will be seen for skilled physical therapy services  Planned modality interventions: TENS  Planned therapy interventions: ADL retraining, balance/weight-bearing training, flexibility, gait training, manual therapy, neuromuscular re-education, motor coordination training, postural training, strengthening, stretching, therapeutic activities, transfer training and home exercise program  Frequency: 1x week  Duration in visits: 4  Treatment plan discussed with: patient and caregiver  Plan details: Patient will continue to be seen 1x/wk x 4wks with treatment to include strengthening, stretching, manual therapy, neuromuscular re-education, balance, gait and endurance training.           Visit Diagnoses:    ICD-10-CM ICD-9-CM   1. Gait abnormality  R26.9 781.2   2. Impaired functional mobility, balance, gait, and endurance  Z74.09 V49.89       Progress toward previous goals: Partially Met      Recommendations: Continue as planned  Timeframe: 1 month    PT Signature: Vianca Durant, PT, DPT, MSCS, CDP  [place] License #: [id number]    Based upon review of the patient's progress and continued therapy plan, it is my medical opinion that Chaitanya Browne should continue physical therapy treatment at [facility]  [address] [place] [id number] [phone].    Signature: __________________________________  Dirk Russ MD    Timed:  Manual Therapy:    0     mins  16626;  Therapeutic Exercise:    25     mins  62797;     Neuromuscular Tahmina:    0    mins  90332;    Therapeutic Activity:     20     mins  93506;     Gait Trainin     mins  60473;     Electrical Stimulation:    0     mins  79902 ( );    Untimed:  Canalith Repositioning   0 mins  74223    Timed Treatment:   45   mins    Total Treatment:     45   mins

## 2020-12-10 ENCOUNTER — TREATMENT (OUTPATIENT)
Dept: PHYSICAL THERAPY | Facility: CLINIC | Age: 85
End: 2020-12-10

## 2020-12-10 DIAGNOSIS — Z74.09 IMPAIRED FUNCTIONAL MOBILITY, BALANCE, GAIT, AND ENDURANCE: ICD-10-CM

## 2020-12-10 DIAGNOSIS — R26.9 GAIT ABNORMALITY: Primary | ICD-10-CM

## 2020-12-10 PROCEDURE — 97110 THERAPEUTIC EXERCISES: CPT | Performed by: PHYSICAL THERAPIST

## 2020-12-10 NOTE — PROGRESS NOTES
Physical Therapy Daily Progress Note  Visit: 6  Date of Initial Visit: Type: THERAPY  Noted: 10/22/2020    Patient: Chaitanya Browne   : 1923  Diagnosis/ICD-10 Code:  Gait abnormality [R26.9]  Referring practitioner: Dirk Russ MD  Date of Initial Visit: Type: THERAPY  Noted: 10/22/2020  Today's Date: 12/10/2020  Patient seen for 6 sessions    This was an audio and video enabled telemedicine encounter. Given consent through [facility].   Subjective:   Patient reports: his shoulder is better - he has been using biofreeze.   Pain: 2/10  Clinical Progress: improved  Home Program Compliance: Yes  Treatment has included: therapeutic exercise  YObjective   See Exercise, Manual, and Modality Logs for complete treatment.      PT Neuro   Services were provided through telehealth with daughter assisting the patient with exercises.   Exercise 1  Exercise Name 1: review of HEP   Exercise 2  Exercise Name 2: shoulder depression with sit up into chair causing chest lift     Access Code: [email or web address]   URL: [email or web address]   Date: 12/10/2020   Prepared by: Vianca Durant     Exercises  Isometric Shoulder Extension at Wall - 10 reps - 1 sets - 3 sec hold - 1x daily - 5-7x weekly  Isometric Shoulder Adduction - 10 reps - 1 sets - 3 sec hold - 1x daily - 5-7x weekly  Assessment & Plan     Assessment  Assessment details: Patient demonstrates decreased ability to activate musculature between scapula. This is a causative factor for his shoulder pain. Isometrics were initiated today and will be reassessed next visit.     Plan  Plan details: Continue per POC.         Timed:  Manual Therapy:            0     mins  48621;  Therapeutic Exercise:    30    mins  18980;     Neuromuscular Tahmina:    0    mins  70040;    Therapeutic Activity:      0     mins  80659;     Gait Trainin    mins  87300;     Electrical Stimulation:    0    mins  63436 ( );     Untimed:  Canalith Repositioning techniques  _0_ 38013      Timed Treatment:   30   mins   Total Treatment:     30   mins      Vianca Durant PT, DPT, MSCS, CDP  [place] License #: [id number]  Physical Therapist

## 2020-12-17 ENCOUNTER — TREATMENT (OUTPATIENT)
Dept: PHYSICAL THERAPY | Facility: CLINIC | Age: 85
End: 2020-12-17

## 2020-12-17 DIAGNOSIS — R26.9 GAIT ABNORMALITY: Primary | ICD-10-CM

## 2020-12-17 DIAGNOSIS — Z74.09 IMPAIRED FUNCTIONAL MOBILITY, BALANCE, GAIT, AND ENDURANCE: ICD-10-CM

## 2020-12-17 PROCEDURE — 97110 THERAPEUTIC EXERCISES: CPT | Performed by: PHYSICAL THERAPIST

## 2020-12-17 NOTE — PROGRESS NOTES
"Physical Therapy Daily Progress Note  Visit: 7  Date of Initial Visit: Type: THERAPY  Noted: 10/22/2020    Patient: Chaitanya Browne   : 1923  Diagnosis/ICD-10 Code:  Gait abnormality [R26.9]  Referring practitioner: Dirk Russ MD  Date of Initial Visit: Type: THERAPY  Noted: 10/22/2020  Today's Date: 2020  Patient seen for 7 sessions    This was an audio and video enabled telemedicine encounter that patient and patient caregiver consented to.   Subjective:   Patient reports: he is having pain at the top of his L shoulder.   Pain: 3/10  Clinical Progress: improved  Home Program Compliance: Yes  Treatment has included: therapeutic exercise    Objective   See Exercise, Manual, and Modality Logs for complete treatment.    PT Neuro   Access Code: [email or web address]   URL: [email or web address]   Date: 2020   Prepared by: Vianca Durant     Exercises  Seated Neck Sidebending Stretch with Pillow - 10 hold - 1x daily - 7x weekly  Seated Levator Scapulae Stretch - 2 reps - 10 hold - 1x daily - 7x weekly       Assessment & Plan     Assessment  Assessment details: Patient has irritated L upper trap and is now having \"crawling' sensations throughout top of shoulder. Patient was instructed to post pone his exercises until Monday in order to let his shoulder rest. During the mean time he will be doing stretches. After Monday, he will restart exercises in a reclined position.     Plan  Plan details: Patient to continue with PT services to improve gait, balance, strength, transfers and overall functional mobility.          Timed:  Manual Therapy:            0     mins  67667;  Therapeutic Exercise:    30    mins  52565;     Neuromuscular Tahmina:    0    mins  81150;    Therapeutic Activity:      0     mins  42663;     Gait Trainin    mins  12078;     Electrical Stimulation:    0    mins  78471 ( );     Untimed:  Canalith Repositioning techniques _0_ 90847      Timed Treatment:   30 "   mins   Total Treatment:     30   mins      Vianca Durant PT, DPT, MSCS, CDP  [place] License #: [id number]  Physical Therapist

## 2020-12-29 ENCOUNTER — TREATMENT (OUTPATIENT)
Dept: PHYSICAL THERAPY | Facility: CLINIC | Age: 85
End: 2020-12-29

## 2020-12-29 DIAGNOSIS — R26.9 GAIT ABNORMALITY: Primary | ICD-10-CM

## 2020-12-29 DIAGNOSIS — Z74.09 IMPAIRED FUNCTIONAL MOBILITY, BALANCE, GAIT, AND ENDURANCE: ICD-10-CM

## 2020-12-29 PROCEDURE — 97110 THERAPEUTIC EXERCISES: CPT | Performed by: PHYSICAL THERAPIST

## 2020-12-29 NOTE — PROGRESS NOTES
Physical Therapy Daily Progress Note  Visit: 8  Date of Initial Visit: Type: THERAPY  Noted: 10/22/2020    Patient: Chaitanya Browne   : 1923  Diagnosis/ICD-10 Code:  Gait abnormality [R26.9]  Referring practitioner: Dirk Russ MD  Date of Initial Visit: Type: THERAPY  Noted: 10/22/2020  Today's Date: 2020  Patient seen for 8 sessions    This was an audio and video enabled telemedicine encounter that patient gave consent to receive.   Subjective:   Patient reports: he is still having tingling in his R shoulder. The pain is better but the tingling occurs when he wakes up in the AM and when he is eating.   Pain: 4/10  Clinical Progress: improved  Home Program Compliance: Yes  Treatment has included: therapeutic exercise    Objective   See Exercise, Manual, and Modality Logs for complete treatment.    PT Neuro   Exercise 1  Exercise Name 1: review of symptoms and how they are occuring  Time: 10  Exercise 2  Exercise Name 2: instructions on pain control - using pillows and postural control   Time 2: 13 min       Assessment & Plan     Assessment  Assessment details: Patient demonstrates improved pain, however continues with pain/tingling throughout top of L shoulder. This only takes place when eating or sleeping. It is assumed this would be positional in nature. Pillows will be used for positioning, as well as sternal lifting.     Plan  Plan details: Patient to continue with PT services to improve gait, balance, strength, transfers and overall functional mobility.          Timed:  Manual Therapy:            0     mins  86682;  Therapeutic Exercise:    23    mins  66496;     Neuromuscular Tahmina:    0    mins  92252;    Therapeutic Activity:      0     mins  57507;     Gait Trainin    mins  80009;     Electrical Stimulation:    0    mins  69648 ( );     Untimed:  Canalith Repositioning techniques _0_ 21609      Timed Treatment:   23   mins   Total Treatment:     23    adi Durant, PT, DPT, MSCS, CDP  [place] License #: [id number]  Physical Therapist

## 2021-01-07 ENCOUNTER — TREATMENT (OUTPATIENT)
Dept: PHYSICAL THERAPY | Facility: CLINIC | Age: 86
End: 2021-01-07

## 2021-01-07 DIAGNOSIS — R26.9 GAIT ABNORMALITY: Primary | ICD-10-CM

## 2021-01-07 DIAGNOSIS — Z74.09 IMPAIRED FUNCTIONAL MOBILITY, BALANCE, GAIT, AND ENDURANCE: ICD-10-CM

## 2021-01-07 PROCEDURE — 97110 THERAPEUTIC EXERCISES: CPT | Performed by: PHYSICAL THERAPIST

## 2021-01-08 NOTE — PROGRESS NOTES
Physical Therapy Daily Progress Note  Visit: 9  Date of Initial Visit: Type: THERAPY  Noted: 10/22/2020    Patient: Chaitanya Browne   : 1923  Diagnosis/ICD-10 Code:  Gait abnormality [R26.9]  Referring practitioner: Dirk Russ MD  Date of Initial Visit: Type: THERAPY  Noted: 10/22/2020  Today's Date: 2021  Patient seen for 9 sessions    This was an audio and video enabled telemedicine encounter.  Subjective:   Patient reports: he is doing ok.   Pain: 1/10  Clinical Progress: improved  Home Program Compliance: Yes  Treatment has included: therapeutic exercise    Objective   See Exercise, Manual, and Modality Logs for complete treatment.    PT Neuro          Assessment & Plan     Assessment  Assessment details: Patient has positive bakody sign which is likely indicative of cervical radiculopathy C4-6. Chin tucks were initiated and he will continue scap squeezes. His leg bike at home was adjusted for proper ergonomic set up.     Plan  Plan details: Patient will be seen in 2 weeks.         Timed:  Manual Therapy:            0     mins  24944;  Therapeutic Exercise:    30    mins  83226;     Neuromuscular Tahmina:    0    mins  97992;    Therapeutic Activity:      0     mins  00194;     Gait Trainin    mins  22344;     Electrical Stimulation:    0    mins  80372 ( );     Untimed:  Canalith Repositioning techniques _0_ 63276      Timed Treatment:   30   mins   Total Treatment:     30   mins      Vianca Durant PT, DPT, MSCS, CDP  [place] License #: [id number]  Physical Therapist

## 2021-01-21 ENCOUNTER — TREATMENT (OUTPATIENT)
Dept: PHYSICAL THERAPY | Facility: CLINIC | Age: 86
End: 2021-01-21

## 2021-01-21 DIAGNOSIS — R26.9 GAIT ABNORMALITY: Primary | ICD-10-CM

## 2021-01-21 DIAGNOSIS — Z74.09 IMPAIRED FUNCTIONAL MOBILITY, BALANCE, GAIT, AND ENDURANCE: ICD-10-CM

## 2021-01-21 PROCEDURE — 97110 THERAPEUTIC EXERCISES: CPT | Performed by: PHYSICAL THERAPIST

## 2021-01-21 NOTE — PROGRESS NOTES
"Physical Therapy Discharge Note  Visit: 10  Date of Initial Visit: Type: THERAPY  Noted: 10/22/2020    Patient: Chaitanya Browne   : 1923  Diagnosis/ICD-10 Code:  Gait abnormality [R26.9]  Referring practitioner: Dirk Russ MD  Date of Initial Visit: Type: THERAPY  Noted: 10/22/2020  Today's Date: 2021  Patient seen for 10 sessions    This was an audio and video enabled telemedicine encounter.  Subjective:   Patient reports: he continues to have a \"knawing\" sensation in his shoulder randomly  Pain: 0/10  Clinical Progress: improved  Home Program Compliance: Yes  Treatment has included: therapeutic exercise    Objective   See Exercise, Manual, and Modality Logs for complete treatment.    PT Neuro          Assessment & Plan     Assessment  Assessment details: Patient feels he is able to manage symptoms independently and can be discharged. He will continue to manage with positional changes and exercises. Patient will report to MD if there are any changes.     Plan  Plan details: Patient to continue with PT services to improve gait, balance, strength, transfers and overall functional mobility.          Timed:  Manual Therapy:            0     mins  33894;  Therapeutic Exercise:    25    mins  14105;     Neuromuscular Tahmina:    0    mins  58391;    Therapeutic Activity:      0     mins  47832;     Gait Trainin    mins  74648;     Electrical Stimulation:    0    mins  97053 ( );     Untimed:  Canalith Repositioning techniques _0_ 42008      Timed Treatment:   25   mins   Total Treatment:     25   mins      Vianca Durant, PT, DPT, MSCS, CDP  [place] License #: [id number]  Physical Therapist    "

## 2021-02-09 RX ORDER — BUMETANIDE 1 MG/1
1 TABLET ORAL DAILY
Qty: 135 TABLET | Refills: 1 | Status: SHIPPED | OUTPATIENT
Start: 2021-02-09 | End: 2021-08-11 | Stop reason: SDUPTHER

## 2021-02-09 NOTE — TELEPHONE ENCOUNTER
Lab Results   Component Value Date    GLUCOSE 101 (H) 06/05/2020    CALCIUM 9.3 06/05/2020     06/05/2020    K 4.0 06/05/2020    CO2 30.4 (H) 06/05/2020    CL 98 06/05/2020    BUN 23 06/05/2020    CREATININE 1.22 06/05/2020    EGFRIFAFRI 67 06/05/2020    EGFRIFNONA 55 (L) 06/05/2020    BCR 18.9 06/05/2020    ANIONGAP 9.6 06/05/2020     Refill if appropriate.     Thanks

## 2021-02-16 ENCOUNTER — OFFICE VISIT (OUTPATIENT)
Dept: INTERNAL MEDICINE | Facility: CLINIC | Age: 86
End: 2021-02-16

## 2021-02-16 VITALS
WEIGHT: 181.8 LBS | DIASTOLIC BLOOD PRESSURE: 69 MMHG | HEART RATE: 82 BPM | BODY MASS INDEX: 31.19 KG/M2 | SYSTOLIC BLOOD PRESSURE: 125 MMHG | RESPIRATION RATE: 12 BRPM

## 2021-02-16 DIAGNOSIS — R26.9 GAIT ABNORMALITY: ICD-10-CM

## 2021-02-16 DIAGNOSIS — E55.9 VITAMIN D DEFICIENCY: ICD-10-CM

## 2021-02-16 DIAGNOSIS — E78.5 HYPERLIPIDEMIA LDL GOAL <100: ICD-10-CM

## 2021-02-16 DIAGNOSIS — I50.32 CHRONIC DIASTOLIC CHF (CONGESTIVE HEART FAILURE) (HCC): ICD-10-CM

## 2021-02-16 DIAGNOSIS — J41.0 SIMPLE CHRONIC BRONCHITIS (HCC): ICD-10-CM

## 2021-02-16 DIAGNOSIS — R73.01 IMPAIRED FASTING GLUCOSE: ICD-10-CM

## 2021-02-16 DIAGNOSIS — G56.01 RIGHT CARPAL TUNNEL SYNDROME: ICD-10-CM

## 2021-02-16 DIAGNOSIS — I10 ESSENTIAL HYPERTENSION: ICD-10-CM

## 2021-02-16 DIAGNOSIS — M54.50 ACUTE MIDLINE LOW BACK PAIN WITHOUT SCIATICA: ICD-10-CM

## 2021-02-16 DIAGNOSIS — D50.0 IRON DEFICIENCY ANEMIA DUE TO CHRONIC BLOOD LOSS: ICD-10-CM

## 2021-02-16 DIAGNOSIS — R60.0 BILATERAL LOWER EXTREMITY EDEMA: ICD-10-CM

## 2021-02-16 DIAGNOSIS — Z00.00 MEDICARE ANNUAL WELLNESS VISIT, SUBSEQUENT: Primary | ICD-10-CM

## 2021-02-16 DIAGNOSIS — C44.320 SQUAMOUS CELL SKIN CANCER, FACE: ICD-10-CM

## 2021-02-16 DIAGNOSIS — R41.3 MEMORY LOSS: ICD-10-CM

## 2021-02-16 DIAGNOSIS — E66.9 OBESITY (BMI 30-39.9): ICD-10-CM

## 2021-02-16 PROBLEM — E43 EDEMA DUE TO MALNUTRITION: Status: RESOLVED | Noted: 2020-02-05 | Resolved: 2021-02-16

## 2021-02-16 PROCEDURE — 1159F MED LIST DOCD IN RCRD: CPT | Performed by: INTERNAL MEDICINE

## 2021-02-16 PROCEDURE — 99213 OFFICE O/P EST LOW 20 MIN: CPT | Performed by: INTERNAL MEDICINE

## 2021-02-16 PROCEDURE — 1170F FXNL STATUS ASSESSED: CPT | Performed by: INTERNAL MEDICINE

## 2021-02-16 PROCEDURE — G0439 PPPS, SUBSEQ VISIT: HCPCS | Performed by: INTERNAL MEDICINE

## 2021-02-16 PROCEDURE — 1126F AMNT PAIN NOTED NONE PRSNT: CPT | Performed by: INTERNAL MEDICINE

## 2021-02-16 RX ORDER — PRAVASTATIN SODIUM 40 MG
40 TABLET ORAL DAILY
Qty: 90 TABLET | Refills: 3 | Status: SHIPPED | OUTPATIENT
Start: 2021-02-16 | End: 2022-02-22 | Stop reason: SDUPTHER

## 2021-02-16 NOTE — ASSESSMENT & PLAN NOTE
Encourage to get up slowly and get bearings before taking first step. Keep home well lit with clear floors to avoid tripping. Use assist devices for all walking especially from bed to bathroom. Resume PT

## 2021-02-16 NOTE — ASSESSMENT & PLAN NOTE
Lipid abnormalities are unchanged.  Nutritional counseling was provided. and Pharmacotherapy as ordered.  Lipids will be reassessed in 1 yearHis LDL was 100 and will recheck .Send in pravastatin.

## 2021-02-16 NOTE — PROGRESS NOTES
The ABCs of the Annual Wellness Visit  Subsequent Medicare Wellness Visit    Chief Complaint   Patient presents with   • Medicare Wellness-subsequent   • Edema   • Skin Cancer   • Carpal Tunnel     This was an audio and video enabled telemedicine encounter.He and his daughter consented yes to the video visit carried out via zoom for 75 minutes  Subjective   History of Present Illness:  Chaitanya Browne is a 89 y.o. male who presents for a Subsequent Medicare Wellness Visit.He has some leg swelling but no associated shortness of air. It improves with bumex. He has some carpal tunnel numbness and weakness. His back pain is overall doing ok. He has had recent     HEALTH RISK ASSESSMENT    Recent Hospitalizations:  No hospitalization(s) within the last year.    Current Medical Providers:  Patient Care Team:  Dirk Russ MD as PCP - General (Internal Medicine)  Fabricio Zavala MD as Consulting Physician (Internal Medicine)    Smoking Status:  Social History     Tobacco Use   Smoking Status Never Smoker   Smokeless Tobacco Never Used       Alcohol Consumption:  Social History     Substance and Sexual Activity   Alcohol Use No   • Frequency: Never       Depression Screen:   PHQ-2/PHQ-9 Depression Screening 2/16/2021   Little interest or pleasure in doing things 0   Feeling down, depressed, or hopeless 0   Total Score 0       Fall Risk Screen:  STEADI Fall Risk Assessment was completed, and patient is at MODERATE risk for falls. Assessment completed on:2/16/2021    Health Habits and Functional and Cognitive Screening:  Functional & Cognitive Status 2/16/2021   Do you have difficulty preparing food and eating? No   Do you have difficulty bathing yourself, getting dressed or grooming yourself? No   Do you have difficulty using the toilet? No   Do you have difficulty moving around from place to place? No   Do you have trouble with steps or getting out of a bed or a chair? No   Current Diet Well Balanced Diet    Dental Exam (No Data)        Dental Exam Comment 2/2020   Eye Exam Up to date        Eye Exam Comment he will get in April 2021   Exercise (times per week) 7 times per week   Current Exercise Activities Include Cardiovasular Workout on Exercise Equipment   Do you need help using the phone?  No   Are you deaf or do you have serious difficulty hearing?  No   Do you need help with transportation? No   Do you need help shopping? Yes   Do you need help preparing meals?  (No Data)   Do you need help with housework?  Yes   Do you need help with laundry? Yes   Do you need help taking your medications? No   Do you need help managing money? No   Do you ever drive or ride in a car without wearing a seat belt? No   Have you felt unusual stress, anger or loneliness in the last month? No   Who do you live with? Alone   If you need help, do you have trouble finding someone available to you? No   Have you been bothered in the last four weeks by sexual problems? No   Do you have difficulty concentrating, remembering or making decisions? No         Does the patient have evidence of cognitive impairment? Yes    Asprin use counseling:Taking ASA appropriately as indicated    Age-appropriate Screening Schedule:  Refer to the list below for future screening recommendations based on patient's age, sex and/or medical conditions. Orders for these recommended tests are listed in the plan section. The patient has been provided with a written plan.    Health Maintenance   Topic Date Due   • ZOSTER VACCINE (1 of 2) 02/[birth year]/1973   • TDAP/TD VACCINES (2 - Td) 10/18/2018   • LIPID PANEL  02/05/2021   • INFLUENZA VACCINE  Completed          The following portions of the patient's history were reviewed and updated as appropriate: allergies, current medications, past family history, past medical history, past social history, past surgical history and problem list.    Outpatient Medications Prior to Visit   Medication Sig Dispense Refill   • aspirin 81  MG EC tablet Take 81 mg by mouth Daily.     • azelastine (ASTELIN) 0.1 % nasal spray 2 sprays into the nostril(s) as directed by provider 2 (Two) Times a Day. Use in each nostril as directed 3 each 3   • bumetanide (BUMEX) 1 MG tablet Take 1 tablet by mouth Daily. Take 1 additional tablet every other evening. 135 tablet 1   • Cholecalciferol (VITAMIN D3) 25 MCG (1000 UT) capsule Take 1,000 Units by mouth Every Other Day.     • finasteride (PROSCAR) 5 MG tablet Take 1 tablet by mouth Daily.     • ketoconazole (NIZORAL) 2 % cream Apply 1 application topically to the appropriate area as directed Daily As Needed.     • ketoconazole (NIZORAL) 2 % shampoo      • metroNIDAZOLE (METROGEL) 0.75 % gel      • pravastatin (PRAVACHOL) 40 MG tablet Take 1 tablet by mouth Daily. 3x a week       No facility-administered medications prior to visit.        Patient Active Problem List   Diagnosis   • Facial basal cell cancer   • Hyperlipidemia LDL goal <100   • Essential hypertension   • ASHD (arteriosclerotic heart disease)   • Gait abnormality   • Impaired fasting glucose   • Polyp, colonic   • Vitamin D deficiency   • Acute midline low back pain without sciatica   • Proteinuria   • Right carpal tunnel syndrome   • Obesity (BMI 30-39.9)   • Hematuria, unspecified   • Medicare annual wellness visit, subsequent   • Edema   • Cough   • Allergic rhinitis   • Gross hematuria   • Acute UTI (urinary tract infection)   • Acute renal insufficiency   • Acute urinary retention   • Debility   • Dysphagia   • BPH with obstruction/lower urinary tract symptoms   • Prostatitis   • Chronic diastolic CHF (congestive heart failure) (CMS/[facility])   • Iron deficiency anemia due to chronic blood loss   • Simple chronic bronchitis (CMS/[facility])       Advanced Care Planning:  ACP discussion was held with the patient during this visit. Patient has an advance directive in EMR which is still valid.     Review of Systems   Constitutional: Negative for chills and  fever.   HENT: Positive for hearing loss. Negative for sore throat.    Respiratory: Negative for cough and shortness of breath.    Cardiovascular: Positive for leg swelling. Negative for chest pain and palpitations.   Gastrointestinal: Negative for abdominal pain, nausea and vomiting.   Musculoskeletal: Positive for back pain and gait problem.   Skin: Negative for rash.        Scalp wound    Neurological: Positive for weakness. Negative for dizziness, light-headedness and headaches.   Psychiatric/Behavioral: Positive for decreased concentration. Negative for dysphoric mood and sleep disturbance. The patient is not nervous/anxious.    All other systems reviewed and are negative.      Compared to one year ago, the patient feels his physical health is the same.  Compared to one year ago, the patient feels his mental health is the same.    Reviewed chart for potential of high risk medication in the elderly: yes  Reviewed chart for potential of harmful drug interactions in the elderly:yes    Objective         Vitals:    02/16/21 1030   BP: 125/69   Pulse: 82   Resp: 12   Weight: 82.5 kg (181 lb 12.8 oz)   PainSc: 0-No pain       Body mass index is 31.19 kg/m².  Discussed the patient's BMI with him. The BMI is above average; BMI management plan is completed.    Physical Exam  Vitals signs and nursing note reviewed.   Constitutional:       Appearance: Normal appearance. He is well-developed.   HENT:      Head: Normocephalic.   Eyes:      Extraocular Movements: Extraocular movements intact.      Conjunctiva/sclera: Conjunctivae normal.   Pulmonary:      Effort: Pulmonary effort is normal. No respiratory distress.   Abdominal:      Tenderness: There is no abdominal tenderness.   Musculoskeletal:         General: Swelling (trace edema legs upon his and daughter exam ) present. No tenderness (no tenderness of wrist or low back upon his or his daughter exam).   Skin:     General: Skin is warm and dry.   Neurological:       General: No focal deficit present.      Mental Status: He is alert and oriented to person, place, and time.      Comments: His recall was 2 of 3 and able to spell world backwards his get up and go was good and good with the walker    Psychiatric:         Mood and Affect: Mood normal.         Behavior: Behavior normal.               Assessment/Plan   Medicare Risks and Personalized Health Plan  CMS Preventative Services Quick Reference  Advance Directive Discussion  Cardiovascular risk  Fall Risk  Hearing Problem  Obesity/Overweight     The above risks/problems have been discussed with the patient.  Pertinent information has been shared with the patient in the After Visit Summary.  Follow up plans and orders are seen below in the Assessment/Plan Section.    Diagnoses and all orders for this visit:    1. Medicare annual wellness visit, subsequent (Primary)  Assessment & Plan:  He has some hearing loss but does pretty well with hearing aides and will follow with audiology on [facility] in next 4 months. He will get an eye exam in April 2021. His mood is good. He has overall good memory with 2 of 3 recall and able to easily spell WORLD backwards. Encouraged to use  multimodal approach with healthy nutrition, healthy sleep, regular physical activity, social activities, and reading to help maintain mood and memory and check labs. He will get second COVID vaccine this week. He is overall doing ok with medications. Age-appropriate Counseling:  Discussed preventative medicine issues with patient including regular exercise, healthy diet, stress reduction, adequate sleep and recommended age-appropriate screening studies.  Immunizations reviewed.             2. Bilateral lower extremity edema  -     Comprehensive Metabolic Panel; Future  -     Microalbumin / Creatinine Urine Ratio - Urine, Clean Catch; Future    3. Squamous cell skin cancer, face  Comments:  Acute issue recently removed with slow healing. No signs of infection.  Follow with Dermatology but no antibiotics at this point     4. Right carpal tunnel syndrome  Assessment & Plan:  He has some numbness and mild burning. Encouraged to use heat and consider night brace per PT.      5. Acute midline low back pain without sciatica  Assessment & Plan:  His back pain is doing well but not doing the exercises. Resume exercise and do PT with [facility].       6. Gait abnormality  Assessment & Plan:  Encourage to get up slowly and get bearings before taking first step. Keep home well lit with clear floors to avoid tripping. Use assist devices for all walking especially from bed to bathroom. Resume PT      7. Iron deficiency anemia due to chronic blood loss  Assessment & Plan:  No known bleeding and will repeat labs    Orders:  -     CBC & Differential; Future  -     Iron Profile; Future  -     Ferritin; Future    8. Hyperlipidemia LDL goal <100  Assessment & Plan:  Lipid abnormalities are unchanged.  Nutritional counseling was provided. and Pharmacotherapy as ordered.  Lipids will be reassessed in 1 yearHis LDL was 100 and will recheck .Send in pravastatin.     Orders:  -     Lipid Panel; Future  -     TSH Rfx On Abnormal To Free T4; Future    9. Essential hypertension  Assessment & Plan:  Hypertension is improving with lifestyle modifications.  Continue current treatment regimen.  Dietary sodium restriction.  Weight loss.  Regular aerobic exercise.  Blood pressure will be reassessed at the next regular appointmentHe is only on bumex and doing ok. .    Orders:  -     Urinalysis With Microscopic - Urine, Clean Catch; Future    10. Chronic diastolic CHF (congestive heart failure) (CMS/HCC)  Assessment & Plan:  Congestive heart failure due to coronary artery disease (CAD) and hypertension.  Heart failure is improving with treatment.  NYHA Class I.  Continue current treatment regimen.  Dietary sodium restriction.  Encouraged daily monitoring of the patient's weight.  Regular aerobic  exercise.  Heart failure will be reassessed in 6 monthsFollow up with Dr Ledbetter tomorrow. Continue the bumex and added every other day evening dose. .      11. Impaired fasting glucose  Assessment & Plan:  Discussed decreasing bad carbohydrates, specifically sweets, breads, potatoes, corn and high caloric drinks (juices, sodas, sweet tea).  Also recommend increasing physical activity, ideally 150 minutes aerobic exercise weekly and resistance exercises 2-3x/week.His most recent HBA1c was approaching diabetic level at 6.2 but random sugar was only minimally elevated at 101.     Orders:  -     Hemoglobin A1c; Future    12. Vitamin D deficiency  Assessment & Plan:  Follow up labs to see if needs replacement.     Orders:  -     Vitamin D 25 Hydroxy; Future    13. Obesity (BMI 30-39.9)  Assessment & Plan:  Patient's (Body mass index is 31.19 kg/m².) indicates that they are obese (BMI >30) with obesity-related health conditions that include hypertension, coronary heart disease and dyslipidemias . Obesity is unchanged. BMI is is above average; BMI management plan is completed. We discussed portion control and increasing exercise Goal to consume large amounts of vegetables and fruits,heart healthy fats and low carbohydrate choices. Encourage aerobic exercise of walking, jogging or biking gradually up to 150 minute a week and 2-3 times of weight resistance. Employe behavioral modifications such as daily meditation and stopping eating and drinking calories after 7 pm. Continue is 30 minutes of cardio a day and add some stretching and weight resistance and add PT.       14. Simple chronic bronchitis (CMS/HCC)  Assessment & Plan:  His lungs are doing better with removing mold      15. Memory loss  -     Vitamin B12; Future    Other orders  -     pravastatin (PRAVACHOL) 40 MG tablet; Take 1 tablet by mouth Daily. 3x a week  Dispense: 90 tablet; Refill: 3    Follow Up:  No follow-ups on file.     An After Visit Summary and PPPS  were given to the patient.

## 2021-02-16 NOTE — ASSESSMENT & PLAN NOTE
No known bleeding and will repeat labs   conducted a detailed discussion... I had a detailed discussion with the patient and/or guardian regarding the historical points, exam findings, and any diagnostic results supporting the discharge/admit diagnosis.

## 2021-02-16 NOTE — ASSESSMENT & PLAN NOTE
Patient's (Body mass index is 31.19 kg/m².) indicates that they are obese (BMI >30) with obesity-related health conditions that include hypertension, coronary heart disease and dyslipidemias . Obesity is unchanged. BMI is is above average; BMI management plan is completed. We discussed portion control and increasing exercise Goal to consume large amounts of vegetables and fruits,heart healthy fats and low carbohydrate choices. Encourage aerobic exercise of walking, jogging or biking gradually up to 150 minute a week and 2-3 times of weight resistance. Employe behavioral modifications such as daily meditation and stopping eating and drinking calories after 7 pm. Continue is 30 minutes of cardio a day and add some stretching and weight resistance and add PT.

## 2021-02-16 NOTE — ASSESSMENT & PLAN NOTE
Discussed decreasing bad carbohydrates, specifically sweets, breads, potatoes, corn and high caloric drinks (juices, sodas, sweet tea).  Also recommend increasing physical activity, ideally 150 minutes aerobic exercise weekly and resistance exercises 2-3x/week.His most recent HBA1c was approaching diabetic level at 6.2 but random sugar was only minimally elevated at 101.

## 2021-02-16 NOTE — ASSESSMENT & PLAN NOTE
Congestive heart failure due to coronary artery disease (CAD) and hypertension.  Heart failure is improving with treatment.  NYHA Class I.  Continue current treatment regimen.  Dietary sodium restriction.  Encouraged daily monitoring of the patient's weight.  Regular aerobic exercise.  Heart failure will be reassessed in 6 monthsFollow up with Dr Ledbetter tomorrow. Continue the bumex and added every other day evening dose. .

## 2021-02-16 NOTE — ASSESSMENT & PLAN NOTE
His back pain is doing well but not doing the exercises. Resume exercise and do PT with [facility].

## 2021-02-16 NOTE — ASSESSMENT & PLAN NOTE
He has some hearing loss but does pretty well with hearing aides and will follow with audiology on [facility] in next 4 months. He will get an eye exam in April 2021. His mood is good. He has overall good memory with 2 of 3 recall and able to easily spell WORLD backwards. Encouraged to use  multimodal approach with healthy nutrition, healthy sleep, regular physical activity, social activities, and reading to help maintain mood and memory and check labs. He will get second COVID vaccine this week. He is overall doing ok with medications. Age-appropriate Counseling:  Discussed preventative medicine issues with patient including regular exercise, healthy diet, stress reduction, adequate sleep and recommended age-appropriate screening studies.  Immunizations reviewed.

## 2021-02-16 NOTE — ASSESSMENT & PLAN NOTE
Hypertension is improving with lifestyle modifications.  Continue current treatment regimen.  Dietary sodium restriction.  Weight loss.  Regular aerobic exercise.  Blood pressure will be reassessed at the next regular appointmentHe is only on bumex and doing ok. .

## 2021-02-17 ENCOUNTER — OFFICE VISIT (OUTPATIENT)
Dept: CARDIOLOGY | Facility: CLINIC | Age: 86
End: 2021-02-17

## 2021-02-17 VITALS
WEIGHT: 181 LBS | DIASTOLIC BLOOD PRESSURE: 65 MMHG | HEART RATE: 70 BPM | BODY MASS INDEX: 29.09 KG/M2 | SYSTOLIC BLOOD PRESSURE: 115 MMHG | HEIGHT: 66 IN

## 2021-02-17 DIAGNOSIS — I50.32 CHRONIC DIASTOLIC CHF (CONGESTIVE HEART FAILURE) (HCC): Primary | ICD-10-CM

## 2021-02-17 DIAGNOSIS — E78.5 DYSLIPIDEMIA: ICD-10-CM

## 2021-02-17 DIAGNOSIS — I10 ESSENTIAL HYPERTENSION: ICD-10-CM

## 2021-02-17 PROCEDURE — 99441 PR PHYS/QHP TELEPHONE EVALUATION 5-10 MIN: CPT | Performed by: INTERNAL MEDICINE

## 2021-02-17 NOTE — PROGRESS NOTES
Subjective:     Encounter Date:02/17/2021    Patient ID: Chaitanya Browne is a 89 y.o.  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     REFERRING INTERNIST: Dirk Russ MD  UROLOGIST: Ck Woods MD  PULMONOLOGIST:  COLLIN Avila DO.    Chief Complaint:   Chief Complaint   Patient presents with   • Congestive Heart Failure     f/u       Problem List:  1. Diastolic congestive heart failure:  a. Treadmill stress test, 10/10/2011: 7 METS, CYRUS -32, acceptable exercise stress test without anginal type chest discomfort or ischemic EKG changes with acceptable Maxwell treadmill score and normal blood pressure response following exercise to 109% predicted maximum heart rate 132% of predicted exercise capacity  b. Echocardiogram, 7/14/2012: LVEF 0.55-0.60, abnormal LV.diastolic filling is observed consistent with impaired LV relaxation, moderate mitral annular calcification with no evidence of mitral stenosis or significant regurgitation  c. Echocardiogram, 9/3/2019: Mild to moderate MR, LVEF 0.72, LV diastolic dysfunction grade 1 consistent with impaired relaxation, LV wall thickness consistent with mild concentric hypertrophy, LA borderline dilated, normal RV cavity size, wall thickness, systolic function and septal motion noted, mild mitral stenosis is present with functional MAC.  Aortic valve exhibits moderate sclerosis without stenosis.  No pulmonary hypertension, no pericardial effusion   d. BNP normal October 2019  e. Residual CCS class 0 angina pectoris/NYHA class II biventricular CHF, November 2019  f. Residual class I symptoms, February 2021  2. Valvular heart disease/mild to moderate mitral regurgitation, mild mitral stenosis, September 2019  3. Hypertension  4. Hyperlipidemia; on statin therapy  5. Chronic kidney disease stage III  6. Anemia  7. Asthma/COPD  8. Lower extremity  edema with negative venous duplex July 2012  9. BPH  10. Bilateral carpal tunnel syndrome  11. History of basal cell carcinoma on left side of nose  12. [facility] 14-day hospitalization September 2019 for TURP, complicated by postoperative aspiration pneumonia, hematuria, and anemia  13. Prediabetes with hemoglobin A1c 6.2% February 2020  14. Surgical history:   a. Appendectomy  b. TURP 1989  c. Peritonitis as a child  d. Basal cell carcinoma excision from nose  e. I&D great toe  f. Cystoscopy  g. TURP 2019  h. Bilateral cataracts    No Known Allergies    Current Outpatient Medications:   •  aspirin 81 MG EC tablet, Take 81 mg by mouth 1 (One) Time Per Week., Disp: , Rfl:   •  azelastine (ASTELIN) 0.1 % nasal spray, 2 sprays into the nostril(s) as directed by provider 2 (Two) Times a Day. Use in each nostril as directed, Disp: 3 each, Rfl: 3  •  bumetanide (BUMEX) 1 MG tablet, Take 1 tablet by mouth Daily. Take 1 additional tablet every other evening., Disp: 135 tablet, Rfl: 1  •  Cholecalciferol (VITAMIN D3) 25 MCG (1000 UT) capsule, Take 1,000 Units by mouth Every Other Day., Disp: , Rfl:   •  finasteride (PROSCAR) 5 MG tablet, Take 1 tablet by mouth Daily., Disp: , Rfl:   •  ketoconazole (NIZORAL) 2 % cream, Apply 1 application topically to the appropriate area as directed Daily As Needed., Disp: , Rfl:   •  ketoconazole (NIZORAL) 2 % shampoo, , Disp: , Rfl:   •  metroNIDAZOLE (METROGEL) 0.75 % gel, , Disp: , Rfl:   •  pravastatin (PRAVACHOL) 40 MG tablet, Take 1 tablet by mouth Daily. 3x a week, Disp: 90 tablet, Rfl: 3    History of Present Illness: Patient has telephone visit for scheduled 6-month follow up. He has been feeling well overall from a cardiovascular standpoint. Patient denies chest pain, shortness of breath, palpitations, edema, dizziness, and syncope. He has had no interim ER visits, hospitalizations, serious illnesses, or surgeries. He does not have cardiopulmonary limitations with activity. His fluid  "levels are well controlled with Bumex. He has been doing exercises in his house. He received initial COVID immunization.         ROS   Obtained and negative except as outlined in problem list and HPI.    Procedures       Objective:       Vitals:    02/17/21 1258   BP: 115/65   BP Location: Left arm   Patient Position: Sitting   Pulse: 70   Weight: 82.1 kg (181 lb)   Height: 167.6 cm (66\")     Body mass index is 29.21 kg/m².  Last weight: 178 lbs    Physical Exam not able to be performed due to telephone visit.      Lab Review:   Lab Results   Component Value Date    GLUCOSE 101 (H) 06/05/2020    BUN 23 06/05/2020    CREATININE 1.22 06/05/2020    EGFRIFNONA 55 (L) 06/05/2020    EGFRIFAFRI 67 06/05/2020    BCR 18.9 06/05/2020     06/05/2020    K 4.0 06/05/2020    CL 98 06/05/2020    MG 2.4 (H) 06/05/2020    CO2 30.4 (H) 06/05/2020    CALCIUM 9.3 06/05/2020    ALBUMIN 3.70 02/05/2020    AST 17 02/05/2020    ALT 8 02/05/2020       Lab Results   Component Value Date    WBC 5.02 02/05/2020    HGB 13.0 02/05/2020    HCT 38.1 02/05/2020    MCV 93.2 02/05/2020     02/05/2020       Lab Results   Component Value Date    HGBA1C 6.20 (H) 02/05/2020       Lab Results   Component Value Date    TSH 1.180 02/05/2020       Lab Results   Component Value Date    CHOL 197 02/05/2020    CHOL 122 09/03/2019    CHOL 171 08/27/2019     Lab Results   Component Value Date    TRIG 93 02/05/2020    TRIG 71 09/03/2019    TRIG 116 08/27/2019     Lab Results   Component Value Date    HDL 78 (H) 02/05/2020    HDL 49 09/03/2019    HDL 70 (H) 08/27/2019     Lab Results   Component Value Date     02/05/2020    LDL 59 09/03/2019    LDL 78 08/27/2019             Assessment:       Overall continued acceptable course with no new interim cardiopulmonary complaints with acceptable functional status. We will defer additional diagnostic or therapeutic intervention from a cardiac perspective at this time. He will have laboratory studies in " the next few weeks with Dr. Russ, weather permitting.     Diagnosis Plan   1. Chronic diastolic CHF (congestive heart failure) (CMS/HCC)  No recurrent angina pectoris or CHF on current activity.   2. Essential hypertension  Well controlled. Continue current treatment.   3. Dyslipidemia  Acceptable control. Continue pravastatin.          Plan:         1. Patient to continue current medications and close follow up with the above providers.  2. Tentative cardiology follow up in August 2021 or patient may return sooner PRN.    This patient has consented to a telehealth visit via telephone. The visit was scheduled as a telephone visit to comply with patient safety concerns in accordance with CDC recommendations.  All vitals recorded within this visit are reported by the patient.  I spent  25 minutes in total including but not limited to the 10 minutes spent in direct conversation with this patient.     I, Drew Eaton, attest that this documentation has been prepared under the direction and in the presence of Jerod Ledbetter MD 02/17/2021    I, Jerod Ledbetter MD, [facility], personally performed the services described in this documentation as scribed by the above named individual in my presence, and it is both accurate and complete. At 13:11 EST on 02/17/2021

## 2021-02-25 ENCOUNTER — LAB (OUTPATIENT)
Dept: LAB | Facility: HOSPITAL | Age: 86
End: 2021-02-25

## 2021-02-25 DIAGNOSIS — E55.9 VITAMIN D DEFICIENCY: ICD-10-CM

## 2021-02-25 DIAGNOSIS — E78.5 HYPERLIPIDEMIA LDL GOAL <100: ICD-10-CM

## 2021-02-25 DIAGNOSIS — R60.0 BILATERAL LOWER EXTREMITY EDEMA: ICD-10-CM

## 2021-02-25 DIAGNOSIS — D50.0 IRON DEFICIENCY ANEMIA DUE TO CHRONIC BLOOD LOSS: ICD-10-CM

## 2021-02-25 DIAGNOSIS — R73.01 IMPAIRED FASTING GLUCOSE: ICD-10-CM

## 2021-02-25 DIAGNOSIS — R41.3 MEMORY LOSS: ICD-10-CM

## 2021-02-25 LAB
25(OH)D3 SERPL-MCNC: 48.1 NG/ML
ALBUMIN SERPL-MCNC: 3.6 G/DL (ref 3.5–5.2)
ALBUMIN/GLOB SERPL: 1.2 G/DL
ALP SERPL-CCNC: 79 U/L (ref 39–117)
ALT SERPL W P-5'-P-CCNC: 11 U/L (ref 1–41)
ANION GAP SERPL CALCULATED.3IONS-SCNC: 11.3 MMOL/L (ref 5–15)
AST SERPL-CCNC: 20 U/L (ref 1–40)
BASOPHILS # BLD AUTO: 0.04 10*3/MM3 (ref 0–0.2)
BASOPHILS NFR BLD AUTO: 0.9 % (ref 0–1.5)
BILIRUB SERPL-MCNC: 0.8 MG/DL (ref 0–1.2)
BUN SERPL-MCNC: 24 MG/DL (ref 8–23)
BUN/CREAT SERPL: 21.2 (ref 7–25)
CALCIUM SPEC-SCNC: 8.9 MG/DL (ref 8.2–9.6)
CHLORIDE SERPL-SCNC: 105 MMOL/L (ref 98–107)
CHOLEST SERPL-MCNC: 170 MG/DL (ref 0–200)
CO2 SERPL-SCNC: 27.7 MMOL/L (ref 22–29)
CREAT SERPL-MCNC: 1.13 MG/DL (ref 0.76–1.27)
DEPRECATED RDW RBC AUTO: 41.7 FL (ref 37–54)
EOSINOPHIL # BLD AUTO: 0.12 10*3/MM3 (ref 0–0.4)
EOSINOPHIL NFR BLD AUTO: 2.7 % (ref 0.3–6.2)
ERYTHROCYTE [DISTWIDTH] IN BLOOD BY AUTOMATED COUNT: 11.5 % (ref 12.3–15.4)
FERRITIN SERPL-MCNC: 79.4 NG/ML (ref 30–400)
GFR SERPL CREATININE-BSD FRML MDRD: 60 ML/MIN/1.73
GFR SERPL CREATININE-BSD FRML MDRD: 73 ML/MIN/1.73
GLOBULIN UR ELPH-MCNC: 3 GM/DL
GLUCOSE SERPL-MCNC: 104 MG/DL (ref 65–99)
HBA1C MFR BLD: 6.14 % (ref 4.8–5.6)
HCT VFR BLD AUTO: 43.9 % (ref 37.5–51)
HDLC SERPL-MCNC: 69 MG/DL (ref 40–60)
HGB BLD-MCNC: 14.5 G/DL (ref 13–17.7)
IMM GRANULOCYTES # BLD AUTO: 0.01 10*3/MM3 (ref 0–0.05)
IMM GRANULOCYTES NFR BLD AUTO: 0.2 % (ref 0–0.5)
IRON 24H UR-MRATE: 72 MCG/DL (ref 59–158)
IRON SATN MFR SERPL: 18 % (ref 20–50)
LDLC SERPL CALC-MCNC: 87 MG/DL (ref 0–100)
LDLC/HDLC SERPL: 1.25 {RATIO}
LYMPHOCYTES # BLD AUTO: 0.85 10*3/MM3 (ref 0.7–3.1)
LYMPHOCYTES NFR BLD AUTO: 19.2 % (ref 19.6–45.3)
MCH RBC QN AUTO: 32.4 PG (ref 26.6–33)
MCHC RBC AUTO-ENTMCNC: 33 G/DL (ref 31.5–35.7)
MCV RBC AUTO: 98 FL (ref 79–97)
MONOCYTES # BLD AUTO: 0.76 10*3/MM3 (ref 0.1–0.9)
MONOCYTES NFR BLD AUTO: 17.2 % (ref 5–12)
NEUTROPHILS NFR BLD AUTO: 2.65 10*3/MM3 (ref 1.7–7)
NEUTROPHILS NFR BLD AUTO: 59.8 % (ref 42.7–76)
NRBC BLD AUTO-RTO: 0 /100 WBC (ref 0–0.2)
PLATELET # BLD AUTO: 235 10*3/MM3 (ref 140–450)
PMV BLD AUTO: 11.2 FL (ref 6–12)
POTASSIUM SERPL-SCNC: 4.3 MMOL/L (ref 3.5–5.2)
PROT SERPL-MCNC: 6.6 G/DL (ref 6–8.5)
RBC # BLD AUTO: 4.48 10*6/MM3 (ref 4.14–5.8)
SODIUM SERPL-SCNC: 144 MMOL/L (ref 136–145)
TIBC SERPL-MCNC: 390 MCG/DL (ref 298–536)
TRANSFERRIN SERPL-MCNC: 262 MG/DL (ref 200–360)
TRIGL SERPL-MCNC: 75 MG/DL (ref 0–150)
TSH SERPL DL<=0.05 MIU/L-ACNC: 1.11 UIU/ML (ref 0.27–4.2)
VIT B12 BLD-MCNC: 602 PG/ML (ref 211–946)
VLDLC SERPL-MCNC: 14 MG/DL (ref 5–40)
WBC # BLD AUTO: 4.43 10*3/MM3 (ref 3.4–10.8)

## 2021-02-25 PROCEDURE — 82306 VITAMIN D 25 HYDROXY: CPT

## 2021-02-25 PROCEDURE — 83540 ASSAY OF IRON: CPT

## 2021-02-25 PROCEDURE — 84443 ASSAY THYROID STIM HORMONE: CPT

## 2021-02-25 PROCEDURE — 80061 LIPID PANEL: CPT

## 2021-02-25 PROCEDURE — 85025 COMPLETE CBC W/AUTO DIFF WBC: CPT

## 2021-02-25 PROCEDURE — 83036 HEMOGLOBIN GLYCOSYLATED A1C: CPT

## 2021-02-25 PROCEDURE — 84466 ASSAY OF TRANSFERRIN: CPT

## 2021-02-25 PROCEDURE — 80053 COMPREHEN METABOLIC PANEL: CPT

## 2021-02-25 PROCEDURE — 82728 ASSAY OF FERRITIN: CPT

## 2021-02-25 PROCEDURE — 82607 VITAMIN B-12: CPT

## 2021-03-22 ENCOUNTER — TELEPHONE (OUTPATIENT)
Dept: INTERNAL MEDICINE | Facility: CLINIC | Age: 86
End: 2021-03-22

## 2021-03-22 DIAGNOSIS — R26.9 GAIT ABNORMALITY: Primary | ICD-10-CM

## 2021-03-22 NOTE — TELEPHONE ENCOUNTER
PATIENTS DAUGHTER WOULD LIKE TO REQUEST ORDERS FOR PHYSICAL THARAPY. THE PATIENTS DAUGHTER WOULD LIKE TO GO BACK TO THE PHYSICAL THERAPY OFFICE THOUGH [facility] IN [place]    CALL [phone]

## 2021-04-13 ENCOUNTER — TREATMENT (OUTPATIENT)
Dept: PHYSICAL THERAPY | Facility: CLINIC | Age: 86
End: 2021-04-13

## 2021-04-13 DIAGNOSIS — Z74.09 IMPAIRED FUNCTIONAL MOBILITY, BALANCE, GAIT, AND ENDURANCE: ICD-10-CM

## 2021-04-13 DIAGNOSIS — R26.9 GAIT ABNORMALITY: Primary | ICD-10-CM

## 2021-04-13 PROCEDURE — 97110 THERAPEUTIC EXERCISES: CPT | Performed by: PHYSICAL THERAPIST

## 2021-04-13 PROCEDURE — 97162 PT EVAL MOD COMPLEX 30 MIN: CPT | Performed by: PHYSICAL THERAPIST

## 2021-04-13 NOTE — PROGRESS NOTES
Physical Therapy Initial Evaluation and Plan of Care      Patient: Cahitanya Browne   : 1923  Diagnosis/ICD-10 Code:  Gait abnormality [R26.9]  Referring practitioner: Dirk Russ MD  Date of Initial Visit: 2021  Today's Date: 2021  Patient seen for 1 sessions      Subjective:     Subjective Questionnaire: TUG 16.15 sec       Subjective Evaluation    History of Present Illness  Mechanism of injury: Patient relates his L shoulder still hurts. If he puts his L arm on his head the pain goes away. Daughter is concerned about global strength. He has not been doing shoulder exercises from previous months. He does have problems remembering to do the exercises as well as how to do them. He wishes for new exercises and to be reprinted.     Quality of life: good    Pain  Current pain ratin  At best pain ratin  At worst pain ratin  Location: L shoulder   Quality: dull ache    Patient Goals  Patient goals for therapy: increased strength             Objective          Active Range of Motion     Additional Active Range of Motion Details  Limited cervical AROM due to forward head and kyphotic upper t-spine posture     Strength/Myotome Testing     Left Shoulder     Planes of Motion   Flexion: 4-   Abduction: 4-     Right Shoulder     Planes of Motion   Flexion: 4+   Abduction: 4+     Left Elbow   Flexion: 4  Extension: 4    Right Elbow   Flexion: 4+  Extension: 4+    Left Hip   Planes of Motion   Flexion: 4+  Abduction: 4    Right Hip   Planes of Motion   Flexion: 4+  Abduction: 4    Left Knee   Flexion: 4+  Extension: 4+    Right Knee   Flexion: 4+  Extension: 4+    Left Ankle/Foot   Dorsiflexion: 5    Right Ankle/Foot   Dorsiflexion: 5    Tests     Additional Tests Details  Positive Bakody's sign     Ambulation     Observational Gait     Additional Observational Gait Details  Forward flexed posture with gait. Use of rollator         PT Neuro         Assessment & Plan      Assessment  Impairments: abnormal coordination, abnormal gait, activity intolerance, impaired balance, impaired physical strength and safety issue  Assessment details: Patient is a 89 YOM that presents with L shoulder pain. He demonstrates a positive Bakody's sign which is indicative of cervical radiculopathy, likely at C5-6. Patient will require global strengthening and postural re-education in order to improve pain.   Prognosis: fair  Functional Limitations: carrying objects, walking, pulling, pushing, standing, stooping, reaching behind back and reaching overhead  Goals  Plan Goals: STG (6 visits)  1. Patient will report compliance with initial HEP.   2. Patient to perform TUG within 15 sec without LOB for improved functional mobility.  3. Patient will demonstrate improved posture thereby limited pain down L arm to <2/10 sitting.     LTG (8 visits)  1. Patient will be I with final HEP.   2. Patient to improve DIXON balance score to >/= 40 /56 to decrease client's risk of falls.  3. Patient to perform TUG within 14 sec without LOB for improved functional mobility.  4. Patient will relate pain is no greater than 3/10 at worst.         Plan  Therapy options: will be seen for skilled physical therapy services  Planned modality interventions: TENS  Planned therapy interventions: ADL retraining, balance/weight-bearing training, flexibility, gait training, manual therapy, neuromuscular re-education, motor coordination training, postural training, strengthening, stretching, therapeutic activities, transfer training and home exercise program  Frequency: 1x week  Duration in visits: 8  Treatment plan discussed with: patient and caregiver  Plan details: Patient will be seen 1x/wk x 8wks with treatment to include strengthening, stretching, manual therapy, neuromuscular re-education, balance, gait and endurance training.           Timed:  Manual Therapy:    0     mins  51831;  Therapeutic Exercise:    15     mins  10853;      Neuromuscular Tahmina:    0    mins  96226;    Therapeutic Activity:     0     mins  07242;     Gait Trainin     mins  95504;     Electrical Stimulation:    0     mins  53032 ( );    Untimed:  Canalith Repositioning    0     mins 51302    Timed Treatment:   15   mins   Total Treatment:     45   mins    PT SIGNATURE: Vianca Durant, PT, DPT, MSCS, CDP  [place] License #[id number]  DATE TREATMENT INITIATED: 2021    Medicare Initial Certification Certification Period: 2021  I certify that the therapy services are furnished while this patient is under my care.  The services outlined above are required by this patient, and will be reviewed every 90 days.     PHYSICIAN: Dirk Russ MD      DATE:     Please sign and return via fax to [phone].   Thank you,   [facility].

## 2021-04-20 ENCOUNTER — TREATMENT (OUTPATIENT)
Dept: PHYSICAL THERAPY | Facility: CLINIC | Age: 86
End: 2021-04-20

## 2021-04-20 DIAGNOSIS — Z74.09 IMPAIRED FUNCTIONAL MOBILITY, BALANCE, GAIT, AND ENDURANCE: Primary | ICD-10-CM

## 2021-04-20 DIAGNOSIS — R26.9 GAIT ABNORMALITY: ICD-10-CM

## 2021-04-20 PROCEDURE — 97110 THERAPEUTIC EXERCISES: CPT | Performed by: PHYSICAL THERAPIST

## 2021-04-20 NOTE — PROGRESS NOTES
Physical Therapy Daily Progress Note  Visit: 2  Date of Initial Visit: Type: THERAPY  Noted: 2021    Patient: Chaitanya Browne   : 1923  Diagnosis/ICD-10 Code:  Impaired functional mobility, balance, gait, and endurance [Z74.09]  Referring practitioner: Dirk Russ MD  Date of Initial Visit: Type: THERAPY  Noted: 2021  Today's Date: 2021  Patient seen for 2 sessions      Subjective:   Patient reports: his neck and shoulder are no longer hurting.   Pain: 0/10  Clinical Progress: improved  Home Program Compliance: Yes  Treatment has included: therapeutic exercise    Objective   See Exercise, Manual, and Modality Logs for complete treatment.    PT Neuro          Assessment & Plan     Assessment  Assessment details: Patient demonstrates good posturing during exercises. He asked to have exercises printed for performance at home. No pain reported during session.     Plan  Plan details: Patient to continue with PT services to improve gait, balance, strength, transfers and overall functional mobility.          Timed:  Manual Therapy:            0     mins  05636;  Therapeutic Exercise:    40    mins  18866;     Neuromuscular Tahmina:    0    mins  37188;    Therapeutic Activity:      0     mins  81328;     Gait Trainin    mins  97080;     Electrical Stimulation:    0    mins  53419 ( );     Untimed:  Canalith Repositioning techniques _0_ 37264      Timed Treatment:   40   mins   Total Treatment:     40   mins      Vianca Durant PT, DPT, MSCS, CDP  [place] License #: [id number]  Physical Therapist

## 2021-04-28 ENCOUNTER — TREATMENT (OUTPATIENT)
Dept: PHYSICAL THERAPY | Facility: CLINIC | Age: 86
End: 2021-04-28

## 2021-04-28 DIAGNOSIS — R26.9 GAIT ABNORMALITY: ICD-10-CM

## 2021-04-28 DIAGNOSIS — Z74.09 IMPAIRED FUNCTIONAL MOBILITY, BALANCE, GAIT, AND ENDURANCE: Primary | ICD-10-CM

## 2021-04-28 PROCEDURE — 97112 NEUROMUSCULAR REEDUCATION: CPT | Performed by: PHYSICAL THERAPIST

## 2021-04-28 PROCEDURE — 97110 THERAPEUTIC EXERCISES: CPT | Performed by: PHYSICAL THERAPIST

## 2021-04-28 NOTE — PROGRESS NOTES
Physical Therapy Daily Progress Note  Visit: 3  Date of Initial Visit: Type: THERAPY  Noted: 2021    Patient: Chaitanya Browne   : 1923  Diagnosis/ICD-10 Code:  Impaired functional mobility, balance, gait, and endurance [Z74.09]  Referring practitioner: Dirk Russ MD  Date of Initial Visit: Type: THERAPY  Noted: 2021  Today's Date: 2021  Patient seen for 3 sessions      Subjective:   Patient reports: he continues to be pain free with neck and shoulder.   Pain: 0/10  Clinical Progress: improved  Home Program Compliance: Yes  Treatment has included: therapeutic exercise and neuromuscular re-education    Objective   See Exercise, Manual, and Modality Logs for complete treatment.    PT Neuro          Assessment/Plan    Timed:  Manual Therapy:            0     mins  48041;  Therapeutic Exercise:    30    mins  07923;     Neuromuscular Tahmina:    10    mins  38264;    Therapeutic Activity:      0     mins  07730;     Gait Trainin    mins  88713;     Electrical Stimulation:    0    mins  21455 ( );     Untimed:  Canalith Repositioning techniques _0_ 55775      Timed Treatment:   40   mins   Total Treatment:     40   mins      Vianca Durant, PT, DPT, MSCS, CDP  [place] License #: [id number]  Physical Therapist

## 2021-05-06 ENCOUNTER — TREATMENT (OUTPATIENT)
Dept: PHYSICAL THERAPY | Facility: CLINIC | Age: 86
End: 2021-05-06

## 2021-05-06 DIAGNOSIS — R26.9 GAIT ABNORMALITY: ICD-10-CM

## 2021-05-06 DIAGNOSIS — Z74.09 IMPAIRED FUNCTIONAL MOBILITY, BALANCE, GAIT, AND ENDURANCE: Primary | ICD-10-CM

## 2021-05-06 PROCEDURE — 97112 NEUROMUSCULAR REEDUCATION: CPT | Performed by: PHYSICAL THERAPIST

## 2021-05-06 PROCEDURE — 97110 THERAPEUTIC EXERCISES: CPT | Performed by: PHYSICAL THERAPIST

## 2021-05-06 NOTE — PROGRESS NOTES
"Physical Therapy Daily Progress Note  Visit: 4  Date of Initial Visit: Type: THERAPY  Noted: 2021    Patient: Chaitanya Browne   : 1923  Diagnosis/ICD-10 Code:  Impaired functional mobility, balance, gait, and endurance [Z74.09]  Referring practitioner: Dirk Russ MD  Date of Initial Visit: Type: THERAPY  Noted: 2021  Today's Date: 2021  Patient seen for 4 sessions      Subjective:   Patient reports: \"he is still vertical!\"   Pain: 0/10  Clinical Progress: improved  Home Program Compliance: Yes  Treatment has included: therapeutic exercise and neuromuscular re-education    Objective   See Exercise, Manual, and Modality Logs for complete treatment.    PT Neuro          Assessment & Plan     Assessment  Assessment details: Patient reports no pain with exercises in shoulder. He demonstrates proficient balance with CGA only. He does require a few rest breaks due to exercises requiring UE and LE work.     Plan  Plan details: Patient to continue with PT services to improve gait, balance, strength, transfers and overall functional mobility.          Timed:  Manual Therapy:            0     mins  73843;  Therapeutic Exercise:    30    mins  81178;     Neuromuscular Tahmina:    10    mins  87478;    Therapeutic Activity:      0     mins  41184;     Gait Trainin    mins  10612;     Electrical Stimulation:    0    mins  37535 ( );     Untimed:  Canalith Repositioning techniques _0_ 79954      Timed Treatment:   40   mins   Total Treatment:     40   mins      Vianca Durant, PT, DPT, MSCS, CDP  [place] License #: [id number]  Physical Therapist    "

## 2021-05-13 ENCOUNTER — TREATMENT (OUTPATIENT)
Dept: PHYSICAL THERAPY | Facility: CLINIC | Age: 86
End: 2021-05-13

## 2021-05-13 DIAGNOSIS — Z74.09 IMPAIRED FUNCTIONAL MOBILITY, BALANCE, GAIT, AND ENDURANCE: Primary | ICD-10-CM

## 2021-05-13 DIAGNOSIS — R26.9 GAIT ABNORMALITY: ICD-10-CM

## 2021-05-13 PROCEDURE — 97112 NEUROMUSCULAR REEDUCATION: CPT | Performed by: PHYSICAL THERAPIST

## 2021-05-13 PROCEDURE — 97110 THERAPEUTIC EXERCISES: CPT | Performed by: PHYSICAL THERAPIST

## 2021-05-13 NOTE — PROGRESS NOTES
Physical Therapy Daily Progress Note/Re-assessment   Visit: 5  Date of Initial Visit: Type: THERAPY  Noted: 2021    Patient: Chaitanya Browne   : 1923  Diagnosis/ICD-10 Code:  Impaired functional mobility, balance, gait, and endurance [Z74.09]  Referring practitioner: Dirk Russ MD  Date of Initial Visit: Type: THERAPY  Noted: 2021  Today's Date: 2021  Patient seen for 5 sessions      Subjective:   Patient reports: he would like to increase his weight at home for dumbells.   Pain: 0/10  Clinical Progress: improved  Home Program Compliance: Yes  Treatment has included: therapeutic exercise and neuromuscular re-education    Objective   See Exercise, Manual, and Modality Logs for complete treatment.    PT Neuro   Exercise 1  Exercise Name 1: Nu-Step   Equipment/Resistance 1: L5  Time: 10 min   Exercise 2  Exercise Name 2: hammer curls   Equipment/Resistance 2: 2#   Sets/Reps 2: 215  Exercise 3  Exercise Name 3: shoulder ER with supination   Equipment/Resistance 3: 2#   Sets/Reps 3: 215   Exercise 4  Exercise Name 4: airex mini squats   Sets/Reps 4: 2/10  Exercise 5  Exercise Name 5: airex cone taps   Sets/Reps 5: 2/10  Exercise 6  Exercise Name 6: air disc mini lunge   Sets/Reps 6: 10 total       Assessment & Plan     Assessment  Impairments: abnormal coordination, abnormal gait, activity intolerance, impaired balance, impaired physical strength and safety issue  Assessment details: Patient has made great postural improvements which has decreased arm and neck pain to 0/10 consistently for the last 2-3 weeks. Patient would like to continue with strengthening to avoid onset of pain, as well as decrease risk of falls.   Prognosis: fair  Functional Limitations: carrying objects, walking, pulling, pushing, standing, stooping, reaching behind back and reaching overhead  Goals  Plan Goals: STG (6 visits)  1. Patient will report compliance with initial HEP. MET  2. Patient to perform TUG within  15 sec without LOB for improved functional mobility. ONGOING  3. Patient will demonstrate improved posture thereby limited pain down L arm to <2/10 sitting. MET    LTG (8 visits)  1. Patient will be I with final HEP. ONGOING  2. Patient to improve DIXON balance score to >/= 40 /56 to decrease client's risk of falls.ONGOING  3. Patient to perform TUG within 14 sec without LOB for improved functional mobility.ONGOING  4. Patient will relate pain is no greater than 3/10 at worst.         Plan  Therapy options: will be seen for skilled physical therapy services  Planned modality interventions: TENS  Planned therapy interventions: ADL retraining, balance/weight-bearing training, flexibility, gait training, manual therapy, neuromuscular re-education, motor coordination training, postural training, strengthening, stretching, therapeutic activities, transfer training and home exercise program  Frequency: 1x week  Duration in visits: 4  Treatment plan discussed with: patient  Plan details: Patient will continue to be seen 1x/wk with treatment to include strengthening, stretching, manual therapy, neuromuscular re-education, balance, gait and endurance training.           Timed:  Manual Therapy:            0     mins  23770;  Therapeutic Exercise:    30    mins  14447;     Neuromuscular Tahmina:    10    mins  39528;    Therapeutic Activity:      0     mins  06104;     Gait Trainin    mins  89537;     Electrical Stimulation:    0    mins  37382 ( );     Untimed:  Canalith Repositioning techniques _0_ 22749      Timed Treatment:   40   mins   Total Treatment:     40   mins      Vianca Durant PT, DPT, MSCS, CDP  [place] License #: [id number]  Physical Therapist

## 2021-05-20 ENCOUNTER — TREATMENT (OUTPATIENT)
Dept: PHYSICAL THERAPY | Facility: CLINIC | Age: 86
End: 2021-05-20

## 2021-05-20 DIAGNOSIS — Z74.09 IMPAIRED FUNCTIONAL MOBILITY, BALANCE, GAIT, AND ENDURANCE: Primary | ICD-10-CM

## 2021-05-20 DIAGNOSIS — R26.9 GAIT ABNORMALITY: ICD-10-CM

## 2021-05-20 PROCEDURE — 97112 NEUROMUSCULAR REEDUCATION: CPT | Performed by: PHYSICAL THERAPIST

## 2021-05-20 PROCEDURE — 97110 THERAPEUTIC EXERCISES: CPT | Performed by: PHYSICAL THERAPIST

## 2021-05-20 NOTE — PROGRESS NOTES
Physical Therapy Daily Progress Note  Visit: 6  Date of Initial Visit: Type: THERAPY  Noted: 2021    Patient: Chaitanya Browne   : 1923  Diagnosis/ICD-10 Code:  Impaired functional mobility, balance, gait, and endurance [Z74.09]  Referring practitioner: Dirk Russ MD  Date of Initial Visit: Type: THERAPY  Noted: 2021  Today's Date: 2021  Patient seen for 6 sessions      Subjective:   Patient reports: he is good.   Pain: 0/10  Clinical Progress: improved  Home Program Compliance: Yes  Treatment has included: therapeutic exercise and neuromuscular re-education    Objective   See Exercise, Manual, and Modality Logs for complete treatment.    PT Neuro          Assessment & Plan     Assessment  Assessment details: Patient performed all exercises well without reports of pain or discomfort. Patient did require cueing for posture and reminding to not use UE's for all reps. Patient will continue to be progressed as indicated.     Plan  Plan details: Patient to continue with PT services to improve gait, balance, strength, transfers and overall functional mobility.          Timed:  Manual Therapy:            0     mins  64070;  Therapeutic Exercise:    25    mins  95695;     Neuromuscular Tahmina:    15    mins  28778;    Therapeutic Activity:      0     mins  73892;     Gait Trainin    mins  72582;     Electrical Stimulation:    0    mins  72046 ( );     Untimed:  Canalith Repositioning techniques _0_ 64403      Timed Treatment:   40   mins   Total Treatment:     40   mins      Vianca Durant, PT, DPT, MSCS, CDP  [place] License #: [id number]  Physical Therapist

## 2021-05-25 ENCOUNTER — TELEPHONE (OUTPATIENT)
Dept: INTERNAL MEDICINE | Facility: CLINIC | Age: 86
End: 2021-05-25

## 2021-05-25 ENCOUNTER — OFFICE VISIT (OUTPATIENT)
Dept: INTERNAL MEDICINE | Facility: CLINIC | Age: 86
End: 2021-05-25

## 2021-05-25 VITALS
BODY MASS INDEX: 28.61 KG/M2 | DIASTOLIC BLOOD PRESSURE: 74 MMHG | WEIGHT: 178 LBS | SYSTOLIC BLOOD PRESSURE: 116 MMHG | TEMPERATURE: 98.2 F | HEIGHT: 66 IN | HEART RATE: 79 BPM

## 2021-05-25 DIAGNOSIS — E66.3 OVERWEIGHT (BMI 25.0-29.9): ICD-10-CM

## 2021-05-25 DIAGNOSIS — I10 ESSENTIAL HYPERTENSION: Primary | ICD-10-CM

## 2021-05-25 DIAGNOSIS — R26.9 GAIT ABNORMALITY: ICD-10-CM

## 2021-05-25 DIAGNOSIS — I25.10 ASHD (ARTERIOSCLEROTIC HEART DISEASE): ICD-10-CM

## 2021-05-25 PROCEDURE — 99214 OFFICE O/P EST MOD 30 MIN: CPT | Performed by: INTERNAL MEDICINE

## 2021-05-25 RX ORDER — AZELASTINE 1 MG/ML
2 SPRAY, METERED NASAL 2 TIMES DAILY
Qty: 3 EACH | Refills: 3 | Status: SHIPPED | OUTPATIENT
Start: 2021-05-25 | End: 2022-08-31

## 2021-05-25 NOTE — PROGRESS NOTES
Chief Complaint   Patient presents with   • Treatment     last visit is Thursday, wants to continue       History of Present Illness  89 y.o. white male presents for follow up and doing well with breathing and pain     Review of Systems   Constitutional: Negative for chills and fever.   Respiratory: Negative for cough and shortness of breath.    Cardiovascular: Negative for chest pain and palpitations.   Gastrointestinal: Negative for abdominal pain, nausea and vomiting.   Musculoskeletal: Positive for gait problem.   Skin: Negative for rash.   Neurological: Negative for dizziness, light-headedness and headaches.   Hematological: Negative for adenopathy.   Psychiatric/Behavioral: Negative for decreased concentration. The patient is not nervous/anxious.    All other systems reviewed and are negative.        PMSFH:  The following portions of the patient's history were reviewed and updated as appropriate: allergies, current medications, past family history, past medical history, past social history, past surgical history and problem list.      Current Outpatient Medications:   •  aspirin 81 MG EC tablet, Take 81 mg by mouth 1 (One) Time Per Week., Disp: , Rfl:   •  azelastine (ASTELIN) 0.1 % nasal spray, 2 sprays into the nostril(s) as directed by provider 2 (Two) Times a Day. Use in each nostril as directed, Disp: 3 each, Rfl: 3  •  bumetanide (BUMEX) 1 MG tablet, Take 1 tablet by mouth Daily. Take 1 additional tablet every other evening., Disp: 135 tablet, Rfl: 1  •  Cholecalciferol (VITAMIN D3) 25 MCG (1000 UT) capsule, Take 1,000 Units by mouth Every Other Day., Disp: , Rfl:   •  finasteride (PROSCAR) 5 MG tablet, Take 1 tablet by mouth Daily., Disp: , Rfl:   •  ketoconazole (NIZORAL) 2 % cream, Apply 1 application topically to the appropriate area as directed Daily As Needed., Disp: , Rfl:   •  ketoconazole (NIZORAL) 2 % shampoo, , Disp: , Rfl:   •  metroNIDAZOLE (METROGEL) 0.75 % gel, , Disp: , Rfl:   •   "pravastatin (PRAVACHOL) 40 MG tablet, Take 1 tablet by mouth Daily. 3x a week, Disp: 90 tablet, Rfl: 3    VITALS:  /74   Pulse 79   Temp 98.2 °F (36.8 °C)   Ht 167.6 cm (65.98\")   Wt 80.7 kg (178 lb)   BMI 28.74 kg/m²     Physical Exam  Vitals and nursing note reviewed.   Constitutional:       Appearance: Normal appearance. He is well-developed.   HENT:      Head: Normocephalic.   Eyes:      Extraocular Movements: Extraocular movements intact.      Conjunctiva/sclera: Conjunctivae normal.   Cardiovascular:      Rate and Rhythm: Normal rate and regular rhythm.      Heart sounds: Murmur (II/VI SM ) heard.     Pulmonary:      Effort: Pulmonary effort is normal. No respiratory distress.      Breath sounds: Normal breath sounds.   Abdominal:      General: Bowel sounds are normal.      Palpations: Abdomen is soft.      Tenderness: There is no abdominal tenderness.   Musculoskeletal:         General: No swelling.   Skin:     General: Skin is warm and dry.   Neurological:      General: No focal deficit present.      Mental Status: He is alert and oriented to person, place, and time.   Psychiatric:         Mood and Affect: Mood normal.         Behavior: Behavior normal.         LABS:  Results for orders placed or performed in visit on 02/25/21   Vitamin B12    Specimen: Blood   Result Value Ref Range    Vitamin B-12 602 211 - 946 pg/mL   Vitamin D 25 Hydroxy    Specimen: Blood   Result Value Ref Range    25 Hydroxy, Vitamin D 48.1 ng/ml   Comprehensive Metabolic Panel    Specimen: Blood   Result Value Ref Range    Glucose 104 (H) 65 - 99 mg/dL    BUN 24 (H) 8 - 23 mg/dL    Creatinine 1.13 0.76 - 1.27 mg/dL    Sodium 144 136 - 145 mmol/L    Potassium 4.3 3.5 - 5.2 mmol/L    Chloride 105 98 - 107 mmol/L    CO2 27.7 22.0 - 29.0 mmol/L    Calcium 8.9 8.2 - 9.6 mg/dL    Total Protein 6.6 6.0 - 8.5 g/dL    Albumin 3.60 3.50 - 5.20 g/dL    ALT (SGPT) 11 1 - 41 U/L    AST (SGOT) 20 1 - 40 U/L    Alkaline Phosphatase 79 39 " - 117 U/L    Total Bilirubin 0.8 0.0 - 1.2 mg/dL    eGFR Non African Amer 60 (L) >60 mL/min/1.73    eGFR  African Amer 73 >60 mL/min/1.73    Globulin 3.0 gm/dL    A/G Ratio 1.2 g/dL    BUN/Creatinine Ratio 21.2 7.0 - 25.0    Anion Gap 11.3 5.0 - 15.0 mmol/L   Hemoglobin A1c    Specimen: Blood   Result Value Ref Range    Hemoglobin A1C 6.14 (H) 4.80 - 5.60 %   Lipid Panel    Specimen: Blood   Result Value Ref Range    Total Cholesterol 170 0 - 200 mg/dL    Triglycerides 75 0 - 150 mg/dL    HDL Cholesterol 69 (H) 40 - 60 mg/dL    LDL Cholesterol  87 0 - 100 mg/dL    VLDL Cholesterol 14 5 - 40 mg/dL    LDL/HDL Ratio 1.25    TSH Rfx On Abnormal To Free T4    Specimen: Blood   Result Value Ref Range    TSH 1.110 0.270 - 4.200 uIU/mL   Iron Profile    Specimen: Blood   Result Value Ref Range    Iron 72 59 - 158 mcg/dL    Iron Saturation 18 (L) 20 - 50 %    Transferrin 262 200 - 360 mg/dL    TIBC 390 298 - 536 mcg/dL   Ferritin    Specimen: Blood   Result Value Ref Range    Ferritin 79.40 30.00 - 400.00 ng/mL   CBC Auto Differential    Specimen: Blood   Result Value Ref Range    WBC 4.43 3.40 - 10.80 10*3/mm3    RBC 4.48 4.14 - 5.80 10*6/mm3    Hemoglobin 14.5 13.0 - 17.7 g/dL    Hematocrit 43.9 37.5 - 51.0 %    MCV 98.0 (H) 79.0 - 97.0 fL    MCH 32.4 26.6 - 33.0 pg    MCHC 33.0 31.5 - 35.7 g/dL    RDW 11.5 (L) 12.3 - 15.4 %    RDW-SD 41.7 37.0 - 54.0 fl    MPV 11.2 6.0 - 12.0 fL    Platelets 235 140 - 450 10*3/mm3    Neutrophil % 59.8 42.7 - 76.0 %    Lymphocyte % 19.2 (L) 19.6 - 45.3 %    Monocyte % 17.2 (H) 5.0 - 12.0 %    Eosinophil % 2.7 0.3 - 6.2 %    Basophil % 0.9 0.0 - 1.5 %    Immature Grans % 0.2 0.0 - 0.5 %    Neutrophils, Absolute 2.65 1.70 - 7.00 10*3/mm3    Lymphocytes, Absolute 0.85 0.70 - 3.10 10*3/mm3    Monocytes, Absolute 0.76 0.10 - 0.90 10*3/mm3    Eosinophils, Absolute 0.12 0.00 - 0.40 10*3/mm3    Basophils, Absolute 0.04 0.00 - 0.20 10*3/mm3    Immature Grans, Absolute 0.01 0.00 - 0.05 10*3/mm3     nRBC 0.0 0.0 - 0.2 /100 WBC       Procedures         ASSESSMENT/PLAN:  Problems Addressed this Visit        Cardiac and Vasculature    ASHD (arteriosclerotic heart disease)     Coronary artery disease is improving with lifestyle modifications.  Continue current treatment regimen.  Weight loss.  Regular aerobic exercise.  Cardiac status will be reassessed 4 months.         Essential hypertension - Primary     Hypertension is unchanged.  Continue current treatment regimen.  Dietary sodium restriction.  Weight loss.  Regular aerobic exercise.  Blood pressure will be reassessed at the next regular appointment.            Endocrine and Metabolic    Overweight (BMI 25.0-29.9)     Patient's (Body mass index is 28.74 kg/m².) indicates that they are overweight (BMI 25-29.9) with obesity-related health conditions that include hypertension, coronary heart disease and dyslipidemias . Obesity is improving with lifestyle modifications. BMI is is above average; BMI management plan is completed. We discussed portion control and increasing exercise.             Symptoms and Signs    Gait abnormality     Encourage to get up slowly and get bearings before taking first step. Keep home well lit with clear floors to avoid tripping. Use assist devices for all walking especially from bed to bathroom.            Diagnoses       Codes Comments    Essential hypertension    -  Primary ICD-10-CM: I10  ICD-9-CM: 401.9     Gait abnormality     ICD-10-CM: R26.9  ICD-9-CM: 781.2     Overweight (BMI 25.0-29.9)     ICD-10-CM: E66.3  ICD-9-CM: 278.02     ASHD (arteriosclerotic heart disease)     ICD-10-CM: I25.10  ICD-9-CM: 414.00           FOLLOW-UP:  Return in about 4 months (around 9/25/2021) for Recheck.      Electronically signed by:    Dirk Russ MD

## 2021-05-25 NOTE — ASSESSMENT & PLAN NOTE
Patient's (Body mass index is 28.74 kg/m².) indicates that they are overweight (BMI 25-29.9) with obesity-related health conditions that include hypertension, coronary heart disease and dyslipidemias . Obesity is improving with lifestyle modifications. BMI is is above average; BMI management plan is completed. We discussed portion control and increasing exercise.

## 2021-05-25 NOTE — ASSESSMENT & PLAN NOTE
Coronary artery disease is improving with lifestyle modifications.  Continue current treatment regimen.  Weight loss.  Regular aerobic exercise.  Cardiac status will be reassessed 4 months.

## 2021-05-27 ENCOUNTER — TREATMENT (OUTPATIENT)
Dept: PHYSICAL THERAPY | Facility: CLINIC | Age: 86
End: 2021-05-27

## 2021-05-27 DIAGNOSIS — R26.9 GAIT ABNORMALITY: ICD-10-CM

## 2021-05-27 DIAGNOSIS — Z74.09 IMPAIRED FUNCTIONAL MOBILITY, BALANCE, GAIT, AND ENDURANCE: Primary | ICD-10-CM

## 2021-05-27 PROCEDURE — 97112 NEUROMUSCULAR REEDUCATION: CPT | Performed by: PHYSICAL THERAPIST

## 2021-05-27 PROCEDURE — 97110 THERAPEUTIC EXERCISES: CPT | Performed by: PHYSICAL THERAPIST

## 2021-05-27 PROCEDURE — 97530 THERAPEUTIC ACTIVITIES: CPT | Performed by: PHYSICAL THERAPIST

## 2021-05-27 NOTE — PROGRESS NOTES
Physical Therapy Daily Progress Note  Visit: 7  Date of Initial Visit: Type: THERAPY  Noted: 2021    Patient: Chaitanya Browne   : 1923  Diagnosis/ICD-10 Code:  Impaired functional mobility, balance, gait, and endurance [Z74.09]  Referring practitioner: Dirk Russ MD  Date of Initial Visit: Type: THERAPY  Noted: 2021  Today's Date: 2021  Patient seen for 7 sessions      Subjective:   Patient reports: he is well.   Pain: 0/10  Clinical Progress: improved  Home Program Compliance: Yes  Treatment has included: therapeutic exercise and neuromuscular re-educationand therapeutic activity     Objective   See Exercise, Manual, and Modality Logs for complete treatment.    PT Neuro          Assessment & Plan     Assessment  Assessment details: Patient demonstrates improved sit to stand without the use of UE's. He required verbal cueing for correct posture of LE's. He will continue to be progressed with LE strength and global balance.     Plan  Plan details: Patient to continue with PT services to improve gait, balance, strength, transfers and overall functional mobility.          Timed:  Manual Therapy:            0     mins  06531;  Therapeutic Exercise:    10    mins  80619;     Neuromuscular Tahmina:    18    mins  29569;    Therapeutic Activity:      10     mins  55641;     Gait Trainin    mins  46734;     Electrical Stimulation:    0    mins  91279 ( );     Untimed:  Canalith Repositioning techniques _0_ [id number]      Timed Treatment:   38   mins   Total Treatment:     38   mins      Vianca Durant, PT, DPT, MSCS, CDP  [place] License #: [id number]  Physical Therapist

## 2021-06-03 ENCOUNTER — TREATMENT (OUTPATIENT)
Dept: PHYSICAL THERAPY | Facility: CLINIC | Age: 86
End: 2021-06-03

## 2021-06-03 DIAGNOSIS — R26.9 GAIT ABNORMALITY: ICD-10-CM

## 2021-06-03 DIAGNOSIS — Z74.09 IMPAIRED FUNCTIONAL MOBILITY, BALANCE, GAIT, AND ENDURANCE: Primary | ICD-10-CM

## 2021-06-03 PROCEDURE — 97110 THERAPEUTIC EXERCISES: CPT | Performed by: PHYSICAL THERAPIST

## 2021-06-03 NOTE — PROGRESS NOTES
Physical Therapy Daily Progress Note  Visit: 8  Date of Initial Visit: Type: THERAPY  Noted: 2021    Patient: Chaitanya Browne   : 1923  Diagnosis/ICD-10 Code:  Impaired functional mobility, balance, gait, and endurance [Z74.09]  Referring practitioner: Dirk Russ MD  Date of Initial Visit: Type: THERAPY  Noted: 2021  Today's Date: 6/3/2021  Patient seen for 8 sessions      Subjective:   Patient reports: he is still in the vertical.   Pain: 0/10  Clinical Progress: improved  Home Program Compliance: Yes  Treatment has included: therapeutic exercise    Objective   See Exercise, Manual, and Modality Logs for complete treatment.    PT Neuro          Assessment & Plan     Assessment  Assessment details: Patient's HEP was reviewed due to several questions. Exercises were tweaked for correctness and an extra was added to replace a sidelying exercise he did not like. All exercises to be performed in standing or sitting.     Plan  Plan details: Patient to continue with PT services to improve gait, balance, strength, transfers and overall functional mobility.          Timed:  Manual Therapy:            0     mins  12996;  Therapeutic Exercise:    38    mins  52161;     Neuromuscular Tahmina:    0    mins  07843;    Therapeutic Activity:      0     mins  67894;     Gait Trainin    mins  33215;     Electrical Stimulation:    0    mins  41988 ( );     Untimed:  Canalith Repositioning techniques _0_ [id number]      Timed Treatment:   38   mins   Total Treatment:     38   mins      Vianca Durant, PT, DPT, MSCS, CDP  [place] License #: [id number]  Physical Therapist

## 2021-06-10 ENCOUNTER — TREATMENT (OUTPATIENT)
Dept: PHYSICAL THERAPY | Facility: CLINIC | Age: 86
End: 2021-06-10

## 2021-06-10 DIAGNOSIS — R26.9 GAIT ABNORMALITY: ICD-10-CM

## 2021-06-10 DIAGNOSIS — Z74.09 IMPAIRED FUNCTIONAL MOBILITY, BALANCE, GAIT, AND ENDURANCE: Primary | ICD-10-CM

## 2021-06-10 PROCEDURE — 97112 NEUROMUSCULAR REEDUCATION: CPT | Performed by: PHYSICAL THERAPIST

## 2021-06-10 PROCEDURE — 97110 THERAPEUTIC EXERCISES: CPT | Performed by: PHYSICAL THERAPIST

## 2021-06-10 NOTE — PROGRESS NOTES
Physical Therapy Daily Progress Note  Visit: 9  Date of Initial Visit: Type: THERAPY  Noted: 2021    Patient: Chaitanya Browne   : 1923  Diagnosis/ICD-10 Code:  Impaired functional mobility, balance, gait, and endurance [Z74.09]  Referring practitioner: Dirk Russ MD  Date of Initial Visit: Type: THERAPY  Noted: 2021  Today's Date: 6/10/2021  Patient seen for 9 sessions      Subjective:   Patient reports: he is vertical.   Pain: 0/10  Clinical Progress: improved  Home Program Compliance: Yes  Treatment has included: therapeutic exercise and neuromuscular re-education    Objective   See Exercise, Manual, and Modality Logs for complete treatment.    PT Neuro          Assessment & Plan     Assessment  Assessment details: Patient demonstrates challenged balance on unstable surface with UE activities. He only required CGA. Patient will continue to be progressed as indicated.     Plan  Plan details: Patient to continue with PT services to improve gait, balance, strength, transfers and overall functional mobility.          Timed:  Manual Therapy:            0     mins  92250;  Therapeutic Exercise:    30    mins  86255;     Neuromuscular Tahmina:    10    mins  04364;    Therapeutic Activity:      0     mins  25414;     Gait Trainin    mins  16870;     Electrical Stimulation:    0    mins  64100 ( );     Untimed:  Canalith Repositioning techniques _0_ 65158      Timed Treatment:   40   mins   Total Treatment:     40   mins      Vianca Durant PT, DPT, MSCS, CDP  [place] License #: [id number]  Physical Therapist

## 2021-06-17 ENCOUNTER — TREATMENT (OUTPATIENT)
Dept: PHYSICAL THERAPY | Facility: CLINIC | Age: 86
End: 2021-06-17

## 2021-06-17 DIAGNOSIS — Z74.09 IMPAIRED FUNCTIONAL MOBILITY, BALANCE, GAIT, AND ENDURANCE: Primary | ICD-10-CM

## 2021-06-17 DIAGNOSIS — R26.9 GAIT ABNORMALITY: ICD-10-CM

## 2021-06-17 PROCEDURE — 97110 THERAPEUTIC EXERCISES: CPT | Performed by: PHYSICAL THERAPIST

## 2021-06-17 PROCEDURE — 97112 NEUROMUSCULAR REEDUCATION: CPT | Performed by: PHYSICAL THERAPIST

## 2021-06-17 NOTE — PROGRESS NOTES
Physical Therapy Daily Progress Note  Visit: 10  Date of Initial Visit: Type: THERAPY  Noted: 2021    Patient: Chaitanya Browne   : 1923  Diagnosis/ICD-10 Code:  Impaired functional mobility, balance, gait, and endurance [Z74.09]  Referring practitioner: Dirk Russ MD  Date of Initial Visit: Type: THERAPY  Noted: 2021  Today's Date: 2021  Patient seen for 10 sessions      Subjective:   Patient reports: he is psychologically prepared for today.  Pain: 0/10  Clinical Progress: improved  Home Program Compliance: Yes  Treatment has included: therapeutic exercise and neuromuscular re-education    Objective   See Exercise, Manual, and Modality Logs for complete treatment.    PT Neuro          Assessment & Plan     Assessment  Assessment details: Pt demonstrated improved balance on unstable surfaces but was challenged more when adding in UE work. He only required CGA and occasional rest breaks due to fatigue. Pt will be progressed as tolerated.     Plan  Plan details: Pt will continue with PT services for strength, balance, gait, and coordination for improved functional mobility.        Timed:  Manual Therapy:            0     mins  33082;  Therapeutic Exercise:    10    mins  84504;     Neuromuscular Tahmina:    28    mins  41907;    Therapeutic Activity:      0     mins  66725;     Gait Trainin    mins  14950;     Electrical Stimulation:    0    mins  45526 ( );     Untimed:  Canalith Repositioning techniques _0_ 68599      Timed Treatment:   38   mins   Total Treatment:     38   mins    Gurjit Saravia Physical Therapist Student completed treatment under my direct supervision.     2021  Vianca Durant, PT, DPT, MSCS, CDP  [place] License #: [id number]  Physical Therapist

## 2021-06-24 ENCOUNTER — TREATMENT (OUTPATIENT)
Dept: PHYSICAL THERAPY | Facility: CLINIC | Age: 86
End: 2021-06-24

## 2021-06-24 DIAGNOSIS — Z74.09 IMPAIRED FUNCTIONAL MOBILITY, BALANCE, GAIT, AND ENDURANCE: Primary | ICD-10-CM

## 2021-06-24 DIAGNOSIS — R26.9 GAIT ABNORMALITY: ICD-10-CM

## 2021-06-24 PROCEDURE — 97112 NEUROMUSCULAR REEDUCATION: CPT | Performed by: PHYSICAL THERAPIST

## 2021-06-24 PROCEDURE — 97110 THERAPEUTIC EXERCISES: CPT | Performed by: PHYSICAL THERAPIST

## 2021-06-24 NOTE — PROGRESS NOTES
PT Re-Assessment / Re-Certification  Visit: 11  Date of Initial Visit: Type: THERAPY  Noted: 2021    Patient: Chaitanya Browne   : 1923  Diagnosis/ICD-10 Code:  Impaired functional mobility, balance, gait, and endurance [Z74.09]  Referring practitioner: Dirk Russ MD  Date of Initial Visit: Type: THERAPY  Noted: 2021  Today's Date: 2021  Patient seen for 11 sessions      Subjective:   Patient reports: he is ready.   Pain: 0/10  Clinical Progress: improved  Home Program Compliance: Yes  Treatment has included: therapeutic exercise and neuromuscular re-education    Objective   See Exercise, Manual, and Modality Logs for complete treatment.    PT Neuro         Assessment & Plan     Assessment  Impairments: abnormal coordination, abnormal gait, activity intolerance, impaired balance, impaired physical strength and safety issue  Assessment details: Patient demonstrates improvement in balance and gait speed. He is no longer having pain in the shoulder and continues to be compliant with HEP. Patient will be seen for one remaining visit in order to progress HEP.   Prognosis: fair  Functional Limitations: carrying objects, walking, pulling, pushing, standing, stooping, reaching behind back and reaching overhead  Goals  Plan Goals: STG (6 visits)  1. Patient will report compliance with initial HEP. MET  2. Patient to perform TUG within 15 sec without LOB for improved functional mobility. ONGOING  3. Patient will demonstrate improved posture thereby limited pain down L arm to <2/10 sitting. MET    LTG (8 visits)  1. Patient will be I with final HEP. ONGOING  2. Patient to improve DIXON balance score to >/= 40 /56 to decrease client's risk of falls.MET  3. Patient to perform TUG within 14 sec without LOB for improved functional mobility.ONGOING  4. Patient will relate pain is no greater than 3/10 at worst. MET        Plan  Therapy options: will be seen for skilled physical therapy services  Planned  modality interventions: TENS  Planned therapy interventions: ADL retraining, balance/weight-bearing training, flexibility, gait training, manual therapy, neuromuscular re-education, motor coordination training, postural training, strengthening, stretching, therapeutic activities, transfer training and home exercise program  Frequency: 1x week  Duration in visits: 1  Treatment plan discussed with: patient  Plan details: Patient will be seen for one remaining visit.         Visit Diagnoses:    ICD-10-CM ICD-9-CM   1. Impaired functional mobility, balance, gait, and endurance  Z74.09 V49.89   2. Gait abnormality  R26.9 781.2       Progress toward previous goals: Partially Met      Recommendations: Continue as planned  Timeframe: 2 weeks    PT Signature: Vianca Durant, PT, DPT, MSCS, CDP  [place] License #: [id number]    Based upon review of the patient's progress and continued therapy plan, it is my medical opinion that Chaitanya Browne should continue physical therapy treatment at [facility]  [address] [place] [id number] [phone].    Signature: __________________________________  Dirk Russ MD    Timed:  Manual Therapy:    0     mins  14647;  Therapeutic Exercise:    15     mins  60996;     Neuromuscular Tahmina:    25    mins  56343;    Therapeutic Activity:     0     mins  02498;     Gait Trainin     mins  97828;     Electrical Stimulation:    0     mins  71177 ( );    Untimed:  Canalith Repositioning   0 mins  21524    Timed Treatment:   40   mins   Total Treatment:     40   mins

## 2021-07-06 ENCOUNTER — TREATMENT (OUTPATIENT)
Dept: PHYSICAL THERAPY | Facility: CLINIC | Age: 86
End: 2021-07-06

## 2021-07-06 DIAGNOSIS — Z74.09 IMPAIRED FUNCTIONAL MOBILITY, BALANCE, GAIT, AND ENDURANCE: Primary | ICD-10-CM

## 2021-07-06 PROCEDURE — 97110 THERAPEUTIC EXERCISES: CPT | Performed by: PHYSICAL THERAPIST

## 2021-07-06 PROCEDURE — 97112 NEUROMUSCULAR REEDUCATION: CPT | Performed by: PHYSICAL THERAPIST

## 2021-07-06 NOTE — PROGRESS NOTES
Physical Therapy Daily Progress Note/Discharge Summary  Visit: 12  Date of Initial Visit: Type: THERAPY  Noted: 2021    Patient: Chaitanya Browne   : 1923  Diagnosis/ICD-10 Code:  Impaired functional mobility, balance, gait, and endurance [Z74.09]  Referring practitioner: Dirk Russ MD  Date of Initial Visit: Type: THERAPY  Noted: 2021  Today's Date: 2021  Patient seen for 12 sessions      Subjective:   Patient reports: he is doing well today  Pain: 0/10  Clinical Progress: improved  Home Program Compliance: Yes  Treatment has included: therapeutic exercise and neuromuscular re-education    Objective   See Exercise, Manual, and Modality Logs for complete treatment.    PT Neuro          Assessment & Plan     Assessment  Assessment details: Patient demonstrated improvements in his balance, posture, and strength in addition to meeting the majority of his goals. He demonstrates independence with his HEP and his exercises were updated today to progress them. He was encouraged to continue his exercises at home on a daily basis.     Goals  Plan Goals: STG (6 visits)  1. Patient will report compliance with initial HEP. MET  2. Patient to perform TUG within 15 sec without LOB for improved functional mobility. NOT MET  3. Patient will demonstrate improved posture thereby limited pain down L arm to <2/10 sitting. MET    LTG (8 visits)  1. Patient will be I with final HEP. MET  2. Patient to improve DIXON balance score to >/= 40 /56 to decrease client's risk of falls .MET  3. Patient to perform TUG within 14 sec without LOB for improved functional mobility. NOT MET  4. Patient will relate pain is no greater than 3/10 at worst. MET        Plan  Plan details: Patient will be discharged at this time.         Timed:  Manual Therapy:            0     mins  06820;  Therapeutic Exercise:    25    mins  70194;     Neuromuscular Tahmina:    13    mins  08833;    Therapeutic Activity:      0     mins  83830;      Gait Trainin    mins  48405;     Electrical Stimulation:    0    mins  49856 ( );     Untimed:  Canalith Repositioning techniques _0_ 66832      Timed Treatment:   38   mins   Total Treatment:     38   mins    Gurjit Saravia, Physical Therapist Student completed treatment under my direct supervision.     2021  Vianca Durant, PT, DPT, MSCS, CDP  [place] License #: [id number]  Physical Therapist

## 2021-08-11 RX ORDER — BUMETANIDE 1 MG/1
1 TABLET ORAL DAILY
Qty: 45 TABLET | Refills: 0 | Status: SHIPPED | OUTPATIENT
Start: 2021-08-11 | End: 2021-08-18 | Stop reason: SDUPTHER

## 2021-08-18 ENCOUNTER — OFFICE VISIT (OUTPATIENT)
Dept: CARDIOLOGY | Facility: CLINIC | Age: 86
End: 2021-08-18

## 2021-08-18 VITALS
OXYGEN SATURATION: 98 % | WEIGHT: 181 LBS | HEIGHT: 65 IN | DIASTOLIC BLOOD PRESSURE: 69 MMHG | SYSTOLIC BLOOD PRESSURE: 117 MMHG | HEART RATE: 100 BPM | BODY MASS INDEX: 30.16 KG/M2

## 2021-08-18 DIAGNOSIS — E78.5 DYSLIPIDEMIA: ICD-10-CM

## 2021-08-18 DIAGNOSIS — I10 ESSENTIAL HYPERTENSION: ICD-10-CM

## 2021-08-18 DIAGNOSIS — I50.32 CHRONIC DIASTOLIC CHF (CONGESTIVE HEART FAILURE) (HCC): Primary | ICD-10-CM

## 2021-08-18 PROCEDURE — 99214 OFFICE O/P EST MOD 30 MIN: CPT | Performed by: INTERNAL MEDICINE

## 2021-08-18 RX ORDER — BUMETANIDE 1 MG/1
1 TABLET ORAL DAILY
Qty: 135 TABLET | Refills: 3 | Status: SHIPPED | OUTPATIENT
Start: 2021-08-18 | End: 2021-09-23

## 2021-08-18 NOTE — PROGRESS NOTES
Subjective:     Encounter Date:08/18/2021    Patient ID: Chaitanya Browne is a 89 y.o.  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     REFERRING INTERNIST: Dirk Russ MD  UROLOGIST: Ck Woods MD  PULMONOLOGIST:  COLLIN Avila DO.    Chief Complaint:   Chief Complaint   Patient presents with   • Chronic diastolic CHF (congestive heart failure)       Problem List:  1. Diastolic congestive heart failure:  a. Treadmill stress test, 10/10/2011: 7 METS, CYRUS -32, acceptable exercise stress test without anginal type chest discomfort or ischemic EKG changes with acceptable Maxwell treadmill score and normal blood pressure response following exercise to 109% predicted maximum heart rate 132% of predicted exercise capacity  b. Echocardiogram, 7/14/2012: LVEF 0.55-0.60, abnormal LV.diastolic filling is observed consistent with impaired LV relaxation, moderate mitral annular calcification with no evidence of mitral stenosis or significant regurgitation  c. Echocardiogram, 9/3/2019: Mild to moderate MR, LVEF 0.72, LV diastolic dysfunction grade 1 consistent with impaired relaxation, LV wall thickness consistent with mild concentric hypertrophy, LA borderline dilated, normal RV cavity size, wall thickness, systolic function and septal motion noted, mild mitral stenosis is present with functional MAC.  Aortic valve exhibits moderate sclerosis without stenosis.  No pulmonary hypertension, no pericardial effusion   d. BNP normal October 2019  e. Residual CCS class 0 angina pectoris/NYHA class II biventricular CHF, November 2019  f. Residual class I symptoms, February 2021, August 2021  2. Valvular heart disease/mild to moderate mitral regurgitation, mild mitral stenosis, September 2019  3. Hypertension  4. Hyperlipidemia; on statin therapy  5. Chronic kidney disease stage  III  6. Anemia  7. Asthma/COPD  8. Lower extremity edema with negative venous duplex July 2012  9. BPH  10. Bilateral carpal tunnel syndrome  11. History of basal cell carcinoma on left side of nose  12. [facility] 14-day hospitalization September 2019 for TURP, complicated by postoperative aspiration pneumonia, hematuria, and anemia  13. Prediabetes with hemoglobin A1c 6.2% February 2020  14. Surgical history:   a. Appendectomy  b. TURP 1989  c. Peritonitis as a child  d. Basal cell carcinoma excision from nose  e. I&D great toe  f. Cystoscopy  g. TURP 2019  h. Bilateral cataracts    No Known Allergies    Current Outpatient Medications:   •  aspirin 81 MG EC tablet, Take 81 mg by mouth 1 (One) Time Per Week., Disp: , Rfl:   •  azelastine (ASTELIN) 0.1 % nasal spray, 2 sprays into the nostril(s) as directed by provider 2 (Two) Times a Day. Use in each nostril as directed, Disp: 3 each, Rfl: 3  •  bumetanide (BUMEX) 1 MG tablet, Take 1 tablet by mouth Daily. Take 1 additional tablet every other evening., Disp: 45 tablet, Rfl: 0  •  Cholecalciferol (VITAMIN D3) 25 MCG (1000 UT) capsule, Take 1,000 Units by mouth Every Other Day., Disp: , Rfl:   •  finasteride (PROSCAR) 5 MG tablet, Take 1 tablet by mouth Daily., Disp: , Rfl:   •  ketoconazole (NIZORAL) 2 % cream, Apply 1 application topically to the appropriate area as directed Daily As Needed., Disp: , Rfl:   •  ketoconazole (NIZORAL) 2 % shampoo, , Disp: , Rfl:   •  metroNIDAZOLE (METROGEL) 0.75 % gel, , Disp: , Rfl:   •  pravastatin (PRAVACHOL) 40 MG tablet, Take 1 tablet by mouth Daily. 3x a week, Disp: 90 tablet, Rfl: 3    History of Present Illness: Patient returns for scheduled 6-month follow up. He has been feeling well overall from a cardiovascular standpoint. Patient denies chest pain, shortness of breath, palpitations, dizziness, and syncope. Last month he had family in town and ate out more than usual and possibly missed a few doses of his diuretic. He developed  "edema but this has been improving. He wears calf-length compression stockings. He has had no interim ER visits, hospitalizations, serious illnesses, or surgeries. He has been experiencing symptoms of carpal tunnel in his bilateral wrists. He has undergone occupational therapy for this. He has seen Dr. Mcdonough in the past for this. He also wears splints on his bilateral wrists nightly. He exercises on his elliptical machine at home twice daily. He has had no additional interim ER visits, hospitalizations, serious illnesses, or surgeries. He has received COVID immunization. He is accompanied to the office today by his daughter who just completed her PhD in veterinary sciences.       ROS   Obtained and negative except as outlined in problem list and HPI.    Procedures       Objective:       Vitals:    08/18/21 1326 08/18/21 1329   BP: 119/63 117/69   BP Location: Right arm Right arm   Patient Position: Sitting Standing   Pulse: 96 100   SpO2: 98%    Weight: 82.1 kg (181 lb)    Height: 165.1 cm (65\")      Body mass index is 30.12 kg/m².  Last weight: 181 lbs    Vitals reviewed.   Constitutional:       Appearance: Well-developed.   Neck:      Thyroid: No thyromegaly.      Vascular: No carotid bruit or JVD.      Lymphadenopathy: No cervical adenopathy.   Pulmonary:      Effort: Pulmonary effort is normal.      Breath sounds: Decreased breath sounds present. No wheezing. No rhonchi. No rales.   Cardiovascular:      Regular rhythm.      Murmurs: There is a grade 2/6 mid frequency midsystolic murmur at the URSB.      No gallop. No S3 gallop.   Pulses:     Dorsalis pedis: 1+ bilaterally.     Posterior tibial: 1+ bilaterally.  Edema:     Peripheral edema present.     Pretibial: bilateral trace edema of the pretibial area.     Ankle: bilateral trace edema of the ankle.  Abdominal:      Palpations: Abdomen is soft. There is no abdominal mass.      Tenderness: There is no abdominal tenderness.   Musculoskeletal: Normal range " of motion. Skin:     General: Skin is warm and dry.      Findings: No rash.   Neurological:      Mental Status: Alert and oriented to person, place, and time.           Lab Review:   Lab Results   Component Value Date    GLUCOSE 104 (H) 02/25/2021    BUN 24 (H) 02/25/2021    CREATININE 1.13 02/25/2021    EGFRIFNONA 60 (L) 02/25/2021    EGFRIFAFRI 73 02/25/2021    BCR 21.2 02/25/2021     02/25/2021    K 4.3 02/25/2021     02/25/2021    CO2 27.7 02/25/2021    CALCIUM 8.9 02/25/2021    ALBUMIN 3.60 02/25/2021    AST 20 02/25/2021    ALT 11 02/25/2021       Lab Results   Component Value Date    WBC 4.43 02/25/2021    HGB 14.5 02/25/2021    HCT 43.9 02/25/2021    MCV 98.0 (H) 02/25/2021     02/25/2021       Lab Results   Component Value Date    HGBA1C 6.14 (H) 02/25/2021       Lab Results   Component Value Date    TSH 1.110 02/25/2021       Lab Results   Component Value Date    CHOL 170 02/25/2021    CHOL 197 02/05/2020    CHOL 122 09/03/2019     Lab Results   Component Value Date    TRIG 75 02/25/2021    TRIG 93 02/05/2020    TRIG 71 09/03/2019     Lab Results   Component Value Date    HDL 69 (H) 02/25/2021    HDL 78 (H) 02/05/2020    HDL 49 09/03/2019     Lab Results   Component Value Date    LDL 87 02/25/2021     02/05/2020    LDL 59 09/03/2019             Assessment:       Overall continued acceptable course with no new interim cardiopulmonary complaints with acceptable functional status. We will defer additional diagnostic or therapeutic intervention from a cardiac perspective at this time.     Diagnosis Plan   1. Chronic diastolic CHF (congestive heart failure) (CMS/HCC)  No recurrent angina pectoris or CHF on current activity. Continue current treatment.   2. Essential hypertension  Well controlled. Continue current treatment.   3. Dyslipidemia  Well controlled. Continue pravastatin.          Plan:         1. Patient to continue current medications and close follow up with the above  providers.  2. Tentative cardiology follow up in March 2021 or patient may return sooner PRN.    I, Drew Eaton, attest that this documentation has been prepared under the direction and in the presence of Jerod Ledbetter MD 08/18/2021    I, Jerod Ledbetter MD, [facility], personally performed the services described in this documentation as scribed by the above named individual in my presence, and it is both accurate and complete. At 13:50 EDT on 08/18/2021

## 2021-09-21 ENCOUNTER — TELEPHONE (OUTPATIENT)
Dept: INTERNAL MEDICINE | Facility: CLINIC | Age: 86
End: 2021-09-21

## 2021-09-21 NOTE — TELEPHONE ENCOUNTER
Pt's daughter Nkechi called concerned about her dad. She stated on Saturday he had a muscle spasm in his back. Since then he has had trouble breathing but doesn't admit to it. Nkechi stated when he sleeps he is breathing from his abdomen instead of his chest.    Since Saturday he has not been eating or drinking. He did have a small amount of orange juice and cereal this morning.    Nkechi is concerned as he is on a diuretic and she does not want him to get dehydrated.    Pt has not complained of not feeling well, he has not had any cough or fever but Nkechi's get feeling is something is going on.

## 2021-09-21 NOTE — TELEPHONE ENCOUNTER
He could try holding the diuretic and see if he feels better and keep his appt next week and if worsens come in sooner

## 2021-09-23 ENCOUNTER — LAB (OUTPATIENT)
Dept: LAB | Facility: HOSPITAL | Age: 86
End: 2021-09-23

## 2021-09-23 ENCOUNTER — OFFICE VISIT (OUTPATIENT)
Dept: INTERNAL MEDICINE | Facility: CLINIC | Age: 86
End: 2021-09-23

## 2021-09-23 ENCOUNTER — TELEPHONE (OUTPATIENT)
Dept: INTERNAL MEDICINE | Facility: CLINIC | Age: 86
End: 2021-09-23

## 2021-09-23 VITALS
WEIGHT: 182 LBS | BODY MASS INDEX: 29.25 KG/M2 | SYSTOLIC BLOOD PRESSURE: 116 MMHG | HEART RATE: 104 BPM | OXYGEN SATURATION: 94 % | RESPIRATION RATE: 40 BRPM | DIASTOLIC BLOOD PRESSURE: 80 MMHG | HEIGHT: 66 IN | TEMPERATURE: 98.4 F

## 2021-09-23 DIAGNOSIS — I95.89 HYPOTENSION DUE TO HYPOVOLEMIA: ICD-10-CM

## 2021-09-23 DIAGNOSIS — R06.02 SHORTNESS OF BREATH: Primary | ICD-10-CM

## 2021-09-23 DIAGNOSIS — E78.5 HYPERLIPIDEMIA LDL GOAL <100: ICD-10-CM

## 2021-09-23 DIAGNOSIS — I50.20 SYSTOLIC HEART FAILURE, UNSPECIFIED HF CHRONICITY (HCC): ICD-10-CM

## 2021-09-23 DIAGNOSIS — I10 ESSENTIAL HYPERTENSION: ICD-10-CM

## 2021-09-23 DIAGNOSIS — E86.1 HYPOTENSION DUE TO HYPOVOLEMIA: ICD-10-CM

## 2021-09-23 LAB
ALBUMIN SERPL-MCNC: 3.9 G/DL (ref 3.5–5.2)
ALBUMIN/GLOB SERPL: 1.3 G/DL
ALP SERPL-CCNC: 82 U/L (ref 39–117)
ALT SERPL W P-5'-P-CCNC: 36 U/L (ref 1–41)
ANION GAP SERPL CALCULATED.3IONS-SCNC: 16.3 MMOL/L (ref 5–15)
AST SERPL-CCNC: 57 U/L (ref 1–40)
BASOPHILS # BLD AUTO: 0.04 10*3/MM3 (ref 0–0.2)
BASOPHILS NFR BLD AUTO: 0.5 % (ref 0–1.5)
BILIRUB SERPL-MCNC: 1.3 MG/DL (ref 0–1.2)
BUN SERPL-MCNC: 33 MG/DL (ref 8–23)
BUN/CREAT SERPL: 23.9 (ref 7–25)
CALCIUM SPEC-SCNC: 9.3 MG/DL (ref 8.2–9.6)
CHLORIDE SERPL-SCNC: 99 MMOL/L (ref 98–107)
CO2 SERPL-SCNC: 26.7 MMOL/L (ref 22–29)
CREAT SERPL-MCNC: 1.38 MG/DL (ref 0.76–1.27)
DEPRECATED RDW RBC AUTO: 40.4 FL (ref 37–54)
EOSINOPHIL # BLD AUTO: 0.11 10*3/MM3 (ref 0–0.4)
EOSINOPHIL NFR BLD AUTO: 1.3 % (ref 0.3–6.2)
ERYTHROCYTE [DISTWIDTH] IN BLOOD BY AUTOMATED COUNT: 11.5 % (ref 12.3–15.4)
GFR SERPL CREATININE-BSD FRML MDRD: 48 ML/MIN/1.73
GFR SERPL CREATININE-BSD FRML MDRD: 58 ML/MIN/1.73
GLOBULIN UR ELPH-MCNC: 3.1 GM/DL
GLUCOSE SERPL-MCNC: 101 MG/DL (ref 65–99)
HCT VFR BLD AUTO: 42.3 % (ref 37.5–51)
HGB BLD-MCNC: 14.1 G/DL (ref 13–17.7)
IMM GRANULOCYTES # BLD AUTO: 0.04 10*3/MM3 (ref 0–0.05)
IMM GRANULOCYTES NFR BLD AUTO: 0.5 % (ref 0–0.5)
LYMPHOCYTES # BLD AUTO: 0.97 10*3/MM3 (ref 0.7–3.1)
LYMPHOCYTES NFR BLD AUTO: 11.5 % (ref 19.6–45.3)
MCH RBC QN AUTO: 32.3 PG (ref 26.6–33)
MCHC RBC AUTO-ENTMCNC: 33.3 G/DL (ref 31.5–35.7)
MCV RBC AUTO: 96.8 FL (ref 79–97)
MONOCYTES # BLD AUTO: 1.61 10*3/MM3 (ref 0.1–0.9)
MONOCYTES NFR BLD AUTO: 19 % (ref 5–12)
NEUTROPHILS NFR BLD AUTO: 5.7 10*3/MM3 (ref 1.7–7)
NEUTROPHILS NFR BLD AUTO: 67.2 % (ref 42.7–76)
NRBC BLD AUTO-RTO: 0 /100 WBC (ref 0–0.2)
PLATELET # BLD AUTO: 255 10*3/MM3 (ref 140–450)
PMV BLD AUTO: 11.6 FL (ref 6–12)
POTASSIUM SERPL-SCNC: 4.2 MMOL/L (ref 3.5–5.2)
PROT SERPL-MCNC: 7 G/DL (ref 6–8.5)
RBC # BLD AUTO: 4.37 10*6/MM3 (ref 4.14–5.8)
SODIUM SERPL-SCNC: 142 MMOL/L (ref 136–145)
WBC # BLD AUTO: 8.47 10*3/MM3 (ref 3.4–10.8)

## 2021-09-23 PROCEDURE — 83880 ASSAY OF NATRIURETIC PEPTIDE: CPT

## 2021-09-23 PROCEDURE — 85025 COMPLETE CBC W/AUTO DIFF WBC: CPT

## 2021-09-23 PROCEDURE — 99214 OFFICE O/P EST MOD 30 MIN: CPT | Performed by: INTERNAL MEDICINE

## 2021-09-23 PROCEDURE — 80053 COMPREHEN METABOLIC PANEL: CPT

## 2021-09-23 NOTE — PROGRESS NOTES
[facility]     Chaitanya Browne  2/23/1923   [id number]      Patient Care Team:  Dirk Russ MD as PCP - General (Internal Medicine)  Fabricio Zavala MD as Consulting Physician (Internal Medicine)    Chief Complaint::   Chief Complaint   Patient presents with   • Shortness of Breath     with shallowing breathing while sleeping, abdominal breathing   • Anorexia     not eating much x 4 days            HPI  Patient is a 89-year-old male with mild essential hypertension mixed hyperlipidemia overweight with BPH and chronic allergies complaining of back pain approximately 5 days ago that has resolved and complaining of anorexia and poor p.o. intake and mild shortness of breath with physical activity in the past 48 hours.  Denies fever chills denies headache or dizzy denies myalgia denies nausea vomiting.  His diuretic of Bumex has been discontinued 3 days ago.      Patient Active Problem List   Diagnosis   • Facial basal cell cancer   • Hyperlipidemia LDL goal <100   • Essential hypertension   • ASHD (arteriosclerotic heart disease)   • Gait abnormality   • Impaired fasting glucose   • Polyp, colonic   • Vitamin D deficiency   • Acute midline low back pain without sciatica   • Proteinuria   • Right carpal tunnel syndrome   • Overweight (BMI 25.0-29.9)   • Hematuria, unspecified   • Medicare annual wellness visit, subsequent   • Edema   • Cough   • Allergic rhinitis   • Gross hematuria   • Acute UTI (urinary tract infection)   • Acute renal insufficiency   • Acute urinary retention   • Debility   • Dysphagia   • BPH with obstruction/lower urinary tract symptoms   • Prostatitis   • Chronic diastolic CHF (congestive heart failure) (CMS/HCC)   • Iron deficiency anemia due to chronic blood loss   • Simple chronic bronchitis (CMS/HCC)   • Shortness of breath        Past Medical History:   Diagnosis Date   • Abnormal heart rhythm    • Anemia    • Aortic valve sclerosis    • Asthma    • Basal cell  carcinoma (BCC) of left side of nose    • BPH (benign prostatic hyperplasia)    • Cataracts, bilateral     bilateralcataract extraction and lens implantation 2013   • Diastolic dysfunction    • Easy bruising    • Edema due to malnutrition (CMS/HCC) 2/5/2020   • Heart murmur    • High cholesterol    • History of disease     bilateral lacrimal gland dysfunction per Dr Fajardo   • Hypertension    • Infection     infected big toe; I and D DR Alvares 2013   • Phlebitis    • Pneumonia 2009       Past Surgical History:   Procedure Laterality Date   • APPENDECTOMY     • CATARACT EXTRACTION, BILATERAL  2013    and lens implantation   • CYSTOSCOPY N/A 9/6/2019    Procedure: CYSTOSCOPY;  Surgeon: Ck Woods MD;  Location:  [facility];  Service: Urology   • CYSTOSCOPY TRANSURETHRAL RESECTION OF PROSTATE  1989   • CYSTOSCOPY TRANSURETHRAL RESECTION OF PROSTATE N/A 9/11/2019    Procedure: CYSTOSCOPY TRANSURETHRAL RESECTION OF PROSTATE;  Surgeon: Ck Woods MD;  Location:  [facility];  Service: Urology   • HEAD & NECK SKIN LESION EXCISIONAL BIOPSY      Basal cell removed from nose    • INCISION AND DRAINAGE FOOT  2013    infected big toe Dr Alvares   • TRANSURETHRAL RESECTION OF BLADDER TUMOR N/A 9/6/2019    Procedure: EVACUATION OF BLOOD CLOT, FULGERATION OF PROSTATE;  Surgeon: Ck Woods MD;  Location:  [facility];  Service: Urology       Family History   Problem Relation Age of Onset   • Coronary artery disease Father    • Heart attack Father    • Coronary artery disease Brother    • Heart disease Brother    • Atrial fibrillation Brother    • Stroke Brother    • Heart attack Brother    • Heart attack Mother        Social History     Socioeconomic History   • Marital status:      Spouse name: Not on file   • Number of children: Not on file   • Years of education: Not on file   • Highest education level: Not on file   Tobacco Use   • Smoking status: Never Smoker   • Smokeless tobacco: Never Used  "  Substance and Sexual Activity   • Alcohol use: No   • Drug use: No   • Sexual activity: Defer       No Known Allergies    Review of Systems     HEENT: Denies headache or dizzy  NECK: Denies pain discomfort dysphagia  CHEST: Complains of mild shortness of breath when active denies fever chills or cough production  CARDIAC: Denies chest pain pressure palpitations  ABD: Denies nausea vomiting abdominal pain  : Denies dysuria frequency  NEURO: Denies syncope concussion neuropathy  PSYCH: Denies anxiety depression  EXTREM: Mild arthritic soreness with mild edema    Vital Signs  Vitals:    09/23/21 1412   BP: 116/80   BP Location: Right arm   Patient Position: Sitting   Cuff Size: Adult   Pulse: 104   Resp: (!) 40   Temp: 98.4 °F (36.9 °C)   SpO2: 94%  Comment: on room air   Weight: 82.6 kg (182 lb)   Height: 167.6 cm (66\")   PainSc: 0-No pain     Body mass index is 29.38 kg/m².  Patient's Body mass index is 29.38 kg/m². indicating that he is overweight (BMI 25-29.9). Obesity-related health conditions include the following: dyslipidemias. Obesity is unchanged. BMI is is above average; BMI management plan is completed. We discussed low calorie, low carb based diet program, portion control and increasing exercise..     Advance Care Planning   ACP discussion was held with the patient during this visit. Patient has an advance directive in EMR which is still valid.        Current Outpatient Medications:   •  aspirin 81 MG EC tablet, Take 81 mg by mouth 1 (One) Time Per Week., Disp: , Rfl:   •  azelastine (ASTELIN) 0.1 % nasal spray, 2 sprays into the nostril(s) as directed by provider 2 (Two) Times a Day. Use in each nostril as directed, Disp: 3 each, Rfl: 3  •  Cholecalciferol (VITAMIN D3) 25 MCG (1000 UT) capsule, Take 1,000 Units by mouth Every Other Day., Disp: , Rfl:   •  finasteride (PROSCAR) 5 MG tablet, Take 1 tablet by mouth Daily., Disp: , Rfl:   •  ketoconazole (NIZORAL) 2 % cream, Apply 1 application topically " to the appropriate area as directed Daily As Needed., Disp: , Rfl:   •  ketoconazole (NIZORAL) 2 % shampoo, , Disp: , Rfl:   •  metroNIDAZOLE (METROGEL) 0.75 % gel, , Disp: , Rfl:   •  pravastatin (PRAVACHOL) 40 MG tablet, Take 1 tablet by mouth Daily. 3x a week, Disp: 90 tablet, Rfl: 3    Physical Exam     ACE III MINI         HEENT: No facial asymmetry pharynx is clear  NECK: No mass bruit or neck vein distention  CHEST: Distant breath sounds without rales or wheezing O2 saturation 94%  CARDIAC: Regular rhythm no gallop or rub blood pressure low range heart rate elevated  ABD: Liver spleen normal good bowel sounds nontender  : Deferred  NEURO: Intact with no lateralization  PSYCH: Normal  EXTREM: Trace edema  Skin: Clear     Results Review:    Recent Results (from the past 672 hour(s))   Comprehensive Metabolic Panel    Collection Time: 09/23/21  3:14 PM    Specimen: Blood   Result Value Ref Range    Glucose 101 (H) 65 - 99 mg/dL    BUN 33 (H) 8 - 23 mg/dL    Creatinine 1.38 (H) 0.76 - 1.27 mg/dL    Sodium 142 136 - 145 mmol/L    Potassium 4.2 3.5 - 5.2 mmol/L    Chloride 99 98 - 107 mmol/L    CO2 26.7 22.0 - 29.0 mmol/L    Calcium 9.3 8.2 - 9.6 mg/dL    Total Protein 7.0 6.0 - 8.5 g/dL    Albumin 3.90 3.50 - 5.20 g/dL    ALT (SGPT) 36 1 - 41 U/L    AST (SGOT) 57 (H) 1 - 40 U/L    Alkaline Phosphatase 82 39 - 117 U/L    Total Bilirubin 1.3 (H) 0.0 - 1.2 mg/dL    eGFR Non African Amer 48 (L) >60 mL/min/1.73    eGFR  African Amer 58 (L) >60 mL/min/1.73    Globulin 3.1 gm/dL    A/G Ratio 1.3 g/dL    BUN/Creatinine Ratio 23.9 7.0 - 25.0    Anion Gap 16.3 (H) 5.0 - 15.0 mmol/L   BNP    Collection Time: 09/23/21  3:14 PM    Specimen: Blood   Result Value Ref Range    proBNP 23,668.0 (H) 0.0-1,800.0 pg/mL   CBC Auto Differential    Collection Time: 09/23/21  3:14 PM    Specimen: Blood   Result Value Ref Range    WBC 8.47 3.40 - 10.80 10*3/mm3    RBC 4.37 4.14 - 5.80 10*6/mm3    Hemoglobin 14.1 13.0 - 17.7 g/dL     Hematocrit 42.3 37.5 - 51.0 %    MCV 96.8 79.0 - 97.0 fL    MCH 32.3 26.6 - 33.0 pg    MCHC 33.3 31.5 - 35.7 g/dL    RDW 11.5 (L) 12.3 - 15.4 %    RDW-SD 40.4 37.0 - 54.0 fl    MPV 11.6 6.0 - 12.0 fL    Platelets 255 140 - 450 10*3/mm3    Neutrophil % 67.2 42.7 - 76.0 %    Lymphocyte % 11.5 (L) 19.6 - 45.3 %    Monocyte % 19.0 (H) 5.0 - 12.0 %    Eosinophil % 1.3 0.3 - 6.2 %    Basophil % 0.5 0.0 - 1.5 %    Immature Grans % 0.5 0.0 - 0.5 %    Neutrophils, Absolute 5.70 1.70 - 7.00 10*3/mm3    Lymphocytes, Absolute 0.97 0.70 - 3.10 10*3/mm3    Monocytes, Absolute 1.61 (H) 0.10 - 0.90 10*3/mm3    Eosinophils, Absolute 0.11 0.00 - 0.40 10*3/mm3    Basophils, Absolute 0.04 0.00 - 0.20 10*3/mm3    Immature Grans, Absolute 0.04 0.00 - 0.05 10*3/mm3    nRBC 0.0 0.0 - 0.2 /100 WBC     Procedures    Medication Review: Medications reviewed and noted    Patient wellness counseling  Exercise: Encouraged rest  Diet: Increase p.o. intake of fluid and calories  Smoking: None  Alcohol: None  Screening: CMP BNP and CBC pending    Assessment/Plan:    Problem List Items Addressed This Visit        Cardiac and Vasculature    Hyperlipidemia LDL goal <100    Current Assessment & Plan       Mixed hyperlipidemia on pravastatin 40 mg daily denies myalgia arthralgia continue therapy         Relevant Medications    pravastatin (PRAVACHOL) 40 MG tablet    Essential hypertension    Overview     Current blood pressure is low with initial blood pressure 116/80 repeated 96/80 left and right sitting with a heart rate of 104.  The patient has no longer on his Bumex diuretic for the past 3 days and encouraged to not resume         Relevant Orders    CBC & Differential (Completed)    Comprehensive Metabolic Panel (Completed)    BNP (Completed)       Pulmonary and Pneumonias    Shortness of breath - Primary    Overview     The patient states that he has shortness of breath with ambulation and physical activity that began approximately 5 days ago with  back pain and since that episode of back pain which has resolved improved the patient is a decreased food and liquid intake and has noticed a mild increase in shortness of breath with occasional wheeze in the past 48 hours with movement activity.  He denies fever chills denies cough with production and denies chest pain pressure or palpitations or nausea vomiting.         Current Assessment & Plan     The patient shortness of breath is symptomatic with activity.  His O2 saturation is 94% on room air at rest his lungs are clear without rales or wheezing  His blood pressure is low range and seemingly asymptomatic in this 89-year-old patient.  Will obtain CBC, CMP, and BNP  Will continue to leave off Bumex  Will suggest the patient continue follow-up with Dr. Russ appointment September 28, 2021             Other Visit Diagnoses     Hypotension due to hypovolemia        Relevant Orders    CBC & Differential (Completed)    Comprehensive Metabolic Panel (Completed)    BNP (Completed)    Systolic heart failure, unspecified HF chronicity (HCC)         Relevant Orders    BNP (Completed)           Patient Instructions   Continue to leave off Bumex  CBC CMP and BNP are pending  Continue all therapy  Encourage water and caloric intake  Follow-up with regular appointment with Dr. Russ September 28, 2021       Plan of care reviewed with patient at the conclusion of today's visit. Education was provided regarding diagnosis, management, and any prescribed or recommended OTC medications.Patient verbalizes understanding of and agreement with management plan.         Miller Chan MD      Note: Part of this note may be an electronic transcription/translation of spoken language to printed text using the Dragon Dictation system.

## 2021-09-23 NOTE — ASSESSMENT & PLAN NOTE
The patient shortness of breath is symptomatic with activity.  His O2 saturation is 94% on room air at rest his lungs are clear without rales or wheezing  His blood pressure is low range and seemingly asymptomatic in this 89-year-old patient.  Will obtain CBC, CMP, and BNP  Will continue to leave off Bumex  Will suggest the patient continue follow-up with Dr. Russ appointment September 28, 2021

## 2021-09-23 NOTE — PATIENT INSTRUCTIONS
Continue to leave off Bumex  CBC CMP and BNP are pending  Continue all therapy  Encourage water and caloric intake  Follow-up with regular appointment with Dr. Russ September 28, 2021

## 2021-09-24 ENCOUNTER — TELEPHONE (OUTPATIENT)
Dept: INTERNAL MEDICINE | Facility: CLINIC | Age: 86
End: 2021-09-24

## 2021-09-24 LAB — NT-PROBNP SERPL-MCNC: ABNORMAL PG/ML (ref 0–1800)

## 2021-09-24 NOTE — TELEPHONE ENCOUNTER
See below, Nkechi wanting to know what the results mean from his visit with  yesterday and if any further action needs to be taken.

## 2021-09-24 NOTE — TELEPHONE ENCOUNTER
I recommend he resume the bumex and follow with cardiology as looks like his retaining fluid by labs

## 2021-09-28 ENCOUNTER — OFFICE VISIT (OUTPATIENT)
Dept: INTERNAL MEDICINE | Facility: CLINIC | Age: 86
End: 2021-09-28

## 2021-09-28 VITALS
TEMPERATURE: 98.4 F | OXYGEN SATURATION: 97 % | HEART RATE: 90 BPM | SYSTOLIC BLOOD PRESSURE: 108 MMHG | BODY MASS INDEX: 29.25 KG/M2 | WEIGHT: 182 LBS | HEIGHT: 66 IN | DIASTOLIC BLOOD PRESSURE: 64 MMHG

## 2021-09-28 DIAGNOSIS — R26.9 GAIT ABNORMALITY: ICD-10-CM

## 2021-09-28 DIAGNOSIS — I50.32 CHRONIC DIASTOLIC CHF (CONGESTIVE HEART FAILURE) (HCC): Primary | ICD-10-CM

## 2021-09-28 DIAGNOSIS — M62.81 GENERALIZED MUSCLE WEAKNESS: ICD-10-CM

## 2021-09-28 DIAGNOSIS — I10 ESSENTIAL HYPERTENSION: ICD-10-CM

## 2021-09-28 DIAGNOSIS — Z23 NEED FOR VACCINATION: ICD-10-CM

## 2021-09-28 DIAGNOSIS — R35.0 URINARY FREQUENCY: ICD-10-CM

## 2021-09-28 PROCEDURE — 90662 IIV NO PRSV INCREASED AG IM: CPT | Performed by: INTERNAL MEDICINE

## 2021-09-28 PROCEDURE — 99214 OFFICE O/P EST MOD 30 MIN: CPT | Performed by: INTERNAL MEDICINE

## 2021-09-28 PROCEDURE — G0008 ADMIN INFLUENZA VIRUS VAC: HCPCS | Performed by: INTERNAL MEDICINE

## 2021-09-28 RX ORDER — BUMETANIDE 1 MG/1
1 TABLET ORAL DAILY
COMMUNITY
End: 2021-10-14 | Stop reason: DRUGHIGH

## 2021-09-28 NOTE — PROGRESS NOTES
"Chief Complaint   Patient presents with   • Shortness of Breath   • Foot Swelling   • Anorexia       History of Present Illness  89 y.o. white male presents for follow up on CHF exacerbation and leg swelling. His shortness of breath.     Review of Systems   Constitutional: Positive for appetite change (decreased appetite ).   Respiratory: Positive for shortness of breath (shortness of breath improving. ).    Cardiovascular: Positive for leg swelling. Negative for chest pain and palpitations.   Gastrointestinal: Negative for abdominal pain.   Musculoskeletal: Positive for gait problem.   Neurological: Positive for weakness.     .    PMSFH:  The following portions of the patient's history were reviewed and updated as appropriate: allergies, current medications, past family history, past medical history, past social history, past surgical history and problem list.      Current Outpatient Medications:   •  aspirin 81 MG EC tablet, Take 81 mg by mouth 1 (One) Time Per Week., Disp: , Rfl:   •  azelastine (ASTELIN) 0.1 % nasal spray, 2 sprays into the nostril(s) as directed by provider 2 (Two) Times a Day. Use in each nostril as directed, Disp: 3 each, Rfl: 3  •  bumetanide (BUMEX) 1 MG tablet, Take 1 mg by mouth Daily. 1 daily and 1 every other night, Disp: , Rfl:   •  Cholecalciferol (VITAMIN D3) 25 MCG (1000 UT) capsule, Take 1,000 Units by mouth Every Other Day., Disp: , Rfl:   •  finasteride (PROSCAR) 5 MG tablet, Take 1 tablet by mouth Daily., Disp: , Rfl:   •  ketoconazole (NIZORAL) 2 % cream, Apply 1 application topically to the appropriate area as directed Daily As Needed., Disp: , Rfl:   •  ketoconazole (NIZORAL) 2 % shampoo, , Disp: , Rfl:   •  metroNIDAZOLE (METROGEL) 0.75 % gel, , Disp: , Rfl:   •  pravastatin (PRAVACHOL) 40 MG tablet, Take 1 tablet by mouth Daily. 3x a week, Disp: 90 tablet, Rfl: 3    VITALS:  /64   Pulse 90   Temp 98.4 °F (36.9 °C)   Ht 167.6 cm (65.98\")   Wt 82.6 kg (182 lb)   " SpO2 97%   BMI 29.39 kg/m²     Physical Exam  Constitutional:       Appearance: Normal appearance.   Cardiovascular:      Rate and Rhythm: Normal rate and regular rhythm.      Heart sounds: Murmur (II/VI SM ) heard.     Pulmonary:      Effort: Pulmonary effort is normal.      Breath sounds: Normal breath sounds. No wheezing or rales.   Abdominal:      General: Bowel sounds are normal.      Palpations: Abdomen is soft.      Tenderness: There is no abdominal tenderness.   Musculoskeletal:      Left lower leg: Edema (pretibial edema 1+) present.   Neurological:      Mental Status: He is alert.      Comments: Slow get up and go    Psychiatric:         Mood and Affect: Mood normal.         Behavior: Behavior normal.         LABS:  Results for orders placed or performed in visit on 09/23/21   Comprehensive Metabolic Panel    Specimen: Blood   Result Value Ref Range    Glucose 101 (H) 65 - 99 mg/dL    BUN 33 (H) 8 - 23 mg/dL    Creatinine 1.38 (H) 0.76 - 1.27 mg/dL    Sodium 142 136 - 145 mmol/L    Potassium 4.2 3.5 - 5.2 mmol/L    Chloride 99 98 - 107 mmol/L    CO2 26.7 22.0 - 29.0 mmol/L    Calcium 9.3 8.2 - 9.6 mg/dL    Total Protein 7.0 6.0 - 8.5 g/dL    Albumin 3.90 3.50 - 5.20 g/dL    ALT (SGPT) 36 1 - 41 U/L    AST (SGOT) 57 (H) 1 - 40 U/L    Alkaline Phosphatase 82 39 - 117 U/L    Total Bilirubin 1.3 (H) 0.0 - 1.2 mg/dL    eGFR Non African Amer 48 (L) >60 mL/min/1.73    eGFR  African Amer 58 (L) >60 mL/min/1.73    Globulin 3.1 gm/dL    A/G Ratio 1.3 g/dL    BUN/Creatinine Ratio 23.9 7.0 - 25.0    Anion Gap 16.3 (H) 5.0 - 15.0 mmol/L   BNP    Specimen: Blood   Result Value Ref Range    proBNP 23,668.0 (H) 0.0-1,800.0 pg/mL   CBC Auto Differential    Specimen: Blood   Result Value Ref Range    WBC 8.47 3.40 - 10.80 10*3/mm3    RBC 4.37 4.14 - 5.80 10*6/mm3    Hemoglobin 14.1 13.0 - 17.7 g/dL    Hematocrit 42.3 37.5 - 51.0 %    MCV 96.8 79.0 - 97.0 fL    MCH 32.3 26.6 - 33.0 pg    MCHC 33.3 31.5 - 35.7 g/dL    RDW  11.5 (L) 12.3 - 15.4 %    RDW-SD 40.4 37.0 - 54.0 fl    MPV 11.6 6.0 - 12.0 fL    Platelets 255 140 - 450 10*3/mm3    Neutrophil % 67.2 42.7 - 76.0 %    Lymphocyte % 11.5 (L) 19.6 - 45.3 %    Monocyte % 19.0 (H) 5.0 - 12.0 %    Eosinophil % 1.3 0.3 - 6.2 %    Basophil % 0.5 0.0 - 1.5 %    Immature Grans % 0.5 0.0 - 0.5 %    Neutrophils, Absolute 5.70 1.70 - 7.00 10*3/mm3    Lymphocytes, Absolute 0.97 0.70 - 3.10 10*3/mm3    Monocytes, Absolute 1.61 (H) 0.10 - 0.90 10*3/mm3    Eosinophils, Absolute 0.11 0.00 - 0.40 10*3/mm3    Basophils, Absolute 0.04 0.00 - 0.20 10*3/mm3    Immature Grans, Absolute 0.04 0.00 - 0.05 10*3/mm3    nRBC 0.0 0.0 - 0.2 /100 WBC        Procedures         ASSESSMENT/PLAN:  Problems Addressed this Visit        Cardiac and Vasculature    Chronic diastolic CHF (congestive heart failure) (CMS/[facility]) - Primary     Congestive heart failure due to hypertension.  Heart failure is worsening.  NYHA Class II.  Continue current treatment regimen.  Dietary sodium restriction.  Encouraged daily monitoring of the patient's weight.  Regular aerobic exercise.  Heart failure will be reassessed in 3 months to 4 months.         Relevant Orders    Ambulatory Referral to Cardiology    Essential hypertension     Hypertension is unchanged.  Continue current treatment regimen.  Regular aerobic exercise.  Blood pressure will be reassessed at the next regular appointment.         Relevant Medications    bumetanide (BUMEX) 1 MG tablet       Symptoms and Signs    Gait abnormality     Proceed with PT          Relevant Orders    Ambulatory Referral to Physical Therapy Evaluate and treat      Other Visit Diagnoses     Generalized muscle weakness        Proceed with PT     Relevant Orders    Ambulatory Referral to Physical Therapy Evaluate and treat    Need for vaccination        Relevant Orders    Fluzone High-Dose 65+yrs (Completed)    Urinary frequency        Check urine.     Relevant Orders    POC Urinalysis Dipstick,  Automated      Diagnoses       Codes Comments    Chronic diastolic CHF (congestive heart failure) (CMS/[facility])    -  Primary ICD-10-CM: I50.32  ICD-9-CM: 428.32, 428.0     Essential hypertension     ICD-10-CM: I10  ICD-9-CM: 401.9     Gait abnormality     ICD-10-CM: R26.9  ICD-9-CM: 781.2     Generalized muscle weakness     ICD-10-CM: M62.81  ICD-9-CM: 728.87 Proceed with PT     Need for vaccination     ICD-10-CM: Z23  ICD-9-CM: V05.9     Urinary frequency     ICD-10-CM: R35.0  ICD-9-CM: 788.41 Check urine.           FOLLOW-UP:  Return in about 21 weeks (around 2/22/2022) for Medicare Wellness.      Electronically signed by:    Dirk Russ MD

## 2021-09-29 NOTE — ASSESSMENT & PLAN NOTE
Congestive heart failure due to hypertension.  Heart failure is worsening.  NYHA Class II.  Continue current treatment regimen.  Dietary sodium restriction.  Encouraged daily monitoring of the patient's weight.  Regular aerobic exercise.  Heart failure will be reassessed in 3 months to 4 months.

## 2021-09-29 NOTE — PROGRESS NOTES
Subjective:     Encounter Date:09/30/2021    Patient ID: Chaitanya Browne is a 89 y.o.  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     REFERRING INTERNIST: Dirk Russ MD  UROLOGIST: Ck Woods MD  PULMONOLOGIST:  COLLIN Avila DO    Chief Complaint:   Chief Complaint   Patient presents with   • Chronic diastolic CHF (congestive heart failure) (CMS/[facility])       Problem List:  1. Diastolic congestive heart failure:  a. Treadmill stress test, 10/10/2011: 7 METS, CYRUS -32, acceptable exercise stress test without anginal type chest discomfort or ischemic EKG changes with acceptable Maxwell treadmill score and normal blood pressure response following exercise to 109% predicted maximum heart rate 132% of predicted exercise capacity  b. Echocardiogram, 7/14/2012: LVEF 0.55-0.60, abnormal LV.diastolic filling is observed consistent with impaired LV relaxation, moderate mitral annular calcification with no evidence of mitral stenosis or significant regurgitation  c. Echocardiogram, 9/3/2019: Mild to moderate MR, LVEF 0.72, LV diastolic dysfunction grade 1 consistent with impaired relaxation, LV wall thickness consistent with mild concentric hypertrophy, LA borderline dilated, normal RV cavity size, wall thickness, systolic function and septal motion noted, mild mitral stenosis is present with functional MAC.  Aortic valve exhibits moderate sclerosis without stenosis.  No pulmonary hypertension, no pericardial effusion   d. BNP normal October 2019  e. Residual CCS class 0 angina pectoris/NYHA class II biventricular CHF, November 2019  f. Residual class I symptoms, February 2021, August 2021, September 2021  2. Valvular heart disease/mild to moderate mitral regurgitation, mild mitral stenosis, September 2019  3. Hypertension  4. Hyperlipidemia; on statin therapy  5. Chronic kidney disease  stage III  6. Anemia  7. Asthma/COPD  8. Lower extremity edema with negative venous duplex July 2012  9. BPH  10. Bilateral carpal tunnel syndrome  11. History of basal cell carcinoma on left side of nose  12. [facility] 14-day hospitalization September 2019 for TURP, complicated by postoperative aspiration pneumonia, hematuria, and anemia  13. Prediabetes with hemoglobin A1c 6.2% February 2020  14. Surgical history:   a. Appendectomy  b. TURP 1989  c. Peritonitis as a child  d. Basal cell carcinoma excision from nose  e. I&D great toe  f. Cystoscopy  g. TURP 2019  h. Bilateral cataracts    No Known Allergies    Current Outpatient Medications:   •  aspirin 81 MG EC tablet, Take 81 mg by mouth 1 (One) Time Per Week., Disp: , Rfl:   •  azelastine (ASTELIN) 0.1 % nasal spray, 2 sprays into the nostril(s) as directed by provider 2 (Two) Times a Day. Use in each nostril as directed, Disp: 3 each, Rfl: 3  •  bumetanide (BUMEX) 1 MG tablet, Take 1 mg by mouth Daily. 1 daily and 1 every other night, Disp: , Rfl:   •  Cholecalciferol (VITAMIN D3) 25 MCG (1000 UT) capsule, Take 1,000 Units by mouth Every Other Day., Disp: , Rfl:   •  finasteride (PROSCAR) 5 MG tablet, Take 1 tablet by mouth Daily., Disp: , Rfl:   •  ketoconazole (NIZORAL) 2 % cream, Apply 1 application topically to the appropriate area as directed Daily As Needed., Disp: , Rfl:   •  ketoconazole (NIZORAL) 2 % shampoo, , Disp: , Rfl:   •  metroNIDAZOLE (METROGEL) 0.75 % gel, , Disp: , Rfl:   •  pravastatin (PRAVACHOL) 40 MG tablet, Take 1 tablet by mouth Daily. 3x a week, Disp: 90 tablet, Rfl: 3    History of Present Illness: Patient returns for 1-month follow up. He has had apparent marked decline over the past 1 week. He had 1 night of difficulty sleeping due to back pain and has not had good appetite since. His back pain resolved with Tylenol and has not recurred. He has followed up with Dr. Russ and had laboratory studies collected. His laboratory studies were  "apparently acceptable other than his BNP was greater than 24,000; data deficit. He does not have shortness of breath while at rest but with minimal exertion he will become short of breath. He has not had any chest pain/pressure, sputum production, abdominal pain, nausea, or any GI or  difficulty. He has received COVID immunization.      ROS   Obtained and negative except as outlined in problem list and HPI.    Procedures       Objective:       Vitals:    09/30/21 1105 09/30/21 1108   BP: 105/63 106/63   BP Location: Left arm Left arm   Patient Position: Sitting Standing   Pulse: 100 101   SpO2: 93%    Weight: 82.1 kg (181 lb)    Height: 165.1 cm (65\")      Body mass index is 30.12 kg/m².  Last weight: 181 lbs    Vitals reviewed.   Constitutional:       Appearance: Well-developed.   Neck:      Thyroid: No thyromegaly.      Vascular: No carotid bruit or JVD.      Lymphadenopathy: No cervical adenopathy.   Pulmonary:      Effort: Pulmonary effort is normal.      Breath sounds: Examination of the right-lower field reveals rales. Examination of the left-lower field reveals rales. Decreased breath sounds present. No wheezing. No rhonchi. Rales (scattered) present.   Cardiovascular:      Regular rhythm.      Murmurs: There is a grade 1/6 harsh midsystolic murmur at the URSB, radiating to the neck.      No gallop. No S3 gallop.   Pulses:     Dorsalis pedis: 1+ bilaterally.     Posterior tibial: 1+ bilaterally.  Edema:     Pretibial: bilateral 1+ edema of the pretibial area.     Ankle: bilateral 1+ edema of the ankle.  Abdominal:      Palpations: Abdomen is soft. There is no abdominal mass.      Tenderness: There is no abdominal tenderness.   Musculoskeletal: Normal range of motion. Skin:     General: Skin is warm and dry.      Findings: No rash.   Neurological:      Mental Status: Alert and oriented to person, place, and time.           Lab Review:   Lab Results   Component Value Date    GLUCOSE 101 (H) 09/23/2021    BUN " 33 (H) 09/23/2021    CREATININE 1.38 (H) 09/23/2021    EGFRIFNONA 48 (L) 09/23/2021    EGFRIFAFRI 58 (L) 09/23/2021    BCR 23.9 09/23/2021     09/23/2021    K 4.2 09/23/2021    CL 99 09/23/2021    CO2 26.7 09/23/2021    CALCIUM 9.3 09/23/2021    ALBUMIN 3.90 09/23/2021    AST 57 (H) 09/23/2021    ALT 36 09/23/2021       Lab Results   Component Value Date    WBC 8.47 09/23/2021    HGB 14.1 09/23/2021    HCT 42.3 09/23/2021    MCV 96.8 09/23/2021     09/23/2021       Lab Results   Component Value Date    HGBA1C 6.14 (H) 02/25/2021       Lab Results   Component Value Date    TSH 1.110 02/25/2021       Lab Results   Component Value Date    CHOL 170 02/25/2021    CHOL 197 02/05/2020    CHOL 122 09/03/2019     Lab Results   Component Value Date    TRIG 75 02/25/2021    TRIG 93 02/05/2020    TRIG 71 09/03/2019     Lab Results   Component Value Date    HDL 69 (H) 02/25/2021    HDL 78 (H) 02/05/2020    HDL 49 09/03/2019     Lab Results   Component Value Date    LDL 87 02/25/2021     02/05/2020    LDL 59 09/03/2019             Assessment:       Overall continued acceptable course with no new interim cardiopulmonary complaints with fair functional status. We will defer additional diagnostic or therapeutic intervention from a cardiac perspective at this time. He is encouraged to use Bumex 1 mg BID if he does have shortness of breath or significant peripheral edema.     Diagnosis Plan   1. Chronic diastolic CHF (congestive heart failure) (HCC)  Chest x-ray in office today.    2. Essential hypertension  Well controlled. Continue current treatment.   3. Dyslipidemia  Well controlled. Continue pravastatin.          Plan:         1. Patient to continue current medications and close follow up with the above providers.  2. He should call in 2-3 weeks to update us on his symptoms.  3. Tentative cardiology follow up in January 2022 or patient may return sooner PRN.    I, Drew Eaton, attest that this documentation  has been prepared under the direction and in the presence of Jerod Ledbetter MD 09/30/2021    I, Jerod Ledbetter MD, [facility], personally performed the services described in this documentation as scribed by the above named individual in my presence, and it is both accurate and complete. At 11:51 EDT on 09/30/2021

## 2021-09-30 ENCOUNTER — HOSPITAL ENCOUNTER (OUTPATIENT)
Dept: GENERAL RADIOLOGY | Facility: HOSPITAL | Age: 86
Discharge: HOME | End: 2021-09-30
Admitting: INTERNAL MEDICINE

## 2021-09-30 ENCOUNTER — OFFICE VISIT (OUTPATIENT)
Dept: CARDIOLOGY | Facility: CLINIC | Age: 86
End: 2021-09-30

## 2021-09-30 VITALS
WEIGHT: 181 LBS | HEART RATE: 101 BPM | OXYGEN SATURATION: 93 % | HEIGHT: 65 IN | BODY MASS INDEX: 30.16 KG/M2 | DIASTOLIC BLOOD PRESSURE: 63 MMHG | SYSTOLIC BLOOD PRESSURE: 106 MMHG

## 2021-09-30 DIAGNOSIS — I10 ESSENTIAL HYPERTENSION: ICD-10-CM

## 2021-09-30 DIAGNOSIS — R06.09 DOE (DYSPNEA ON EXERTION): ICD-10-CM

## 2021-09-30 DIAGNOSIS — I50.32 CHRONIC DIASTOLIC CHF (CONGESTIVE HEART FAILURE) (HCC): Primary | ICD-10-CM

## 2021-09-30 DIAGNOSIS — E78.5 DYSLIPIDEMIA: ICD-10-CM

## 2021-09-30 PROCEDURE — 99214 OFFICE O/P EST MOD 30 MIN: CPT | Performed by: INTERNAL MEDICINE

## 2021-09-30 PROCEDURE — 71046 X-RAY EXAM CHEST 2 VIEWS: CPT

## 2021-10-01 ENCOUNTER — TELEPHONE (OUTPATIENT)
Dept: CARDIOLOGY | Facility: CLINIC | Age: 86
End: 2021-10-01

## 2021-10-01 NOTE — TELEPHONE ENCOUNTER
Per KTS- take Bumex 1 mg BID and call to update 3-4 days to update    Called pt and gave KTS recommendations above. Pt verbalizes understanding and agreeable to plan.

## 2021-10-05 NOTE — TELEPHONE ENCOUNTER
Patient's daughter called and stated that BLE swelling has decreased, but still more swollen than baseline. Patient seems to be breathing better, less SOB. His appetite is increasing and he is doing better than he was a few weeks ago. Patient has lost 5 pounds in past few weeks, pt's daughter believes this is due decreased intake.    Denies dizziness, chest pain, palpitations.    BP averaging 110/60 HR 80-90s    Pt currently taking:  Bumex 1 mg BID (since 10/2)      Please advise.

## 2021-10-05 NOTE — TELEPHONE ENCOUNTER
Noted; would continue Bumex 1 mg twice daily and elevate lower extremities and reduce dose to 1 mg daily when edema resolves.    Thanks!

## 2021-10-14 RX ORDER — METOLAZONE 5 MG/1
5 TABLET ORAL DAILY PRN
Qty: 30 TABLET | Refills: 0 | Status: SHIPPED | OUTPATIENT
Start: 2021-10-14 | End: 2022-10-13 | Stop reason: SDUPTHER

## 2021-10-14 RX ORDER — BUMETANIDE 1 MG/1
1 TABLET ORAL 2 TIMES DAILY
Qty: 180 TABLET | Refills: 3 | Status: SHIPPED | OUTPATIENT
Start: 2021-10-14 | End: 2022-06-21 | Stop reason: SDUPTHER

## 2021-10-14 NOTE — TELEPHONE ENCOUNTER
Called pt's daughter Nkechi and gave KTS recommendations above. Pt's daughter verbalizes understanding and agreeable to plan.

## 2021-10-14 NOTE — TELEPHONE ENCOUNTER
Patient's daughter called and stated that patient has gained 3 pounds since Monday and swelling is returning and increasing in feet. Patient decreased Bumex to once daily on 10/9/21.     Pt denies chest pain, SOB, dizziness, palpitations.     Pt currently taking:  Bumex 1 mg daily    Please advise.

## 2021-10-14 NOTE — TELEPHONE ENCOUNTER
Would recommend he take Bumex 1 mg BID indefinitely unless he has marked decrease in swelling longterm and also offer metolazone 5 mg daily PRN refractory edema and use this medication sparingly.    Thanks!

## 2021-10-27 ENCOUNTER — TREATMENT (OUTPATIENT)
Dept: PHYSICAL THERAPY | Facility: CLINIC | Age: 86
End: 2021-10-27

## 2021-10-27 DIAGNOSIS — R26.9 GAIT ABNORMALITY: ICD-10-CM

## 2021-10-27 DIAGNOSIS — Z74.09 IMPAIRED FUNCTIONAL MOBILITY, BALANCE, GAIT, AND ENDURANCE: Primary | ICD-10-CM

## 2021-10-27 PROCEDURE — 97110 THERAPEUTIC EXERCISES: CPT | Performed by: PHYSICAL THERAPIST

## 2021-10-27 PROCEDURE — 97162 PT EVAL MOD COMPLEX 30 MIN: CPT | Performed by: PHYSICAL THERAPIST

## 2021-10-27 NOTE — PROGRESS NOTES
Physical Therapy Initial Evaluation and Plan of Care      Patient: Chaitanya Browne   : 1923  Diagnosis/ICD-10 Code:  Impaired functional mobility, balance, gait, and endurance [Z74.09]  Referring practitioner: Dirk Russ MD  Date of Initial Visit: 10/27/2021  Today's Date: 10/27/2021  Patient seen for 1 sessions      Subjective:     Subjective Questionnaire: TUG 17.25 sec       Subjective Evaluation    History of Present Illness  Mechanism of injury: Patient is returning for LE strengthening. He is concerned about his balance but he is doing his exercises at home. He continues to do his sit to stands. He does complain of hand issues related to CT. He did have an issue with CHF but everything is clear now. He had wheezing, retaining fluid, weakness. He started on a diuertic. He is still on the drug now. He feels he is globally a little weak and wants to get stronger.     Pain  No pain reported    Patient Goals  Patient goals for therapy: improved balance and increased strength             Objective          Strength/Myotome Testing     Left Hip   Planes of Motion   Flexion: 4+  Abduction: 4-    Right Hip   Planes of Motion   Flexion: 4+  Abduction: 4-    Left Knee   Flexion: 4+  Extension: 4+    Right Knee   Flexion: 4+  Extension: 4+    Left Ankle/Foot   Dorsiflexion: 5    Right Ankle/Foot   Dorsiflexion: 5    Ambulation     Observational Gait     Additional Observational Gait Details  Normalized gait pattern with rollator         PT Neuro         Assessment & Plan     Assessment  Impairments: abnormal coordination, abnormal gait, activity intolerance, impaired balance, impaired physical strength and safety issue  Assessment details: Patient is a 89 YOM that presents to therapy with recent onset weakness from CHF event. Patient and daughter state he is globally weak and decreased endurance. He will benefit from increasing LE strength, as well UE strength for improved cardiovascular endurance.    Prognosis: fair  Functional Limitations: carrying objects, walking, standing and stooping  Goals  Plan Goals: STG (4 visits)  1. Patient will report compliance with initial HEP.   2. Patient to perform TUG within 16 sec without LOB for improved functional mobility.    LTG (8 visits)  1. Patient will be I with final HEP.   2. Patient will demonstrate improved LE strength >/= 1/2 muscle grade.   3. Patient to perform TUG within 15 sec without LOB for improved functional mobility.          Plan  Therapy options: will be seen for skilled physical therapy services  Planned modality interventions: TENS  Planned therapy interventions: ADL retraining, balance/weight-bearing training, flexibility, gait training, manual therapy, neuromuscular re-education, motor coordination training, postural training, strengthening, stretching, therapeutic activities, transfer training and home exercise program  Frequency: 1x week  Duration in visits: 8  Treatment plan discussed with: patient and caregiver  Plan details: Patient will be seen 1x/wk x 8wks with treatment to include strengthening, stretching, manual therapy, neuromuscular re-education, balance, gait and endurance training.           Timed:  Manual Therapy:    0     mins  16035;  Therapeutic Exercise:    10     mins  31586;     Neuromuscular Tahmina:    0    mins  28605;    Therapeutic Activity:     0     mins  51994;     Gait Trainin     mins  28824;     Electrical Stimulation:    0     mins  17247 ( );    Untimed:  Canalith Repositioning    0     mins 42686    Timed Treatment:   10   mins   Total Treatment:     45   mins    PT SIGNATURE: Vianca Durant PT, DPT, MSCS, CDP  [place] License #[id number]  DATE TREATMENT INITIATED: 10/27/2021      Medicare Initial Certification Certification Period: 10/27/9739pewh4  I certify that the therapy services are furnished while this patient is under my care.  The services outlined above are required by this patient, and  will be reviewed every 90 days.     PHYSICIAN: Dirk Russ MD      DATE:     Please sign and return via fax to [phone].   Thank you,   [facility].

## 2021-11-02 ENCOUNTER — TREATMENT (OUTPATIENT)
Dept: PHYSICAL THERAPY | Facility: CLINIC | Age: 86
End: 2021-11-02

## 2021-11-02 DIAGNOSIS — Z74.09 IMPAIRED FUNCTIONAL MOBILITY, BALANCE, GAIT, AND ENDURANCE: Primary | ICD-10-CM

## 2021-11-02 DIAGNOSIS — R26.9 GAIT ABNORMALITY: ICD-10-CM

## 2021-11-02 PROCEDURE — 97110 THERAPEUTIC EXERCISES: CPT | Performed by: PHYSICAL THERAPIST

## 2021-11-02 PROCEDURE — 97112 NEUROMUSCULAR REEDUCATION: CPT | Performed by: PHYSICAL THERAPIST

## 2021-11-02 NOTE — PROGRESS NOTES
Physical Therapy Daily Note  Visit: 2  Date of Initial Visit: Type: THERAPY  Noted: 10/27/2021    Patient: Chaitanya Browne   : 1923  Diagnosis/ICD-10 Code:  Impaired functional mobility, balance, gait, and endurance [Z74.09]  Referring practitioner: Dirk Russ MD  Date of Initial Visit: Type: THERAPY  Noted: 10/27/2021  Today's Date: 2021  Patient seen for 2 sessions      Subjective:   Patient reports: he is fine. He is in the vertical.   Pain: 0/10  Clinical Progress: improved  Home Program Compliance: Yes  Treatment has included: therapeutic exercise and neuromuscular re-education    Objective   See Exercise, Manual, and Modality Logs for complete treatment.    PT Neuro   Exercise 1  Exercise Name 1: Nu-Step  Equipment/Resistance 1: L6  Time: 15 min - endurance  Exercise 2  Exercise Name 2: forward step over HFR with step up  Sets/Reps 2: 10 ea  Exercise 3  Exercise Name 3: airex standing with 2# DB forward punches, bicep curls and hammer curls  Sets/Reps 3: 10 ea  Exercise 4  Exercise Name 4: airex lateral taps to floor  Sets/Reps 4: 20  Exercise 5  Exercise Name 5: lateral step up and over step  Sets/Reps 5: 10  Exercise 6  Exercise Name 6: seated 1# ER, bent elbow punches, horizontal abduction  Sets/Reps 6: 10    Assessment & Plan     Assessment  Assessment details: Patient c/o knee fatigue after standing/stepping exercises. He required 2 rest breaks, however he demo'd good balance. He will continue to progressed as indicated for general strengthening and balance.     Plan  Plan details: Patient to continue with PT services to improve gait, balance, strength, transfers and overall functional mobility.          Timed:  Manual Therapy:            0     mins  21017;  Therapeutic Exercise:    15    mins  14757;     Neuromuscular Tahmina:    25    mins  17874;    Therapeutic Activity:      0     mins  00368;     Gait Trainin    mins  33845;     Electrical Stimulation:    0     mins  93841 ( );     Untimed:  Canalith Repositioning techniques _[id number]_ [id number]      Timed Treatment:   40   mins   Total Treatment:     40   mins      Vianca Durant PT, SUSANAT, MSCS, CDP  [place] License #: [id number]  Physical Therapist

## 2021-11-11 ENCOUNTER — TREATMENT (OUTPATIENT)
Dept: PHYSICAL THERAPY | Facility: CLINIC | Age: 86
End: 2021-11-11

## 2021-11-11 DIAGNOSIS — Z74.09 IMPAIRED FUNCTIONAL MOBILITY, BALANCE, GAIT, AND ENDURANCE: ICD-10-CM

## 2021-11-11 DIAGNOSIS — R26.9 GAIT ABNORMALITY: Primary | ICD-10-CM

## 2021-11-11 PROCEDURE — 97112 NEUROMUSCULAR REEDUCATION: CPT | Performed by: PHYSICAL THERAPIST

## 2021-11-11 PROCEDURE — 97110 THERAPEUTIC EXERCISES: CPT | Performed by: PHYSICAL THERAPIST

## 2021-11-11 NOTE — PROGRESS NOTES
Physical Therapy Daily Note  Visit: 3  Date of Initial Visit: Type: THERAPY  Noted: 10/27/2021    Patient: Chaitanya Browne   : 1923  Diagnosis/ICD-10 Code:  Gait abnormality [R26.9]  Referring practitioner: Dirk Russ MD  Date of Initial Visit: Type: THERAPY  Noted: 10/27/2021  Today's Date: 2021  Patient seen for 3 sessions      Subjective:   Patient reports: he had a cramp last night in his hamstring.   Pain: 0/10  Clinical Progress: improved  Home Program Compliance: Yes  Treatment has included: therapeutic exercise and neuromuscular re-education    Objective   See Exercise, Manual, and Modality Logs for complete treatment.    PT Neuro   Exercise 1  Exercise Name 1: Nu-Step  Equipment/Resistance 1: L6  Time: 15 min - endurance  Exercise 2  Exercise Name 2: HS stretch  Equipment/Resistance 2: w/ strap  Sets/Reps 2: 2  Time 2: 30 sec  Exercise 3  Exercise Name 3: calf stretch  Equipment/Resistance 3: HFR  Sets/Reps 3: 2  Time 3: 30 sec  Exercise 4  Exercise Name 4: forward lunge onto BOSU with crossover arm reach  Sets/Reps 4: 10 ea  Exercise 5  Exercise Name 5: lateral step over HFR with 1# elbow lift  Sets/Reps 5: 5 ea side      Assessment & Plan     Assessment  Assessment details: Patient demonstrates cramping in L hamstring during previous night. Stretching was reviewed using a stretching strap. Patient did like the strap and will likely order one for home use.     Plan  Plan details: Patient to continue with PT services to improve gait, balance, strength, transfers and overall functional mobility.          Timed:  Manual Therapy:            0     mins  40773;  Therapeutic Exercise:    25    mins  48641;     Neuromuscular Tahmina:    15    mins  49128;    Therapeutic Activity:      0     mins  72926;     Gait Trainin    mins  24824;     Electrical Stimulation:    0    mins  27953 ( );     Untimed:  Canalith Repositioning techniques _0_ 86185      Timed Treatment:    40   mins   Total Treatment:     40   mins      Vianca Durant PT, DPT, MSCS, CDP  [place] License #: [id number]  Physical Therapist

## 2021-11-18 ENCOUNTER — TREATMENT (OUTPATIENT)
Dept: PHYSICAL THERAPY | Facility: CLINIC | Age: 86
End: 2021-11-18

## 2021-11-18 DIAGNOSIS — R26.9 GAIT ABNORMALITY: Primary | ICD-10-CM

## 2021-11-18 DIAGNOSIS — Z74.09 IMPAIRED FUNCTIONAL MOBILITY, BALANCE, GAIT, AND ENDURANCE: ICD-10-CM

## 2021-11-18 PROCEDURE — 97110 THERAPEUTIC EXERCISES: CPT | Performed by: PHYSICAL THERAPIST

## 2021-11-18 PROCEDURE — 97112 NEUROMUSCULAR REEDUCATION: CPT | Performed by: PHYSICAL THERAPIST

## 2021-11-18 NOTE — PROGRESS NOTES
Physical Therapy Daily Note  Visit: 4  Date of Initial Visit: Type: THERAPY  Noted: 10/27/2021    Patient: Chaitanya Browne   : 1923  Diagnosis/ICD-10 Code:  Gait abnormality [R26.9]  Referring practitioner: Dirk Russ MD  Date of Initial Visit: Type: THERAPY  Noted: 10/27/2021  Today's Date: 2021  Patient seen for 4 sessions      Subjective:   Patient reports: he is well and vertical.   Pain: 0/10  Clinical Progress: improved  Home Program Compliance: Yes  Treatment has included: therapeutic exercise and neuromuscular re-education    Objective   See Exercise, Manual, and Modality Logs for complete treatment.    PT Neuro          Assessment & Plan     Assessment  Assessment details: Patient demonstrates good balance with static stance on altered surface, as well as dyanmic balance on beam. Patient will continue HEP as prescribed with no updates or changes this week.     Plan  Plan details: Patient to continue with PT services to improve gait, balance, strength, transfers and overall functional mobility.          Timed:  Manual Therapy:            0     mins  95335;  Therapeutic Exercise:    20    mins  75888;     Neuromuscular Tahmina:    25    mins  32434;    Therapeutic Activity:      0     mins  41217;     Gait Trainin    mins  20561;     Electrical Stimulation:    0    mins  68497 ( );     Untimed:  Canalith Repositioning techniques _0_ 98825      Timed Treatment:   45   mins   Total Treatment:     45   mins      iVanca Durant PT, DPT, MSCS, CDP  [place] License #: [id number]  Physical Therapist

## 2021-11-23 ENCOUNTER — TREATMENT (OUTPATIENT)
Dept: PHYSICAL THERAPY | Facility: CLINIC | Age: 86
End: 2021-11-23

## 2021-11-23 DIAGNOSIS — R26.9 GAIT ABNORMALITY: Primary | ICD-10-CM

## 2021-11-23 DIAGNOSIS — Z74.09 IMPAIRED FUNCTIONAL MOBILITY, BALANCE, GAIT, AND ENDURANCE: ICD-10-CM

## 2021-11-23 PROCEDURE — 97110 THERAPEUTIC EXERCISES: CPT | Performed by: PHYSICAL THERAPIST

## 2021-11-23 PROCEDURE — 97112 NEUROMUSCULAR REEDUCATION: CPT | Performed by: PHYSICAL THERAPIST

## 2021-11-23 NOTE — PROGRESS NOTES
PT Progress Note  Visit: 5  Date of Initial Visit: Type: THERAPY  Noted: 10/27/2021    Patient: Chaitanya Browne   : 1923  Diagnosis/ICD-10 Code:  Gait abnormality [R26.9]  Referring practitioner: Dirk Russ MD  Date of Initial Visit: Type: THERAPY  Noted: 10/27/2021  Today's Date: 2021  Patient seen for 5 sessions      Subjective:   Patient reports: he is in the vertical   Pain: 0/10  Clinical Progress: improved  Home Program Compliance: Yes  Treatment has included: therapeutic exercise and neuromuscular re-education    Objective   See Exercise, Manual, and Modality Logs for complete treatment.    PT Neuro         Assessment & Plan     Assessment  Impairments: abnormal coordination, abnormal gait, activity intolerance, impaired balance, impaired physical strength and safety issue  Functional Limitations: carrying objects, walking, standing and stooping  Assessment details: Patient demonstrates improvement in endurance and strength per pt and daughter. He was even able to stand and take steps without using UE assist. Patient would benefit from continued skilled PT to address endurance, strength and balance.   Prognosis: fair    Goals  Plan Goals: STG (4 visits)  1. Patient will report compliance with initial HEP. MET  2. Patient to perform TUG within 16 sec without LOB for improved functional mobility. MET    LTG (8 visits)  1. Patient will be I with final HEP. ONGOING  2. Patient will demonstrate improved LE strength >/= 1/2 muscle grade. ONGOING  3. Patient to perform TUG within 15 sec without LOB for improved functional mobility.ONGOING          Plan  Therapy options: will be seen for skilled therapy services  Planned modality interventions: TENS  Planned therapy interventions: ADL retraining, balance/weight-bearing training, flexibility, gait training, manual therapy, neuromuscular re-education, motor coordination training, postural training, strengthening, stretching, therapeutic activities,  transfer training and home exercise program  Frequency: 1x week  Duration in visits: 4  Treatment plan discussed with: patient and caregiver  Plan details: Patient will continue to be seen 1x/wk x 4wks with treatment to include strengthening, stretching, manual therapy, neuromuscular re-education, balance, gait and endurance training.           Visit Diagnoses:    ICD-10-CM ICD-9-CM   1. Gait abnormality  R26.9 781.2   2. Impaired functional mobility, balance, gait, and endurance  Z74.09 V49.89       Progress toward previous goals: Partially Met      Recommendations: Continue as planned  Timeframe: 1 month    PT Signature: Vianca Durant, PT, DPT, MSCS, CDP  [place] License #: [id number]    Based upon review of the patient's progress and continued therapy plan, it is my medical opinion that Chaitanya Browne should continue physical therapy treatment at [facility]  [address] [facility] [id number] [phone].    Signature: __________________________________  Dirk Russ MD    Timed:  Manual Therapy:    0     mins  14091;  Therapeutic Exercise:    20     mins  21107;     Neuromuscular Tahmina:    25    mins  77997;    Therapeutic Activity:     0     mins  04300;     Gait Trainin     mins  38508;     Electrical Stimulation:    0     mins  19084 ( );    Untimed:  Canalith Repositioning   0 mins  96758    Timed Treatment:   45   mins   Total Treatment:     45   mins

## 2021-12-02 ENCOUNTER — TREATMENT (OUTPATIENT)
Dept: PHYSICAL THERAPY | Facility: CLINIC | Age: 86
End: 2021-12-02

## 2021-12-02 DIAGNOSIS — R26.9 GAIT ABNORMALITY: Primary | ICD-10-CM

## 2021-12-02 DIAGNOSIS — Z74.09 IMPAIRED FUNCTIONAL MOBILITY, BALANCE, GAIT, AND ENDURANCE: ICD-10-CM

## 2021-12-02 PROCEDURE — 97112 NEUROMUSCULAR REEDUCATION: CPT | Performed by: PHYSICAL THERAPIST

## 2021-12-02 PROCEDURE — 97110 THERAPEUTIC EXERCISES: CPT | Performed by: PHYSICAL THERAPIST

## 2021-12-02 NOTE — PROGRESS NOTES
Physical Therapy Daily Note  Visit: 6  Date of Initial Visit: Type: THERAPY  Noted: 10/27/2021    Patient: Chaitanya Browne   : 1923  Diagnosis/ICD-10 Code:  Gait abnormality [R26.9]  Referring practitioner: Dirk Russ MD  Date of Initial Visit: Type: THERAPY  Noted: 10/27/2021  Today's Date: 2021  Patient seen for 6 sessions      Subjective:   Patient reports: he is in the vertical   Pain: 0/10  Clinical Progress: improved  Home Program Compliance: Yes  Treatment has included: therapeutic exercise and neuromuscular re-education    Objective   See Exercise, Manual, and Modality Logs for complete treatment.    PT Neuro          Assessment & Plan     Assessment    Assessment details: Patient able to perform step over without UE assist. He is able to perform static stance on unstable surface performing UE tasks. He will continue to be progressed as indicated.     Plan  Plan details: Patient to continue with PT services to improve gait, balance, strength, transfers and overall functional mobility.          Timed:  Manual Therapy:            0     mins  95439;  Therapeutic Exercise:    25    mins  16746;     Neuromuscular Tahmina:    15    mins  61797;    Therapeutic Activity:      0     mins  47663;     Gait Trainin    mins  46585;     Electrical Stimulation:    0    mins  32635 ( );     Untimed:  Canalith Repositioning techniques _0_ 32796      Timed Treatment:   40   mins   Total Treatment:     40   mins      Vianca Durant PT, DPT, MSCS, CDP  [place] License #: [id number]  Physical Therapist

## 2021-12-09 ENCOUNTER — TREATMENT (OUTPATIENT)
Dept: PHYSICAL THERAPY | Facility: CLINIC | Age: 86
End: 2021-12-09

## 2021-12-09 DIAGNOSIS — R26.9 GAIT ABNORMALITY: Primary | ICD-10-CM

## 2021-12-09 DIAGNOSIS — Z74.09 IMPAIRED FUNCTIONAL MOBILITY, BALANCE, GAIT, AND ENDURANCE: ICD-10-CM

## 2021-12-09 PROCEDURE — 97110 THERAPEUTIC EXERCISES: CPT | Performed by: PHYSICAL THERAPIST

## 2021-12-09 PROCEDURE — 97112 NEUROMUSCULAR REEDUCATION: CPT | Performed by: PHYSICAL THERAPIST

## 2021-12-09 NOTE — PROGRESS NOTES
Physical Therapy Daily Note  Visit: 7  Date of Initial Visit: Type: THERAPY  Noted: 10/27/2021    Patient: Chaitanya Browne   : 1923  Diagnosis/ICD-10 Code:  Gait abnormality [R26.9]  Referring practitioner: Dirk Russ MD  Date of Initial Visit: Type: THERAPY  Noted: 10/27/2021  Today's Date: 2021  Patient seen for 7 sessions      Subjective:   Patient reports: he is in the vertical.   Pain: 0/10  Clinical Progress: improved  Home Program Compliance: Yes  Treatment has included: therapeutic exercise and neuromuscular re-education    Objective   See Exercise, Manual, and Modality Logs for complete treatment.    PT Neuro          Assessment & Plan     Assessment    Assessment details: Patient demonstrates good stability stepping with an UE activity. He was able to move from altered surface forward and backwards. Patient will continue to be progressed as indicated.     Plan  Plan details: Patient to continue with PT services to improve gait, balance, strength, transfers and overall functional mobility.          Timed:  Manual Therapy:            0     mins  37724;  Therapeutic Exercise:    30    mins  46455;     Neuromuscular Tahmina:    10    mins  10334;    Therapeutic Activity:      0     mins  87729;     Gait Trainin    mins  87202;     Electrical Stimulation:    0    mins  60998 ( );     Untimed:  Canalith Repositioning techniques _0_ 75838      Timed Treatment:   40   mins   Total Treatment:     40   mins      Vianca Durant PT, DPT, MSCS, CDP  [place] License #: [id number]  Physical Therapist

## 2021-12-16 ENCOUNTER — TREATMENT (OUTPATIENT)
Dept: PHYSICAL THERAPY | Facility: CLINIC | Age: 86
End: 2021-12-16

## 2021-12-16 DIAGNOSIS — R26.9 GAIT ABNORMALITY: Primary | ICD-10-CM

## 2021-12-16 DIAGNOSIS — Z74.09 IMPAIRED FUNCTIONAL MOBILITY, BALANCE, GAIT, AND ENDURANCE: ICD-10-CM

## 2021-12-16 PROCEDURE — 97110 THERAPEUTIC EXERCISES: CPT | Performed by: PHYSICAL THERAPIST

## 2021-12-16 PROCEDURE — 97112 NEUROMUSCULAR REEDUCATION: CPT | Performed by: PHYSICAL THERAPIST

## 2021-12-16 NOTE — PROGRESS NOTES
Physical Therapy Daily Note  Visit: 8  Date of Initial Visit: Type: THERAPY  Noted: 10/27/2021    Patient: Chaitanya Browne   : 1923  Diagnosis/ICD-10 Code:  Gait abnormality [R26.9]  Referring practitioner: Dirk Russ MD  Date of Initial Visit: Type: THERAPY  Noted: 10/27/2021  Today's Date: 2021  Patient seen for 8 sessions      Subjective:   Patient reports: he is feeling good.   Pain: 0/10  Clinical Progress: improved  Home Program Compliance: Yes  Treatment has included: therapeutic exercise and neuromuscular re-education    Objective   See Exercise, Manual, and Modality Logs for complete treatment.    PT Neuro          Assessment & Plan     Assessment    Assessment details: Patient with improved balance and walking with UE activity. Patient will be re-assessed next visit with potential diagnosis.     Plan  Plan details: Patient to continue with PT services to improve gait, balance, strength, transfers and overall functional mobility.          Timed:  Manual Therapy:            0     mins  42130;  Therapeutic Exercise:    25    mins  70676;     Neuromuscular Tahmina:    15    mins  56745;    Therapeutic Activity:      0     mins  44483;     Gait Trainin    mins  72187;     Electrical Stimulation:    0    mins  43263 ( );     Untimed:  Canalith Repositioning techniques _0_ 64327      Timed Treatment:   40   mins   Total Treatment:     40   mins      Vianca Durant PT, DPT, MSCS, CDP  [place] License #: [id number]  Physical Therapist

## 2021-12-23 ENCOUNTER — TREATMENT (OUTPATIENT)
Dept: PHYSICAL THERAPY | Facility: CLINIC | Age: 86
End: 2021-12-23

## 2021-12-23 DIAGNOSIS — Z74.09 IMPAIRED FUNCTIONAL MOBILITY, BALANCE, GAIT, AND ENDURANCE: ICD-10-CM

## 2021-12-23 DIAGNOSIS — R26.9 GAIT ABNORMALITY: Primary | ICD-10-CM

## 2021-12-23 PROCEDURE — 97112 NEUROMUSCULAR REEDUCATION: CPT | Performed by: PHYSICAL THERAPIST

## 2021-12-23 PROCEDURE — 97110 THERAPEUTIC EXERCISES: CPT | Performed by: PHYSICAL THERAPIST

## 2021-12-23 NOTE — PROGRESS NOTES
Physical Therapy Daily Note/Discharge Summary  Visit: 9  Date of Initial Visit: Type: THERAPY  Noted: 10/27/2021    Patient: Chaitanya Browne   : 1923  Diagnosis/ICD-10 Code:  Gait abnormality [R26.9]  Referring practitioner: Dirk Russ MD  Date of Initial Visit: Type: THERAPY  Noted: 10/27/2021  Today's Date: 2021  Patient seen for 9 sessions      Subjective:   Patient reports: he is in the vertical!   Pain: 0/10  Clinical Progress: improved  Home Program Compliance: Yes  Treatment has included: therapeutic exercise and neuromuscular re-education    Objective   See Exercise, Manual, and Modality Logs for complete treatment.    PT Neuro          Assessment & Plan     Assessment    Assessment details: Patient feels he has made good gains since beginning skilled PT. Pt will continue HEP exercises at home. He and his daughter were instructed to return if anything changes with pt physically.     Plan  Plan details: Patient will be discharged at this time.         Timed:  Manual Therapy:            0     mins  35450;  Therapeutic Exercise:    30    mins  57808;     Neuromuscular Tahmina:    10    mins  08523;    Therapeutic Activity:      0     mins  80260;     Gait Trainin    mins  24328;     Electrical Stimulation:    0    mins  42646 ( );     Untimed:  Canalith Repositioning techniques _0_ 02451      Timed Treatment:   40   mins   Total Treatment:     40   mins      Vianca Durant PT, DPT, MSCS, CDP  [place] License #: [id number]  Physical Therapist

## 2022-02-22 ENCOUNTER — LAB (OUTPATIENT)
Dept: LAB | Facility: HOSPITAL | Age: 87
End: 2022-02-22

## 2022-02-22 ENCOUNTER — OFFICE VISIT (OUTPATIENT)
Dept: INTERNAL MEDICINE | Facility: CLINIC | Age: 87
End: 2022-02-22

## 2022-02-22 VITALS
HEART RATE: 62 BPM | SYSTOLIC BLOOD PRESSURE: 98 MMHG | WEIGHT: 178 LBS | TEMPERATURE: 98 F | HEIGHT: 65 IN | DIASTOLIC BLOOD PRESSURE: 56 MMHG | BODY MASS INDEX: 29.66 KG/M2

## 2022-02-22 DIAGNOSIS — E66.3 OVERWEIGHT (BMI 25.0-29.9): ICD-10-CM

## 2022-02-22 DIAGNOSIS — R73.01 IMPAIRED FASTING GLUCOSE: ICD-10-CM

## 2022-02-22 DIAGNOSIS — I25.10 ASHD (ARTERIOSCLEROTIC HEART DISEASE): ICD-10-CM

## 2022-02-22 DIAGNOSIS — R41.3 MEMORY LOSS: ICD-10-CM

## 2022-02-22 DIAGNOSIS — E55.9 VITAMIN D DEFICIENCY: ICD-10-CM

## 2022-02-22 DIAGNOSIS — R26.9 GAIT ABNORMALITY: ICD-10-CM

## 2022-02-22 DIAGNOSIS — E78.5 HYPERLIPIDEMIA LDL GOAL <70: ICD-10-CM

## 2022-02-22 DIAGNOSIS — N13.8 BPH WITH OBSTRUCTION/LOWER URINARY TRACT SYMPTOMS: ICD-10-CM

## 2022-02-22 DIAGNOSIS — Z00.00 MEDICARE ANNUAL WELLNESS VISIT, SUBSEQUENT: Primary | ICD-10-CM

## 2022-02-22 DIAGNOSIS — I50.32 CHRONIC DIASTOLIC CHF (CONGESTIVE HEART FAILURE): ICD-10-CM

## 2022-02-22 DIAGNOSIS — Z91.81 AT HIGH RISK FOR INJURY RELATED TO FALL: ICD-10-CM

## 2022-02-22 DIAGNOSIS — J41.0 SIMPLE CHRONIC BRONCHITIS: ICD-10-CM

## 2022-02-22 DIAGNOSIS — N40.1 BPH WITH OBSTRUCTION/LOWER URINARY TRACT SYMPTOMS: ICD-10-CM

## 2022-02-22 DIAGNOSIS — I10 ESSENTIAL HYPERTENSION: ICD-10-CM

## 2022-02-22 LAB
ALBUMIN SERPL-MCNC: 3.9 G/DL (ref 3.5–5.2)
ALBUMIN/GLOB SERPL: 1.3 G/DL
ALP SERPL-CCNC: 77 U/L (ref 39–117)
ALT SERPL W P-5'-P-CCNC: 6 U/L (ref 1–41)
ANION GAP SERPL CALCULATED.3IONS-SCNC: 14.4 MMOL/L (ref 5–15)
AST SERPL-CCNC: 17 U/L (ref 1–40)
BASOPHILS # BLD AUTO: 0.04 10*3/MM3 (ref 0–0.2)
BASOPHILS NFR BLD AUTO: 0.6 % (ref 0–1.5)
BILIRUB SERPL-MCNC: 1.4 MG/DL (ref 0–1.2)
BUN SERPL-MCNC: 19 MG/DL (ref 8–23)
BUN/CREAT SERPL: 14.3 (ref 7–25)
CALCIUM SPEC-SCNC: 9.5 MG/DL (ref 8.2–9.6)
CHLORIDE SERPL-SCNC: 99 MMOL/L (ref 98–107)
CHOLEST SERPL-MCNC: 187 MG/DL (ref 0–200)
CO2 SERPL-SCNC: 28.6 MMOL/L (ref 22–29)
CREAT SERPL-MCNC: 1.33 MG/DL (ref 0.76–1.27)
DEPRECATED RDW RBC AUTO: 40.6 FL (ref 37–54)
EOSINOPHIL # BLD AUTO: 0.05 10*3/MM3 (ref 0–0.4)
EOSINOPHIL NFR BLD AUTO: 0.8 % (ref 0.3–6.2)
ERYTHROCYTE [DISTWIDTH] IN BLOOD BY AUTOMATED COUNT: 11.8 % (ref 12.3–15.4)
GFR SERPL CREATININE-BSD FRML MDRD: 50 ML/MIN/1.73
GFR SERPL CREATININE-BSD FRML MDRD: 60 ML/MIN/1.73
GLOBULIN UR ELPH-MCNC: 2.9 GM/DL
GLUCOSE SERPL-MCNC: 97 MG/DL (ref 65–99)
HCT VFR BLD AUTO: 42.7 % (ref 37.5–51)
HDLC SERPL-MCNC: 79 MG/DL (ref 40–60)
HGB BLD-MCNC: 14.1 G/DL (ref 13–17.7)
IMM GRANULOCYTES # BLD AUTO: 0.02 10*3/MM3 (ref 0–0.05)
IMM GRANULOCYTES NFR BLD AUTO: 0.3 % (ref 0–0.5)
LDLC SERPL CALC-MCNC: 89 MG/DL (ref 0–100)
LDLC/HDLC SERPL: 1.08 {RATIO}
LYMPHOCYTES # BLD AUTO: 0.85 10*3/MM3 (ref 0.7–3.1)
LYMPHOCYTES NFR BLD AUTO: 12.9 % (ref 19.6–45.3)
MCH RBC QN AUTO: 31.7 PG (ref 26.6–33)
MCHC RBC AUTO-ENTMCNC: 33 G/DL (ref 31.5–35.7)
MCV RBC AUTO: 96 FL (ref 79–97)
MONOCYTES # BLD AUTO: 0.96 10*3/MM3 (ref 0.1–0.9)
MONOCYTES NFR BLD AUTO: 14.5 % (ref 5–12)
NEUTROPHILS NFR BLD AUTO: 4.68 10*3/MM3 (ref 1.7–7)
NEUTROPHILS NFR BLD AUTO: 70.9 % (ref 42.7–76)
NRBC BLD AUTO-RTO: 0 /100 WBC (ref 0–0.2)
PLATELET # BLD AUTO: 237 10*3/MM3 (ref 140–450)
PMV BLD AUTO: 10.8 FL (ref 6–12)
POTASSIUM SERPL-SCNC: 3.8 MMOL/L (ref 3.5–5.2)
PROT SERPL-MCNC: 6.8 G/DL (ref 6–8.5)
RBC # BLD AUTO: 4.45 10*6/MM3 (ref 4.14–5.8)
SODIUM SERPL-SCNC: 142 MMOL/L (ref 136–145)
TRIGL SERPL-MCNC: 112 MG/DL (ref 0–150)
TSH SERPL DL<=0.05 MIU/L-ACNC: 1.03 UIU/ML (ref 0.27–4.2)
VLDLC SERPL-MCNC: 19 MG/DL (ref 5–40)
WBC NRBC COR # BLD: 6.6 10*3/MM3 (ref 3.4–10.8)

## 2022-02-22 PROCEDURE — 85025 COMPLETE CBC W/AUTO DIFF WBC: CPT

## 2022-02-22 PROCEDURE — 84443 ASSAY THYROID STIM HORMONE: CPT

## 2022-02-22 PROCEDURE — G0439 PPPS, SUBSEQ VISIT: HCPCS | Performed by: INTERNAL MEDICINE

## 2022-02-22 PROCEDURE — 80061 LIPID PANEL: CPT

## 2022-02-22 PROCEDURE — 99397 PER PM REEVAL EST PAT 65+ YR: CPT | Performed by: INTERNAL MEDICINE

## 2022-02-22 PROCEDURE — 1159F MED LIST DOCD IN RCRD: CPT | Performed by: INTERNAL MEDICINE

## 2022-02-22 PROCEDURE — 80053 COMPREHEN METABOLIC PANEL: CPT

## 2022-02-22 PROCEDURE — 82607 VITAMIN B-12: CPT

## 2022-02-22 PROCEDURE — 82306 VITAMIN D 25 HYDROXY: CPT

## 2022-02-22 PROCEDURE — 83036 HEMOGLOBIN GLYCOSYLATED A1C: CPT

## 2022-02-22 RX ORDER — PRAVASTATIN SODIUM 40 MG
40 TABLET ORAL DAILY
Qty: 90 TABLET | Refills: 3 | Status: SHIPPED | OUTPATIENT
Start: 2022-02-22 | End: 2023-03-21 | Stop reason: SDUPTHER

## 2022-02-22 RX ORDER — FINASTERIDE 5 MG/1
5 TABLET, FILM COATED ORAL DAILY
Qty: 90 TABLET | Refills: 1
Start: 2022-02-22 | End: 2022-08-02 | Stop reason: SDUPTHER

## 2022-02-22 RX ORDER — ASPIRIN 81 MG/1
81 TABLET ORAL EVERY OTHER DAY
Start: 2022-02-22

## 2022-02-22 NOTE — ASSESSMENT & PLAN NOTE
Patient's (Body mass index is 29.62 kg/m².) indicates that they are overweight with health conditions that include hypertension, coronary heart disease, dyslipidemias and osteoarthritis . Weight is improving with lifestyle modifications. BMI is is above average; BMI management plan is completed. We discussed portion control and increasing exercise.

## 2022-02-22 NOTE — ASSESSMENT & PLAN NOTE
Proceed with PT and Encourage to get up slowly and get bearings before taking first step. Keep home well lit with clear floors to avoid tripping. Use assist devices for all walking especially from bed to bathroom.

## 2022-02-22 NOTE — PROGRESS NOTES
QUICK REFERENCE INFORMATION:  The ABCs of the Annual Wellness Visit    Subsequent Medicare Wellness Visit    HEALTH RISK ASSESSMENT    2/23/1923    Recent Hospitalizations:  No hospitalization(s) within the last year..        Current Medical Providers:  Patient Care Team:  Dirk Russ MD as PCP - General (Internal Medicine)  Fabricio Zavala MD as Consulting Physician (Internal Medicine)        Smoking Status:  Social History     Tobacco Use   Smoking Status Never Smoker   Smokeless Tobacco Never Used       Alcohol Consumption:  Social History     Substance and Sexual Activity   Alcohol Use No       Depression Screen:   PHQ-2/PHQ-9 Depression Screening 2/22/2022   Little interest or pleasure in doing things 0   Feeling down, depressed, or hopeless 0   Total Score 0       Health Habits and Functional and Cognitive Screening:  Functional & Cognitive Status 2/22/2022   Do you have difficulty preparing food and eating? Yes   Do you have difficulty bathing yourself, getting dressed or grooming yourself? No   Do you have difficulty using the toilet? No   Do you have difficulty moving around from place to place? No   Do you have trouble with steps or getting out of a bed or a chair? -   Current Diet Limited Junk Food   Dental Exam Not up to date        Dental Exam Comment -   Eye Exam Up to date        Eye Exam Comment -   Exercise (times per week) 7 times per week   Current Exercises Include Walking   Current Exercise Activities Include -   Do you need help using the phone?  No   Are you deaf or do you have serious difficulty hearing?  Yes   Do you need help with transportation? No   Do you need help shopping? Yes   Do you need help preparing meals?  Yes   Do you need help with housework?  Yes   Do you need help with laundry? Yes   Do you need help taking your medications? No   Do you need help managing money? No   Do you ever drive or ride in a car without wearing a seat belt? No   Have you felt unusual  stress, anger or loneliness in the last month? No   Who do you live with? Alone   If you need help, do you have trouble finding someone available to you? No   Have you been bothered in the last four weeks by sexual problems? No   Do you have difficulty concentrating, remembering or making decisions? No       Fall Risk Screen:  MURALI Fall Risk Assessment was completed, and patient is at LOW risk for falls.Assessment completed on:2/22/2022    ACE III MINI        Does the patient have evidence of cognitive impairment? Yes    Aspirin use counseling: Taking ASA appropriately as indicated    Recent Lab Results:  CMP:  Lab Results   Component Value Date    BUN 33 (H) 09/23/2021    CREATININE 1.38 (H) 09/23/2021    EGFRIFNONA 48 (L) 09/23/2021    EGFRIFAFRI 58 (L) 09/23/2021    BCR 23.9 09/23/2021     09/23/2021    K 4.2 09/23/2021    CO2 26.7 09/23/2021    CALCIUM 9.3 09/23/2021    ALBUMIN 3.90 09/23/2021    BILITOT 1.3 (H) 09/23/2021    ALKPHOS 82 09/23/2021    AST 57 (H) 09/23/2021    ALT 36 09/23/2021     HbA1c:  Lab Results   Component Value Date    HGBA1C 6.14 (H) 02/25/2021    HGBA1C 6.20 (H) 02/05/2020     Microalbumin:  Lab Results   Component Value Date    MICROALBUR 39.3 08/27/2019     Lipid Panel  Lab Results   Component Value Date    CHOL 170 02/25/2021    TRIG 75 02/25/2021    HDL 69 (H) 02/25/2021    LDL 87 02/25/2021    AST 57 (H) 09/23/2021    ALT 36 09/23/2021       Visual Acuity:  No exam data present    Age-appropriate Screening Schedule:  Refer to the list below for future screening recommendations based on patient's age, sex and/or medical conditions. Orders for these recommended tests are listed in the plan section. The patient has been provided with a written plan.    Health Maintenance   Topic Date Due   • ZOSTER VACCINE (1 of 2) Never done   • TDAP/TD VACCINES (2 - Td or Tdap) 10/18/2018   • LIPID PANEL  02/25/2022   • INFLUENZA VACCINE  Completed        Subjective   History of Present  Illness    Chaitanya Browne is a 89 y.o. male who presents for a Subsequent Wellness Visit.    CHRONIC CONDITIONS    The following portions of the patient's history were reviewed and updated as appropriate: allergies, current medications, past family history, past medical history, past social history, past surgical history and problem list.    Outpatient Medications Prior to Visit   Medication Sig Dispense Refill   • azelastine (ASTELIN) 0.1 % nasal spray 2 sprays into the nostril(s) as directed by provider 2 (Two) Times a Day. Use in each nostril as directed 3 each 3   • bumetanide (Bumex) 1 MG tablet Take 1 tablet by mouth 2 (Two) Times a Day. 180 tablet 3   • Cholecalciferol (VITAMIN D3) 25 MCG (1000 UT) capsule Take 1,000 Units by mouth Every Other Day.     • ketoconazole (NIZORAL) 2 % cream Apply 1 application topically to the appropriate area as directed Daily As Needed.     • ketoconazole (NIZORAL) 2 % shampoo      • metOLazone (ZAROXOLYN) 5 MG tablet Take 1 tablet by mouth Daily As Needed (refractory swelling). Use sparingly 30 tablet 0   • metroNIDAZOLE (METROGEL) 0.75 % gel      • aspirin 81 MG EC tablet Take 81 mg by mouth 1 (One) Time Per Week.     • finasteride (PROSCAR) 5 MG tablet Take 1 tablet by mouth Daily.     • pravastatin (PRAVACHOL) 40 MG tablet Take 1 tablet by mouth Daily. 3x a week 90 tablet 3     No facility-administered medications prior to visit.       Patient Active Problem List   Diagnosis   • Facial basal cell cancer   • Hyperlipidemia LDL goal <70   • Essential hypertension   • ASHD (arteriosclerotic heart disease)   • Gait abnormality   • Impaired fasting glucose   • Polyp, colonic   • Vitamin D deficiency   • Acute midline low back pain without sciatica   • Proteinuria   • Right carpal tunnel syndrome   • Overweight (BMI 25.0-29.9)   • Hematuria, unspecified   • Medicare annual wellness visit, subsequent   • Edema   • Cough   • Allergic rhinitis   • Gross hematuria   • Acute UTI  (urinary tract infection)   • Acute renal insufficiency   • Acute urinary retention   • Debility   • Dysphagia   • BPH with obstruction/lower urinary tract symptoms   • Prostatitis   • Chronic diastolic CHF (congestive heart failure) (HCC)   • Iron deficiency anemia due to chronic blood loss   • Simple chronic bronchitis (HCC)   • Shortness of breath       Advance Care Planning:  ACP discussion was held with the patient during this visit. Patient has an advance directive in EMR which is still valid.     Identification of Risk Factors:  Risk factors include: Advance Directive Discussion  Fall Risk.    Review of Systems    Compared to one year ago, the patient feels his physical health is the same.  Compared to one year ago, the patient feels his mental health is the same.    Objective     Physical Exam  Vitals and nursing note reviewed.   Constitutional:       Appearance: Normal appearance. He is well-developed.   HENT:      Head: Normocephalic.      Comments: Partial cerumen impaction right ear      Left Ear: Tympanic membrane and ear canal normal.   Eyes:      Extraocular Movements: Extraocular movements intact.      Conjunctiva/sclera: Conjunctivae normal.   Cardiovascular:      Rate and Rhythm: Normal rate and regular rhythm.      Heart sounds: Murmur (II/VI SM ) heard.       Pulmonary:      Effort: Pulmonary effort is normal. No respiratory distress.      Breath sounds: Normal breath sounds.   Abdominal:      General: Bowel sounds are normal.      Palpations: Abdomen is soft.      Tenderness: There is no abdominal tenderness.      Comments: Obese    Skin:     General: Skin is warm and dry.      Comments: Erythema left temple    Neurological:      General: No focal deficit present.      Mental Status: He is alert and oriented to person, place, and time.      Comments: His get up and go is slow and his MMSE was 28/30   Psychiatric:         Mood and Affect: Mood normal.         Behavior: Behavior normal.       "    Procedures     Vitals:    02/22/22 1040   BP: 98/56   Pulse: 62   Temp: 98 °F (36.7 °C)   Weight: 80.7 kg (178 lb)   Height: 165.1 cm (65\")   PainSc:   2       Patient's Body mass index is 29.62 kg/m². indicating that he is overweight (BMI 25-29.9). Patient's (Body mass index is 29.62 kg/m².) indicates that they are overweight with health conditions that include hypertension, coronary heart disease, dyslipidemias and osteoarthritis . Weight is improving with lifestyle modifications. BMI is is above average; BMI management plan is completed. We discussed portion control and increasing exercise. .      Assessment/Plan   Problem List Items Addressed This Visit        Cardiac and Vasculature    ASHD (arteriosclerotic heart disease)    Current Assessment & Plan     Coronary artery disease is unchanged.  Continue current treatment regimen.  Regular aerobic exercise.  Cardiac status will be reassessed in 6 months.         Chronic diastolic CHF (congestive heart failure) (HCC)    Essential hypertension    Overview     Current blood pressure is low with initial blood pressure 116/80 repeated 96/80 left and right sitting with a heart rate of 104.  The patient has no longer on his Bumex diuretic for the past 3 days and encouraged to not resume         Current Assessment & Plan     His blood pressure runs 90's to 110's.          Relevant Medications    metOLazone (ZAROXOLYN) 5 MG tablet    bumetanide (Bumex) 1 MG tablet    Other Relevant Orders    Microalbumin / Creatinine Urine Ratio - Urine, Clean Catch    Urinalysis With Microscopic - Urine, Clean Catch    Hyperlipidemia LDL goal <70    Relevant Medications    pravastatin (PRAVACHOL) 40 MG tablet    Other Relevant Orders    CBC & Differential    Comprehensive Metabolic Panel    Lipid Panel    TSH Rfx On Abnormal To Free T4       Endocrine and Metabolic    Impaired fasting glucose    Current Assessment & Plan     Discussed decreasing bad carbohydrates, specifically " sweets, breads, potatoes, corn and high caloric drinks (juices, sodas, sweet tea).  Also recommend increasing physical activity, ideally 150 minutes aerobic exercise weekly and resistance exercises 2-3x/week.         Relevant Orders    Hemoglobin A1c    Overweight (BMI 25.0-29.9)    Current Assessment & Plan     Patient's (Body mass index is 29.62 kg/m².) indicates that they are overweight with health conditions that include hypertension, coronary heart disease, dyslipidemias and osteoarthritis . Weight is improving with lifestyle modifications. BMI is is above average; BMI management plan is completed. We discussed portion control and increasing exercise.          Vitamin D deficiency    Current Assessment & Plan     Check labs          Relevant Orders    Vitamin D 25 Hydroxy       Genitourinary and Reproductive     BPH with obstruction/lower urinary tract symptoms    Current Assessment & Plan     Doing ok         Relevant Medications    finasteride (PROSCAR) 5 MG tablet       Health Encounters    Medicare annual wellness visit, subsequent - Primary    Current Assessment & Plan     He is following with Dr Hendrix for skin cancer evaluations regularly. He will get audiology to follow up his hearing aides. He does see  need follow up with Dr Bauer for eye exam in next 6 months. He is doing ok with medications. His mood is good overall. He has some mild memory loss but overall ok. His MMSE was 28/30 and clock pretty good overall. Age-appropriate Counseling:  Discussed preventative medicine issues with patient including regular exercise, healthy diet, stress reduction, adequate sleep and recommended age-appropriate screening studies.  Immunizations reviewed.                 Pulmonary and Pneumonias    Simple chronic bronchitis (HCC)    Current Assessment & Plan     His lungs are improved and has been in mold free house for a few years now.          Relevant Medications    azelastine (ASTELIN) 0.1 % nasal spray        Symptoms and Signs    Gait abnormality    Current Assessment & Plan     Proceed with PT and Encourage to get up slowly and get bearings before taking first step. Keep home well lit with clear floors to avoid tripping. Use assist devices for all walking especially from bed to bathroom.          Relevant Orders    Ambulatory Referral to Physical Therapy Evaluate and treat      Other Visit Diagnoses     At high risk for injury related to fall        Proceed with PT    Relevant Orders    Ambulatory Referral to Physical Therapy Evaluate and treat    Memory loss        Mild and will check labs.     Relevant Orders    Vitamin B12        Patient Self-Management and Personalized Health Advice  The patient has been provided with information about: diet, exercise, weight management, prevention of cardiac or vascular disease and fall prevention and preventive services including:   · Annual Wellness Visit (AWV).    Outpatient Encounter Medications as of 2/22/2022   Medication Sig Dispense Refill   • aspirin 81 MG EC tablet Take 1 tablet by mouth Every Other Day.     • azelastine (ASTELIN) 0.1 % nasal spray 2 sprays into the nostril(s) as directed by provider 2 (Two) Times a Day. Use in each nostril as directed 3 each 3   • bumetanide (Bumex) 1 MG tablet Take 1 tablet by mouth 2 (Two) Times a Day. 180 tablet 3   • Cholecalciferol (VITAMIN D3) 25 MCG (1000 UT) capsule Take 1,000 Units by mouth Every Other Day.     • finasteride (PROSCAR) 5 MG tablet Take 1 tablet by mouth Daily. 90 tablet 1   • ketoconazole (NIZORAL) 2 % cream Apply 1 application topically to the appropriate area as directed Daily As Needed.     • ketoconazole (NIZORAL) 2 % shampoo      • metOLazone (ZAROXOLYN) 5 MG tablet Take 1 tablet by mouth Daily As Needed (refractory swelling). Use sparingly 30 tablet 0   • metroNIDAZOLE (METROGEL) 0.75 % gel      • pravastatin (PRAVACHOL) 40 MG tablet Take 1 tablet by mouth Daily. 3x a week 90 tablet 3   • [DISCONTINUED]  aspirin 81 MG EC tablet Take 81 mg by mouth 1 (One) Time Per Week.     • [DISCONTINUED] finasteride (PROSCAR) 5 MG tablet Take 1 tablet by mouth Daily.     • [DISCONTINUED] pravastatin (PRAVACHOL) 40 MG tablet Take 1 tablet by mouth Daily. 3x a week 90 tablet 3     No facility-administered encounter medications on file as of 2/22/2022.       Reviewed use of high risk medication in the elderly: yes  Reviewed for potential of harmful drug interactions in the elderly: yes    Follow Up:  No follow-ups on file.     There are no Patient Instructions on file for this visit.    An After Visit Summary and PPPS with all of these plans were given to the patient.

## 2022-02-22 NOTE — ASSESSMENT & PLAN NOTE
He is following with Dr Hendrix for skin cancer evaluations regularly. He will get audiology to follow up his hearing aides. He does see Dr need follow up with Dr Bauer for eye exam in next 6 months. He is doing ok with medications. His mood is good overall. He has some mild memory loss but overall ok. His MMSE was 28/30 and clock pretty good overall. Age-appropriate Counseling:  Discussed preventative medicine issues with patient including regular exercise, healthy diet, stress reduction, adequate sleep and recommended age-appropriate screening studies.  Immunizations reviewed.

## 2022-02-23 LAB
25(OH)D3 SERPL-MCNC: 47.5 NG/ML (ref 30–100)
HBA1C MFR BLD: 5.9 % (ref 4.8–5.6)
VIT B12 BLD-MCNC: 508 PG/ML (ref 211–946)

## 2022-03-01 ENCOUNTER — TREATMENT (OUTPATIENT)
Dept: PHYSICAL THERAPY | Facility: CLINIC | Age: 87
End: 2022-03-01

## 2022-03-01 DIAGNOSIS — Z74.09 IMPAIRED FUNCTIONAL MOBILITY, BALANCE, GAIT, AND ENDURANCE: ICD-10-CM

## 2022-03-01 DIAGNOSIS — R26.9 GAIT ABNORMALITY: Primary | ICD-10-CM

## 2022-03-01 PROCEDURE — 97110 THERAPEUTIC EXERCISES: CPT | Performed by: PHYSICAL THERAPIST

## 2022-03-01 PROCEDURE — 97162 PT EVAL MOD COMPLEX 30 MIN: CPT | Performed by: PHYSICAL THERAPIST

## 2022-03-01 NOTE — PROGRESS NOTES
Physical Therapy Initial Evaluation and Plan of Care      Patient: Chaitanya Browne   : 1923  Diagnosis/ICD-10 Code:  Gait abnormality [R26.9]  Referring practitioner: Dirk Russ MD  Date of Initial Visit: 3/1/2022  Today's Date: 3/1/2022  Patient seen for 1 sessions      Subjective:     Subjective Questionnaire: DIXON 40/56  TUG 17.75 sec  10 Meter 11.91 sec      Subjective Evaluation    History of Present Illness  Mechanism of injury: Patient reports he is back to work on balance and strength. He states he continues to use his rollator. No issues getting in / out of bed. He wishes to be stronger with better balance.     Pain  No pain reported        Objective          Strength/Myotome Testing     Left Hip   Planes of Motion   Flexion: 4+  Abduction: 4  Adduction: 4-    Right Hip   Planes of Motion   Flexion: 4  Abduction: 4-  Adduction: 4-    Left Knee   Flexion: 4+  Extension: 4+    Right Knee   Flexion: 4+  Extension: 4+    Left Ankle/Foot   Dorsiflexion: 5    Right Ankle/Foot   Dorsiflexion: 5    Ambulation     Comments   Pt ambulates with a normalized pattern using a rollator          PT Neuro         Assessment & Plan     Assessment  Impairments: abnormal coordination, abnormal gait, activity intolerance, impaired balance, impaired physical strength and safety issue  Functional Limitations: carrying objects, walking, standing and stooping  Assessment details: Patient is a 89 YOM that presents to PT with concerns of strength and balance. Pt demo's decreased strength on the RLE compared to the LLE. He also displays uneasiness when not touching an object for his balance. Patient will require skilled PT intervention to address deficits in order to improve global strength and balance.   Prognosis: fair    Goals  Plan Goals: STG (4 visits)  1. Patient will report compliance with initial HEP.   2. Patient to improve DIXON balance score to >/= 44 /56 to decrease client's risk of falls.  3. Patient to  perform TUG within <16 sec without LOB for improved functional mobility.  4. Patient to ambulate 10 meters within <11 sec without LOB for improved gait nida and functional mobility.      LTG (8 visits)  1. Patient will be I with final HEP.   2. Patient to improve DIXON balance score to >/= 48 /56 to decrease client's risk of falls.  3. Patient to perform TUG within <15 sec without LOB for improved functional mobility.  4. Patient to ambulate 10 meters within <10 sec without LOB for improved gait nida and functional mobility.          Plan  Therapy options: will be seen for skilled therapy services  Planned modality interventions: TENS  Planned therapy interventions: ADL retraining, balance/weight-bearing training, flexibility, gait training, manual therapy, neuromuscular re-education, motor coordination training, postural training, strengthening, stretching, therapeutic activities, transfer training and home exercise program  Frequency: 1x week  Duration in visits: 8  Treatment plan discussed with: patient and caregiver  Plan details: Patient will be seen 1x/wk x 8wks with treatment to include strengthening, stretching, manual therapy, neuromuscular re-education, balance, gait and endurance training.           Timed:  Manual Therapy:    0     mins  21324;  Therapeutic Exercise:    10     mins  75418;     Neuromuscular Tahmina:    0    mins  73326;    Therapeutic Activity:     0     mins  65653;     Gait Trainin     mins  64227;     Electrical Stimulation:    0     mins  64289 ( );    Untimed:  Canalith Repositioning    0     mins 39852    Timed Treatment:   10   mins   Total Treatment:     40   mins    PT SIGNATURE: Vianca Durant PT, DPT, MSCS, CDP  [place] License #[id number]  DATE TREATMENT INITIATED: 3/1/2022      Medicare Initial Certification Certification Period: 3/1/0911cphp2/29/2022  I certify that the therapy services are furnished while this patient is under my care.  The services outlined  above are required by this patient, and will be reviewed every 90 days.     PHYSICIAN: Dirk Russ MD  NPI: [id number]                                         DATE:     Please sign and return via fax to [phone].   Thank you,   [facility].

## 2022-03-10 ENCOUNTER — TREATMENT (OUTPATIENT)
Dept: PHYSICAL THERAPY | Facility: CLINIC | Age: 87
End: 2022-03-10

## 2022-03-10 DIAGNOSIS — R26.9 GAIT ABNORMALITY: Primary | ICD-10-CM

## 2022-03-10 DIAGNOSIS — Z74.09 IMPAIRED FUNCTIONAL MOBILITY, BALANCE, GAIT, AND ENDURANCE: ICD-10-CM

## 2022-03-10 PROCEDURE — 97110 THERAPEUTIC EXERCISES: CPT | Performed by: PHYSICAL THERAPIST

## 2022-03-10 PROCEDURE — 97112 NEUROMUSCULAR REEDUCATION: CPT | Performed by: PHYSICAL THERAPIST

## 2022-03-10 NOTE — PROGRESS NOTES
Physical Therapy Daily Note  Visit: 2  Date of Initial Visit: Type: THERAPY  Noted: 3/1/2022    Patient: Chaitanya Browne   : 1923  Diagnosis/ICD-10 Code:  Gait abnormality [R26.9]  Referring practitioner: Dirk Russ MD  Date of Initial Visit: Type: THERAPY  Noted: 3/1/2022  Today's Date: 3/10/2022  Patient seen for 2 sessions      Subjective:   Patient reports: he is in the vertical.  Pain: 0/10  Clinical Progress: improved  Home Program Compliance: Yes  Treatment has included: therapeutic exercise and neuromuscular re-education    Objective   See Exercise, Manual, and Modality Logs for complete treatment.    PT Neuro          Assessment & Plan     Assessment    Assessment details: Patient demo'd good endurance with activity. He only required one rest break x 1 min. He will continue to perform HEP at home once per day.     Plan  Plan details: Patient to continue with PT services to improve gait, balance, strength, transfers and overall functional mobility.          Timed:  Manual Therapy:            0     mins  42898;  Therapeutic Exercise:    30    mins  93330;     Neuromuscular Tahmina:    10    mins  74167;    Therapeutic Activity:      0     mins  81999;     Gait Trainin    mins  23977;     Electrical Stimulation:    0    mins  64791 ( );     Untimed:  Canalith Repositioning techniques _0_ 70716      Timed Treatment:   40   mins   Total Treatment:     40   mins      Vianca Durant, PT, DPT, MSCS, CDP  [place] License #: [id number]  Physical Therapist

## 2022-03-15 ENCOUNTER — TREATMENT (OUTPATIENT)
Dept: PHYSICAL THERAPY | Facility: CLINIC | Age: 87
End: 2022-03-15

## 2022-03-15 DIAGNOSIS — Z74.09 IMPAIRED FUNCTIONAL MOBILITY, BALANCE, GAIT, AND ENDURANCE: ICD-10-CM

## 2022-03-15 DIAGNOSIS — R26.9 GAIT ABNORMALITY: Primary | ICD-10-CM

## 2022-03-15 PROCEDURE — 97530 THERAPEUTIC ACTIVITIES: CPT | Performed by: PHYSICAL THERAPIST

## 2022-03-15 PROCEDURE — 97110 THERAPEUTIC EXERCISES: CPT | Performed by: PHYSICAL THERAPIST

## 2022-03-15 NOTE — PROGRESS NOTES
Physical Therapy Daily Note  Visit: 3  Date of Initial Visit: Type: THERAPY  Noted: 3/1/2022    Patient: Chaitanya Browne   : 1923  Diagnosis/ICD-10 Code:  Gait abnormality [R26.9]  Referring practitioner: Dirk Russ MD  Date of Initial Visit: Type: THERAPY  Noted: 3/1/2022  Today's Date: 3/15/2022  Patient seen for 3 sessions      Subjective:   Patient reports: his back is hurting when he lays down.   Pain: 0/10  Clinical Progress: unchanged  Home Program Compliance: Yes  Treatment has included: therapeutic exercise and therapeutic activity    Objective   See Exercise, Manual, and Modality Logs for complete treatment.    PT Neuro          Assessment & Plan     Assessment    Assessment details: Patient reports to clinic with complaints of low back pain on the R. Patient only has this pain when he is getting in/out of bed and trying to move. Lower back stretches were initiated this treatment in a supine position. Patient's daughter was educated on how to perform them so he has assistance at home. I also shared that he likely hurt his back getting out of bed due to mechanics noticed in clinic.     Plan  Plan details: Patient to continue with PT services to improve gait, balance, strength, transfers and overall functional mobility.          Timed:  Manual Therapy:            0     mins  96139;  Therapeutic Exercise:    30    mins  95137;     Neuromuscular Tahmina:    0    mins  43130;    Therapeutic Activity:      10     mins  27533;     Gait Trainin    mins  77549;     Electrical Stimulation:    0    mins  09258 ( );     Untimed:  Canalith Repositioning techniques _0_ [id number]      Timed Treatment:   40   mins   Total Treatment:     40   mins      Vianca Durant PT, DPT, MSCS, CDP  [place] License #: [id number]  Physical Therapist

## 2022-03-24 ENCOUNTER — TREATMENT (OUTPATIENT)
Dept: PHYSICAL THERAPY | Facility: CLINIC | Age: 87
End: 2022-03-24

## 2022-03-24 DIAGNOSIS — R26.9 GAIT ABNORMALITY: Primary | ICD-10-CM

## 2022-03-24 DIAGNOSIS — Z74.09 IMPAIRED FUNCTIONAL MOBILITY, BALANCE, GAIT, AND ENDURANCE: ICD-10-CM

## 2022-03-24 PROCEDURE — 97112 NEUROMUSCULAR REEDUCATION: CPT | Performed by: PHYSICAL THERAPIST

## 2022-03-24 PROCEDURE — 97110 THERAPEUTIC EXERCISES: CPT | Performed by: PHYSICAL THERAPIST

## 2022-03-24 NOTE — PROGRESS NOTES
Physical Therapy Daily Note  Visit: 4  Date of Initial Visit: Type: THERAPY  Noted: 3/1/2022    Patient: Chaitanya Browne   : 1923  Diagnosis/ICD-10 Code:  Gait abnormality [R26.9]  Referring practitioner: Dirk Russ MD  Date of Initial Visit: Type: THERAPY  Noted: 3/1/2022  Today's Date: 3/24/2022  Patient seen for 4 sessions      Subjective:   Patient reports: the back exercises worked and he does not hurt anymore.   Pain: 0/10  Clinical Progress: improved  Home Program Compliance: Yes  Treatment has included: therapeutic exercise and neuromuscular re-education    Objective   See Exercise, Manual, and Modality Logs for complete treatment.    PT Neuro          Assessment & Plan     Assessment    Assessment details: Patient demonstrates difficulty sitting on unstable surface, however this did improve as he continued with exercise in that position. Patient will require ongoing balance, gait and strength.     Plan  Plan details: Patient to continue with PT services to improve gait, balance, strength, transfers and overall functional mobility.          Timed:  Manual Therapy:            0     mins  77446;  Therapeutic Exercise:    20    mins  15306;     Neuromuscular Tahmina:    25    mins  11782;    Therapeutic Activity:      0     mins  30504;     Gait Trainin    mins  73597;     Electrical Stimulation:    0    mins  26711 ( );     Untimed:  Canalith Repositioning techniques _0_ 12139      Timed Treatment:   45   mins   Total Treatment:     45   mins      Vianca Durant PT, DPT, MSCS, CDP  [place] License #: [id number]  Physical Therapist

## 2022-03-25 ENCOUNTER — OFFICE VISIT (OUTPATIENT)
Dept: CARDIOLOGY | Facility: CLINIC | Age: 87
End: 2022-03-25

## 2022-03-25 VITALS
DIASTOLIC BLOOD PRESSURE: 74 MMHG | HEIGHT: 65 IN | SYSTOLIC BLOOD PRESSURE: 130 MMHG | BODY MASS INDEX: 30.16 KG/M2 | HEART RATE: 90 BPM | OXYGEN SATURATION: 98 % | WEIGHT: 181 LBS

## 2022-03-25 DIAGNOSIS — E78.5 DYSLIPIDEMIA: ICD-10-CM

## 2022-03-25 DIAGNOSIS — I10 ESSENTIAL HYPERTENSION: ICD-10-CM

## 2022-03-25 DIAGNOSIS — I50.32 CHRONIC DIASTOLIC CHF (CONGESTIVE HEART FAILURE): Primary | ICD-10-CM

## 2022-03-25 PROCEDURE — 99214 OFFICE O/P EST MOD 30 MIN: CPT | Performed by: INTERNAL MEDICINE

## 2022-03-25 NOTE — PROGRESS NOTES
Subjective:     Encounter Date:03/25/2022    Patient ID: Chaitanya Browne is a 89 y.o.  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     REFERRING INTERNIST: Dirk Russ MD  UROLOGIST: Ck Woods MD  PULMONOLOGIST:  COLLIN Avila DO    Chief Complaint:   Chief Complaint   Patient presents with   • Congestive Heart Failure       Problem List:  1. Diastolic congestive heart failure:  a. Treadmill stress test, 10/10/2011: 7 METS, CYRUS -32, acceptable exercise stress test without anginal type chest discomfort or ischemic EKG changes with acceptable Maxwell treadmill score and normal blood pressure response following exercise to 109% predicted maximum heart rate 132% of predicted exercise capacity  b. Echocardiogram, 7/14/2012: LVEF 0.55-0.60, abnormal LV.diastolic filling is observed consistent with impaired LV relaxation, moderate mitral annular calcification with no evidence of mitral stenosis or significant regurgitation  c. Echocardiogram, 9/3/2019: Mild to moderate MR, LVEF 0.72, LV diastolic dysfunction grade 1 consistent with impaired relaxation, LV wall thickness consistent with mild concentric hypertrophy, LA borderline dilated, normal RV cavity size, wall thickness, systolic function and septal motion noted, mild mitral stenosis is present with functional MAC.  Aortic valve exhibits moderate sclerosis without stenosis.  No pulmonary hypertension, no pericardial effusion   d. BNP normal October 2019  e. Residual CCS class 0 angina pectoris/NYHA class II biventricular CHF, November 2019  f. Residual class I symptoms, February 2021, August 2021, September 2021, March 2022  2. Valvular heart disease/mild to moderate mitral regurgitation, mild mitral stenosis, September 2019  3. Hypertension  4. Hyperlipidemia; on statin therapy  5. Chronic kidney disease stage  III  6. Anemia  7. Asthma/COPD  8. Lower extremity edema with negative venous duplex July 2012  9. BPH  10. Bilateral carpal tunnel syndrome  11. History of basal cell carcinoma on left side of nose  12. [facility] 14-day hospitalization September 2019 for TURP, complicated by postoperative aspiration pneumonia, hematuria, and anemia  13. Prediabetes with hemoglobin A1c 6.2% February 2020, 5.9% February 2022  14. Surgical history:   a. Appendectomy  b. TURP 1989  c. Peritonitis as a child  d. Basal cell carcinoma excision from nose  e. I&D great toe  f. Cystoscopy  g. TURP 2019  h. Bilateral cataracts    No Known Allergies    Current Outpatient Medications:   •  aspirin 81 MG EC tablet, Take 1 tablet by mouth Every Other Day., Disp: , Rfl:   •  azelastine (ASTELIN) 0.1 % nasal spray, 2 sprays into the nostril(s) as directed by provider 2 (Two) Times a Day. Use in each nostril as directed, Disp: 3 each, Rfl: 3  •  bumetanide (Bumex) 1 MG tablet, Take 1 tablet by mouth 2 (Two) Times a Day., Disp: 180 tablet, Rfl: 3  •  Cholecalciferol (VITAMIN D3) 25 MCG (1000 UT) capsule, Take 1,000 Units by mouth Every Other Day., Disp: , Rfl:   •  finasteride (PROSCAR) 5 MG tablet, Take 1 tablet by mouth Daily., Disp: 90 tablet, Rfl: 1  •  ketoconazole (NIZORAL) 2 % cream, Apply 1 application topically to the appropriate area as directed Daily As Needed., Disp: , Rfl:   •  ketoconazole (NIZORAL) 2 % shampoo, , Disp: , Rfl:   •  metOLazone (ZAROXOLYN) 5 MG tablet, Take 1 tablet by mouth Daily As Needed (refractory swelling). Use sparingly, Disp: 30 tablet, Rfl: 0  •  metroNIDAZOLE (METROGEL) 0.75 % gel, , Disp: , Rfl:   •  pravastatin (PRAVACHOL) 40 MG tablet, Take 1 tablet by mouth Daily. 3x a week, Disp: 90 tablet, Rfl: 3    History of Present Illness: Patient returns for scheduled 6-month follow up.  He denies any chest pain, shortness of breath, palpitations, presyncope, or syncope.  Occasionally he will have vertigo but he goes to  "physical therapy once a week to help him with gait disturbances and vertigo.  He uses a walker to ambulate.  He brought his blood pressure log with him today for us to review.  Occasionally his blood pressures will get into the 90s systolic but most the time they are over 100.  He says he has no complaints and feels well.  He just had laboratory testing in February 2022 with Dr. Russ.  He has had his Covid vaccinations and booster.  He says that he takes naps sometimes during the day because he stays up till 1:30 at night.        ROS   Obtained and negative except as outlined in problem list and HPI.    Procedures       Objective:       Vitals:    03/25/22 1323 03/25/22 1324   BP: 126/73 130/74   BP Location: Right arm Right arm   Patient Position: Sitting Standing   Pulse: 89 90   SpO2: 98% 98%   Weight: 82.1 kg (181 lb)    Height: 165.1 cm (65\")      Body mass index is 30.12 kg/m².  Last weight: 181 lbs    Vitals reviewed.   Constitutional:       Appearance: Well-developed.      Comments: Ambulating with walker   Neck:      Thyroid: No thyromegaly.      Vascular: No carotid bruit or JVD.      Lymphadenopathy: No cervical adenopathy.   Pulmonary:      Effort: Pulmonary effort is normal.      Breath sounds: Decreased breath sounds present. No wheezing. No rhonchi. No rales.   Cardiovascular:      Regular rhythm.      Murmurs: There is a grade 1/6 systolic murmur at the URSB, radiating to the neck.      No gallop. No S3 gallop.   Pulses:     Dorsalis pedis: 2+ bilaterally.     Posterior tibial: 2+ bilaterally.  Edema:     Pretibial: bilateral trace edema of the pretibial area.     Ankle: bilateral trace edema of the ankle.  Abdominal:      Palpations: Abdomen is soft. There is no abdominal mass.      Tenderness: There is no abdominal tenderness.   Musculoskeletal: Normal range of motion. Skin:     General: Skin is warm and dry.      Findings: No rash.   Neurological:      Mental Status: Alert and oriented to " person, place, and time.           Lab Review:   Lab Results   Component Value Date    GLUCOSE 97 02/22/2022    BUN 19 02/22/2022    CREATININE 1.33 (H) 02/22/2022    EGFRIFNONA 50 (L) 02/22/2022    EGFRIFAFRI 60 (L) 02/22/2022    BCR 14.3 02/22/2022     02/22/2022    K 3.8 02/22/2022    CL 99 02/22/2022    MG 2.4 (H) 06/05/2020    CO2 28.6 02/22/2022    CALCIUM 9.5 02/22/2022    ALBUMIN 3.90 02/22/2022    AST 17 02/22/2022    ALT 6 02/22/2022       Lab Results   Component Value Date    WBC 6.60 02/22/2022    HGB 14.1 02/22/2022    HCT 42.7 02/22/2022    MCV 96.0 02/22/2022     02/22/2022       Lab Results   Component Value Date    HGBA1C 5.90 (H) 02/22/2022       Lab Results   Component Value Date    TSH 1.030 02/22/2022       Lab Results   Component Value Date    CHOL 187 02/22/2022    CHOL 170 02/25/2021    CHOL 197 02/05/2020     Lab Results   Component Value Date    TRIG 112 02/22/2022    TRIG 75 02/25/2021    TRIG 93 02/05/2020     Lab Results   Component Value Date    HDL 79 (H) 02/22/2022    HDL 69 (H) 02/25/2021    HDL 78 (H) 02/05/2020     Lab Results   Component Value Date    LDL 89 02/22/2022    LDL 87 02/25/2021     02/05/2020     XR Chest, 10/1/2021: Asymmetric elevation of the right hemidiaphragm with adjacent atelectasis and/or airspace disease along with at least small to moderate volume right pleural effusion. Increased effusion component in density from prior comparison.        Assessment:       Overall continued acceptable course with no new interim cardiopulmonary complaints with acceptable functional status on limited activity. We will defer additional diagnostic or therapeutic intervention from a cardiac perspective at this time.     Diagnosis Plan   1. Chronic diastolic CHF (congestive heart failure) (HCC)  No recurrent angina pectoris or CHF on current activity schedule; continue current treatment   2. Essential hypertension  Controlled, continue current cardiac  medications   3. Dyslipidemia  Excellent lipid panel February 2022, continue pravastatin          Plan:         1. Patient to continue current medications and close follow up with the above providers.  2. Tentative cardiology follow up in October 2022 or patient may return sooner PRN.    Scribed for Jerod Ledbetter MD by Kaley Oliveros, APRN. 3/25/2022  13:35 EDT    I, Jerod Ledbetter MD, [facility], personally performed the services described in this documentation as scribed by the above named individual in my presence, and it is both accurate and complete. At 14:11 EDT on 03/25/2022

## 2022-03-31 ENCOUNTER — TREATMENT (OUTPATIENT)
Dept: PHYSICAL THERAPY | Facility: CLINIC | Age: 87
End: 2022-03-31

## 2022-03-31 DIAGNOSIS — Z74.09 IMPAIRED FUNCTIONAL MOBILITY, BALANCE, GAIT, AND ENDURANCE: ICD-10-CM

## 2022-03-31 DIAGNOSIS — R26.9 GAIT ABNORMALITY: Primary | ICD-10-CM

## 2022-03-31 PROCEDURE — 97110 THERAPEUTIC EXERCISES: CPT | Performed by: PHYSICAL THERAPIST

## 2022-03-31 PROCEDURE — 97530 THERAPEUTIC ACTIVITIES: CPT | Performed by: PHYSICAL THERAPIST

## 2022-04-01 NOTE — PROGRESS NOTES
Physical Therapy Daily Note  Visit: 5  Date of Initial Visit: Type: THERAPY  Noted: 3/1/2022    Patient: Chaitanya Browne   : 1923  Diagnosis/ICD-10 Code:  Gait abnormality [R26.9]  Referring practitioner: Dirk Russ MD  Date of Initial Visit: Type: THERAPY  Noted: 3/1/2022  Today's Date: 2022  Patient seen for 5 sessions      Subjective:   Patient reports: he is doing good. Has questions about exercise.   Pain: 0/10  Clinical Progress: improved  Home Program Compliance: Yes  Treatment has included: therapeutic exercise    Objective   See Exercise, Manual, and Modality Logs for complete treatment.    PT Neuro          Assessment & Plan     Assessment    Assessment details: Patient demo'd poor ability to move from supine to sit. This was adjusted to decrease pain on R side of back when he gets out of bed. This was practiced several times for improved compliance at home.     Plan  Plan details: Patient to continue with PT services to improve gait, balance, strength, transfers and overall functional mobility.          Timed:  Manual Therapy:            0     mins  84023;  Therapeutic Exercise:    30    mins  93351;     Neuromuscular Tahmina:    0    mins  43072;    Therapeutic Activity:      10     mins  17204;     Gait Trainin    mins  80472;     Electrical Stimulation:    0    mins  13872 ( );     Untimed:  Canalith Repositioning techniques _0_ 05734      Timed Treatment:   40   mins   Total Treatment:     40   mins      Vianca Durant PT, DPT, MSCS, CDP  [place] License #: [id number]  Physical Therapist

## 2022-04-07 ENCOUNTER — TREATMENT (OUTPATIENT)
Dept: PHYSICAL THERAPY | Facility: CLINIC | Age: 87
End: 2022-04-07

## 2022-04-07 DIAGNOSIS — Z74.09 IMPAIRED FUNCTIONAL MOBILITY, BALANCE, GAIT, AND ENDURANCE: ICD-10-CM

## 2022-04-07 DIAGNOSIS — R26.9 GAIT ABNORMALITY: Primary | ICD-10-CM

## 2022-04-07 PROCEDURE — 97112 NEUROMUSCULAR REEDUCATION: CPT | Performed by: PHYSICAL THERAPIST

## 2022-04-07 PROCEDURE — 97110 THERAPEUTIC EXERCISES: CPT | Performed by: PHYSICAL THERAPIST

## 2022-04-07 NOTE — PROGRESS NOTES
PT Progress Note  Visit: 6  Date of Initial Visit: Type: THERAPY  Noted: 3/1/2022    Patient: Chaitanya Browne   : 1923  Diagnosis/ICD-10 Code:  Gait abnormality [R26.9]  Referring practitioner: Dirk Russ MD  Date of Initial Visit: Type: THERAPY  Noted: 3/1/2022  Today's Date: 2022  Patient seen for 6 sessions      Subjective:   Patient reports: his back is feeling much better.   Pain: 0/10  Clinical Progress: improved  Home Program Compliance: Yes  Treatment has included: therapeutic exercise and neuromuscular re-education    Objective   See Exercise, Manual, and Modality Logs for complete treatment.    PT Neuro         Assessment & Plan     Assessment  Impairments: abnormal coordination, abnormal gait, activity intolerance, impaired balance, impaired physical strength and safety issue  Functional Limitations: carrying objects, walking, standing and stooping  Assessment details: Patient has met all STG's at this time and is performing his HEP independently. Supine to sit has been adjusted and is no longer causing him pain. Patient would like to continue to work on strengthening of his LE and UE, as well as improve his balance. He will continue to be progressed with skilled PT intervention.   Prognosis: fair    Goals  Plan Goals: STG (4 visits)  1. Patient will report compliance with initial HEP. MET  2. Patient to improve DIXON balance score to >/= 44 /56 to decrease client's risk of falls. MET  3. Patient to perform TUG within <16 sec without LOB for improved functional mobility. MET  4. Patient to ambulate 10 meters within <11 sec without LOB for improved gait nida and functional mobility. MET      LTG (8 visits)  1. Patient will be I with final HEP. ONGOING  2. Patient to improve DIXON balance score to >/= 48 /56 to decrease client's risk of falls.ONGOING  3. Patient to perform TUG within <15 sec without LOB for improved functional mobility.ONGOING  4. Patient to ambulate 10 meters within <10  sec without LOB for improved gait nida and functional mobility.ONGOING          Plan  Therapy options: will be seen for skilled therapy services  Planned modality interventions: TENS  Planned therapy interventions: ADL retraining, balance/weight-bearing training, flexibility, gait training, manual therapy, neuromuscular re-education, motor coordination training, postural training, strengthening, stretching, therapeutic activities, transfer training and home exercise program  Frequency: 1x week  Duration in visits: 4  Treatment plan discussed with: patient and caregiver  Plan details: Patient will continue to be seen 1x/wk x 4wks with treatment to include strengthening, stretching, manual therapy, neuromuscular re-education, balance, gait and endurance training.           Visit Diagnoses:    ICD-10-CM ICD-9-CM   1. Gait abnormality  R26.9 781.2   2. Impaired functional mobility, balance, gait, and endurance  Z74.09 V49.89       Progress toward previous goals: Partially Met      Recommendations: Continue as planned  Timeframe: 1 month    PT Signature: Vianca Durant, PT, DPT, MSCS, CDP  [place] License #: [id number]    Based upon review of the patient's progress and continued therapy plan, it is my medical opinion that Chaitanya Browne should continue physical therapy treatment at [facility]  [address] [address] [phone].    Signature: __________________________________  Dirk Russ MD    Timed:  Manual Therapy:    0     mins  65224;  Therapeutic Exercise:    30     mins  14409;     Neuromuscular Tahmina:    10    mins  65182;    Therapeutic Activity:     0     mins  11280;     Gait Trainin     mins  83489;     Electrical Stimulation:    0     mins  98900 ( );    Untimed:  Canalith Repositioning   0 mins  61076    Timed Treatment:   40   mins   Total Treatment:     40   mins

## 2022-04-12 ENCOUNTER — TREATMENT (OUTPATIENT)
Dept: PHYSICAL THERAPY | Facility: CLINIC | Age: 87
End: 2022-04-12

## 2022-04-12 DIAGNOSIS — R26.9 GAIT ABNORMALITY: Primary | ICD-10-CM

## 2022-04-12 DIAGNOSIS — Z74.09 IMPAIRED FUNCTIONAL MOBILITY, BALANCE, GAIT, AND ENDURANCE: ICD-10-CM

## 2022-04-12 PROCEDURE — 97110 THERAPEUTIC EXERCISES: CPT | Performed by: PHYSICAL THERAPIST

## 2022-04-12 NOTE — PROGRESS NOTES
Physical Therapy Daily Note  Visit: 7  Date of Initial Visit: Type: THERAPY  Noted: 3/1/2022    Patient: Chaitanya Browne   : 1923  Diagnosis/ICD-10 Code:  Gait abnormality [R26.9]  Referring practitioner: Dirk Russ MD  Date of Initial Visit: Type: THERAPY  Noted: 3/1/2022  Today's Date: 2022  Patient seen for 7 sessions      Subjective:   Patient reports: his back hurts a little when he gets up in the morning.   Pain: 0/10  Clinical Progress: improved  Home Program Compliance: Yes  Treatment has included: therapeutic exercise    Objective   See Exercise, Manual, and Modality Logs for complete treatment.    PT Neuro          Assessment & Plan     Assessment    Assessment details: Patient demonstrates increased tightness throughout R piriformis musculature along with hamstrings. Patient will continue to stretch these muscles at home in order to assist with global flexibility and his occasional back pain.     Plan  Plan details: Patient to continue with PT services to improve gait, balance, strength, transfers and overall functional mobility.          Timed:  Manual Therapy:            0     mins  95514;  Therapeutic Exercise:    40    mins  33423;     Neuromuscular Tahmina:    0    mins  97603;    Therapeutic Activity:      0     mins  53908;     Gait Trainin    mins  72113;     Electrical Stimulation:    0    mins  05889 ( );     Untimed:  Canalith Repositioning techniques _0_ 63169      Timed Treatment:   40   mins   Total Treatment:     40   mins      Vianca Durant PT, DPT, MSCS, CDP  [place] License #: [id number]  Physical Therapist

## 2022-04-21 ENCOUNTER — TREATMENT (OUTPATIENT)
Dept: PHYSICAL THERAPY | Facility: CLINIC | Age: 87
End: 2022-04-21

## 2022-04-21 DIAGNOSIS — R26.9 GAIT ABNORMALITY: Primary | ICD-10-CM

## 2022-04-21 DIAGNOSIS — Z74.09 IMPAIRED FUNCTIONAL MOBILITY, BALANCE, GAIT, AND ENDURANCE: ICD-10-CM

## 2022-04-21 PROCEDURE — 97110 THERAPEUTIC EXERCISES: CPT | Performed by: PHYSICAL THERAPIST

## 2022-04-21 PROCEDURE — 97112 NEUROMUSCULAR REEDUCATION: CPT | Performed by: PHYSICAL THERAPIST

## 2022-04-22 NOTE — PROGRESS NOTES
Physical Therapy Daily Note  Visit: 8  Date of Initial Visit: Type: THERAPY  Noted: 3/1/2022    Patient: Chaitanya Browne   : 1923  Diagnosis/ICD-10 Code:  Gait abnormality [R26.9]  Referring practitioner: Dirk Russ MD  Date of Initial Visit: Type: THERAPY  Noted: 3/1/2022  Today's Date: 2022  Patient seen for 8 sessions      Subjective:   Patient reports: he wonders if his bike at home helps you.   Pain: 0/10  Clinical Progress: improved  Home Program Compliance: Yes  Treatment has included: therapeutic exercise and neuromuscular re-education    Objective   See Exercise, Manual, and Modality Logs for complete treatment.    PT Neuro          Assessment & Plan     Assessment    Assessment details: Patient demonstrates improved UE endurance with exercise on unstable surfaces. He will continue to be progressed until discharge.     Plan  Plan details: Patient to continue with PT services to improve gait, balance, strength, transfers and overall functional mobility.          Timed:  Manual Therapy:            0     mins  61467;  Therapeutic Exercise:    30    mins  15325;     Neuromuscular Tahmina:    10    mins  63375;    Therapeutic Activity:      0     mins  74028;     Gait Trainin    mins  77519;     Electrical Stimulation:    0    mins  60782 ( );     Untimed:  Canalith Repositioning techniques _0_ 72843      Timed Treatment:   40   mins   Total Treatment:     40   mins      Vianca Durant PT, DPT, MSCS, CDP  [place] License #: [id number]  Physical Therapist

## 2022-04-28 ENCOUNTER — TREATMENT (OUTPATIENT)
Dept: PHYSICAL THERAPY | Facility: CLINIC | Age: 87
End: 2022-04-28

## 2022-04-28 DIAGNOSIS — Z74.09 IMPAIRED FUNCTIONAL MOBILITY, BALANCE, GAIT, AND ENDURANCE: ICD-10-CM

## 2022-04-28 DIAGNOSIS — R26.9 GAIT ABNORMALITY: Primary | ICD-10-CM

## 2022-04-28 PROCEDURE — 97112 NEUROMUSCULAR REEDUCATION: CPT | Performed by: PHYSICAL THERAPIST

## 2022-04-28 PROCEDURE — 97110 THERAPEUTIC EXERCISES: CPT | Performed by: PHYSICAL THERAPIST

## 2022-04-28 NOTE — PROGRESS NOTES
Physical Therapy Daily Note  Visit: 9  Date of Initial Visit: Type: THERAPY  Noted: 3/1/2022    Patient: Chaitanya Browne   : 1923  Diagnosis/ICD-10 Code:  Gait abnormality [R26.9]  Referring practitioner: Dirk Russ MD  Date of Initial Visit: Type: THERAPY  Noted: 3/1/2022  Today's Date: 2022  Patient seen for 9 sessions      Subjective:   Patient reports: he is mentally ready.  Pain: 0/10  Clinical Progress: improved  Home Program Compliance: Yes  Treatment has included: therapeutic exercise and neuromuscular re-education    Objective   See Exercise, Manual, and Modality Logs for complete treatment.    PT Neuro          Assessment/Plan    Timed:  Manual Therapy:            0     mins  10096;  Therapeutic Exercise:    30    mins  90354;     Neuromuscular Tahmina:    10    mins  23715;    Therapeutic Activity:      0     mins  74333;     Gait Trainin    mins  35371;     Electrical Stimulation:    0    mins  71483 ( );     Untimed:  Canalith Repositioning techniques _0_ 72854      Timed Treatment:   40   mins   Total Treatment:     40   mins      Vianca Durant PT, DPT, MSCS, CDP  [place] License #: [id number]  Physical Therapist

## 2022-05-05 ENCOUNTER — TREATMENT (OUTPATIENT)
Dept: PHYSICAL THERAPY | Facility: CLINIC | Age: 87
End: 2022-05-05

## 2022-05-05 DIAGNOSIS — Z74.09 IMPAIRED FUNCTIONAL MOBILITY, BALANCE, GAIT, AND ENDURANCE: ICD-10-CM

## 2022-05-05 DIAGNOSIS — R26.9 GAIT ABNORMALITY: Primary | ICD-10-CM

## 2022-05-05 PROCEDURE — 97110 THERAPEUTIC EXERCISES: CPT | Performed by: PHYSICAL THERAPIST

## 2022-05-05 PROCEDURE — 97112 NEUROMUSCULAR REEDUCATION: CPT | Performed by: PHYSICAL THERAPIST

## 2022-05-05 NOTE — PROGRESS NOTES
Physical Therapy Daily Note/Discharge Summary  Visit: 10  Date of Initial Visit: Type: THERAPY  Noted: 3/1/2022    Patient: Chaitanya Browne   : 1923  Diagnosis/ICD-10 Code:  Gait abnormality [R26.9]  Referring practitioner: Dirk Russ MD  Date of Initial Visit: Type: THERAPY  Noted: 3/1/2022  Today's Date: 2022  Patient seen for 10 sessions      Subjective:   Patient reports: he wants to know what exercises he can do daily.   Pain: 0/10  Clinical Progress: improved  Home Program Compliance: Yes  Treatment has included: therapeutic exercise and neuromuscular re-education    Objective   See Exercise, Manual, and Modality Logs for complete treatment.    PT Neuro          Assessment & Plan     Assessment    Assessment details: Patient demonstrates good understanding of HEP and feels he is able to manage independently or with assistance from caregiver or daughter. Patient will be discharged from PT services at this time.     Plan  Plan details: Patient will be discharged at this time to independent management of program.         Timed:  Manual Therapy:            0     mins  64959;  Therapeutic Exercise:    30    mins  60043;     Neuromuscular Tahmina:    10    mins  38208;    Therapeutic Activity:      0     mins  42378;     Gait Trainin    mins  66827;     Electrical Stimulation:    0    mins  04749 ( );     Untimed:  Canalith Repositioning techniques _0_ 22301      Timed Treatment:   40   mins   Total Treatment:     40   mins      Vianca Durant PT, DPT, MSCS, CDP  [place] License #: [id number]  Physical Therapist

## 2022-05-31 ENCOUNTER — TELEPHONE (OUTPATIENT)
Dept: INTERNAL MEDICINE | Facility: CLINIC | Age: 87
End: 2022-05-31

## 2022-06-21 ENCOUNTER — OFFICE VISIT (OUTPATIENT)
Dept: CARDIOLOGY | Facility: HOSPITAL | Age: 87
End: 2022-06-21

## 2022-06-21 VITALS
WEIGHT: 185 LBS | HEIGHT: 65 IN | TEMPERATURE: 98 F | DIASTOLIC BLOOD PRESSURE: 57 MMHG | HEART RATE: 87 BPM | SYSTOLIC BLOOD PRESSURE: 117 MMHG | RESPIRATION RATE: 18 BRPM | OXYGEN SATURATION: 96 % | BODY MASS INDEX: 30.82 KG/M2

## 2022-06-21 DIAGNOSIS — I50.32 CHRONIC DIASTOLIC CHF (CONGESTIVE HEART FAILURE): Primary | ICD-10-CM

## 2022-06-21 DIAGNOSIS — I10 ESSENTIAL HYPERTENSION: ICD-10-CM

## 2022-06-21 DIAGNOSIS — R60.0 EDEMA LEG: ICD-10-CM

## 2022-06-21 LAB
ANION GAP SERPL CALCULATED.3IONS-SCNC: 11.5 MMOL/L (ref 5–15)
BUN SERPL-MCNC: 19 MG/DL (ref 8–23)
BUN/CREAT SERPL: 16 (ref 7–25)
CALCIUM SPEC-SCNC: 9.1 MG/DL (ref 8.2–9.6)
CHLORIDE SERPL-SCNC: 101 MMOL/L (ref 98–107)
CO2 SERPL-SCNC: 28.5 MMOL/L (ref 22–29)
CREAT SERPL-MCNC: 1.19 MG/DL (ref 0.76–1.27)
EGFRCR SERPLBLD CKD-EPI 2021: 54.9 ML/MIN/1.73
GLUCOSE SERPL-MCNC: 96 MG/DL (ref 65–99)
NT-PROBNP SERPL-MCNC: 1386 PG/ML (ref 0–1800)
POTASSIUM SERPL-SCNC: 3.8 MMOL/L (ref 3.5–5.2)
SODIUM SERPL-SCNC: 141 MMOL/L (ref 136–145)

## 2022-06-21 PROCEDURE — 99214 OFFICE O/P EST MOD 30 MIN: CPT | Performed by: NURSE PRACTITIONER

## 2022-06-21 PROCEDURE — 83880 ASSAY OF NATRIURETIC PEPTIDE: CPT | Performed by: NURSE PRACTITIONER

## 2022-06-21 PROCEDURE — 80048 BASIC METABOLIC PNL TOTAL CA: CPT | Performed by: NURSE PRACTITIONER

## 2022-06-21 RX ORDER — ACETAMINOPHEN 325 MG/1
650 TABLET ORAL NIGHTLY
COMMUNITY
End: 2023-01-04

## 2022-06-21 RX ORDER — BUMETANIDE 1 MG/1
TABLET ORAL
Qty: 200 TABLET | Refills: 2 | Status: SHIPPED | OUTPATIENT
Start: 2022-06-21 | End: 2022-11-28 | Stop reason: SDUPTHER

## 2022-06-21 RX ORDER — FLUOCINONIDE TOPICAL SOLUTION USP, 0.05% 0.5 MG/ML
1 SOLUTION TOPICAL AS NEEDED
COMMUNITY

## 2022-06-21 RX ORDER — TACROLIMUS 1 MG/G
1 OINTMENT TOPICAL 2 TIMES DAILY
COMMUNITY
End: 2023-02-23

## 2022-06-21 NOTE — PROGRESS NOTES
"[facility]    Chief Complaint  Edema and Follow-up    Subjective    History of Present Illness {CC  Problem List  Visit  Diagnosis   Encounters  Notes  Medications  Labs  Result Review Imaging  Media :23}     Chaitanya Browne presents to [facility] for   History of Present Illness     89-year-old female with history of heart failure with preserved EF, valvular heart disease, hypertension, hyperlipidemia, chronic kidney disease stage III, anemia, asthma/COPD, lower extremity edema     patient presents today due to worsening lower extremity edema and a 4 pound weight gain over the last 3 days.     No CP, pressure, dyspnea, dizziness, near syncope, syncope.  Sleep well, eating well.  Denies increase of NA intake.  Wears compression stockings.     Patient takes Bumex 1 mg twice a day.           Objective     Vital Signs:   Vitals:    06/21/22 1452   BP: 117/57   BP Location: Left arm   Patient Position: Sitting   Cuff Size: Adult   Pulse: 87   Resp: 18   Temp: 98 °F (36.7 °C)   TempSrc: Temporal   SpO2: 96%   Weight: 83.9 kg (185 lb)   Height: 165.1 cm (65\")     Body mass index is 30.79 kg/m².  Physical Exam  Vitals reviewed.   Constitutional:       General: He is not in acute distress.     Appearance: Normal appearance.   Cardiovascular:      Rate and Rhythm: Normal rate and regular rhythm.      Pulses:           Radial pulses are 2+ on the right side.        Dorsalis pedis pulses are 2+ on the right side.        Posterior tibial pulses are 2+ on the right side.      Heart sounds: Normal heart sounds.   Pulmonary:      Effort: Pulmonary effort is normal.      Breath sounds: Normal breath sounds.   Musculoskeletal:      Right lower leg: Edema (1+ bilateral edema) present.      Left lower leg: Edema present.   Skin:     General: Skin is warm and dry.   Neurological:      Mental Status: He is alert.   Psychiatric:         Mood " and Affect: Mood normal.         Behavior: Behavior is cooperative.              Result Review  Data Reviewed:{ Labs  Result Review  Imaging  Med Tab  Media :23}   Echocardiogram 9/3/2019: EF 72%, grade 1 diastolic dysfunction, mild MR, moderate aortic valve sclerosis with no significant regurgitation or stenosis.    Lab Results   Component Value Date    WBC 6.60 02/22/2022    HGB 14.1 02/22/2022    HCT 42.7 02/22/2022    MCV 96.0 02/22/2022     02/22/2022     Lab Results   Component Value Date    GLUCOSE 97 02/22/2022    CALCIUM 9.5 02/22/2022     02/22/2022    K 3.8 02/22/2022    CO2 28.6 02/22/2022    CL 99 02/22/2022    BUN 19 02/22/2022    CREATININE 1.33 (H) 02/22/2022    EGFRIFAFRI 60 (L) 02/22/2022    EGFRIFNONA 50 (L) 02/22/2022    BCR 14.3 02/22/2022    ANIONGAP 14.4 02/22/2022     Lab Results   Component Value Date    TSH 1.030 02/22/2022                   Assessment and Plan {CC Problem List  Visit Diagnosis  ROS  Review (Popup)  Health Maintenance  Quality  BestPractice  Medications  SmartSets  SnapShot Encounters  Media :23}   1. Chronic diastolic CHF (congestive heart failure) (HCC)  Patient will continue 1 mg twice a day as scheduled, but will take an extra 1 mg every other day for 3 days.  Monitor sodium intake.  Continue compression stockings.  If edema worsens please call.    - Basic Metabolic Panel  - proBNP  - bumetanide (Bumex) 1 MG tablet; 1 mg BID, may take extra 1 mg with 3-5 lb weight gain, dyspnea, or worsening edema.  Dispense: 200 tablet; Refill: 2    2. Essential hypertension  Controlled today    3. Edema leg    - Basic Metabolic Panel  - proBNP  - bumetanide (Bumex) 1 MG tablet; 1 mg BID, may take extra 1 mg with 3-5 lb weight gain, dyspnea, or worsening edema.  Dispense: 200 tablet; Refill: 2          Follow Up {Instructions Charge Capture  Follow-up Communications :23}   Return if symptoms worsen or fail to improve.    Patient was given instructions  and counseling regarding his condition or for health maintenance advice. Please see specific information pulled into the AVS if appropriate.  Patient was instructed to call the [facility] with any questions, concerns, or worsening symptoms.

## 2022-08-02 DIAGNOSIS — N40.1 BPH WITH OBSTRUCTION/LOWER URINARY TRACT SYMPTOMS: ICD-10-CM

## 2022-08-02 DIAGNOSIS — N13.8 BPH WITH OBSTRUCTION/LOWER URINARY TRACT SYMPTOMS: ICD-10-CM

## 2022-08-02 RX ORDER — FINASTERIDE 5 MG/1
5 TABLET, FILM COATED ORAL DAILY
Qty: 90 TABLET | Refills: 1
Start: 2022-08-02 | End: 2022-08-08 | Stop reason: SDUPTHER

## 2022-08-02 NOTE — TELEPHONE ENCOUNTER
Caller: CheyenneHaley barberria    Relationship: Emergency Contact    Best call back number: [phone]     Requested Prescriptions:   Requested Prescriptions     Pending Prescriptions Disp Refills   • finasteride (PROSCAR) 5 MG tablet 90 tablet 1     Sig: Take 1 tablet by mouth Daily.        Pharmacy where request should be sent: [facility] - [address] [phone] [facility] [phone] FX     Additional details provided by patient: PATIENT WILL BE OUT OF MEDICATION NEXT WEEK.    Does the patient have less than a 3 day supply:  [] Yes  [x] No    Orlando Stevenson Rep   08/02/22 13:12 EDT

## 2022-08-08 ENCOUNTER — TELEPHONE (OUTPATIENT)
Dept: INTERNAL MEDICINE | Facility: CLINIC | Age: 87
End: 2022-08-08

## 2022-08-08 DIAGNOSIS — N40.1 BPH WITH OBSTRUCTION/LOWER URINARY TRACT SYMPTOMS: ICD-10-CM

## 2022-08-08 DIAGNOSIS — N13.8 BPH WITH OBSTRUCTION/LOWER URINARY TRACT SYMPTOMS: ICD-10-CM

## 2022-08-08 RX ORDER — FINASTERIDE 5 MG/1
5 TABLET, FILM COATED ORAL DAILY
Qty: 90 TABLET | Refills: 1 | Status: SHIPPED | OUTPATIENT
Start: 2022-08-08 | End: 2023-01-31 | Stop reason: SDUPTHER

## 2022-08-08 NOTE — TELEPHONE ENCOUNTER
Patient's daughter called and said the prescription for Finasteride has not been received by the pharmacy.  It is listed as NO PRINT and not electronicaly from 8/2/22.    Please correct and resend

## 2022-08-18 ENCOUNTER — LAB (OUTPATIENT)
Dept: LAB | Facility: HOSPITAL | Age: 87
End: 2022-08-18

## 2022-08-18 ENCOUNTER — TRANSCRIBE ORDERS (OUTPATIENT)
Dept: LAB | Facility: HOSPITAL | Age: 87
End: 2022-08-18

## 2022-08-18 DIAGNOSIS — L08.0 PYODERMA: ICD-10-CM

## 2022-08-18 DIAGNOSIS — L08.0 PYODERMA: Primary | ICD-10-CM

## 2022-08-18 PROCEDURE — 87147 CULTURE TYPE IMMUNOLOGIC: CPT

## 2022-08-18 PROCEDURE — 87205 SMEAR GRAM STAIN: CPT

## 2022-08-18 PROCEDURE — 87186 SC STD MICRODIL/AGAR DIL: CPT

## 2022-08-18 PROCEDURE — 87070 CULTURE OTHR SPECIMN AEROBIC: CPT

## 2022-08-18 PROCEDURE — 87077 CULTURE AEROBIC IDENTIFY: CPT

## 2022-08-20 LAB
BACTERIA SPEC AEROBE CULT: ABNORMAL
BACTERIA SPEC AEROBE CULT: ABNORMAL
GRAM STN SPEC: ABNORMAL

## 2022-08-23 ENCOUNTER — LAB (OUTPATIENT)
Dept: LAB | Facility: HOSPITAL | Age: 87
End: 2022-08-23

## 2022-08-23 ENCOUNTER — OFFICE VISIT (OUTPATIENT)
Dept: INTERNAL MEDICINE | Facility: CLINIC | Age: 87
End: 2022-08-23

## 2022-08-23 VITALS
HEART RATE: 94 BPM | OXYGEN SATURATION: 92 % | HEIGHT: 65 IN | TEMPERATURE: 98.4 F | BODY MASS INDEX: 30.35 KG/M2 | WEIGHT: 182.2 LBS | SYSTOLIC BLOOD PRESSURE: 110 MMHG | DIASTOLIC BLOOD PRESSURE: 72 MMHG

## 2022-08-23 DIAGNOSIS — A49.02 MRSA INFECTION: ICD-10-CM

## 2022-08-23 DIAGNOSIS — E78.5 HYPERLIPIDEMIA LDL GOAL <70: ICD-10-CM

## 2022-08-23 DIAGNOSIS — N18.31 STAGE 3A CHRONIC KIDNEY DISEASE: ICD-10-CM

## 2022-08-23 DIAGNOSIS — R26.81 UNSTEADY GAIT: ICD-10-CM

## 2022-08-23 DIAGNOSIS — R73.01 IMPAIRED FASTING GLUCOSE: ICD-10-CM

## 2022-08-23 DIAGNOSIS — E66.09 CLASS 1 OBESITY DUE TO EXCESS CALORIES WITH SERIOUS COMORBIDITY AND BODY MASS INDEX (BMI) OF 30.0 TO 30.9 IN ADULT: ICD-10-CM

## 2022-08-23 DIAGNOSIS — I10 ESSENTIAL HYPERTENSION: Primary | ICD-10-CM

## 2022-08-23 DIAGNOSIS — M62.81 GENERALIZED MUSCLE WEAKNESS: ICD-10-CM

## 2022-08-23 PROBLEM — E66.01 MORBID OBESITY DUE TO EXCESS CALORIES: Status: RESOLVED | Noted: 2019-04-16 | Resolved: 2022-08-23

## 2022-08-23 PROBLEM — E66.811 CLASS 1 OBESITY DUE TO EXCESS CALORIES WITH SERIOUS COMORBIDITY AND BODY MASS INDEX (BMI) OF 30.0 TO 30.9 IN ADULT: Status: ACTIVE | Noted: 2019-04-16

## 2022-08-23 LAB
BASOPHILS # BLD AUTO: 0.04 10*3/MM3 (ref 0–0.2)
BASOPHILS NFR BLD AUTO: 0.7 % (ref 0–1.5)
DEPRECATED RDW RBC AUTO: 42.5 FL (ref 37–54)
EOSINOPHIL # BLD AUTO: 0.06 10*3/MM3 (ref 0–0.4)
EOSINOPHIL NFR BLD AUTO: 1 % (ref 0.3–6.2)
ERYTHROCYTE [DISTWIDTH] IN BLOOD BY AUTOMATED COUNT: 11.8 % (ref 12.3–15.4)
HBA1C MFR BLD: 5.9 % (ref 4.8–5.6)
HCT VFR BLD AUTO: 39.7 % (ref 37.5–51)
HGB BLD-MCNC: 13.2 G/DL (ref 13–17.7)
IMM GRANULOCYTES # BLD AUTO: 0.02 10*3/MM3 (ref 0–0.05)
IMM GRANULOCYTES NFR BLD AUTO: 0.3 % (ref 0–0.5)
LYMPHOCYTES # BLD AUTO: 0.81 10*3/MM3 (ref 0.7–3.1)
LYMPHOCYTES NFR BLD AUTO: 14.1 % (ref 19.6–45.3)
MCH RBC QN AUTO: 32.8 PG (ref 26.6–33)
MCHC RBC AUTO-ENTMCNC: 33.2 G/DL (ref 31.5–35.7)
MCV RBC AUTO: 98.5 FL (ref 79–97)
MONOCYTES # BLD AUTO: 0.98 10*3/MM3 (ref 0.1–0.9)
MONOCYTES NFR BLD AUTO: 17 % (ref 5–12)
NEUTROPHILS NFR BLD AUTO: 3.85 10*3/MM3 (ref 1.7–7)
NEUTROPHILS NFR BLD AUTO: 66.9 % (ref 42.7–76)
NRBC BLD AUTO-RTO: 0 /100 WBC (ref 0–0.2)
PLATELET # BLD AUTO: 234 10*3/MM3 (ref 140–450)
PMV BLD AUTO: 11 FL (ref 6–12)
RBC # BLD AUTO: 4.03 10*6/MM3 (ref 4.14–5.8)
WBC NRBC COR # BLD: 5.76 10*3/MM3 (ref 3.4–10.8)

## 2022-08-23 PROCEDURE — 80053 COMPREHEN METABOLIC PANEL: CPT

## 2022-08-23 PROCEDURE — 99214 OFFICE O/P EST MOD 30 MIN: CPT | Performed by: INTERNAL MEDICINE

## 2022-08-23 PROCEDURE — 83036 HEMOGLOBIN GLYCOSYLATED A1C: CPT

## 2022-08-23 PROCEDURE — 85025 COMPLETE CBC W/AUTO DIFF WBC: CPT

## 2022-08-23 PROCEDURE — 80061 LIPID PANEL: CPT

## 2022-08-23 RX ORDER — DOXYCYCLINE HYCLATE 100 MG/1
CAPSULE ORAL
COMMUNITY
Start: 2022-08-22 | End: 2022-10-13

## 2022-08-23 NOTE — PROGRESS NOTES
Chief Complaint   Patient presents with   • Hypertension   • Skin MRSA     L side of face, found out yesterday       History of Present Illness  89 y.o. male presents for follow up of MRSA and unsteadiness.     Review of Systems   HENT: Positive for postnasal drip.    Respiratory: Negative for shortness of breath.    Musculoskeletal: Positive for gait problem.   Skin: Positive for rash.   Psychiatric/Behavioral: Positive for sleep disturbance (improved sleep). Negative for decreased concentration and dysphoric mood. The patient is not nervous/anxious.      .    PMSFH:  The following portions of the patient's history were reviewed and updated as appropriate: allergies, current medications, past family history, past medical history, past social history, past surgical history and problem list.      Current Outpatient Medications:   •  acetaminophen (TYLENOL) 325 MG tablet, Take 650 mg by mouth Every Night., Disp: , Rfl:   •  aspirin 81 MG EC tablet, Take 1 tablet by mouth Every Other Day. (Patient taking differently: Take 40.5 mg by mouth 3 (Three) Times a Week.), Disp: , Rfl:   •  azelastine (ASTELIN) 0.1 % nasal spray, 2 sprays into the nostril(s) as directed by provider 2 (Two) Times a Day. Use in each nostril as directed, Disp: 3 each, Rfl: 3  •  bumetanide (Bumex) 1 MG tablet, 1 mg BID, may take extra 1 mg with 3-5 lb weight gain, dyspnea, or worsening edema., Disp: 200 tablet, Rfl: 2  •  Cholecalciferol (VITAMIN D3) 25 MCG (1000 UT) capsule, Take 1,000 Units by mouth Every Other Day., Disp: , Rfl:   •  doxycycline (VIBRAMYCIN) 100 MG capsule, , Disp: , Rfl:   •  finasteride (PROSCAR) 5 MG tablet, Take 1 tablet by mouth Daily., Disp: 90 tablet, Rfl: 1  •  fluocinonide (LIDEX) 0.05 % external solution, Apply 1 application topically to the appropriate area as directed As Needed., Disp: , Rfl:   •  ketoconazole (NIZORAL) 2 % cream, Apply 1 application topically to the appropriate area as directed Daily As Needed.,  "Disp: , Rfl:   •  ketoconazole (NIZORAL) 2 % shampoo, Apply  topically to the appropriate area as directed As Needed., Disp: , Rfl:   •  metOLazone (ZAROXOLYN) 5 MG tablet, Take 1 tablet by mouth Daily As Needed (refractory swelling). Use sparingly, Disp: 30 tablet, Rfl: 0  •  metroNIDAZOLE (METROGEL) 0.75 % gel, Apply 1 application topically to the appropriate area as directed As Needed., Disp: , Rfl:   •  mupirocin (BACTROBAN) 2 % ointment, , Disp: , Rfl:   •  pravastatin (PRAVACHOL) 40 MG tablet, Take 1 tablet by mouth Daily. 3x a week, Disp: 90 tablet, Rfl: 3  •  tacrolimus (PROTOPIC) 0.1 % ointment, Apply 1 application topically to the appropriate area as directed 2 (Two) Times a Day., Disp: , Rfl:     VITALS:  /72 (BP Location: Left arm, Patient Position: Sitting, Cuff Size: Adult)   Pulse 94   Temp 98.4 °F (36.9 °C) (Infrared)   Ht 165.1 cm (65\")   Wt 82.6 kg (182 lb 3.2 oz)   SpO2 92%   BMI 30.32 kg/m²     Physical Exam  Constitutional:       Appearance: Normal appearance.   Cardiovascular:      Rate and Rhythm: Normal rate.   Pulmonary:      Breath sounds: No wheezing or rales.   Abdominal:      General: Bowel sounds are normal.      Palpations: Abdomen is soft.      Tenderness: There is no abdominal tenderness.   Neurological:      General: No focal deficit present.      Mental Status: He is alert and oriented to person, place, and time.   Psychiatric:         Mood and Affect: Mood normal.         Behavior: Behavior normal.         LABS:      Procedures         ASSESSMENT/PLAN:  Problems Addressed this Visit        Cardiac and Vasculature    Essential hypertension - Primary     Hypertension is improving with treatment.  Continue current treatment regimen.  Weight loss.  Regular aerobic exercise.  Blood pressure will be reassessed at the next regular appointment.         Hyperlipidemia LDL goal <70    Relevant Orders    CBC & Differential    Comprehensive Metabolic Panel    Microalbumin / " Creatinine Urine Ratio - Urine, Clean Catch    Lipid Panel       Endocrine and Metabolic    Class 1 obesity due to excess calories with serious comorbidity and body mass index (BMI) of 30.0 to 30.9 in adult     Patient's (Body mass index is 30.32 kg/m².) indicates that they are obese (BMI >30) with health conditions that include hypertension, coronary heart disease, dyslipidemias and osteoarthritis . Weight is worsening. BMI is is above average; BMI management plan is completed. We discussed portion control and increasing exercise.          Impaired fasting glucose     Discussed decreasing bad carbohydrates, specifically sweets, breads, potatoes, corn and high caloric drinks (juices, sodas, sweet tea).  Also recommend increasing physical activity, ideally 150 minutes aerobic exercise weekly and resistance exercises 2-3x/week.         Relevant Orders    Hemoglobin A1c       Genitourinary and Reproductive     Stage 3a chronic kidney disease (HCC)     Renal condition is unchanged.  Continue current treatment regimen.  Weight loss.  Renal condition will be reassessed in 6 months.           Other Visit Diagnoses     MRSA infection        Complete antibiotics     Relevant Medications    doxycycline (VIBRAMYCIN) 100 MG capsule    Generalized muscle weakness        Relevant Orders    Ambulatory Referral to Physical Therapy Evaluate and treat    Unsteady gait        Relevant Orders    Ambulatory Referral to Physical Therapy Evaluate and treat      Diagnoses       Codes Comments    Essential hypertension    -  Primary ICD-10-CM: I10  ICD-9-CM: 401.9     Hyperlipidemia LDL goal <70     ICD-10-CM: E78.5  ICD-9-CM: 272.4     Impaired fasting glucose     ICD-10-CM: R73.01  ICD-9-CM: 790.21     Class 1 obesity due to excess calories with serious comorbidity and body mass index (BMI) of 30.0 to 30.9 in adult     ICD-10-CM: E66.09, Z68.30  ICD-9-CM: 278.00, V85.30     MRSA infection     ICD-10-CM: A49.02  ICD-9-CM: 041.12 Complete  antibiotics     Generalized muscle weakness     ICD-10-CM: M62.81  ICD-9-CM: 728.87     Unsteady gait     ICD-10-CM: R26.81  ICD-9-CM: 781.2     Stage 3a chronic kidney disease (HCC)     ICD-10-CM: N18.31  ICD-9-CM: 585.3           FOLLOW-UP:  Return in about 6 months (around 2/23/2023) for Medicare Wellness, Labs this visit.      Electronically signed by:    Dirk Russ MD

## 2022-08-23 NOTE — ASSESSMENT & PLAN NOTE
Discussed decreasing bad carbohydrates, specifically sweets, breads, potatoes, corn and high caloric drinks (juices, sodas, sweet tea).  Also recommend increasing physical activity, ideally 150 minutes aerobic exercise weekly and resistance exercises 2-3x/week.  
Hypertension is improving with treatment.  Continue current treatment regimen.  Weight loss.  Regular aerobic exercise.  Blood pressure will be reassessed at the next regular appointment.  
Patient's (Body mass index is 30.32 kg/m².) indicates that they are obese (BMI >30) with health conditions that include hypertension, coronary heart disease, dyslipidemias and osteoarthritis . Weight is worsening. BMI is is above average; BMI management plan is completed. We discussed portion control and increasing exercise.   
Renal condition is unchanged.  Continue current treatment regimen.  Weight loss.  Renal condition will be reassessed in 6 months.  
98

## 2022-08-24 LAB
ALBUMIN SERPL-MCNC: 4.1 G/DL (ref 3.5–5.2)
ALBUMIN/GLOB SERPL: 2.1 G/DL
ALP SERPL-CCNC: 73 U/L (ref 39–117)
ALT SERPL W P-5'-P-CCNC: 10 U/L (ref 1–41)
ANION GAP SERPL CALCULATED.3IONS-SCNC: 11 MMOL/L (ref 5–15)
AST SERPL-CCNC: 17 U/L (ref 1–40)
BILIRUB SERPL-MCNC: 1.2 MG/DL (ref 0–1.2)
BUN SERPL-MCNC: 20 MG/DL (ref 8–23)
BUN/CREAT SERPL: 15.3 (ref 7–25)
CALCIUM SPEC-SCNC: 9.1 MG/DL (ref 8.2–9.6)
CHLORIDE SERPL-SCNC: 104 MMOL/L (ref 98–107)
CHOLEST SERPL-MCNC: 187 MG/DL (ref 0–200)
CO2 SERPL-SCNC: 29 MMOL/L (ref 22–29)
CREAT SERPL-MCNC: 1.31 MG/DL (ref 0.76–1.27)
EGFRCR SERPLBLD CKD-EPI 2021: 48.9 ML/MIN/1.73
GLOBULIN UR ELPH-MCNC: 2 GM/DL
GLUCOSE SERPL-MCNC: 95 MG/DL (ref 65–99)
HDLC SERPL-MCNC: 74 MG/DL (ref 40–60)
LDLC SERPL CALC-MCNC: 98 MG/DL (ref 0–100)
LDLC/HDLC SERPL: 1.31 {RATIO}
POTASSIUM SERPL-SCNC: 3.8 MMOL/L (ref 3.5–5.2)
PROT SERPL-MCNC: 6.1 G/DL (ref 6–8.5)
SODIUM SERPL-SCNC: 144 MMOL/L (ref 136–145)
TRIGL SERPL-MCNC: 81 MG/DL (ref 0–150)
VLDLC SERPL-MCNC: 15 MG/DL (ref 5–40)

## 2022-08-31 RX ORDER — AZELASTINE 1 MG/ML
SPRAY, METERED NASAL
Qty: 90 EACH | Refills: 3 | Status: SHIPPED | OUTPATIENT
Start: 2022-08-31

## 2022-09-22 ENCOUNTER — TREATMENT (OUTPATIENT)
Dept: PHYSICAL THERAPY | Facility: CLINIC | Age: 87
End: 2022-09-22

## 2022-09-22 DIAGNOSIS — R26.9 GAIT ABNORMALITY: Primary | ICD-10-CM

## 2022-09-22 DIAGNOSIS — Z74.09 IMPAIRED FUNCTIONAL MOBILITY, BALANCE, GAIT, AND ENDURANCE: ICD-10-CM

## 2022-09-22 PROCEDURE — 97162 PT EVAL MOD COMPLEX 30 MIN: CPT | Performed by: PHYSICAL THERAPIST

## 2022-09-22 PROCEDURE — 97110 THERAPEUTIC EXERCISES: CPT | Performed by: PHYSICAL THERAPIST

## 2022-09-22 NOTE — PROGRESS NOTES
Physical Therapy Initial Evaluation and Plan of Care      Patient: Chaitanya Browne   : 1923  Diagnosis/ICD-10 Code:  Gait abnormality [R26.9]  Referring practitioner: Dirk Russ MD  Date of Initial Visit: 2022  Today's Date: 2022  Patient seen for 1 sessions      Subjective:     Subjective Evaluation    History of Present Illness  Mechanism of injury: Patient is back to therapy. Daughter states he cannot scoot a chair when he is sitting in it. He does have occasional back pain at night but it is controlled with tylenol. Daughter is concerned about arm strength.       Patient Occupation: retired dentist  Pain  Current pain ratin  At best pain ratin  At worst pain ratin  Location: back          Objective          Strength/Myotome Testing     Left Shoulder     Planes of Motion   Flexion: 4+   Extension: 4-   Abduction: 4     Right Shoulder     Planes of Motion   Flexion: 4+   Extension: 4-   Abduction: 4     Left Elbow   Flexion: 4  Extension: 4    Right Elbow   Flexion: 4  Extension: 4    Left Hip   Planes of Motion   Flexion: 4  Extension: 3  Abduction: 4    Right Hip   Planes of Motion   Flexion: 4  Extension: 3  Abduction: 4    Left Knee   Flexion: 4+  Extension: 4+    Right Knee   Flexion: 4+  Extension: 4+    Left Ankle/Foot   Dorsiflexion: 5    Right Ankle/Foot   Dorsiflexion: 5    Ambulation     Comments   Normalized gait with rollator         PT Neuro         Assessment & Plan     Assessment  Impairments: abnormal coordination, abnormal gait, activity intolerance, impaired balance, impaired physical strength and safety issue  Functional Limitations: carrying objects, walking, standing and stooping  Assessment details: Patient is a 89 YOM that presents with generalized upper body deconditioning. Patient feels he is weaker and is unable to scoot his chairs when he is out at restaurants. Patient will be seen for skilled PT intervention to address deficits in order to  improve strength and global mobility.   Prognosis: fair    Goals  Plan Goals: STG (4 visits)  1. Patient will report compliance with initial HEP.   2. Patient will demonstrate improved UE strength to 4+/5 throughout.     LTG (8 visits)  1. Patient will be I with final HEP.   2. Patient will be able to scoot chair in and out at restaurants.         Plan  Therapy options: will be seen for skilled therapy services  Planned modality interventions: TENS  Planned therapy interventions: ADL retraining, balance/weight-bearing training, flexibility, gait training, manual therapy, neuromuscular re-education, motor coordination training, postural training, strengthening, stretching, therapeutic activities, transfer training and home exercise program  Frequency: 1x week  Duration in visits: 8  Treatment plan discussed with: patient and family  Plan details: Patient will be seen 1x/wk x 8wks with treatment to include strengthening, stretching, manual therapy, neuromuscular re-education, balance, gait and endurance training.           Timed:  Manual Therapy:    0     mins  72311;  Therapeutic Exercise:    15     mins  91919;     Neuromuscular Tahmina:    0    mins  28529;    Therapeutic Activity:     0     mins  79658;     Gait Trainin     mins  62389;     Electrical Stimulation:    0     mins  78957 ( );    Untimed:  Canalith Repositioning    0     mins 69156    Timed Treatment:   15   mins   Total Treatment:     40   mins    PT SIGNATURE: Vianca Durant, PT, DPT, MSCS, CDP, CSRS  [place] License #[id number]  DATE TREATMENT INITIATED: 2022      Medicare Initial Certification Certification Period: [id number]  I certify that the therapy services are furnished while this patient is under my care.  The services outlined above are required by this patient, and will be reviewed every 90 days.     PHYSICIAN: Dirk Russ MD  NPI: [id number]                                         DATE:     Please sign and  return via fax to [phone].   Thank you,   [facility].

## 2022-09-29 ENCOUNTER — TREATMENT (OUTPATIENT)
Dept: PHYSICAL THERAPY | Facility: CLINIC | Age: 87
End: 2022-09-29

## 2022-09-29 DIAGNOSIS — R26.9 GAIT ABNORMALITY: Primary | ICD-10-CM

## 2022-09-29 DIAGNOSIS — Z74.09 IMPAIRED FUNCTIONAL MOBILITY, BALANCE, GAIT, AND ENDURANCE: ICD-10-CM

## 2022-09-29 PROCEDURE — 97110 THERAPEUTIC EXERCISES: CPT | Performed by: PHYSICAL THERAPIST

## 2022-09-29 PROCEDURE — 97112 NEUROMUSCULAR REEDUCATION: CPT | Performed by: PHYSICAL THERAPIST

## 2022-10-11 ENCOUNTER — TREATMENT (OUTPATIENT)
Dept: PHYSICAL THERAPY | Facility: CLINIC | Age: 87
End: 2022-10-11

## 2022-10-11 DIAGNOSIS — Z74.09 IMPAIRED FUNCTIONAL MOBILITY, BALANCE, GAIT, AND ENDURANCE: ICD-10-CM

## 2022-10-11 DIAGNOSIS — R26.9 GAIT ABNORMALITY: Primary | ICD-10-CM

## 2022-10-11 PROCEDURE — 97110 THERAPEUTIC EXERCISES: CPT | Performed by: PHYSICAL THERAPIST

## 2022-10-12 NOTE — PROGRESS NOTES
Physical Therapy Daily Note  Visit: 3  Date of Initial Visit: Type: THERAPY  Noted: 2022    Patient: Chaitanya Browne   : 1923  Diagnosis/ICD-10 Code:  Gait abnormality [R26.9]  Referring practitioner: Dirk Russ MD  Date of Initial Visit: Type: THERAPY  Noted: 2022  Today's Date: 10/11/2022  Patient seen for 3 sessions      Subjective:   Patient reports: he has more swelling in his ankles and lower leg.   Pain: 0/10  Clinical Progress: unchanged  Home Program Compliance: Yes  Treatment has included: therapeutic exercise    Objective   See Exercise, Manual, and Modality Logs for complete treatment.    PT Neuro          Assessment & Plan     Assessment    Assessment details: Patient demonstrates pitting edema.Daughter and physician are aware. He was taught how to prop and perform ankle pumps to assist with decreasing the swelling, as well as stretching for discomfort.     Plan  Plan details: Patient to continue with PT services to improve gait, balance, strength, transfers and overall functional mobility.          Timed:  Manual Therapy:            0     mins  73399;  Therapeutic Exercise:    38    mins  74157;     Neuromuscular Tahmina:    0    mins  56235;    Therapeutic Activity:      0     mins  04417;     Gait Trainin    mins  03175;     Electrical Stimulation:    0    mins  71755 ( );     Untimed:  Canalith Repositioning techniques _0_ 34244      Timed Treatment:   38   mins   Total Treatment:     38   mins      Vianca Durant, PT, DPT, MSCS, CDP, CSRS  [place] License #: [id number]  Physical Therapist

## 2022-10-13 ENCOUNTER — OFFICE VISIT (OUTPATIENT)
Dept: CARDIOLOGY | Facility: CLINIC | Age: 87
End: 2022-10-13

## 2022-10-13 VITALS
SYSTOLIC BLOOD PRESSURE: 110 MMHG | BODY MASS INDEX: 30.99 KG/M2 | DIASTOLIC BLOOD PRESSURE: 70 MMHG | HEART RATE: 87 BPM | WEIGHT: 186 LBS | HEIGHT: 65 IN | OXYGEN SATURATION: 96 %

## 2022-10-13 DIAGNOSIS — I10 ESSENTIAL HYPERTENSION: ICD-10-CM

## 2022-10-13 DIAGNOSIS — I50.32 CHRONIC DIASTOLIC CHF (CONGESTIVE HEART FAILURE): ICD-10-CM

## 2022-10-13 DIAGNOSIS — I25.10 ASHD (ARTERIOSCLEROTIC HEART DISEASE): Primary | ICD-10-CM

## 2022-10-13 DIAGNOSIS — E66.09 CLASS 1 OBESITY DUE TO EXCESS CALORIES WITH SERIOUS COMORBIDITY AND BODY MASS INDEX (BMI) OF 30.0 TO 30.9 IN ADULT: ICD-10-CM

## 2022-10-13 DIAGNOSIS — E78.5 HYPERLIPIDEMIA LDL GOAL <70: ICD-10-CM

## 2022-10-13 PROCEDURE — 93000 ELECTROCARDIOGRAM COMPLETE: CPT | Performed by: NURSE PRACTITIONER

## 2022-10-13 PROCEDURE — 99214 OFFICE O/P EST MOD 30 MIN: CPT | Performed by: NURSE PRACTITIONER

## 2022-10-13 RX ORDER — METOLAZONE 2.5 MG/1
2.5 TABLET ORAL DAILY PRN
Qty: 30 TABLET | Refills: 5 | Status: SHIPPED | OUTPATIENT
Start: 2022-10-13 | End: 2023-01-04

## 2022-10-20 ENCOUNTER — TREATMENT (OUTPATIENT)
Dept: PHYSICAL THERAPY | Facility: CLINIC | Age: 87
End: 2022-10-20

## 2022-10-20 DIAGNOSIS — Z74.09 IMPAIRED FUNCTIONAL MOBILITY, BALANCE, GAIT, AND ENDURANCE: ICD-10-CM

## 2022-10-20 DIAGNOSIS — R26.9 GAIT ABNORMALITY: Primary | ICD-10-CM

## 2022-10-20 PROCEDURE — 97112 NEUROMUSCULAR REEDUCATION: CPT | Performed by: PHYSICAL THERAPIST

## 2022-10-20 PROCEDURE — 97110 THERAPEUTIC EXERCISES: CPT | Performed by: PHYSICAL THERAPIST

## 2022-10-20 NOTE — PROGRESS NOTES
PT Progress Note  Visit: 4  Date of Initial Visit: Type: THERAPY  Noted: 2022    Patient: Chaitanya Browne   : 1923  Diagnosis/ICD-10 Code:  Gait abnormality [R26.9]  Referring practitioner: Dirk Russ MD  Date of Initial Visit: Type: THERAPY  Noted: 2022  Today's Date: 10/20/2022  Patient seen for 4 sessions      Subjective:   Patient reports: the swelling in his leg hasn't gone down very much.   Pain: 0/10  Clinical Progress: improved  Home Program Compliance: Yes  Treatment has included: therapeutic exercise and neuromuscular re-education    Objective   See Exercise, Manual, and Modality Logs for complete treatment.    PT Neuro         Assessment & Plan     Assessment  Impairments: abnormal coordination, abnormal gait, activity intolerance, impaired balance, impaired physical strength and safety issue  Functional Limitations: carrying objects, walking, standing and stooping  Assessment details: Patient is performing exercises well. He continues with B LE swelling, however propping and ankle pumps were reviewed today, as well as propped calf/hamstring stretch. Patient will continue with skilled PT intervention.   Prognosis: fair    Goals  Plan Goals: STG (4 visits)  1. Patient will report compliance with initial HEP. MET  2. Patient will demonstrate improved UE strength to 4+/5 throughout. ONGOING    LTG (8 visits)  1. Patient will be I with final HEP. ONGOING  2. Patient will be able to scoot chair in and out at restaurants. ONGOING        Plan  Therapy options: will be seen for skilled therapy services  Planned modality interventions: TENS  Planned therapy interventions: ADL retraining, balance/weight-bearing training, flexibility, gait training, manual therapy, neuromuscular re-education, motor coordination training, postural training, strengthening, stretching, therapeutic activities, transfer training and home exercise program  Frequency: 1x week  Duration in visits: 4  Treatment plan  discussed with: patient and family  Plan details: Patient will continue to be seen 1x/wk x 4 wks with treatment to include strengthening, stretching, manual therapy, neuromuscular re-education, balance, gait and endurance training.           Visit Diagnoses:    ICD-10-CM ICD-9-CM   1. Gait abnormality  R26.9 781.2   2. Impaired functional mobility, balance, gait, and endurance  Z74.09 V49.89       Progress toward previous goals: Partially Met      Recommendations: Continue as planned  Timeframe: 1 month    PT Signature: Vianca Durant, PT, DPT, MSCS, CDP, CSRS  [place] License #: [id number]    Based upon review of the patient's progress and continued therapy plan, it is my medical opinion that Chaitanya Browne should continue physical therapy treatment at [facility]  [address] [facility] [id number] [phone].    Signature: __________________________________  Dirk Russ MD    Timed:  Manual Therapy:    0     mins  39304;  Therapeutic Exercise:    25     mins  35239;     Neuromuscular Tahmina:    20    mins  50781;    Therapeutic Activity:     0     mins  82208;     Gait Trainin     mins  06382;     Electrical Stimulation:    0     mins  57349 ( );    Untimed:  Canalith Repositioning   0 mins  82472    Timed Treatment:   45   mins   Total Treatment:     45   mins

## 2022-10-27 ENCOUNTER — TREATMENT (OUTPATIENT)
Dept: PHYSICAL THERAPY | Facility: CLINIC | Age: 87
End: 2022-10-27

## 2022-10-27 DIAGNOSIS — Z74.09 IMPAIRED FUNCTIONAL MOBILITY, BALANCE, GAIT, AND ENDURANCE: ICD-10-CM

## 2022-10-27 DIAGNOSIS — R26.9 GAIT ABNORMALITY: Primary | ICD-10-CM

## 2022-10-27 PROCEDURE — 97530 THERAPEUTIC ACTIVITIES: CPT | Performed by: PHYSICAL THERAPIST

## 2022-10-27 PROCEDURE — 97110 THERAPEUTIC EXERCISES: CPT | Performed by: PHYSICAL THERAPIST

## 2022-10-27 NOTE — PROGRESS NOTES
Physical Therapy Daily Note  Visit: 5  Date of Initial Visit: Type: THERAPY  Noted: 2022    Patient: Chaitanya Browne   : 1923  Diagnosis/ICD-10 Code:  Gait abnormality [R26.9]  Referring practitioner: Dirk Russ MD  Date of Initial Visit: Type: THERAPY  Noted: 2022  Today's Date: 10/27/2022  Patient seen for 5 sessions      Subjective:   Patient reports: he loves his stretching strap   Pain: 0/10  Clinical Progress: improved  Home Program Compliance: Yes  Treatment has included: therapeutic exercise and therapeutic activity    Objective   See Exercise, Manual, and Modality Logs for complete treatment.    PT Neuro          Assessment & Plan     Assessment    Assessment details: Patient purchased stretching strap and has been using at home. New stretches were added to HEP so he can use the strap more often. Patient will continue to be progressed as indicated.     Plan  Plan details: Patient to continue with PT services to improve gait, balance, strength, transfers and overall functional mobility.          Timed:  Manual Therapy:            0     mins  90307;  Therapeutic Exercise:    30    mins  56913;     Neuromuscular Tahmina:    0    mins  78964;    Therapeutic Activity:      10     mins  90938;     Gait Trainin    mins  98014;     Electrical Stimulation:    0    mins  70835 ( );     Untimed:  Canalith Repositioning techniques _0_ 66246      Timed Treatment:   40   mins   Total Treatment:     40   mins      Vianca Durant PT, DPT, MSCS, CDP, CSRS  [place] License #: [id number]  Physical Therapist

## 2022-11-03 ENCOUNTER — TREATMENT (OUTPATIENT)
Dept: PHYSICAL THERAPY | Facility: CLINIC | Age: 87
End: 2022-11-03

## 2022-11-03 DIAGNOSIS — Z74.09 IMPAIRED FUNCTIONAL MOBILITY, BALANCE, GAIT, AND ENDURANCE: ICD-10-CM

## 2022-11-03 DIAGNOSIS — R26.9 GAIT ABNORMALITY: Primary | ICD-10-CM

## 2022-11-03 PROCEDURE — 97110 THERAPEUTIC EXERCISES: CPT | Performed by: PHYSICAL THERAPIST

## 2022-11-03 PROCEDURE — 97112 NEUROMUSCULAR REEDUCATION: CPT | Performed by: PHYSICAL THERAPIST

## 2022-11-03 NOTE — PROGRESS NOTES
Physical Therapy Daily Note  Visit: 6  Date of Initial Visit: Type: THERAPY  Noted: 2022    Patient: Chaitanya Browne   : 1923  Diagnosis/ICD-10 Code:  Gait abnormality [R26.9]  Referring practitioner: Dirk Russ MD  Date of Initial Visit: Type: THERAPY  Noted: 2022  Today's Date: 11/3/2022  Patient seen for 6 sessions      Subjective:   Patient reports: daughter states that the chemo cream and infusion are working really well to help.   Pain: 0/10  Clinical Progress: improved  Home Program Compliance: Yes  Treatment has included: therapeutic exercise and neuromuscular re-education    Objective   See Exercise, Manual, and Modality Logs for complete treatment.    PT Neuro          Assessment & Plan     Assessment    Assessment details: Patient demonstrates good standing endurance for balance exercises performed today. He continues to enjoy stretching with his new strap and does this often at home to assist with improved mobility. This was reviewed again today for correctness.     Plan  Plan details: Patient to continue with PT services to improve gait, balance, strength, transfers and overall functional mobility.          Timed:  Manual Therapy:            0     mins  52831;  Therapeutic Exercise:    30    mins  76255;     Neuromuscular Tahmina:    10    mins  80152;    Therapeutic Activity:      0     mins  02109;     Gait Trainin    mins  26965;     Electrical Stimulation:    0    mins  54202 ( );     Untimed:  Canalith Repositioning techniques _0_ 75162      Timed Treatment:   40   mins   Total Treatment:     40   mins      Vianca Durant, PT, DPT, MSCS, CDP, CSRS  [place] License #: [id number]  Physical Therapist

## 2022-11-10 ENCOUNTER — TREATMENT (OUTPATIENT)
Dept: PHYSICAL THERAPY | Facility: CLINIC | Age: 87
End: 2022-11-10

## 2022-11-10 DIAGNOSIS — R26.9 GAIT ABNORMALITY: Primary | ICD-10-CM

## 2022-11-10 DIAGNOSIS — Z74.09 IMPAIRED FUNCTIONAL MOBILITY, BALANCE, GAIT, AND ENDURANCE: ICD-10-CM

## 2022-11-10 PROCEDURE — 97112 NEUROMUSCULAR REEDUCATION: CPT | Performed by: PHYSICAL THERAPIST

## 2022-11-10 PROCEDURE — 97110 THERAPEUTIC EXERCISES: CPT | Performed by: PHYSICAL THERAPIST

## 2022-11-10 NOTE — PROGRESS NOTES
Physical Therapy Daily Note  Visit: 7  Date of Initial Visit: Type: THERAPY  Noted: 2022    Patient: Chaitanya Browne   : 1923  Diagnosis/ICD-10 Code:  Gait abnormality [R26.9]  Referring practitioner: Dirk Russ MD  Date of Initial Visit: Type: THERAPY  Noted: 2022  Today's Date: 11/10/2022  Patient seen for 7 sessions    Subjective:   Patient reports: he has a question about one of his exercises.   Pain: 0/10  Clinical Progress: improved  Home Program Compliance: Yes  Treatment has included: therapeutic exercise and neuromuscular re-education    Objective   See Exercise, Manual, and Modality Logs for complete treatment.    PT Neuro          Assessment & Plan     Assessment    Assessment details: Patient demonstrates good standing endurance with resisted exercises. Patient's standing heel raises were adjusted he was performing incorrectly. He now knows that he is supposed to stay completely vertical instead of leaning.     Plan  Plan details: Patient to continue with PT services to improve gait, balance, strength, transfers and overall functional mobility.          Timed:  Manual Therapy:            0     mins  09762;  Therapeutic Exercise:    20    mins  86155;     Neuromuscular Tahmina:    23    mins  68419;    Therapeutic Activity:      0     mins  12967;     Gait Trainin    mins  28297;     Electrical Stimulation:    0    mins  78057 ( );     Untimed:  Canalith Repositioning techniques _0_ 67121      Timed Treatment:   43   mins   Total Treatment:     43   mins      Vianca Durant, PT, DPT, MSCS, CDP, CSRS  [place] License #: [id number]  Physical Therapist

## 2022-11-15 NOTE — ASSESSMENT & PLAN NOTE
Follow up labs to see if needs replacement.    [Sclera] : the sclera and conjunctiva were normal [Extraocular Movements] : extraocular movements were intact [Normal] : oriented to person, place and time, the affect was normal, the mood was normal and not anxious [de-identified] : Right peripheral visual field comprised

## 2022-11-17 ENCOUNTER — TREATMENT (OUTPATIENT)
Dept: PHYSICAL THERAPY | Facility: CLINIC | Age: 87
End: 2022-11-17

## 2022-11-17 DIAGNOSIS — Z74.09 IMPAIRED FUNCTIONAL MOBILITY, BALANCE, GAIT, AND ENDURANCE: ICD-10-CM

## 2022-11-17 DIAGNOSIS — R26.9 GAIT ABNORMALITY: Primary | ICD-10-CM

## 2022-11-17 PROCEDURE — 97110 THERAPEUTIC EXERCISES: CPT | Performed by: PHYSICAL THERAPIST

## 2022-11-17 PROCEDURE — 97112 NEUROMUSCULAR REEDUCATION: CPT | Performed by: PHYSICAL THERAPIST

## 2022-11-19 NOTE — PROGRESS NOTES
Physical Therapy Daily Note  Visit: 8  Date of Initial Visit: Type: THERAPY  Noted: 2022    Patient: Chaitanya Browne   : 1923  Diagnosis/ICD-10 Code:  Gait abnormality [R26.9]  Referring practitioner: Dirk Russ MD  Date of Initial Visit: Type: THERAPY  Noted: 2022  Today's Date: 2022  Patient seen for 8 sessions      Subjective:   Patient reports: he is still in the vertical.   Pain: 0/10  Clinical Progress: improved  Home Program Compliance: Yes  Treatment has included: therapeutic exercise and neuromuscular re-education    Objective   See Exercise, Manual, and Modality Logs for complete treatment.    PT Neuro          Assessment & Plan     Assessment    Assessment details: Patient requested review of heel raise exercise. New LE exercises also added for improvement of strength, along with increased weight bearing and balance.     Plan  Plan details: Patient to continue with PT services to improve gait, balance, strength, transfers and overall functional mobility.          Timed:  Manual Therapy:            0     mins  29623;  Therapeutic Exercise:    30    mins  66505;     Neuromuscular Tahmina:    8    mins  32973;    Therapeutic Activity:      0     mins  39645;     Gait Trainin    mins  26171;     Electrical Stimulation:    0    mins  97394 ( );     Untimed:  Canalith Repositioning techniques _0_ 16953      Timed Treatment:   38   mins   Total Treatment:     38   mins      Vianca Durant PT, DPT, MSCS, CDP, CSRS  [place] License #: [id number]  Physical Therapist

## 2022-11-22 ENCOUNTER — TREATMENT (OUTPATIENT)
Dept: PHYSICAL THERAPY | Facility: CLINIC | Age: 87
End: 2022-11-22

## 2022-11-22 DIAGNOSIS — Z74.09 IMPAIRED FUNCTIONAL MOBILITY, BALANCE, GAIT, AND ENDURANCE: ICD-10-CM

## 2022-11-22 DIAGNOSIS — R26.9 GAIT ABNORMALITY: Primary | ICD-10-CM

## 2022-11-22 PROCEDURE — 97112 NEUROMUSCULAR REEDUCATION: CPT | Performed by: PHYSICAL THERAPIST

## 2022-11-22 PROCEDURE — 97110 THERAPEUTIC EXERCISES: CPT | Performed by: PHYSICAL THERAPIST

## 2022-11-22 NOTE — PROGRESS NOTES
Physical Therapy Daily Note  Visit: 9  Date of Initial Visit: Type: THERAPY  Noted: 2022    Patient: Chaitanya Browne   : 1923  Diagnosis/ICD-10 Code:  Gait abnormality [R26.9]  Referring practitioner: Dirk Russ MD  Date of Initial Visit: Type: THERAPY  Noted: 2022  Today's Date: 2022  Patient seen for 9 sessions    Subjective:   Patient reports: he is in the vertical.   Pain: 0/10  Clinical Progress: improved  Home Program Compliance: Yes  Treatment has included: therapeutic exercise and neuromuscular re-education    Objective   See Exercise, Manual, and Modality Logs for complete treatment.    PT Neuro          Assessment & Plan     Assessment    Assessment details: Patient demonstrates good performance of HEP in clinic. He had no new questions and daughter states he has all of his exercises at home in printed format. Patient will be discharged at this time to independent management of program.     Plan  Plan details: Patient will be discharged at this time.           Timed:  Manual Therapy:            0     mins  89259;  Therapeutic Exercise:    30    mins  53978;     Neuromuscular [place]:    8    mins  40565;    Therapeutic Activity:      0     mins  99098;     Gait Trainin    mins  11491;     Electrical Stimulation:    0    mins  14622 ( );     Untimed:  Canalith Repositioning techniques _0_ [id number]      Timed Treatment:   38   mins   Total Treatment:     38   mins      Vianca Durant, PT, DPT, MSCS, CDP, CSRS  [place] License #: [id number]  Physical Therapist

## 2022-11-28 DIAGNOSIS — R60.0 EDEMA LEG: ICD-10-CM

## 2022-11-28 DIAGNOSIS — I50.32 CHRONIC DIASTOLIC CHF (CONGESTIVE HEART FAILURE): ICD-10-CM

## 2022-11-28 RX ORDER — BUMETANIDE 1 MG/1
TABLET ORAL
Qty: 200 TABLET | Refills: 1 | Status: SHIPPED | OUTPATIENT
Start: 2022-11-28

## 2022-11-30 ENCOUNTER — TELEPHONE (OUTPATIENT)
Dept: INTERNAL MEDICINE | Facility: CLINIC | Age: 87
End: 2022-11-30

## 2023-01-04 ENCOUNTER — HOSPITAL ENCOUNTER (OUTPATIENT)
Dept: GENERAL RADIOLOGY | Facility: HOSPITAL | Age: 88
Discharge: HOME | End: 2023-01-04
Admitting: NURSE PRACTITIONER
Payer: MEDICARE

## 2023-01-04 ENCOUNTER — OFFICE VISIT (OUTPATIENT)
Dept: INTERNAL MEDICINE | Facility: CLINIC | Age: 88
End: 2023-01-04
Payer: MEDICARE

## 2023-01-04 VITALS
DIASTOLIC BLOOD PRESSURE: 62 MMHG | WEIGHT: 179 LBS | BODY MASS INDEX: 29.82 KG/M2 | HEART RATE: 73 BPM | SYSTOLIC BLOOD PRESSURE: 112 MMHG | HEIGHT: 65 IN | OXYGEN SATURATION: 94 % | TEMPERATURE: 98.7 F

## 2023-01-04 DIAGNOSIS — R06.02 SHORTNESS OF BREATH: Primary | ICD-10-CM

## 2023-01-04 DIAGNOSIS — R06.02 SHORTNESS OF BREATH: ICD-10-CM

## 2023-01-04 DIAGNOSIS — C44.310 FACIAL BASAL CELL CANCER: ICD-10-CM

## 2023-01-04 PROCEDURE — 71046 X-RAY EXAM CHEST 2 VIEWS: CPT

## 2023-01-04 PROCEDURE — 99213 OFFICE O/P EST LOW 20 MIN: CPT | Performed by: NURSE PRACTITIONER

## 2023-01-04 NOTE — PROGRESS NOTES
Follow Up Office Visit      Patient Name: Chaitanya Browne  : 1923   MRN: [id number]   Care Team: Patient Care Team:  Dirk Russ MD as PCP - General (Internal Medicine)  Fabricio Zavala MD as Consulting Physician (Internal Medicine)  Kaley Oliveros APRN as Nurse Practitioner (Cardiology)    Chief Complaint:    Chief Complaint   Patient presents with   • low oxygen        History of Present Illness: Chaitanya Browne is a 89 y.o. male with pertinent medical history significant for hyperlipidemia, hypertension, ASHD, chronic diastolic congestive heart failure, obesity.  He is accompanied by his daughter, Nkechi, who contributes to the patient's history.    Presents today for issue of low oxygen saturation readings at home over the last 4 days.  Nkechi states that the patient's oxygen saturation readings typically run between 97 and 99.  However, over the last 4 days, readings have been at 90-95.  The patient states that he feels somewhat short of breath but nothing outside of his typical sensation.  He denies chest pain or swelling of the extremities.    Nkechi states that the patient weighs on a daily basis and has recently lost some weight but then gained a few pounds back.  He has a cardiologist and continues to use Bumex 100 mg twice daily as prescribed.      Denies any associated cough, chills, fever, headache, sore throat or general ill sensation  Subjective        I have reviewed and the following portions of the patient's history were updated as appropriate: past family history, past medical history, past social history, past surgical history and problem list.    Medications:     Current Outpatient Medications:   •  aspirin 81 MG EC tablet, Take 1 tablet by mouth Every Other Day. (Patient taking differently: Take 40.5 mg by mouth 3 (Three) Times a Week.), Disp: , Rfl:   •  azelastine (ASTELIN) 0.1 % nasal spray, USE 2 SPRAYS IN EACH NOSTRIL TWO TIMES A DAY AS DIRECTED, Disp: 90 each,  Rfl: 3  •  bumetanide (Bumex) 1 MG tablet, 1 mg BID, may take extra 1 mg with 3-5 lb weight gain, dyspnea, or worsening edema., Disp: 200 tablet, Rfl: 1  •  Cholecalciferol (Vitamin D3) 25 MCG (1000 UT) capsule, Take  by mouth., Disp: , Rfl:   •  finasteride (PROSCAR) 5 MG tablet, Take 1 tablet by mouth Daily., Disp: 90 tablet, Rfl: 1  •  fluocinonide (LIDEX) 0.05 % external solution, Apply 1 application topically to the appropriate area as directed As Needed., Disp: , Rfl:   •  ketoconazole (NIZORAL) 2 % cream, Apply 1 application topically to the appropriate area as directed Daily As Needed., Disp: , Rfl:   •  ketoconazole (NIZORAL) 2 % shampoo, Apply  topically to the appropriate area as directed As Needed., Disp: , Rfl:   •  mupirocin (BACTROBAN) 2 % ointment, , Disp: , Rfl:   •  pravastatin (PRAVACHOL) 40 MG tablet, Take 1 tablet by mouth Daily. 3x a week, Disp: 90 tablet, Rfl: 3  •  tacrolimus (PROTOPIC) 0.1 % ointment, Apply 1 application topically to the appropriate area as directed 2 (Two) Times a Day., Disp: , Rfl:     Allergies:   No Known Allergies    Objective     Physical Exam:  Vital Signs:   Vitals:    01/04/23 1438   BP: 112/62   BP Location: Left arm   Patient Position: Sitting   Cuff Size: Adult   Pulse: 73   Temp: 98.7 °F (37.1 °C)   TempSrc: Temporal   SpO2: 94%   Weight: 81.2 kg (179 lb)   Height: 165.1 cm (65\")   PainSc: 0-No pain     Body mass index is 29.79 kg/m².     Physical Exam  Vitals and nursing note reviewed.   Constitutional:       General: He is not in acute distress.  HENT:      Head: Normocephalic and atraumatic.      Right Ear: Tympanic membrane, ear canal and external ear normal.      Left Ear: Tympanic membrane, ear canal and external ear normal.      Mouth/Throat:      Mouth: Mucous membranes are moist.      Pharynx: Oropharynx is clear. No oropharyngeal exudate.   Eyes:      General: No scleral icterus.     Conjunctiva/sclera: Conjunctivae normal.   Cardiovascular:      Rate  and Rhythm: Normal rate and regular rhythm.   Pulmonary:      Effort: Pulmonary effort is normal. No respiratory distress.      Breath sounds: No wheezing.   Musculoskeletal:      Right lower leg: Edema (1+) present.      Left lower leg: Edema (1+) present.   Skin:     Comments: Left side temporal area with erythematous lesion.  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   Psychiatric:         Mood and Affect: Mood normal.         Assessment / Plan      Assessment/Plan:   Problems Addressed This Visit    ICD-10-CM ICD-9-CM   1. Shortness of breath  R06.02 786.05   2. 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 Education was provided regarding diagnosis and management.  Patient verbalizes understanding of and agreement with management plan.      Follow Up:   Return for Next scheduled follow up.        BRUCE Ho  [facility] [address]    Please note that portions of this note were completed with a voice recognition program.

## 2023-01-31 DIAGNOSIS — N13.8 BPH WITH OBSTRUCTION/LOWER URINARY TRACT SYMPTOMS: ICD-10-CM

## 2023-01-31 DIAGNOSIS — N40.1 BPH WITH OBSTRUCTION/LOWER URINARY TRACT SYMPTOMS: ICD-10-CM

## 2023-01-31 RX ORDER — FINASTERIDE 5 MG/1
5 TABLET, FILM COATED ORAL DAILY
Qty: 90 TABLET | Refills: 1 | Status: SHIPPED | OUTPATIENT
Start: 2023-01-31

## 2023-01-31 NOTE — TELEPHONE ENCOUNTER
Caller: CheyenneHaley barberria    Relationship: Emergency Contact    Best call back number: [phone]    Requested Prescriptions:   Requested Prescriptions     Pending Prescriptions Disp Refills   • finasteride (PROSCAR) 5 MG tablet 90 tablet 1     Sig: Take 1 tablet by mouth Daily.        Pharmacy where request should be sent: [facility] - [address] [phone] [facility] [phone] FX     Additional details provided by patient: PATIENT HAS CALLED REQUESTING A NEW PRESCRIPTION WITH  REFILLS ON ABOVE MEDICATION    Does the patient have less than a 3 day supply:  [x] Yes  [] No    Would you like a call back once the refill request has been completed: [] Yes [x] No    If the office needs to give you a call back, can they leave a voicemail: [] Yes [x] No    Keisha Fabian   01/31/23 10:38 EST

## 2023-02-23 ENCOUNTER — OFFICE VISIT (OUTPATIENT)
Dept: INTERNAL MEDICINE | Facility: CLINIC | Age: 88
End: 2023-02-23
Payer: MEDICARE

## 2023-02-23 VITALS
TEMPERATURE: 97.7 F | DIASTOLIC BLOOD PRESSURE: 54 MMHG | OXYGEN SATURATION: 96 % | WEIGHT: 181 LBS | BODY MASS INDEX: 30.16 KG/M2 | HEIGHT: 65 IN | HEART RATE: 82 BPM | SYSTOLIC BLOOD PRESSURE: 96 MMHG

## 2023-02-23 DIAGNOSIS — Z00.00 MEDICARE ANNUAL WELLNESS VISIT, SUBSEQUENT: Primary | ICD-10-CM

## 2023-02-23 DIAGNOSIS — N18.31 STAGE 3A CHRONIC KIDNEY DISEASE: ICD-10-CM

## 2023-02-23 DIAGNOSIS — C44.320 SQUAMOUS CELL CARCINOMA OF FACE: ICD-10-CM

## 2023-02-23 DIAGNOSIS — C44.310 FACIAL BASAL CELL CANCER: ICD-10-CM

## 2023-02-23 DIAGNOSIS — J41.0 SIMPLE CHRONIC BRONCHITIS: ICD-10-CM

## 2023-02-23 DIAGNOSIS — I50.32 CHRONIC DIASTOLIC CHF (CONGESTIVE HEART FAILURE): ICD-10-CM

## 2023-02-23 DIAGNOSIS — G56.01 RIGHT CARPAL TUNNEL SYNDROME: ICD-10-CM

## 2023-02-23 DIAGNOSIS — M54.50 ACUTE MIDLINE LOW BACK PAIN WITHOUT SCIATICA: ICD-10-CM

## 2023-02-23 DIAGNOSIS — E66.09 CLASS 1 OBESITY DUE TO EXCESS CALORIES WITH SERIOUS COMORBIDITY AND BODY MASS INDEX (BMI) OF 30.0 TO 30.9 IN ADULT: ICD-10-CM

## 2023-02-23 DIAGNOSIS — Z91.81 AT HIGH RISK FOR FALLS: ICD-10-CM

## 2023-02-23 PROCEDURE — 1170F FXNL STATUS ASSESSED: CPT | Performed by: INTERNAL MEDICINE

## 2023-02-23 PROCEDURE — G0439 PPPS, SUBSEQ VISIT: HCPCS | Performed by: INTERNAL MEDICINE

## 2023-02-23 PROCEDURE — 1159F MED LIST DOCD IN RCRD: CPT | Performed by: INTERNAL MEDICINE

## 2023-02-23 PROCEDURE — 1125F AMNT PAIN NOTED PAIN PRSNT: CPT | Performed by: INTERNAL MEDICINE

## 2023-02-23 PROCEDURE — 99213 OFFICE O/P EST LOW 20 MIN: CPT | Performed by: INTERNAL MEDICINE

## 2023-02-23 NOTE — PROGRESS NOTES
QUICK REFERENCE INFORMATION:  The ABCs of the Annual Wellness Visit    Subsequent Medicare Wellness Visit    HEALTH RISK ASSESSMENT    [birth year]23    Recent Hospitalizations:  No hospitalization(s) within the last year..        Current Medical Providers:  Patient Care Team:  Dirk Russ MD as PCP - General (Internal Medicine)  Fabricio Zavala MD as Consulting Physician (Internal Medicine)  Kaley Oliveros APRN as Nurse Practitioner (Cardiology)        Smoking Status:  Social History     Tobacco Use   Smoking Status Never   Smokeless Tobacco Never       Alcohol Consumption:  Social History     Substance and Sexual Activity   Alcohol Use No       Depression Screen:   PHQ-2/PHQ-9 Depression Screening 2/23/2023   Retired PHQ-9 Total Score -   Retired Total Score -   Little Interest or Pleasure in Doing Things 0-->not at all   Feeling Down, Depressed or Hopeless 0-->not at all   PHQ-9: Brief Depression Severity Measure Score 0       Health Habits and Functional and Cognitive Screening:  Functional & Cognitive Status 2/23/2023   Do you have difficulty preparing food and eating? Yes   Do you have difficulty bathing yourself, getting dressed or grooming yourself? No   Do you have difficulty using the toilet? No   Do you have difficulty moving around from place to place? No   Do you have trouble with steps or getting out of a bed or a chair? No   Current Diet Well Balanced Diet   Dental Exam Up to date        Dental Exam Comment -   Eye Exam Up to date        Eye Exam Comment -   Exercise (times per week) 6 times per week   Current Exercises Include Weightlifting;Walking   Current Exercise Activities Include -   Do you need help using the phone?  No   Are you deaf or do you have serious difficulty hearing?  Yes   Do you need help with transportation? Yes   Do you need help shopping? Yes   Do you need help preparing meals?  Yes   Do you need help with housework?  Yes   Do you need help with laundry? Yes   Do you  need help taking your medications? Yes   Do you need help managing money? Yes   Do you ever drive or ride in a car without wearing a seat belt? No   Have you felt unusual stress, anger or loneliness in the last month? No   Who do you live with? Alone   If you need help, do you have trouble finding someone available to you? No   Have you been bothered in the last four weeks by sexual problems? No   Do you have difficulty concentrating, remembering or making decisions? Yes       Fall Risk Screen:  STEADI Fall Risk Assessment was completed, and patient is at MODERATE risk for falls. Assessment completed on:2/23/2023    ACE III MINI        Does the patient have evidence of cognitive impairment? No    Aspirin use counseling: Taking ASA appropriately as indicated    Recent Lab Results:  CMP:  Lab Results   Component Value Date    BUN 20 08/23/2022    CREATININE 1.31 (H) 08/23/2022    EGFRIFNONA 50 (L) 02/22/2022    EGFRIFAFRI 60 (L) 02/22/2022    BCR 15.3 08/23/2022     08/23/2022    K 3.8 08/23/2022    CO2 29.0 08/23/2022    CALCIUM 9.1 08/23/2022    ALBUMIN 4.10 08/23/2022    BILITOT 1.2 08/23/2022    ALKPHOS 73 08/23/2022    AST 17 08/23/2022    ALT 10 08/23/2022     HbA1c:  Lab Results   Component Value Date    HGBA1C 5.90 (H) 08/23/2022    HGBA1C 5.90 (H) 02/22/2022     Microalbumin:  Lab Results   Component Value Date    MICROALBUR 39.3 08/27/2019     Lipid Panel  Lab Results   Component Value Date    CHOL 187 08/23/2022    TRIG 81 08/23/2022    HDL 74 (H) 08/23/2022    LDL 98 08/23/2022    AST 17 08/23/2022    ALT 10 08/23/2022       Visual Acuity:  No results found.    Age-appropriate Screening Schedule:  Refer to the list below for future screening recommendations based on patient's age, sex and/or medical conditions. Orders for these recommended tests are listed in the plan section. The patient has been provided with a written plan.    Health Maintenance   Topic Date Due   • ZOSTER VACCINE (1 of 2) Never  done   • TDAP/TD VACCINES (2 - Td or Tdap) 10/18/2018   • LIPID PANEL  08/23/2023   • ANNUAL WELLNESS VISIT  02/23/2024   • COVID-19 Vaccine  Completed   • INFLUENZA VACCINE  Completed   • Pneumococcal Vaccine 65+  Completed        Subjective   History of Present Illness    Chaitanya Browne is a 89 y.o. male who presents for a Subsequent Wellness Visit. 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 He has some back pain and hand pain.     CHRONIC CONDITIONS    The following portions of the patient's history were reviewed and updated as appropriate: allergies, current medications, past family history, past medical history, past social history, past surgical history and problem list.    Outpatient Medications Prior to Visit   Medication Sig Dispense Refill   • aspirin 81 MG EC tablet Take 1 tablet by mouth Every Other Day. (Patient taking differently: Take 40.5 mg by mouth 3 (Three) Times a Week.)     • azelastine (ASTELIN) 0.1 % nasal spray USE 2 SPRAYS IN EACH NOSTRIL TWO TIMES A DAY AS DIRECTED 90 each 3   • bumetanide (Bumex) 1 MG tablet 1 mg BID, may take extra 1 mg with 3-5 lb weight gain, dyspnea, or worsening edema. 200 tablet 1   • Cholecalciferol (Vitamin D3) 25 MCG (1000 UT) capsule Take  by mouth.     • finasteride (PROSCAR) 5 MG tablet Take 1 tablet by mouth Daily. 90 tablet 1   • fluocinonide (LIDEX) 0.05 % external solution Apply 1 application topically to the appropriate area as directed As Needed.     • ketoconazole (NIZORAL) 2 % cream Apply 1 application topically to the appropriate area as directed Daily As Needed.     • ketoconazole (NIZORAL) 2 % shampoo Apply  topically to the appropriate area as directed As Needed.     • mupirocin (BACTROBAN) 2 % ointment      • pravastatin (PRAVACHOL) 40 MG tablet Take 1 tablet by mouth Daily. 3x a week 90 tablet 3   • tacrolimus (PROTOPIC) 0.1 % ointment Apply 1 application topically to the appropriate area as directed 2 (Two)  Times a Day.       No facility-administered medications prior to visit.       Patient Active Problem List   Diagnosis   • Facial basal cell cancer   • Hyperlipidemia LDL goal <70   • Essential hypertension   • ASHD (arteriosclerotic heart disease)   • Gait abnormality   • Impaired fasting glucose   • Polyp, colonic   • Vitamin D deficiency   • Acute midline low back pain without sciatica   • Proteinuria   • Right carpal tunnel syndrome   • Class 1 obesity due to excess calories with serious comorbidity and body mass index (BMI) of 30.0 to 30.9 in adult   • Hematuria, unspecified   • Medicare annual wellness visit, subsequent   • Edema   • Cough   • Allergic rhinitis   • Gross hematuria   • Acute UTI (urinary tract infection)   • Acute renal insufficiency   • Acute urinary retention   • Debility   • Dysphagia   • BPH with obstruction/lower urinary tract symptoms   • Prostatitis   • Chronic diastolic CHF (congestive heart failure) (HCC)   • Iron deficiency anemia due to chronic blood loss   • Simple chronic bronchitis (HCC)   • Shortness of breath   • Stage 3a chronic kidney disease (HCC)       Advance Care Planning:  ACP discussion was held with the patient during this visit. Patient has an advance directive in EMR which is still valid.     Identification of Risk Factors:  Risk factors include: Advance Directive Discussion  Cardiovascular risk  Chronic Pain   Fall Risk  Obesity/Overweight   Polypharmacy.    Review of Systems   Constitutional: Negative for diaphoresis and fatigue.   HENT: Positive for hearing loss.    Respiratory: Positive for cough (improved cough ).    Cardiovascular: Negative for chest pain and palpitations.   Gastrointestinal: Negative for abdominal pain.   Musculoskeletal: Positive for arthralgias, back pain and gait problem.   Skin: Negative for rash.   Neurological: Positive for weakness. Negative for dizziness and headaches.   Psychiatric/Behavioral: Negative for decreased concentration,  "dysphoric mood and sleep disturbance. The patient is not nervous/anxious.        Compared to one year ago, the patient feels his physical health is the same.  Compared to one year ago, the patient feels his mental health is the same.    Objective     Physical Exam  Constitutional:       Appearance: Normal appearance.   HENT:      Right Ear: Tympanic membrane and ear canal normal.      Left Ear: Tympanic membrane and ear canal normal.   Eyes:      Conjunctiva/sclera: Conjunctivae normal.   Cardiovascular:      Rate and Rhythm: Normal rate and regular rhythm.      Heart sounds: Murmur (II/VI SM ) heard.   Abdominal:      General: Bowel sounds are normal.      Palpations: Abdomen is soft.   Musculoskeletal:         General: Tenderness (no low  ) present.   Neurological:      Mental Status: He is alert.      Comments: Slow get up and go and good recall and mildly unsteady gait.    Psychiatric:         Mood and Affect: Mood normal.         Behavior: Behavior normal.          Procedures     Vitals:    02/23/23 1050   BP: 96/54   Pulse: 82   Temp: 97.7 °F (36.5 °C)   SpO2: 96%   Weight: 82.1 kg (181 lb)   Height: 165.1 cm (65\")   PainSc: 0-No pain       BMI is >= 30 and <35. (Class 1 Obesity). The following options were offered after discussion;: weight loss educational material (shared in after visit summary), exercise counseling/recommendations and nutrition counseling/recommendations      Assessment & Plan   Problem List Items Addressed This Visit        Cardiac and Vasculature    Chronic diastolic CHF (congestive heart failure) (HCC)    Current Assessment & Plan     Congestive heart failure due to hypertension.  Heart failure is improving with treatment.  NYHA Class II.  Continue current treatment regimen.  Regular aerobic exercise.  Heart failure will be reassessed in 6 months.         Relevant Medications    bumetanide (Bumex) 1 MG tablet       Endocrine and Metabolic    Class 1 obesity due to excess " calories with serious comorbidity and body mass index (BMI) of 30.0 to 30.9 in adult    Current Assessment & Plan     Patient's (Body mass index is 30.12 kg/m².) indicates that they are obese (BMI >30) with health conditions that include hypertension, coronary heart disease, dyslipidemias and osteoarthritis . Weight is unchanged. BMI  is above average; BMI management plan is completed. We discussed portion control and increasing exercise.             Genitourinary and Reproductive     Stage 3a chronic kidney disease (HCC)    Current Assessment & Plan     Renal condition is unchanged.  Continue current treatment regimen.  Weight loss.  Renal condition will be reassessed in 6 months.         Relevant Medications    bumetanide (Bumex) 1 MG tablet       Health Encounters    Medicare annual wellness visit, subsequent - Primary    Current Assessment & Plan     He has hearing loss and hearing aides working well overall and will see Audiology in next 4 months. His mood is good overall and good recall. Encourage to get up slowly and get bearings before taking first step. Keep home well lit with clear floors to avoid tripping. Use assist devices for all walking especially from bed to bathroom. Age-appropriate Counseling:  Discussed preventative medicine issues with patient including regular exercise, healthy diet, stress reduction, adequate sleep and recommended age-appropriate screening studies.  Immunizations reviewed.                 Hematology and Neoplasia    Facial basal cell cancer    Current Assessment & Plan     Follows with Dr Hendrix.          Relevant Medications    ketoconazole (NIZORAL) 2 % shampoo    ketoconazole (NIZORAL) 2 % cream    fluocinonide (LIDEX) 0.05 % external solution    mupirocin (BACTROBAN) 2 % ointment       Musculoskeletal and Injuries    Acute midline low back pain without sciatica    Current Assessment & Plan     Proceed with PT.         Relevant Orders    Ambulatory Referral to Physical  Therapy Evaluate and treat       Neuro    Right carpal tunnel syndrome    Current Assessment & Plan     Some pain. He is doing PT but it is for back and gait.             Pulmonary and Pneumonias    Simple chronic bronchitis (HCC)    Current Assessment & Plan     His cough is better overall and no longer follows with pulmonary          Relevant Medications    azelastine (ASTELIN) 0.1 % nasal spray   Other Visit Diagnoses     Squamous cell carcinoma of face        Following with Dr Hendrix and Dr Legegtt and getting chemo     At high risk for falls        Proceed with PT    Relevant Orders    Ambulatory Referral to Physical Therapy Evaluate and treat        Patient Self-Management and Personalized Health Advice  The patient has been provided with information about: diet, exercise, weight management, prevention of cardiac or vascular disease and fall prevention and preventive services including:   · Annual Wellness Visit (AWV).    Outpatient Encounter Medications as of 2/23/2023   Medication Sig Dispense Refill   • aspirin 81 MG EC tablet Take 1 tablet by mouth Every Other Day. (Patient taking differently: Take 40.5 mg by mouth 3 (Three) Times a Week.)     • azelastine (ASTELIN) 0.1 % nasal spray USE 2 SPRAYS IN EACH NOSTRIL TWO TIMES A DAY AS DIRECTED 90 each 3   • bumetanide (Bumex) 1 MG tablet 1 mg BID, may take extra 1 mg with 3-5 lb weight gain, dyspnea, or worsening edema. 200 tablet 1   • Cholecalciferol (Vitamin D3) 25 MCG (1000 UT) capsule Take  by mouth.     • finasteride (PROSCAR) 5 MG tablet Take 1 tablet by mouth Daily. 90 tablet 1   • fluocinonide (LIDEX) 0.05 % external solution Apply 1 application topically to the appropriate area as directed As Needed.     • ketoconazole (NIZORAL) 2 % cream Apply 1 application topically to the appropriate area as directed Daily As Needed.     • ketoconazole (NIZORAL) 2 % shampoo Apply  topically to the appropriate area as directed As Needed.     • mupirocin  (BACTROBAN) 2 % ointment      • pravastatin (PRAVACHOL) 40 MG tablet Take 1 tablet by mouth Daily. 3x a week 90 tablet 3   • [DISCONTINUED] tacrolimus (PROTOPIC) 0.1 % ointment Apply 1 application topically to the appropriate area as directed 2 (Two) Times a Day.       No facility-administered encounter medications on file as of 2/23/2023.       Reviewed use of high risk medication in the elderly: yes  Reviewed for potential of harmful drug interactions in the elderly: yes    Follow Up:  Return in 6 months (on 8/23/2023) for Recheck.     Patient Instructions       Fall Prevention in the Home, Adult  Falls can cause injuries and affect people of all ages. There are many simple things that you can do to make your home safe and to help prevent falls. Ask for help when making these changes, if needed.  What actions can I take to prevent falls?  General instructions  • Use good lighting in all rooms. Replace any light bulbs that burn out, turn on lights if it is dark, and use night-lights.  • Place frequently used items in easy-to-reach places. Lower the shelves around your home if necessary.  • Set up furniture so that there are clear paths around it. Avoid moving your furniture around.  • Remove throw rugs and other tripping hazards from the floor.  • Avoid walking on wet floors.  • Fix any uneven floor surfaces.  • Add color or contrast paint or tape to grab bars and handrails in your home. Place contrasting color strips on the first and last steps of staircases.  • When you use a stepladder, make sure that it is completely opened and that the sides and supports are firmly locked. Have someone hold the ladder while you are using it. Do not climb a closed stepladder.  • Know where your pets are when moving through your home.  What can I do in the bathroom?     • Keep the floor dry. Immediately clean up any water that is on the floor.  • Remove soap buildup in the tub or shower regularly.  • Use nonskid mats or decals  on the floor of the tub or shower.  • Attach bath mats securely with double-sided, nonslip rug tape.  • If you need to sit down while you are in the shower, use a plastic, nonslip stool.  • Install grab bars by the toilet and in the tub and shower. Do not use towel bars as grab bars.  What can I do in the bedroom?  • Make sure that a bedside light is easy to reach.  • Do not use oversized bedding that reaches the floor.  • Have a firm chair that has side arms to use for getting dressed.  What can I do in the kitchen?  • Clean up any spills right away.  • If you need to reach for something above you, use a sturdy step stool that has a grab bar.  • Keep electrical cables out of the way.  • Do not use floor polish or wax that makes floors slippery. If you must use wax, make sure that it is non-skid floor wax.  What can I do with my stairs?  • Do not leave any items on the stairs.  • Make sure that you have a light switch at the top and the bottom of the stairs. Have them installed if you do not have them.  • Make sure that there are handrails on both sides of the stairs. Fix handrails that are broken or loose. Make sure that handrails are as long as the staircases.  • Install non-slip stair treads on all stairs in your home.  • Avoid having throw rugs at the top or bottom of stairs, or secure the rugs with carpet tape to prevent them from moving.  • Choose a carpet design that does not hide the edge of steps on the stairs.  • Check any carpeting to make sure that it is firmly attached to the stairs. Fix any carpet that is loose or worn.  What can I do on the outside of my home?  • Use bright outdoor lighting.  • Regularly repair the edges of walkways and driveways and fix any cracks.  • Remove high doorway thresholds.  • Trim any shrubbery on the main path into your home.  • Regularly check that handrails are securely fastened and in good repair. Both sides of all steps should have handrails.  • Install guardrails  along the edges of any raised decks or porches.  • Clear walkways of debris and clutter, including tools and rocks.  • Have leaves, snow, and ice cleared regularly.  • Use sand or salt on walkways during winter months.  • In the garage, clean up any spills right away, including grease or oil spills.  What other actions can I take?  • Wear closed-toe shoes that fit well and support your feet. Wear shoes that have rubber soles or low heels.  • Use mobility aids as needed, such as canes, walkers, scooters, and crutches.  • Review your medicines with your health care provider. Some medicines can cause dizziness or changes in blood pressure, which increase your risk of falling.  Talk with your health care provider about other ways that you can decrease your risk of falls. This may include working with a physical therapist or  to improve your strength, balance, and endurance.  Where to find more information  • Centers for Disease Control and Prevention, STEADI: www.cdc.gov  • National Calder on Aging: www.nazanin.nih.gov  Contact a health care provider if:  • You are afraid of falling at home.  • You feel weak, drowsy, or dizzy at home.  • You fall at home.  Summary  • There are many simple things that you can do to make your home safe and to help prevent falls.  • Ways to make your home safe include removing tripping hazards and installing grab bars in the bathroom.  • Ask for help when making these changes in your home.  This information is not intended to replace advice given to you by your health care provider. Make sure you discuss any questions you have with your health care provider.  Document Revised: 09/19/2022 Document Reviewed: 07/21/2021  [email or web address] Patient Education © 2022 [email or web address]        An After Visit Summary and PPPS with all of these plans were given to the patient.

## 2023-02-23 NOTE — PATIENT INSTRUCTIONS
Fall Prevention in the Home, Adult  Falls can cause injuries and affect people of all ages. There are many simple things that you can do to make your home safe and to help prevent falls. Ask for help when making these changes, if needed.  What actions can I take to prevent falls?  General instructions  • Use good lighting in all rooms. Replace any light bulbs that burn out, turn on lights if it is dark, and use night-lights.  • Place frequently used items in easy-to-reach places. Lower the shelves around your home if necessary.  • Set up furniture so that there are clear paths around it. Avoid moving your furniture around.  • Remove throw rugs and other tripping hazards from the floor.  • Avoid walking on wet floors.  • Fix any uneven floor surfaces.  • Add color or contrast paint or tape to grab bars and handrails in your home. Place contrasting color strips on the first and last steps of staircases.  • When you use a stepladder, make sure that it is completely opened and that the sides and supports are firmly locked. Have someone hold the ladder while you are using it. Do not climb a closed stepladder.  • Know where your pets are when moving through your home.  What can I do in the bathroom?     • Keep the floor dry. Immediately clean up any water that is on the floor.  • Remove soap buildup in the tub or shower regularly.  • Use nonskid mats or decals on the floor of the tub or shower.  • Attach bath mats securely with double-sided, nonslip rug tape.  • If you need to sit down while you are in the shower, use a plastic, nonslip stool.  • Install grab bars by the toilet and in the tub and shower. Do not use towel bars as grab bars.  What can I do in the bedroom?  • Make sure that a bedside light is easy to reach.  • Do not use oversized bedding that reaches the floor.  • Have a firm chair that has side arms to use for getting dressed.  What can I do in the kitchen?  • Clean up any spills right away.  • If you  need to reach for something above you, use a sturdy step stool that has a grab bar.  • Keep electrical cables out of the way.  • Do not use floor polish or wax that makes floors slippery. If you must use wax, make sure that it is non-skid floor wax.  What can I do with my stairs?  • Do not leave any items on the stairs.  • Make sure that you have a light switch at the top and the bottom of the stairs. Have them installed if you do not have them.  • Make sure that there are handrails on both sides of the stairs. Fix handrails that are broken or loose. Make sure that handrails are as long as the staircases.  • Install non-slip stair treads on all stairs in your home.  • Avoid having throw rugs at the top or bottom of stairs, or secure the rugs with carpet tape to prevent them from moving.  • Choose a carpet design that does not hide the edge of steps on the stairs.  • Check any carpeting to make sure that it is firmly attached to the stairs. Fix any carpet that is loose or worn.  What can I do on the outside of my home?  • Use bright outdoor lighting.  • Regularly repair the edges of walkways and driveways and fix any cracks.  • Remove high doorway thresholds.  • Trim any shrubbery on the main path into your home.  • Regularly check that handrails are securely fastened and in good repair. Both sides of all steps should have handrails.  • Install guardrails along the edges of any raised decks or porches.  • Clear walkways of debris and clutter, including tools and rocks.  • Have leaves, snow, and ice cleared regularly.  • Use sand or salt on walkways during winter months.  • In the garage, clean up any spills right away, including grease or oil spills.  What other actions can I take?  • Wear closed-toe shoes that fit well and support your feet. Wear shoes that have rubber soles or low heels.  • Use mobility aids as needed, such as canes, walkers, scooters, and crutches.  • Review your medicines with your health care  provider. Some medicines can cause dizziness or changes in blood pressure, which increase your risk of falling.  Talk with your health care provider about other ways that you can decrease your risk of falls. This may include working with a physical therapist or  to improve your strength, balance, and endurance.  Where to find more information  • Centers for Disease Control and Prevention, [place]AD[place]: www.cdc.gov  • National [place] on Aging: www.nazanin.nih.gov  Contact a health care provider if:  • You are afraid of falling at home.  • You feel weak, drowsy, or dizzy at home.  • You fall at home.  Summary  • There are many simple things that you can do to make your home safe and to help prevent falls.  • Ways to make your home safe include removing tripping hazards and installing grab bars in the bathroom.  • Ask for help when making these changes in your home.  This information is not intended to replace advice given to you by your health care provider. Make sure you discuss any questions you have with your health care provider.  Document Revised: 09/19/2022 Document Reviewed: 07/21/2021  Elsevier Patient Education © 2022 [employer] Inc.

## 2023-02-24 NOTE — ASSESSMENT & PLAN NOTE
Congestive heart failure due to hypertension.  Heart failure is improving with treatment.  NYHA Class II.  Continue current treatment regimen.  Regular aerobic exercise.  Heart failure will be reassessed in 6 months.

## 2023-02-24 NOTE — ASSESSMENT & PLAN NOTE
Renal condition is unchanged.  Continue current treatment regimen.  Weight loss.  Renal condition will be reassessed in 6 months.

## 2023-02-24 NOTE — ASSESSMENT & PLAN NOTE
Patient's (Body mass index is 30.12 kg/m².) indicates that they are obese (BMI >30) with health conditions that include hypertension, coronary heart disease, dyslipidemias and osteoarthritis . Weight is unchanged. BMI  is above average; BMI management plan is completed. We discussed portion control and increasing exercise.

## 2023-02-24 NOTE — ASSESSMENT & PLAN NOTE
He has hearing loss and hearing aides working well overall and will see Audiology in next 4 months. His mood is good overall and good recall. Encourage to get up slowly and get bearings before taking first step. Keep home well lit with clear floors to avoid tripping. Use assist devices for all walking especially from bed to bathroom. Age-appropriate Counseling:  Discussed preventative medicine issues with patient including regular exercise, healthy diet, stress reduction, adequate sleep and recommended age-appropriate screening studies.  Immunizations reviewed.

## 2023-03-21 RX ORDER — PRAVASTATIN SODIUM 40 MG
40 TABLET ORAL DAILY
Qty: 90 TABLET | Refills: 3 | Status: SHIPPED | OUTPATIENT
Start: 2023-03-21

## 2023-03-21 NOTE — TELEPHONE ENCOUNTER
Caller: Nkechi Browne    Relationship: Emergency Contact    Best call back number:[phone]    Requested Prescriptions:   Requested Prescriptions     Pending Prescriptions Disp Refills   • pravastatin (PRAVACHOL) 40 MG tablet 90 tablet 3     Sig: Take 1 tablet by mouth Daily. 3x a week        Pharmacy where request should be sent:      Last office visit with prescribing clinician: 2/23/2023   Last telemedicine visit with prescribing clinician: 8/24/2023   Next office visit with prescribing clinician: 8/24/2023     Additional details provided by patient: PATIENT IS OUT AND NEEDS REFILLS    Does the patient have less than a 3 day supply:  [x] Yes  [] No    Would you like a call back once the refill request has been completed: [x] Yes [] No    If the office needs to give you a call back, can they leave a voicemail: [x] Yes [] No    Orlando Peralta Rep   03/21/23 10:40 EDT

## 2023-03-28 ENCOUNTER — TREATMENT (OUTPATIENT)
Dept: PHYSICAL THERAPY | Facility: CLINIC | Age: 88
End: 2023-03-28
Payer: MEDICARE

## 2023-03-28 DIAGNOSIS — Z74.09 IMPAIRED FUNCTIONAL MOBILITY, BALANCE, GAIT, AND ENDURANCE: Primary | ICD-10-CM

## 2023-03-28 DIAGNOSIS — R26.9 GAIT ABNORMALITY: ICD-10-CM

## 2023-03-28 PROCEDURE — 97162 PT EVAL MOD COMPLEX 30 MIN: CPT | Performed by: PHYSICAL THERAPIST

## 2023-03-28 PROCEDURE — 97110 THERAPEUTIC EXERCISES: CPT | Performed by: PHYSICAL THERAPIST

## 2023-03-28 NOTE — PROGRESS NOTES
Physical Therapy Initial Evaluation and Plan of Care     [facility] [place]          [address]     Patient: Chaitanya Browne   : 1923  Diagnosis/ICD-10 Code:  Impaired functional mobility, balance, gait, and endurance [Z74.09]  Referring practitioner: Dirk Russ MD  Date of Initial Visit: 3/28/2023  Today's Date: 3/28/2023  Patient seen for 1 sessions    Subjective:     Subjective Questionnaire: DIXON 34/56      Subjective Evaluation    History of Present Illness  Mechanism of injury: Everything is the same since the last time he was here except he has now turned 89. He wants to do a tune up and work on strength and balance.     Pain  No pain reported             Objective          Strength/Myotome Testing     Left Shoulder     Planes of Motion   Flexion: 4+   Abduction: 4+     Right Shoulder     Planes of Motion   Flexion: 4+   Abduction: 4+     Left Hip   Planes of Motion   Flexion: 4+  Abduction: 4    Right Hip   Planes of Motion   Flexion: 4+  Abduction: 4    Left Knee   Flexion: 5  Extension: 5    Right Knee   Flexion: 5  Extension: 5    Left Ankle/Foot   Dorsiflexion: 5    Right Ankle/Foot   Dorsiflexion: 5    Ambulation     Comments   Normalized gait with rollator         PT Neuro         Assessment & Plan     Assessment  Impairments: abnormal coordination, abnormal gait, activity intolerance, impaired balance, impaired physical strength and safety issue  Functional Limitations: carrying objects, walking, standing and stooping  Assessment details: Patient is an 89 YOM that presents to therapy with balance deficits. He lives alone with caregiver and family assist. He feels his upper body and lower body need some strengthening so he is able to continue to live independently. Patient will be seen for skilled PT intervention to address deficits in order to improve global mobility and function.   Prognosis: fair    Goals  Plan Goals: STG (4 visits)  1. Patient will  report compliance with initial HEP.   2. Patient to improve DIXON balance score to >/= 38 /56 to decrease patient's risk of falls.  3. Patient to perform TUG within 10 sec without LOB for improved functional mobility.  4. Patient to ambulate 10 meters without AD within 10 sec without LOB for improved gait nida and functional mobility.    LTG (8 visits)  1. Patient will be I with final HEP.   2. Patient to improve DIXON balance score to >/= 42 /56 to decrease patient's risk of falls.  3. Patient to perform TUG within 9 sec without LOB for improved functional mobility.  4. Patient to ambulate 10 meters without AD within 9 sec without LOB for improved gait nida and functional mobility.        Plan  Therapy options: will be seen for skilled therapy services  Planned modality interventions: TENS  Planned therapy interventions: ADL retraining, balance/weight-bearing training, flexibility, gait training, manual therapy, neuromuscular re-education, motor coordination training, postural training, strengthening, stretching, therapeutic activities, transfer training and home exercise program  Frequency: 1x week  Duration in visits: 10  Treatment plan discussed with: patient and family  Plan details: Patient will be seen 1x/wk x 10 wks with treatment to include strengthening, stretching, manual therapy, neuromuscular re-education, balance, gait and endurance training.           Timed:  Manual Therapy:    0     mins  11191;  Therapeutic Exercise:    10     mins  50671;     Neuromuscular [place]mina:    0    mins  99058;    Therapeutic Activity:     0     mins  78391;     Gait Trainin     mins  58305;     Electrical Stimulation:    0     mins  37601 ( );    Untimed:  Canalith Repositioning    0     mins [id number]    Timed Treatment:   10   mins   Total Treatment:     40   mins    PT SIGNATURE: Vianca Durant PT, DPT, MSCS, CDP, CSRS  [place] License #[id number]  DATE TREATMENT INITIATED: 3/28/2023      Medicare Initial  Certification Certification Period: 3/28/6940ythj5/25/2023  I certify that the therapy services are furnished while this patient is under my care.  The services outlined above are required by this patient, and will be reviewed every 90 days.     PHYSICIAN: Dirk Russ MD  NPI: [id number]                                         DATE:     Please sign and return via fax to [phone].   Thank you,   [facility].

## 2023-04-04 ENCOUNTER — TELEPHONE (OUTPATIENT)
Dept: INTERNAL MEDICINE | Facility: CLINIC | Age: 88
End: 2023-04-04
Payer: MEDICARE

## 2023-04-04 ENCOUNTER — TELEPHONE (OUTPATIENT)
Dept: PHYSICAL THERAPY | Facility: CLINIC | Age: 88
End: 2023-04-04

## 2023-04-04 NOTE — TELEPHONE ENCOUNTER
Caller: Nkechi Browne    Relationship: Emergency Contact       What was the call regarding:TOOK A FALL DID NOT BREAK ANYTHING BUT HAS A CUT SO TAKING A WEEK OFF

## 2023-04-04 NOTE — TELEPHONE ENCOUNTER
Pts daughter called reporting pt had fallen over the weekend and had a couple of skin tears. Daughter was wanting to change bandages today that was originally put on by [facility] but thought they looked stuck and that it would open the wound. Appt made for tomorrow to be evaluated and dressed.

## 2023-04-05 ENCOUNTER — OFFICE VISIT (OUTPATIENT)
Dept: INTERNAL MEDICINE | Facility: CLINIC | Age: 88
End: 2023-04-05
Payer: MEDICARE

## 2023-04-05 VITALS
TEMPERATURE: 98.4 F | OXYGEN SATURATION: 96 % | WEIGHT: 179.2 LBS | SYSTOLIC BLOOD PRESSURE: 118 MMHG | HEART RATE: 95 BPM | HEIGHT: 65 IN | BODY MASS INDEX: 29.85 KG/M2 | DIASTOLIC BLOOD PRESSURE: 58 MMHG

## 2023-04-05 DIAGNOSIS — Z91.81 AT HIGH RISK FOR FALLS: Chronic | ICD-10-CM

## 2023-04-05 DIAGNOSIS — S51.812A SKIN TEAR OF LEFT FOREARM WITHOUT COMPLICATION, INITIAL ENCOUNTER: Primary | Chronic | ICD-10-CM

## 2023-04-05 DIAGNOSIS — R53.81 DEBILITY: ICD-10-CM

## 2023-04-05 DIAGNOSIS — R26.9 GAIT ABNORMALITY: ICD-10-CM

## 2023-04-05 DIAGNOSIS — C44.321 SQUAMOUS CELL CARCINOMA OF NOSE: ICD-10-CM

## 2023-04-05 NOTE — PATIENT INSTRUCTIONS
Patient Instructions   Problem List Items Addressed This Visit          Advance Directives and General Issues    At high risk for falls (Chronic)    Current Assessment & Plan     He will continue using the walker at all times. He will continue PT for strength and balance.            Hematology and Neoplasia    Squamous cell carcinoma of nose    Current Assessment & Plan     He will call Dr. David Leggett's office to delay libtayo injection for a week. He will continue regular follow-up with Dr. Leggett.         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 No ointment on the tears. We will apply nonstick dressings to both forearms with Kerlix to keep them in place. The daughter will check it on Sunday and she will call us if there is swelling, erythema, or discharge from the skin tears or if the patient says they are getting painful. He may go to PT on Friday. We will delay next week's infusion for a week.         Relevant Medications    ketoconazole (NIZORAL) 2 % shampoo    ketoconazole (NIZORAL) 2 % cream    fluocinonide (LIDEX) 0.05 % external solution       Symptoms and Signs    Gait abnormality    Current Assessment & Plan     He will continue physical therapy for balance and strength.         Debility    Current Assessment & Plan     Continue PT for balance and strength. Continue using the rollator walker at all times.

## 2023-04-05 NOTE — ASSESSMENT & PLAN NOTE
We will leave the Steri-Strips in place. No ointment on the tears. We will apply nonstick dressings to both forearms with Kerlix to keep them in place. The daughter will check it on Sunday and she will call us if there is swelling, erythema, or discharge from the skin tears or if the patient says they are getting painful. He may go to PT on Friday. We will delay next week's infusion for a week.

## 2023-04-05 NOTE — PROGRESS NOTES
[facility]     Chaitanya Browne  2/23/1923   [id number]      Patient Care Team:  Dirk Russ MD as PCP - General (Internal Medicine)  Fabricio Zavala MD as Consulting Physician (Internal Medicine)  Kaley Oliveros APRN as Nurse Practitioner (Cardiology)    Chief Complaint   Patient presents with   • Abrasion     Both arms. Due to fall Monday morning. Left arm is worse than the right arm            HPI  Patient is a 89 y.o. male presents today for an acute visit. He is accompanied by his daughter.    Skin tear  The patient has a skin tear on his bilateral forearms, left worse than right. He reports that he fell on Monday,  04/03/2023. He went to urgent care on 04/03/2023. The patient's daughter unwrapped it enough to see that the skin tear had been bleeding significantly overnight. The urgent care doctor put a Betadine dressing on it with Steri-Strips. The patient's daughter states that it was very jagged. The patient was not prescribed antibiotics. The patient denies pain around the tear, but states that it itches some. He denies pain in his elbow.    Fall  The patient states that he fell on 04/03/2023. He denies hurting himself elsewhere. The patient states that he was trying to pick something up, and he misjudged and went forward. He uses his walker at home. This is the only fall he has had recently.    Balance  The patient has restarted physical therapy with [facility] at [facility]. His next appointment is 04/11/2023.    Hypertension  The patient's blood pressure is stable today. He takes Bumex every day.    Squamous cell carcinoma of the nose  The patient is scheduled for an i.v. infusion on Thursday, 04/13/2023, for squamous cell carcinoma.        CHRONIC CONDITIONS      Past Medical History:   Diagnosis Date   • Abnormal heart rhythm    • Anemia    • Aortic valve sclerosis    • Asthma    • Basal cell carcinoma (BCC) of left side of nose    • BPH (benign prostatic  hyperplasia)    • Cataracts, bilateral     bilateralcataract extraction and lens implantation 2013   • Diastolic dysfunction    • Easy bruising    • Edema due to malnutrition 02/05/2020   • Heart murmur    • High cholesterol    • History of disease     bilateral lacrimal gland dysfunction per Dr Fajardo   • Hypertension    • Infection     infected big toe; I and D DR Alvares 2013   • Phlebitis    • Pneumonia 2009   • Squamous acanthoma of face        Past Surgical History:   Procedure Laterality Date   • APPENDECTOMY     • CATARACT EXTRACTION, BILATERAL  2013    and lens implantation   • CYSTOSCOPY N/A 9/6/2019    Procedure: CYSTOSCOPY;  Surgeon: Ck Woods MD;  Location:  [facility];  Service: Urology   • CYSTOSCOPY TRANSURETHRAL RESECTION OF PROSTATE  1989   • CYSTOSCOPY TRANSURETHRAL RESECTION OF PROSTATE N/A 9/11/2019    Procedure: CYSTOSCOPY TRANSURETHRAL RESECTION OF PROSTATE;  Surgeon: Ck Woods MD;  Location:  [facility];  Service: Urology   • HEAD & NECK SKIN LESION EXCISIONAL BIOPSY      Basal cell removed from nose    • INCISION AND DRAINAGE FOOT  2013    infected big toe Dr Alvares   • TRANSURETHRAL RESECTION OF BLADDER TUMOR N/A 9/6/2019    Procedure: EVACUATION OF BLOOD CLOT, FULGERATION OF PROSTATE;  Surgeon: Ck Woods MD;  Location:  [facility];  Service: Urology       Family History   Problem Relation Age of Onset   • Coronary artery disease Father    • Heart attack Father    • Coronary artery disease Brother    • Heart disease Brother    • Atrial fibrillation Brother    • Stroke Brother    • Heart attack Brother    • Heart attack Mother        Social History     Socioeconomic History   • Marital status:    Tobacco Use   • Smoking status: Never   • Smokeless tobacco: Never   Vaping Use   • Vaping Use: Never used   Substance and Sexual Activity   • Alcohol use: No   • Drug use: No   • Sexual activity: Defer       No Known Allergies    Review of Systems:   The appropriate  "review of systems is included in the HPI.    Vital Signs  Vitals:    04/05/23 1058   BP: 118/58   BP Location: Right arm   Patient Position: Sitting   Cuff Size: Adult   Pulse: 95   Temp: 98.4 °F (36.9 °C)   TempSrc: Infrared   SpO2: 96%   Weight: 81.3 kg (179 lb 3.2 oz)   Height: 165.1 cm (65\")     Body mass index is 29.82 kg/m².  BMI is >= 25 and <30. (Overweight) The following options were offered after discussion;: exercise counseling/recommendations and nutrition counseling/recommendations        Current Outpatient Medications:   •  aspirin 81 MG EC tablet, Take 1 tablet by mouth Every Other Day. (Patient taking differently: Take 40.5 mg by mouth 3 (Three) Times a Week.), Disp: , Rfl:   •  azelastine (ASTELIN) 0.1 % nasal spray, USE 2 SPRAYS IN EACH NOSTRIL TWO TIMES A DAY AS DIRECTED, Disp: 90 each, Rfl: 3  •  bumetanide (Bumex) 1 MG tablet, 1 mg BID, may take extra 1 mg with 3-5 lb weight gain, dyspnea, or worsening edema., Disp: 200 tablet, Rfl: 1  •  Cholecalciferol (Vitamin D3) 25 MCG (1000 UT) capsule, Take  by mouth., Disp: , Rfl:   •  finasteride (PROSCAR) 5 MG tablet, Take 1 tablet by mouth Daily., Disp: 90 tablet, Rfl: 1  •  fluocinonide (LIDEX) 0.05 % external solution, Apply 1 application topically to the appropriate area as directed As Needed., Disp: , Rfl:   •  ketoconazole (NIZORAL) 2 % cream, Apply 1 application topically to the appropriate area as directed Daily As Needed., Disp: , Rfl:   •  ketoconazole (NIZORAL) 2 % shampoo, Apply  topically to the appropriate area as directed As Needed., Disp: , Rfl:   •  pravastatin (PRAVACHOL) 40 MG tablet, Take 1 tablet by mouth Daily. 3x a week, Disp: 90 tablet, Rfl: 3  •  Cemiplimab-rwlc 350 mg in sodium chloride 0.9 % 100 mL, Infuse 350 mg into a venous catheter 1 (One) Time for 1 dose. Dr. David Leggett, Disp: , Rfl:     Physical Exam:    Physical Exam  Vitals and nursing note reviewed.   Constitutional:       Appearance: Normal appearance. He is " well-developed.   HENT:      Head: Normocephalic.   Eyes:      Conjunctiva/sclera: Conjunctivae normal.      Pupils: Pupils are equal, round, and reactive to light.   Neck:      Thyroid: No thyromegaly.   Cardiovascular:      Rate and Rhythm: Normal rate and regular rhythm.      Heart sounds: Normal heart sounds.   Pulmonary:      Effort: Pulmonary effort is normal.      Breath sounds: Normal breath sounds. No wheezing.   Musculoskeletal:         General: Normal range of motion.      Cervical back: Normal range of motion and neck supple.   Lymphadenopathy:      Cervical: No cervical adenopathy.   Skin:     General: Skin is warm and dry.      Comments: On the left forearm dorsally, there is a skin tear that is V shaped and it measures 1.5 cm in one direction and 2 cm in the other direction. Steri-Strips are in place. Only mild bleeding when I took the bandage off. No surrounding erythema or swelling. There is extensive bruising of both forearms. The skin tear on the right forearm is linear and measures 1.5 cm and Steri-Strips in place, mild bleeding when I took off the bandage.   Neurological:      Mental Status: He is alert and oriented to person, place, and time.      Gait: Gait abnormal.      Comments: Generalized weakness.   Psychiatric:         Mood and Affect: Mood normal.         Thought Content: Thought content normal.          ACE III MINI        Results Review:    None    CMP:  Lab Results   Component Value Date    BUN 20 08/23/2022    CREATININE 1.31 (H) 08/23/2022    EGFRIFNONA 50 (L) 02/22/2022    EGFRIFAFRI 60 (L) 02/22/2022    BCR 15.3 08/23/2022     08/23/2022    K 3.8 08/23/2022    CO2 29.0 08/23/2022    CALCIUM 9.1 08/23/2022    ALBUMIN 4.10 08/23/2022    BILITOT 1.2 08/23/2022    ALKPHOS 73 08/23/2022    AST 17 08/23/2022    ALT 10 08/23/2022     HbA1c:  Lab Results   Component Value Date    HGBA1C 5.90 (H) 08/23/2022    HGBA1C 5.90 (H) 02/22/2022     Microalbumin:  Lab Results   Component  Value Date    MICROALADAM 39.3 08/27/2019     Lipid Panel  Lab Results   Component Value Date    CHOL 187 08/23/2022    TRIG 81 08/23/2022    HDL 74 (H) 08/23/2022    LDL 98 08/23/2022    AST 17 08/23/2022    ALT 10 08/23/2022       Medication Review: Medications reviewed and noted  Patient Instructions   Problem List Items Addressed This Visit        Advance Directives and General Issues    At high risk for falls (Chronic)    Current Assessment & Plan     He will continue using the walker at all times. He will continue PT for strength and balance.            Hematology and Neoplasia    Squamous cell carcinoma of nose    Current Assessment & Plan     He will call Dr. David Leggett's office to delay libtayo injection for a week. He will continue regular follow-up with Dr. Leggett.         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 No ointment on the tears. We will apply nonstick dressings to both forearms with Kerlix to keep them in place. The daughter will check it on Sunday and she will call us if there is swelling, erythema, or discharge from the skin tears or if the patient says they are getting painful. He may go to PT on Friday. We will delay next week's infusion for a week.         Relevant Medications    ketoconazole (NIZORAL) 2 % shampoo    ketoconazole (NIZORAL) 2 % cream    fluocinonide (LIDEX) 0.05 % external solution       Symptoms and Signs    Gait abnormality    Current Assessment & Plan     He will continue physical therapy for balance and strength.         Debility    Current Assessment & Plan     Continue PT for balance and strength. Continue using the rollator walker at all times.               Diagnosis Plan   1. Skin tear of left forearm without complication, initial encounter        2.  Debility        3. Gait abnormality        4. Squamous cell carcinoma of nose        5. At high risk for falls                Plan of care reviewed with patient at the conclusion of today's visit. Education was provided regarding diagnosis, management, and any prescribed or recommended OTC medications.Patient verbalizes understanding of and agreement with management plan.        Transcribed from ambient dictation for Elizabeth De Luna MD by Peyton Taveras.  04/05/23   13:37 EDT    Patient or patient representative verbalized consent to the visit recording.  I have personally performed the services described in this document as transcribed by the above individual, and it is both accurate and complete.  Elizabeth De Luna MD  4/5/2023  17:59 EDT

## 2023-04-05 NOTE — ASSESSMENT & PLAN NOTE
He will call Dr. David Leggett's office to delay libtayo injection for a week. He will continue regular follow-up with Dr. Leggett.

## 2023-04-10 ENCOUNTER — TELEPHONE (OUTPATIENT)
Dept: INTERNAL MEDICINE | Facility: CLINIC | Age: 88
End: 2023-04-10
Payer: MEDICARE

## 2023-04-10 NOTE — TELEPHONE ENCOUNTER
Caller: Nkechi Browne    Relationship: CALLER    Best call back number: [phone]    SHE IS CALLING IN TO LET RODRIGUE OR DR DEE KNOW THAT THE STERI STRIPS HAVE NOT ALL COME OFF OF HIS ARM WOUNDS. A COUPLE OF PLACES ARE STARTING TO HEAL OVER THEM.    PLEASE CALL AND ADVISE.

## 2023-04-11 ENCOUNTER — TELEPHONE (OUTPATIENT)
Dept: INTERNAL MEDICINE | Facility: CLINIC | Age: 88
End: 2023-04-11

## 2023-04-11 ENCOUNTER — TREATMENT (OUTPATIENT)
Dept: PHYSICAL THERAPY | Facility: CLINIC | Age: 88
End: 2023-04-11
Payer: MEDICARE

## 2023-04-11 DIAGNOSIS — R26.9 GAIT ABNORMALITY: ICD-10-CM

## 2023-04-11 DIAGNOSIS — Z74.09 IMPAIRED FUNCTIONAL MOBILITY, BALANCE, GAIT, AND ENDURANCE: Primary | ICD-10-CM

## 2023-04-11 PROCEDURE — 97112 NEUROMUSCULAR REEDUCATION: CPT | Performed by: PHYSICAL THERAPIST

## 2023-04-11 PROCEDURE — 97110 THERAPEUTIC EXERCISES: CPT | Performed by: PHYSICAL THERAPIST

## 2023-04-11 NOTE — TELEPHONE ENCOUNTER
Caller: Loreto Browne    Relationship: Emergency Contact    Best call back number: [phone]    What is the best time to reach you: ANY TIME    Who are you requesting to speak with (clinical staff, provider,  specific staff member): DR DEE    Do you know the name of the person who called: LORETO    What was the call regarding: LORETO REMOVED BANDAGE ON Sunday AND PUT BACK ON AND REMOVED IT AGAIN TODAY AND THERE IS A DISCHARGE ON BANDAGE. DOES NOT LOOK LIKE IT IS HEALING AND LORETO IS AFRAID IT MAY BE INFECTED. PLEASE ADVISE    Do you require a callback: YES

## 2023-04-12 ENCOUNTER — OFFICE VISIT (OUTPATIENT)
Dept: INTERNAL MEDICINE | Facility: CLINIC | Age: 88
End: 2023-04-12
Payer: MEDICARE

## 2023-04-12 ENCOUNTER — TELEPHONE (OUTPATIENT)
Dept: INTERNAL MEDICINE | Facility: CLINIC | Age: 88
End: 2023-04-12

## 2023-04-12 VITALS
WEIGHT: 179 LBS | HEART RATE: 76 BPM | SYSTOLIC BLOOD PRESSURE: 98 MMHG | BODY MASS INDEX: 29.82 KG/M2 | HEIGHT: 65 IN | DIASTOLIC BLOOD PRESSURE: 66 MMHG | TEMPERATURE: 98.4 F

## 2023-04-12 DIAGNOSIS — L03.113 CELLULITIS OF RIGHT UPPER EXTREMITY: ICD-10-CM

## 2023-04-12 DIAGNOSIS — R26.9 GAIT ABNORMALITY: ICD-10-CM

## 2023-04-12 DIAGNOSIS — L98.491 STAGE 2 SKIN ULCER LIMITED TO BREAKDOWN OF SKIN: Primary | ICD-10-CM

## 2023-04-12 DIAGNOSIS — M62.81 GENERALIZED MUSCLE WEAKNESS: ICD-10-CM

## 2023-04-12 DIAGNOSIS — M54.50 ACUTE MIDLINE LOW BACK PAIN WITHOUT SCIATICA: ICD-10-CM

## 2023-04-12 PROBLEM — L98.499: Status: ACTIVE | Noted: 2023-04-12

## 2023-04-12 PROCEDURE — 1160F RVW MEDS BY RX/DR IN RCRD: CPT | Performed by: INTERNAL MEDICINE

## 2023-04-12 PROCEDURE — 1159F MED LIST DOCD IN RCRD: CPT | Performed by: INTERNAL MEDICINE

## 2023-04-12 PROCEDURE — 99213 OFFICE O/P EST LOW 20 MIN: CPT | Performed by: INTERNAL MEDICINE

## 2023-04-12 RX ORDER — COLLAGENASE SANTYL 250 [ARB'U]/G
1 OINTMENT TOPICAL DAILY
Qty: 30 G | Refills: 0 | Status: SHIPPED | OUTPATIENT
Start: 2023-04-12 | End: 2023-04-22

## 2023-04-12 RX ORDER — MUPIROCIN CALCIUM 20 MG/G
1 CREAM TOPICAL 2 TIMES DAILY
Qty: 30 G | Refills: 0 | Status: SHIPPED | OUTPATIENT
Start: 2023-04-12 | End: 2023-04-22

## 2023-04-12 RX ORDER — MUPIROCIN CALCIUM 20 MG/G
1 CREAM TOPICAL 2 TIMES DAILY
Qty: 30 G | Refills: 0 | Status: SHIPPED | OUTPATIENT
Start: 2023-04-12 | End: 2023-04-12 | Stop reason: SDUPTHER

## 2023-04-12 NOTE — PROGRESS NOTES
Chief Complaint   Patient presents with   • Wound Check       History of Present Illness  89 y.o. male presents for evaluation of inflammaed arm and wound problems.     Review of Systems   Musculoskeletal: Positive for gait problem.   Skin: Positive for rash and wound.   Neurological: Positive for weakness.     .    PMSFH:  The following portions of the patient's history were reviewed and updated as appropriate: allergies, current medications, past family history, past medical history, past social history, past surgical history and problem list.      Current Outpatient Medications:   •  aspirin 81 MG EC tablet, Take 1 tablet by mouth Every Other Day. (Patient taking differently: Take 40.5 mg by mouth 3 (Three) Times a Week.), Disp: , Rfl:   •  azelastine (ASTELIN) 0.1 % nasal spray, USE 2 SPRAYS IN EACH NOSTRIL TWO TIMES A DAY AS DIRECTED, Disp: 90 each, Rfl: 3  •  bumetanide (Bumex) 1 MG tablet, 1 mg BID, may take extra 1 mg with 3-5 lb weight gain, dyspnea, or worsening edema., Disp: 200 tablet, Rfl: 1  •  Cholecalciferol (Vitamin D3) 25 MCG (1000 UT) capsule, Take  by mouth., Disp: , Rfl:   •  finasteride (PROSCAR) 5 MG tablet, Take 1 tablet by mouth Daily., Disp: 90 tablet, Rfl: 1  •  fluocinonide (LIDEX) 0.05 % external solution, Apply 1 application topically to the appropriate area as directed As Needed., Disp: , Rfl:   •  ketoconazole (NIZORAL) 2 % cream, Apply 1 application topically to the appropriate area as directed Daily As Needed., Disp: , Rfl:   •  ketoconazole (NIZORAL) 2 % shampoo, Apply  topically to the appropriate area as directed As Needed., Disp: , Rfl:   •  pravastatin (PRAVACHOL) 40 MG tablet, Take 1 tablet by mouth Daily. 3x a week, Disp: 90 tablet, Rfl: 3  •  collagenase (Santyl) 250 UNIT/GM ointment, Apply 1 application topically to the appropriate area as directed Daily for 10 days., Disp: 30 g, Rfl: 0  •  mupirocin (BACTROBAN) 2 % cream, Apply 1 application topically to the appropriate  "area as directed 2 (Two) Times a Day for 10 days., Disp: 30 g, Rfl: 0    VITALS:  BP 98/66   Pulse 76   Temp 98.4 °F (36.9 °C)   Ht 165.1 cm (65\")   Wt 81.2 kg (179 lb)   BMI 29.79 kg/m²     Physical Exam  Skin:     Comments: Erythema right arm and abrasion and left arm abrasion and steri strips    Neurological:      General: No focal deficit present.      Mental Status: He is alert and oriented to person, place, and time.      Comments: Slow get up and go and unsteady gait helped with walker and daughter   Psychiatric:         Mood and Affect: Mood normal.         Behavior: Behavior normal.         LABS:    CBC:  Lab Results   Component Value Date    WBC 5.76 08/23/2022    RBC 4.03 (L) 08/23/2022    HGB 13.2 08/23/2022    HCT 39.7 08/23/2022    MCV 98.5 (H) 08/23/2022    MCH 32.8 08/23/2022    MCHC 33.2 08/23/2022    RDW 11.8 (L) 08/23/2022     08/23/2022     Procedures         ASSESSMENT/PLAN:  Problems Addressed this Visit        Infectious Diseases    Cellulitis of right upper extremity     Proceed with bactroban            Musculoskeletal and Injuries    Acute midline low back pain without sciatica     Proceed with Home Health once does out patient wound care and do PT          Stage II skin ulcer - Primary     Acute issue and get to wound care and use santyl ointment          Relevant Medications    collagenase (Santyl) 250 UNIT/GM ointment    Other Relevant Orders    Ambulatory Referral to Wound Clinic       Symptoms and Signs    Gait abnormality     Proceed with Home Health once does out patient wound care and do PT         Other Visit Diagnoses     Generalized muscle weakness        Proceed with Home Health once does out patient wound care       Diagnoses       Codes Comments    Stage 2 skin ulcer limited to breakdown of skin    -  Primary ICD-10-CM: L98.491  ICD-9-CM: 707.9     Cellulitis of right upper extremity     ICD-10-CM: L03.113  ICD-9-CM: 682.3     Gait abnormality     ICD-10-CM: " R26.9  ICD-9-CM: 781.2     Acute midline low back pain without sciatica     ICD-10-CM: M54.50  ICD-9-CM: 724.2     Generalized muscle weakness     ICD-10-CM: M62.81  ICD-9-CM: 728.87 Proceed with Home Health once does out patient wound care           FOLLOW-UP:  No follow-ups on file.      Electronically signed by:    Dirk Russ MD

## 2023-04-12 NOTE — TELEPHONE ENCOUNTER
Pharm[facility] Name:  [facility]     Pharmacy representative phone number: [phone]    What medication are you calling in regards to: MUPIROCIN    What question does the pharmacy have: INSURANCE WILL NOT PAY FOR THE CREAM BUT WILL PAY FOR THE OINTMENT    Who is the provider that prescribed the medication: DR DEE

## 2023-04-13 ENCOUNTER — HOSPITAL ENCOUNTER (OUTPATIENT)
Dept: PHYSICAL THERAPY | Facility: HOSPITAL | Age: 88
Setting detail: THERAPIES SERIES
Discharge: HOME | End: 2023-04-13
Payer: MEDICARE

## 2023-04-13 DIAGNOSIS — S51.811A ISTAP TYPE 3 SKIN TEAR OF RIGHT FOREARM: ICD-10-CM

## 2023-04-13 DIAGNOSIS — S51.812A ISTAP TYPE 3 SKIN TEAR OF LEFT FOREARM: Primary | ICD-10-CM

## 2023-04-13 PROCEDURE — 97161 PT EVAL LOW COMPLEX 20 MIN: CPT

## 2023-04-13 PROCEDURE — 97597 DBRDMT OPN WND 1ST 20 CM/<: CPT

## 2023-04-13 NOTE — THERAPY EVALUATION
Outpatient Rehabilitation - Wound/Debridement Initial Dalia Ayala     Patient Name: Chaitanya Browne  : 1923  MRN: [id number]  Today's Date: 2023              R forearm      L forearm      Admit Date: 2023    Visit Dx:    ICD-10-CM ICD-9-CM   1. ISTAP type 3 skin tear of left forearm  S51.812A 881.00   2. ISTAP type 3 skin tear of right forearm  S51.811A 881.00       Patient Active Problem List   Diagnosis   • Facial basal cell cancer   • Hyperlipidemia LDL goal <70   • Essential hypertension   • ASHD (arteriosclerotic heart disease)   • Gait abnormality   • Impaired fasting glucose   • Polyp, colonic   • Vitamin D deficiency   • Acute midline low back pain without sciatica   • Proteinuria   • Right carpal tunnel syndrome   • Class 1 obesity due to excess calories with serious comorbidity and body mass index (BMI) of 30.0 to 30.9 in adult   • Hematuria, unspecified   • Medicare annual wellness visit, subsequent   • Edema   • Cough   • Allergic rhinitis   • Gross hematuria   • Acute UTI (urinary tract infection)   • Acute renal insufficiency   • Acute urinary retention   • Debility   • Dysphagia   • BPH with obstruction/lower urinary tract symptoms   • Prostatitis   • Chronic diastolic CHF (congestive heart failure)   • Iron deficiency anemia due to chronic blood loss   • Simple chronic bronchitis   • Shortness of breath   • Stage 3a chronic kidney disease   • Skin tear of left forearm without complication   • Squamous cell carcinoma of nose   • At high risk for falls   • Stage II skin ulcer   • Cellulitis of right upper extremity        Past Medical History:   Diagnosis Date   • Abnormal heart rhythm    • Anemia    • Aortic valve sclerosis    • Asthma    • Basal cell carcinoma (BCC) of left side of nose    • BPH (benign prostatic hyperplasia)    • Cataracts, bilateral     bilateralcataract extraction and lens implantation    • Diastolic dysfunction    • Easy bruising    • Edema due to  malnutrition 02/05/2020   • Heart murmur    • High cholesterol    • History of disease     bilateral lacrimal gland dysfunction per Dr Fajardo   • Hypertension    • Infection     infected big toe; I and D DR Alvares 2013   • Phlebitis    • Pneumonia 2009   • Squamous acanthoma of face         Past Surgical History:   Procedure Laterality Date   • APPENDECTOMY     • CATARACT EXTRACTION, BILATERAL  2013    and lens implantation   • CYSTOSCOPY N/A 9/6/2019    Procedure: CYSTOSCOPY;  Surgeon: Ck Woods MD;  Location:  [facility];  Service: Urology   • CYSTOSCOPY TRANSURETHRAL RESECTION OF PROSTATE  1989   • CYSTOSCOPY TRANSURETHRAL RESECTION OF PROSTATE N/A 9/11/2019    Procedure: CYSTOSCOPY TRANSURETHRAL RESECTION OF PROSTATE;  Surgeon: Ck Woods MD;  Location:  [facility];  Service: Urology   • HEAD & NECK SKIN LESION EXCISIONAL BIOPSY      Basal cell removed from nose    • INCISION AND DRAINAGE FOOT  2013    infected big toe Dr Alvares   • TRANSURETHRAL RESECTION OF BLADDER TUMOR N/A 9/6/2019    Procedure: EVACUATION OF BLOOD CLOT, FULGERATION OF PROSTATE;  Surgeon: Ck Woods MD;  Location:  [facility];  Service: Urology        Patient History     Row Name 04/13/23 1400             History    Chief Complaint Ulcer, wound or other skin conditions  -      Brief Description of Current Complaint Pt with bilateral skin tears to the forearms after a fall. He had steristrips placed at an urgent care facility and followed up with his PCP, who ordered Santyl and recommended follow-up here.  -      Patient/Caregiver Goals Heal wound;Know what to do to help the symptoms  -      Patient seeing anyone else for problem(s)? PCP  -      Related/Recent Hospitalizations No  -         Pain     Pain Location Arm  -      Pain at Present 0  -         Services    Are you currently receiving Home Health services No  -      Do you plan to receive Home Health services in the near future No  -          Daily Activities    Primary Language English  -      Are you able to read Yes  -MC      Are you able to write Yes  -      How does patient learn best? Listening;Demonstration  -      Teaching needs identified Management of Condition  -      Patient is concerned about/has problems with Other (comment);Transfers (getting out of a chair, bed);Walking;Standing;Difficulty with self care (i.e. bathing, dressing, toileting:  wound mgmt  -      Barriers to learning None  -MC      Pt Participated in POC and Goals Yes  -MC         Safety    Are you being hurt, hit, or frightened by anyone at home or in your life? No  -MC      Are you being neglected by a caregiver No  -MC            User Key  (r) = Recorded By, (t) = Taken By, (c) = Cosigned By    Initials Name Provider Type    Shaylee Clarke PT Physical Therapist                EVALUATION   PT Ortho     Row Name 04/13/23 1400       Subjective Pain    Able to rate subjective pain? yes  -MC    Pre-Treatment Pain Level 0  -MC    Post-Treatment Pain Level 0  -       Transfers    Sit-Stand [place] (Transfers) minimum assist (75% patient effort)  -MC    Stand-Sit [place] (Transfers) minimum assist (75% patient effort)  -    Transfers, Sit-Stand-Sit, Assist Device --  HHA  -    Comment, (Transfers) seated for tx. To/from dept in transport chair with dtr.  -          User Key  (r) = Recorded By, (t) = Taken By, (c) = Cosigned By    Initials Name Provider Type    Shaylee Clarke PT Physical Therapist               LDA Wound     Row Name 04/13/23 1400             Wound 04/13/23 0930 Right proximal arm Skin Tear    Wound - Properties Group Placement Date: 04/13/23  - Placement Time: 0930  - Present on Hospital Admission: Y  - Side: Right  - Orientation: proximal  - Location: arm  - Primary Wound Type: Skin tear  -    Wound Image Images linked: 1  -MC      Dressing Appearance intact;moist drainage  -      Base moist;pink;red   -MC      Periwound intact;dry  -MC      Periwound Temperature warm  -MC      Periwound Skin Turgor soft  -MC      Edges jagged;open  -MC      Wound Length (cm) 0.6 cm  -MC      Wound Width (cm) 1.7 cm  -MC      Wound Depth (cm) 0.1 cm  -MC      Wound Surface Area (cm^2) 1.02 cm^2  -MC      Wound Volume (cm^3) 0.102 cm^3  -MC      Drainage Characteristics/Odor serosanguineous  -MC      Drainage Amount small  -      Care, Wound cleansed with;wound cleanser;debrided  -MC      Dressing Care dressing applied;silver impregnated;low-adherent;foam;gauze  mepilex Ag, kerlix, spandage  -      Periwound Care cleansed with pH balanced cleanser;dry periwound area maintained  -      Retired Wound - Properties Group Placement Date: 04/13/23  - Placement Time: 0930 - Present on Hospital Admission: Y  -MC Side: Right  - Orientation: proximal  -MC Location: arm  -MC Primary Wound Type: Skin tear  -MC    Retired Wound - Properties Group Date first assessed: 04/13/23  - Time first assessed: 0930  - Present on Hospital Admission: Y  -MC Side: Right  - Location: arm  -MC Primary Wound Type: Skin tear  -MC       Wound 04/13/23 0930 Left proximal arm Skin Tear    Wound - Properties Group Placement Date: 04/13/23  - Placement Time: 0930 - Present on Hospital Admission: Y  -MC Side: Left  - Orientation: proximal  - Location: arm  -MC Primary Wound Type: Skin tear  -MC    Wound Image Images linked: 1  -MC      Dressing Appearance intact;moist drainage  -MC      Base moist;pink;red  -MC      Periwound intact;dry  -MC      Periwound Temperature warm  -MC      Periwound Skin Turgor soft  -MC      Edges irregular;jagged;open  -MC      Wound Length (cm) 1 cm  -MC      Wound Width (cm) 1.5 cm  -MC      Wound Depth (cm) 0.1 cm  -MC      Wound Surface Area (cm^2) 1.5 cm^2  -MC      Wound Volume (cm^3) 0.15 cm^3  -MC      Drainage Characteristics/Odor serosanguineous  -MC      Drainage Amount small  -      Care, Wound  cleansed with;wound cleanser;debrided  -      Dressing Care dressing applied;silver impregnated;low-adherent;foam;gauze  mepilex Ag, kerlix, spandage  -      Periwound Care cleansed with pH balanced cleanser;dry periwound area maintained  -      Retired Wound - Properties Group Placement Date: 04/13/23  - Placement Time: 0930 - Present on Hospital Admission: Y  - Side: Left  - Orientation: proximal  - Location: arm  - Primary Wound Type: Skin tear  -MC    Retired Wound - Properties Group Date first assessed: 04/13/23  - Time first assessed: 0930  - Present on Hospital Admission: Y  - Side: Left  - Location: arm  - Primary Wound Type: Skin tear  -MC          User Key  (r) = Recorded By, (t) = Taken By, (c) = Cosigned By    Initials Name Provider Type    Shaylee Clarke, PT Physical Therapist                  WOUND DEBRIDEMENT  Total area of Debridement: 6 cm2  Debridement Site 1  Location- Site 1: R forearm  Selective Debridement- Site 1: Wound Surface <20cmsq  Instruments- Site 1: tweezers, scissors  Excised Tissue Description- Site 1: maximum, slough  Bleeding- Site 1: scant, held pressure, 1 minute   Debridement Site 2  Location- Site 2: L forearm  Selective Debridement- Site 2: Wound Surface <20cmsq  Instruments- Site 2: tweezers, scissors  Excised Tissue Description- Site 2: maximum, slough, other (comment) (crust)  Bleeding- Site 2: scant, held pressure, 1 minute          Therapy Education     Row Name 04/13/23 1400             Therapy Education    Education Details Reviewed s/sx of infection and PT role in wound care. Change dressings every 2-3 days with theraworx/gauze to clean, mepilex Ag to cover, and kerlix/spandage to secure.  -      Given Bandaging/dressing change;Symptoms/condition management  -      Program New  -      How Provided Verbal;Demonstration  -MC      Provided to Patient;Caregiver  -MC      Level of Understanding Teach back education  performed;Verbalized  -            User Key  (r) = Recorded By, (t) = Taken By, (c) = Cosigned By    Initials Name Provider Type    Shaylee Clarke PT Physical Therapist                Recommendation and Plan   PT Assessment/Plan     Row Name 04/13/23 1400          PT Assessment    Functional Limitations Performance in self-care ADL;Other (comment)  wound mgmt  -     Impairments Integumentary integrity  -     Assessment Comments Pt presents with ISTAP type 3 skin tears to both posterior forearms s/p fall. PT was able to debride all visible necrotic tissue today to reveal moist, pink/red wound beds bilaterally. Pt will benefit from skilled PT wound care to promote healing given his age and the fragility of his skin.  -     Rehab Potential Good  -     Patient/caregiver participated in establishment of treatment plan and goals Yes  -     Patient would benefit from skilled therapy intervention Yes  -        PT Plan    PT Frequency 1x/week  -     Predicted Duration of Therapy Intervention (PT) 6 visits  -     Planned CPT's? PT EVAL LOW COMPLEXITY: 49795;PT NONSELECT DEBRIDE 15 MIN: 07155;PT SELF CARE/MGMT/TRAIN 15 MIN: 40959;PT ENRIKE DEBRIDE OPEN WOUND UP TO 20 CM: 62190  -     Physical Therapy Interventions (Optional Details) wound care;patient/family education  -     PT Plan Comments debridement, dressings  -           User Key  (r) = Recorded By, (t) = Taken By, (c) = Cosigned By    Initials Name Provider Type    Shaylee Clarke PT Physical Therapist                  Goals   PT OP Goals     Row Name 04/13/23 1400          PT Short Term Goals    STG 1 Pt/caregiver will verbalize s/sx of infection.  -     STG 2 Pt will demonstrate 25% reduction in wound dimensions to both areas to indicate healing progress.  -        Long Term Goals    LTG 1 Pt/caregiver will demonstrate independence with clean home dressing changes.  -     LTG 2 Pt will demonstrate 85% reduction in wound  dimensions to both areas to indicate healing progress.  -        Time Calculation    PT Goal Re-Cert Due Date 07/12/23  -           User Key  (r) = Recorded By, (t) = Taken By, (c) = Cosigned By    Initials Name Provider Type    Shaylee Clarke, PT Physical Therapist                Time Calculation: Start Time: 0930  Untimed Charges  PT Eval/Re-eval Minutes: 40  Wound Care: 43299 Selective debridement  73078-[place]jl debridement: 15  Total Minutes  Untimed Charges Total Minutes: 55   Total Minutes: 55  Therapy Charges for Today     Code Description Service Date Service Provider Modifiers Qty    47891180923  PT EVAL LOW COMPLEXITY 3 4/13/2023 Shaylee Govea, PT GP 1    [id number] HC ENRIKE DEBRIDE OPEN WOUND UP TO 20CM 4/13/2023 Shaylee Govea, PT GP 1                Shaylee Govea, PT  4/13/2023

## 2023-04-14 ENCOUNTER — TELEPHONE (OUTPATIENT)
Dept: INTERNAL MEDICINE | Facility: CLINIC | Age: 88
End: 2023-04-14

## 2023-04-14 NOTE — TELEPHONE ENCOUNTER
Caller: Nkechi Browne    Relationship: Emergency Contact    Best call back number: [phone]    What is the best time to reach you: ANYTIME     Who are you requesting to speak with (clinical staff, provider,  specific staff member): CLINICAL STAFF     What was the call regarding: PATIENT'S DAUGHTER IS CALLING TO REQUEST AN ORDER FOR HOME HEALTH TO VISIT THE PATIENT NEXT WEEK. SHE IS ASKING FOR HIM TO RECEIVE A SPONGE BATH DUE TO HIS WOUND HE CAN NOT DO IT HIMSELF     Do you require a callback: YES

## 2023-04-17 NOTE — TELEPHONE ENCOUNTER
Nkechi would prefer to start this week with health aide. Chaitanya has not had a shower/bath/sponge bath in 2 weeks as he is not able to keep the bandages from getting wet. Nkechi said the wound is looking good, he has an appt with wound care on 4/25/23 and if it keeps looking good she does not anticipate another appt. He is also getting PT outside of the home.

## 2023-04-18 ENCOUNTER — TREATMENT (OUTPATIENT)
Dept: PHYSICAL THERAPY | Facility: CLINIC | Age: 88
End: 2023-04-18
Payer: MEDICARE

## 2023-04-18 DIAGNOSIS — R26.9 GAIT ABNORMALITY: ICD-10-CM

## 2023-04-18 DIAGNOSIS — Z74.09 IMPAIRED FUNCTIONAL MOBILITY, BALANCE, GAIT, AND ENDURANCE: Primary | ICD-10-CM

## 2023-04-18 PROCEDURE — 97110 THERAPEUTIC EXERCISES: CPT | Performed by: PHYSICAL THERAPIST

## 2023-04-18 PROCEDURE — 97112 NEUROMUSCULAR REEDUCATION: CPT | Performed by: PHYSICAL THERAPIST

## 2023-04-18 NOTE — PROGRESS NOTES
Physical Therapy Daily Note  Visit: 3  Date of Initial Visit: Type: THERAPY  Noted: 3/28/2023    Patient: Chaitanya Browne   : 1923  Diagnosis/ICD-10 Code:  Impaired functional mobility, balance, gait, and endurance [Z74.09]  Referring practitioner: Dirk Russ MD  Date of Initial Visit: Type: THERAPY  Noted: 3/28/2023  Today's Date: 2023  Patient seen for 3 sessions      Subjective:   Patient reports: his arms are healing.   Pain: 0/10  Clinical Progress: improved  Home Program Compliance: Yes  Treatment has included: therapeutic exercise and neuromuscular re-education    Objective   See Exercise, Manual, and Modality Logs for complete treatment.    PT Neuro          Assessment & Plan     Assessment    Assessment details: Patient demonstrates good balance, only using LUE to maintain. He did require multiple cues for upright posture but he is able to correct. No pain noted in his RUE with laceration. Patient will continue to be progressed as indicated.     Plan  Plan details: Patient to continue with PT services to improve gait, balance, strength, transfers and overall functional mobility.          Timed:  Manual Therapy:            0     mins  14173;  Therapeutic Exercise:    30    mins  92474;     Neuromuscular [place]:    10    mins  56266;    Therapeutic Activity:      0     mins  37342;     Gait Trainin    mins  43157;     Electrical Stimulation:    0    mins  23743 ( );     Untimed:  [place] Repositioning techniques _0_ [id number]      Timed Treatment:   40   mins   Total Treatment:     40   mins      Vianca Durant, PT, DPT, MSCS, CDP, CSRS  [place] License #: [id number]  Physical Therapist

## 2023-04-25 ENCOUNTER — HOSPITAL ENCOUNTER (OUTPATIENT)
Dept: PHYSICAL THERAPY | Facility: HOSPITAL | Age: 88
Setting detail: THERAPIES SERIES
Discharge: HOME | End: 2023-04-25
Payer: MEDICARE

## 2023-04-25 ENCOUNTER — TREATMENT (OUTPATIENT)
Dept: PHYSICAL THERAPY | Facility: CLINIC | Age: 88
End: 2023-04-25
Payer: MEDICARE

## 2023-04-25 DIAGNOSIS — R26.9 GAIT ABNORMALITY: ICD-10-CM

## 2023-04-25 DIAGNOSIS — S51.811A ISTAP TYPE 3 SKIN TEAR OF RIGHT FOREARM: ICD-10-CM

## 2023-04-25 DIAGNOSIS — S51.812A ISTAP TYPE 3 SKIN TEAR OF LEFT FOREARM: Primary | ICD-10-CM

## 2023-04-25 DIAGNOSIS — Z74.09 IMPAIRED FUNCTIONAL MOBILITY, BALANCE, GAIT, AND ENDURANCE: Primary | ICD-10-CM

## 2023-04-25 PROCEDURE — 97110 THERAPEUTIC EXERCISES: CPT | Performed by: PHYSICAL THERAPIST

## 2023-04-25 PROCEDURE — 97597 DBRDMT OPN WND 1ST 20 CM/<: CPT

## 2023-04-25 PROCEDURE — 97112 NEUROMUSCULAR REEDUCATION: CPT | Performed by: PHYSICAL THERAPIST

## 2023-04-25 NOTE — THERAPY WOUND CARE TREATMENT
Outpatient Rehabilitation - Wound/Debridement Treatment Note   [place]     Patient Name: Chaitanya Browne  : 1923  MRN: [id number]  Today's Date: 2023             R Forearm      L Forearm      Admit Date: 2023    Visit Dx:    ICD-10-CM ICD-9-CM   1. ISTAP type 3 skin tear of left forearm  S51.812A 881.00   2. ISTAP type 3 skin tear of right forearm  S51.811A 881.00       Patient Active Problem List   Diagnosis   • Facial basal cell cancer   • Hyperlipidemia LDL goal <70   • Essential hypertension   • ASHD (arteriosclerotic heart disease)   • Gait abnormality   • Impaired fasting glucose   • Polyp, colonic   • Vitamin D deficiency   • Acute midline low back pain without sciatica   • Proteinuria   • Right carpal tunnel syndrome   • Class 1 obesity due to excess calories with serious comorbidity and body mass index (BMI) of 30.0 to 30.9 in adult   • Hematuria, unspecified   • Medicare annual wellness visit, subsequent   • Edema   • Cough   • Allergic rhinitis   • Gross hematuria   • Acute UTI (urinary tract infection)   • Acute renal insufficiency   • Acute urinary retention   • Debility   • Dysphagia   • BPH with obstruction/lower urinary tract symptoms   • Prostatitis   • Chronic diastolic CHF (congestive heart failure)   • Iron deficiency anemia due to chronic blood loss   • Simple chronic bronchitis   • Shortness of breath   • Stage 3a chronic kidney disease   • Skin tear of left forearm without complication   • Squamous cell carcinoma of nose   • At high risk for falls   • Stage II skin ulcer   • Cellulitis of right upper extremity        Past Medical History:   Diagnosis Date   • Abnormal heart rhythm    • Anemia    • Aortic valve sclerosis    • Asthma    • Basal cell carcinoma (BCC) of left side of nose    • BPH (benign prostatic hyperplasia)    • Cataracts, bilateral     bilateralcataract extraction and lens implantation    • Diastolic dysfunction    • Easy bruising    • Edema due  to malnutrition 02/05/2020   • Heart murmur    • High cholesterol    • History of disease     bilateral lacrimal gland dysfunction per Dr Fajardo   • Hypertension    • Infection     infected big toe; I and D DR Alvares 2013   • Phlebitis    • Pneumonia 2009   • Squamous acanthoma of face         Past Surgical History:   Procedure Laterality Date   • APPENDECTOMY     • CATARACT EXTRACTION, BILATERAL  2013    and lens implantation   • CYSTOSCOPY N/A 9/6/2019    Procedure: CYSTOSCOPY;  Surgeon: Ck Woods MD;  Location:  [facility];  Service: Urology   • CYSTOSCOPY TRANSURETHRAL RESECTION OF PROSTATE  1989   • CYSTOSCOPY TRANSURETHRAL RESECTION OF PROSTATE N/A 9/11/2019    Procedure: CYSTOSCOPY TRANSURETHRAL RESECTION OF PROSTATE;  Surgeon: Ck Woods MD;  Location:  [facility];  Service: Urology   • HEAD & NECK SKIN LESION EXCISIONAL BIOPSY      Basal cell removed from nose    • INCISION AND DRAINAGE FOOT  2013    infected big toe Dr Alvares   • TRANSURETHRAL RESECTION OF BLADDER TUMOR N/A 9/6/2019    Procedure: EVACUATION OF BLOOD CLOT, FULGERATION OF PROSTATE;  Surgeon: Ck Woods MD;  Location:  [facility];  Service: Urology         EVALUATION   PT Ortho     Row Name 04/25/23 1300       Subjective Comments    Subjective Comments Dtr was able to troubleshoot dressings placement/securement to keep dressings in place. She thinks the areas are looking much better.  -       Subjective Pain    Able to rate subjective pain? yes  -    Pre-Treatment Pain Level 0  -    Post-Treatment Pain Level 0  -       Transfers    Sit-Stand [place] (Transfers) minimum assist (75% patient effort)  -    Stand-Sit [place] (Transfers) minimum assist (75% patient effort)  -    Transfers, Sit-Stand-Sit, Assist Device --  HHA  -    Comment, (Transfers) seated for tx. Stood to don/doff coat  -          User Key  (r) = Recorded By, (t) = Taken By, (c) = Cosigned By    Initials Name Provider Type      Shaylee Govea PT Physical Therapist                 LDA Wound     Row Name 04/25/23 1300             Wound 04/13/23 0930 Right proximal arm Skin Tear    Wound - Properties Group Placement Date: 04/13/23  - Placement Time: 0930 - Present on Hospital Admission: Y  -MC Side: Right  -MC Orientation: proximal  -MC Location: arm  -MC Primary Wound Type: Skin tear  -MC    Wound Image Images linked: 1  -MC      Dressing Appearance intact;no drainage  -MC      Base closed/resurfaced;dry;clean;pink  -MC      Periwound intact;dry  -MC      Periwound Temperature warm  -MC      Periwound Skin Turgor soft  -MC      Drainage Amount none  -MC      Care, Wound cleansed with;wound cleanser;debrided  -MC      Dressing Care open to air  -MC      Periwound Care cleansed with pH balanced cleanser;dry periwound area maintained  -MC      Retired Wound - Properties Group Placement Date: 04/13/23  - Placement Time: 0930  - Present on Hospital Admission: Y  -MC Side: Right  - Orientation: proximal  -MC Location: arm  -MC Primary Wound Type: Skin tear  -MC    Retired Wound - Properties Group Date first assessed: 04/13/23  - Time first assessed: 0930  - Present on Hospital Admission: Y  -MC Side: Right  - Location: arm  -MC Primary Wound Type: Skin tear  -MC       Wound 04/13/23 0930 Left proximal arm Skin Tear    Wound - Properties Group Placement Date: 04/13/23  - Placement Time: 0930 - Present on Hospital Admission: Y  -MC Side: Left  - Orientation: proximal  - Location: arm  -MC Primary Wound Type: Skin tear  -MC    Wound Image Images linked: 1  -MC      Dressing Appearance intact;no drainage  -MC      Base moist;pink;red;closed/resurfaced;dry;epithelialization  Over 99% closed. Tiny bleeding area from debridement  -MC      Periwound intact;dry  -MC      Periwound Temperature warm  -MC      Periwound Skin Turgor soft  -MC      Edges irregular  -MC      Drainage Characteristics/Odor sanguineous  -MC       Drainage Amount scant  -      Care, Wound cleansed with;wound cleanser;debrided  -      Dressing Care dressing applied;silver impregnated;low-adherent;foam  mepilex Ag, PF tape  -      Periwound Care cleansed with pH balanced cleanser;dry periwound area maintained  -      Retired Wound - Properties Group Placement Date: 04/13/23  - Placement Time: 0930 - Present on Hospital Admission: Y  - Side: Left  - Orientation: proximal  - Location: arm  - Primary Wound Type: Skin tear  -MC    Retired Wound - Properties Group Date first assessed: 04/13/23  - Time first assessed: 0930  - Present on Hospital Admission: Y  - Side: Left  - Location: arm  - Primary Wound Type: Skin tear  -MC          User Key  (r) = Recorded By, (t) = Taken By, (c) = Cosigned By    Initials Name Provider Type    Shaylee Clarke, PT Physical Therapist                  WOUND DEBRIDEMENT  Total area of Debridement: 6 cm2  Debridement Site 1  Location- Site 1: R forearm  Selective Debridement- Site 1: Wound Surface <20cmsq  Instruments- Site 1: tweezers  Excised Tissue Description- Site 1: maximum, other (comment) (crust over intact skin)   Debridement Site 2  Location- Site 2: L forearm  Selective Debridement- Site 2: Wound Surface <20cmsq  Instruments- Site 2: tweezers, #15, scapel  Excised Tissue Description- Site 2: maximum, other (comment) (crust over intact skin)  Bleeding- Site 2: scant, held pressure, 1 minute          Therapy Education     Row Name 04/25/23 1400             Therapy Education    Education Details Keep L forearm area covered for 2-3 days. If no drainage or open areas noted, may leave KB. Leave R forearm KB. Call for follow up within recert period  -MC      Given Bandaging/dressing change;Symptoms/condition management  -      Program Progressed  -      How Provided Verbal;Demonstration  -MC      Provided to Patient;Caregiver  -      Level of Understanding Teach back education  performed;Verbalized  -            User Key  (r) = Recorded By, (t) = Taken By, (c) = Cosigned By    Initials Name Provider Type    Shaylee Clarke, PT Physical Therapist                Recommendation and Plan   PT Assessment/Plan     Row Name 04/25/23 1400          PT Assessment    Functional Limitations Performance in self-care ADL;Other (comment)  wound mgmt  -     Impairments Integumentary integrity  -     Assessment Comments The R forearm skin tear is closed today, wtih appropriate skin integrity noted. The L forearm skin tear also appears closed apart from a pinpoint area of bleeding after debridement of thick crust. PT anticipates this pinpoint area will close within the next several days. Pt is appropriate for tentative transition to independent/family management, with follow-up as needed. He remains at risk for additional skin breakdown in these areas given his age and the overall fragility of his skin.  -     Rehab Potential Good  -     Patient/caregiver participated in establishment of treatment plan and goals Yes  -     Patient would benefit from skilled therapy intervention Yes  -        PT Plan    PT Plan Comments tentative d/c  -           User Key  (r) = Recorded By, (t) = Taken By, (c) = Cosigned By    Initials Name Provider Type    Shaylee Clarke, PT Physical Therapist                Goals   PT OP Goals     Row Name 04/25/23 1437          Time Calculation    PT Goal Re-Cert Due Date 07/12/23  -           User Key  (r) = Recorded By, (t) = Taken By, (c) = Cosigned By    Initials Name Provider Type    Shaylee Clarke, PT Physical Therapist                PT Goal Re-Cert Due Date: 07/12/23            Time Calculation: Start Time: 1300  Untimed Charges  [id number] debridement: 20  Total Minutes  Untimed Charges Total Minutes: 20   Total Minutes: 20  Therapy Charges for Today     Code Description Service Date Service Provider Modifiers Qty    [id number]  ENRIKE  DEBRIDE OPEN WOUND UP TO 20CM 4/25/2023 Shaylee Govea, PT GP 1                  Shaylee Govea, PT  4/25/2023

## 2023-04-25 NOTE — PROGRESS NOTES
Physical Therapy Daily Note  Visit: 4  Date of Initial Visit: Type: THERAPY  Noted: 3/28/2023    Patient: Chaitanya Browne   : 1923  Diagnosis/ICD-10 Code:  Impaired functional mobility, balance, gait, and endurance [Z74.09]  Referring practitioner: Dirk Russ MD  Date of Initial Visit: Type: THERAPY  Noted: 3/28/2023  Today's Date: 2023  Patient seen for 4 sessions      Subjective:   Patient reports: his wounds are healing.   Pain: 0/10  Clinical Progress: improved  Home Program Compliance: Yes  Treatment has included: therapeutic exercise and neuromuscular re-education    Objective   See Exercise, Manual, and Modality Logs for complete treatment.    PT Neuro          Assessment & Plan     Assessment    Assessment details: Patient demonstrates challenge with trunk rotation on altered surfaces. Patient did have LOB but he recovered well with assistance. He will continue to be progressed with LE/UE strengthening, endurance and balance re-education.     Plan  Plan details: Patient to continue with PT services to improve gait, balance, strength, transfers and overall functional mobility.          Timed:  Manual Therapy:            0     mins  78086;  Therapeutic Exercise:    25    mins  81859;     Neuromuscular [place]:    13    mins  34067;    Therapeutic Activity:      0     mins  45238;     Gait Trainin    mins  95011;     Electrical Stimulation:    0    mins  79523 ( );     Untimed:  Canalith Repositioning techniques _[id number]      Timed Treatment:   38   mins   Total Treatment:     38   mins      Vianca Durant, PT, DPT, MSCS, CDP, CSRS  [place] License #: [id number]  Physical Therapist

## 2023-04-28 ENCOUNTER — OFFICE VISIT (OUTPATIENT)
Dept: INTERNAL MEDICINE | Facility: CLINIC | Age: 88
End: 2023-04-28
Payer: MEDICARE

## 2023-04-28 VITALS
BODY MASS INDEX: 29.62 KG/M2 | SYSTOLIC BLOOD PRESSURE: 96 MMHG | HEART RATE: 72 BPM | HEIGHT: 65 IN | DIASTOLIC BLOOD PRESSURE: 64 MMHG | TEMPERATURE: 99.1 F | WEIGHT: 177.8 LBS

## 2023-04-28 DIAGNOSIS — J30.1 SEASONAL ALLERGIC RHINITIS DUE TO POLLEN: Primary | ICD-10-CM

## 2023-04-28 PROCEDURE — 1160F RVW MEDS BY RX/DR IN RCRD: CPT | Performed by: INTERNAL MEDICINE

## 2023-04-28 PROCEDURE — 1159F MED LIST DOCD IN RCRD: CPT | Performed by: INTERNAL MEDICINE

## 2023-04-28 PROCEDURE — 99213 OFFICE O/P EST LOW 20 MIN: CPT | Performed by: INTERNAL MEDICINE

## 2023-04-28 RX ORDER — AZITHROMYCIN 250 MG/1
TABLET, FILM COATED ORAL
Qty: 6 TABLET | Refills: 0 | Status: SHIPPED | OUTPATIENT
Start: 2023-04-28

## 2023-04-28 NOTE — PROGRESS NOTES
[facility]     Chaitanya Browne  2/23/1923   [id number]      Patient Care Team:  Dirk Russ MD as PCP - General (Internal Medicine)  Fabricio Zavala MD as Consulting Physician (Internal Medicine)  Kaley Oliveros APRN as Nurse Practitioner (Cardiology)    Chief Complaint::   Chief Complaint   Patient presents with   • Cough     slight   • Nasal Congestion     Onset yesterday        HPI  The patient comes in complaining of cough and nasal congestion. He is accompanied by his daughter today.    Allergic rhinitis  The patient still experiences rhinorrhea and a mild cough. He does not feel sick. She denies suffering from allergies before. He does not have a fever, myalgia, sore throat, or earache. He has a hoarse voice. He denies having to clear his throat. He wears hearing aids. He has nasal spray but notes its ineffectiveness. The patient's daughter reports that the patient's hoarseness in his voice started overnight on 04/27/2023. She attests that the patient was doing well on 04/27/2023.     Squamous cell carcinoma   The patient's daughter claims that the patient has squamous cell carcinoma in his nose. The patient is receiving a cemiplimab infusion. He is under the care of Dr. David Leggett.     Chronic Conditions:      Patient Active Problem List   Diagnosis   • Facial basal cell cancer   • Hyperlipidemia LDL goal <70   • Essential hypertension   • ASHD (arteriosclerotic heart disease)   • Gait abnormality   • Impaired fasting glucose   • Polyp, colonic   • Vitamin D deficiency   • Acute midline low back pain without sciatica   • Proteinuria   • Right carpal tunnel syndrome   • Class 1 obesity due to excess calories with serious comorbidity and body mass index (BMI) of 30.0 to 30.9 in adult   • Hematuria, unspecified   • Medicare annual wellness visit, subsequent   • Edema   • Cough   • Allergic rhinitis   • Gross hematuria   • Acute UTI (urinary tract infection)   • Acute renal  insufficiency   • Acute urinary retention   • Debility   • Dysphagia   • BPH with obstruction/lower urinary tract symptoms   • Prostatitis   • Chronic diastolic CHF (congestive heart failure)   • Iron deficiency anemia due to chronic blood loss   • Simple chronic bronchitis   • Shortness of breath   • Stage 3a chronic kidney disease   • Skin tear of left forearm without complication   • Squamous cell carcinoma of nose   • At high risk for falls   • Stage II skin ulcer   • Cellulitis of right upper extremity        Past Medical History:   Diagnosis Date   • Abnormal heart rhythm    • Anemia    • Aortic valve sclerosis    • Asthma    • Basal cell carcinoma (BCC) of left side of nose    • BPH (benign prostatic hyperplasia)    • Cataracts, bilateral     bilateralcataract extraction and lens implantation 2013   • Diastolic dysfunction    • Easy bruising    • Edema due to malnutrition 02/05/2020   • Heart murmur    • High cholesterol    • History of disease     bilateral lacrimal gland dysfunction per Dr Fajardo   • Hypertension    • Infection     infected big toe; I and D DR Alvares 2013   • Phlebitis    • Pneumonia 2009   • Squamous acanthoma of face        Past Surgical History:   Procedure Laterality Date   • APPENDECTOMY     • CATARACT EXTRACTION, BILATERAL  2013    and lens implantation   • CYSTOSCOPY N/A 9/6/2019    Procedure: CYSTOSCOPY;  Surgeon: Ck Wodos MD;  Location:  [facility] OR;  Service: Urology   • CYSTOSCOPY TRANSURETHRAL RESECTION OF PROSTATE  1989   • CYSTOSCOPY TRANSURETHRAL RESECTION OF PROSTATE N/A 9/11/2019    Procedure: CYSTOSCOPY TRANSURETHRAL RESECTION OF PROSTATE;  Surgeon: Ck Woods MD;  Location:  [facility];  Service: Urology   • HEAD & NECK SKIN LESION EXCISIONAL BIOPSY      Basal cell removed from nose    • INCISION AND DRAINAGE FOOT  2013    infected big toe Dr Alvares   • TRANSURETHRAL RESECTION OF BLADDER TUMOR N/A 9/6/2019    Procedure: EVACUATION OF BLOOD CLOT,  FULGERATION OF PROSTATE;  Surgeon: Ck Woods MD;  Location: [facility];  Service: Urology       Family History   Problem Relation Age of Onset   • Coronary artery disease Father    • Heart attack Father    • Coronary artery disease Brother    • Heart disease Brother    • Atrial fibrillation Brother    • Stroke Brother    • Heart attack Brother    • Heart attack Mother        Social History     Socioeconomic History   • Marital status:    Tobacco Use   • Smoking status: Never   • Smokeless tobacco: Never   Vaping Use   • Vaping Use: Never used   Substance and Sexual Activity   • Alcohol use: No   • Drug use: No   • Sexual activity: Defer       No Known Allergies      Current Outpatient Medications:   •  aspirin 81 MG EC tablet, Take 1 tablet by mouth Every Other Day. (Patient taking differently: Take 40.5 mg by mouth 3 (Three) Times a Week.), Disp: , Rfl:   •  azelastine (ASTELIN) 0.1 % nasal spray, USE 2 SPRAYS IN EACH NOSTRIL TWO TIMES A DAY AS DIRECTED, Disp: 90 each, Rfl: 3  •  bumetanide (Bumex) 1 MG tablet, 1 mg BID, may take extra 1 mg with 3-5 lb weight gain, dyspnea, or worsening edema., Disp: 200 tablet, Rfl: 1  •  Cholecalciferol (Vitamin D3) 25 MCG (1000 UT) capsule, Take  by mouth., Disp: , Rfl:   •  finasteride (PROSCAR) 5 MG tablet, Take 1 tablet by mouth Daily., Disp: 90 tablet, Rfl: 1  •  fluocinonide (LIDEX) 0.05 % external solution, Apply 1 application topically to the appropriate area as directed As Needed., Disp: , Rfl:   •  ketoconazole (NIZORAL) 2 % cream, Apply 1 application topically to the appropriate area as directed Daily As Needed., Disp: , Rfl:   •  ketoconazole (NIZORAL) 2 % shampoo, Apply  topically to the appropriate area as directed As Needed., Disp: , Rfl:   •  pravastatin (PRAVACHOL) 40 MG tablet, Take 1 tablet by mouth Daily. 3x a week, Disp: 90 tablet, Rfl: 3  •  azithromycin (Zithromax Z-Pierre) 250 MG tablet, Take 2 tablets the first day, then 1 tablet daily for 4  "days., Disp: 6 tablet, Rfl: 0    Review of Systems   Constitutional: Negative for chills, fatigue and fever.   HENT: Negative for congestion, ear pain and sinus pressure.    Respiratory: Negative for cough, chest tightness, shortness of breath and wheezing.    Cardiovascular: Negative for chest pain and palpitations.   Gastrointestinal: Negative for abdominal pain, blood in stool and constipation.   Skin: Negative for color change.   Allergic/Immunologic: Negative for environmental allergies.   Neurological: Negative for dizziness, speech difficulty and headache.   Psychiatric/Behavioral: Negative for decreased concentration. The patient is not nervous/anxious.         Vital Signs  Vitals:    04/28/23 1128   BP: 96/64   BP Location: Left arm   Patient Position: Sitting   Cuff Size: Adult   Pulse: 72   Temp: 99.1 °F (37.3 °C)   Weight: 80.6 kg (177 lb 12.8 oz)   Height: 165.1 cm (65\")   PainSc: 0-No pain       Physical Exam  Vitals reviewed.   Constitutional:       Appearance: He is well-developed.   HENT:      Head: Normocephalic and atraumatic.   Eyes:      Pupils: Pupils are equal, round, and reactive to light.   Cardiovascular:      Rate and Rhythm: Normal rate and regular rhythm.      Heart sounds: Normal heart sounds.   Pulmonary:      Effort: Pulmonary effort is normal.      Breath sounds: Normal breath sounds.   Abdominal:      General: Bowel sounds are normal.      Palpations: Abdomen is soft.   Musculoskeletal:         General: Normal range of motion.      Cervical back: Normal range of motion.   Skin:     General: Skin is warm and dry.   Neurological:      Mental Status: He is alert and oriented to person, place, and time.          Procedures    ACE III MINI             Assessment/Plan:    Diagnoses and all orders for this visit:    1. Seasonal allergic rhinitis due to pollen (Primary)    Other orders  -     azithromycin (Zithromax Z-Pierre) 250 MG tablet; Take 2 tablets the first day, then 1 tablet daily for " 4 days.  Dispense: 6 tablet; Refill: 0        1. Allergic rhinitis  There is currently no evidence of a viral or bacterial infection. He has azelastine, but he says it does not work. He also says he does not typically have allergy symptoms. He may use loratadine or fluticasone spray. Because of his advanced age and frailty, I have given him an antibiotic in case his symptoms progress this weekend, but he and his daughter know not to take it for present symptoms.    The patient will follow up as previously scheduled.    Plan of care reviewed with patient at the conclusion of today's visit. Education was provided regarding diagnosis, management, and any prescribed or recommended OTC medications.Patient verbalizes understanding of and agreement with management plan.       Nitin Yen MD     Transcribed from ambient dictation for Nitin Yen MD by Kamar Warren.  04/28/23   14:11 EDT    Patient or patient representative verbalized consent to the visit recording.  I have personally performed the services described in this document as transcribed by the above individual, and it is both accurate and complete.

## 2023-05-02 ENCOUNTER — TREATMENT (OUTPATIENT)
Dept: PHYSICAL THERAPY | Facility: CLINIC | Age: 88
End: 2023-05-02
Payer: MEDICARE

## 2023-05-02 DIAGNOSIS — R26.9 GAIT ABNORMALITY: Primary | ICD-10-CM

## 2023-05-02 DIAGNOSIS — Z74.09 IMPAIRED FUNCTIONAL MOBILITY, BALANCE, GAIT, AND ENDURANCE: ICD-10-CM

## 2023-05-02 PROCEDURE — 97110 THERAPEUTIC EXERCISES: CPT | Performed by: PHYSICAL THERAPIST

## 2023-05-02 PROCEDURE — 97112 NEUROMUSCULAR REEDUCATION: CPT | Performed by: PHYSICAL THERAPIST

## 2023-05-02 NOTE — PROGRESS NOTES
PT Progress Note  Visit: 5  Date of Initial Visit: Type: THERAPY  Noted: 3/28/2023  [facility] [place]  [address]    Patient: Chaitanya Browne   : 1923  Diagnosis/ICD-10 Code:  Gait abnormality [R26.9]  Referring practitioner: Dirk Russ MD  Date of Initial Visit: Type: THERAPY  Noted: 3/28/2023  Today's Date: 2023  Patient seen for 5 sessions    Subjective:   Patient reports: he has had a cold the past week.   Pain: 0/10  Clinical Progress: improved  Home Program Compliance: Yes  Treatment has included: therapeutic exercise and neuromuscular re-education    Objective   See Exercise, Manual, and Modality Logs for complete treatment.    PT Neuro         Assessment & Plan     Assessment  Impairments: abnormal coordination, abnormal gait, activity intolerance, impaired balance, impaired physical strength and safety issue  Functional Limitations: carrying objects, walking, standing and stooping  Assessment details: Patient is making good improvements with balance and strengthening since his fall out of bed a few weeks ago. Patient is very compliant with his HEP and performs exercises daily. He will continue to be progressed as indicated.   Prognosis: fair    Goals  Plan Goals: STG (4 visits)  1. Patient will report compliance with initial HEP. MET  2. Patient to improve DIXON balance score to >/= 38 /56 to decrease patient's risk of falls. ONGOING  3. Patient to perform TUG within 10 sec without LOB for improved functional mobility.ONGOING  4. Patient to ambulate 10 meters without AD within 10 sec without LOB for improved gait nida and functional mobility.ONGOING    LTG (8 visits)  1. Patient will be I with final HEP. ONGOING  2. Patient to improve DIXON balance score to >/= 42 /56 to decrease patient's risk of falls.ONGOING  3. Patient to perform TUG within 9 sec without LOB for improved functional mobility.ONGOING  4. Patient to ambulate 10 meters without AD within 9 sec  without LOB for improved gait nida and functional mobility.ONGOING        Plan  Therapy options: will be seen for skilled therapy services  Planned modality interventions: TENS  Planned therapy interventions: ADL retraining, balance/weight-bearing training, flexibility, gait training, manual therapy, neuromuscular re-education, motor coordination training, postural training, strengthening, stretching, therapeutic activities, transfer training and home exercise program  Frequency: 1x week  Duration in visits: 6  Treatment plan discussed with: patient and family  Plan details: Patient will be seen 1x/wk x 10 wks with treatment to include strengthening, stretching, manual therapy, neuromuscular re-education, balance, gait and endurance training.           Visit Diagnoses:    ICD-10-CM ICD-9-CM   1. Gait abnormality  R26.9 781.2   2. Impaired functional mobility, balance, gait, and endurance  Z74.09 V49.89       Progress toward previous goals: Partially Met      Recommendations: Continue as planned  Timeframe: 1 month-6 weeks    PT Signature: Vianca Durant, PT, DPT, MSCS, CDP, CSRS  [place] License #: [id number]    Based upon review of the patient's progress and continued therapy plan, it is my medical opinion that Chaitanya Browne should continue physical therapy treatment at [facility]  [address] [address] [phone].    Signature: __________________________________  Dirk Russ MD    Timed:  Manual Therapy:    0     mins  24579;  Therapeutic Exercise:    32     mins  71573;     Neuromuscular Tahmina:    10    mins  69446;    Therapeutic Activity:     0     mins  60911;     Gait Trainin     mins  35839;     Electrical Stimulation:    0     mins  91932 ( );    Untimed:  Canalith Repositioning   0 mins  15790    Timed Treatment:   42   mins   Total Treatment:     42   mins

## 2023-05-09 ENCOUNTER — TREATMENT (OUTPATIENT)
Dept: PHYSICAL THERAPY | Facility: CLINIC | Age: 88
End: 2023-05-09
Payer: MEDICARE

## 2023-05-09 DIAGNOSIS — R26.9 GAIT ABNORMALITY: Primary | ICD-10-CM

## 2023-05-09 DIAGNOSIS — Z74.09 IMPAIRED FUNCTIONAL MOBILITY, BALANCE, GAIT, AND ENDURANCE: ICD-10-CM

## 2023-05-09 PROCEDURE — 97112 NEUROMUSCULAR REEDUCATION: CPT | Performed by: PHYSICAL THERAPIST

## 2023-05-09 PROCEDURE — 97110 THERAPEUTIC EXERCISES: CPT | Performed by: PHYSICAL THERAPIST

## 2023-05-09 NOTE — PROGRESS NOTES
Physical Therapy Daily Note  Visit: 6  Date of Initial Visit: Type: THERAPY  Noted: 3/28/2023    Patient: Chaitanya Browne   : 1923  Diagnosis/ICD-10 Code:  Gait abnormality [R26.9]  Referring practitioner: Dirk Russ MD  Date of Initial Visit: Type: THERAPY  Noted: 3/28/2023  Today's Date: 2023  Patient seen for 6 sessions      Subjective:   Patient reports: he wants to review exercises from next week.   Pain: 0/10  Clinical Progress: improved  Home Program Compliance: Yes  Treatment has included: therapeutic exercise and neuromuscular re-education    Objective   See Exercise, Manual, and Modality Logs for complete treatment.    PT Neuro          Assessment & Plan     Assessment    Assessment details: Patient wanted to review HEP established last week. He demo'd everything well with a just a minor adjustment to lateral stepping exercise. He will continue to perform at home.     Plan  Plan details: Patient to continue with PT services to improve gait, balance, strength, transfers and overall functional mobility.          Timed:  Manual Therapy:            0     mins  78012;  Therapeutic Exercise:    30    mins  55997;     Neuromuscular Tahmina:    10    mins  31435;    Therapeutic Activity:      0     mins  66100;     Gait Trainin    mins  06637;     Electrical Stimulation:    0    mins  03982 ( );     Untimed:  Canalith Repositioning techniques _0_ 38766      Timed Treatment:   40   mins   Total Treatment:     40   mins      Vianca Durant, PT, DPT, MSCS, CDP, CSRS  [place] License #: [id number]  Physical Therapist

## 2023-05-16 ENCOUNTER — TREATMENT (OUTPATIENT)
Dept: PHYSICAL THERAPY | Facility: CLINIC | Age: 88
End: 2023-05-16
Payer: MEDICARE

## 2023-05-16 DIAGNOSIS — Z74.09 IMPAIRED FUNCTIONAL MOBILITY, BALANCE, GAIT, AND ENDURANCE: ICD-10-CM

## 2023-05-16 DIAGNOSIS — R26.9 GAIT ABNORMALITY: Primary | ICD-10-CM

## 2023-05-16 PROCEDURE — 97110 THERAPEUTIC EXERCISES: CPT | Performed by: PHYSICAL THERAPIST

## 2023-05-16 PROCEDURE — 97112 NEUROMUSCULAR REEDUCATION: CPT | Performed by: PHYSICAL THERAPIST

## 2023-05-16 NOTE — PROGRESS NOTES
Physical Therapy Daily Note  Visit: 7  Date of Initial Visit: Type: THERAPY  Noted: 3/28/2023    Patient: Chaitanya Browne   : 1923  Diagnosis/ICD-10 Code:  Gait abnormality [R26.9]  Referring practitioner: Dirk Russ MD  Date of Initial Visit: Type: THERAPY  Noted: 3/28/2023  Today's Date: 2023  Patient seen for 7 sessions      Subjective:   Patient reports: he is still in the vertical.   Pain: 0/10  Clinical Progress: improved  Home Program Compliance: Yes  Treatment has included: therapeutic exercise and neuromuscular re-education    Objective   See Exercise, Manual, and Modality Logs for complete treatment.    PT Neuro          Assessment & Plan     Assessment    Assessment details: Patient demonstrates challenge in retro stepping with keeping head up and not looking downward at his feet. It was suggested to patient about potentially riding a nu-step at the [facility] after discharge. Patient will think about it.     Plan  Plan details: Patient to continue with PT services to improve gait, balance, strength, transfers and overall functional mobility.          Timed:  Manual Therapy:            0     mins  90273;  Therapeutic Exercise:    25    mins  98317;     Neuromuscular Tahmina:    15    mins  22912;    Therapeutic Activity:      0     mins  43945;     Gait Trainin    mins  76433;     Electrical Stimulation:    0    mins  79371 ( );     Untimed:  Canalith Repositioning techniques _0_ 66349      Timed Treatment:   40   mins   Total Treatment:     40   mins      Vianca Durant, PT, DPT, MSCS, CDP, CSRS  [place] License #: [id number]  Physical Therapist

## 2023-05-23 ENCOUNTER — TREATMENT (OUTPATIENT)
Dept: PHYSICAL THERAPY | Facility: CLINIC | Age: 88
End: 2023-05-23
Payer: MEDICARE

## 2023-05-23 DIAGNOSIS — Z74.09 IMPAIRED FUNCTIONAL MOBILITY, BALANCE, GAIT, AND ENDURANCE: ICD-10-CM

## 2023-05-23 DIAGNOSIS — R26.9 GAIT ABNORMALITY: Primary | ICD-10-CM

## 2023-05-23 PROCEDURE — 97112 NEUROMUSCULAR REEDUCATION: CPT | Performed by: PHYSICAL THERAPIST

## 2023-05-23 PROCEDURE — 97110 THERAPEUTIC EXERCISES: CPT | Performed by: PHYSICAL THERAPIST

## 2023-05-23 NOTE — PROGRESS NOTES
Physical Therapy Daily Note  Visit: 8  Date of Initial Visit: Type: THERAPY  Noted: 3/28/2023    Patient: Chaitanya Browne   : 1923  Diagnosis/ICD-10 Code:  Gait abnormality [R26.9]  Referring practitioner: Dirk Russ MD  Date of Initial Visit: Type: THERAPY  Noted: 3/28/2023  Today's Date: 2023  Patient seen for 8 sessions      Subjective:   Patient reports: he had a cramp last night.   Pain: 0/10  Clinical Progress: improved  Home Program Compliance: Yes  Treatment has included: therapeutic exercise and neuromuscular re-education    Objective   See Exercise, Manual, and Modality Logs for complete treatment.    PT Neuro          Assessment & Plan     Assessment    Assessment details: Continued to encourage patient to join his daughter at the [facility] to keep up progress he has made in therapy. Calf stretching was reviewed today due to complaints of calf cramping at night. He has one visit remaining.     Plan  Plan details: Patient to continue with PT services to improve gait, balance, strength, transfers and overall functional mobility.          Timed:  Manual Therapy:            0     mins  77386;  Therapeutic Exercise:    30    mins  00264;     Neuromuscular Tahmina:    10    mins  84704;    Therapeutic Activity:      0     mins  70835;     Gait Trainin    mins  60800;     Electrical Stimulation:    0    mins  55003 ( );     Untimed:  Canalith Repositioning techniques _0_ 50732      Timed Treatment:   40   mins   Total Treatment:     40   mins      Vianca Durant, PT, DPT, MSCS, CDP, CSRS  [place] License #: [id number]  Physical Therapist

## 2023-05-30 ENCOUNTER — TREATMENT (OUTPATIENT)
Dept: PHYSICAL THERAPY | Facility: CLINIC | Age: 88
End: 2023-05-30

## 2023-05-30 DIAGNOSIS — Z74.09 IMPAIRED FUNCTIONAL MOBILITY, BALANCE, GAIT, AND ENDURANCE: ICD-10-CM

## 2023-05-30 DIAGNOSIS — R26.9 GAIT ABNORMALITY: Primary | ICD-10-CM

## 2023-05-30 PROCEDURE — 97112 NEUROMUSCULAR REEDUCATION: CPT | Performed by: PHYSICAL THERAPIST

## 2023-05-30 PROCEDURE — 97110 THERAPEUTIC EXERCISES: CPT | Performed by: PHYSICAL THERAPIST

## 2023-05-30 NOTE — PROGRESS NOTES
Physical Therapy Daily Note/Discharge Summary  Visit: 9  Date of Initial Visit: Type: THERAPY  Noted: 3/28/2023    Patient: Chaitanya Browne   : 1923  Diagnosis/ICD-10 Code:  Gait abnormality [R26.9]  Referring practitioner: Dirk Russ MD  Date of Initial Visit: Type: THERAPY  Noted: 3/28/2023  Today's Date: 2023  Patient seen for 9 sessions      Subjective:   Patient reports: he is still in the vertical.   Pain: 0/10  Clinical Progress: improved  Home Program Compliance: Yes  Treatment has included: therapeutic exercise and neuromuscular re-education    Objective   See Exercise, Manual, and Modality Logs for complete treatment.    PT Neuro          Assessment & Plan     Assessment    Assessment details: Patient's last visit is today. He will continue his HEP as prescribed, as well as begin going to the [employer] 1-2 x/wk to continue with endurance training. His daughter will accompany him to assist him in and out of the building as well as onto/ off of the equipment. He will be discharged at this time.     Plan  Plan details: Patient will be discharged at this time to independent management of program.         Timed:  Manual Therapy:            0     mins  66487;  Therapeutic Exercise:    25    mins  17826;     Neuromuscular Tahmina:    20    mins  75620;    Therapeutic Activity:      0     mins  72309;     Gait Trainin    mins  90703;     Electrical Stimulation:    0    mins  97802 ( );     Untimed:  Canalith Repositioning techniques _0_ 27816      Timed Treatment:   45   mins   Total Treatment:     45   mins      Vianca Durant PT, DPT, MSCS, CDP, CSRS  [place] License #: [id number]  Physical Therapist

## 2023-07-27 ENCOUNTER — DOCUMENTATION (OUTPATIENT)
Dept: PHYSICAL THERAPY | Facility: HOSPITAL | Age: 88
End: 2023-07-27
Payer: MEDICARE

## 2023-07-27 DIAGNOSIS — S51.812A ISTAP TYPE 3 SKIN TEAR OF LEFT FOREARM: Primary | ICD-10-CM

## 2023-07-27 DIAGNOSIS — S51.811A ISTAP TYPE 3 SKIN TEAR OF RIGHT FOREARM: ICD-10-CM

## 2023-07-27 NOTE — THERAPY DISCHARGE NOTE
Outpatient Rehabilitation - Wound/Debridement Discharge Summary       Patient Name: Chaitanya Browne  : 1923  MRN: [id number]  Today's Date: 2023                  Admit Date: (Not on file)    Visit Dx:    ICD-10-CM ICD-9-CM   1. ISTAP type 3 skin tear of left forearm  S51.812A 881.00   2. ISTAP type 3 skin tear of right forearm  S51.811A 881.00       Patient Active Problem List   Diagnosis    Facial basal cell cancer    Hyperlipidemia LDL goal <70    Essential hypertension    ASHD (arteriosclerotic heart disease)    Gait abnormality    Impaired fasting glucose    Polyp, colonic    Vitamin D deficiency    Acute midline low back pain without sciatica    Proteinuria    Right carpal tunnel syndrome    Class 1 obesity due to excess calories with serious comorbidity and body mass index (BMI) of 30.0 to 30.9 in adult    Hematuria, unspecified    Medicare annual wellness visit, subsequent    Edema    Cough    Allergic rhinitis    Gross hematuria    Acute UTI (urinary tract infection)    Acute renal insufficiency    Acute urinary retention    Debility    Dysphagia    BPH with obstruction/lower urinary tract symptoms    Prostatitis    Chronic diastolic CHF (congestive heart failure)    Iron deficiency anemia due to chronic blood loss    Simple chronic bronchitis    Shortness of breath    Stage 3a chronic kidney disease    Skin tear of left forearm without complication    Squamous cell carcinoma of nose    At high risk for falls    Stage II skin ulcer    Cellulitis of right upper extremity        Past Medical History:   Diagnosis Date    Abnormal heart rhythm     Anemia     Aortic valve sclerosis     Asthma     Basal cell carcinoma (BCC) of left side of nose     BPH (benign prostatic hyperplasia)     Cataracts, bilateral     bilateralcataract extraction and lens implantation     Diastolic dysfunction     Easy bruising     Edema due to malnutrition 2020    Heart murmur     High cholesterol     History  of disease     bilateral lacrimal gland dysfunction per Dr Fajardo    Hypertension     Infection     infected big toe; I and D DR Alvares 2013    Phlebitis     Pneumonia 2009    Squamous acanthoma of face         Past Surgical History:   Procedure Laterality Date    APPENDECTOMY      CATARACT EXTRACTION, BILATERAL  2013    and lens implantation    CYSTOSCOPY N/A 9/6/2019    Procedure: CYSTOSCOPY;  Surgeon: Ck Woods MD;  Location:  [facility];  Service: Urology    CYSTOSCOPY TRANSURETHRAL RESECTION OF PROSTATE  1989    CYSTOSCOPY TRANSURETHRAL RESECTION OF PROSTATE N/A 9/11/2019    Procedure: CYSTOSCOPY TRANSURETHRAL RESECTION OF PROSTATE;  Surgeon: kC Woods MD;  Location:  [facility];  Service: Urology    HEAD & NECK SKIN LESION EXCISIONAL BIOPSY      Basal cell removed from nose     INCISION AND DRAINAGE FOOT  2013    infected big toe Dr Alvares    TRANSURETHRAL RESECTION OF BLADDER TUMOR N/A 9/6/2019    Procedure: EVACUATION OF BLOOD CLOT, FULGERATION OF PROSTATE;  Surgeon: Ck Woods MD;  Location:  [facility] OR;  Service: Urology         Goals   PT OP Goals       Row Name 07/27/23 1600          PT Short Term Goals    STG 1 Pt/caregiver will verbalize s/sx of infection.  -     STG 1 Progress Met  -     STG 2 Pt will demonstrate 25% reduction in wound dimensions to both areas to indicate healing progress.  -     STG 2 Progress Met  -        Long Term Goals    LTG 1 Pt/caregiver will demonstrate independence with clean home dressing changes.  -     LTG 1 Progress Met  -     LTG 2 Pt will demonstrate 85% reduction in wound dimensions to both areas to indicate healing progress.  -     LTG 2 Progress Met  -               User Key  (r) = Recorded By, (t) = Taken By, (c) = Cosigned By      Initials Name Provider Type    Jennifer Diaz, PT Physical Therapist                     OP Discharge Summary       Row Name 07/27/23 1616             OP PT Discharge Summary    Date of  Discharge 07/27/23  -ENOC      Reason for Discharge All goals achieved;Independent  -ENOC      Outcomes Achieved Able to achieve all goals within established timeline  -      Discharge Destination Home with caregiver assist  -                User Key  (r) = Recorded By, (t) = Taken By, (c) = Cosigned By      Initials Name Provider Type    Jennifer Diaz, PT Physical Therapist                    Jennifer Farfan, PT  7/27/2023

## 2023-08-04 DIAGNOSIS — N40.1 BPH WITH OBSTRUCTION/LOWER URINARY TRACT SYMPTOMS: ICD-10-CM

## 2023-08-04 DIAGNOSIS — N13.8 BPH WITH OBSTRUCTION/LOWER URINARY TRACT SYMPTOMS: ICD-10-CM

## 2023-08-04 RX ORDER — FINASTERIDE 5 MG/1
5 TABLET, FILM COATED ORAL DAILY
Qty: 90 TABLET | Refills: 1 | Status: SHIPPED | OUTPATIENT
Start: 2023-08-04

## 2023-08-11 ENCOUNTER — TELEPHONE (OUTPATIENT)
Dept: INTERNAL MEDICINE | Facility: CLINIC | Age: 88
End: 2023-08-11
Payer: MEDICARE

## 2023-08-11 NOTE — TELEPHONE ENCOUNTER
Pts daughter Nkechi called stating pt has blood in his urine but not symptomatic. Since it is so late on a Friday she was needing to know whether to go to [facility] or ER. She was notified that the on call doctor ( Dr. Trujillo ) said that as long as he was upright and able to void [facility] would suffice but if he is unable to void because the blood is too thick to go to the ER. Nkechi is taking pt to [facility].

## 2023-08-20 ENCOUNTER — APPOINTMENT (OUTPATIENT)
Dept: CT IMAGING | Facility: HOSPITAL | Age: 88
End: 2023-08-20
Payer: MEDICARE

## 2023-08-20 ENCOUNTER — HOSPITAL ENCOUNTER (OUTPATIENT)
Facility: HOSPITAL | Age: 88
Setting detail: OBSERVATION
Discharge: HOME | End: 2023-08-23
Attending: EMERGENCY MEDICINE | Admitting: INTERNAL MEDICINE
Payer: MEDICARE

## 2023-08-20 DIAGNOSIS — Z85.828 HISTORY OF SKIN CANCER: ICD-10-CM

## 2023-08-20 DIAGNOSIS — R06.02 SHORTNESS OF BREATH: ICD-10-CM

## 2023-08-20 DIAGNOSIS — D64.9 ACUTE ANEMIA: Primary | ICD-10-CM

## 2023-08-20 DIAGNOSIS — Z87.898 HISTORY OF GROSS HEMATURIA: ICD-10-CM

## 2023-08-20 DIAGNOSIS — R26.9 GAIT ABNORMALITY: ICD-10-CM

## 2023-08-20 DIAGNOSIS — Z91.81 AT HIGH RISK FOR FALLS: Chronic | ICD-10-CM

## 2023-08-20 PROBLEM — N32.89 BLADDER MASS: Status: ACTIVE | Noted: 2023-08-20

## 2023-08-20 LAB
ABO GROUP BLD: NORMAL
ABO GROUP BLD: NORMAL
ALBUMIN SERPL-MCNC: 3.3 G/DL (ref 3.5–5.2)
ALBUMIN/GLOB SERPL: 1.4 G/DL
ALP SERPL-CCNC: 78 U/L (ref 39–117)
ALT SERPL W P-5'-P-CCNC: 8 U/L (ref 1–41)
ANION GAP SERPL CALCULATED.3IONS-SCNC: 9 MMOL/L (ref 5–15)
AST SERPL-CCNC: 23 U/L (ref 1–40)
BACTERIA UR QL AUTO: NORMAL /HPF
BASOPHILS # BLD AUTO: 0.03 10*3/MM3 (ref 0–0.2)
BASOPHILS NFR BLD AUTO: 0.4 % (ref 0–1.5)
BILIRUB SERPL-MCNC: 0.8 MG/DL (ref 0–1.2)
BILIRUB UR QL STRIP: NEGATIVE
BLD GP AB SCN SERPL QL: NEGATIVE
BUN SERPL-MCNC: 22 MG/DL (ref 8–23)
BUN/CREAT SERPL: 14.6 (ref 7–25)
CALCIUM SPEC-SCNC: 8.6 MG/DL (ref 8.2–9.6)
CHLORIDE SERPL-SCNC: 99 MMOL/L (ref 98–107)
CLARITY UR: CLEAR
CO2 SERPL-SCNC: 30 MMOL/L (ref 22–29)
COLOR UR: YELLOW
CREAT SERPL-MCNC: 1.51 MG/DL (ref 0.76–1.27)
D-LACTATE SERPL-SCNC: 1.4 MMOL/L (ref 0.5–2)
DEPRECATED RDW RBC AUTO: 49.3 FL (ref 37–54)
EGFRCR SERPLBLD CKD-EPI 2021: 41 ML/MIN/1.73
EOSINOPHIL # BLD AUTO: 0.09 10*3/MM3 (ref 0–0.4)
EOSINOPHIL NFR BLD AUTO: 1.3 % (ref 0.3–6.2)
ERYTHROCYTE [DISTWIDTH] IN BLOOD BY AUTOMATED COUNT: 13.2 % (ref 12.3–15.4)
FECAL OCCULT BLOOD SCREEN, POC: NEGATIVE
GLOBULIN UR ELPH-MCNC: 2.3 GM/DL
GLUCOSE SERPL-MCNC: 131 MG/DL (ref 65–99)
GLUCOSE UR STRIP-MCNC: NEGATIVE MG/DL
HCT VFR BLD AUTO: 26.9 % (ref 37.5–51)
HGB BLD-MCNC: 8.4 G/DL (ref 13–17.7)
HGB UR QL STRIP.AUTO: ABNORMAL
HOLD SPECIMEN: NORMAL
HYALINE CASTS UR QL AUTO: NORMAL /LPF
IMM GRANULOCYTES # BLD AUTO: 0.03 10*3/MM3 (ref 0–0.05)
IMM GRANULOCYTES NFR BLD AUTO: 0.4 % (ref 0–0.5)
KETONES UR QL STRIP: NEGATIVE
LEUKOCYTE ESTERASE UR QL STRIP.AUTO: ABNORMAL
LIPASE SERPL-CCNC: 51 U/L (ref 13–60)
LYMPHOCYTES # BLD AUTO: 0.81 10*3/MM3 (ref 0.7–3.1)
LYMPHOCYTES NFR BLD AUTO: 11.9 % (ref 19.6–45.3)
MCH RBC QN AUTO: 31.8 PG (ref 26.6–33)
MCHC RBC AUTO-ENTMCNC: 31.2 G/DL (ref 31.5–35.7)
MCV RBC AUTO: 101.9 FL (ref 79–97)
MONOCYTES # BLD AUTO: 1.16 10*3/MM3 (ref 0.1–0.9)
MONOCYTES NFR BLD AUTO: 17.1 % (ref 5–12)
NEGATIVE CONTROL: NORMAL
NEUTROPHILS NFR BLD AUTO: 4.67 10*3/MM3 (ref 1.7–7)
NEUTROPHILS NFR BLD AUTO: 68.9 % (ref 42.7–76)
NITRITE UR QL STRIP: NEGATIVE
NRBC BLD AUTO-RTO: 0 /100 WBC (ref 0–0.2)
PH UR STRIP.AUTO: 6 [PH] (ref 5–8)
PLATELET # BLD AUTO: 281 10*3/MM3 (ref 140–450)
PMV BLD AUTO: 10.6 FL (ref 6–12)
POSITIVE CONTROL: NORMAL
POTASSIUM SERPL-SCNC: 3.9 MMOL/L (ref 3.5–5.2)
PROT SERPL-MCNC: 5.6 G/DL (ref 6–8.5)
PROT UR QL STRIP: NEGATIVE
RBC # BLD AUTO: 2.64 10*6/MM3 (ref 4.14–5.8)
RBC # UR STRIP: NORMAL /HPF
REF LAB TEST METHOD: NORMAL
RH BLD: POSITIVE
RH BLD: POSITIVE
SODIUM SERPL-SCNC: 138 MMOL/L (ref 136–145)
SP GR UR STRIP: 1.01 (ref 1–1.03)
SQUAMOUS #/AREA URNS HPF: NORMAL /HPF
T&S EXPIRATION DATE: NORMAL
UROBILINOGEN UR QL STRIP: ABNORMAL
WBC # UR STRIP: NORMAL /HPF
WBC NRBC COR # BLD: 6.79 10*3/MM3 (ref 3.4–10.8)
WHOLE BLOOD HOLD COAG: NORMAL
WHOLE BLOOD HOLD SPECIMEN: NORMAL

## 2023-08-20 PROCEDURE — 82270 OCCULT BLOOD FECES: CPT | Performed by: EMERGENCY MEDICINE

## 2023-08-20 PROCEDURE — 86901 BLOOD TYPING SEROLOGIC RH(D): CPT

## 2023-08-20 PROCEDURE — 99284 EMERGENCY DEPT VISIT MOD MDM: CPT

## 2023-08-20 PROCEDURE — 86901 BLOOD TYPING SEROLOGIC RH(D): CPT | Performed by: EMERGENCY MEDICINE

## 2023-08-20 PROCEDURE — 85018 HEMOGLOBIN: CPT | Performed by: INTERNAL MEDICINE

## 2023-08-20 PROCEDURE — 86850 RBC ANTIBODY SCREEN: CPT | Performed by: EMERGENCY MEDICINE

## 2023-08-20 PROCEDURE — 85025 COMPLETE CBC W/AUTO DIFF WBC: CPT | Performed by: EMERGENCY MEDICINE

## 2023-08-20 PROCEDURE — G0378 HOSPITAL OBSERVATION PER HR: HCPCS

## 2023-08-20 PROCEDURE — 85014 HEMATOCRIT: CPT | Performed by: INTERNAL MEDICINE

## 2023-08-20 PROCEDURE — 80053 COMPREHEN METABOLIC PANEL: CPT | Performed by: EMERGENCY MEDICINE

## 2023-08-20 PROCEDURE — 74176 CT ABD & PELVIS W/O CONTRAST: CPT

## 2023-08-20 PROCEDURE — 81001 URINALYSIS AUTO W/SCOPE: CPT | Performed by: EMERGENCY MEDICINE

## 2023-08-20 PROCEDURE — 86900 BLOOD TYPING SEROLOGIC ABO: CPT

## 2023-08-20 PROCEDURE — 83605 ASSAY OF LACTIC ACID: CPT | Performed by: EMERGENCY MEDICINE

## 2023-08-20 PROCEDURE — 83690 ASSAY OF LIPASE: CPT | Performed by: EMERGENCY MEDICINE

## 2023-08-20 PROCEDURE — 86923 COMPATIBILITY TEST ELECTRIC: CPT

## 2023-08-20 PROCEDURE — 99222 1ST HOSP IP/OBS MODERATE 55: CPT | Performed by: INTERNAL MEDICINE

## 2023-08-20 PROCEDURE — 86900 BLOOD TYPING SEROLOGIC ABO: CPT | Performed by: EMERGENCY MEDICINE

## 2023-08-20 RX ORDER — FINASTERIDE 5 MG/1
5 TABLET, FILM COATED ORAL DAILY
Status: DISCONTINUED | OUTPATIENT
Start: 2023-08-21 | End: 2023-08-23 | Stop reason: HOSPADM

## 2023-08-20 RX ORDER — SODIUM CHLORIDE 9 MG/ML
40 INJECTION, SOLUTION INTRAVENOUS AS NEEDED
Status: DISCONTINUED | OUTPATIENT
Start: 2023-08-20 | End: 2023-08-23 | Stop reason: HOSPADM

## 2023-08-20 RX ORDER — SODIUM CHLORIDE 9 MG/ML
10 INJECTION INTRAVENOUS AS NEEDED
Status: DISCONTINUED | OUTPATIENT
Start: 2023-08-20 | End: 2023-08-23 | Stop reason: HOSPADM

## 2023-08-20 RX ORDER — BISACODYL 10 MG
10 SUPPOSITORY, RECTAL RECTAL DAILY PRN
Status: DISCONTINUED | OUTPATIENT
Start: 2023-08-20 | End: 2023-08-23 | Stop reason: HOSPADM

## 2023-08-20 RX ORDER — ONDANSETRON 4 MG/1
4 TABLET, FILM COATED ORAL EVERY 6 HOURS PRN
Status: DISCONTINUED | OUTPATIENT
Start: 2023-08-20 | End: 2023-08-23 | Stop reason: HOSPADM

## 2023-08-20 RX ORDER — AMOXICILLIN 250 MG
2 CAPSULE ORAL 2 TIMES DAILY
Status: DISCONTINUED | OUTPATIENT
Start: 2023-08-20 | End: 2023-08-23 | Stop reason: HOSPADM

## 2023-08-20 RX ORDER — ONDANSETRON 2 MG/ML
4 INJECTION INTRAMUSCULAR; INTRAVENOUS EVERY 6 HOURS PRN
Status: DISCONTINUED | OUTPATIENT
Start: 2023-08-20 | End: 2023-08-23 | Stop reason: HOSPADM

## 2023-08-20 RX ORDER — SODIUM CHLORIDE 0.9 % (FLUSH) 0.9 %
10 SYRINGE (ML) INJECTION AS NEEDED
Status: DISCONTINUED | OUTPATIENT
Start: 2023-08-20 | End: 2023-08-23 | Stop reason: HOSPADM

## 2023-08-20 RX ORDER — CIPROFLOXACIN 250 MG/1
250 TABLET, FILM COATED ORAL 2 TIMES DAILY
COMMUNITY
End: 2023-08-24

## 2023-08-20 RX ORDER — SODIUM CHLORIDE, SODIUM LACTATE, POTASSIUM CHLORIDE, CALCIUM CHLORIDE 600; 310; 30; 20 MG/100ML; MG/100ML; MG/100ML; MG/100ML
100 INJECTION, SOLUTION INTRAVENOUS CONTINUOUS
Status: CANCELLED | OUTPATIENT
Start: 2023-08-20

## 2023-08-20 RX ORDER — BISACODYL 5 MG/1
5 TABLET, DELAYED RELEASE ORAL DAILY PRN
Status: DISCONTINUED | OUTPATIENT
Start: 2023-08-20 | End: 2023-08-23 | Stop reason: HOSPADM

## 2023-08-20 RX ORDER — NALOXONE HCL 0.4 MG/ML
0.4 VIAL (ML) INJECTION
Status: DISCONTINUED | OUTPATIENT
Start: 2023-08-20 | End: 2023-08-23 | Stop reason: HOSPADM

## 2023-08-20 RX ORDER — ACETAMINOPHEN 325 MG/1
650 TABLET ORAL EVERY 4 HOURS PRN
Status: DISCONTINUED | OUTPATIENT
Start: 2023-08-20 | End: 2023-08-23 | Stop reason: HOSPADM

## 2023-08-20 RX ORDER — MENTHOL 40 MG/ML
GEL TOPICAL
COMMUNITY

## 2023-08-20 RX ORDER — MORPHINE SULFATE 2 MG/ML
2 INJECTION, SOLUTION INTRAMUSCULAR; INTRAVENOUS
Status: DISCONTINUED | OUTPATIENT
Start: 2023-08-20 | End: 2023-08-23 | Stop reason: HOSPADM

## 2023-08-20 RX ORDER — HYDROCODONE BITARTRATE AND ACETAMINOPHEN 5; 325 MG/1; MG/1
1 TABLET ORAL EVERY 4 HOURS PRN
Status: DISCONTINUED | OUTPATIENT
Start: 2023-08-20 | End: 2023-08-23 | Stop reason: HOSPADM

## 2023-08-20 RX ORDER — PRAVASTATIN SODIUM 40 MG
40 TABLET ORAL DAILY
Status: DISCONTINUED | OUTPATIENT
Start: 2023-08-21 | End: 2023-08-23 | Stop reason: HOSPADM

## 2023-08-20 RX ORDER — SODIUM CHLORIDE 0.9 % (FLUSH) 0.9 %
10 SYRINGE (ML) INJECTION EVERY 12 HOURS SCHEDULED
Status: DISCONTINUED | OUTPATIENT
Start: 2023-08-20 | End: 2023-08-23 | Stop reason: HOSPADM

## 2023-08-20 RX ORDER — POLYETHYLENE GLYCOL 3350 17 G/17G
17 POWDER, FOR SOLUTION ORAL DAILY PRN
Status: DISCONTINUED | OUTPATIENT
Start: 2023-08-20 | End: 2023-08-23 | Stop reason: HOSPADM

## 2023-08-20 RX ORDER — ACETAMINOPHEN 160 MG/5ML
650 SOLUTION ORAL EVERY 4 HOURS PRN
Status: DISCONTINUED | OUTPATIENT
Start: 2023-08-20 | End: 2023-08-23 | Stop reason: HOSPADM

## 2023-08-20 RX ORDER — ACETAMINOPHEN 650 MG/1
650 SUPPOSITORY RECTAL EVERY 4 HOURS PRN
Status: DISCONTINUED | OUTPATIENT
Start: 2023-08-20 | End: 2023-08-23 | Stop reason: HOSPADM

## 2023-08-20 RX ORDER — CHOLECALCIFEROL (VITAMIN D3) 125 MCG
5 CAPSULE ORAL NIGHTLY PRN
Status: DISCONTINUED | OUTPATIENT
Start: 2023-08-20 | End: 2023-08-23 | Stop reason: HOSPADM

## 2023-08-20 RX ADMIN — DICLOFENAC SODIUM 2 G: 10 GEL TOPICAL at 23:21

## 2023-08-20 RX ADMIN — SODIUM CHLORIDE 500 ML: 9 INJECTION, SOLUTION INTRAVENOUS at 17:59

## 2023-08-20 RX ADMIN — Medication 10 ML: at 20:13

## 2023-08-20 RX ADMIN — SENNOSIDES AND DOCUSATE SODIUM 2 TABLET: 50; 8.6 TABLET ORAL at 20:12

## 2023-08-20 RX ADMIN — SODIUM CHLORIDE 500 ML: 9 INJECTION, SOLUTION INTRAVENOUS at 23:09

## 2023-08-20 RX ADMIN — Medication 5 MG: at 20:13

## 2023-08-20 NOTE — ED PROVIDER NOTES
Subjective   History of Present Illness  Patient is a 89-year-old male presenting to the emergency department decreased urine output, incontinence, and gross hematuria with clots.  Symptoms have been progressively worsening for 2 weeks.  The patient was seen by Dr. Gutierrez, his urologist.  They put him on ciprofloxacin for presumed infection.  Patient's symptoms have continued to worsen with significant decrease in urine output over the last couple of days.  Family member also reports that he has been having urinary incontinence including blood clots while in route to the bathroom.  They also reports some generalized weakness and fatigue.  No fever or chills.    History provided by:  Patient and relative    Review of Systems    Past Medical History:   Diagnosis Date    Abnormal heart rhythm     Anemia     Aortic valve sclerosis     Asthma     Basal cell carcinoma (BCC) of left side of nose     BPH (benign prostatic hyperplasia)     Cataracts, bilateral     bilateralcataract extraction and lens implantation 2013    Diastolic dysfunction     Easy bruising     Edema due to malnutrition 02/05/2020    Heart murmur     High cholesterol     History of disease     bilateral lacrimal gland dysfunction per Dr Fajardo    Hypertension     Infection     infected big toe; I and D DR Alvares 2013    Phlebitis     Pneumonia 2009    Squamous acanthoma of face        No Known Allergies    Past Surgical History:   Procedure Laterality Date    APPENDECTOMY      CATARACT EXTRACTION, BILATERAL  2013    and lens implantation    CYSTOSCOPY N/A 9/6/2019    Procedure: CYSTOSCOPY;  Surgeon: Ck Woods MD;  Location: [facility] OR;  Service: Urology    CYSTOSCOPY TRANSURETHRAL RESECTION OF PROSTATE  1989    CYSTOSCOPY TRANSURETHRAL RESECTION OF PROSTATE N/A 9/11/2019    Procedure: CYSTOSCOPY TRANSURETHRAL RESECTION OF PROSTATE;  Surgeon: Ck Woods MD;  Location:  [facility];  Service: Urology    HEAD & NECK SKIN LESION EXCISIONAL  BIOPSY      Basal cell removed from nose     INCISION AND DRAINAGE FOOT  2013    infected big toe Dr Alvares    TRANSURETHRAL RESECTION OF BLADDER TUMOR N/A 9/6/2019    Procedure: EVACUATION OF BLOOD CLOT, FULGERATION OF PROSTATE;  Surgeon: kC Woods MD;  Location: [facility] OR;  Service: Urology       Family History   Problem Relation Age of Onset    Coronary artery disease Father     Heart attack Father     Coronary artery disease Brother     Heart disease Brother     Atrial fibrillation Brother     Stroke Brother     Heart attack Brother     Heart attack Mother        Social History     Socioeconomic History    Marital status:    Tobacco Use    Smoking status: Never    Smokeless tobacco: Never   Vaping Use    Vaping Use: Never used   Substance and Sexual Activity    Alcohol use: No    Drug use: No    Sexual activity: Defer           Objective   Physical Exam  Vitals and nursing note reviewed.   Constitutional:       General: He is not in acute distress.     Appearance: Normal appearance. He is not toxic-appearing.   Cardiovascular:      Rate and Rhythm: Normal rate and regular rhythm.      Pulses: Normal pulses.   Pulmonary:      Effort: Pulmonary effort is normal. No respiratory distress.      Breath sounds: Normal breath sounds.   Abdominal:      Palpations: Abdomen is soft.      Tenderness: There is no abdominal tenderness. There is no guarding.   Genitourinary:     Rectum: Guaiac result negative.   Skin:     General: Skin is warm and dry.   Neurological:      General: No focal deficit present.      Mental Status: He is alert. Mental status is at baseline.   Psychiatric:         Mood and Affect: Mood normal.         Behavior: Behavior normal.       Procedures           ED Course  ED Course as of 08/20/23 1825   Sun Aug 20, 2023   1700 Hemoglobin(!): 8.4  Significant acute anemia when compared to prior values.  Most recent available value for us was 1 year ago. [RS]   1718 Patient had a postvoid  residual of 0.  Nurse reports that visualization of the urine does not demonstrate any gross hematuria.  Concern now with the acute anemia is that this may be a GI etiology. [RS]   1718 Creatinine(!): 1.51  Mild acute exacerbation of chronic kidney disease when compared to prior. [RS]   1726 Heme-negative stool. [RS]   1818 No clear source for the patient's bleeding.  I did talk with the family member who is in the room.  They report they are checking the patient's blood work every 3 weeks secondary to the injections he is getting for the squamous cell lesion of his head.  They report that 2-1/2 weeks ago that his hemoglobin was around 13.  I did evaluate the wound on the patient's abdomen there does not appear to be an injection site potentially with resolving ecchymosis.  No cellulitis or induration. [RS]   1823 Chart review does demonstrate that the patient had a bladder tumor excision in 2019 by Dr. Woods.  See that report for further details.  I discussed with Dr. Sosa who is agreeable with admission. [RS]      ED Course User Index  [RS] Christopher Madrigal MD                                           Medical Decision Making  Problems Addressed:  Acute anemia: complicated acute illness or injury  History of gross hematuria: complicated acute illness or injury  History of skin cancer: complicated acute illness or injury    Amount and/or Complexity of Data Reviewed  Independent Historian: caregiver  External Data Reviewed: labs.  Labs: ordered. Decision-making details documented in ED Course.  Radiology: ordered.    Risk  Prescription drug management.  Decision regarding hospitalization.        Final diagnoses:   Acute anemia   History of gross hematuria   History of skin cancer       ED Disposition  ED Disposition       ED Disposition   Decision to Admit    Condition   --    Comment   Level of Care: Telemetry [5]   Diagnosis: Gross hematuria [599.71.ICD-9-CM]                 No follow-up provider  specified.       Medication List      No changes were made to your prescriptions during this visit.            Christopher Madrigal MD  08/20/23 9996

## 2023-08-20 NOTE — H&P
[facility] Medicine Services  HISTORY AND PHYSICAL    Patient Name: Chaitanya Browne  : 1923  MRN: [id number]  Primary Care Physician: Dirk Russ MD  Date of admission: 2023      Subjective   Subjective     Chief Complaint: hematuria    HPI:  Chaitanya Browne is a 89 y.o. male presenting to ED with gross hematuria. First noted symptoms about 2 weeks ago but have been gradually worsening. Patient has been passing gross blood and clots. He was seen by his primary urologist who placed him on Cipro for presumed infection. Symptoms did not improve so presented here. Since arriving here he has urinated 3 times all of which have been clear of blood.    Review of Systems   Gen- No fevers, chills  CV- No chest pain, palpitations  Resp- No cough, dyspnea  GI- No N/V/D, abd pain    Personal History     Past Medical History:   Diagnosis Date    Abnormal heart rhythm     Anemia     Aortic valve sclerosis     Asthma     Basal cell carcinoma (BCC) of left side of nose     BPH (benign prostatic hyperplasia)     Cataracts, bilateral     bilateralcataract extraction and lens implantation     Diastolic dysfunction     Easy bruising     Edema due to malnutrition 2020    Heart murmur     High cholesterol     History of disease     bilateral lacrimal gland dysfunction per Dr Fajardo    Hypertension     Infection     infected big toe; I and D DR Alvares 2013    Phlebitis     Pneumonia 2009    Squamous acanthoma of face          Oncology Problem List:  Squamous cell carcinoma of nose (2023; Status: Active)  Facial basal cell cancer (2019; Status: Active)       Past Surgical History:   Procedure Laterality Date    APPENDECTOMY      CATARACT EXTRACTION, BILATERAL  2013    and lens implantation    CYSTOSCOPY N/A 2019    Procedure: CYSTOSCOPY;  Surgeon: Ck Woods MD;  Location: [facility];  Service: Urology    CYSTOSCOPY TRANSURETHRAL RESECTION OF PROSTATE       CYSTOSCOPY TRANSURETHRAL RESECTION OF PROSTATE N/A 9/11/2019    Procedure: CYSTOSCOPY TRANSURETHRAL RESECTION OF PROSTATE;  Surgeon: Ck Woods MD;  Location: [facility] OR;  Service: Urology    HEAD & NECK SKIN LESION EXCISIONAL BIOPSY      Basal cell removed from nose     INCISION AND DRAINAGE FOOT  2013    infected big toe Dr Alvares    TRANSURETHRAL RESECTION OF BLADDER TUMOR N/A 9/6/2019    Procedure: EVACUATION OF BLOOD CLOT, FULGERATION OF PROSTATE;  Surgeon: Ck Woods MD;  Location: [facility] OR;  Service: Urology       Family History: family history includes Atrial fibrillation in his brother; Coronary artery disease in his brother and father; Heart attack in his brother, father, and mother; Heart disease in his brother; Stroke in his brother.     Social History:  reports that he has never smoked. He has never used smokeless tobacco. He reports that he does not drink alcohol and does not use drugs.  Social History     Social History Narrative    Retired [employer] no caffeine use       Medications:  Available home medication information reviewed.  (Not in a hospital admission)      No Known Allergies    Objective   Objective     Vital Signs:   Temp:  [97.8 øF (36.6 øC)] 97.8 øF (36.6 øC)  Heart Rate:  [100] 100  Resp:  [18] 18  BP: ()/(55-58) 106/58       Physical Exam   Constitutional: No acute distress, awake, alert, elderly male appearing comfortable  HENT: NCAT, mucous membranes moist  Respiratory: Clear to auscultation bilaterally, respiratory effort normal   Cardiovascular: RRR, no murmurs, rubs, or gallops  Gastrointestinal: Positive bowel sounds, soft, nontender, nondistended  Musculoskeletal: 2+ LE edema bilaterally  Psychiatric: Appropriate affect, cooperative  Neurologic: Oriented x 3, strength symmetric in all extremities, Cranial Nerves grossly intact to confrontation, speech clear  Skin: No rashes     Result Review:  I have personally reviewed the results from the time  of this admission to 8/20/2023 18:29 EDT and agree with these findings:  []  Laboratory list / accordion  []  Microbiology  []  Radiology  []  EKG/Telemetry   []  Cardiology/Vascular   []  Pathology  []  Old records  []  Other:  Most notable findings include:         LAB RESULTS:      Lab 08/20/23  1640   WBC 6.79   HEMOGLOBIN 8.4*   HEMATOCRIT 26.9*   PLATELETS 281   NEUTROS ABS 4.67   IMMATURE GRANS (ABS) 0.03   LYMPHS ABS 0.81   MONOS ABS 1.16*   EOS ABS 0.09   .9*   LACTATE 1.4         Lab 08/20/23  1640   SODIUM 138   POTASSIUM 3.9   CHLORIDE 99   CO2 30.0*   ANION GAP 9.0   BUN 22   CREATININE 1.51*   EGFR 41.0*   GLUCOSE 131*   CALCIUM 8.6         Lab 08/20/23  1640   TOTAL PROTEIN 5.6*   ALBUMIN 3.3*   GLOBULIN 2.3   ALT (SGPT) 8   AST (SGOT) 23   BILIRUBIN 0.8   ALK PHOS 78   LIPASE 51                     UA          8/11/2023    18:29 8/20/2023    17:07   Urinalysis   Squamous Epithelial Cells, UA  0-2    Specific Gravity, UA  1.006    Ketones, UA Negative  Negative    Blood, UA  Moderate (2+)    Leukocytes, UA Negative  Small (1+)    Nitrite, UA  Negative    RBC, UA  0-2    WBC, UA  0-2    Bacteria, UA  None Seen        Microbiology Results (last 10 days)       ** No results found for the last 240 hours. **            No radiology results from the last 24 hrs    Results for orders placed during the hospital encounter of 09/02/19    Adult Transthoracic Echo Complete W/ Cont if Necessary Per Protocol    Interpretation Summary  ú Mild-to-moderate mitral valve regurgitation is present.  ú Estimated EF = 72%.  ú Left ventricular systolic function is hyperdynamic (EF > 70).  ú Left ventricular diastolic dysfunction (grade I) consistent with impaired relaxation.  ú Left ventricular wall thickness is consistent with mild concentric hypertrophy.  ú Left atrial cavity size is borderline dilated.  ú Normal right ventricular cavity size, wall thickness, systolic function and septal motion noted.  ú Mild mitral  valve stenosis is present with functional MAC.  ú The aortic valve exhibits moderate sclerosis without stenosis.  ú No evidence of pulmonary hypertension is present.  ú There is no evidence of pericardial effusion.      Assessment & Plan   Assessment & Plan     Active Hospital Problems    Diagnosis  POA    **Gross hematuria [R31.0]  Yes    Bladder mass [N32.89]  Yes    Anemia [D64.9]  Yes    Stage 3a chronic kidney disease [N18.31]  Yes    Chronic diastolic CHF (congestive heart failure) [I50.32]  Yes    Facial basal cell cancer [C44.310]  Yes     89 y/o male presenting with gross hematuria.    Gross hematuria  Abnl CT A/P concerning for bladder mass  Acute Blood Loss Anemia  --Will obs overnight and check serial h/h. Type and screen ordered. Transfuse as needed.  --Consult urology. Apparently was supposed to have cystoscopy with Dr. Woods tomorrow.  --Hold asa.  --CBI if needed for return of hematuria.  --NPO MN.    Basal cell cancer of scalp  --Followed by MAEGAN. Currently undergoing injections.    CKD IIIa  --Appears near baseline. Holding bumex, IVF as above. BMP in am.    Acute on Chronic diastolic heart failure  --Has been taking extra bumex due to his pitting edema. Holding bumex now given his hypotension and concern for bleeding. Watch volume status, resume as needed.    DVT prophylaxis:  SCDs    CODE STATUS:  DNR/DNI  Code Status and Medical Interventions:   Ordered at: 08/20/23 1826     Medical Intervention Limits:    NO intubation (DNI)     Code Status (Patient has no pulse and is not breathing):    No CPR (Do Not Attempt to Resuscitate)     Medical Interventions (Patient has pulse or is breathing):    Limited Support       Expected Discharge 8/23  Expected discharge date/ time has not been documented.     Alta Sosa II, DO  08/20/23

## 2023-08-21 LAB
ANION GAP SERPL CALCULATED.3IONS-SCNC: 8 MMOL/L (ref 5–15)
BUN SERPL-MCNC: 21 MG/DL (ref 8–23)
BUN/CREAT SERPL: 15.9 (ref 7–25)
CALCIUM SPEC-SCNC: 8 MG/DL (ref 8.2–9.6)
CHLORIDE SERPL-SCNC: 103 MMOL/L (ref 98–107)
CO2 SERPL-SCNC: 29 MMOL/L (ref 22–29)
CREAT SERPL-MCNC: 1.32 MG/DL (ref 0.76–1.27)
EGFRCR SERPLBLD CKD-EPI 2021: 48.1 ML/MIN/1.73
GLUCOSE SERPL-MCNC: 116 MG/DL (ref 65–99)
HCT VFR BLD AUTO: 22.9 % (ref 37.5–51)
HCT VFR BLD AUTO: 24.4 % (ref 37.5–51)
HCT VFR BLD AUTO: 27.4 % (ref 37.5–51)
HGB BLD-MCNC: 7.3 G/DL (ref 13–17.7)
HGB BLD-MCNC: 7.8 G/DL (ref 13–17.7)
HGB BLD-MCNC: 8.9 G/DL (ref 13–17.7)
POTASSIUM SERPL-SCNC: 3.6 MMOL/L (ref 3.5–5.2)
SODIUM SERPL-SCNC: 140 MMOL/L (ref 136–145)

## 2023-08-21 PROCEDURE — G0378 HOSPITAL OBSERVATION PER HR: HCPCS

## 2023-08-21 PROCEDURE — P9016 RBC LEUKOCYTES REDUCED: HCPCS

## 2023-08-21 PROCEDURE — 80048 BASIC METABOLIC PNL TOTAL CA: CPT | Performed by: INTERNAL MEDICINE

## 2023-08-21 PROCEDURE — 85014 HEMATOCRIT: CPT | Performed by: INTERNAL MEDICINE

## 2023-08-21 PROCEDURE — 85018 HEMOGLOBIN: CPT | Performed by: INTERNAL MEDICINE

## 2023-08-21 PROCEDURE — 97165 OT EVAL LOW COMPLEX 30 MIN: CPT

## 2023-08-21 PROCEDURE — 36430 TRANSFUSION BLD/BLD COMPNT: CPT

## 2023-08-21 PROCEDURE — 86900 BLOOD TYPING SEROLOGIC ABO: CPT

## 2023-08-21 PROCEDURE — 97535 SELF CARE MNGMENT TRAINING: CPT

## 2023-08-21 RX ORDER — NYSTATIN 100000 [USP'U]/G
POWDER TOPICAL EVERY 12 HOURS SCHEDULED
Status: DISCONTINUED | OUTPATIENT
Start: 2023-08-21 | End: 2023-08-23 | Stop reason: HOSPADM

## 2023-08-21 RX ADMIN — Medication 10 ML: at 20:16

## 2023-08-21 RX ADMIN — PRAVASTATIN SODIUM 40 MG: 40 TABLET ORAL at 08:49

## 2023-08-21 RX ADMIN — Medication 10 ML: at 08:49

## 2023-08-21 RX ADMIN — DICLOFENAC 2 G: 10 GEL TOPICAL at 10:33

## 2023-08-21 RX ADMIN — SENNOSIDES AND DOCUSATE SODIUM 2 TABLET: 50; 8.6 TABLET ORAL at 08:49

## 2023-08-21 RX ADMIN — FINASTERIDE 5 MG: 5 TABLET, FILM COATED ORAL at 08:49

## 2023-08-21 RX ADMIN — NYSTATIN: 100000 POWDER TOPICAL at 20:16

## 2023-08-21 RX ADMIN — SENNOSIDES AND DOCUSATE SODIUM 2 TABLET: 50; 8.6 TABLET ORAL at 20:16

## 2023-08-21 NOTE — PROGRESS NOTES
[facility] Medicine Services  PROGRESS NOTE    Patient Name: Chaitanya Browne  : 1923  MRN: [id number]    Date of Admission: 2023  Primary Care Physician: Dirk Russ MD    Subjective   Subjective     CC:  Hematuria     HPI:  Patient sitting in chair with his daughter at bedside. OT eval and noted patient to be extremely orthostatic this am. BP running in the 80s currently. Hb dropped to 7.3 will give unit of blood today. Urology eval pending     ROS:  As above      Objective   Objective     Vital Signs:   Temp:  [97.6 øF (36.4 øC)-98.5 øF (36.9 øC)] 97.8 øF (36.6 øC)  Heart Rate:  [] 111  Resp:  [18] 18  BP: ()/(49-68) 108/68  Flow (L/min):  [2] 2     Physical Exam:  GEN: NAD, resting in bed, awake, chronically ill appearing   HEENT: on room air, atraumatic, normocephalic  NECK: supple, no masses  RESP: on room air, normal effort  CV: on tele, sinus rhythm, bp 85/51  PSYCH: normal affect, appropriate  NEURO: awake, alert, no focal deficits noted  MSK: no edema noted  SKIN: no rashes noted       Results Reviewed:  LAB RESULTS:      Lab 23  0823  2353 23  1640   WBC  --   --  6.79   HEMOGLOBIN 7.3* 7.8* 8.4*   HEMATOCRIT 22.9* 24.4* 26.9*   PLATELETS  --   --  281   NEUTROS ABS  --   --  4.67   IMMATURE GRANS (ABS)  --   --  0.03   LYMPHS ABS  --   --  0.81   MONOS ABS  --   --  1.16*   EOS ABS  --   --  0.09   MCV  --   --  101.9*   LACTATE  --   --  1.4         Lab 23  0812 23  1640   SODIUM 140 138   POTASSIUM 3.6 3.9   CHLORIDE 103 99   CO2 29.0 30.0*   ANION GAP 8.0 9.0   BUN 21 22   CREATININE 1.32* 1.51*   EGFR 48.1* 41.0*   GLUCOSE 116* 131*   CALCIUM 8.0* 8.6         Lab 23  1640   TOTAL PROTEIN 5.6*   ALBUMIN 3.3*   GLOBULIN 2.3   ALT (SGPT) 8   AST (SGOT) 23   BILIRUBIN 0.8   ALK PHOS 78   LIPASE 51                 Lab 23   ABO TYPING A   RH TYPING Positive   ANTIBODY SCREEN Negative         Brief  Urine Lab Results  (Last result in the past 365 days)        Color   Clarity   Blood   Leuk Est   Nitrite   Protein   CREAT   Urine HCG        08/20/23 1707 Yellow   Clear   Moderate (2+)   Small (1+)   Negative   Negative                   Microbiology Results Abnormal       None            CT Abdomen Pelvis Without Contrast    Result Date: 8/20/2023  CT ABDOMEN PELVIS WO CONTRAST Date of Exam: 8/20/2023 5:09 PM CDT Indication: reported gross hematuria. Comparison: 9/3/2019 Technique: Axial CT images were obtained of the abdomen and pelvis without the administration of contrast. Reconstructed coronal and sagittal images were also obtained. Automated exposure control and iterative construction methods were used. Findings: LUNG BASES: Basilar atelectasis, right greater than left with there is elevation of the right hemidiaphragm. LIVER: Lobular morphology is similar to prior examination. There are diffuse calcified granulomata. No suspicious lesion. BILIARY/GALLBLADDER: There are gallstones. There are no gallbladder inflammatory changes. SPLEEN: There are punctate granulomata. PANCREAS:  Unremarkable ADRENAL:  Unremarkable KIDNEYS: There is mild bilateral renal cortical atrophy. No evidence of obstruction. Ureters are unremarkable in course and caliber throughout. No calculus identified. GASTROINTESTINAL/MESENTERY: No evidence of obstruction or inflammation. There is a benign lipoma within the second portion of the duodenum. There are scattered colonic diverticula without evidence of diverticulitis. AORTA/IVC:  Normal caliber. RETROPERITONEUM/LYMPH NODES:  Unremarkable REPRODUCTIVE: Irregular enlargement of the prostate gland impressing upon the urinary bladder base. BLADDER: Prostate gland impresses upon the urinary bladder base. There are 2 stones each measuring 2 mm in diameter within the urinary bladder base. OSSEUS STRUCTURES: Advanced lumbar degenerative changes with grade 1 spondylolisthesis at L4/5.      Impression: Impression: 1.There are 2 tiny calculi within the urinary bladder which may account for patient's hematuria. No additional urinary tract calculi identified. 2.Persistent enlarged prostate gland impressing upon the urinary bladder base. Urinary bladder ingrowth not excluded. However, findings are similar if not slightly decreased when compared to previous exam. 3.Other nonemergent findings as detailed above. Electronically Signed: Abdelrahman Bearden MD  8/20/2023 5:32 PM CDT  Workstation ID: [id number]     Results for orders placed during the hospital encounter of 09/02/19    Adult Transthoracic Echo Complete W/ Cont if Necessary Per Protocol    Interpretation Summary  ú Mild-to-moderate mitral valve regurgitation is present.  ú Estimated EF = 72%.  ú Left ventricular systolic function is hyperdynamic (EF > 70).  ú Left ventricular diastolic dysfunction (grade I) consistent with impaired relaxation.  ú Left ventricular wall thickness is consistent with mild concentric hypertrophy.  ú Left atrial cavity size is borderline dilated.  ú Normal right ventricular cavity size, wall thickness, systolic function and septal motion noted.  ú Mild mitral valve stenosis is present with functional MAC.  ú The aortic valve exhibits moderate sclerosis without stenosis.  ú No evidence of pulmonary hypertension is present.  ú There is no evidence of pericardial effusion.      Current medications:  Scheduled Meds:finasteride, 5 mg, Oral, Daily  nystatin, , Topical, Q12H  pravastatin, 40 mg, Oral, Daily  senna-docusate sodium, 2 tablet, Oral, BID  sodium chloride, 10 mL, Intravenous, Q12H      Continuous Infusions:   PRN Meds:.  acetaminophen **OR** acetaminophen **OR** acetaminophen    senna-docusate sodium **AND** polyethylene glycol **AND** bisacodyl **AND** bisacodyl    Calcium Replacement - Follow Nurse / BPA Driven Protocol    Diclofenac Sodium    HYDROcodone-acetaminophen    Magnesium Standard Dose Replacement - Follow  Nurse / BPA Driven Protocol    melatonin    Morphine **AND** naloxone    ondansetron **OR** ondansetron    Phosphorus Replacement - Follow Nurse / BPA Driven Protocol    Potassium Replacement - Follow Nurse / BPA Driven Protocol    Sodium Chloride (PF)    sodium chloride    sodium chloride    Assessment & Plan   Assessment & Plan     Active Hospital Problems    Diagnosis  POA    **Gross hematuria [R31.0]  Yes    Bladder mass [N32.89]  Yes    Anemia [D64.9]  Yes    Stage 3a chronic kidney disease [N18.31]  Yes    Chronic diastolic CHF (congestive heart failure) [I50.32]  Yes    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        Brief Hospital Course to date:  Chaitanya Browne is a 89 y/o male presenting with gross hematuria.     Gross hematuria  Abnl CT A/P concerning for bladder mass  Acute Blood Loss Anemia  Orthostatic hypotension  --Will obs overnight and check serial h/h. Given h/h drop to 7.3 combined with orthostatis- will tx 1 unit prbc now   --Consult urology. Apparently was supposed to have cystoscopy with Dr. Woods today  --Hold asa.  --CBI if needed for return of hematuria.  --NPO MN.     Basal cell cancer of scalp  --Followed by MAEGAN. Currently undergoing injections.     CKD IIIa  --Appears near baseline. Holding bumex, IVF as above. BMP in am.     Acute on Chronic diastolic heart failure  --Has been taking extra bumex due to his pitting edema. Holding bumex now given his hypotension and concern for bleeding. Watch volume status, resume as needed.    Expected Discharge Location and Transportation: home   Expected Discharge   Expected Discharge Date: 8/22/2023; Expected Discharge Time:      DVT prophylaxis:  Mechanical DVT prophylaxis orders are present.     AM-PAC 6 Clicks Score (PT): 20 (08/21/23 0800)    CODE STATUS:   Code Status and Medical Interventions:   Ordered at: 08/20/23 1826     Medical Intervention Limits:    NO intubation (DNI)      Code Status (Patient has no pulse and is not breathing):    No CPR (Do Not Attempt to Resuscitate)     Medical Interventions (Patient has pulse or is breathing):    Limited Support       Elza Milian MD  08/21/23

## 2023-08-21 NOTE — CASE MANAGEMENT/SOCIAL WORK
"Discharge Planning Assessment  [facility]     Patient Name: Chaitanya Browne  MRN: [id number]  Today's Date: 8/21/2023    Admit Date: 8/20/2023    Plan: TBD   Discharge Needs Assessment       Row Name 08/21/23 1637       Living Environment    People in Home alone  Caregiver comes daily from 9am to 10pm    Current Living Arrangements home    Potentially Unsafe Housing Conditions none    Primary Care Provided by --  caregivers    Provides Primary Care For no one, unable/limited ability to care for self    Family Caregiver if Needed --  caregiver    Quality of Family Relationships supportive    Able to Return to Prior Arrangements yes       Resource/Environmental Concerns    Resource/Environmental Concerns none    Transportation Concerns none       Transition Planning    Patient/Family Anticipates Transition to home    Patient/Family Anticipated Services at Transition none    Transportation Anticipated family or friend will provide       Discharge Needs Assessment    Readmission Within the Last 30 Days no previous admission in last 30 days    Equipment Currently Used at Home walker, rolling;cane, straight;shower chair    Anticipated Changes Related to Illness none                   Discharge Plan       Row Name 08/21/23 1640       Plan    Plan TBD    Patient/Family in Agreement with Plan yes    Plan Comments Spoke to Mr. Browne at the bedside. He lives alone in [facility], but he has a caregiver that comes daily from 9am-10pm. He is dependent with ADL's, he stated \"the caregiver does everything for me\". He has a walker, cane, and shower chair. He does not use oxygen or home health. His Son and Daughter are his POA's and he has a living will. His PCP is Dirk Russ and he has [employer] Medicare.  will continue to follow for discharge needs.    Final Discharge Disposition Code 30 - still a patient                  Continued Care and Services - Admitted Since 8/20/2023    Coordination has not been " started for this encounter.       Expected Discharge Date and Time       Expected Discharge Date Expected Discharge Time    Aug 22, 2023            Demographic Summary       Row Name 08/21/23 1636       General Information    Admission Type observation    Arrived From home    Referral Source admission list    Reason for Consult discharge planning    Preferred Language English       Contact Information    Permission Granted to Share Info With                    Functional Status       Row Name 08/21/23 1637       Functional Status, IADL    Medications completely dependent    Meal Preparation completely dependent    Housekeeping completely dependent    Laundry completely dependent    Shopping completely dependent       Mental Status    General Appearance WDL WDL       Mental Status Summary    Recent Changes in Mental Status/Cognitive Functioning no changes                   Psychosocial    No documentation.                  Abuse/Neglect    No documentation.                  Legal    No documentation.                  Substance Abuse    No documentation.                  Patient Forms    No documentation.                     Nguyen Somers, RN

## 2023-08-21 NOTE — PLAN OF CARE
Goal Outcome Evaluation:   BP soft and treated this shift, see documentation. RA but 2L nasal cannula while asleep. SR. Patient complained of hand pain this shift, treated as ordered. H/H trending. No overt blood seen in UOP so far this shift. No further complaints at this time. NPO since 0000.

## 2023-08-21 NOTE — CONSULTS
Urology Consult    Referring Provider: Maicol  Reason for Consultation: Hematuria    Patient Care Team:  Dirk Russ MD as PCP - General (Internal Medicine)  Fabricio Zavala MD as Consulting Physician (Internal Medicine)  Kaley Oliveros APRN as Nurse Practitioner (Cardiology)    Chief complaint blood in urine    Subjective .     History of present illness: Patient was seen in my office last week with gross hematuria.  CT scan shows no sign of renal mass or stones.  Possible bladder stone.  He was scheduled for cystoscopy this morning.  He was admitted yesterday with continued gross hematuria that is now cleared.  He is voiding without any difficulty.  Plans are to give blood transfusion    Review of Systems  Pertinent items are noted in HPI    History  Past Medical History:   Diagnosis Date    Abnormal heart rhythm     Anemia     Aortic valve sclerosis     Asthma     Basal cell carcinoma (BCC) of left side of nose     BPH (benign prostatic hyperplasia)     Cataracts, bilateral     bilateralcataract extraction and lens implantation 2013    Diastolic dysfunction     Easy bruising     Edema due to malnutrition 02/05/2020    Heart murmur     High cholesterol     History of disease     bilateral lacrimal gland dysfunction per Dr Fajardo    Hypertension     Infection     infected big toe; I and D DR Alvares 2013    Phlebitis     Pneumonia 2009    Squamous acanthoma of face        Objective     Vital Signs   Temp:  [97.5 øF (36.4 øC)-98.5 øF (36.9 øC)] 97.5 øF (36.4 øC)  Heart Rate:  [] 88  Resp:  [18] 18  BP: ()/(45-68) 88/45    Physical Exam:     General Appearance:    Alert, cooperative, in no acute distress   Head:    Normocephalic, without obvious abnormality, atraumatic   Eyes:            Lids and lashes normal, conjunctivae and sclerae normal, no   icterus, no pallor, corneas clear, PERRLA   Ears:    Ears appear intact with no abnormalities noted   Throat:   No oral lesions, no thrush,  oral mucosa moist   Neck:   No adenopathy, supple, trachea midline, no thyromegaly, no   carotid bruit, no JVD   Back:     No kyphosis present, no scoliosis present, no skin lesions,      erythema or scars, no tenderness to percussion or                   palpation,   range of motion normal   Lungs:     Clear to auscultation,respirations regular, even and                  unlabored    Heart:    Regular rhythm and normal rate, normal S1 and S2, no            murmur, no gallop, no rub, no click   Chest Wall:    No abnormalities observed   Abdomen:     Normal bowel sounds, no masses, no organomegaly, soft        non-tender, non-distended, no guarding, no rebound                tenderness   Rectal:     Deferred   Extremities:   Moves all extremities well, no edema, no cyanosis, no             redness   Pulses:   Pulses palpable and equal bilaterally   Skin:   No bleeding, bruising or rash   Lymph nodes:   No palpable adenopathy   Neurologic:   Cranial nerves 2 - 12 grossly intact, sensation intact, DTR       present and equal bilaterally       Results Review:   I reviewed the patient's new clinical results.    Lab Results (last 72 hours)       Procedure Component Value Units Date/Time    Basic Metabolic Panel [[id number]]  (Abnormal) Collected: 08/21/23 0812    Specimen: Blood Updated: 08/21/23 0901     Glucose 116 mg/dL      BUN 21 mg/dL      Creatinine 1.32 mg/dL      Sodium 140 mmol/L      Potassium 3.6 mmol/L      Chloride 103 mmol/L      CO2 29.0 mmol/L      Calcium 8.0 mg/dL      BUN/Creatinine Ratio 15.9     Anion Gap 8.0 mmol/L      eGFR 48.1 mL/min/1.73     Narrative:      GFR Normal >60  Chronic Kidney Disease <60  Kidney Failure <15    The GFR formula is only valid for adults with stable renal function between ages 18 and 70.    Hemoglobin & Hematocrit, Blood [[id number]]  (Abnormal) Collected: 08/21/23 0812    Specimen: Blood Updated: 08/21/23 0843     Hemoglobin 7.3 g/dL      Hematocrit 22.9 %     Hemoglobin  & Hematocrit, Blood [[id number]]  (Abnormal) Collected: 08/20/23 2353    Specimen: Blood Updated: 08/21/23 0014     Hemoglobin 7.8 g/dL      Hematocrit 24.4 %     [place] Draw [[id number]] Collected: 08/20/23 1640    Specimen: Blood Updated: 08/20/23 2046    Narrative:      The following orders were created for panel order [place] Draw.  Procedure                               Abnormality         Status                     ---------                               -----------         ------                     Green Top (Gel)[[id number]]                                  Final result               Lavender Top[[id number]]                                     Final result               Gold Top - SST[[id number]]                                   Final result               Bradley Top[[id number]]                                         Final result               Light Blue Top[[id number]]                                   Final result                 Please view results for these tests on the individual orders.    Bradley Top [[id number]] Collected: 08/20/23 1640    Specimen: Blood Updated: 08/20/23 2046     Extra Tube Hold for add-ons.     Comment: Auto resulted.       Green Top (Gel) [[id number]] Collected: 08/20/23 1640    Specimen: Blood Updated: 08/20/23 1746     Extra Tube Hold for add-ons.     Comment: Auto resulted.       Gold Top - SST [[id number]] Collected: 08/20/23 1640    Specimen: Blood Updated: 08/20/23 1746     Extra Tube Hold for add-ons.     Comment: Auto resulted.       Lavender Top [[id number]] Collected: 08/20/23 1640    Specimen: Blood Updated: 08/20/23 1746     Extra Tube hold for add-on     Comment: Auto resulted       Light Blue Top [[id number]] Collected: 08/20/23 1640    Specimen: Blood Updated: 08/20/23 1746     Extra Tube Hold for add-ons.     Comment: Auto resulted       POC Occult Blood Stool [[id number]]  (Normal) Resulted: 08/20/23 1726    Specimen: Stool from Per Rectum Updated: 08/20/23 1726     Fecal  Occult Blood Negative     Positive Control --     Negative Control --    Urinalysis, Microscopic Only - Urine, Clean Catch [[id number]] Collected: 08/20/23 1707    Specimen: Urine, Clean Catch Updated: 08/20/23 1720     RBC, UA 0-2 /HPF      WBC, UA 0-2 /HPF      Bacteria, UA None Seen /HPF      Squamous Epithelial Cells, UA 0-2 /HPF      Hyaline Casts, UA None Seen /LPF      Methodology Automated Microscopy    Urinalysis With Microscopic If Indicated (No Culture) - Urine, Clean Catch [[id number]]  (Abnormal) Collected: 08/20/23 1707    Specimen: Urine, Clean Catch Updated: 08/20/23 1720     Color, UA Yellow     Appearance, UA Clear     pH, UA 6.0     Specific Gravity, UA 1.006     Glucose, UA Negative     Ketones, UA Negative     Bilirubin, UA Negative     Blood, UA Moderate (2+)     Protein, UA Negative     Leuk Esterase, UA Small (1+)     Nitrite, UA Negative     Urobilinogen, UA 0.2 E.U./dL    Comprehensive Metabolic Panel [[id number]]  (Abnormal) Collected: 08/20/23 1640    Specimen: Blood Updated: 08/20/23 1712     Glucose 131 mg/dL      BUN 22 mg/dL      Creatinine 1.51 mg/dL      Sodium 138 mmol/L      Potassium 3.9 mmol/L      Comment: Slight hemolysis detected by analyzer. Results may be affected.        Chloride 99 mmol/L      CO2 30.0 mmol/L      Calcium 8.6 mg/dL      Total Protein 5.6 g/dL      Albumin 3.3 g/dL      ALT (SGPT) 8 U/L      AST (SGOT) 23 U/L      Alkaline Phosphatase 78 U/L      Total Bilirubin 0.8 mg/dL      Globulin 2.3 gm/dL      Comment: Calculated Result        A/G Ratio 1.4 g/dL      BUN/Creatinine Ratio 14.6     Anion Gap 9.0 mmol/L      eGFR 41.0 mL/min/1.73     Narrative:      GFR Normal >60  Chronic Kidney Disease <60  Kidney Failure <15    The GFR formula is only valid for adults with stable renal function between ages 18 and 70.    Lipase [[id number]]  (Normal) Collected: 08/20/23 1640    Specimen: Blood Updated: 08/20/23 1710     Lipase 51 U/L     Lactic Acid, Plasma [[id number]]   (Normal) Collected: 08/20/23 1640    Specimen: Blood Updated: 08/20/23 1707     Lactate 1.4 mmol/L      Comment: Falsely depressed results may occur on samples drawn from patients receiving N-Acetylcysteine (NAC) or Metamizole.       CBC & Differential [[id number]]  (Abnormal) Collected: 08/20/23 1640    Specimen: Blood Updated: 08/20/23 1648    Narrative:      The following orders were created for panel order CBC & Differential.  Procedure                               Abnormality         Status                     ---------                               -----------         ------                     CBC Auto Differential[[id number]]        Abnormal            Final result                 Please view results for these tests on the individual orders.    CBC Auto Differential [[id number]]  (Abnormal) Collected: 08/20/23 1640    Specimen: Blood Updated: 08/20/23 1648     WBC 6.79 10*3/mm3      RBC 2.64 10*6/mm3      Hemoglobin 8.4 g/dL      Hematocrit 26.9 %      .9 fL      MCH 31.8 pg      MCHC 31.2 g/dL      RDW 13.2 %      RDW-SD 49.3 fl      MPV 10.6 fL      Platelets 281 10*3/mm3      Neutrophil % 68.9 %      Lymphocyte % 11.9 %      Monocyte % 17.1 %      Eosinophil % 1.3 %      Basophil % 0.4 %      Immature Grans % 0.4 %      Neutrophils, Absolute 4.67 10*3/mm3      Lymphocytes, Absolute 0.81 10*3/mm3      Monocytes, Absolute 1.16 10*3/mm3      Eosinophils, Absolute 0.09 10*3/mm3      Basophils, Absolute 0.03 10*3/mm3      Immature Grans, Absolute 0.03 10*3/mm3      nRBC 0.0 /100 WBC           Imaging Results (Last 24 Hours)       Procedure Component Value Units Date/Time    CT Abdomen Pelvis Without Contrast [[id number]] Collected: 08/20/23 1820     Updated: 08/20/23 1835    Narrative:      CT ABDOMEN PELVIS WO CONTRAST    Date of Exam: 8/20/2023 5:09 PM CDT    Indication: reported gross hematuria.    Comparison: 9/3/2019    Technique: Axial CT images were obtained of the abdomen and pelvis without the  administration of contrast. Reconstructed coronal and sagittal images were also obtained. Automated exposure control and iterative construction methods were used.      Findings:  LUNG BASES: Basilar atelectasis, right greater than left with there is elevation of the right hemidiaphragm.    LIVER: Lobular morphology is similar to prior examination. There are diffuse calcified granulomata. No suspicious lesion.  BILIARY/GALLBLADDER: There are gallstones. There are no gallbladder inflammatory changes.  SPLEEN: There are punctate granulomata.  PANCREAS:  Unremarkable  ADRENAL:  Unremarkable  KIDNEYS: There is mild bilateral renal cortical atrophy. No evidence of obstruction. Ureters are unremarkable in course and caliber throughout. No calculus identified.  GASTROINTESTINAL/MESENTERY: No evidence of obstruction or inflammation. There is a benign lipoma within the second portion of the duodenum. There are scattered colonic diverticula without evidence of diverticulitis.  AORTA/IVC:  Normal caliber.    RETROPERITONEUM/LYMPH NODES:  Unremarkable    REPRODUCTIVE: Irregular enlargement of the prostate gland impressing upon the urinary bladder base.  BLADDER: Prostate gland impresses upon the urinary bladder base. There are 2 stones each measuring 2 mm in diameter within the urinary bladder base.    OSSEUS STRUCTURES: Advanced lumbar degenerative changes with grade 1 spondylolisthesis at L4/5.      Impression:      Impression:  1.There are 2 tiny calculi within the urinary bladder which may account for patient's hematuria. No additional urinary tract calculi identified.  2.Persistent enlarged prostate gland impressing upon the urinary bladder base. Urinary bladder ingrowth not excluded. However, findings are similar if not slightly decreased when compared to previous exam.  3.Other nonemergent findings as detailed above.            Electronically Signed: Abdelrahman Bearden MD    8/20/2023 5:32 PM CDT    Workstation ID: [id number]             Medications:  Current Facility-Administered Medications   Medication Dose Route Frequency Provider Last Rate Last Admin    acetaminophen (TYLENOL) tablet 650 mg  650 mg Oral Q4H PRN Ian, Alta M II, DO        Or    acetaminophen (TYLENOL) 160 MG/5ML solution 650 mg  650 mg Oral Q4H PRN Ian, Alta M II, DO        Or    acetaminophen (TYLENOL) suppository 650 mg  650 mg Rectal Q4H PRN Ian, Alta M II, DO        sennosides-docusate (PERICOLACE) 8.6-50 MG per tablet 2 tablet  2 tablet Oral BID Ian, Alta M II, DO   2 tablet at 08/21/23 0849    And    polyethylene glycol (MIRALAX) packet 17 g  17 g Oral Daily PRN Ian, Alta M II, DO        And    bisacodyl (DULCOLAX) EC tablet 5 mg  5 mg Oral Daily PRN Ian, Alta M II, DO        And    bisacodyl (DULCOLAX) suppository 10 mg  10 mg Rectal Daily PRN Ian, Alta M II, DO        Calcium Replacement - Follow Nurse / BPA Driven Protocol   Does not apply PRN Ian, Alta M II, DO        Diclofenac Sodium (VOLTAREN) 1 % gel 2 g  2 g Topical 4x Daily PRN Elza Milian MD   2 g at 08/21/23 1033    finasteride (PROSCAR) tablet 5 mg  5 mg Oral Daily Ian, Alta M II, DO   5 mg at 08/21/23 0849    HYDROcodone-acetaminophen (NORCO) 5-325 MG per tablet 1 tablet  1 tablet Oral Q4H PRN Ian, Lata M II, DO        Magnesium Standard Dose Replacement - Follow Nurse / BPA Driven Protocol   Does not apply PRN Ian, Alta M II, DO        melatonin tablet 5 mg  5 mg Oral Nightly PRN Ian, Alta M II, DO   5 mg at 08/20/23 2013    morphine injection 2 mg  2 mg Intravenous Q2H PRN Ian, Alta M II, DO        And    naloxone (NARCAN) injection 0.4 mg  0.4 mg Intravenous Q5 Min PRN Ian, Alta M II, DO        nystatin (MYCOSTATIN) powder   Topical Q12H Elza Milian MD        ondansetron (ZOFRAN) tablet 4 mg  4 mg Oral Q6H PRN Alta Sosa II, DO        Or    ondansetron (ZOFRAN) injection 4 mg  4 mg Intravenous Q6H PRN Ian,  Alta ADHIKARI II, DO        Phosphorus Replacement - Follow Nurse / BPA Driven Protocol   Does not apply PRN Alta Sosa II, DO        Potassium Replacement - Follow Nurse / BPA Driven Protocol   Does not apply PRN Alta Sosa II, DO        pravastatin (PRAVACHOL) tablet 40 mg  40 mg Oral Daily Alta Sosa II, DO   40 mg at 08/21/23 0849    Sodium Chloride (PF) 0.9 % 10 mL  10 mL Intravenous PRN Christopher Madrigal MD        sodium chloride 0.9 % flush 10 mL  10 mL Intravenous Q12H Alta Sosa II, DO   10 mL at 08/21/23 0849    sodium chloride 0.9 % flush 10 mL  10 mL Intravenous PRN Alta Sosa II, DO        sodium chloride 0.9 % infusion 40 mL  40 mL Intravenous PRN Alta Sosa II, DO           Assessment & Plan       Gross hematuria    Facial basal cell cancer    Chronic diastolic CHF (congestive heart failure)    Stage 3a chronic kidney disease    Bladder mass    Anemia      Impression: Hematuria probably coming from the surface of the prostate    Plan: Since this is currently stopped we will not instrument him.  If the bleeding resumes we will begin three-way irrigation    I discussed the patients findings and my recommendations with patient and family    Ck Woods MD  08/21/23  12:31 EDT

## 2023-08-21 NOTE — PLAN OF CARE
Goal Outcome Evaluation:  Plan of Care Reviewed With: patient, daughter        Progress: no change  Outcome Evaluation: Pt presents w/ decreased activity tolerance, dynamic standing balance deficit, and chronic carpal tunnel syndrome pain warranting skilled IP OT services to promote return to PLOF. BP dropped from sit-stand (108/68 <> 81/46) and remained low following being seated/reclined. Provided room w/ BSC for safe toileting transfers, notified RN/MD. Rec return to home with continued/daily caregiver assist.      Anticipated Discharge Disposition (OT): home with assist, home with 24/7 care

## 2023-08-21 NOTE — THERAPY EVALUATION
Patient Name: Chaitanya Browne  : 1923    MRN: [id number]                              Today's Date: 2023       Admit Date: 2023    Visit Dx:     ICD-10-CM ICD-9-CM   1. Acute anemia  D64.9 285.9   2. History of gross hematuria  Z87.898 V13.09   3. History of skin cancer  Z85.828 V10.83     Patient Active Problem List   Diagnosis    Facial basal cell cancer    Hyperlipidemia LDL goal <70    Essential hypertension    ASHD (arteriosclerotic heart disease)    Gait abnormality    Impaired fasting glucose    Polyp, colonic    Vitamin D deficiency    Acute midline low back pain without sciatica    Proteinuria    Right carpal tunnel syndrome    Class 1 obesity due to excess calories with serious comorbidity and body mass index (BMI) of 30.0 to 30.9 in adult    Hematuria, unspecified    Medicare annual wellness visit, subsequent    Edema    Cough    Allergic rhinitis    Gross hematuria    Acute UTI (urinary tract infection)    Acute renal insufficiency    Acute urinary retention    Debility    Dysphagia    BPH with obstruction/lower urinary tract symptoms    Prostatitis    Chronic diastolic CHF (congestive heart failure)    Iron deficiency anemia due to chronic blood loss    Simple chronic bronchitis    Shortness of breath    Stage 3a chronic kidney disease    Skin tear of left forearm without complication    Squamous cell carcinoma of nose    At high risk for falls    Stage II skin ulcer    Cellulitis of right upper extremity    Bladder mass    Anemia     Past Medical History:   Diagnosis Date    Abnormal heart rhythm     Anemia     Aortic valve sclerosis     Asthma     Basal cell carcinoma (BCC) of left side of nose     BPH (benign prostatic hyperplasia)     Cataracts, bilateral     bilateralcataract extraction and lens implantation     Diastolic dysfunction     Easy bruising     Edema due to malnutrition 2020    Heart murmur     High cholesterol     History of disease     bilateral lacrimal  gland dysfunction per Dr Fajardo    Hypertension     Infection     infected big toe; I and D DR Alvares 2013    Phlebitis     Pneumonia 2009    Squamous acanthoma of face      Past Surgical History:   Procedure Laterality Date    APPENDECTOMY      CATARACT EXTRACTION, BILATERAL  2013    and lens implantation    CYSTOSCOPY N/A 9/6/2019    Procedure: CYSTOSCOPY;  Surgeon: Ck Woods MD;  Location:  [facility];  Service: Urology    CYSTOSCOPY TRANSURETHRAL RESECTION OF PROSTATE  1989    CYSTOSCOPY TRANSURETHRAL RESECTION OF PROSTATE N/A 9/11/2019    Procedure: CYSTOSCOPY TRANSURETHRAL RESECTION OF PROSTATE;  Surgeon: Ck Woods MD;  Location:  [facility];  Service: Urology    HEAD & NECK SKIN LESION EXCISIONAL BIOPSY      Basal cell removed from nose     INCISION AND DRAINAGE FOOT  2013    infected big toe Dr Alvares    TRANSURETHRAL RESECTION OF BLADDER TUMOR N/A 9/6/2019    Procedure: EVACUATION OF BLOOD CLOT, FULGERATION OF PROSTATE;  Surgeon: Ck Woods MD;  Location:  [facility];  Service: Urology      General Information       Row Name 08/21/23 0928          OT Time and Intention    Document Type evaluation  -CS     Mode of Treatment occupational therapy  -CS       Row Name 08/21/23 0928          General Information    Patient Profile Reviewed yes  -CS     Prior Level of Function independent:;transfer;all household mobility;feeding;min assist:;grooming;mod assist:;max assist:;dressing;bathing;dependent:;driving  Pt has caregivers from 10am - 9pm daily, Dtr lives in town and is supportive. Tub transfer bench used for bathing, Rollator for mobility. Has CTS braces for sleep.  -CS     Existing Precautions/Restrictions fall;orthostatic hypotension;other (see comments)  low H&H, frequent urination, [place] (aids in room), bilateral carpal tunnel  -CS     Barriers to Rehab medically complex;previous functional deficit  -CS       Row Name 08/21/23 0928          Living Environment    People in Home  alone;other (see comments)  caregiver support  -CS       Row Name 08/21/23 0928          Cognition    Orientation Status (Cognition) oriented x 4  -CS       Row Name 08/21/23 0928          Safety Issues, Functional Mobility    Safety Issues Affecting Function (Mobility) insight into deficits/self-awareness;safety precaution awareness  -CS     Impairments Affecting Function (Mobility) balance;endurance/activity tolerance;strength;pain  -CS               User Key  (r) = Recorded By, (t) = Taken By, (c) = Cosigned By      Initials Name Provider Type    CS Michael Roque OT Occupational Therapist                     Mobility/ADL's       Row Name 08/21/23 0931          Bed Mobility    Bed Mobility supine-sit;scooting/bridging  -CS     Scooting/Bridging [place] (Bed Mobility) minimum assist (75% patient effort)  -CS     Supine-Sit [place] (Bed Mobility) moderate assist (50% patient effort)  -CS     Bed Mobility, Safety Issues decreased use of arms for pushing/pulling  -CS     Assistive Device (Bed Mobility) bed rails;head of bed elevated;draw sheet  -CS       Row Name 08/21/23 0931          Transfers    Transfers bed-chair transfer;sit-stand transfer  -CS     Comment, (Transfers) BP drop from 108/35 <> 81/46, reports no dizziness, progressed to MIP then steps to recliner, BP remained low at 81/49  -CS       Row Name 08/21/23 0931          Bed-Chair Transfer    Bed-Chair [place] (Transfers) contact guard  -CS       Row Name 08/21/23 0931          Sit-Stand Transfer    Sit-Stand [place] (Transfers) contact guard  -CS       Row Name 08/21/23 0931          Functional Mobility    Functional Mobility- Comment deferred further mobility this date due to low BP and low H&H  -CS       Row Name 08/21/23 0931          Activities of Daily Living    BADL Assessment/Intervention lower body dressing;grooming;feeding;toileting  -CS       Row Name 08/21/23 0931          Lower Body Dressing Assessment/Training     [place] Level (Lower Body Dressing) don;socks;dependent (less than 25% patient effort)  -CS     Position (Lower Body Dressing) edge of bed sitting  -CS       Row Name 08/21/23 0931          Grooming Assessment/Training    [place] Level (Grooming) wash face, hands;set up  -CS     Position (Grooming) supported sitting  -CS       Row Name 08/21/23 0931          Self-Feeding Assessment/Training    [place] (Feeding) feeding skills;other (see comments)  -CS     Comment, (Feeding) NPO awaiting Urology consult  -CS       Row Name 08/21/23 0931          Toileting Assessment/Training    [place] Level (Toileting) toileting skills;adjust/manage clothing;perform perineal hygiene;moderate assist (50% patient effort)  -CS     Assistive Devices (Toileting) commode, bedside without drop arms  -CS     Comment, (Toileting) Provided BSC and educated RN/PCT on safe toilet transfers regarding low H&H and orthostatic hypotension  -CS               User Key  (r) = Recorded By, (t) = Taken By, (c) = Cosigned By      Initials Name Provider Type    CS Michael Roque, OT Occupational Therapist                   Obj/Interventions       Row Name 08/21/23 0946          Sensory Assessment (Somatosensory)    Sensory Assessment (Somatosensory) UE sensation intact;other (see comments);bilateral UE  -CS     Sensory Subjective Reports other (see comments)  -     Sensory Assessment pain w/ bilateral carpal tunnel (chronic)  -CS       Row Name 08/21/23 0946          Vision Assessment/Intervention    Visual Impairment/Limitations WFL;corrective lenses full-time  -CS       Row Name 08/21/23 0946          Range of Motion Comprehensive    General Range of Motion bilateral upper extremity ROM WFL  -CS     Comment, General Range of Motion BUE grossly 4/5, no significant asymmetry, B grasp/pinch strength impacted by CTS pain/weakness  -CS       Row Name 08/21/23 0946          Strength Comprehensive (MMT)    General Manual Muscle  Testing (MMT) Assessment lower extremity strength deficits identified  -CS     Comment, General Manual Muscle Testing (MMT) Assessment BUE grossly 4/5, no significant asymmetry, B grasp/pinch strength impacted by CTS pain/weakness  -CS       Row Name 08/21/23 0946          Motor Skills    Motor Skills coordination;functional endurance  -CS     Coordination bimanual skills;WFL  -CS     Functional Endurance moderate, O2 sats stable on RA, reports no supplemental O2 at baseline  -CS       Row Name 08/21/23 0946          Balance    Balance Assessment sitting static balance;sitting dynamic balance;standing static balance;standing dynamic balance  -CS     Static Sitting Balance standby assist  -CS     Dynamic Sitting Balance contact guard  -CS     Position, Sitting Balance unsupported;sitting edge of bed  -CS     Static Standing Balance standby assist  -CS     Dynamic Standing Balance contact guard  -CS     Position/Device Used, Standing Balance supported  -CS     Balance Interventions sitting;sit to stand;standing;occupation based/functional task  -CS     Comment, Balance no LOB this date  -CS               User Key  (r) = Recorded By, (t) = Taken By, (c) = Cosigned By      Initials Name Provider Type    CS Michael Roque, OT Occupational Therapist                   Goals/Plan       Row Name 08/21/23 0958          Transfer Goal 1 (OT)    Activity/Assistive Device (Transfer Goal 1, OT) bed-to-chair/chair-to-bed;commode, bedside without drop arms  -     [place] Level/Cues Needed (Transfer Goal 1, OT) supervision required;modified independence  -CS     Time Frame (Transfer Goal 1, OT) long term goal (LTG);10 days  -CS     Progress/Outcome (Transfer Goal 1, OT) new goal  -       Row Name 08/21/23 0958          Dressing Goal 1 (OT)    Activity/Device (Dressing Goal 1, OT) upper body dressing  -CS     [place]/Cues Needed (Dressing Goal 1, OT) standby assist;set-up required  -     Time Frame (Dressing Goal 1,  OT) long term goal (LTG);10 days  -CS     Progress/Outcome (Dressing Goal 1, OT) new goal  -CS       Row Name 08/21/23 0958          Toileting Goal 1 (OT)    Activity/Device (Toileting Goal 1, OT) adjust/manage clothing;perform perineal hygiene  -CS     [place] Level/Cues Needed (Toileting Goal 1, OT) minimum assist (75% or more patient effort)  -CS     Time Frame (Toileting Goal 1, OT) long term goal (LTG);10 days  -CS     Progress/Outcome (Toileting Goal 1, OT) new goal  -CS       Row Name 08/21/23 0958          Problem Specific Goal 1 (OT)    Problem Specific Goal 1 (OT) Pt will tolerate short in-room ADL related distance w/ stable BP and safe use of baseline Rollator  -CS     Time Frame (Problem Specific Goal 1, OT) long term goal (LTG);by discharge  -CS     Strategies/Barriers (Problem Specific Goal 1, OT) BP, orthostatic  -CS     Progress/Outcome (Problem Specific Goal 1, OT) new goal  -CS       Row Name 08/21/23 0922          Therapy Assessment/Plan (OT)    Planned Therapy Interventions (OT) functional balance retraining;occupation/activity based interventions;strengthening exercise;transfer/mobility retraining;ROM/therapeutic exercise;wheelchair assessment/training;cognitive/visual perception retraining  -CS               User Key  (r) = Recorded By, (t) = Taken By, (c) = Cosigned By      Initials Name Provider Type    CS Michael Roque, OT Occupational Therapist                   Clinical Impression       Row Name 08/21/23 0937          Pain Assessment    Additional Documentation Pain Scale: FACES Pre/Post-Treatment (Group)  -CS       Row Name 08/21/23 0950          Pain Scale: FACES Pre/Post-Treatment    Posttreatment Pain Rating 0-->no hurt  -CS     Pre/Posttreatment Pain Comment no pain at rest, chronic carpal tunnel  -CS       Row Name 08/21/23 0958          Plan of Care Review    Plan of Care Reviewed With patient;daughter  -CS     Progress no change  -CS     Outcome Evaluation Pt presents w/  decreased activity tolerance, dynamic standing balance deficit, and chronic carpal tunnel syndrome pain warranting skilled IP OT services to promote return to PLOF. BP dropped from sit-stand (108/68 <> 81/46) and remained low following being seated/reclined. Provided room w/ BS for safe toileting transfers, notified RN/MD. Rec return to home with continued/daily caregiver assist.  -CS       Row Name 08/21/23 0950          Therapy Assessment/Plan (OT)    Rehab Potential (OT) good, to achieve stated therapy goals  -CS     Criteria for Skilled Therapeutic Interventions Met (OT) yes;meets criteria;skilled treatment is necessary  -CS     Therapy Frequency (OT) daily  -CS       Row Name 08/21/23 0950          Therapy Plan Review/Discharge Plan (OT)    Anticipated Discharge Disposition (OT) home with assist;home with 24/7 care  -CS       Row Name 08/21/23 0950          Vital Signs    Pre Systolic BP Rehab 108  RN cleared for eval  -CS     Pre Treatment Diastolic BP 68  supine  -CS     Intra Systolic BP Rehab 81  -CS     Intra Treatment Diastolic BP 46   standing  -CS     Post Systolic BP Rehab 81  -CS     Post Treatment Diastolic BP 49   reclined  -CS     O2 Delivery Pre Treatment room air  -CS     O2 Delivery Intra Treatment room air  -CS     O2 Delivery Post Treatment room air  -CS     Pre Patient Position Supine  -CS     Intra Patient Position Standing  -CS     Post Patient Position Sitting  -CS       Row Name 08/21/23 0950          Positioning and Restraints    Pre-Treatment Position in bed  -CS     Post Treatment Position chair  -CS     In Chair notified nsg;reclined;sitting;call light within reach;encouraged to call for assist;exit alarm on;with family/caregiver;waffle cushion;legs elevated  -CS               User Key  (r) = Recorded By, (t) = Taken By, (c) = Cosigned By      Initials Name Provider Type    CS Michael Roque, OT Occupational Therapist                   Outcome Measures       Row Name 08/21/23 1000           How much help from another is currently needed...    Putting on and taking off regular lower body clothing? 2  -CS     Bathing (including washing, rinsing, and drying) 2  -CS     Toileting (which includes using toilet bed pan or urinal) 2  -CS     Putting on and taking off regular upper body clothing 3  -CS     Taking care of personal grooming (such as brushing teeth) 3  -CS     Eating meals 3  -CS     AM-PAC 6 Clicks Score (OT) 15  -CS       Row Name 08/21/23 0800          How much help from another person do you currently need...    Turning from your back to your side while in flat bed without using bedrails? 3  -EO     Moving from lying on back to sitting on the side of a flat bed without bedrails? 3  -EO     Moving to and from a bed to a chair (including a wheelchair)? 3  -EO     Standing up from a chair using your arms (e.g., wheelchair, bedside chair)? 4  -EO     Climbing 3-5 steps with a railing? 3  -EO     To walk in hospital room? 4  -EO     AM-PAC 6 Clicks Score (PT) 20  -EO     Highest level of mobility 6 --> Walked 10 steps or more  -EO       Row Name 08/21/23 1001          Functional Assessment    Outcome Measure Options AM-PAC 6 Clicks Daily Activity (OT)  -CS               User Key  (r) = Recorded By, (t) = Taken By, (c) = Cosigned By      Initials Name Provider Type    EO Livia Goel RN Registered Nurse    Michael Lund OT Occupational Therapist                    Occupational Therapy Education       Title: PT OT SLP Therapies (In Progress)       Topic: Occupational Therapy (In Progress)       Point: ADL training (Done)       Description:   Instruct learner(s) on proper safety adaptation and remediation techniques during self care or transfers.   Instruct in proper use of assistive devices.                  Learning Progress Summary             Patient Acceptance, D,E, VU,DU by  at 8/21/2023 1001                         Point: Home exercise program (Not Started)        Description:   Instruct learner(s) on appropriate technique for monitoring, assisting and/or progressing therapeutic exercises/activities.                  Learner Progress:  Not documented in this visit.              Point: Precautions (Done)       Description:   Instruct learner(s) on prescribed precautions during self-care and functional transfers.                  Learning Progress Summary             Patient Acceptance, D,E, VU,DU by  at 8/21/2023 1001                         Point: Body mechanics (Done)       Description:   Instruct learner(s) on proper positioning and spine alignment during self-care, functional mobility activities and/or exercises.                  Learning Progress Summary             Patient Acceptance, D,E, VU,DU by  at 8/21/2023 1001                                         User Key       Initials Effective Dates Name Provider Type Discipline     06/16/21 -  Michael Roque, OT Occupational Therapist OT                  OT Recommendation and Plan  Planned Therapy Interventions (OT): functional balance retraining, occupation/activity based interventions, strengthening exercise, transfer/mobility retraining, ROM/therapeutic exercise, wheelchair assessment/training, cognitive/visual perception retraining  Therapy Frequency (OT): daily  Plan of Care Review  Plan of Care Reviewed With: patient, daughter  Progress: no change  Outcome Evaluation: Pt presents w/ decreased activity tolerance, dynamic standing balance deficit, and chronic carpal tunnel syndrome pain warranting skilled IP OT services to promote return to PLOF. BP dropped from sit-stand (108/68 <> 81/46) and remained low following being seated/reclined. Provided room w/ BSC for safe toileting transfers, notified RN/MD. Rec return to home with continued/daily caregiver assist.     Time Calculation:   Evaluation Complexity (OT)  Review Occupational Profile/Medical/Therapy History Complexity: expanded/moderate  complexity  Assessment, Occupational Performance/Identification of Deficit Complexity: 1-3 performance deficits  Clinical Decision Making Complexity (OT): problem focused assessment/low complexity  Overall Complexity of Evaluation (OT): low complexity     Time Calculation- OT       Row Name 08/21/23 1002             Time Calculation- OT    OT Start Time 0856  -CS      OT Received On 08/21/23  -CS      OT Goal Re-Cert Due Date 08/31/23  -CS         Timed Charges    43837 - OT Therapeutic Activity Minutes 4  -CS      56414 - OT Self Care/Mgmt Minutes 6  -CS         Untimed Charges    OT Eval/Re-eval Minutes 47  -CS         Total Minutes    Timed Charges Total Minutes 10  -CS      Untimed Charges Total Minutes 47  -CS       Total Minutes 57  -CS                User Key  (r) = Recorded By, (t) = Taken By, (c) = Cosigned By      Initials Name Provider Type    CS Michael Roque, OT Occupational Therapist                  Therapy Charges for Today       Code Description Service Date Service Provider Modifiers Qty    55990824231 HC OT SELF CARE/MGMT/TRAIN EA 15 MIN 8/21/2023 Michael Roque OT GO 1    84531568534 HC OT EVAL LOW COMPLEXITY 4 8/21/2023 Michael Roque, OT GO 1                 Michael Roque OT  8/21/2023

## 2023-08-22 LAB
ALBUMIN SERPL-MCNC: 2.9 G/DL (ref 3.5–5.2)
ALBUMIN/GLOB SERPL: 1.3 G/DL
ALP SERPL-CCNC: 75 U/L (ref 39–117)
ALT SERPL W P-5'-P-CCNC: 7 U/L (ref 1–41)
ANION GAP SERPL CALCULATED.3IONS-SCNC: 11 MMOL/L (ref 5–15)
AST SERPL-CCNC: 24 U/L (ref 1–40)
BASOPHILS # BLD AUTO: 0.05 10*3/MM3 (ref 0–0.2)
BASOPHILS NFR BLD AUTO: 0.6 % (ref 0–1.5)
BH BB BLOOD EXPIRATION DATE: NORMAL
BH BB BLOOD TYPE BARCODE: 6200
BH BB DISPENSE STATUS: NORMAL
BH BB PRODUCT CODE: NORMAL
BH BB UNIT NUMBER: NORMAL
BILIRUB SERPL-MCNC: 2.1 MG/DL (ref 0–1.2)
BUN SERPL-MCNC: 18 MG/DL (ref 8–23)
BUN/CREAT SERPL: 14.8 (ref 7–25)
CALCIUM SPEC-SCNC: 8.4 MG/DL (ref 8.2–9.6)
CHLORIDE SERPL-SCNC: 105 MMOL/L (ref 98–107)
CO2 SERPL-SCNC: 24 MMOL/L (ref 22–29)
CREAT SERPL-MCNC: 1.22 MG/DL (ref 0.76–1.27)
CROSSMATCH INTERPRETATION: NORMAL
DEPRECATED RDW RBC AUTO: 51.1 FL (ref 37–54)
EGFRCR SERPLBLD CKD-EPI 2021: 52.9 ML/MIN/1.73
EOSINOPHIL # BLD AUTO: 0.11 10*3/MM3 (ref 0–0.4)
EOSINOPHIL NFR BLD AUTO: 1.3 % (ref 0.3–6.2)
ERYTHROCYTE [DISTWIDTH] IN BLOOD BY AUTOMATED COUNT: 14.1 % (ref 12.3–15.4)
GLOBULIN UR ELPH-MCNC: 2.3 GM/DL
GLUCOSE SERPL-MCNC: 100 MG/DL (ref 65–99)
HCT VFR BLD AUTO: 25.6 % (ref 37.5–51)
HCT VFR BLD AUTO: 28 % (ref 37.5–51)
HCT VFR BLD AUTO: 28.7 % (ref 37.5–51)
HCT VFR BLD AUTO: 28.7 % (ref 37.5–51)
HGB BLD-MCNC: 8.3 G/DL (ref 13–17.7)
HGB BLD-MCNC: 9 G/DL (ref 13–17.7)
HGB BLD-MCNC: 9.2 G/DL (ref 13–17.7)
HGB BLD-MCNC: 9.2 G/DL (ref 13–17.7)
IMM GRANULOCYTES # BLD AUTO: 0.04 10*3/MM3 (ref 0–0.05)
IMM GRANULOCYTES NFR BLD AUTO: 0.5 % (ref 0–0.5)
LYMPHOCYTES # BLD AUTO: 0.85 10*3/MM3 (ref 0.7–3.1)
LYMPHOCYTES NFR BLD AUTO: 10.1 % (ref 19.6–45.3)
MCH RBC QN AUTO: 31.7 PG (ref 26.6–33)
MCHC RBC AUTO-ENTMCNC: 32.1 G/DL (ref 31.5–35.7)
MCV RBC AUTO: 99 FL (ref 79–97)
MONOCYTES # BLD AUTO: 1.52 10*3/MM3 (ref 0.1–0.9)
MONOCYTES NFR BLD AUTO: 18 % (ref 5–12)
NEUTROPHILS NFR BLD AUTO: 5.87 10*3/MM3 (ref 1.7–7)
NEUTROPHILS NFR BLD AUTO: 69.5 % (ref 42.7–76)
NRBC BLD AUTO-RTO: 0 /100 WBC (ref 0–0.2)
PLATELET # BLD AUTO: 270 10*3/MM3 (ref 140–450)
PMV BLD AUTO: 10.8 FL (ref 6–12)
POTASSIUM SERPL-SCNC: 3.8 MMOL/L (ref 3.5–5.2)
PROT SERPL-MCNC: 5.2 G/DL (ref 6–8.5)
RBC # BLD AUTO: 2.9 10*6/MM3 (ref 4.14–5.8)
SODIUM SERPL-SCNC: 140 MMOL/L (ref 136–145)
UNIT  ABO: NORMAL
UNIT  RH: NORMAL
WBC NRBC COR # BLD: 8.44 10*3/MM3 (ref 3.4–10.8)

## 2023-08-22 PROCEDURE — G0378 HOSPITAL OBSERVATION PER HR: HCPCS

## 2023-08-22 PROCEDURE — 80053 COMPREHEN METABOLIC PANEL: CPT | Performed by: INTERNAL MEDICINE

## 2023-08-22 PROCEDURE — 85018 HEMOGLOBIN: CPT | Performed by: INTERNAL MEDICINE

## 2023-08-22 PROCEDURE — 85014 HEMATOCRIT: CPT | Performed by: INTERNAL MEDICINE

## 2023-08-22 PROCEDURE — 97530 THERAPEUTIC ACTIVITIES: CPT

## 2023-08-22 PROCEDURE — 97116 GAIT TRAINING THERAPY: CPT

## 2023-08-22 PROCEDURE — 85025 COMPLETE CBC W/AUTO DIFF WBC: CPT | Performed by: INTERNAL MEDICINE

## 2023-08-22 PROCEDURE — 97161 PT EVAL LOW COMPLEX 20 MIN: CPT

## 2023-08-22 RX ADMIN — NYSTATIN: 100000 POWDER TOPICAL at 08:39

## 2023-08-22 RX ADMIN — FINASTERIDE 5 MG: 5 TABLET, FILM COATED ORAL at 08:39

## 2023-08-22 RX ADMIN — PRAVASTATIN SODIUM 40 MG: 40 TABLET ORAL at 08:39

## 2023-08-22 RX ADMIN — SENNOSIDES AND DOCUSATE SODIUM 2 TABLET: 50; 8.6 TABLET ORAL at 08:39

## 2023-08-22 RX ADMIN — Medication 10 ML: at 22:19

## 2023-08-22 RX ADMIN — NYSTATIN: 100000 POWDER TOPICAL at 22:18

## 2023-08-22 NOTE — THERAPY EVALUATION
Patient Name: Chaitanya Browne  : 1923    MRN: [id number]                              Today's Date: 2023       Admit Date: 2023    Visit Dx:     ICD-10-CM ICD-9-CM   1. Acute anemia  D64.9 285.9   2. History of gross hematuria  Z87.898 V13.09   3. History of skin cancer  Z85.828 V10.83     Patient Active Problem List   Diagnosis    Facial basal cell cancer    Hyperlipidemia LDL goal <70    Essential hypertension    ASHD (arteriosclerotic heart disease)    Gait abnormality    Impaired fasting glucose    Polyp, colonic    Vitamin D deficiency    Acute midline low back pain without sciatica    Proteinuria    Right carpal tunnel syndrome    Class 1 obesity due to excess calories with serious comorbidity and body mass index (BMI) of 30.0 to 30.9 in adult    Hematuria, unspecified    Medicare annual wellness visit, subsequent    Edema    Cough    Allergic rhinitis    Gross hematuria    Acute UTI (urinary tract infection)    Acute renal insufficiency    Acute urinary retention    Debility    Dysphagia    BPH with obstruction/lower urinary tract symptoms    Prostatitis    Chronic diastolic CHF (congestive heart failure)    Iron deficiency anemia due to chronic blood loss    Simple chronic bronchitis    Shortness of breath    Stage 3a chronic kidney disease    Skin tear of left forearm without complication    Squamous cell carcinoma of nose    At high risk for falls    Stage II skin ulcer    Cellulitis of right upper extremity    Bladder mass    Anemia     Past Medical History:   Diagnosis Date    Abnormal heart rhythm     Anemia     Aortic valve sclerosis     Asthma     Basal cell carcinoma (BCC) of left side of nose     BPH (benign prostatic hyperplasia)     Cataracts, bilateral     bilateralcataract extraction and lens implantation     Diastolic dysfunction     Easy bruising     Edema due to malnutrition 2020    Heart murmur     High cholesterol     History of disease     bilateral lacrimal  gland dysfunction per Dr Fajardo    Hypertension     Infection     infected big toe; I and D DR Alvares 2013    Phlebitis     Pneumonia 2009    Squamous acanthoma of face      Past Surgical History:   Procedure Laterality Date    APPENDECTOMY      CATARACT EXTRACTION, BILATERAL  2013    and lens implantation    CYSTOSCOPY N/A 9/6/2019    Procedure: CYSTOSCOPY;  Surgeon: Ck Woods MD;  Location:  [facility];  Service: Urology    CYSTOSCOPY TRANSURETHRAL RESECTION OF PROSTATE  1989    CYSTOSCOPY TRANSURETHRAL RESECTION OF PROSTATE N/A 9/11/2019    Procedure: CYSTOSCOPY TRANSURETHRAL RESECTION OF PROSTATE;  Surgeon: Ck Woods MD;  Location:  [facility];  Service: Urology    HEAD & NECK SKIN LESION EXCISIONAL BIOPSY      Basal cell removed from nose     INCISION AND DRAINAGE FOOT  2013    infected big toe Dr Alvares    TRANSURETHRAL RESECTION OF BLADDER TUMOR N/A 9/6/2019    Procedure: EVACUATION OF BLOOD CLOT, FULGERATION OF PROSTATE;  Surgeon: Ck Woods MD;  Location:  [facility];  Service: Urology      General Information       Row Name 08/22/23 0903          Physical Therapy Time and Intention    Document Type evaluation  -KR     Mode of Treatment individual therapy;physical therapy  -KR       Row Name 08/22/23 0903          General Information    Patient Profile Reviewed yes  -KR     Prior Level of Function independent:;all household mobility;feeding;min assist:;grooming;max assist:;dressing;bathing;dependent:;driving  rollator  -KR     Existing Precautions/Restrictions fall;orthostatic hypotension;other (see comments)  [place] (aids in room), bilateral carpal tunnel  -KR     Barriers to Rehab medically complex;previous functional deficit  -KR       Row Name 08/22/23 0903          Living Environment    People in Home alone  caregiver comes daily 9a-10p  -KR       Row Name 08/22/23 0903          Home Main Entrance    Number of Stairs, Main Entrance none  -KR       Row Name 08/22/23 0903           Stairs Within Home, Primary    Number of Stairs, Within Home, Primary two  -KR     Stair Railings, Within Home, Primary railings on both sides of stairs  -KR       Row Name 08/22/23 0903          Cognition    Orientation Status (Cognition) oriented x 4  -KR       Row Name 08/22/23 0903          Safety Issues, Functional Mobility    Safety Issues Affecting Function (Mobility) insight into deficits/self-awareness;safety precaution awareness;positioning of assistive device  -KR     Impairments Affecting Function (Mobility) balance;endurance/activity tolerance;strength;pain  -KR               User Key  (r) = Recorded By, (t) = Taken By, (c) = Cosigned By      Initials Name Provider Type    Yuridia Mariee, PT Physical Therapist                   Mobility       Row Name 08/22/23 0949          Sit-Stand Transfer    Sit-Stand [place] (Transfers) contact guard  -KR     Assistive Device (Sit-Stand Transfers) walker, front-wheeled  -KR     Comment, (Sit-Stand Transfer) 2x from chair, 1x from toilet Zac with R grab bar. When standing from chair, cues for UE push up from arm rests. Cues for walker proximity when transitioning stand to sit and reaching back to chair for controlled lowering.  -KR       Row Name 08/22/23 0949          Gait/Stairs (Locomotion)    [place] Level (Gait) contact guard  -KR     Assistive Device (Gait) walker, front-wheeled  -KR     Distance in Feet (Gait) 16 + 16 + 30  -KR     Deviations/Abnormal Patterns (Gait) nida decreased;gait speed decreased;stride length decreased;bilateral deviations  -KR     Bilateral Gait Deviations forward flexed posture;heel strike decreased  -KR     [place] Level (Stairs) minimum assist (75% patient effort);1 person assist  -KR     Handrail Location (Stairs) left side (ascending);right side (ascending)  -KR     Number of Steps (Stairs) 2  -KR     Ascending Technique (Stairs) step-to-step  -KR     Descending Technique (Stairs) step-to-step  -KR      Comment, (Gait/Stairs) pt required VCs for proximity to walker. Farther ambulation distance limited by significant fatigue. Ruben req'd for ascending steps. Pt navigated stairs sideways. BUE support on R handrail descending/L handrail ascending.  -KR               User Key  (r) = Recorded By, (t) = Taken By, (c) = Cosigned By      Initials Name Provider Type    Yuridia Mariee PT Physical Therapist                   Obj/Interventions       Row Name 08/22/23 0957          Range of Motion Comprehensive    General Range of Motion bilateral lower extremity ROM WNL  -KR       Row Name 08/22/23 0957          Strength Comprehensive (MMT)    General Manual Muscle Testing (MMT) Assessment lower extremity strength deficits identified  -KR     Comment, General Manual Muscle Testing (MMT) Assessment BLEs grossly 4/5  -KR       Row Name 08/22/23 0957          Balance    Balance Assessment sitting static balance;sitting dynamic balance;standing static balance;standing dynamic balance  -KR     Static Sitting Balance standby assist  -KR     Dynamic Sitting Balance contact guard  -KR     Position, Sitting Balance unsupported;sitting in chair;other (see comments)  toilet  -KR     Static Standing Balance contact guard  -KR     Dynamic Standing Balance contact guard  -KR     Position/Device Used, Standing Balance supported;walker, front-wheeled  -KR     Balance Interventions sitting;standing;sit to stand;supported;static;dynamic  -KR       Row Name 08/22/23 0957          Sensory Assessment (Somatosensory)    Sensory Assessment (Somatosensory) LE sensation intact  -KR               User Key  (r) = Recorded By, (t) = Taken By, (c) = Cosigned By      Initials Name Provider Type    Yuridia Mariee PT Physical Therapist                   Goals/Plan       Row Name 08/22/23 1000          Bed Mobility Goal 1 (PT)    Activity/Assistive Device (Bed Mobility Goal 1, PT) bed mobility activities, all  -KR     [place] Level/Cues Needed  (Bed Mobility Goal 1, PT) independent  -KR     Time Frame (Bed Mobility Goal 1, PT) long term goal (LTG);2 weeks  -KR       Row Name 08/22/23 1000          Transfer Goal 1 (PT)    Activity/Assistive Device (Transfer Goal 1, PT) sit-to-stand/stand-to-sit;bed-to-chair/chair-to-bed;walker, rolling  -KR     [place] Level/Cues Needed (Transfer Goal 1, PT) modified independence  -KR     Time Frame (Transfer Goal 1, PT) long term goal (LTG);2 weeks  -KR       Row Name 08/22/23 1000          Gait Training Goal 1 (PT)    Activity/Assistive Device (Gait Training Goal 1, PT) gait (walking locomotion);improve balance and speed;increase endurance/gait distance;assistive device use;walker, rolling  -KR     [place] Level (Gait Training Goal 1, PT) modified independence  -KR     Distance (Gait Training Goal 1, PT) 150  -KR     Time Frame (Gait Training Goal 1, PT) long term goal (LTG);2 weeks  -KR       Row Name 08/22/23 1000          Stairs Goal 1 (PT)    Activity/Assistive Device (Stairs Goal 1, PT) stairs, all skills;using handrail, right  -KR     [place] Level/Cues Needed (Stairs Goal 1, PT) modified independence  -KR     Number of Stairs (Stairs Goal 1, PT) 2  -KR     Time Frame (Stairs Goal 1, PT) long term goal (LTG);2 weeks  -KR       Row Name 08/22/23 1000          Therapy Assessment/Plan (PT)    Planned Therapy Interventions (PT) balance training;bed mobility training;gait training;home exercise program;manual therapy techniques;postural re-education;patient/family education;neuromuscular re-education;ROM (range of motion);stair training;strengthening;stretching;transfer training  -KR               User Key  (r) = Recorded By, (t) = Taken By, (c) = Cosigned By      Initials Name Provider Type    Yuridia Mariee PT Physical Therapist                   Clinical Impression       Row Name 08/22/23 0958          Pain    Pain Intervention(s) Repositioned  -KR     Additional Documentation Pain Scale: FACES  Pre/Post-Treatment (Group)  -KR       Row Name 08/22/23 0958          Pain Scale: FACES Pre/Post-Treatment    Pain: FACES Scale, Pretreatment 4-->hurts little more  -KR     Posttreatment Pain Rating 4-->hurts little more  -KR     Pain Location - Side/Orientation Bilateral  -KR     Pain Location - wrist  -KR     Pre/Posttreatment Pain Comment carpal tunnel pain, increased since 8/19  -KR       Row Name 08/22/23 0958          Plan of Care Review    Plan of Care Reviewed With patient;daughter  -KR     Outcome Evaluation Pt presents with strength, endurance, and balance below baseline contributing to impairments in transfers, ambulation, and stair navigation. Pt's BP low but stable throughout evaluation. Pt will benefit from PT to address aforementioned deficits and return to PLOF. PT rec home with initial 24/7 assist, BSC, and OP PT upon dc.  -KR       Row Name 08/22/23 0958          Therapy Assessment/Plan (PT)    Rehab Potential (PT) good, to achieve stated therapy goals  -KR     Criteria for Skilled Interventions Met (PT) yes;skilled treatment is necessary  -KR     Therapy Frequency (PT) daily  -KR       Row Name 08/22/23 0958          Vital Signs    Pre Systolic BP Rehab 91  -KR     Pre Treatment Diastolic BP 50  -KR     Intra Systolic BP Rehab 97  -KR     Intra Treatment Diastolic BP 50  -KR     Post Systolic BP Rehab 97  -KR     Post Treatment Diastolic BP 49  -KR     Posttreatment Heart Rate (beats/min) 90  -KR     O2 Delivery Pre Treatment room air  -KR     O2 Delivery Intra Treatment room air  -KR     O2 Delivery Post Treatment room air  -KR     Pre Patient Position Sitting  -KR     Intra Patient Position Standing  -KR     Post Patient Position Sitting  -KR       Row Name 08/22/23 0958          Positioning and Restraints    Pre-Treatment Position sitting in chair/recliner  -KR     Post Treatment Position chair  -KR     In Chair notified nsg;reclined;call light within reach;encouraged to call for assist;exit  alarm on;with family/caregiver;waffle cushion;legs elevated  -KR               User Key  (r) = Recorded By, (t) = Taken By, (c) = Cosigned By      Initials Name Provider Type    Yuridia Mariee PT Physical Therapist                   Outcome Measures       Row Name 08/22/23 1001          How much help from another person do you currently need...    Turning from your back to your side while in flat bed without using bedrails? 3  -KR     Moving from lying on back to sitting on the side of a flat bed without bedrails? 3  -KR     Moving to and from a bed to a chair (including a wheelchair)? 3  -KR     Standing up from a chair using your arms (e.g., wheelchair, bedside chair)? 3  -KR     Climbing 3-5 steps with a railing? 3  -KR     To walk in hospital room? 3  -KR     AM-PAC 6 Clicks Score (PT) 18  -KR     Highest level of mobility 6 --> Walked 10 steps or more  -KR               User Key  (r) = Recorded By, (t) = Taken By, (c) = Cosigned By      Initials Name Provider Type    Yuridia Mariee PT Physical Therapist                                 Physical Therapy Education       Title: PT OT SLP Therapies (In Progress)       Topic: Physical Therapy (Done)       Point: Mobility training (Done)       Learning Progress Summary             Patient Acceptance, E, VU by KR at 8/22/2023 1001   Family Acceptance, E, VU by KR at 8/22/2023 1001                         Point: Home exercise program (Done)       Learning Progress Summary             Patient Acceptance, E, VU by KR at 8/22/2023 1001   Family Acceptance, E, VU by KR at 8/22/2023 1001                         Point: Body mechanics (Done)       Learning Progress Summary             Patient Acceptance, E, VU by KR at 8/22/2023 1001   Family Acceptance, E, VU by KR at 8/22/2023 1001                         Point: Precautions (Done)       Learning Progress Summary             Patient Acceptance, E, VU by BRITTANY at 8/22/2023 1001   Family Acceptance, E, VU by BRITTANY at  8/22/2023 1001                                         User Key       Initials Effective Dates Name Provider Type Discipline    KR 12/30/22 -  Yuridia Ruelas PT Physical Therapist PT                  PT Recommendation and Plan  Planned Therapy Interventions (PT): balance training, bed mobility training, gait training, home exercise program, manual therapy techniques, postural re-education, patient/family education, neuromuscular re-education, ROM (range of motion), stair training, strengthening, stretching, transfer training  Plan of Care Reviewed With: patient, daughter  Outcome Evaluation: Pt presents with strength, endurance, and balance below baseline contributing to impairments in transfers, ambulation, and stair navigation. Pt's BP low but stable throughout evaluation. Pt will benefit from PT to address aforementioned deficits and return to PLOF. PT rec home with initial 24/7 assist, BSC, and OP PT upon dc.     Time Calculation:   PT Evaluation Complexity  History, PT Evaluation Complexity: 3 or more personal factors and/or comorbidities  Examination of Body Systems (PT Eval Complexity): total of 4 or more elements  Clinical Presentation (PT Evaluation Complexity): stable  Clinical Decision Making (PT Evaluation Complexity): low complexity  Overall Complexity (PT Evaluation Complexity): low complexity     PT Charges       Row Name 08/22/23 1002             Time Calculation    Start Time 0903  -KR      PT Received On 08/22/23  -KR      PT Goal Re-Cert Due Date 09/01/23  -KR         Timed Charges    21220 - Gait Training Minutes  12  -KR      44346 - PT Therapeutic Activity Minutes 11  -KR         Untimed Charges    PT Eval/Re-eval Minutes 50  -KR         Total Minutes    Timed Charges Total Minutes 23  -KR      Untimed Charges Total Minutes 50  -KR       Total Minutes 73  -KR                User Key  (r) = Recorded By, (t) = Taken By, (c) = Cosigned By      Initials Name Provider Type    KR Yuridia Ruelas,  PT Physical Therapist                  Therapy Charges for Today       Code Description Service Date Service Provider Modifiers Qty    47340060457 HC PT EVAL LOW COMPLEXITY 4 8/22/2023 Yuridia Ruelas, PT GP 1    13735040293 HC PT THERAPEUTIC ACT EA 15 MIN 8/22/2023 Yuridia Ruelas, PT GP 1    72151001750 HC GAIT TRAINING EA 15 MIN 8/22/2023 Yuridia Ruelas, PT GP 1            PT G-Codes  Outcome Measure Options: AM-PAC 6 Clicks Daily Activity (OT)  AM-PAC 6 Clicks Score (PT): 18  AM-PAC 6 Clicks Score (OT): 15  PT Discharge Summary  Anticipated Discharge Disposition (PT): home with outpatient therapy services, home with 24/7 care    Yuridia Ruelas, PT  8/22/2023

## 2023-08-22 NOTE — PLAN OF CARE
Goal Outcome Evaluation:  Plan of Care Reviewed With: patient, daughter           Outcome Evaluation: Pt presents with strength, endurance, and balance below baseline contributing to impairments in transfers, ambulation, and stair navigation. Pt's BP low but stable throughout evaluation. Pt will benefit from PT to address aforementioned deficits and return to PLOF. PT rec home with initial 24/7 assist, BSC, and OP PT upon dc.      Anticipated Discharge Disposition (PT): home with outpatient therapy services, home with 24/7 care

## 2023-08-22 NOTE — SIGNIFICANT NOTE
Dr. Milian notified STAT and responded to bedside for reported change in patient's mentation. Patient displayed 10 minutes of extreme difficulty with word finding, witnessed by floor nurse and charge nurse. Vital signs remained stable and unchanged during episode. Upon arrival of physician to bedside, patient returned to neurological baseline. Will continue to monitor patient.

## 2023-08-22 NOTE — PLAN OF CARE
Problem: Fall Injury Risk  Goal: Absence of Fall and Fall-Related Injury  Outcome: Ongoing, Progressing  Intervention: Identify and Manage Contributors  Recent Flowsheet Documentation  Taken 8/22/2023 0437 by Carroll Quintana RN  Medication Review/Management: medications reviewed  Taken 8/22/2023 0200 by Carroll Quintana RN  Medication Review/Management: medications reviewed  Taken 8/22/2023 0030 by Carroll Quintana RN  Medication Review/Management: medications reviewed  Taken 8/21/2023 2234 by Carroll Quintana RN  Medication Review/Management: medications reviewed  Taken 8/21/2023 1934 by Carroll Quintana RN  Medication Review/Management: medications reviewed  Intervention: Promote Injury-Free Environment  Recent Flowsheet Documentation  Taken 8/22/2023 0437 by Carroll Quintana RN  Safety Promotion/Fall Prevention:   activity supervised   assistive device/personal items within reach   clutter free environment maintained   fall prevention program maintained   gait belt   lighting adjusted   mobility aid in reach   nonskid shoes/slippers when out of bed   room organization consistent   safety round/check completed   toileting scheduled  Taken 8/22/2023 0200 by Carroll Quintana RN  Safety Promotion/Fall Prevention:   activity supervised   assistive device/personal items within reach   clutter free environment maintained   fall prevention program maintained   gait belt   lighting adjusted   mobility aid in reach   nonskid shoes/slippers when out of bed   room organization consistent   safety round/check completed   toileting scheduled  Taken 8/22/2023 0030 by Carroll Quintana RN  Safety Promotion/Fall Prevention:   activity supervised   assistive device/personal items within reach   clutter free environment maintained   fall prevention program maintained   gait belt   lighting adjusted   mobility aid in reach   nonskid shoes/slippers when out of bed   room  organization consistent   safety round/check completed   toileting scheduled  Taken 8/21/2023 2234 by Carroll Quintana RN  Safety Promotion/Fall Prevention:   activity supervised   assistive device/personal items within reach   clutter free environment maintained   fall prevention program maintained   gait belt   lighting adjusted   mobility aid in reach   nonskid shoes/slippers when out of bed   room organization consistent   safety round/check completed   toileting scheduled  Taken 8/21/2023 1934 by Carroll Quintana RN  Safety Promotion/Fall Prevention:   activity supervised   assistive device/personal items within reach   clutter free environment maintained   fall prevention program maintained   gait belt   lighting adjusted   mobility aid in reach   nonskid shoes/slippers when out of bed   room organization consistent   safety round/check completed   toileting scheduled     Problem: Skin Injury Risk Increased  Goal: Skin Health and Integrity  Outcome: Ongoing, Progressing  Intervention: Optimize Skin Protection  Recent Flowsheet Documentation  Taken 8/22/2023 0437 by Carroll Quintana RN  Pressure Reduction Techniques:   frequent weight shift encouraged   weight shift assistance provided  Head of Bed (HOB) Positioning: HOB at 30-45 degrees  Pressure Reduction Devices:   positioning supports utilized   pressure-redistributing mattress utilized  Skin Protection:   adhesive use limited   tubing/devices free from skin contact  Taken 8/22/2023 0200 by Carroll Quintana RN  Pressure Reduction Techniques:   frequent weight shift encouraged   heels elevated off bed   weight shift assistance provided  Head of Bed (HOB) Positioning: HOB at 30-45 degrees  Pressure Reduction Devices:   positioning supports utilized   pressure-redistributing mattress utilized  Skin Protection:   adhesive use limited   tubing/devices free from skin contact  Taken 8/22/2023 0030 by Carroll Quintana RN  Pressure  Reduction Techniques:   frequent weight shift encouraged   heels elevated off bed   weight shift assistance provided  Head of Bed (HOB) Positioning: HOB at 30-45 degrees  Pressure Reduction Devices:   positioning supports utilized   pressure-redistributing mattress utilized  Skin Protection:   adhesive use limited   tubing/devices free from skin contact  Taken 8/21/2023 2234 by Carroll Quintana RN  Pressure Reduction Techniques:   frequent weight shift encouraged   heels elevated off bed   weight shift assistance provided  Head of Bed (HOB) Positioning: HOB at 30-45 degrees  Pressure Reduction Devices:   positioning supports utilized   pressure-redistributing mattress utilized  Skin Protection:   adhesive use limited   tubing/devices free from skin contact  Taken 8/21/2023 1934 by Carroll Quintana RN  Pressure Reduction Techniques:   frequent weight shift encouraged   heels elevated off bed   weight shift assistance provided  Head of Bed (HOB) Positioning: HOB at 30-45 degrees  Pressure Reduction Devices: (pt refuses heel boots bilat)   positioning supports utilized   pressure-redistributing mattress utilized   other (see comments)  Skin Protection:   adhesive use limited   tubing/devices free from skin contact     Problem: Heart Failure Comorbidity  Goal: Maintenance of Heart Failure Symptom Control  Outcome: Ongoing, Progressing  Intervention: Maintain Heart Failure-Management  Recent Flowsheet Documentation  Taken 8/22/2023 0437 by Carroll Quintana RN  Medication Review/Management: medications reviewed  Taken 8/22/2023 0200 by Carroll Quintana RN  Medication Review/Management: medications reviewed  Taken 8/22/2023 0030 by Carroll Quintana RN  Medication Review/Management: medications reviewed  Taken 8/21/2023 2234 by Carroll Quintana RN  Medication Review/Management: medications reviewed  Taken 8/21/2023 1934 by Carroll Quintana RN  Medication Review/Management:  medications reviewed   Goal Outcome Evaluation:         VSS, RA, patient has had no blood in urine for entire shift.

## 2023-08-22 NOTE — PROGRESS NOTES
[facility] Medicine Services  PROGRESS NOTE    Patient Name: Chaitanya Browne  : 1923  MRN: [id number]    Date of Admission: 2023  Primary Care Physician: Dirk Russ MD    Subjective   Subjective     CC:  Hematuria     HPI:  Patient sitting in chair , nursing reporting he was having some issues with word finding this am, but on my eval is apporpriate and oriented. No word finding issues. Patient asking how his BP looks , otherwise doing ok     ROS:  As above      Objective   Objective     Vital Signs:   Temp:  [97.6 øF (36.4 øC)-98.6 øF (37 øC)] 97.8 øF (36.6 øC)  Heart Rate:  [73-99] 89  Resp:  [16-18] 16  BP: ()/(41-56) 98/56     Physical Exam:  GEN: NAD, resting in bed, awake, chronically ill appearing   HEENT: on room air, atraumatic, normocephalic  NECK: supple, no masses  RESP: on room air, normal effort  CV: on tele, sinus rhythm, bp improved   PSYCH: normal affect, appropriate  NEURO: awake, alert, no focal deficits noted  MSK: no edema noted  SKIN: no rashes noted       Results Reviewed:  LAB RESULTS:      Lab 23  0807 23  2332 23  1619 23  0812 23  2353 23  1640   WBC 8.44  --   --   --   --  6.79   HEMOGLOBIN 9.2*  9.2* 8.3* 8.9* 7.3* 7.8* 8.4*   HEMATOCRIT 28.7*  28.7* 25.6* 27.4* 22.9* 24.4* 26.9*   PLATELETS 270  --   --   --   --  281   NEUTROS ABS 5.87  --   --   --   --  4.67   IMMATURE GRANS (ABS) 0.04  --   --   --   --  0.03   LYMPHS ABS 0.85  --   --   --   --  0.81   MONOS ABS 1.52*  --   --   --   --  1.16*   EOS ABS 0.11  --   --   --   --  0.09   MCV 99.0*  --   --   --   --  101.9*   LACTATE  --   --   --   --   --  1.4         Lab 23  0807 23  0812 23  1640   SODIUM 140 140 138   POTASSIUM 3.8 3.6 3.9   CHLORIDE 105 103 99   CO2 24.0 29.0 30.0*   ANION GAP 11.0 8.0 9.0   BUN 18 21 22   CREATININE 1.22 1.32* 1.51*   EGFR 52.9* 48.1* 41.0*   GLUCOSE 100* 116* 131*   CALCIUM 8.4 8.0*  8.6         Lab 08/22/23  0807 08/20/23  1640   TOTAL PROTEIN 5.2* 5.6*   ALBUMIN 2.9* 3.3*   GLOBULIN 2.3 2.3   ALT (SGPT) 7 8   AST (SGOT) 24 23   BILIRUBIN 2.1* 0.8   ALK PHOS 75 78   LIPASE  --  51                 Lab 08/20/23  1940   ABO TYPING A   RH TYPING Positive   ANTIBODY SCREEN Negative         Brief Urine Lab Results  (Last result in the past 365 days)        Color   Clarity   Blood   Leuk Est   Nitrite   Protein   CREAT   Urine HCG        08/20/23 1707 Yellow   Clear   Moderate (2+)   Small (1+)   Negative   Negative                   Microbiology Results Abnormal       None            CT Abdomen Pelvis Without Contrast    Result Date: 8/20/2023  CT ABDOMEN PELVIS WO CONTRAST Date of Exam: 8/20/2023 5:09 PM CDT Indication: reported gross hematuria. Comparison: 9/3/2019 Technique: Axial CT images were obtained of the abdomen and pelvis without the administration of contrast. Reconstructed coronal and sagittal images were also obtained. Automated exposure control and iterative construction methods were used. Findings: LUNG BASES: Basilar atelectasis, right greater than left with there is elevation of the right hemidiaphragm. LIVER: Lobular morphology is similar to prior examination. There are diffuse calcified granulomata. No suspicious lesion. BILIARY/GALLBLADDER: There are gallstones. There are no gallbladder inflammatory changes. SPLEEN: There are punctate granulomata. PANCREAS:  Unremarkable ADRENAL:  Unremarkable KIDNEYS: There is mild bilateral renal cortical atrophy. No evidence of obstruction. Ureters are unremarkable in course and caliber throughout. No calculus identified. GASTROINTESTINAL/MESENTERY: No evidence of obstruction or inflammation. There is a benign lipoma within the second portion of the duodenum. There are scattered colonic diverticula without evidence of diverticulitis. AORTA/IVC:  Normal caliber. RETROPERITONEUM/LYMPH NODES:  Unremarkable REPRODUCTIVE: Irregular enlargement of  the prostate gland impressing upon the urinary bladder base. BLADDER: Prostate gland impresses upon the urinary bladder base. There are 2 stones each measuring 2 mm in diameter within the urinary bladder base. OSSEUS STRUCTURES: Advanced lumbar degenerative changes with grade 1 spondylolisthesis at L4/5.     Impression: Impression: 1.There are 2 tiny calculi within the urinary bladder which may account for patient's hematuria. No additional urinary tract calculi identified. 2.Persistent enlarged prostate gland impressing upon the urinary bladder base. Urinary bladder ingrowth not excluded. However, findings are similar if not slightly decreased when compared to previous exam. 3.Other nonemergent findings as detailed above. Electronically Signed: Abdelrahman Bearden MD  8/20/2023 5:32 PM CDT  Workstation ID: [id number]     Results for orders placed during the hospital encounter of 09/02/19    Adult Transthoracic Echo Complete W/ Cont if Necessary Per Protocol    Interpretation Summary  ú Mild-to-moderate mitral valve regurgitation is present.  ú Estimated EF = 72%.  ú Left ventricular systolic function is hyperdynamic (EF > 70).  ú Left ventricular diastolic dysfunction (grade I) consistent with impaired relaxation.  ú Left ventricular wall thickness is consistent with mild concentric hypertrophy.  ú Left atrial cavity size is borderline dilated.  ú Normal right ventricular cavity size, wall thickness, systolic function and septal motion noted.  ú Mild mitral valve stenosis is present with functional MAC.  ú The aortic valve exhibits moderate sclerosis without stenosis.  ú No evidence of pulmonary hypertension is present.  ú There is no evidence of pericardial effusion.      Current medications:  Scheduled Meds:finasteride, 5 mg, Oral, Daily  nystatin, , Topical, Q12H  pravastatin, 40 mg, Oral, Daily  senna-docusate sodium, 2 tablet, Oral, BID  sodium chloride, 10 mL, Intravenous, Q12H      Continuous Infusions:   PRN Meds:.   acetaminophen **OR** acetaminophen **OR** acetaminophen    senna-docusate sodium **AND** polyethylene glycol **AND** bisacodyl **AND** bisacodyl    Calcium Replacement - Follow Nurse / BPA Driven Protocol    Diclofenac Sodium    HYDROcodone-acetaminophen    Magnesium Standard Dose Replacement - Follow Nurse / BPA Driven Protocol    melatonin    Morphine **AND** naloxone    ondansetron **OR** ondansetron    Phosphorus Replacement - Follow Nurse / BPA Driven Protocol    Potassium Replacement - Follow Nurse / BPA Driven Protocol    Sodium Chloride (PF)    sodium chloride    sodium chloride    Assessment & Plan   Assessment & Plan     Active Hospital Problems    Diagnosis  POA    **Gross hematuria [R31.0]  Yes    Bladder mass [N32.89]  Yes    Anemia [D64.9]  Yes    Stage 3a chronic kidney disease [N18.31]  Yes    Chronic diastolic CHF (congestive heart failure) [I50.32]  Yes    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        Brief Hospital Course to date:  Chaitanya Browne is a 89 y/o male presenting with gross hematuria.     Gross hematuria  Abnl CT A/P concerning for bladder mass  Acute Blood Loss Anemia  Orthostatic hypotension  --Will obs overnight and check serial h/h. Given h/h drop to 7.3 combined with orthostatis- will tx 1 unit prbc now   --Consult urology. Apparently was supposed to have cystoscopy with Dr. Woods today, they have recommended conservative management at this time   --Hold asa.  --CBI if needed for return of hematuria.       Basal cell cancer of scalp  --Followed by [facility]. Currently undergoing injections.     CKD IIIa  --Appears near baseline. Holding bumex, IVF as above. BMP in am.     Acute on Chronic diastolic heart failure  --Has been taking extra bumex due to his pitting edema. Holding bumex now given his hypotension and concern for bleeding. Watch volume status, resume as needed.    Expected Discharge Location and  Transportation: home vs rehab pending pt eval  Expected Discharge   Expected Discharge Date: 8/24/2023; Expected Discharge Time:      DVT prophylaxis:  Mechanical DVT prophylaxis orders are present.     AM-PAC 6 Clicks Score (PT): 18 (08/22/23 1001)    CODE STATUS:   Code Status and Medical Interventions:   Ordered at: 08/20/23 1821     Medical Intervention Limits:    NO intubation (DNI)     Code Status (Patient has no pulse and is not breathing):    No CPR (Do Not Attempt to Resuscitate)     Medical Interventions (Patient has pulse or is breathing):    Limited Support       Elza Milian MD  08/22/23

## 2023-08-23 ENCOUNTER — READMISSION MANAGEMENT (OUTPATIENT)
Dept: CALL CENTER | Facility: HOSPITAL | Age: 88
End: 2023-08-23
Payer: MEDICARE

## 2023-08-23 ENCOUNTER — APPOINTMENT (OUTPATIENT)
Dept: CT IMAGING | Facility: HOSPITAL | Age: 88
End: 2023-08-23
Payer: MEDICARE

## 2023-08-23 ENCOUNTER — APPOINTMENT (OUTPATIENT)
Dept: NEUROLOGY | Facility: HOSPITAL | Age: 88
End: 2023-08-23
Payer: MEDICARE

## 2023-08-23 VITALS
RESPIRATION RATE: 18 BRPM | WEIGHT: 177.3 LBS | TEMPERATURE: 97.7 F | HEART RATE: 92 BPM | DIASTOLIC BLOOD PRESSURE: 61 MMHG | SYSTOLIC BLOOD PRESSURE: 106 MMHG | HEIGHT: 65 IN | BODY MASS INDEX: 29.54 KG/M2 | OXYGEN SATURATION: 92 %

## 2023-08-23 LAB
HCT VFR BLD AUTO: 29.1 % (ref 37.5–51)
HCT VFR BLD AUTO: 29.8 % (ref 37.5–51)
HGB BLD-MCNC: 9.4 G/DL (ref 13–17.7)
HGB BLD-MCNC: 9.5 G/DL (ref 13–17.7)

## 2023-08-23 PROCEDURE — G0378 HOSPITAL OBSERVATION PER HR: HCPCS

## 2023-08-23 PROCEDURE — 95819 EEG AWAKE AND ASLEEP: CPT

## 2023-08-23 PROCEDURE — 95819 EEG AWAKE AND ASLEEP: CPT | Performed by: PSYCHIATRY & NEUROLOGY

## 2023-08-23 PROCEDURE — 70450 CT HEAD/BRAIN W/O DYE: CPT

## 2023-08-23 PROCEDURE — 97530 THERAPEUTIC ACTIVITIES: CPT

## 2023-08-23 PROCEDURE — 97535 SELF CARE MNGMENT TRAINING: CPT

## 2023-08-23 PROCEDURE — 85018 HEMOGLOBIN: CPT | Performed by: INTERNAL MEDICINE

## 2023-08-23 PROCEDURE — 85014 HEMATOCRIT: CPT | Performed by: INTERNAL MEDICINE

## 2023-08-23 RX ORDER — ASPIRIN 81 MG/1
81 TABLET ORAL DAILY
Status: DISCONTINUED | OUTPATIENT
Start: 2023-08-23 | End: 2023-08-23 | Stop reason: HOSPADM

## 2023-08-23 RX ADMIN — ASPIRIN 81 MG: 81 TABLET, COATED ORAL at 16:11

## 2023-08-23 RX ADMIN — PRAVASTATIN SODIUM 40 MG: 40 TABLET ORAL at 10:30

## 2023-08-23 RX ADMIN — DICLOFENAC 2 G: 10 GEL TOPICAL at 10:31

## 2023-08-23 RX ADMIN — NYSTATIN: 100000 POWDER TOPICAL at 10:31

## 2023-08-23 RX ADMIN — FINASTERIDE 5 MG: 5 TABLET, FILM COATED ORAL at 10:31

## 2023-08-23 RX ADMIN — Medication 10 ML: at 10:31

## 2023-08-23 NOTE — PLAN OF CARE
Goal Outcome Evaluation:  Plan of Care Reviewed With: patient, daughter        Progress: improving  Outcome Evaluation: Pt tolerated OT intervention well this date. Pt continues to present below his functional baseline due to B hand pain (carpal tunnel syndrome), as well as strength, endurance, and balance deficits. Pt provided built up utensils and adapted cup to assist in feeding this date. BP remained stable this session. Pt will benefit from continued OT to address deficits listed and return to PLOF. Recommend home with initial 24/7 assist.      Anticipated Discharge Disposition (OT): home with assist, home with 24/7 care

## 2023-08-23 NOTE — CASE MANAGEMENT/SOCIAL WORK
Continued Stay Note  [facility]     Patient Name: Chaitanya Browne  MRN: [id number]  Today's Date: 8/23/2023    Admit Date: 8/20/2023    Plan: Home   Discharge Plan       Row Name 08/23/23 3640       Plan    Plan Home    Patient/Family in Agreement with Plan yes    Plan Comments Spoke with Mr. Browne and daughter at the bedside. PT is recommending home with OP PT. Family wants to use [facility]. Gave order to family to take to location of choice. Family agreeable to discharge plan. CM will follow up.    Final Discharge Disposition Code 01 - home or self-care                   Discharge Codes    No documentation.                 Expected Discharge Date and Time       Expected Discharge Date Expected Discharge Time    Aug 23, 2023               Nguyen Somers RN

## 2023-08-23 NOTE — THERAPY TREATMENT NOTE
Patient Name: Chaitanya Browne  : 1923    MRN: [id number]                              Today's Date: 2023       Admit Date: 2023    Visit Dx:     ICD-10-CM ICD-9-CM   1. Acute anemia  D64.9 285.9   2. History of gross hematuria  Z87.898 V13.09   3. History of skin cancer  Z85.828 V10.83     Patient Active Problem List   Diagnosis    Facial basal cell cancer    Hyperlipidemia LDL goal <70    Essential hypertension    ASHD (arteriosclerotic heart disease)    Gait abnormality    Impaired fasting glucose    Polyp, colonic    Vitamin D deficiency    Acute midline low back pain without sciatica    Proteinuria    Right carpal tunnel syndrome    Class 1 obesity due to excess calories with serious comorbidity and body mass index (BMI) of 30.0 to 30.9 in adult    Hematuria, unspecified    Medicare annual wellness visit, subsequent    Edema    Cough    Allergic rhinitis    Gross hematuria    Acute UTI (urinary tract infection)    Acute renal insufficiency    Acute urinary retention    Debility    Dysphagia    BPH with obstruction/lower urinary tract symptoms    Prostatitis    Chronic diastolic CHF (congestive heart failure)    Iron deficiency anemia due to chronic blood loss    Simple chronic bronchitis    Shortness of breath    Stage 3a chronic kidney disease    Skin tear of left forearm without complication    Squamous cell carcinoma of nose    At high risk for falls    Stage II skin ulcer    Cellulitis of right upper extremity    Bladder mass    Anemia     Past Medical History:   Diagnosis Date    Abnormal heart rhythm     Anemia     Aortic valve sclerosis     Asthma     Basal cell carcinoma (BCC) of left side of nose     BPH (benign prostatic hyperplasia)     Cataracts, bilateral     bilateralcataract extraction and lens implantation     Diastolic dysfunction     Easy bruising     Edema due to malnutrition 2020    Heart murmur     High cholesterol     History of disease     bilateral lacrimal  gland dysfunction per Dr Fajardo    Hypertension     Infection     infected big toe; I and D DR Alvares 2013    Phlebitis     Pneumonia 2009    Squamous acanthoma of face      Past Surgical History:   Procedure Laterality Date    APPENDECTOMY      CATARACT EXTRACTION, BILATERAL  2013    and lens implantation    CYSTOSCOPY N/A 9/6/2019    Procedure: CYSTOSCOPY;  Surgeon: Ck Woods MD;  Location:  [facility];  Service: Urology    CYSTOSCOPY TRANSURETHRAL RESECTION OF PROSTATE  1989    CYSTOSCOPY TRANSURETHRAL RESECTION OF PROSTATE N/A 9/11/2019    Procedure: CYSTOSCOPY TRANSURETHRAL RESECTION OF PROSTATE;  Surgeon: Ck Woods MD;  Location:  [facility];  Service: Urology    HEAD & NECK SKIN LESION EXCISIONAL BIOPSY      Basal cell removed from nose     INCISION AND DRAINAGE FOOT  2013    infected big toe Dr Alvares    TRANSURETHRAL RESECTION OF BLADDER TUMOR N/A 9/6/2019    Procedure: EVACUATION OF BLOOD CLOT, FULGERATION OF PROSTATE;  Surgeon: Ck Woods MD;  Location:  [facility];  Service: Urology      General Information       Row Name 08/23/23 1106          OT Time and Intention    Document Type therapy note (daily note)  -KF     Mode of Treatment individual therapy;occupational therapy  -KF       Row Name 08/23/23 1106          General Information    Patient Profile Reviewed yes  -KF     Existing Precautions/Restrictions fall;other (see comments)  [place] (aids in room), bilateral carpal tunnel  -KF     Barriers to Rehab medically complex;previous functional deficit  -KF       Row Name 08/23/23 1106          Cognition    Orientation Status (Cognition) oriented x 4  -KF       Row Name 08/23/23 1106          Safety Issues, Functional Mobility    Safety Issues Affecting Function (Mobility) safety precaution awareness;insight into deficits/self-awareness  -KF     Impairments Affecting Function (Mobility) balance;endurance/activity tolerance;pain;strength  -KF               User Key  (r) = Recorded By,  (t) = Taken By, (c) = Cosigned By      Initials Name Provider Type    KF Anisa Wood, OT Occupational Therapist                     Mobility/ADL's       Row Name 08/23/23 1107          Bed Mobility    Comment, (Bed Mobility) Pt found and left up in the chair.  -       Row Name 08/23/23 1107          Transfers    Transfers sit-stand transfer;stand-sit transfer;toilet transfer  -       Row Name 08/23/23 1107          Sit-Stand Transfer    Sit-Stand [place] (Transfers) contact guard;1 person assist  -     Assistive Device (Sit-Stand Transfers) walker, front-wheeled  -KF       Row Name 08/23/23 1107          Stand-Sit Transfer    Stand-Sit [place] (Transfers) contact guard;1 person assist  -     Assistive Device (Stand-Sit Transfers) walker, front-wheeled  -[facility] Name 08/23/23 1107          Toilet Transfer    Type (Toilet Transfer) sit-stand;stand-sit  -KF     [place] Level (Toilet Transfer) contact guard;1 person assist  -     Assistive Device (Toilet Transfer) grab bars/safety frame;walker, front-wheeled  -       Row Name 08/23/23 1107          Functional Mobility    Functional Mobility- Ind. Level contact guard assist;1 person  -     Functional Mobility- Device walker, front-wheeled  -     Functional Mobility-Distance (Feet) --  In room ambulation  -[facility] Name 08/23/23 1107          Activities of Daily Living    BADL Assessment/Intervention feeding;lower body dressing;toileting  -[facility] Name 08/23/23 1107          Lower Body Dressing Assessment/Training    [place] Level (Lower Body Dressing) don;shoes/slippers;minimum assist (75% patient effort)  -     Position (Lower Body Dressing) supported sitting  -       Row Name 08/23/23 1107          Self-Feeding Assessment/Training    Assistive Devices (Feeding) adapted cup;built-up handle utensils  -     Position (Self-Feeding) supported sitting  -     Comment, (Feeding) Eduation on AE provided; Hand to  mouth with utensils completed with set up  -KF       Row Name 08/23/23 1107          Toileting Assessment/Training    [place] Level (Toileting) toileting skills;adjust/manage clothing;perform perineal hygiene;standby assist  -KF     Assistive Devices (Toileting) commode;grab bar/safety frame  -KF     Position (Toileting) supported sitting  -KF               User Key  (r) = Recorded By, (t) = Taken By, (c) = Cosigned By      Initials Name Provider Type    KF Anisa Wood OT Occupational Therapist                   Obj/Interventions       Row Name 08/23/23 1110          Balance    Balance Assessment sitting static balance;sitting dynamic balance;standing static balance;standing dynamic balance  -KF     Static Sitting Balance supervision  -KF     Dynamic Sitting Balance contact guard  -KF     Position, Sitting Balance supported;sitting in chair  -KF     Static Standing Balance contact guard  -KF     Dynamic Standing Balance contact guard  -KF     Position/Device Used, Standing Balance supported;walker, front-wheeled  -KF     Balance Interventions sitting;standing;sit to stand;supported;static;dynamic;occupation based/functional task  -KF     Comment, Balance No overt LOB this date  -KF               User Key  (r) = Recorded By, (t) = Taken By, (c) = Cosigned By      Initials Name Provider Type    KF Anisa Wood, YESENIA Occupational Therapist                   Goals/Plan    No documentation.                  Clinical Impression       Row Name 08/23/23 1111          Pain Assessment    Pretreatment Pain Rating 10/10  -KF     Posttreatment Pain Rating 8/10  -KF     Pain Location - Side/Orientation Bilateral  -KF     Pain Location generalized  -KF     Pain Location - hand;other (see comments)  CTS  -KF     Pain Intervention(s) Repositioned;Ambulation/increased activity  -KF       Row Name 08/23/23 1111          Plan of Care Review    Plan of Care Reviewed With patient;daughter  -KF     Progress improving  -KF      Outcome Evaluation Pt tolerated OT intervention well this date. Pt continues to present below his functional baseline due to B hand pain (carpal tunnel syndrome), as well as strength, endurance, and balance deficits. Pt provided built up utensils and adapted cup to assist in feeding this date. BP remained stable this session. Pt will benefit from continued OT to address deficits listed and return to PLOF. Recommend home with initial 24/7 assist.  -       Row Name 08/23/23 1111          Therapy Assessment/Plan (OT)    Rehab Potential (OT) good, to achieve stated therapy goals  -KF     Criteria for Skilled Therapeutic Interventions Met (OT) yes;skilled treatment is necessary  -KF     Therapy Frequency (OT) daily  -KF       Row Name 08/23/23 1111          Therapy Plan Review/Discharge Plan (OT)    Anticipated Discharge Disposition (OT) home with assist;home with 24/7 care  -       Row Name 08/23/23 1111          Vital Signs    Pre Systolic BP Rehab 100  Initial sitting in chair  -KF     Pre Treatment Diastolic BP 56  -KF     Intra Systolic BP Rehab 102  Standing  -KF     Intra Treatment Diastolic BP 62  -KF     Post Systolic BP Rehab 106  Post session  -KF     Post Treatment Diastolic BP 61  -KF     Pretreatment Heart Rate (beats/min) 94  -KF     Pre SpO2 (%) 95  -KF     O2 Delivery Pre Treatment room air  -KF     Pre Patient Position Sitting  -KF     Intra Patient Position Standing  -KF     Post Patient Position Sitting  -KF       Row Name 08/23/23 1111          Positioning and Restraints    Pre-Treatment Position sitting in chair/recliner  -KF     Post Treatment Position chair  -KF     In Chair reclined;call light within reach;encouraged to call for assist;exit alarm on;with family/caregiver;waffle cushion;legs elevated  -KF               User Key  (r) = Recorded By, (t) = Taken By, (c) = Cosigned By      Initials Name Provider Type    Anisa Cheney, OT Occupational Therapist                   Outcome  Measures       Row Name 08/23/23 1117          How much help from another is currently needed...    Putting on and taking off regular lower body clothing? 3  -KF     Bathing (including washing, rinsing, and drying) 2  -KF     Toileting (which includes using toilet bed pan or urinal) 3  -KF     Putting on and taking off regular upper body clothing 3  -KF     Taking care of personal grooming (such as brushing teeth) 3  -KF     Eating meals 3  -KF     AM-PAC 6 Clicks Score (OT) 17  -KF       Row Name 08/23/23 0800          How much help from another person do you currently need...    Turning from your back to your side while in flat bed without using bedrails? 3  -JM     Moving from lying on back to sitting on the side of a flat bed without bedrails? 3  -JM     Moving to and from a bed to a chair (including a wheelchair)? 3  -JM     Standing up from a chair using your arms (e.g., wheelchair, bedside chair)? 3  -JM     Climbing 3-5 steps with a railing? 3  -JM     To walk in hospital room? 3  -JM     AM-PAC 6 Clicks Score (PT) 18  -JM     Highest level of mobility 6 --> Walked 10 steps or more  -JM       Row Name 08/23/23 1117          Functional Assessment    Outcome Measure Options AM-PAC 6 Clicks Daily Activity (OT)  -               User Key  (r) = Recorded By, (t) = Taken By, (c) = Cosigned By      Initials Name Provider Type    Cecilia Pérez, RN Registered Nurse    Anisa Cheney OT Occupational Therapist                    Occupational Therapy Education       Title: PT OT SLP Therapies (In Progress)       Topic: Occupational Therapy (In Progress)       Point: ADL training (Done)       Description:   Instruct learner(s) on proper safety adaptation and remediation techniques during self care or transfers.   Instruct in proper use of assistive devices.                  Learning Progress Summary             Patient Acceptance, E, VU by KF at 8/23/2023 1026    Acceptance, D,E, VU,DU by TIESHA at 8/21/2023 1001    Family Acceptance, E, VU by KF at 8/23/2023 1026                         Point: Home exercise program (Not Started)       Description:   Instruct learner(s) on appropriate technique for monitoring, assisting and/or progressing therapeutic exercises/activities.                  Learner Progress:  Not documented in this visit.              Point: Precautions (Done)       Description:   Instruct learner(s) on prescribed precautions during self-care and functional transfers.                  Learning Progress Summary             Patient Acceptance, E, VU by KF at 8/23/2023 1026    Acceptance, D,E, VU,DU by  at 8/21/2023 1001   Family Acceptance, E, VU by KF at 8/23/2023 1026                         Point: Body mechanics (Done)       Description:   Instruct learner(s) on proper positioning and spine alignment during self-care, functional mobility activities and/or exercises.                  Learning Progress Summary             Patient Acceptance, E, VU by KF at 8/23/2023 1026    Acceptance, D,E, VU,DU by  at 8/21/2023 1001   Family Acceptance, E, VU by KF at 8/23/2023 1026                                         User Key       Initials Effective Dates Name Provider Type Discipline     06/16/21 -  Michael Roque OT Occupational Therapist OT     08/09/23 -  Anisa Wood OT Occupational Therapist OT                  OT Recommendation and Plan  Therapy Frequency (OT): daily  Plan of Care Review  Plan of Care Reviewed With: patient, daughter  Progress: improving  Outcome Evaluation: Pt tolerated OT intervention well this date. Pt continues to present below his functional baseline due to B hand pain (carpal tunnel syndrome), as well as strength, endurance, and balance deficits. Pt provided built up utensils and adapted cup to assist in feeding this date. BP remained stable this session. Pt will benefit from continued OT to address deficits listed and return to PLOF. Recommend home with initial 24/7  assist.     Time Calculation:         Time Calculation- OT       Row Name 08/23/23 1119             Time Calculation- OT    OT Start Time 1026  -KF      OT Received On 08/23/23  -KF      OT Goal Re-Cert Due Date 08/31/23  -KF         Timed Charges    81439 - OT Therapeutic Activity Minutes 15  -KF      08400 - OT Self Care/Mgmt Minutes 24  -KF         Total Minutes    Timed Charges Total Minutes 39  -KF       Total Minutes 39  -KF                User Key  (r) = Recorded By, (t) = Taken By, (c) = Cosigned By      Initials Name Provider Type    KF Anisa Wood OT Occupational Therapist                  Therapy Charges for Today       Code Description Service Date Service Provider Modifiers Qty    49013267230 HC OT THERAPEUTIC ACT EA 15 MIN 8/23/2023 Anisa Wood OT GO 1    11251271613 HC OT SELF CARE/MGMT/TRAIN EA 15 MIN 8/23/2023 Anisa Wood OT GO 2                 Anisa Wood OT  8/23/2023

## 2023-08-23 NOTE — PROGRESS NOTES
[facility] Medicine Services  PROGRESS NOTE    Patient Name: Chaitanya Browne  : 1923  MRN: [id number]    Date of Admission: 2023  Primary Care Physician: Dirk Russ MD    Subjective   Subjective     CC:  Hematuria     HPI:  Patient sitting in chair , reports he feels good and wants to go home  D/w daughter later at bedside who is concerned about his word finding episode yesterday as well as high low-normal BP.  Will obtain EEG/CT    ROS:  As above      Objective   Objective     Vital Signs:   Temp:  [97.7 øF (36.5 øC)-98.7 øF (37.1 øC)] 97.7 øF (36.5 øC)  Heart Rate:  [] 79  Resp:  [16-18] 18  BP: ()/(51-82) 106/61     Physical Exam:  GEN: NAD, resting in bed, awake, chronically ill appearing   HEENT: on room air, atraumatic, normocephalic  NECK: supple, no masses  RESP: on room air, normal effort  CV: on tele, sinus rhythm, bp improved   PSYCH: normal affect, appropriate  NEURO: awake, alert, no focal deficits noted  MSK: no edema noted  SKIN: no rashes noted       Results Reviewed:  LAB RESULTS:      Lab 23  0850 23  2352 23  1620 23  0807 23  2332 23  2353 23  1640   WBC  --   --   --  8.44  --   --  6.79   HEMOGLOBIN 9.4* 9.5* 9.0* 9.2*  9.2* 8.3*   < > 8.4*   HEMATOCRIT 29.1* 29.8* 28.0* 28.7*  28.7* 25.6*   < > 26.9*   PLATELETS  --   --   --  270  --   --  281   NEUTROS ABS  --   --   --  5.87  --   --  4.67   IMMATURE GRANS (ABS)  --   --   --  0.04  --   --  0.03   LYMPHS ABS  --   --   --  0.85  --   --  0.81   MONOS ABS  --   --   --  1.52*  --   --  1.16*   EOS ABS  --   --   --  0.11  --   --  0.09   MCV  --   --   --  99.0*  --   --  101.9*   LACTATE  --   --   --   --   --   --  1.4    < > = values in this interval not displayed.         Lab 23  0807 23  0812 23  1640   SODIUM 140 140 138   POTASSIUM 3.8 3.6 3.9   CHLORIDE 105 103 99   CO2 24.0 29.0 30.0*   ANION GAP 11.0 8.0 9.0    BUN 18 21 22   CREATININE 1.22 1.32* 1.51*   EGFR 52.9* 48.1* 41.0*   GLUCOSE 100* 116* 131*   CALCIUM 8.4 8.0* 8.6         Lab 08/22/23  0807 08/20/23  1640   TOTAL PROTEIN 5.2* 5.6*   ALBUMIN 2.9* 3.3*   GLOBULIN 2.3 2.3   ALT (SGPT) 7 8   AST (SGOT) 24 23   BILIRUBIN 2.1* 0.8   ALK PHOS 75 78   LIPASE  --  51                 Lab 08/20/23  1940   ABO TYPING A   RH TYPING Positive   ANTIBODY SCREEN Negative         Brief Urine Lab Results  (Last result in the past 365 days)        Color   Clarity   Blood   Leuk Est   Nitrite   Protein   CREAT   Urine HCG        08/20/23 1707 Yellow   Clear   Moderate (2+)   Small (1+)   Negative   Negative                   Microbiology Results Abnormal       None            EEG    Result Date: 8/23/2023  Reason for referral: 89 y.o.male with word finding difficulty, consideration of seizure Technical Summary:  A 19 channel digital EEG was performed using the international 10-20 placement system, including eye leads and EKG leads. Duration: 22 minutes Findings: The patient is asleep at the beginning of the study.  The background shows diffuse low to medium amplitude 3 to 6 Hz intermixed delta and theta activity.  Vertex waves and sleep spindles are frequently seen.  Later in the study, the patient is awake, and intermixed theta and alpha activity is seen over both hemispheres with EMG and eye blink artifact present anteriorly.  At times a 9 Hz posterior rhythm is seen symmetrically over the occipital leads.  Photic stimulation does not change the background.  Hyperventilation is not performed.  No focal features or epileptiform activity are seen. Video: Available Technical quality: Superior EKG: Regular, 80 bpm SUMMARY: Normal EEG in the awake and asleep states No focal features or epileptiform activity are seen     Impression: Normal study This report is transcribed using the Dragon dictation system.       Results for orders placed during the hospital encounter of  09/02/19    Adult Transthoracic Echo Complete W/ Cont if Necessary Per Protocol    Interpretation Summary  ú Mild-to-moderate mitral valve regurgitation is present.  ú Estimated EF = 72%.  ú Left ventricular systolic function is hyperdynamic (EF > 70).  ú Left ventricular diastolic dysfunction (grade I) consistent with impaired relaxation.  ú Left ventricular wall thickness is consistent with mild concentric hypertrophy.  ú Left atrial cavity size is borderline dilated.  ú Normal right ventricular cavity size, wall thickness, systolic function and septal motion noted.  ú Mild mitral valve stenosis is present with functional MAC.  ú The aortic valve exhibits moderate sclerosis without stenosis.  ú No evidence of pulmonary hypertension is present.  ú There is no evidence of pericardial effusion.      Current medications:  Scheduled Meds:finasteride, 5 mg, Oral, Daily  nystatin, , Topical, Q12H  pravastatin, 40 mg, Oral, Daily  senna-docusate sodium, 2 tablet, Oral, BID  sodium chloride, 10 mL, Intravenous, Q12H      Continuous Infusions:   PRN Meds:.  acetaminophen **OR** acetaminophen **OR** acetaminophen    senna-docusate sodium **AND** polyethylene glycol **AND** bisacodyl **AND** bisacodyl    Calcium Replacement - Follow Nurse / BPA Driven Protocol    Diclofenac Sodium    HYDROcodone-acetaminophen    Magnesium Standard Dose Replacement - Follow Nurse / BPA Driven Protocol    melatonin    Morphine **AND** naloxone    ondansetron **OR** ondansetron    Phosphorus Replacement - Follow Nurse / BPA Driven Protocol    Potassium Replacement - Follow Nurse / BPA Driven Protocol    Sodium Chloride (PF)    sodium chloride    sodium chloride    Assessment & Plan   Assessment & Plan     Active Hospital Problems    Diagnosis  POA    **Gross hematuria [R31.0]  Yes    Bladder mass [N32.89]  Yes    Anemia [D64.9]  Yes    Stage 3a chronic kidney disease [N18.31]  Yes    Chronic diastolic CHF (congestive heart failure) [I50.32]  Yes     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        Brief Hospital Course to date:  Chaitanya Browne is a 89 y/o male presenting with gross hematuria.     Gross hematuria  Abnl CT A/P concerning for bladder mass  Acute Blood Loss Anemia  Orthostatic hypotension  --Will obs overnight and check serial h/h. Given h/h drop to 7.3 combined with orthostatis- will tx 1 unit prbc now   --Consult urology. Apparently was supposed to have cystoscopy with Dr. Woods today, they have recommended conservative management at this time   --Hold asa.  --CBI if needed for return of hematuria.    Episode of word finding difficulty  --lasted 10 min per nursing, resolved on my eval  --d/w daughter- will obtain CT H today- ordered and pending, as well as EEG        Basal cell cancer of scalp  --Followed by [facility]. Currently undergoing injections.     CKD IIIa  --Appears near baseline. Holding bumex, IVF as above. BMP in am.     Acute on Chronic diastolic heart failure  --Has been taking extra bumex due to his pitting edema. Holding bumex now given his hypotension and concern for bleeding. Would not resume at this time given his low normal BP    Expected Discharge Location and Transportation: home pending above  Expected Discharge   Expected Discharge Date: 8/24/2023; Expected Discharge Time:      DVT prophylaxis:  Mechanical DVT prophylaxis orders are present.     AM-PAC 6 Clicks Score (PT): 18 (08/22/23 1001)    CODE STATUS:   Code Status and Medical Interventions:   Ordered at: 08/20/23 1821     Medical Intervention Limits:    NO intubation (DNI)     Code Status (Patient has no pulse and is not breathing):    No CPR (Do Not Attempt to Resuscitate)     Medical Interventions (Patient has pulse or is breathing):    Limited Support       Elza Milian MD  08/23/23

## 2023-08-23 NOTE — OUTREACH NOTE
Prep Survey      Flowsheet Row Responses   [facility] patient discharged from? [place]   Is LACE score < 7 ? No   Eligibility [facility]   Date of Admission 08/20/23   Date of Discharge 08/23/23   Discharge Disposition Home or Self Care   Discharge diagnosis *Gross hematuria   Does the patient have one of the following disease processes/diagnoses(primary or secondary)? Other   Does the patient have Home health ordered? Yes   What is the Home health agency?  [facility]   Is there a DME ordered? No   Prep survey completed? Yes            TUNDE BECKHAM - Registered Nurse

## 2023-08-24 ENCOUNTER — OFFICE VISIT (OUTPATIENT)
Dept: INTERNAL MEDICINE | Facility: CLINIC | Age: 88
End: 2023-08-24
Payer: MEDICARE

## 2023-08-24 ENCOUNTER — TRANSITIONAL CARE MANAGEMENT TELEPHONE ENCOUNTER (OUTPATIENT)
Dept: CALL CENTER | Facility: HOSPITAL | Age: 88
End: 2023-08-24
Payer: MEDICARE

## 2023-08-24 VITALS
DIASTOLIC BLOOD PRESSURE: 58 MMHG | TEMPERATURE: 98.3 F | WEIGHT: 183 LBS | SYSTOLIC BLOOD PRESSURE: 100 MMHG | HEIGHT: 65 IN | BODY MASS INDEX: 30.49 KG/M2 | HEART RATE: 88 BPM

## 2023-08-24 DIAGNOSIS — D50.0 IRON DEFICIENCY ANEMIA DUE TO CHRONIC BLOOD LOSS: Primary | ICD-10-CM

## 2023-08-24 DIAGNOSIS — R26.9 GAIT ABNORMALITY: ICD-10-CM

## 2023-08-24 DIAGNOSIS — R40.4 TRANSIENT ALTERATION OF AWARENESS: ICD-10-CM

## 2023-08-24 DIAGNOSIS — R31.0 GROSS HEMATURIA: ICD-10-CM

## 2023-08-24 DIAGNOSIS — M62.81 GENERALIZED MUSCLE WEAKNESS: ICD-10-CM

## 2023-08-24 DIAGNOSIS — B35.6 TINEA CRURIS: ICD-10-CM

## 2023-08-24 RX ORDER — KETOCONAZOLE 20 MG/G
1 CREAM TOPICAL DAILY
Qty: 60 G | Refills: 1 | Status: SHIPPED | OUTPATIENT
Start: 2023-08-24

## 2023-08-24 NOTE — PROGRESS NOTES
Chief Complaint   Patient presents with    Hospital Follow Up Visit     Counseling was given to patient and family for the following topics: diagnostic results, instructions for management, risk factor reductions, prognosis, patient and family education, impressions, risks and benefits of treatment options, and importance of treatment compliance . Total time of the encounter was 67 minutes  History of Present Illness  89 y.o. male presents for follow up from [facility] for gross blood in urine and anemia. He was there from 8/20 to 8/22/2023. He will follow with Dr Woods 9/7/2023. He  has [facility] [place] and will do outpatient PT. He was weak and had symptomatic anemia and was given blood during the hospitalization. He had episode of trouble speaking and confusion and scan that was ok.     Review of Systems   Constitutional:  Positive for fatigue.   HENT:  Positive for hearing loss.    Respiratory:  Negative for cough and shortness of breath.    Cardiovascular:  Negative for chest pain.   Genitourinary:  Negative for dysuria, flank pain and hematuria.   Musculoskeletal:  Positive for gait problem.        Carpal tunnel    Skin:  Positive for rash (rash groin).   Neurological:  Positive for weakness.   Psychiatric/Behavioral:  Negative for dysphoric mood.    .    PMSFH:  The following portions of the patient's history were reviewed and updated as appropriate: allergies, current medications, past family history, past medical history, past social history, past surgical history and problem list.      Current Outpatient Medications:     aspirin 81 MG EC tablet, Take 1 tablet by mouth Every Other Day., Disp: , Rfl:     azelastine (ASTELIN) 0.1 % nasal spray, USE 2 SPRAYS IN EACH NOSTRIL TWO TIMES A DAY AS DIRECTED, Disp: 90 each, Rfl: 3    bumetanide (Bumex) 1 MG tablet, 1 mg BID, may take extra 1 mg with 3-5 lb weight gain, dyspnea, or worsening edema., Disp: 200 tablet, Rfl: 1    Cemiplimab-[facility]  "(LIBTAYO IV), Infuse  into a venous catheter., Disp: , Rfl:     Cholecalciferol (Vitamin D3) 25 MCG (1000 UT) capsule, Take  by mouth., Disp: , Rfl:     finasteride (PROSCAR) 5 MG tablet, TAKE 1 TABLET BY MOUTH DAILY, Disp: 90 tablet, Rfl: 1    fluocinonide (LIDEX) 0.05 % external solution, Apply 1 application topically to the appropriate area as directed As Needed., Disp: , Rfl:     ketoconazole (NIZORAL) 2 % cream, Apply 1 application  topically to the appropriate area as directed Daily. For 3 weeks to groin and prn return of rash, Disp: 60 g, Rfl: 1    ketoconazole (NIZORAL) 2 % shampoo, Apply  topically to the appropriate area as directed As Needed., Disp: , Rfl:     Menthol, Topical Analgesic, (Biofreeze) 4 % gel, Apply  topically., Disp: , Rfl:     pravastatin (PRAVACHOL) 40 MG tablet, Take 1 tablet by mouth Daily. 3x a week, Disp: 90 tablet, Rfl: 3    VITALS:  /58   Pulse 88   Temp 98.3 øF (36.8 øC)   Ht 165.1 cm (65\")   Wt 83 kg (183 lb)   BMI 30.45 kg/mý     Physical Exam  Constitutional:       Appearance: Normal appearance.   Cardiovascular:      Rate and Rhythm: Normal rate and regular rhythm.   Pulmonary:      Breath sounds: No wheezing or rales.   Abdominal:      General: Bowel sounds are normal.      Palpations: Abdomen is soft.      Tenderness: There is no abdominal tenderness. There is no right CVA tenderness, left CVA tenderness or guarding.   Skin:     Comments: Erythematous scale groin Left > right side.    Neurological:      General: No focal deficit present.      Mental Status: He is alert and oriented to person, place, and time.      Comments: Slow get up and go    Psychiatric:         Mood and Affect: Mood normal.       LABS:    CMP:  Lab Results   Component Value Date    BUN 18 08/22/2023    CREATININE 1.22 08/22/2023    EGFRIFNONA 50 (L) 02/22/2022    EGFRIFAFRI 60 (L) 02/22/2022     08/22/2023    K 3.8 08/22/2023     08/22/2023    CALCIUM 8.4 08/22/2023    ALBUMIN 2.9 " (L) 08/22/2023    BILITOT 2.1 (H) 08/22/2023    ALKPHOS 75 08/22/2023    AST 24 08/22/2023    ALT 7 08/22/2023     CBC:  Lab Results   Component Value Date    WBC 8.44 08/22/2023    RBC 2.90 (L) 08/22/2023    HGB 9.4 (L) 08/23/2023    HCT 29.1 (L) 08/23/2023    MCV 99.0 (H) 08/22/2023    MCH 31.7 08/22/2023    MCHC 32.1 08/22/2023    RDW 14.1 08/22/2023     08/22/2023     Procedures       Current outpatient and discharge medications have been reconciled for the patient.  Reviewed by: Dirk Russ MD   ASSESSMENT/PLAN:  Diagnoses and all orders for this visit:    1. Iron deficiency anemia due to chronic blood loss (Primary)  Assessment & Plan:  No more bleeding and will repeat within 2 weeks the blood count and follow with Dr Leggett and Dr Woods.     Orders:  -     CBC & Differential; Future    2. Gross hematuria  Assessment & Plan:  89 y.o. male presents for follow up from [facility] for gross blood in urine and anemia. He was there from 8/20 to 8/22/2023. He will follow with Dr Woods 9/7/2023. He  has [facility] [place] and will do outpatient PT. He was weak and had symptomatic anemia and was given blood during the hospitalization. He had episode of trouble speaking and confusion and scan that was ok. I have gone over his medications, hospitalization and follow up visits with he and his daughter. They declined Home Health but will do outpatient PT with [facility]. He is highly complex and left the hospital yesterday. He has been worked on one day out of hospital as highly risk for ER visit and or hospitalization. He will follow with chemo c group next Thursday 8/30/2023. He has no more gross blood in urine and blood count stable. Repeat blood count next week or early following week so Dr Woods will have it to help determine if needs bladder scope.     Orders:  -     CBC & Differential; Future    3. Gait abnormality  Assessment & Plan:  Encourage to get up slowly and get bearings  before taking first step. Keep home well lit with clear floors to avoid tripping. Use assist devices for all walking especially from bed to bathroom.  Proceed with PT     Orders:  -     Ambulatory Referral to Physical Therapy Evaluate and treat; Full weight bearing    4. Generalized muscle weakness  -     Ambulatory Referral to Physical Therapy Evaluate and treat; Full weight bearing    5. Transient alteration of awareness  Comments:  he has had spell in hospital and scan of head and scan was ok overall.    6. Tinea cruris  Comments:  Acute issue and given ketoconazole.    Other orders  -     ketoconazole (NIZORAL) 2 % cream; Apply 1 application  topically to the appropriate area as directed Daily. For 3 weeks to groin and prn return of rash  Dispense: 60 g; Refill: 1        FOLLOW-UP:  Return in 2 months (on 10/24/2023).      Electronically signed by:    Dirk Russ MD

## 2023-08-24 NOTE — OUTREACH NOTE
Call Center TCM Note      Flowsheet Row Responses   [facility] patient discharged from? [place]   Does the patient have one of the following disease processes/diagnoses(primary or secondary)? Other   TCM attempt successful? Yes   Call start time 0955   Call end time 0956   Discharge diagnosis *Gross hematuria   Meds reviewed with patient/caregiver? Yes   Is the patient having any side effects they believe may be caused by any medication additions or changes? No   Does the patient have all medications ordered at discharge? Yes   Is the patient taking all medications as directed (includes completed medication regime)? Yes   Comments PCP appt listed for today 8/24/23 @ 11 am. Not listed as HOSP DC FU appt.   Does the patient have an appointment with their PCP within 7-14 days of discharge? Yes   What is the Home health agency?  [facility].   Home health comments daughter will call to restart   Psychosocial issues? No   Did the patient receive a copy of their discharge instructions? Yes   Nursing interventions Reviewed instructions with patient   What is the patient's perception of their health status since discharge? Improving   Is the patient/caregiver able to teach back signs and symptoms related to disease process for when to call PCP? Yes   Is the patient/caregiver able to teach back signs and symptoms related to disease process for when to call 911? Yes   Is the patient/caregiver able to teach back the hierarchy of who to call/visit for symptoms/problems? PCP, Specialist, Home health nurse, Urgent Care, ED, 911 Yes   TCM call completed? Yes   Wrap up additional comments Daughter reports pt doing ok. Still weak. Pt has an appt with PCP this am.   Call end time 0956            Yamile Rosas RN    8/24/2023, 09:57 EDT

## 2023-08-25 NOTE — ASSESSMENT & PLAN NOTE
No more bleeding and will repeat within 2 weeks the blood count and follow with Dr Leggett and Dr Woods.

## 2023-08-25 NOTE — ASSESSMENT & PLAN NOTE
Encourage to get up slowly and get bearings before taking first step. Keep home well lit with clear floors to avoid tripping. Use assist devices for all walking especially from bed to bathroom.  Proceed with PT

## 2023-08-25 NOTE — DISCHARGE SUMMARY
[facility]  DISCHARGE SUMMARY    Patient Name: Chaitanya Browne  : 1923  MRN: [id number]    Date of Admission: 2023  4:22 PM  Date of Discharge:    Primary Care Physician: Dirk Russ MD    Consults       No orders found from 2023 to 2023.            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          Hospital Course:  Chaitanya Browne is a 89 y/o male presenting with gross hematuria.     Gross hematuria  Abnl CT A/P concerning for bladder mass  Acute Blood Loss Anemia  Orthostatic hypotension  --Will obs overnight and check serial h/h. Given h/h drop to 7.3 combined with orthostatis- will tx 1 unit prbc now   --Consult urology. Apparently was supposed to have cystoscopy with Dr. Woods today, they have recommended conservative management at this time   --Hold asa.  --CBI if needed for return of hematuria.     Episode of word finding difficulty  --lasted 10 min per nursing, resolved on my eval  --d/w daughter- will obtain CT H today- ordered and pending, as well as EEG -- both of these were normal and d/w daughter. Will discharge home today         Basal cell cancer of scalp  --Followed by [facility]. Currently undergoing injections.     CKD IIIa  --Appears near baseline. Holding bumex, however after d/w daughter did resume upon discharge      Acute on Chronic diastolic heart failure  --Has been taking extra bumex due to his pitting edema. Holding bumex now given his hypotension and concern for bleeding. See above, ultimately did resume on discharge         Discharge Follow Up  Recommendations for outpatient labs/diagnostics:   PCP 1-2 weeks  Dr Woods 2 weeks    Day of Discharge     HPI:   See progress note    Review of Systems  See progress note    Vital Signs:          Physical Exam:  See progress note    Pertinent  and/or Most Recent Results     LAB RESULTS:      Lab 08/23/23  0850 08/22/23  2352 08/22/23  1620 08/22/23  0807 08/21/23  2332 08/20/23  2353 08/20/23  1640   WBC  --   --   --  8.44  --   --  6.79   HEMOGLOBIN 9.4* 9.5* 9.0* 9.2*  9.2* 8.3*   < > 8.4*   HEMATOCRIT 29.1* 29.8* 28.0* 28.7*  28.7* 25.6*   < > 26.9*   PLATELETS  --   --   --  270  --   --  281   NEUTROS ABS  --   --   --  5.87  --   --  4.67   IMMATURE GRANS (ABS)  --   --   --  0.04  --   --  0.03   LYMPHS ABS  --   --   --  0.85  --   --  0.81   MONOS ABS  --   --   --  1.52*  --   --  1.16*   EOS ABS  --   --   --  0.11  --   --  0.09   MCV  --   --   --  99.0*  --   --  101.9*   LACTATE  --   --   --   --   --   --  1.4    < > = values in this interval not displayed.         Lab 08/22/23  0807 08/21/23  0812 08/20/23  1640   SODIUM 140 140 138   POTASSIUM 3.8 3.6 3.9   CHLORIDE 105 103 99   CO2 24.0 29.0 30.0*   ANION GAP 11.0 8.0 9.0   BUN 18 21 22   CREATININE 1.22 1.32* 1.51*   EGFR 52.9* 48.1* 41.0*   GLUCOSE 100* 116* 131*   CALCIUM 8.4 8.0* 8.6         Lab 08/22/23  0807 08/20/23  1640   TOTAL PROTEIN 5.2* 5.6*   ALBUMIN 2.9* 3.3*   GLOBULIN 2.3 2.3   ALT (SGPT) 7 8   AST (SGOT) 24 23   BILIRUBIN 2.1* 0.8   ALK PHOS 75 78   LIPASE  --  51                 Lab 08/20/23  1940   ABO TYPING A   RH TYPING Positive   ANTIBODY SCREEN Negative         Brief Urine Lab Results  (Last result in the past 365 days)        Color   Clarity   Blood   Leuk Est   Nitrite   Protein   CREAT   Urine HCG        08/20/23 1707 Yellow   Clear   Moderate (2+)   Small (1+)   Negative   Negative                 Microbiology Results (last 10 days)       ** No results found for the last 240 hours. **            EEG    Result  Date: 8/23/2023  Reason for referral: 89 y.o.male with word finding difficulty, consideration of seizure Technical Summary:  A 19 channel digital EEG was performed using the international 10-20 placement system, including eye leads and EKG leads. Duration: 22 minutes Findings: The patient is asleep at the beginning of the study.  The background shows diffuse low to medium amplitude 3 to 6 Hz intermixed delta and theta activity.  Vertex waves and sleep spindles are frequently seen.  Later in the study, the patient is awake, and intermixed theta and alpha activity is seen over both hemispheres with EMG and eye blink artifact present anteriorly.  At times a 9 Hz posterior rhythm is seen symmetrically over the occipital leads.  Photic stimulation does not change the background.  Hyperventilation is not performed.  No focal features or epileptiform activity are seen. Video: Available Technical quality: Superior EKG: Regular, 80 bpm SUMMARY: Normal EEG in the awake and asleep states No focal features or epileptiform activity are seen     Normal study This report is transcribed using the Dragon dictation system.      CT Abdomen Pelvis Without Contrast    Result Date: 8/20/2023  CT ABDOMEN PELVIS WO CONTRAST Date of Exam: 8/20/2023 5:09 PM CDT Indication: reported gross hematuria. Comparison: 9/3/2019 Technique: Axial CT images were obtained of the abdomen and pelvis without the administration of contrast. Reconstructed coronal and sagittal images were also obtained. Automated exposure control and iterative construction methods were used. Findings: LUNG BASES: Basilar atelectasis, right greater than left with there is elevation of the right hemidiaphragm. LIVER: Lobular morphology is similar to prior examination. There are diffuse calcified granulomata. No suspicious lesion. BILIARY/GALLBLADDER: There are gallstones. There are no gallbladder inflammatory changes. SPLEEN: There are punctate granulomata. PANCREAS:   Unremarkable ADRENAL:  Unremarkable KIDNEYS: There is mild bilateral renal cortical atrophy. No evidence of obstruction. Ureters are unremarkable in course and caliber throughout. No calculus identified. GASTROINTESTINAL/MESENTERY: No evidence of obstruction or inflammation. There is a benign lipoma within the second portion of the duodenum. There are scattered colonic diverticula without evidence of diverticulitis. AORTA/IVC:  Normal caliber. RETROPERITONEUM/LYMPH NODES:  Unremarkable REPRODUCTIVE: Irregular enlargement of the prostate gland impressing upon the urinary bladder base. BLADDER: Prostate gland impresses upon the urinary bladder base. There are 2 stones each measuring 2 mm in diameter within the urinary bladder base. OSSEUS STRUCTURES: Advanced lumbar degenerative changes with grade 1 spondylolisthesis at L4/5.     Impression: 1.There are 2 tiny calculi within the urinary bladder which may account for patient's hematuria. No additional urinary tract calculi identified. 2.Persistent enlarged prostate gland impressing upon the urinary bladder base. Urinary bladder ingrowth not excluded. However, findings are similar if not slightly decreased when compared to previous exam. 3.Other nonemergent findings as detailed above. Electronically Signed: Abdelrahman Bearden MD  8/20/2023 5:32 PM CDT  Workstation ID: [id number]    CT Head Without Contrast    Result Date: 8/23/2023  CT HEAD WO CONTRAST Date of Exam: 8/23/2023 1:49 PM CDT Indication: Mental status change, unknown cause. Comparison: None available. Technique: Axial CT images were obtained of the head without contrast administration.  Automated exposure control and iterative construction methods were used. Findings: There is no evidence of acute territorial infarction. There there is no acute intracranial hemorrhage. There are no extra-axial collections. Ventricles and CSF spaces are mildly prominent but symmetric. No mass effect nor hydrocephalus. Mild  subcortical and periventricular white matter hypoattenuation consistent with sequela microvascular ischemic disease. Brain parenchyma appears normal for age.  There is mucosal thickening and frothy fluid in the left maxillary sinus. Correlate for sinusitis. There is mucosal thickening involving the right maxillary sinus.  Osseous structures and orbits appear intact.     Impression: 1.No acute intracranial process identified. There are age-related changes. 2.Imaging features suggesting left maxillary sinusitis. Correlate with symptoms. Electronically Signed: Abdelrahman Bearden MD  8/23/2023 2:04 PM CDT  Workstation ID: [id number]             Results for orders placed during the hospital encounter of 09/02/19    Adult Transthoracic Echo Complete W/ Cont if Necessary Per Protocol    Interpretation Summary  ú Mild-to-moderate mitral valve regurgitation is present.  ú Estimated EF = 72%.  ú Left ventricular systolic function is hyperdynamic (EF > 70).  ú Left ventricular diastolic dysfunction (grade I) consistent with impaired relaxation.  ú Left ventricular wall thickness is consistent with mild concentric hypertrophy.  ú Left atrial cavity size is borderline dilated.  ú Normal right ventricular cavity size, wall thickness, systolic function and septal motion noted.  ú Mild mitral valve stenosis is present with functional MAC.  ú The aortic valve exhibits moderate sclerosis without stenosis.  ú No evidence of pulmonary hypertension is present.  ú There is no evidence of pericardial effusion.      Plan for Follow-up of Pending Labs/Results:     Discharge Details        Discharge Medications        Changes to Medications        Instructions Start Date   aspirin 81 MG EC tablet  What changed:   how much to take  when to take this   81 mg, Oral, Every Other Day             Continue These Medications        Instructions Start Date   azelastine 0.1 % nasal spray  Commonly known as: ASTELIN   USE 2 SPRAYS IN EACH NOSTRIL TWO TIMES A  DAY AS DIRECTED      Biofreeze 4 % gel  Generic drug: Menthol (Topical Analgesic)   Apply externally      bumetanide 1 MG tablet  Commonly known as: Bumex   1 mg BID, may take extra 1 mg with 3-5 lb weight gain, dyspnea, or worsening edema.      finasteride 5 MG tablet  Commonly known as: PROSCAR   5 mg, Oral, Daily      fluocinonide 0.05 % external solution  Commonly known as: LIDEX   1 application , Topical, As Needed      ketoconazole 2 % shampoo  Commonly known as: NIZORAL   Topical, As Needed      LIBTAYO IV   Intravenous      pravastatin 40 MG tablet  Commonly known as: PRAVACHOL   40 mg, Oral, Daily, 3x a week      Vitamin D3 25 MCG (1000 UT) capsule   Oral               No Known Allergies      Discharge Disposition:  Home or Self Care    Diet:  Hospital:No active diet order      Activity:      Restrictions or Other Recommendations:  As tolerated        CODE STATUS:    Code Status and Medical Interventions:   Ordered at: 08/20/23 1826     Medical Intervention Limits:    NO intubation (DNI)     Code Status (Patient has no pulse and is not breathing):    No CPR (Do Not Attempt to Resuscitate)     Medical Interventions (Patient has pulse or is breathing):    Limited Support       Future Appointments   Date Time Provider Department Center   11/14/2023 11:15 AM Dirk Russ MD [facility]   2/27/2024 11:00 AM Dirk Russ MD [facility]       Additional Instructions for the Follow-ups that You Need to Schedule       Discharge Follow-up with PCP   As directed       Currently Documented PCP:    Dirk Russ MD    PCP Phone Number:    [phone]     Follow Up Details: tomorrow as scheduled        Discharge Follow-up with Specified Provider: Dr Woods; 2 Weeks   As directed      To: Dr Woods   Follow Up: 2 Weeks                      Elza Milian MD  08/24/23      Time Spent on Discharge:  I spen 25   minutes on this discharge activity which included: face-to-face encounter with the  patient, reviewing the data in the system, coordination of the care with the nursing staff as well as consultants, documentation, and entering orders.

## 2023-08-25 NOTE — ASSESSMENT & PLAN NOTE
89 y.o. male presents for follow up from [facility] for gross blood in urine and anemia. He was there from 8/20 to 8/22/2023. He will follow with Dr Woods 9/7/2023. He  has [facility] [place] and will do outpatient PT. He was weak and had symptomatic anemia and was given blood during the hospitalization. He had episode of trouble speaking and confusion and scan that was ok. I have gone over his medications, hospitalization and follow up visits with he and his daughter. They declined Home Health but will do outpatient PT with [facility]. He is highly complex and left the hospital yesterday. He has been worked on one day out of hospital as highly risk for ER visit and or hospitalization. He will follow with chemo c group next Thursday 8/30/2023. He has no more gross blood in urine and blood count stable. Repeat blood count next week or early following week so Dr Woods will have it to help determine if needs bladder scope.

## 2023-08-29 DIAGNOSIS — R60.0 EDEMA LEG: ICD-10-CM

## 2023-08-29 DIAGNOSIS — I50.32 CHRONIC DIASTOLIC CHF (CONGESTIVE HEART FAILURE): ICD-10-CM

## 2023-08-29 RX ORDER — BUMETANIDE 1 MG/1
TABLET ORAL
Qty: 200 TABLET | Refills: 1 | Status: SHIPPED | OUTPATIENT
Start: 2023-08-29

## 2023-08-29 NOTE — PROGRESS NOTES
Subjective:     Encounter Date:08/30/2023      Patient ID: Chaitanya Browne is a 89 y.o.  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     REFERRING INTERNIST: Dirk Russ MD  UROLOGIST: Ck Woods MD  PULMONOLOGIST:  COLLIN Avila DO.  ONCOLOGIST: David Leggett MD.    Chief Complaint:   Chief Complaint   Patient presents with    ASHD (arteriosclerotic heart disease)     Problem List:  Diastolic congestive heart failure:  Treadmill stress test, 10/10/2011: 7 METS, CYRUS -32, acceptable exercise stress test without anginal type chest discomfort or ischemic EKG changes with acceptable Maxwell treadmill score and normal blood pressure response following exercise to 109% predicted maximum heart rate 132% of predicted exercise capacity  Echocardiogram, 7/14/2012: LVEF 0.55-0.60, abnormal LV.diastolic filling is observed consistent with impaired LV relaxation, moderate mitral annular calcification with no evidence of mitral stenosis or significant regurgitation  Echocardiogram, 9/3/2019: Mild to moderate MR, LVEF 0.72, LV diastolic dysfunction grade 1 consistent with impaired relaxation, LV wall thickness consistent with mild concentric hypertrophy, LA borderline dilated, normal RV cavity size, wall thickness, systolic function and septal motion noted, mild mitral stenosis is present with functional MAC.  Aortic valve exhibits moderate sclerosis without stenosis.  No pulmonary hypertension, no pericardial effusion   BNP normal October 2019  Residual CCS class 0 angina pectoris/NYHA class II biventricular CHF, November 2019  Residual class I symptoms, February 2021, August 2021, September 2021, March 2022, October 2022, August 2023  Valvular heart disease/mild to moderate mitral regurgitation, mild mitral stenosis, September 2019  Hypertension  Hyperlipidemia; on statin therapy  Chronic  kidney disease stage III  Anemia  Asthma/COPD  Lower extremity edema with negative venous duplex July 2012  BPH  Bilateral carpal tunnel syndrome  History of basal cell carcinoma on left side of nose  [facility] 14-day hospitalization September 2019 for TURP, complicated by postoperative aspiration pneumonia, hematuria, and anemia  Prediabetes with hemoglobin A1c 6.2% February 2020, 5.9% February 2022, 5.9% August 2022  Squamous cell carcinoma with recent chemoinfusion summer 2022-data deficit  3-day [facility] hospitalization August 2023 for gross hematuria  Surgical history:   Appendectomy  TURP 1989  Peritonitis as a child  Basal cell carcinoma excision from nose  I&D great toe  Cystoscopy  TURP 2019  Bilateral cataracts     No Known Allergies    Current Outpatient Medications   Medication Instructions    aspirin 81 mg, Oral, Every Other Day    azelastine (ASTELIN) 0.1 % nasal spray USE 2 SPRAYS IN EACH NOSTRIL TWO TIMES A DAY AS DIRECTED    bumetanide (Bumex) 1 MG tablet 1 mg BID, may take extra 1 mg with 3-5 lb weight gain, dyspnea, or worsening edema.    Cemiplimab-rwlc (LIBTAYO IV) Intravenous    Cholecalciferol (Vitamin D3) 25 MCG (1000 UT) capsule Oral    finasteride (PROSCAR) 5 mg, Oral, Daily    fluocinonide (LIDEX) 0.05 % external solution 1 application , Topical, As Needed    ketoconazole (NIZORAL) 2 % cream 1 application , Topical, Daily, For 3 weeks to groin and prn return of rash    ketoconazole (NIZORAL) 2 % shampoo Topical, As Needed    Menthol, Topical Analgesic, (Biofreeze) 4 % gel Apply externally    pravastatin (PRAVACHOL) 40 mg, Oral, Daily, 3x a week         HISTORY OF PRESENT ILLNESS:  The patient is here after an 11 month hiatus.  He follows closely with his oncologist for squamous cell carcinoma and is now on Libtayo.  He had a 3-day [facility] hospitalization August 2023 for gross hematuria.  During the hospitalization his Bumex was discontinued.  His hemoglobin dropped to 7.3 and he was transfused 1 PRBC.   "Apparently during hospitalization he had an episode where he could not remember anything and had difficulty word finding for about 10 minutes and had a negative CT of the head and EEG was negative for seizures.  This was a transient event (hospital delirium?)  And he has had no recurrent episodes.  He is following back up with Dr. Woods soon.  The patient denies any continued hematuria.  Dr. Russ recently added back aspirin every other day.  The patient goes for repeat laboratory testing tomorrow to reassess his hemoglobin.  He denies any chest pain, increased shortness of breath, palpitations, dizziness, presyncope, or syncope.  He is working with physical therapy a couple times a week.  He has some lower extremity edema and this past week his daughter has reinitiated his Bumex.  He has compression stockings that he can use and his caretakers have been helping him put these on.  He weighs himself daily and has not noticed any significant weight changes.  He ambulates with a walker.  He celebrated his 89th birthday by having 55 family members throw him a party.    ROS   All other systems reviewed and otherwise negative.    Procedures       Objective:       Vitals:    08/30/23 1102 08/30/23 1103   BP: 120/60 110/70   BP Location: Right arm Right arm   Patient Position: Sitting Standing   Cuff Size: Adult Adult   Pulse: 88 78   SpO2: 98% 99%   Weight: 82.4 kg (181 lb 9.6 oz)    Height: 165.1 cm (65\")      Body mass index is 30.22 kg/mý.  Last weight October 2022 was 186 pounds  Constitutional:       Appearance: Healthy appearance. Not in distress.      Comments: Ambulating with walker   Neck:      Vascular: No JVR. JVD normal.   Pulmonary:      Effort: Pulmonary effort is normal.      Breath sounds: Normal breath sounds. No wheezing. No rhonchi. No rales.   Chest:      Chest wall: Not tender to palpatation.   Cardiovascular:      PMI at left midclavicular line. Normal rate. Regular rhythm. Normal S1. Normal S2.  "      Murmurs: There is a grade 1/6 systolic murmur at the URSB, radiating to the neck.      No gallop.  No click. No rub.   Pulses:     Dorsalis pedis: 2+ bilaterally.     Posterior tibial: 2+ bilaterally.  Edema:     Ankle: bilateral 1+ edema of the ankle.     Feet: bilateral 1+ edema of the feet.  Abdominal:      General: Bowel sounds are normal.      Palpations: Abdomen is soft.      Tenderness: There is no abdominal tenderness.   Musculoskeletal: Normal range of motion.         General: No tenderness. Skin:     General: Skin is warm and dry.   Neurological:      General: No focal deficit present.      Mental Status: Alert and oriented to person, place and time.         Lab Review:   Lab Results   Component Value Date    GLUCOSE 100 (H) 08/22/2023    BUN 18 08/22/2023    CREATININE 1.22 08/22/2023    EGFRIFNONA 50 (L) 02/22/2022    EGFRIFAFRI 60 (L) 02/22/2022    BCR 14.8 08/22/2023    CO2 24.0 08/22/2023    CALCIUM 8.4 08/22/2023    ALBUMIN 2.9 (L) 08/22/2023    AST 24 08/22/2023    ALT 7 08/22/2023       Lab Results   Component Value Date    WBC 8.44 08/22/2023    HGB 9.4 (L) 08/23/2023    HCT 29.1 (L) 08/23/2023    MCV 99.0 (H) 08/22/2023     08/22/2023       Lab Results   Component Value Date    HGBA1C 5.90 (H) 08/23/2022       Lab Results   Component Value Date    TSH 1.570 06/22/2023       Lab Results   Component Value Date    CHOL 187 08/23/2022    CHOL 187 02/22/2022     Lab Results   Component Value Date    TRIG 81 08/23/2022    TRIG 112 02/22/2022     Lab Results   Component Value Date    HDL 74 (H) 08/23/2022    HDL 79 (H) 02/22/2022     Lab Results   Component Value Date    LDL 98 08/23/2022    LDL 89 02/22/2022       Advance Care Planning   ACP discussion was held with the patient during this visit. Patient has an advance directive (not in EMR), copy requested.            Assessment:     Overall continued acceptable course with no new interim cardiopulmonary complaints with fair functional  status on limited activity. We will defer additional diagnostic or therapeutic intervention from a cardiac perspective at this time.  The patient's daughter will let me know if his lower extremity swelling does not improve with reinitiating Bumex and he will continue to monitor his weight at home and use compression stockings.     Diagnosis Plan   1. Chronic diastolic CHF (congestive heart failure)  No recurrent angina pectoris or CHF on current activity schedule; continue current treatment       2. ASHD (arteriosclerotic heart disease)  No recurrent angina pectoris or CHF on current activity schedule; continue current treatment       3. Essential hypertension  Controlled, continue current cardiac medications      4. Hyperlipidemia LDL goal <70               Plan:         Patient to continue current medications and close follow up with the above providers.  Tentative cardiology follow up in March 2024 or patient may return sooner PRN.    Electronically signed by BRUCE Lopez, 08/30/23, 11:32 AM EDT.

## 2023-08-29 NOTE — TELEPHONE ENCOUNTER
Caller: Nkechi Browne    Relationship: Emergency Contact    Best call back number: [phone]    Requested Prescriptions:   Requested Prescriptions     Pending Prescriptions Disp Refills    bumetanide (Bumex) 1 MG tablet 200 tablet 1     Si mg BID, may take extra 1 mg with 3-5 lb weight gain, dyspnea, or worsening edema.        Pharmacy where request should be sent:      Last office visit with prescribing clinician: 10/13/2022   Last telemedicine visit with prescribing clinician: Visit date not found   Next office visit with prescribing clinician: 10/25/2023     Additional details provided by patient: ENOUGH MEDICATION TO LAST TILL     Does the patient have less than a 3 day supply:  [] Yes  [x] No    Would you like a call back once the refill request has been completed: [] Yes [x] No    If the office needs to give you a call back, can they leave a voicemail: [] Yes [x] No    Orlando Mendenhall Rep   23 10:41 EDT

## 2023-08-30 ENCOUNTER — OFFICE VISIT (OUTPATIENT)
Dept: CARDIOLOGY | Facility: CLINIC | Age: 88
End: 2023-08-30
Payer: MEDICARE

## 2023-08-30 VITALS
HEART RATE: 78 BPM | OXYGEN SATURATION: 99 % | WEIGHT: 181.6 LBS | SYSTOLIC BLOOD PRESSURE: 110 MMHG | BODY MASS INDEX: 30.26 KG/M2 | DIASTOLIC BLOOD PRESSURE: 70 MMHG | HEIGHT: 65 IN

## 2023-08-30 DIAGNOSIS — I50.32 CHRONIC DIASTOLIC CHF (CONGESTIVE HEART FAILURE): Primary | ICD-10-CM

## 2023-08-30 DIAGNOSIS — I10 ESSENTIAL HYPERTENSION: ICD-10-CM

## 2023-08-30 DIAGNOSIS — I25.10 ASHD (ARTERIOSCLEROTIC HEART DISEASE): ICD-10-CM

## 2023-08-30 DIAGNOSIS — E78.5 HYPERLIPIDEMIA LDL GOAL <70: ICD-10-CM

## 2023-09-01 ENCOUNTER — READMISSION MANAGEMENT (OUTPATIENT)
Dept: CALL CENTER | Facility: HOSPITAL | Age: 88
End: 2023-09-01
Payer: MEDICARE

## 2023-09-01 NOTE — OUTREACH NOTE
Medical Week 2 Survey      Flowsheet Row Responses   [facility] patient discharged from? [place]   Does the patient have one of the following disease processes/diagnoses(primary or secondary)? Other   Week 2 attempt successful? Yes   Call start time 1639   Discharge diagnosis *Gross hematuria   Call end time 1641   Meds reviewed with patient/caregiver? Yes   Is the patient having any side effects they believe may be caused by any medication additions or changes? No   Does the patient have all medications ordered at discharge? Yes   Is the patient taking all medications as directed (includes completed medication regime)? Yes   Does the patient have a primary care provider?  Yes   Does the patient have an appointment with their PCP within 7 days of discharge? Yes   Has the patient kept scheduled appointments due by today? Yes   What is the Home health agency?  [facility].   Has home health visited the patient within 72 hours of discharge? Yes   Psychosocial issues? No   Did the patient receive a copy of their discharge instructions? Yes   Nursing interventions Reviewed instructions with patient   What is the patient's perception of their health status since discharge? Improving   Is the patient/caregiver able to teach back signs and symptoms related to disease process for when to call PCP? Yes   Is the patient/caregiver able to teach back signs and symptoms related to disease process for when to call 911? Yes   Is the patient/caregiver able to teach back the hierarchy of who to call/visit for symptoms/problems? PCP, Specialist, Home health nurse, Urgent Care, ED, 911 Yes   If the patient is a current smoker, are they able to teach back resources for cessation? Not a smoker   Week 2 Call Completed? Yes   Wrap up additional comments pt doing well, has 24 hour caregivers   Call end time 1641            TUNDE BECKHAM - Registered Nurse

## 2023-09-08 DIAGNOSIS — I10 ESSENTIAL HYPERTENSION: Primary | ICD-10-CM

## 2023-09-08 DIAGNOSIS — M79.89 LOCALIZED SWELLING OF LOWER EXTREMITY: ICD-10-CM

## 2023-09-08 DIAGNOSIS — R06.09 OTHER FORMS OF DYSPNEA: ICD-10-CM

## 2023-09-08 RX ORDER — POTASSIUM CHLORIDE 1500 MG/1
20 TABLET, EXTENDED RELEASE ORAL DAILY
Qty: 30 TABLET | Refills: 5 | Status: SHIPPED | OUTPATIENT
Start: 2023-09-08

## 2023-09-08 RX ORDER — METOLAZONE 2.5 MG/1
2.5 TABLET ORAL AS NEEDED
Qty: 30 TABLET | Refills: 2 | Status: SHIPPED | OUTPATIENT
Start: 2023-09-08

## 2023-09-11 DIAGNOSIS — R06.02 SOB (SHORTNESS OF BREATH): Primary | ICD-10-CM

## 2023-09-12 ENCOUNTER — LAB (OUTPATIENT)
Dept: LAB | Facility: HOSPITAL | Age: 88
End: 2023-09-12
Payer: MEDICARE

## 2023-09-12 DIAGNOSIS — I50.32 CHRONIC DIASTOLIC CHF (CONGESTIVE HEART FAILURE): ICD-10-CM

## 2023-09-12 DIAGNOSIS — I10 ESSENTIAL HYPERTENSION: ICD-10-CM

## 2023-09-12 DIAGNOSIS — M79.89 LOCALIZED SWELLING OF LOWER EXTREMITY: ICD-10-CM

## 2023-09-12 DIAGNOSIS — R06.09 OTHER FORMS OF DYSPNEA: ICD-10-CM

## 2023-09-12 DIAGNOSIS — R31.0 GROSS HEMATURIA: ICD-10-CM

## 2023-09-12 DIAGNOSIS — D50.0 IRON DEFICIENCY ANEMIA DUE TO CHRONIC BLOOD LOSS: ICD-10-CM

## 2023-09-12 DIAGNOSIS — R06.02 SOB (SHORTNESS OF BREATH): ICD-10-CM

## 2023-09-12 LAB
ANION GAP SERPL CALCULATED.3IONS-SCNC: 13.7 MMOL/L (ref 5–15)
BUN SERPL-MCNC: 22 MG/DL (ref 8–23)
BUN/CREAT SERPL: 14.6 (ref 7–25)
CALCIUM SPEC-SCNC: 9.6 MG/DL (ref 8.2–9.6)
CHLORIDE SERPL-SCNC: 97 MMOL/L (ref 98–107)
CO2 SERPL-SCNC: 32.3 MMOL/L (ref 22–29)
CREAT SERPL-MCNC: 1.51 MG/DL (ref 0.76–1.27)
D DIMER PPP FEU-MCNC: 1.06 MCGFEU/ML (ref 0–1)
EGFRCR SERPLBLD CKD-EPI 2021: 41 ML/MIN/1.73
GLUCOSE SERPL-MCNC: 100 MG/DL (ref 65–99)
MAGNESIUM SERPL-MCNC: 2.2 MG/DL (ref 1.7–2.3)
NT-PROBNP SERPL-MCNC: 792 PG/ML (ref 0–1800)
POTASSIUM SERPL-SCNC: 3.4 MMOL/L (ref 3.5–5.2)
SODIUM SERPL-SCNC: 143 MMOL/L (ref 136–145)

## 2023-09-12 PROCEDURE — 85025 COMPLETE CBC W/AUTO DIFF WBC: CPT

## 2023-09-12 PROCEDURE — 36415 COLL VENOUS BLD VENIPUNCTURE: CPT

## 2023-09-12 PROCEDURE — 80048 BASIC METABOLIC PNL TOTAL CA: CPT

## 2023-09-12 PROCEDURE — 85007 BL SMEAR W/DIFF WBC COUNT: CPT

## 2023-09-12 PROCEDURE — 85379 FIBRIN DEGRADATION QUANT: CPT

## 2023-09-12 PROCEDURE — 83735 ASSAY OF MAGNESIUM: CPT

## 2023-09-12 PROCEDURE — 83880 ASSAY OF NATRIURETIC PEPTIDE: CPT

## 2023-09-13 DIAGNOSIS — I10 ESSENTIAL HYPERTENSION: Primary | ICD-10-CM

## 2023-09-13 LAB
DEPRECATED RDW RBC AUTO: 42.3 FL (ref 37–54)
ERYTHROCYTE [DISTWIDTH] IN BLOOD BY AUTOMATED COUNT: 12.6 % (ref 12.3–15.4)
HCT VFR BLD AUTO: 32.3 % (ref 37.5–51)
HGB BLD-MCNC: 10.9 G/DL (ref 13–17.7)
LYMPHOCYTES # BLD MANUAL: 0.96 10*3/MM3 (ref 0.7–3.1)
LYMPHOCYTES NFR BLD MANUAL: 18 % (ref 5–12)
MCH RBC QN AUTO: 31.5 PG (ref 26.6–33)
MCHC RBC AUTO-ENTMCNC: 33.7 G/DL (ref 31.5–35.7)
MCV RBC AUTO: 93.4 FL (ref 79–97)
MONOCYTES # BLD: 1.01 10*3/MM3 (ref 0.1–0.9)
NEUTROPHILS # BLD AUTO: 3.66 10*3/MM3 (ref 1.7–7)
NEUTROPHILS NFR BLD MANUAL: 65 % (ref 42.7–76)
NRBC BLD AUTO-RTO: 0 /100 WBC (ref 0–0.2)
PLAT MORPH BLD: NORMAL
PLATELET # BLD AUTO: 272 10*3/MM3 (ref 140–450)
PMV BLD AUTO: 10.9 FL (ref 6–12)
POIKILOCYTOSIS BLD QL SMEAR: ABNORMAL
RBC # BLD AUTO: 3.46 10*6/MM3 (ref 4.14–5.8)
STOMATOCYTES BLD QL SMEAR: ABNORMAL
VARIANT LYMPHS NFR BLD MANUAL: 17 % (ref 19.6–45.3)
WBC MORPH BLD: NORMAL
WBC NRBC COR # BLD: 5.63 10*3/MM3 (ref 3.4–10.8)

## 2023-09-13 RX ORDER — POTASSIUM CHLORIDE 1500 MG/1
40 TABLET, EXTENDED RELEASE ORAL DAILY
Qty: 60 TABLET | Refills: 5 | Status: SHIPPED | OUTPATIENT
Start: 2023-09-13

## 2023-09-26 ENCOUNTER — LAB (OUTPATIENT)
Dept: LAB | Facility: HOSPITAL | Age: 88
End: 2023-09-26
Payer: MEDICARE

## 2023-09-26 ENCOUNTER — TREATMENT (OUTPATIENT)
Dept: PHYSICAL THERAPY | Facility: CLINIC | Age: 88
End: 2023-09-26
Payer: MEDICARE

## 2023-09-26 DIAGNOSIS — I10 ESSENTIAL HYPERTENSION: ICD-10-CM

## 2023-09-26 DIAGNOSIS — R53.1 WEAKNESS: Primary | ICD-10-CM

## 2023-09-26 PROCEDURE — 80048 BASIC METABOLIC PNL TOTAL CA: CPT

## 2023-09-26 PROCEDURE — 97110 THERAPEUTIC EXERCISES: CPT | Performed by: PHYSICAL THERAPIST

## 2023-09-26 PROCEDURE — 97162 PT EVAL MOD COMPLEX 30 MIN: CPT | Performed by: PHYSICAL THERAPIST

## 2023-09-26 PROCEDURE — 36415 COLL VENOUS BLD VENIPUNCTURE: CPT

## 2023-09-26 PROCEDURE — 83735 ASSAY OF MAGNESIUM: CPT

## 2023-09-26 NOTE — PROGRESS NOTES
Physical Therapy Initial Evaluation and Plan of Care     [facility]          [address]     Patient: Chaitanya Browne   : 1923  Diagnosis/ICD-10 Code:  Weakness [R53.1]  Referring practitioner: Dirk Russ MD  Date of Initial Visit: 2023  Today's Date: 2023  Patient seen for 1 sessions    Subjective:     Subjective Questionnaire: DIXON 34/56      Subjective Evaluation    History of Present Illness  Mechanism of injury: Pt was recently in the hospital due to prostate bleeding. He was admitted and had a transfusion. He has had some residual weakness. Along with BLE swelling. His calf hurts only when he sits not when he walks.            Objective          Tenderness     Additional Tenderness Details  L medial calf - TTP, no change with stretching     Strength/Myotome Testing     Left Hip   Planes of Motion   Flexion: 3+  Abduction: 4    Right Hip   Planes of Motion   Flexion: 3+  Abduction: 4    Left Knee   Flexion: 4  Extension: 4    Right Knee   Flexion: 4  Extension: 4    Left Ankle/Foot   Dorsiflexion: 5    Right Ankle/Foot   Dorsiflexion: 5    Ambulation     Comments   R pelvis dropped during stance and swing      PT Neuro         Assessment & Plan       Assessment  Impairments: abnormal coordination, abnormal gait, activity intolerance, impaired balance, impaired physical strength and safety issue   Functional limitations: carrying objects, walking, standing and stooping   Assessment details:  Patient is an 89 YOM that presents to therapy with balance deficits and weakness from recent hospitalization. His daughter states he is a little slower with walking and cognitively since hospitalized. They are concerned that he had a TIA while admitted. He lives alone with caregiver and family assist. He feels his upper body and lower body need strengthening so he is able to continue to live independently. Patient will be seen for skilled PT intervention to  address deficits in order to improve global mobility and function.  Prognosis: fair    Goals  Plan Goals: Plan Goals: STG (4 visits)  1. Patient will report compliance with initial HEP.   2. Patient to improve DIXON balance score to >/= 38 /56 to decrease patient's risk of falls.  3. Patient to perform TUG within 10 sec without LOB for improved functional mobility.  4. Patient to ambulate 10 meters without AD within 10 sec without LOB for improved gait nida and functional mobility.    LTG (8 visits)  1. Patient will be I with final HEP.   2. Patient to improve DIXON balance score to >/= 42 /56 to decrease patient's risk of falls.  3. Patient to perform TUG within 9 sec without LOB for improved functional mobility.  4. Patient to ambulate 10 meters without AD within 9 sec without LOB for improved gait nida and functional mobility.       Plan  Therapy options: will be seen for skilled therapy services  Planned modality interventions: TENS  Planned therapy interventions: ADL retraining, balance/weight-bearing training, flexibility, gait training, manual therapy, neuromuscular re-education, motor coordination training, postural training, strengthening, stretching, therapeutic activities, transfer training and home exercise program  Frequency: 1x week  Duration in visits: 8  Treatment plan discussed with: patient and family  Plan details: Patient will be seen 1x/wk x 8wks with treatment to include strengthening, stretching, manual therapy, neuromuscular re-education, balance, gait and endurance training.         Timed:  Manual Therapy:    0     mins  72764;  Therapeutic Exercise:    10     mins  31469;     Neuromuscular Tahmina:    0    mins  37073;    Therapeutic Activity:     0     mins  92289;     Gait Trainin     mins  83304;     Electrical Stimulation:    0     mins  51187 ( );    Untimed:  Canalith Repositioning    0     mins 04569    Timed Treatment:   10   mins   Total Treatment:     45    mins    PT SIGNATURE: Vianca Durant, PT, DPT, MSCS, CDP, CSRS  [place] License #[id number]  DATE TREATMENT INITIATED: 9/26/2023      Medicare Initial Certification Certification Period: 9/26/20235622ezbp08u12/24/2023  I certify that the therapy services are furnished while this patient is under my care.  The services outlined above are required by this patient, and will be reviewed every 90 days.     PHYSICIAN: Dirk Russ MD  NPI: [id number]                                         DATE:     Please sign and return via fax to [phone].   Thank you,   [facility].

## 2023-09-27 LAB
ANION GAP SERPL CALCULATED.3IONS-SCNC: 10.1 MMOL/L (ref 5–15)
BUN SERPL-MCNC: 17 MG/DL (ref 8–23)
BUN/CREAT SERPL: 12 (ref 7–25)
CALCIUM SPEC-SCNC: 9.5 MG/DL (ref 8.2–9.6)
CHLORIDE SERPL-SCNC: 103 MMOL/L (ref 98–107)
CO2 SERPL-SCNC: 30.9 MMOL/L (ref 22–29)
CREAT SERPL-MCNC: 1.42 MG/DL (ref 0.76–1.27)
EGFRCR SERPLBLD CKD-EPI 2021: 44.1 ML/MIN/1.73
GLUCOSE SERPL-MCNC: 85 MG/DL (ref 65–99)
MAGNESIUM SERPL-MCNC: 2.3 MG/DL (ref 1.7–2.3)
POTASSIUM SERPL-SCNC: 4.3 MMOL/L (ref 3.5–5.2)
SODIUM SERPL-SCNC: 144 MMOL/L (ref 136–145)

## 2023-09-28 ENCOUNTER — TRANSCRIBE ORDERS (OUTPATIENT)
Dept: PHYSICAL THERAPY | Facility: CLINIC | Age: 88
End: 2023-09-28
Payer: MEDICARE

## 2023-09-28 DIAGNOSIS — G56.03 CARPAL TUNNEL SYNDROME, BILATERAL: Primary | ICD-10-CM

## 2023-10-10 ENCOUNTER — TREATMENT (OUTPATIENT)
Dept: PHYSICAL THERAPY | Facility: CLINIC | Age: 88
End: 2023-10-10
Payer: MEDICARE

## 2023-10-10 DIAGNOSIS — Z74.09 IMPAIRED FUNCTIONAL MOBILITY, BALANCE, GAIT, AND ENDURANCE: ICD-10-CM

## 2023-10-10 DIAGNOSIS — R53.1 WEAKNESS: ICD-10-CM

## 2023-10-10 DIAGNOSIS — R26.9 GAIT ABNORMALITY: Primary | ICD-10-CM

## 2023-10-10 PROCEDURE — 97110 THERAPEUTIC EXERCISES: CPT | Performed by: PHYSICAL THERAPIST

## 2023-10-10 PROCEDURE — 97530 THERAPEUTIC ACTIVITIES: CPT | Performed by: PHYSICAL THERAPIST

## 2023-10-11 NOTE — PROGRESS NOTES
Physical Therapy Daily Note  Visit: 2  Date of Initial Visit: Type: THERAPY  Noted: 2023    Patient: Chaitanya Browne   : 1923  Diagnosis/ICD-10 Code:  Gait abnormality [R26.9]  Referring practitioner: Dirk Russ MD  Date of Initial Visit: Type: THERAPY  Noted: 2023  Today's Date: 10/11/2023  Patient seen for 2 sessions    Subjective:   Patient reports: daughter states he is having problems getting in his house up one step.   Pain: 0/10  Clinical Progress: unchanged  Home Program Compliance: Yes  Treatment has included: therapeutic exercise and therapeutic activity    Objective   See Exercise, Manual, and Modality Logs for complete treatment.    PT Neuro          Assessment & Plan       Assessment  Assessment details: Patient's daughter relates he has had difficulty getting in his home as well as maneuvering one step from kitchen to living room. He performs well in clinic with cueing, however daughter told it could potentially be a technique issue as well as cognitive component. She will video patient performing step if he continues with difficulty after instruction today.     Plan  Plan details: Patient to continue with PT services to improve gait, balance, strength, transfers and overall functional mobility.          Timed:  Manual Therapy:            0     mins  16915;  Therapeutic Exercise:    30    mins  31339;     Neuromuscular Tahmina:    0    mins  73849;    Therapeutic Activity:      10     mins  33502;     Gait Trainin    mins  79962;     Electrical Stimulation:    0    mins  92106 ( );     Untimed:  Canalith Repositioning techniques _0_ 68750      Timed Treatment:   40   mins   Total Treatment:     40   mins      Vianca Durant, PT, DPT, MSCS, CDP, CSRS  [place] License #: [id number]  Physical Therapist

## 2023-10-17 ENCOUNTER — TREATMENT (OUTPATIENT)
Dept: PHYSICAL THERAPY | Facility: CLINIC | Age: 88
End: 2023-10-17
Payer: MEDICARE

## 2023-10-17 DIAGNOSIS — R53.1 WEAKNESS: ICD-10-CM

## 2023-10-17 DIAGNOSIS — R26.9 GAIT ABNORMALITY: Primary | ICD-10-CM

## 2023-10-17 DIAGNOSIS — Z74.09 IMPAIRED FUNCTIONAL MOBILITY, BALANCE, GAIT, AND ENDURANCE: ICD-10-CM

## 2023-10-17 PROCEDURE — 97110 THERAPEUTIC EXERCISES: CPT | Performed by: PHYSICAL THERAPIST

## 2023-10-17 PROCEDURE — 97112 NEUROMUSCULAR REEDUCATION: CPT | Performed by: PHYSICAL THERAPIST

## 2023-10-17 NOTE — PROGRESS NOTES
Physical Therapy Daily Note  Visit: 3  Date of Initial Visit: Type: THERAPY  Noted: 2023    Patient: Chaitanya Browne   : 1923  Diagnosis/ICD-10 Code:  Gait abnormality [R26.9]  Referring practitioner: Dirk Rsus MD  Date of Initial Visit: Type: THERAPY  Noted: 2023  Today's Date: 10/17/2023  Patient seen for 3 sessions      Subjective:   Patient reports: daughter states getting in and out of his house is probably more cognitively driven versus strength.  Pain: 0/10  Clinical Progress: improved  Home Program Compliance: Yes  Treatment has included: therapeutic exercise and NMR    Objective   See Exercise, Manual, and Modality Logs for complete treatment.    PT Neuro          Assessment & Plan       Assessment  Assessment details: Patient demonstrates good performance of exercises for HEP. He wanted his stacks of paper over the last few years to be streamlined. These were divided into UE and LE. He performed all in clinic correctly.     Plan  Plan details: Patient to continue with PT services to improve gait, balance, strength, transfers and overall functional mobility.          Timed:  Manual Therapy:            0     mins  87146;  Therapeutic Exercise:    30    mins  55775;     Neuromuscular Tahmina:    10    mins  47858;    Therapeutic Activity:      0     mins  57991;     Gait Trainin    mins  65969;     Electrical Stimulation:    0    mins  16911 ( );     Untimed:  Canalith Repositioning techniques _0_ 20048      Timed Treatment:   40   mins   Total Treatment:     40   mins      Vianca Durant, PT, DPT, MSCS, CDP, CSRS  [place] License #: [id number]  Physical Therapist

## 2023-10-24 ENCOUNTER — TREATMENT (OUTPATIENT)
Dept: PHYSICAL THERAPY | Facility: CLINIC | Age: 88
End: 2023-10-24
Payer: MEDICARE

## 2023-10-24 DIAGNOSIS — R53.1 WEAKNESS: ICD-10-CM

## 2023-10-24 DIAGNOSIS — R26.9 GAIT ABNORMALITY: Primary | ICD-10-CM

## 2023-10-24 PROCEDURE — 97112 NEUROMUSCULAR REEDUCATION: CPT | Performed by: PHYSICAL THERAPIST

## 2023-10-24 PROCEDURE — 97530 THERAPEUTIC ACTIVITIES: CPT | Performed by: PHYSICAL THERAPIST

## 2023-10-24 PROCEDURE — 97110 THERAPEUTIC EXERCISES: CPT | Performed by: PHYSICAL THERAPIST

## 2023-10-24 NOTE — PROGRESS NOTES
Physical Therapy Daily Note  Visit: 4  Date of Initial Visit: Type: THERAPY  Noted: 2023    Patient: Chaitanya Browne   : 1923  Diagnosis/ICD-10 Code:  Gait abnormality [R26.9]  Referring practitioner: Dirk Russ MD  Date of Initial Visit: Type: THERAPY  Noted: 2023  Today's Date: 10/24/2023  Patient seen for 4 sessions    Subjective:   Patient reports: he is well.   Pain: 0/10  Clinical Progress: improved  Home Program Compliance: Yes  Treatment has included: therapeutic exercise, neuromuscular re-education, and therapeutic activity    Objective   See Exercise, Manual, and Modality Logs for complete treatment.    PT Neuro          Assessment & Plan       Assessment  Assessment details: Patient demonstrates good stepping technique up onto step with verbal cues for correctness. Patient's daughter will continue to give cueing as needed for stepping up into home. Continue to progress as indicated.     Plan  Plan details: Patient to continue with PT services to improve gait, balance, strength, transfers and overall functional mobility.          Timed:  Manual Therapy:            0     mins  79279;  Therapeutic Exercise:    15    mins  96085;     Neuromuscular Tahmina:    15    mins  67220;    Therapeutic Activity:      8     mins  23954;     Gait Trainin    mins  89422;     Electrical Stimulation:    0    mins  31375 ( );     Untimed:  Canalith Repositioning techniques _0_ 28487      Timed Treatment:   38   mins   Total Treatment:     38   mins      Vianca Durant, PT, DPT, MSCS, CDP, CSRS  [place] License #: [id number]  Physical Therapist

## 2023-10-31 ENCOUNTER — TREATMENT (OUTPATIENT)
Dept: PHYSICAL THERAPY | Facility: CLINIC | Age: 88
End: 2023-10-31
Payer: MEDICARE

## 2023-10-31 DIAGNOSIS — R53.1 WEAKNESS: ICD-10-CM

## 2023-10-31 DIAGNOSIS — R26.9 GAIT ABNORMALITY: Primary | ICD-10-CM

## 2023-10-31 PROCEDURE — 97112 NEUROMUSCULAR REEDUCATION: CPT | Performed by: PHYSICAL THERAPIST

## 2023-10-31 PROCEDURE — 97110 THERAPEUTIC EXERCISES: CPT | Performed by: PHYSICAL THERAPIST

## 2023-10-31 PROCEDURE — 97530 THERAPEUTIC ACTIVITIES: CPT | Performed by: PHYSICAL THERAPIST

## 2023-10-31 NOTE — PROGRESS NOTES
PT Progress Note  Visit: 5  Date of Initial Visit: Type: THERAPY  Noted: 2023  [facility] [place]  [address]    Patient: Chaitanya Browne   : 1923  Diagnosis/ICD-10 Code:  Gait abnormality [R26.9]  Referring practitioner: Dirk Russ MD  Date of Initial Visit: Type: THERAPY  Noted: 2023  Today's Date: 10/31/2023  Patient seen for 5 sessions    Subjective:   Patient reports: he is feeling ok. Step into home is going better.   Pain: 0/10  Clinical Progress: improved  Home Program Compliance: Yes  Treatment has included: therapeutic exercise, neuromuscular re-education, and therapeutic activity    Objective   See Exercise, Manual, and Modality Logs for complete treatment.    PT Neuro         Assessment & Plan       Assessment  Impairments: abnormal coordination, abnormal gait, activity intolerance, impaired balance, impaired physical strength and safety issue   Functional limitations: carrying objects, walking, standing and stooping   Assessment details: Patient demonstrates good performance of exercises. He is able to step up and down step easily without cueing, which is an improvement from previous weeks. Patient will continue to be progressed as indicated for strengthening and functional balance.   Prognosis: fair    Goals  Plan Goals: Plan Goals: STG (4 visits)  1. Patient will report compliance with initial HEP. MET  2. Patient to improve DIXON balance score to >/= 38 /56 to decrease patient's risk of falls. ONGOING  3. Patient to perform TUG within 10 sec without LOB for improved functional mobility.ONGOING  4. Patient to ambulate 10 meters without AD within 10 sec without LOB for improved gait nida and functional mobility.ONGOING    LTG (8 visits)  1. Patient will be I with final HEP. ONGOING  2. Patient to improve DIXON balance score to >/= 42 /56 to decrease patient's risk of falls.ONGOING  3. Patient to perform TUG within 9 sec without LOB for improved  functional mobility.ONGOING  4. Patient to ambulate 10 meters without AD within 9 sec without LOB for improved gait nida and functional mobility. ONGOING      Plan  Therapy options: will be seen for skilled therapy services  Planned modality interventions: TENS  Planned therapy interventions: ADL retraining, balance/weight-bearing training, flexibility, gait training, manual therapy, neuromuscular re-education, motor coordination training, postural training, strengthening, stretching, therapeutic activities, transfer training and home exercise program  Frequency: 1x week  Duration in visits: 4  Treatment plan discussed with: patient and family  Plan details: Patient will continue be seen 1x/wk x 4 wks with treatment to include strengthening, stretching, manual therapy, neuromuscular re-education, balance, gait and endurance training.           Visit Diagnoses:    ICD-10-CM ICD-9-CM   1. Gait abnormality  R26.9 781.2   2. Weakness  R53.1 780.79       Progress toward previous goals: Partially Met      Recommendations: Continue as planned  Timeframe: 1 month    PT Signature: Vianca Durant, PT, DPT, MSCS, CDP, CSRS  [place] License #: [id number]    Based upon review of the patient's progress and continued therapy plan, it is my medical opinion that Chaitanya Browne should continue physical therapy treatment at [facility]  [address] [facility] [address] [phone].    Signature: __________________________________  Dirk Russ MD    Timed:  Manual Therapy:    0     mins  55398;  Therapeutic Exercise:    15     mins  97751;     Neuromuscular Tahmina:    20    mins  27986;    Therapeutic Activity:     10     mins  52482;     Gait Trainin     mins  82400;     Electrical Stimulation:    0     mins  30090 ( );    Untimed:  Canalith Repositioning   0 mins  80533    Timed Treatment:   45   mins   Total Treatment:     45   mins

## 2023-11-07 ENCOUNTER — TREATMENT (OUTPATIENT)
Dept: PHYSICAL THERAPY | Facility: CLINIC | Age: 88
End: 2023-11-07
Payer: MEDICARE

## 2023-11-07 DIAGNOSIS — Z74.09 IMPAIRED FUNCTIONAL MOBILITY, BALANCE, GAIT, AND ENDURANCE: ICD-10-CM

## 2023-11-07 DIAGNOSIS — R53.1 WEAKNESS: ICD-10-CM

## 2023-11-07 DIAGNOSIS — R26.9 GAIT ABNORMALITY: Primary | ICD-10-CM

## 2023-11-07 PROCEDURE — 97110 THERAPEUTIC EXERCISES: CPT | Performed by: PHYSICAL THERAPIST

## 2023-11-07 PROCEDURE — 97530 THERAPEUTIC ACTIVITIES: CPT | Performed by: PHYSICAL THERAPIST

## 2023-11-07 PROCEDURE — 97112 NEUROMUSCULAR REEDUCATION: CPT | Performed by: PHYSICAL THERAPIST

## 2023-11-07 NOTE — PROGRESS NOTES
Physical Therapy Daily Note  Visit: 6  Date of Initial Visit: Type: THERAPY  Noted: 2023    Patient: Chaitanya Browne   : 1923  Diagnosis/ICD-10 Code:  Gait abnormality [R26.9]  Referring practitioner: Dirk Russ MD  Date of Initial Visit: Type: THERAPY  Noted: 2023  Today's Date: 2023  Patient seen for 6 sessions    Subjective:   Patient reports: he has a question about his back.   Pain: 0/10  Clinical Progress: improved  Home Program Compliance: Yes  Treatment has included: therapeutic exercise, neuromuscular re-education, and therapeutic activity    Objective   See Exercise, Manual, and Modality Logs for complete treatment.    PT Neuro          Assessment & Plan       Assessment  Assessment details: Patient requires reminding of steps to get up and down from seat. He is able to perform without UE assist, but he does not remember with automaticity. Patient will continue to be progressed as indicated.     Plan  Plan details: Patient to continue with PT services to improve gait, balance, strength, transfers and overall functional mobility.          Timed:  Manual Therapy:            0     mins  46420;  Therapeutic Exercise:    20    mins  78057;     Neuromuscular Tahmina:    10    mins  76486;    Therapeutic Activity:      10     mins  85173;     Gait Trainin    mins  30434;     Electrical Stimulation:    0    mins  77459 ( );     Untimed:  Canalith Repositioning techniques _0_ 80433      Timed Treatment:   40   mins   Total Treatment:     40   mins      Vianca Durant, PT, DPT, MSCS, CDP, CSRS  [place] License #: [id number]  Physical Therapist

## 2023-11-14 ENCOUNTER — OFFICE VISIT (OUTPATIENT)
Dept: INTERNAL MEDICINE | Facility: CLINIC | Age: 88
End: 2023-11-14
Payer: MEDICARE

## 2023-11-14 VITALS
BODY MASS INDEX: 29.99 KG/M2 | SYSTOLIC BLOOD PRESSURE: 112 MMHG | DIASTOLIC BLOOD PRESSURE: 70 MMHG | HEIGHT: 65 IN | TEMPERATURE: 98 F | HEART RATE: 74 BPM | WEIGHT: 180 LBS

## 2023-11-14 DIAGNOSIS — N13.8 BPH WITH OBSTRUCTION/LOWER URINARY TRACT SYMPTOMS: Primary | ICD-10-CM

## 2023-11-14 DIAGNOSIS — N40.1 BPH WITH OBSTRUCTION/LOWER URINARY TRACT SYMPTOMS: Primary | ICD-10-CM

## 2023-11-14 DIAGNOSIS — R73.01 IMPAIRED FASTING GLUCOSE: ICD-10-CM

## 2023-11-14 DIAGNOSIS — R26.9 GAIT ABNORMALITY: ICD-10-CM

## 2023-11-14 PROCEDURE — G0008 ADMIN INFLUENZA VIRUS VAC: HCPCS | Performed by: INTERNAL MEDICINE

## 2023-11-14 PROCEDURE — 1160F RVW MEDS BY RX/DR IN RCRD: CPT | Performed by: INTERNAL MEDICINE

## 2023-11-14 PROCEDURE — 1159F MED LIST DOCD IN RCRD: CPT | Performed by: INTERNAL MEDICINE

## 2023-11-14 PROCEDURE — 99214 OFFICE O/P EST MOD 30 MIN: CPT | Performed by: INTERNAL MEDICINE

## 2023-11-14 PROCEDURE — 90662 IIV NO PRSV INCREASED AG IM: CPT | Performed by: INTERNAL MEDICINE

## 2023-11-14 RX ORDER — FINASTERIDE 5 MG/1
5 TABLET, FILM COATED ORAL DAILY
Qty: 90 TABLET | Refills: 3 | Status: SHIPPED | OUTPATIENT
Start: 2023-11-14

## 2023-11-14 NOTE — PROGRESS NOTES
Chief Complaint   Patient presents with    Immunizations    Hypertension       History of Present Illness  89 y.o. male presents for follow up blood pressure and follow up on improving strength.     Review of Systems   Musculoskeletal:  Negative for back pain.   Neurological:  Positive for weakness.   Psychiatric/Behavioral:  Negative for decreased concentration and dysphoric mood.      .    PMSFH:  The following portions of the patient's history were reviewed and updated as appropriate: allergies, current medications, past family history, past medical history, past social history, past surgical history and problem list.      Current Outpatient Medications:     aspirin 81 MG EC tablet, Take 1 tablet by mouth Every Other Day., Disp: , Rfl:     azelastine (ASTELIN) 0.1 % nasal spray, USE 2 SPRAYS IN EACH NOSTRIL TWO TIMES A DAY AS DIRECTED, Disp: 90 each, Rfl: 3    bumetanide (Bumex) 1 MG tablet, 1 mg BID, may take extra 1 mg with 3-5 lb weight gain, dyspnea, or worsening edema., Disp: 200 tablet, Rfl: 1    Cemiplimab-rwlc (LIBTAYO IV), Infuse  into a venous catheter., Disp: , Rfl:     Cholecalciferol (Vitamin D3) 25 MCG (1000 UT) capsule, Take  by mouth., Disp: , Rfl:     finasteride (PROSCAR) 5 MG tablet, Take 1 tablet by mouth Daily., Disp: 90 tablet, Rfl: 3    fluocinonide (LIDEX) 0.05 % external solution, Apply 1 application topically to the appropriate area as directed As Needed., Disp: , Rfl:     ketoconazole (NIZORAL) 2 % cream, Apply 1 application  topically to the appropriate area as directed Daily. For 3 weeks to groin and prn return of rash, Disp: 60 g, Rfl: 1    ketoconazole (NIZORAL) 2 % shampoo, Apply  topically to the appropriate area as directed As Needed., Disp: , Rfl:     Menthol, Topical Analgesic, (Biofreeze) 4 % gel, Apply  topically., Disp: , Rfl:     metOLazone (ZAROXOLYN) 2.5 MG tablet, Take 1 tablet by mouth As Needed (increased swelling/weight gain >2-3 pounds in 1 day, use sparingly).,  "Disp: 30 tablet, Rfl: 2    pravastatin (PRAVACHOL) 40 MG tablet, Take 1 tablet by mouth Daily. 3x a week, Disp: 90 tablet, Rfl: 3    VITALS:  /70   Pulse 74   Temp 98 °F (36.7 °C)   Ht 165.1 cm (65\")   Wt 81.6 kg (180 lb)   BMI 29.95 kg/m²     Physical Exam  Cardiovascular:      Rate and Rhythm: Normal rate and regular rhythm.   Pulmonary:      Effort: Pulmonary effort is normal.      Breath sounds: No wheezing.   Abdominal:      General: Bowel sounds are normal.      Palpations: Abdomen is soft.      Comments: Obese    Neurological:      Comments: Slow get up and go and good recall    Psychiatric:         Mood and Affect: Mood normal.         Behavior: Behavior normal.         LABS:    CMP:  Lab Results   Component Value Date    BUN 17 09/26/2023    CREATININE 1.42 (H) 09/26/2023    EGFRIFNONA 50 (L) 02/22/2022    EGFRIFAFRI 60 (L) 02/22/2022     09/26/2023    K 4.3 09/26/2023     09/26/2023    CALCIUM 9.5 09/26/2023    ALBUMIN 2.9 (L) 08/22/2023    BILITOT 2.1 (H) 08/22/2023    ALKPHOS 75 08/22/2023    AST 24 08/22/2023    ALT 7 08/22/2023     CBC:  Lab Results   Component Value Date    WBC 5.63 09/12/2023    RBC 3.46 (L) 09/12/2023    HGB 10.9 (L) 09/12/2023    HCT 32.3 (L) 09/12/2023    MCV 93.4 09/12/2023    MCH 31.5 09/12/2023    MCHC 33.7 09/12/2023    RDW 12.6 09/12/2023     09/12/2023     LIPID PANEL:  Lab Results   Component Value Date    TRIG 81 08/23/2022    HDL 74 (H) 08/23/2022    VLDL 15 08/23/2022    LDL 98 08/23/2022    LDLHDL 1.31 08/23/2022     HGBA1C(LAST 2):  Lab Results   Component Value Date    HGBA1C 5.90 (H) 08/23/2022    HGBA1C 5.90 (H) 02/22/2022     Procedures         ASSESSMENT/PLAN:  Diagnoses and all orders for this visit:    1. 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     Orders:  -     finasteride (PROSCAR) 5 MG tablet; Take 1 tablet by mouth Daily.  Dispense: 90 tablet; Refill: 3    2. Impaired " fasting glucose  Assessment & Plan:  Discussed decreasing bad carbohydrates, specifically sweets, breads, potatoes, corn and high caloric drinks (juices, sodas, sweet tea).  Also recommend increasing physical activity.      3. Gait abnormality  Assessment & Plan:  Encourage to get up slowly and get bearings before taking first step. Keep home well lit with clear floors to avoid tripping. Use assist devices for all walking especially from bed to bathroom.  Do PT and use walker       Other orders  -     Fluzone High-Dose 65+yrs        FOLLOW-UP:  No follow-ups on file.      Electronically signed by:    Dirk Russ MD

## 2023-11-15 NOTE — ASSESSMENT & PLAN NOTE
Encourage to get up slowly and get bearings before taking first step. Keep home well lit with clear floors to avoid tripping. Use assist devices for all walking especially from bed to bathroom.  Do PT and use walker

## 2023-11-15 NOTE — ASSESSMENT & PLAN NOTE
Discussed decreasing bad carbohydrates, specifically sweets, breads, potatoes, corn and high caloric drinks (juices, sodas, sweet tea).  Also recommend increasing physical activity.

## 2023-11-16 ENCOUNTER — TREATMENT (OUTPATIENT)
Dept: PHYSICAL THERAPY | Facility: CLINIC | Age: 88
End: 2023-11-16
Payer: MEDICARE

## 2023-11-16 DIAGNOSIS — Z74.09 IMPAIRED FUNCTIONAL MOBILITY, BALANCE, GAIT, AND ENDURANCE: ICD-10-CM

## 2023-11-16 DIAGNOSIS — R26.9 GAIT ABNORMALITY: Primary | ICD-10-CM

## 2023-11-16 DIAGNOSIS — R53.1 WEAKNESS: ICD-10-CM

## 2023-11-16 PROCEDURE — 97110 THERAPEUTIC EXERCISES: CPT | Performed by: PHYSICAL THERAPIST

## 2023-11-16 PROCEDURE — 97112 NEUROMUSCULAR REEDUCATION: CPT | Performed by: PHYSICAL THERAPIST

## 2023-11-16 PROCEDURE — 97530 THERAPEUTIC ACTIVITIES: CPT | Performed by: PHYSICAL THERAPIST

## 2023-11-16 NOTE — PROGRESS NOTES
Physical Therapy Daily Note  Visit: 7  Date of Initial Visit: Type: THERAPY  Noted: 2023    Patient: Chaitanya Browne   : 1923  Diagnosis/ICD-10 Code:  Gait abnormality [R26.9]  Referring practitioner: Dirk Russ MD  Date of Initial Visit: Type: THERAPY  Noted: 2023  Today's Date: 2023  Patient seen for 7 sessions      Subjective:   Patient reports: he just woke up right before he got here.   Pain: 0/10  Clinical Progress: improved  Home Program Compliance: Yes  Treatment has included: therapeutic exercise and neuromuscular re-education    Objective   See Exercise, Manual, and Modality Logs for complete treatment.    PT Neuro          Assessment & Plan       Assessment  Assessment details: Patient requires cueing for all exercises multiple times for correctness. This is not typical of him and likely only due to an unpredictable morning. He forgot to set his alarm and woke up later than usual. Patient will continue to be progressed as indicated.     Plan  Plan details: Patient to continue with PT services to improve gait, balance, strength, transfers and overall functional mobility.          Timed:  Manual Therapy:            0     mins  12811;  Therapeutic Exercise:    10    mins  71520;     Neuromuscular Tahmina:    22    mins  61065;    Therapeutic Activity:      8     mins  71010;     Gait Trainin    mins  68023;     Electrical Stimulation:    0    mins  74741 ( );     Untimed:  Canalith Repositioning techniques _0_ 48767      Timed Treatment:   40   mins   Total Treatment:     40   mins      Vianca Durant, PT, DPT, MSCS, CDP, CSRS  [place] License #: [id number]  Physical Therapist

## 2023-11-21 ENCOUNTER — TREATMENT (OUTPATIENT)
Dept: PHYSICAL THERAPY | Facility: CLINIC | Age: 88
End: 2023-11-21
Payer: MEDICARE

## 2023-11-21 DIAGNOSIS — R53.1 WEAKNESS: ICD-10-CM

## 2023-11-21 DIAGNOSIS — R26.9 GAIT ABNORMALITY: Primary | ICD-10-CM

## 2023-11-21 DIAGNOSIS — Z74.09 IMPAIRED FUNCTIONAL MOBILITY, BALANCE, GAIT, AND ENDURANCE: ICD-10-CM

## 2023-11-21 PROCEDURE — 97110 THERAPEUTIC EXERCISES: CPT | Performed by: PHYSICAL THERAPIST

## 2023-11-21 PROCEDURE — 97112 NEUROMUSCULAR REEDUCATION: CPT | Performed by: PHYSICAL THERAPIST

## 2023-11-21 NOTE — PROGRESS NOTES
Physical Therapy Daily Note  Visit: 8  Date of Initial Visit: Type: THERAPY  Noted: 2023    Patient: Chaitanya Browne   : 1923  Diagnosis/ICD-10 Code:  Gait abnormality [R26.9]  Referring practitioner: Dirk Russ MD  Date of Initial Visit: Type: THERAPY  Noted: 2023  Today's Date: 2023  Patient seen for 8 sessions    Subjective:   Patient reports: he is feeling good. He wants to know if he can do the seated elliptical daily.   Pain: 0/10  Clinical Progress: improved  Home Program Compliance: Yes  Treatment has included: therapeutic exercise and neuromuscular re-education    Objective   See Exercise, Manual, and Modality Logs for complete treatment.    PT Neuro          Assessment & Plan       Assessment  Assessment details: Patient demonstrates good performance of exercises with little verbal cueing needed. Sit to stands required no cues, he was able to perform correctly the first time attempted. Continue to progress as indicated.     Plan  Plan details: Patient to continue with PT services to improve gait, balance, strength, transfers and overall functional mobility.          Timed:  Manual Therapy:            0     mins  05824;  Therapeutic Exercise:    15    mins  44067;     Neuromuscular Tahmina:    25    mins  64341;    Therapeutic Activity:      0     mins  78356;     Gait Trainin    mins  14640;     Electrical Stimulation:    0    mins  05752 ( );     Untimed:  Canalith Repositioning techniques _0_ 23990      Timed Treatment:   40   mins   Total Treatment:     40   mins      Vianca Durant, PT, DPT, MSCS, CDP, CSRS  [place] License #: [id number]  Physical Therapist

## 2023-11-28 ENCOUNTER — TREATMENT (OUTPATIENT)
Dept: PHYSICAL THERAPY | Facility: CLINIC | Age: 88
End: 2023-11-28
Payer: MEDICARE

## 2023-11-28 DIAGNOSIS — R53.1 WEAKNESS: ICD-10-CM

## 2023-11-28 DIAGNOSIS — R26.9 GAIT ABNORMALITY: Primary | ICD-10-CM

## 2023-11-28 DIAGNOSIS — Z74.09 IMPAIRED FUNCTIONAL MOBILITY, BALANCE, GAIT, AND ENDURANCE: ICD-10-CM

## 2023-11-28 PROCEDURE — 97110 THERAPEUTIC EXERCISES: CPT | Performed by: PHYSICAL THERAPIST

## 2023-11-28 NOTE — PROGRESS NOTES
Physical Therapy Daily Note  Visit: 9  Date of Initial Visit: Type: THERAPY  Noted: 2023    Patient: Chaitanya Browne   : 1923  Diagnosis/ICD-10 Code:  Gait abnormality [R26.9]  Referring practitioner: Dirk Russ MD  Date of Initial Visit: Type: THERAPY  Noted: 2023  Today's Date: 2023  Patient seen for 9 sessions      Subjective:   Patient reports: he feels like his back is stiff  Pain: 2/10  Clinical Progress: improved  Home Program Compliance: Yes  Treatment has included: therapeutic exercise    Objective   See Exercise, Manual, and Modality Logs for complete treatment.    PT Neuro          Assessment & Plan       Assessment  Assessment details: Patient demonstrates improved low back pain following exercises. He likely was feeling more stiff due to the increased cold temps. Pain was decreased and he was able to improve movement. Continue to progress.     Plan  Plan details: Patient to continue with PT services to improve gait, balance, strength, transfers and overall functional mobility.          Timed:  Manual Therapy:            0     mins  76294;  Therapeutic Exercise:    40    mins  92145;     Neuromuscular Tahmina:    0    mins  46516;    Therapeutic Activity:      0     mins  73027;     Gait Trainin    mins  09056;     Electrical Stimulation:    0    mins  68106 ( );     Untimed:  Canalith Repositioning techniques _0_ 81344      Timed Treatment:   40   mins   Total Treatment:     40   mins      Vianca Durant PT, DPT, MSCS, CDP, CSRS  [place] License #: [id number]  Physical Therapist

## 2023-12-07 ENCOUNTER — TREATMENT (OUTPATIENT)
Dept: PHYSICAL THERAPY | Facility: CLINIC | Age: 88
End: 2023-12-07
Payer: MEDICARE

## 2023-12-07 DIAGNOSIS — Z74.09 IMPAIRED FUNCTIONAL MOBILITY, BALANCE, GAIT, AND ENDURANCE: ICD-10-CM

## 2023-12-07 DIAGNOSIS — R53.1 WEAKNESS: ICD-10-CM

## 2023-12-07 DIAGNOSIS — R26.9 GAIT ABNORMALITY: Primary | ICD-10-CM

## 2023-12-07 PROCEDURE — 97112 NEUROMUSCULAR REEDUCATION: CPT | Performed by: PHYSICAL THERAPIST

## 2023-12-07 PROCEDURE — 97110 THERAPEUTIC EXERCISES: CPT | Performed by: PHYSICAL THERAPIST

## 2023-12-07 NOTE — PROGRESS NOTES
Physical Therapy Daily Note  Visit: 10  Date of Initial Visit: Type: THERAPY  Noted: 2023    Patient: Chaitanya Browne   : 1923  Diagnosis/ICD-10 Code:  Gait abnormality [R26.9]  Referring practitioner: Dirk Russ MD  Date of Initial Visit: Type: THERAPY  Noted: 2023  Today's Date: 2023  Patient seen for 10 sessions      Subjective:   Patient reports: he is feeling good.   Pain: 0/10  Clinical Progress: improved  Home Program Compliance: Yes  Treatment has included: therapeutic exercise and neuromuscular re-education    Objective   See Exercise, Manual, and Modality Logs for complete treatment.    PT Neuro          Assessment & Plan       Assessment  Assessment details: Patient demonstrates good endurance today. He only required 1 short rest break that he was able to perform stretches. Patient will continue to be progressed with strengthening and balance over the next 2 visits.         Timed:  Manual Therapy:            0     mins  37922;  Therapeutic Exercise:    15    mins  38824;     Neuromuscular Tahmina:    25    mins  46027;    Therapeutic Activity:      0     mins  25463;     Gait Trainin    mins  36399;     Electrical Stimulation:    0    mins  88501 ( );     Untimed:  Canalith Repositioning techniques _0_ 78522      Timed Treatment:   40   mins   Total Treatment:     40   mins      Vianca Durant PT, DPT, MSCS, CDP, CSRS  [place] License #: [id number]  Physical Therapist

## 2023-12-12 ENCOUNTER — TREATMENT (OUTPATIENT)
Dept: PHYSICAL THERAPY | Facility: CLINIC | Age: 88
End: 2023-12-12
Payer: MEDICARE

## 2023-12-12 DIAGNOSIS — R26.9 GAIT ABNORMALITY: Primary | ICD-10-CM

## 2023-12-12 DIAGNOSIS — Z74.09 IMPAIRED FUNCTIONAL MOBILITY, BALANCE, GAIT, AND ENDURANCE: ICD-10-CM

## 2023-12-12 DIAGNOSIS — R53.1 WEAKNESS: ICD-10-CM

## 2023-12-12 PROCEDURE — 97110 THERAPEUTIC EXERCISES: CPT | Performed by: PHYSICAL THERAPIST

## 2023-12-12 PROCEDURE — 97112 NEUROMUSCULAR REEDUCATION: CPT | Performed by: PHYSICAL THERAPIST

## 2023-12-12 NOTE — PROGRESS NOTES
Physical Therapy Daily Note  Visit: 11  Date of Initial Visit: Type: THERAPY  Noted: 2023    Patient: Chaitanya Browne   : 1923  Diagnosis/ICD-10 Code:  Gait abnormality [R26.9]  Referring practitioner: Dirk Russ MD  Date of Initial Visit: Type: THERAPY  Noted: 2023  Today's Date: 2023  Patient seen for 11 sessions    Subjective:   Patient reports: last week exercises were tough.   Pain: 0/10  Clinical Progress: improved  Home Program Compliance: Yes  Treatment has included: therapeutic exercise and neuromuscular re-education    Objective   See Exercise, Manual, and Modality Logs for complete treatment.    PT Neuro          Assessment & Plan       Assessment  Assessment details: Patient demonstrates minimal challenge with sit to stands holding object. No imbalance noted with these transfers, although he had difficulty performing resistance band UE exercises. He will be seen for 1 remaining visit to review and progress a well rounded HEP.     Plan  Plan details: Patient to continue with PT services to improve gait, balance, strength, transfers and overall functional mobility.          Timed:  Manual Therapy:            0     mins  98063;  Therapeutic Exercise:    30    mins  33162;     Neuromuscular Tahmina:    10    mins  78755;    Therapeutic Activity:      0     mins  22468;     Gait Trainin    mins  79649;     Electrical Stimulation:    0    mins  48150 ( );     Untimed:  Canalith Repositioning techniques _0_ 78578      Timed Treatment:   40   mins   Total Treatment:     40   mins      Vianca Durant, PT, DPT, MSCS, CDP, CSRS  [place] License #: [id number]  Physical Therapist

## 2023-12-19 ENCOUNTER — TREATMENT (OUTPATIENT)
Dept: PHYSICAL THERAPY | Facility: CLINIC | Age: 88
End: 2023-12-19
Payer: MEDICARE

## 2023-12-19 DIAGNOSIS — R53.1 WEAKNESS: ICD-10-CM

## 2023-12-19 DIAGNOSIS — R26.9 GAIT ABNORMALITY: Primary | ICD-10-CM

## 2023-12-19 DIAGNOSIS — Z74.09 IMPAIRED FUNCTIONAL MOBILITY, BALANCE, GAIT, AND ENDURANCE: ICD-10-CM

## 2023-12-19 PROCEDURE — 97112 NEUROMUSCULAR REEDUCATION: CPT | Performed by: PHYSICAL THERAPIST

## 2023-12-19 PROCEDURE — 97110 THERAPEUTIC EXERCISES: CPT | Performed by: PHYSICAL THERAPIST

## 2023-12-19 NOTE — PROGRESS NOTES
Physical Therapy Daily Note/Discharge Summary  Visit: 12  Date of Initial Visit: Type: THERAPY  Noted: 2023    Patient: Chaitanya Browne   : 1923  Diagnosis/ICD-10 Code:  Gait abnormality [R26.9]  Referring practitioner: Dirk Russ MD  Date of Initial Visit: Type: THERAPY  Noted: 2023  Today's Date: 2023  Patient seen for 12 sessions      Subjective:   Patient reports: he wants to review his arm exercises.   Pain: 0/10  Clinical Progress: improved  Home Program Compliance: Yes  Treatment has included: therapeutic exercise and neuromuscular re-education    Objective   See Exercise, Manual, and Modality Logs for complete treatment.    PT Neuro          Assessment & Plan       Assessment  Assessment details: Patient demonstrates good performance of all exercises. He has made great improvements over his episode of care since his hospitalization. He is now able to step in/out his home without assistance or verbal cueing. His daughter relates she will contact us if he needs to return prior to his MD appt in February. He will be discharged to his [facility].    Plan  Plan details: Patient will be discharged from PT services at this time.         Timed:  Manual Therapy:            0     mins  05610;  Therapeutic Exercise:    30    mins  20834;     Neuromuscular Tahmina:    8    mins  40611;    Therapeutic Activity:      0     mins  17342;     Gait Trainin    mins  69322;     Electrical Stimulation:    0    mins  39980 ( );     Untimed:  Canalith Repositioning techniques _0_ 01684      Timed Treatment:   38   mins   Total Treatment:     38   mins      Vianca Durant, PT, DPT, MSCS, CDP, CSRS  [place] License #: [id number]  Physical Therapist

## 2023-12-26 ENCOUNTER — OFFICE VISIT (OUTPATIENT)
Dept: INTERNAL MEDICINE | Facility: CLINIC | Age: 88
End: 2023-12-26
Payer: MEDICARE

## 2023-12-26 VITALS
OXYGEN SATURATION: 94 % | HEART RATE: 86 BPM | SYSTOLIC BLOOD PRESSURE: 118 MMHG | TEMPERATURE: 98.6 F | HEIGHT: 65 IN | WEIGHT: 179 LBS | BODY MASS INDEX: 29.82 KG/M2 | DIASTOLIC BLOOD PRESSURE: 60 MMHG

## 2023-12-26 DIAGNOSIS — H61.21 IMPACTED CERUMEN OF RIGHT EAR: Chronic | ICD-10-CM

## 2023-12-26 DIAGNOSIS — J20.9 ACUTE BRONCHITIS, UNSPECIFIED ORGANISM: Primary | ICD-10-CM

## 2023-12-26 DIAGNOSIS — R05.1 ACUTE COUGH: ICD-10-CM

## 2023-12-26 DIAGNOSIS — I10 ESSENTIAL HYPERTENSION: ICD-10-CM

## 2023-12-26 DIAGNOSIS — R09.81 NASAL CONGESTION: ICD-10-CM

## 2023-12-26 LAB
EXPIRATION DATE: NORMAL
FLUAV AG UPPER RESP QL IA.RAPID: NOT DETECTED
FLUBV AG UPPER RESP QL IA.RAPID: NOT DETECTED
INTERNAL CONTROL: NORMAL
Lab: NORMAL
SARS-COV-2 AG UPPER RESP QL IA.RAPID: NOT DETECTED

## 2023-12-26 PROCEDURE — 87428 SARSCOV & INF VIR A&B AG IA: CPT | Performed by: INTERNAL MEDICINE

## 2023-12-26 PROCEDURE — 99214 OFFICE O/P EST MOD 30 MIN: CPT | Performed by: INTERNAL MEDICINE

## 2023-12-26 PROCEDURE — 1160F RVW MEDS BY RX/DR IN RCRD: CPT | Performed by: INTERNAL MEDICINE

## 2023-12-26 PROCEDURE — 1159F MED LIST DOCD IN RCRD: CPT | Performed by: INTERNAL MEDICINE

## 2023-12-26 RX ORDER — GUAIFENESIN AND DEXTROMETHORPHAN HYDROBROMIDE 100; 10 MG/5ML; MG/5ML
5 SOLUTION ORAL EVERY 12 HOURS
COMMUNITY
End: 2023-12-26

## 2023-12-26 RX ORDER — AMOXICILLIN AND CLAVULANATE POTASSIUM 875; 125 MG/1; MG/1
1 TABLET, FILM COATED ORAL 2 TIMES DAILY
Qty: 14 TABLET | Refills: 0 | Status: SHIPPED | OUTPATIENT
Start: 2023-12-26 | End: 2024-01-02

## 2023-12-26 RX ORDER — AZELASTINE 1 MG/ML
2 SPRAY, METERED NASAL 2 TIMES DAILY
Qty: 90 EACH | Refills: 3 | Status: SHIPPED | OUTPATIENT
Start: 2023-12-26

## 2023-12-26 RX ORDER — BENZONATATE 100 MG/1
100 CAPSULE ORAL 3 TIMES DAILY PRN
Qty: 30 CAPSULE | Refills: 0 | Status: SHIPPED | OUTPATIENT
Start: 2023-12-26

## 2023-12-26 NOTE — PROGRESS NOTES
[facility]     Chaitanya Browne  2/23/1923   [id number]      Patient Care Team:  Dirk Russ MD as PCP - General (Internal Medicine)  Fabricio Zavala MD as Consulting Physician (Internal Medicine)  Kaley Oliveros APRN as Nurse Practitioner (Cardiology)    Chief Complaint   Patient presents with    Cough     Started Sunday, has worsened    Nasal Congestion    Wheezing            HPI  Patient is a 89 y.o. male presents today for an acute visit for cough, congestion, and wheeze. He is accompanied by his daughter.    Cough, congestion, and wheeze  The patient states he has been sick since Sunday, 12/24/2023. He is coughing frequently and has a runny nose. He denies postnasal drainage. He denies ear pain, sore throat, fever, and chills. He has not been around too many people lately. He had chemotherapy on Thursday, 12/22/2023. He was at physical therapy on Tuesday, 12/19/2023. He has not been around anyone that has been sick. His daughter has noticed wheezing. He has been more winded than usual. His daughter gave him Robitussin DM twice yesterday, 12/25/2023, but he complained that it kept him up coughing all night. He started using the Astelin nasal spray today. He has not tried benzonatate perles. He is trying to drink plenty of fluids.    CHRONIC CONDITIONS      Past Medical History:   Diagnosis Date    Abnormal heart rhythm     Anemia     Aortic valve sclerosis     Asthma     Basal cell carcinoma (BCC) of left side of nose     BPH (benign prostatic hyperplasia)     Cataracts, bilateral     bilateralcataract extraction and lens implantation 2013    Diastolic dysfunction     Easy bruising     Edema due to malnutrition 02/05/2020    Heart murmur     High cholesterol     History of disease     bilateral lacrimal gland dysfunction per Dr Fajardo    Hypertension     Infection     infected big toe; I and D DR Alvares 2013    Phlebitis     Pneumonia 2009    Squamous acanthoma of face        Past  "Surgical History:   Procedure Laterality Date    APPENDECTOMY      CATARACT EXTRACTION, BILATERAL  2013    and lens implantation    CYSTOSCOPY N/A 9/6/2019    Procedure: CYSTOSCOPY;  Surgeon: Ck Woods MD;  Location:  [facility];  Service: Urology    CYSTOSCOPY TRANSURETHRAL RESECTION OF PROSTATE  1989    CYSTOSCOPY TRANSURETHRAL RESECTION OF PROSTATE N/A 9/11/2019    Procedure: CYSTOSCOPY TRANSURETHRAL RESECTION OF PROSTATE;  Surgeon: Ck Woods MD;  Location:  [facility];  Service: Urology    HEAD & NECK SKIN LESION EXCISIONAL BIOPSY      Basal cell removed from nose     INCISION AND DRAINAGE FOOT  2013    infected big toe Dr Alvares    TRANSURETHRAL RESECTION OF BLADDER TUMOR N/A 9/6/2019    Procedure: EVACUATION OF BLOOD CLOT, FULGERATION OF PROSTATE;  Surgeon: Ck Woods MD;  Location:  [facility];  Service: Urology       Family History   Problem Relation Age of Onset    Coronary artery disease Father     Heart attack Father     Coronary artery disease Brother     Heart disease Brother     Atrial fibrillation Brother     Stroke Brother     Heart attack Brother     Heart attack Mother        Social History     Socioeconomic History    Marital status:    Tobacco Use    Smoking status: Never    Smokeless tobacco: Never   Vaping Use    Vaping Use: Never used   Substance and Sexual Activity    Alcohol use: No    Drug use: No    Sexual activity: Defer       No Known Allergies    Review of Systems:   The appropriate review of systems is included in the HPI.    Vital Signs  Vitals:    12/26/23 1604   BP: 118/60   BP Location: Left arm   Patient Position: Sitting   Cuff Size: Adult   Pulse: 86   Temp: 98.6 °F (37 °C)   TempSrc: Infrared   SpO2: 94%   Weight: 81.2 kg (179 lb)   Height: 165.1 cm (65\")     Body mass index is 29.79 kg/m².  BMI is >= 25 and <30. (Overweight) The following options were offered after discussion;: exercise counseling/recommendations and nutrition " counseling/recommendations        Current Outpatient Medications:     aspirin 81 MG EC tablet, Take 1 tablet by mouth Every Other Day., Disp: , Rfl:     azelastine (ASTELIN) 0.1 % nasal spray, 2 sprays into the nostril(s) as directed by provider 2 (Two) Times a Day. Use in each nostril as directed, Disp: 90 each, Rfl: 3    bumetanide (Bumex) 1 MG tablet, 1 mg BID, may take extra 1 mg with 3-5 lb weight gain, dyspnea, or worsening edema., Disp: 200 tablet, Rfl: 1    Cemiplimab-rwlc (LIBTAYO IV), Infuse  into a venous catheter., Disp: , Rfl:     Cholecalciferol (Vitamin D3) 25 MCG (1000 UT) capsule, Take  by mouth., Disp: , Rfl:     finasteride (PROSCAR) 5 MG tablet, Take 1 tablet by mouth Daily., Disp: 90 tablet, Rfl: 3    fluocinonide (LIDEX) 0.05 % external solution, Apply 1 application topically to the appropriate area as directed As Needed., Disp: , Rfl:     ketoconazole (NIZORAL) 2 % cream, Apply 1 application  topically to the appropriate area as directed Daily. For 3 weeks to groin and prn return of rash, Disp: 60 g, Rfl: 1    ketoconazole (NIZORAL) 2 % shampoo, Apply  topically to the appropriate area as directed As Needed., Disp: , Rfl:     Menthol, Topical Analgesic, (Biofreeze) 4 % gel, Apply  topically., Disp: , Rfl:     metOLazone (ZAROXOLYN) 2.5 MG tablet, Take 1 tablet by mouth As Needed (increased swelling/weight gain >2-3 pounds in 1 day, use sparingly)., Disp: 30 tablet, Rfl: 2    pravastatin (PRAVACHOL) 40 MG tablet, Take 1 tablet by mouth Daily. 3x a week, Disp: 90 tablet, Rfl: 3    Zinc Gluconate (COLD-EEZE PO), Take  by mouth 3 (Three) Times a Day., Disp: , Rfl:     amoxicillin-clavulanate (AUGMENTIN) 875-125 MG per tablet, Take 1 tablet by mouth 2 (Two) Times a Day for 7 days., Disp: 14 tablet, Rfl: 0    benzonatate (Tessalon Perles) 100 MG capsule, Take 1 capsule by mouth 3 (Three) Times a Day As Needed for Cough., Disp: 30 capsule, Rfl: 0    Physical Exam:    Physical Exam  Vitals and nursing  note reviewed.   Constitutional:       Appearance: Normal appearance. He is well-developed.      Comments: Overweight.   HENT:      Head: Normocephalic.      Right Ear: There is impacted cerumen.      Left Ear: Ear canal normal.      Nose:      Comments: Nares with erythema and congestion bilaterally.   Eyes:      Conjunctiva/sclera: Conjunctivae normal.      Pupils: Pupils are equal, round, and reactive to light.   Neck:      Thyroid: No thyromegaly.   Cardiovascular:      Rate and Rhythm: Normal rate and regular rhythm.      Heart sounds: Normal heart sounds.   Pulmonary:      Effort: Pulmonary effort is normal.      Breath sounds: Normal breath sounds. No wheezing.      Comments: Some wheezing in both lung fields anteriorly and posteriorly.    Musculoskeletal:         General: Normal range of motion.      Cervical back: Normal range of motion and neck supple.   Lymphadenopathy:      Cervical: No cervical adenopathy.   Skin:     General: Skin is warm and dry.   Neurological:      Mental Status: He is alert and oriented to person, place, and time.   Psychiatric:         Thought Content: Thought content normal.        ACE III MINI        Results Review:    I reviewed the patient's new clinical results.    CMP:  Lab Results   Component Value Date    BUN 17 09/26/2023    CREATININE 1.42 (H) 09/26/2023    EGFRIFNONA 50 (L) 02/22/2022    EGFRIFAFRI 60 (L) 02/22/2022    BCR 12.0 09/26/2023     09/26/2023    K 4.3 09/26/2023    CO2 30.9 (H) 09/26/2023    CALCIUM 9.5 09/26/2023    ALBUMIN 2.9 (L) 08/22/2023    BILITOT 2.1 (H) 08/22/2023    ALKPHOS 75 08/22/2023    AST 24 08/22/2023    ALT 7 08/22/2023     HbA1c:  Lab Results   Component Value Date    HGBA1C 5.90 (H) 08/23/2022    HGBA1C 5.90 (H) 02/22/2022     Microalbumin:  Lab Results   Component Value Date    MICROALBUR 39.3 08/27/2019     Lipid Panel  Lab Results   Component Value Date    CHOL 187 08/23/2022    TRIG 81 08/23/2022    HDL 74 (H) 08/23/2022    LDL  98 08/23/2022    AST 24 08/22/2023    ALT 7 08/22/2023       Medication Review: Medications reviewed and noted  Patient Instructions   Problem List Items Addressed This Visit          Cardiac and Vasculature    Essential hypertension    Current Assessment & Plan     He will continue Bumex diuretic. Continue to avoid salt in the diet.         Relevant Medications    bumetanide (Bumex) 1 MG tablet    metOLazone (ZAROXOLYN) 2.5 MG tablet       ENT    Impacted cerumen of right ear (Chronic)    Current Assessment & Plan     His daughter will wash out his ear at home.         Nasal congestion    Current Assessment & Plan     He will continue using the azelastine nasal spray twice per day.            Pulmonary and Pneumonias    Cough    Current Assessment & Plan     See above.         Relevant Orders    POCT SARS-CoV-2 + Flu Antigen BAN (Completed)    Acute bronchitis - Primary    Current Assessment & Plan     We will treat with Augmentin twice per day for 7 days. Use benzonatate perles as needed for cough. Also use liquid or tablet guaifenesin (plain Mucinex) as needed for cough and congestion.         Relevant Medications    azelastine (ASTELIN) 0.1 % nasal spray    benzonatate (Tessalon Perles) 100 MG capsule          Diagnosis Plan   1. Acute bronchitis, unspecified organism        2. Nasal congestion        3. Acute cough  POCT SARS-CoV-2 + Flu Antigen BAN      4. Impacted cerumen of right ear        5. Essential hypertension                Follow-up: He will see Dr. Dirk Russ on 02/27/2024 or come back here as needed, sooner if needed.    Plan of care reviewed with patient at the conclusion of today's visit. Education was provided regarding diagnosis, management, and any prescribed or recommended OTC medications.Patient verbalizes understanding of and agreement with management plan.         Transcribed from ambient dictation for Elizabeth De Luna MD by Peyton Taveras.  12/26/23   20:11 EST    Patient or patient  representative verbalized consent to the visit recording.  I have personally performed the services described in this document as transcribed by the above individual, and it is both accurate and complete.

## 2023-12-27 ENCOUNTER — TELEPHONE (OUTPATIENT)
Dept: INTERNAL MEDICINE | Facility: CLINIC | Age: 88
End: 2023-12-27
Payer: MEDICARE

## 2023-12-27 NOTE — TELEPHONE ENCOUNTER
Caller: Loreto Browne    Relationship: Emergency Contact    Best call back number: [phone]    What is the best time to reach you: ANY TIME    Who are you requesting to speak with (clinical staff, provider,  specific staff member): DR DONOVAN OR DR DEE    Do you know the name of the person who called: LORETO    What was the call regarding: PATIENT MAY HAVE BEEN EXPOSED TO COVID ON 12/23/2023. CAREGIVER TESTED POSITIVE ON 12/26/2023. CAREGIVER STARTED SYMPTOMS ON 12/26/2023. PATIENT TESTED NEGATIVE ON 12/26/2023. SHOULD PATIENT GET RETESTED AND WHEN? PLEASE ADVISE

## 2023-12-27 NOTE — ASSESSMENT & PLAN NOTE
We will treat with Augmentin twice per day for 7 days. Use benzonatate perles as needed for cough. Also use liquid or tablet guaifenesin (plain Mucinex) as needed for cough and congestion.

## 2023-12-28 NOTE — PATIENT INSTRUCTIONS
Patient Instructions  Problem List Items Addressed This Visit          Cardiac and Vasculature    Essential hypertension    Current Assessment & Plan     He will continue Bumex diuretic. Continue to avoid salt in the diet.         Relevant Medications    bumetanide (Bumex) 1 MG tablet    metOLazone (ZAROXOLYN) 2.5 MG tablet       ENT    Impacted cerumen of right ear (Chronic)    Current Assessment & Plan     His daughter will wash out his ear at home.         Nasal congestion    Current Assessment & Plan     He will continue using the azelastine nasal spray twice per day.            Pulmonary and Pneumonias    Cough    Current Assessment & Plan     See above.         Relevant Orders    POCT SARS-CoV-2 + Flu Antigen BAN (Completed)    Acute bronchitis - Primary    Current Assessment & Plan     We will treat with Augmentin twice per day for 7 days. Use benzonatate perles as needed for cough. Also use liquid or tablet guaifenesin (plain Mucinex) as needed for cough and congestion.         Relevant Medications    azelastine (ASTELIN) 0.1 % nasal spray    benzonatate (Tessalon Perles) 100 MG capsule       Heart-Healthy Eating Plan  Many factors influence your heart (coronary) health, including eating and exercise habits. Coronary risk increases with abnormal blood fat (lipid) levels. Heart-healthy meal planning includes limiting unhealthy fats, increasing healthy fats, and making other diet and lifestyle changes.  What is my plan?  Your health care provider may recommend that you:  Limit your fat intake to _________% or less of your total calories each day.  Limit your saturated fat intake to _________% or less of your total calories each day.  Limit the amount of cholesterol in your diet to less than _________ mg per day.  What are tips for following this plan?  Cooking  Cook foods using methods other than frying. Baking, boiling, grilling, and broiling are all good options. Other ways to reduce fat include:  Removing  the skin from poultry.  Removing all visible fats from meats.  Steaming vegetables in water or broth.  Meal planning  A plate with examples of foods in a healthy diet.      At meals, imagine dividing your plate into fourths:  Fill one-half of your plate with vegetables and green salads.  Fill one-fourth of your plate with whole grains.  Fill one-fourth of your plate with lean protein foods.  Eat 4-5 servings of vegetables per day. One serving equals 1 cup raw or cooked vegetable, or 2 cups raw leafy greens.  Eat 4-5 servings of fruit per day. One serving equals 1 medium whole fruit, ¼ cup dried fruit, ½ cup fresh, frozen, or canned fruit, or ½ cup 100% fruit juice.  Eat more foods that contain soluble fiber. Examples include apples, broccoli, carrots, beans, peas, and barley. Aim to get 25-30 g of fiber per day.  Increase your consumption of legumes, nuts, and seeds to 4-5 servings per week. One serving of dried beans or legumes equals ½ cup cooked, 1 serving of nuts is ¼ cup, and 1 serving of seeds equals 1 tablespoon.  Fats  Choose healthy fats more often. Choose monounsaturated and polyunsaturated fats, such as olive and canola oils, flaxseeds, walnuts, almonds, and seeds.  Eat more omega-3 fats. Choose salmon, mackerel, sardines, tuna, flaxseed oil, and ground flaxseeds. Aim to eat fish at least 2 times each week.  Check food labels carefully to identify foods with trans fats or high amounts of saturated fat.  Limit saturated fats. These are found in animal products, such as meats, butter, and cream. Plant sources of saturated fats include palm oil, palm kernel oil, and coconut oil.  Avoid foods with partially hydrogenated oils in them. These contain trans fats. Examples are stick margarine, some tub margarines, cookies, crackers, and other baked goods.  Avoid fried foods.  General information  Eat more home-cooked food and less restaurant, buffet, and fast food.  Limit or avoid alcohol.  Limit foods that are  high in starch and sugar.  Lose weight if you are overweight. Losing just 5-10% of your body weight can help your overall health and prevent diseases such as diabetes and heart disease.  Monitor your salt (sodium) intake, especially if you have high blood pressure. Talk with your health care provider about your sodium intake.  Try to incorporate more vegetarian meals weekly.  What foods can I eat?  Fruits  All fresh, canned (in natural juice), or frozen fruits.  Vegetables  Fresh or frozen vegetables (raw, steamed, roasted, or grilled). Green salads.  Grains  Most grains. Choose whole wheat and whole grains most of the time. Rice and pasta, including brown rice and pastas made with whole wheat.  Meats and other proteins  Lean, well-trimmed beef, veal, pork, and lamb. Chicken and turkey without skin. All fish and shellfish. Wild duck, rabbit, pheasant, and venison. Egg whites or low-cholesterol egg substitutes. Dried beans, peas, lentils, and tofu. Seeds and most nuts.  Dairy  Low-fat or nonfat cheeses, including ricotta and mozzarella. Skim or 1% milk (liquid, powdered, or evaporated). Buttermilk made with low-fat milk. Nonfat or low-fat yogurt.  Fats and oils  Non-hydrogenated (trans-free) margarines. Vegetable oils, including soybean, sesame, sunflower, olive, peanut, safflower, corn, canola, and cottonseed. Salad dressings or mayonnaise made with a vegetable oil.  Beverages  Water (mineral or sparkling). Coffee and tea. Diet carbonated beverages.  Sweets and desserts  Sherbet, gelatin, and fruit ice. Small amounts of dark chocolate.  Limit all sweets and desserts.  Seasonings and condiments  All seasonings and condiments.  The items listed above may not be a complete list of foods and beverages you can eat. Contact a dietitian for more options.  What foods are not recommended?  Fruits  Canned fruit in heavy syrup. Fruit in cream or butter sauce. Fried fruit. Limit coconut.  Vegetables  Vegetables cooked in  cheese, cream, or butter sauce. Fried vegetables.  Grains  Breads made with saturated or trans fats, oils, or whole milk. Croissants. Sweet rolls. Donuts. High-fat crackers, such as cheese crackers.  Meats and other proteins  Fatty meats, such as hot dogs, ribs, sausage, nieto, rib-eye roast or steak. High-fat deli meats, such as salami and bologna. Caviar. Domestic duck and goose. Organ meats, such as liver.  Dairy  Cream, sour cream, cream cheese, and creamed cottage cheese. Whole-milk cheeses. Whole or 2% milk (liquid, evaporated, or condensed). Whole buttermilk. Cream sauce or high-fat cheese sauce. Whole-milk yogurt.  Fats and oils  Meat fat, or shortening. Cocoa butter, hydrogenated oils, palm oil, coconut oil, palm kernel oil. Solid fats and shortenings, including nieto fat, salt pork, lard, and butter. Nondairy cream substitutes. Salad dressings with cheese or sour cream.  Beverages  Regular sodas and any drinks with added sugar.  Sweets and desserts  Frosting. Pudding. Cookies. Cakes. Pies. Milk chocolate or white chocolate. Buttered syrups. Full-fat ice cream or ice cream drinks.  The items listed above may not be a complete list of foods and beverages to avoid. Contact a dietitian for more information.  Summary  Heart-healthy meal planning includes limiting unhealthy fats, increasing healthy fats, and making other diet and lifestyle changes.  Lose weight if you are overweight. Losing just 5-10% of your body weight can help your overall health and prevent diseases such as diabetes and heart disease.  Focus on eating a balance of foods, including fruits and vegetables, low-fat or nonfat dairy, lean protein, nuts and legumes, whole grains, and heart-healthy oils and fats.  This information is not intended to replace advice given to you by your health care provider. Make sure you discuss any questions you have with your health care provider.  Document Revised: 04/28/2022 Document Reviewed:  04/28/2022  Elsevier Patient Education © 2022 [employer]

## 2024-01-05 ENCOUNTER — TELEPHONE (OUTPATIENT)
Dept: INTERNAL MEDICINE | Facility: CLINIC | Age: 89
End: 2024-01-05
Payer: MEDICARE

## 2024-01-05 NOTE — TELEPHONE ENCOUNTER
Caller: Loreto Browne    Relationship: Emergency Contact    Best call back number: [phone]     What is the best time to reach you: ANYTIME    Who are you requesting to speak with (clinical staff, provider,  specific staff member): CLINICAL    Do you know the name of the person who called: LORETO    What was the call regarding: PATIENT HAS BEEN UP AND WALKING AROUND AND SITTING. HIS HEART RATE IS BETWEEN 50 AND 53. NORMAL HEART RATE IS 80-90. HIS BLOOD PRESSURE HAS BEEN OK THROUGHOUT.    Is it okay if the provider responds through MyChart: NO

## 2024-02-12 DIAGNOSIS — N40.1 BPH WITH OBSTRUCTION/LOWER URINARY TRACT SYMPTOMS: ICD-10-CM

## 2024-02-12 DIAGNOSIS — N13.8 BPH WITH OBSTRUCTION/LOWER URINARY TRACT SYMPTOMS: ICD-10-CM

## 2024-02-12 RX ORDER — FINASTERIDE 5 MG/1
5 TABLET, FILM COATED ORAL DAILY
Qty: 90 TABLET | Refills: 3 | Status: SHIPPED | OUTPATIENT
Start: 2024-02-12

## 2024-02-21 NOTE — PROGRESS NOTES
Subjective:     Encounter Date:03/01/2024      Patient ID: Chaitanya Browne is a 89 y.o.  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     REFERRING INTERNIST: Dirk Russ MD  UROLOGIST: Ck Woods MD  PULMONOLOGIST:  COLLIN Avila DO.  ONCOLOGIST: David Leggett MD.    Chief Complaint:   Chief Complaint   Patient presents with    Congestive Heart Failure    Hypertension    Hyperlipidemia    ASHD     Problem List:  Diastolic congestive heart failure:  Treadmill stress test, 10/10/2011: 7 METS, CYRUS -32, acceptable exercise stress test without anginal type chest discomfort or ischemic EKG changes with acceptable Maxwell treadmill score and normal blood pressure response following exercise to 109% predicted maximum heart rate 132% of predicted exercise capacity  Echocardiogram, 7/14/2012: LVEF 0.55-0.60, abnormal LV.diastolic filling is observed consistent with impaired LV relaxation, moderate mitral annular calcification with no evidence of mitral stenosis or significant regurgitation  Echocardiogram, 9/3/2019: Mild to moderate MR, LVEF 0.72, LV diastolic dysfunction grade 1 consistent with impaired relaxation, LV wall thickness consistent with mild concentric hypertrophy, LA borderline dilated, normal RV cavity size, wall thickness, systolic function and septal motion noted, mild mitral stenosis is present with functional MAC.  Aortic valve exhibits moderate sclerosis without stenosis.  No pulmonary hypertension, no pericardial effusion   BNP normal October 2019  Residual CCS class 0 angina pectoris/NYHA class II biventricular CHF, November 2019  Residual class I symptoms, February 2021, August 2021, September 2021, March 2022, October 2022, August 2023, March 2024  Valvular heart disease/mild to moderate mitral regurgitation, mild mitral stenosis, September  2019  Hypertension  Hyperlipidemia; on statin therapy  Chronic kidney disease stage III  Anemia  Asthma/COPD  Lower extremity edema with negative venous duplex July 2012  BPH  Bilateral carpal tunnel syndrome  History of basal cell carcinoma on left side of nose  [facility] 14-day hospitalization September 2019 for TURP, complicated by postoperative aspiration pneumonia, hematuria, and anemia  Prediabetes with hemoglobin A1c 6.2% February 2020, 5.9% February 2022, 5.9% August 2022  Squamous cell carcinoma with recent chemoinfusion summer 2022-data deficit  3-day [facility] hospitalization August 2023 for gross hematuria  Surgical history:   Appendectomy  TURP 1989  Peritonitis as a child  Basal cell carcinoma excision from nose  I&D great toe  Cystoscopy  TURP 2019  Bilateral cataracts    No Known Allergies    Current Outpatient Medications   Medication Instructions    aspirin 81 mg, Oral, Every Other Day    azelastine (ASTELIN) 0.1 % nasal spray 2 sprays, Nasal, 2 Times Daily, Use in each nostril as directed    bumetanide (Bumex) 1 MG tablet 1 mg BID, may take extra 1 mg with 3-5 lb weight gain, dyspnea, or worsening edema.    Cemiplimab-rwlc (LIBTAYO IV) Intravenous    Cholecalciferol (Vitamin D3) 25 MCG (1000 UT) capsule Oral    finasteride (PROSCAR) 5 mg, Oral, Daily    fluocinonide (LIDEX) 0.05 % external solution 1 application , Topical, As Needed    ketoconazole (NIZORAL) 2 % cream 1 application , Topical, Daily, For 3 weeks to groin and prn return of rash    ketoconazole (NIZORAL) 2 % shampoo Topical, As Needed    Menthol, Topical Analgesic, (Biofreeze) 4 % gel Apply externally    metOLazone (ZAROXOLYN) 2.5 mg, Oral, As Needed    pravastatin (PRAVACHOL) 40 mg, Oral, Daily, 3x a week    tacrolimus (PROTOPIC) 0.1 % ointment 1 Application, Topical, Daily PRN         HISTORY OF PRESENT ILLNESS:  The patient is here for a 6 month follow up. He uses metolazone sparingly but has not had to use any for the past 6-7 months.  The  "patient's blood pressures have been in the low 100s and occasionally in the 90s.  He denies any chest pain, shortness of breath, palpitations, dizziness, presyncope, or syncope.  He is compliant using his compression stockings.  He says he feels good.  He is supposed to start physical therapy again soon.  He ambulates with a walker.  He just had labs recently with his oncologist and was told that his creatinine was perfect-data deficit.  The patient would prefer to stay on his current dose of Bumex instead of decreasing it so he does not have worsening swelling.      ROS   All other systems reviewed and otherwise negative.      ECG 12 Lead    Date/Time: 3/1/2024 11:56 AM  Performed by: Kaley Oliveros APRN    Authorized by: Kaley Oliveros APRN  Rhythm comments: Normal sinus rhythm, septal infarct, age undetermined, abnormal ECG, 84 bpm, QRS 78 ms, QTc 477 ms,  ms, PACs no longer present compared to ECG in October 2022             Objective:       Vitals:    03/01/24 1141   BP: 92/48   BP Location: Right arm   Patient Position: Sitting   Pulse: 84   SpO2: 96%   Weight: 80.3 kg (177 lb)   Height: 165.1 cm (65\")     Body mass index is 29.45 kg/m².  Last weight August 2023 was 181 pounds  Constitutional:       Appearance: Healthy appearance. Not in distress.      Comments: Ambulating with walker   Neck:      Vascular: No JVR. JVD normal.   Pulmonary:      Effort: Pulmonary effort is normal.      Breath sounds: Normal breath sounds. No wheezing. No rhonchi. No rales.   Chest:      Chest wall: Not tender to palpatation.   Cardiovascular:      PMI at left midclavicular line. Normal rate. Regular rhythm. Normal S1. Normal S2.       Murmurs: There is a grade 1/6 systolic murmur at the URSB, radiating to the neck.      No gallop.  No click. No rub.      Comments: Compression stockings in place  Pulses:     Intact distal pulses.   Edema:     Ankle: bilateral 1+ edema of the ankle.     Feet: bilateral 1+ edema of the " feet.  Abdominal:      General: Bowel sounds are normal.      Palpations: Abdomen is soft.      Tenderness: There is no abdominal tenderness.   Musculoskeletal: Normal range of motion.         General: No tenderness. Skin:     General: Skin is warm and dry.   Neurological:      General: No focal deficit present.      Mental Status: Alert and oriented to person, place and time.           Lab Review:   Lab Results   Component Value Date    GLUCOSE 85 09/26/2023    BUN 17 09/26/2023    CREATININE 1.42 (H) 09/26/2023    EGFRIFNONA 50 (L) 02/22/2022    EGFRIFAFRI 60 (L) 02/22/2022    BCR 12.0 09/26/2023    CO2 30.9 (H) 09/26/2023    CALCIUM 9.5 09/26/2023    ALBUMIN 2.9 (L) 08/22/2023    AST 24 08/22/2023    ALT 7 08/22/2023       Lab Results   Component Value Date    WBC 5.63 09/12/2023    HGB 10.9 (L) 09/12/2023    HCT 32.3 (L) 09/12/2023    MCV 93.4 09/12/2023     09/12/2023       Lab Results   Component Value Date    HGBA1C 5.90 (H) 08/23/2022       Lab Results   Component Value Date    TSH 1.490 10/19/2023       Lab Results   Component Value Date    CHOL 187 08/23/2022    CHOL 187 02/22/2022     Lab Results   Component Value Date    TRIG 81 08/23/2022    TRIG 112 02/22/2022     Lab Results   Component Value Date    HDL 74 (H) 08/23/2022    HDL 79 (H) 02/22/2022     Lab Results   Component Value Date    LDL 98 08/23/2022    LDL 89 02/22/2022     Advance Care Planning   ACP discussion was held with the patient during this visit. Patient has an advance directive (not in EMR), copy requested.              Assessment:     Overall continued acceptable course with no new interim cardiopulmonary complaints with fair functional status on limited activity. We will defer additional diagnostic or therapeutic intervention from a cardiac perspective at this time.        Diagnosis Plan   1. Chronic diastolic CHF (congestive heart failure)  No recurrent angina pectoris or CHF on current activity schedule; continue current  treatment       2. Essential hypertension  Controlled, continue Bumex      3. Hyperlipidemia LDL goal <70  No new labs to review, continue pravastatin             Plan:         Patient to continue current medications and close follow up with the above providers.  Tentative cardiology follow up in September 2024 or patient may return sooner PRN.      Electronically signed by BRUCE Lopez, 03/01/24, 12:01 PM EST.

## 2024-02-27 ENCOUNTER — OFFICE VISIT (OUTPATIENT)
Dept: INTERNAL MEDICINE | Facility: CLINIC | Age: 89
End: 2024-02-27
Payer: MEDICARE

## 2024-02-27 VITALS
TEMPERATURE: 96.8 F | SYSTOLIC BLOOD PRESSURE: 102 MMHG | WEIGHT: 178 LBS | BODY MASS INDEX: 29.66 KG/M2 | HEART RATE: 96 BPM | DIASTOLIC BLOOD PRESSURE: 68 MMHG | HEIGHT: 65 IN

## 2024-02-27 DIAGNOSIS — I10 ESSENTIAL HYPERTENSION: ICD-10-CM

## 2024-02-27 DIAGNOSIS — N18.31 STAGE 3A CHRONIC KIDNEY DISEASE: ICD-10-CM

## 2024-02-27 DIAGNOSIS — E78.5 HYPERLIPIDEMIA LDL GOAL <70: ICD-10-CM

## 2024-02-27 DIAGNOSIS — R73.01 IMPAIRED FASTING GLUCOSE: ICD-10-CM

## 2024-02-27 DIAGNOSIS — C44.321 SQUAMOUS CELL CARCINOMA OF NOSE: ICD-10-CM

## 2024-02-27 DIAGNOSIS — I50.32 CHRONIC DIASTOLIC CHF (CONGESTIVE HEART FAILURE): ICD-10-CM

## 2024-02-27 DIAGNOSIS — E55.9 VITAMIN D DEFICIENCY: ICD-10-CM

## 2024-02-27 DIAGNOSIS — E66.3 OVERWEIGHT (BMI 25.0-29.9): ICD-10-CM

## 2024-02-27 DIAGNOSIS — Z91.81 AT HIGH RISK FOR FALLS: Chronic | ICD-10-CM

## 2024-02-27 DIAGNOSIS — E44.1 MILD PROTEIN-CALORIE MALNUTRITION: ICD-10-CM

## 2024-02-27 DIAGNOSIS — R26.9 GAIT ABNORMALITY: ICD-10-CM

## 2024-02-27 DIAGNOSIS — I25.10 ASHD (ARTERIOSCLEROTIC HEART DISEASE): ICD-10-CM

## 2024-02-27 DIAGNOSIS — Z00.00 MEDICARE ANNUAL WELLNESS VISIT, SUBSEQUENT: Primary | ICD-10-CM

## 2024-02-27 NOTE — ASSESSMENT & PLAN NOTE
Encourage to get up slowly and get bearings before taking first step. Keep home well lit with clear floors to avoid tripping. Use assist devices for all walking especially from bed to bathroom.  Proceed with PT and using rolling walker

## 2024-02-27 NOTE — ASSESSMENT & PLAN NOTE
Renal condition is stable.  Continue current treatment regimen.  Renal condition will be reassessed in 6 months.

## 2024-02-27 NOTE — ASSESSMENT & PLAN NOTE
Discussed decreasing bad carbohydrates, specifically sweets, breads, potatoes, corn and high caloric drinks (juices, sodas, sweet tea).  .

## 2024-02-27 NOTE — PROGRESS NOTES
The ABCs of the Annual Wellness Visit  Subsequent Medicare Wellness Visit    Subjective    Chaitanya Browne is a 89 y.o. male who presents for a Subsequent Medicare Wellness Visit.    The following portions of the patient's history were reviewed and   updated as appropriate: allergies, current medications, past family history, past medical history, past social history, past surgical history, and problem list.    Compared to one year ago, the patient feels his physical   health is the same.    Compared to one year ago, the patient feels his mental   health is the same.    Recent Hospitalizations:  This patient has had a [facility] admission record on file within the last 365 days.    Current Medical Providers:  Patient Care Team:  Dirk Russ MD as PCP - General (Internal Medicine)  Fabricio Zavala MD as Consulting Physician (Internal Medicine)  Kaley Oliveros APRN as Nurse Practitioner (Cardiology)    Outpatient Medications Prior to Visit   Medication Sig Dispense Refill    aspirin 81 MG EC tablet Take 1 tablet by mouth Every Other Day.      azelastine (ASTELIN) 0.1 % nasal spray 2 sprays into the nostril(s) as directed by provider 2 (Two) Times a Day. Use in each nostril as directed 90 each 3    bumetanide (Bumex) 1 MG tablet 1 mg BID, may take extra 1 mg with 3-5 lb weight gain, dyspnea, or worsening edema. 200 tablet 1    Cemiplimab-rwlc (LIBTAYO IV) Infuse  into a venous catheter.      Cholecalciferol (Vitamin D3) 25 MCG (1000 UT) capsule Take  by mouth.      finasteride (PROSCAR) 5 MG tablet Take 1 tablet by mouth Daily. 90 tablet 3    fluocinonide (LIDEX) 0.05 % external solution Apply 1 application topically to the appropriate area as directed As Needed.      ketoconazole (NIZORAL) 2 % cream Apply 1 application  topically to the appropriate area as directed Daily. For 3 weeks to groin and prn return of rash 60 g 1    ketoconazole (NIZORAL) 2 % shampoo Apply  topically to the appropriate  area as directed As Needed.      Menthol, Topical Analgesic, (Biofreeze) 4 % gel Apply  topically.      metOLazone (ZAROXOLYN) 2.5 MG tablet Take 1 tablet by mouth As Needed (increased swelling/weight gain >2-3 pounds in 1 day, use sparingly). 30 tablet 2    pravastatin (PRAVACHOL) 40 MG tablet Take 1 tablet by mouth Daily. 3x a week 90 tablet 3    Zinc Gluconate (COLD-EEZE PO) Take  by mouth 3 (Three) Times a Day.      benzonatate (Tessalon Perles) 100 MG capsule Take 1 capsule by mouth 3 (Three) Times a Day As Needed for Cough. 30 capsule 0     No facility-administered medications prior to visit.       No opioid medication identified on active medication list. I have reviewed chart for other potential  high risk medication/s and harmful drug interactions in the elderly.        Aspirin is on active medication list. Aspirin use is indicated based on review of current medical condition/s. Pros and cons of this therapy have been discussed today. Benefits of this medication outweigh potential harm.  Patient has been encouraged to continue taking this medication.  .      Patient Active Problem List   Diagnosis    Facial basal cell cancer    Hyperlipidemia LDL goal <70    Essential hypertension    ASHD (arteriosclerotic heart disease)    Gait abnormality    Impaired fasting glucose    Polyp, colonic    Vitamin D deficiency    Acute midline low back pain without sciatica    Proteinuria    Right carpal tunnel syndrome    Overweight (BMI 25.0-29.9)    Hematuria, unspecified    Medicare annual wellness visit, subsequent    Edema    Cough    Allergic rhinitis    Gross hematuria    Acute UTI (urinary tract infection)    Acute renal insufficiency    Acute bronchitis    Acute urinary retention    Debility    Dysphagia    BPH with obstruction/lower urinary tract symptoms    Prostatitis    Chronic diastolic CHF (congestive heart failure)    Iron deficiency anemia due to chronic blood loss    Simple chronic bronchitis    Shortness  "of breath    Stage 3a chronic kidney disease    Skin tear of left forearm without complication    Squamous cell carcinoma of nose    At high risk for falls    Stage II skin ulcer    Cellulitis of right upper extremity    Bladder mass    Anemia    Impacted cerumen of right ear    Nasal congestion     Advance Care Planning   Advance Care Planning     Advance Directive is on file.  ACP discussion was held with the patient during this visit. Patient has an advance directive in EMR which is still valid.      Objective    Vitals:    24 1109   BP: 102/68   Pulse: 96   Temp: 96.8 °F (36 °C)   Weight: 80.7 kg (178 lb)   Height: 165.1 cm (65\")   PainSc: 0-No pain     Estimated body mass index is 29.62 kg/m² as calculated from the following:    Height as of this encounter: 165.1 cm (65\").    Weight as of this encounter: 80.7 kg (178 lb).           Does the patient have evidence of cognitive impairment? No          HEALTH RISK ASSESSMENT    Smoking Status:  Social History     Tobacco Use   Smoking Status Never   Smokeless Tobacco Never     Alcohol Consumption:  Social History     Substance and Sexual Activity   Alcohol Use No     Fall Risk Screen:    STEADI Fall Risk Assessment was completed, and patient is at MODERATE risk for falls. Assessment completed on:2024    Depression Screenin/27/2024    11:11 AM   PHQ-2/PHQ-9 Depression Screening   Little Interest or Pleasure in Doing Things 0-->not at all   Feeling Down, Depressed or Hopeless 0-->not at all   PHQ-9: Brief Depression Severity Measure Score 0       Health Habits and Functional and Cognitive Screenin/27/2024    11:09 AM   Functional & Cognitive Status   Do you have difficulty preparing food and eating? Yes   Do you have difficulty bathing yourself, getting dressed or grooming yourself? Yes   Do you have difficulty using the toilet? No   Do you have difficulty moving around from place to place? No   Do you have trouble with steps or getting " out of a bed or a chair? No   Current Diet Frequent Junk Food   Dental Exam Up to date   Eye Exam Up to date   Exercise (times per week) 7 times per week   Current Exercises Include Walking   Do you need help using the phone?  Yes   Are you deaf or do you have serious difficulty hearing?  No   Do you need help to go to places out of walking distance? Yes   Do you need help shopping? Yes   Do you need help preparing meals?  Yes   Do you need help with housework?  Yes   Do you need help with laundry? Yes   Do you need help taking your medications? Yes   Do you need help managing money? No   Do you ever drive or ride in a car without wearing a seat belt? No   Have you felt unusual stress, anger or loneliness in the last month? No   Who do you live with? Alone   If you need help, do you have trouble finding someone available to you? No   Have you been bothered in the last four weeks by sexual problems? No   Do you have difficulty concentrating, remembering or making decisions? No       Age-appropriate Screening Schedule:  Refer to the list below for future screening recommendations based on patient's age, sex and/or medical conditions. Orders for these recommended tests are listed in the plan section. The patient has been provided with a written plan.    Health Maintenance   Topic Date Due    ZOSTER VACCINE (1 of 2) Never done    RSV Vaccine - Adults (1 - 1-dose 60+ series) Never done    LIPID PANEL  08/23/2023    ANNUAL WELLNESS VISIT  02/23/2024    BMI FOLLOWUP  02/27/2025    TDAP/TD VACCINES (3 - Td or Tdap) 04/03/2033    COVID-19 Vaccine  Completed    INFLUENZA VACCINE  Completed    Pneumococcal Vaccine 65+  Completed                  CMS Preventative Services Quick Reference  Risk Factors Identified During Encounter  Glaucoma or Family History of Glaucoma:  Ophthalmology Appointment Recommended  Hearing Problem:  recent visit to audiology   The above risks/problems have been discussed with the patient.  Pertinent  "information has been shared with the patient in the After Visit Summary.  An After Visit Summary and PPPS were made available to the patient.    Follow Up:   Next Medicare Wellness visit to be scheduled in 1 year.       Additional E&M Note during same encounter follows:  Patient has multiple medical problems which are significant and separately identifiable that require additional work above and beyond the Medicare Wellness Visit.      Chief Complaint  Annual Exam    Subjective        HPI  Chaitanya Browne is also being seen today for wellness exam     Review of Systems   Constitutional:  Negative for chills and fever.   HENT:  Positive for hearing loss.    Respiratory:  Negative for cough and shortness of breath.    Cardiovascular:  Negative for chest pain and palpitations.   Gastrointestinal:  Negative for abdominal pain.   Musculoskeletal:  Positive for gait problem.   Neurological:  Positive for weakness. Negative for dizziness.   Psychiatric/Behavioral:  Negative for decreased concentration and dysphoric mood. The patient is not nervous/anxious.        Objective   Vital Signs:  /68   Pulse 96   Temp 96.8 °F (36 °C)   Ht 165.1 cm (65\")   Wt 80.7 kg (178 lb)   BMI 29.62 kg/m²     Physical Exam  Constitutional:       Appearance: Normal appearance.   HENT:      Right Ear: Tympanic membrane and ear canal normal.      Left Ear: Tympanic membrane and ear canal normal.   Eyes:      Conjunctiva/sclera: Conjunctivae normal.   Neck:      Vascular: No carotid bruit.   Cardiovascular:      Rate and Rhythm: Normal rate and regular rhythm.   Pulmonary:      Breath sounds: No wheezing or rales.   Abdominal:      General: Bowel sounds are normal.      Palpations: Abdomen is soft.   Musculoskeletal:         General: No swelling.   Neurological:      General: No focal deficit present.      Mental Status: He is alert and oriented to person, place, and time.      Comments: Slow get up and go and mildly unsteady  "   Psychiatric:         Mood and Affect: Mood normal.         Behavior: Behavior normal.                         Assessment and Plan   Diagnoses and all orders for this visit:    1. Medicare annual wellness visit, subsequent (Primary)  Assessment & Plan:  His mood is good overall and good recall. Fall risk precautions and proceed with labs and use walker and proceed with  PT. Following with Derm regarding skin cancer Age-appropriate Counseling:  Discussed preventative medicine issues with patient including regular exercise, healthy diet, stress reduction, adequate sleep and recommended age-appropriate screening studies.  Immunizations reviewed.          2. Gait abnormality  Assessment & Plan:  Encourage to get up slowly and get bearings before taking first step. Keep home well lit with clear floors to avoid tripping. Use assist devices for all walking especially from bed to bathroom.  Proceed with PT and using rolling walker     Orders:  -     Ambulatory Referral to Physical Therapy Evaluate and treat    3. Hyperlipidemia LDL goal <70  Assessment & Plan:   Lipid abnormalities are stable    Plan:  Continue same medication/s without change.      Discussed medication dosage, use, side effects, and goals of treatment in detail.    Counseled patient on lifestyle modifications to help control hyperlipidemia.     Patient Treatment Goals:   LDL goal is less than 70    Followup in 6 months.    Orders:  -     TSH Rfx On Abnormal To Free T4; Future    4. Impaired fasting glucose  Assessment & Plan:  Discussed decreasing bad carbohydrates, specifically sweets, breads, potatoes, corn and high caloric drinks (juices, sodas, sweet tea).  .    Orders:  -     Hemoglobin A1c; Future    5. Chronic diastolic CHF (congestive heart failure)  Assessment & Plan:  Congestive heart failure due to coronary artery disease (CAD).  Heart failure is stable.  NYHA Class II.  Continue current treatment regimen.  Dietary sodium  restriction.  Encouraged daily monitoring of the patient's weight.  Regular aerobic exercise.  Heart failure will be reassessed in 6 months.          6. Overweight (BMI 25.0-29.9)  Assessment & Plan:  Patient's (Body mass index is 29.62 kg/m².) indicates that they are overweight with health conditions that include hypertension, coronary heart disease, dyslipidemias, GERD, and osteoarthritis . Weight is unchanged. BMI is is above average; BMI management plan is completed. We discussed portion control and increasing exercise.       7. Vitamin D deficiency  Assessment & Plan:  Follow labs     Orders:  -     Vitamin D,25-Hydroxy; Future    8. At high risk for falls  Assessment & Plan:  Encourage to get up slowly and get bearings before taking first step. Keep home well lit with clear floors to avoid tripping. Use assist devices for all walking especially from bed to bathroom.  Proceed with PT    Orders:  -     Ambulatory Referral to Physical Therapy Evaluate and treat    9. Essential hypertension  Assessment & Plan:  Hypertension is stable and controlled  Continue current treatment regimen.  Blood pressure will be reassessed in 6 months.    Orders:  -     Lipid Panel; Future    10. Stage 3a chronic kidney disease  Assessment & Plan:  Renal condition is stable.  Continue current treatment regimen.  Renal condition will be reassessed in 6 months.    Orders:  -     Renal Function Panel; Future  -     Vitamin D,25-Hydroxy; Future  -     Microalbumin / Creatinine Urine Ratio - Urine, Clean Catch; Future    11. Mild protein-calorie malnutrition  Comments:  Encourage nutrition protein increase and protein drink.  Orders:  -     Prealbumin; Future  -     TSH Rfx On Abnormal To Free T4; Future    12. ASHD (arteriosclerotic heart disease)  Assessment & Plan:  Follow with Cardiology and denies chest pain      13. Squamous cell carcinoma of nose  Assessment & Plan:  Follow with Dr Leggett and stable                Follow Up   Return  in about 6 months (around 8/27/2024) for Recheck.  Patient was given instructions and counseling regarding his condition or for health maintenance advice. Please see specific information pulled into the AVS if appropriate.

## 2024-02-27 NOTE — ASSESSMENT & PLAN NOTE
Patient's (Body mass index is 29.62 kg/m².) indicates that they are overweight with health conditions that include hypertension, coronary heart disease, dyslipidemias, GERD, and osteoarthritis . Weight is unchanged. BMI is is above average; BMI management plan is completed. We discussed portion control and increasing exercise.

## 2024-02-27 NOTE — ASSESSMENT & PLAN NOTE
Congestive heart failure due to coronary artery disease (CAD).  Heart failure is stable.  NYHA Class II.  Continue current treatment regimen.  Dietary sodium restriction.  Encouraged daily monitoring of the patient's weight.  Regular aerobic exercise.  Heart failure will be reassessed in 6 months.

## 2024-02-27 NOTE — ASSESSMENT & PLAN NOTE
His mood is good overall and good recall. Fall risk precautions and proceed with labs and use walker and proceed with  PT. Following with Derm regarding skin cancer Age-appropriate Counseling:  Discussed preventative medicine issues with patient including regular exercise, healthy diet, stress reduction, adequate sleep and recommended age-appropriate screening studies.  Immunizations reviewed.

## 2024-03-01 ENCOUNTER — OFFICE VISIT (OUTPATIENT)
Dept: CARDIOLOGY | Facility: CLINIC | Age: 89
End: 2024-03-01
Payer: MEDICARE

## 2024-03-01 VITALS
HEART RATE: 84 BPM | BODY MASS INDEX: 29.49 KG/M2 | OXYGEN SATURATION: 96 % | SYSTOLIC BLOOD PRESSURE: 92 MMHG | WEIGHT: 177 LBS | DIASTOLIC BLOOD PRESSURE: 48 MMHG | HEIGHT: 65 IN

## 2024-03-01 DIAGNOSIS — R60.0 EDEMA LEG: ICD-10-CM

## 2024-03-01 DIAGNOSIS — I50.32 CHRONIC DIASTOLIC CHF (CONGESTIVE HEART FAILURE): Primary | ICD-10-CM

## 2024-03-01 DIAGNOSIS — I10 ESSENTIAL HYPERTENSION: ICD-10-CM

## 2024-03-01 DIAGNOSIS — E78.5 HYPERLIPIDEMIA LDL GOAL <70: ICD-10-CM

## 2024-03-01 RX ORDER — TACROLIMUS 1 MG/G
1 OINTMENT TOPICAL DAILY PRN
COMMUNITY
Start: 2024-01-18

## 2024-03-01 RX ORDER — BUMETANIDE 1 MG/1
TABLET ORAL
Qty: 200 TABLET | Refills: 1 | Status: SHIPPED | OUTPATIENT
Start: 2024-03-01

## 2024-03-15 ENCOUNTER — TELEPHONE (OUTPATIENT)
Dept: INTERNAL MEDICINE | Facility: CLINIC | Age: 89
End: 2024-03-15
Payer: MEDICARE

## 2024-03-15 NOTE — TELEPHONE ENCOUNTER
DAUGHTER LORETO CALLED TO REPORT THAT PATIENT IS HAVING LEFT SIDE HIP PAIN AND WEAKNESS. SHE STATES HE HAS HAD THIS BEFORE SHE DIDN'T WANT TO MAKE AN APPOINTMENT BUT ONLY TO NOTIFY DR. DEE.

## 2024-03-28 ENCOUNTER — TREATMENT (OUTPATIENT)
Dept: PHYSICAL THERAPY | Facility: CLINIC | Age: 89
End: 2024-03-28
Payer: MEDICARE

## 2024-03-28 DIAGNOSIS — R26.89 IMPAIRMENT OF BALANCE: Primary | ICD-10-CM

## 2024-03-28 PROCEDURE — 97162 PT EVAL MOD COMPLEX 30 MIN: CPT | Performed by: PHYSICAL THERAPIST

## 2024-03-28 PROCEDURE — 97110 THERAPEUTIC EXERCISES: CPT | Performed by: PHYSICAL THERAPIST

## 2024-03-28 NOTE — PROGRESS NOTES
Physical Therapy Initial Evaluation and Plan of Care     [facility] [place]          [address]     Patient: Chaitanya Browne   : 1923  Diagnosis/ICD-10 Code:  Impairment of balance [R26.89]  Referring practitioner: Dirk Russ MD  Date of Initial Visit: 3/28/2024  Today's Date: 3/28/2024  Patient seen for 1 sessions    Subjective:     Subjective Questionnaire: DIXON 56  TUG 22.63 sec       Subjective Evaluation    History of Present Illness  Mechanism of injury: Patient reports to clinic for a tune up. Balance is ok, walking is alright as well. He is sore on the R side of the hip that has been there for a few weeks. He has not gone to the gym because it has been cold outside.              Objective          Static Posture     Comments  Forward flexed posture in standing     Strength/Myotome Testing     Left Hip   Planes of Motion   Flexion: 4  Abduction: 4-    Right Hip   Planes of Motion   Flexion: 4  Abduction: 4-    Left Knee   Flexion: 4  Extension: 4    Right Knee   Flexion: 4  Extension: 4    Left Ankle/Foot   Dorsiflexion: 4    Right Ankle/Foot   Dorsiflexion: 4    Ambulation     Comments   Normalized gait pattern with use of rollator     General Comments     Lumbar Comments  Positive test for RLE neural tension        PT Neuro         Assessment & Plan       Assessment  Impairments: abnormal coordination, abnormal gait, activity intolerance, impaired balance, impaired physical strength and safety issue   Functional limitations: carrying objects, walking, standing and stooping   Assessment details: Patient is a 89 YOM that reports to clinic for balance and strength deficits, along with new onset R leg discomfort. Neural tension testing is positive. He will be administered seated nerve glides for the RLE for home performance. Patient will require skilled PT intervention to address global weakness and balance deficits in order to improve mobility and function.    Prognosis: fair    Goals  Plan Goals: STG (5 visits)  1. Patient will report compliance with initial HEP.   2. Patient to improve DIXON balance score to >/= 35 /56 to decrease patient's risk of falls.  3. Patient to perform TUG within 20 sec without LOB for improved functional mobility.      LTG (8 visits)  1. Patient will be I with final HEP.   2. Patient to improve DIXON balance score to >/= 39 /56 to decrease patient's risk of falls.  3. Patient to perform TUG within 18 sec without LOB for improved functional mobility.      Plan  Therapy options: will be seen for skilled therapy services  Planned modality interventions: TENS  Planned therapy interventions: ADL retraining, balance/weight-bearing training, flexibility, gait training, manual therapy, neuromuscular re-education, motor coordination training, postural training, strengthening, stretching, therapeutic activities, transfer training and home exercise program  Frequency: 1x week  Duration in visits: 10  Treatment plan discussed with: patient and family  Plan details: Patient will be seen 1x/wk x 10 wks with treatment to include strengthening, stretching, manual therapy, neuromuscular re-education, balance, gait and endurance training.           Timed:  Manual Therapy:    0     mins  26044;  Therapeutic Exercise:    10     mins  48191;     Neuromuscular Tahmina:    0    mins  60392;    Therapeutic Activity:     0     mins  10251;     Gait Trainin     mins  91116;     Electrical Stimulation:    0     mins  97453 ( );    Untimed:  Canalith Repositioning    0     mins 06499    Timed Treatment:   10   mins   Total Treatment:     40   mins    PT SIGNATURE: Vianca Durant, PT, DPT, MSCS, CDP, CSRS  [place] License #[id number]  DATE TREATMENT INITIATED: 3/28/2024      Medicare Initial Certification Certification Period: 3/28/7603zkgk7/25/2024  I certify that the therapy services are furnished while this patient is under my care.  The services outlined above  are required by this patient, and will be reviewed every 90 days.     PHYSICIAN: Dirk Russ MD  NPI: [id number]                                         DATE:     Please sign and return via fax to [phone].   Thank you,   [facility].

## 2024-04-02 ENCOUNTER — TELEPHONE (OUTPATIENT)
Dept: PHYSICAL THERAPY | Facility: CLINIC | Age: 89
End: 2024-04-02

## 2024-04-02 NOTE — TELEPHONE ENCOUNTER
Caller: Nkechi Browne    Relationship: Emergency Contact    What was the call regarding: PATIENT WILL NOT BE AT TODAY'S APPT.  [address] IS BLOCKED BY A DOWNED TREE.

## 2024-04-16 ENCOUNTER — TREATMENT (OUTPATIENT)
Dept: PHYSICAL THERAPY | Facility: CLINIC | Age: 89
End: 2024-04-16
Payer: MEDICARE

## 2024-04-16 DIAGNOSIS — R26.89 IMPAIRMENT OF BALANCE: Primary | ICD-10-CM

## 2024-04-16 PROCEDURE — 97112 NEUROMUSCULAR REEDUCATION: CPT | Performed by: PHYSICAL THERAPIST

## 2024-04-16 PROCEDURE — 97110 THERAPEUTIC EXERCISES: CPT | Performed by: PHYSICAL THERAPIST

## 2024-04-16 NOTE — PROGRESS NOTES
Physical Therapy Daily Note  Visit: 2  Date of Initial Visit: Type: THERAPY  Noted: 3/28/2024    Patient: Chaitanya Browne   : 1923  Diagnosis/ICD-10 Code:  Impairment of balance [R26.89]  Referring practitioner: Dirk Russ MD  Date of Initial Visit: Type: THERAPY  Noted: 3/28/2024  Today's Date: 2024  Patient seen for 2 sessions    Subjective:   Patient reports: he is feeling good. Has a question about dynamic stretching.   Pain: 0/10  Clinical Progress: improved  Home Program Compliance: Yes  Treatment has included: therapeutic exercise and neuromuscular re-education    Objective   See Exercise, Manual, and Modality Logs for complete treatment.    PT Neuro          Assessment & Plan       Assessment  Assessment details: Patient demonstrates good performance of STS on and off of unstable surface. He did use the back of his legs for several repetitions, however he did improve once he began leaning forward. Continue to progress as indicated.     Plan  Plan details: Patient to continue with PT services to improve gait, balance, strength, transfers and overall functional mobility.          Timed:  Manual Therapy:            0     mins  53964;  Therapeutic Exercise:    30    mins  06239;     Neuromuscular Tahmina:    15    mins  58094;    Therapeutic Activity:      0     mins  74021;     Gait Trainin    mins  13733;     Electrical Stimulation:    0    mins  86585 ( );     Untimed:  Canalith Repositioning techniques _0_ 15888      Timed Treatment:   45   mins   Total Treatment:     45   mins      Vianca Durant, PT, DPT, MSCS, CDP, CSRS  [place] License #: [id number]  Physical Therapist

## 2024-04-30 ENCOUNTER — TREATMENT (OUTPATIENT)
Dept: PHYSICAL THERAPY | Facility: CLINIC | Age: 89
End: 2024-04-30
Payer: MEDICARE

## 2024-04-30 DIAGNOSIS — R26.89 IMPAIRMENT OF BALANCE: Primary | ICD-10-CM

## 2024-04-30 PROCEDURE — 97110 THERAPEUTIC EXERCISES: CPT | Performed by: PHYSICAL THERAPIST

## 2024-04-30 PROCEDURE — 97530 THERAPEUTIC ACTIVITIES: CPT | Performed by: PHYSICAL THERAPIST

## 2024-04-30 NOTE — PROGRESS NOTES
Physical Therapy Daily Note  Visit: 3  Date of Initial Visit: Type: THERAPY  Noted: 3/28/2024    Patient: Chaitanya Browne   : 1923  Diagnosis/ICD-10 Code:  Impairment of balance [R26.89]  Referring practitioner: Dirk Russ MD  Date of Initial Visit: Type: THERAPY  Noted: 3/28/2024  Today's Date: 2024  Patient seen for 3 sessions    Subjective:   Patient reports: daughter states he fell last Monday stepping off of a scale.   Pain: 0/10  Clinical Progress: improved  Home Program Compliance: Yes  Treatment has included: therapeutic exercise and therapeutic activity    Objective   See Exercise, Manual, and Modality Logs for complete treatment.    PT Neuro          Assessment & Plan       Assessment  Assessment details: Patient had a fall last week and he is reporting no pain, however retro stepping on and off step were practiced today since that was his mechanism of fall. Patient will continue with current HEP.     Plan  Plan details: Patient to continue with PT services to improve gait, balance, strength, transfers and overall functional mobility.          Timed:  Manual Therapy:            0     mins  02957;  Therapeutic Exercise:    25    mins  78148;     Neuromuscular Tahmina:    0    mins  91220;    Therapeutic Activity:      15     mins  08192;     Gait Trainin    mins  97023;     Electrical Stimulation:    0    mins  70324 ( );     Untimed:  Canalith Repositioning techniques _0_ 84392      Timed Treatment:   40   mins   Total Treatment:     40   mins      Vianca Durant, PT, DPT, MSCS, CDP, CSRS  [place] License #: [id number]  Physical Therapist

## 2024-05-07 ENCOUNTER — TREATMENT (OUTPATIENT)
Dept: PHYSICAL THERAPY | Facility: CLINIC | Age: 89
End: 2024-05-07
Payer: MEDICARE

## 2024-05-07 DIAGNOSIS — R26.89 IMPAIRMENT OF BALANCE: Primary | ICD-10-CM

## 2024-05-07 PROCEDURE — 97112 NEUROMUSCULAR REEDUCATION: CPT | Performed by: PHYSICAL THERAPIST

## 2024-05-07 PROCEDURE — 97110 THERAPEUTIC EXERCISES: CPT | Performed by: PHYSICAL THERAPIST

## 2024-05-14 ENCOUNTER — TREATMENT (OUTPATIENT)
Dept: PHYSICAL THERAPY | Facility: CLINIC | Age: 89
End: 2024-05-14
Payer: MEDICARE

## 2024-05-14 DIAGNOSIS — R26.89 IMPAIRMENT OF BALANCE: Primary | ICD-10-CM

## 2024-05-14 PROCEDURE — 97110 THERAPEUTIC EXERCISES: CPT | Performed by: PHYSICAL THERAPIST

## 2024-05-14 NOTE — PROGRESS NOTES
Physical Therapy Daily Note  Visit: 5  Date of Initial Visit: Type: THERAPY  Noted: 3/28/2024    Patient: Chaitanya Browne   : 1923  Diagnosis/ICD-10 Code:  Impairment of balance [R26.89]  Referring practitioner: Dirk Russ MD  Date of Initial Visit: Type: THERAPY  Noted: 3/28/2024  Today's Date: 2024  Patient seen for 5 sessions    Subjective:   Patient reports: his back is bothering him a little today.   Pain: 2/10  Clinical Progress: improved  Home Program Compliance: Yes  Treatment has included: therapeutic exercise    Objective   See Exercise, Manual, and Modality Logs for complete treatment.    PT Neuro          Assessment & Plan       Assessment  Assessment details: Patient reports to clinic with complaints of back pain. Stretching and LE strengthening exercises did improve his pain. These exercises were printed for performance at home in order to limit pain.     Plan  Plan details: Patient to continue with PT services to improve gait, balance, strength, transfers and overall functional mobility.          Timed:  Manual Therapy:            0     mins  27529;  Therapeutic Exercise:    40    mins  05522;     Neuromuscular Tahmina:    0    mins  06317;    Therapeutic Activity:      0     mins  85955;     Gait Trainin    mins  60598;     Electrical Stimulation:    0    mins  28148 ( );     Untimed:  Canalith Repositioning techniques _0_ 08068      Timed Treatment:   40   mins   Total Treatment:     40   mins      Vianca Durant PT, DPT, MSCS, CDP, CSRS  [place] License #: [id number]  Physical Therapist

## 2024-05-17 NOTE — TELEPHONE ENCOUNTER
I need to see him within 2 weeks of leaving the rehab   And needs to be on my schedule with tcm call    Patient attended session 11 of outpatient Phase 2 cardiac rehab. See scanned in exercise session report in media tab.

## 2024-05-21 ENCOUNTER — TREATMENT (OUTPATIENT)
Dept: PHYSICAL THERAPY | Facility: CLINIC | Age: 89
End: 2024-05-21
Payer: MEDICARE

## 2024-05-21 DIAGNOSIS — R26.89 IMPAIRMENT OF BALANCE: Primary | ICD-10-CM

## 2024-05-21 PROCEDURE — 97110 THERAPEUTIC EXERCISES: CPT | Performed by: PHYSICAL THERAPIST

## 2024-05-21 NOTE — PROGRESS NOTES
Physical Therapy Daily Note  Visit: 6  Date of Initial Visit: Type: THERAPY  Noted: 3/28/2024    Patient: Chaitanya Browne   : 1923  Diagnosis/ICD-10 Code:  Impairment of balance [R26.89]  Referring practitioner: Dirk Russ MD  Date of Initial Visit: Type: THERAPY  Noted: 3/28/2024  Today's Date: 2024  Patient seen for 6 sessions    Subjective:   Patient reports: he has been woken up at night from wrist pain related to CT.   Pain: 0/10  Clinical Progress: improved  Home Program Compliance: Yes  Treatment has included: therapeutic exercise    Objective   See Exercise, Manual, and Modality Logs for complete treatment.    PT Neuro          Assessment & Plan       Assessment  Assessment details: Patient reported pain at night with CT symptoms. He is compliant with braces, however he has not been performing stretches during the day. These were reviewed and demonstrated in clinic. He will continue at home and search for his foam block/putty.     Plan  Plan details: Patient to continue with PT services to improve gait, balance, strength, transfers and overall functional mobility.          Timed:  Manual Therapy:            0     mins  96611;  Therapeutic Exercise:    40    mins  64950;     Neuromuscular Tahmina:    0    mins  34351;    Therapeutic Activity:      0     mins  11711;     Gait Trainin    mins  94202;     Electrical Stimulation:    0    mins  07456 ( );     Untimed:  Canalith Repositioning techniques _0_ 60559      Timed Treatment:   40   mins   Total Treatment:     40   mins      Vianca Durant, PT, DPT, MSCS, CDP, CSRS  [place] License #: [id number]  Physical Therapist

## 2024-05-28 ENCOUNTER — TREATMENT (OUTPATIENT)
Dept: PHYSICAL THERAPY | Facility: CLINIC | Age: 89
End: 2024-05-28
Payer: MEDICARE

## 2024-05-28 ENCOUNTER — PATIENT MESSAGE (OUTPATIENT)
Dept: INTERNAL MEDICINE | Facility: CLINIC | Age: 89
End: 2024-05-28
Payer: MEDICARE

## 2024-05-28 DIAGNOSIS — R26.89 IMPAIRMENT OF BALANCE: Primary | ICD-10-CM

## 2024-05-28 PROCEDURE — 97110 THERAPEUTIC EXERCISES: CPT | Performed by: PHYSICAL THERAPIST

## 2024-05-28 PROCEDURE — 97116 GAIT TRAINING THERAPY: CPT | Performed by: PHYSICAL THERAPIST

## 2024-05-28 PROCEDURE — 97112 NEUROMUSCULAR REEDUCATION: CPT | Performed by: PHYSICAL THERAPIST

## 2024-05-28 RX ORDER — PRAVASTATIN SODIUM 40 MG
40 TABLET ORAL DAILY
Qty: 90 TABLET | Refills: 3 | Status: SHIPPED | OUTPATIENT
Start: 2024-05-28

## 2024-05-28 NOTE — PROGRESS NOTES
Physical Therapy Daily Note  Visit: 7  Date of Initial Visit: Type: THERAPY  Noted: 3/28/2024    Patient: Chaitanya Browne   : 1923  Diagnosis/ICD-10 Code:  Impairment of balance [R26.89]  Referring practitioner: Dirk Russ MD  Date of Initial Visit: Type: THERAPY  Noted: 3/28/2024  Today's Date: 2024  Patient seen for 7 sessions    Subjective:   Patient reports: daughter reports that he likely had another TIA over the weekend. R foot is scuffing and he seems to be more confused.   Pain: 3/10-LB  Clinical Progress: worse  Home Program Compliance: Yes  Treatment has included: therapeutic exercise, neuromuscular re-education, and gait training    Objective   See Exercise, Manual, and Modality Logs for complete treatment.    PT Neuro          Assessment & Plan       Assessment  Assessment details: Patient and patients daughter present to PT with concerns of another potential TIA. Patient was unable to figure out how to dress himself or get in/out of the house. He also demo's BLE midfoot strike. Patient was able to make changes to gait pattern, as well as step/up down correctly with verbal cues. Daughter was instructed to cue him when needed and decrease cues as the week goes on to assess the automaticity of his movements.     Plan  Plan details: Patient to continue with PT services to improve gait, balance, strength, transfers and overall functional mobility.          Timed:  Manual Therapy:            0     mins  21104;  Therapeutic Exercise:    15    mins  76313;     Neuromuscular Tahmina:    15    mins  54509;    Therapeutic Activity:      0     mins  12805;     Gait Training:                 15    mins  57770;     Electrical Stimulation:    0    mins  75412 ( );     Untimed:  Canalith Repositioning techniques _0_ 13635      Timed Treatment:   45   mins   Total Treatment:     45   mins      Vianca Durant, PT, DPT, MSCS, CDP, CSRS  [place] License #: [id number]  Physical Therapist

## 2024-06-06 ENCOUNTER — TREATMENT (OUTPATIENT)
Dept: PHYSICAL THERAPY | Facility: CLINIC | Age: 89
End: 2024-06-06
Payer: MEDICARE

## 2024-06-06 DIAGNOSIS — R26.89 IMPAIRMENT OF BALANCE: Primary | ICD-10-CM

## 2024-06-06 NOTE — PROGRESS NOTES
Physical Therapy Progress Note  Visit: 8  Date of Initial Visit: Type: THERAPY  Noted: 3/28/2024    Patient: Chaitanya Browne   : 1923  Diagnosis/ICD-10 Code:  Impairment of balance [R26.89]  Referring practitioner: Dirk Russ MD  Date of Initial Visit: Type: THERAPY  Noted: 3/28/2024  Today's Date: 2024  Patient seen for 8 sessions    Subjective:   Patient reports: daughter states he is doing much better compared to last week. He had no issues getting in and out of the house.   Pain: 0/10  Clinical Progress: improved  Home Program Compliance: Yes  Treatment has included: therapeutic exercise and neuromuscular re-education    Objective   See Exercise, Manual, and Modality Logs for complete treatment.    PT Neuro          Assessment & Plan       Assessment  Impairments: abnormal coordination, abnormal gait, activity intolerance, impaired balance, impaired physical strength and safety issue   Functional limitations: carrying objects, walking, standing and stooping   Assessment details: Patient demonstrates improved movement up/down steps. He was able to manage with 1 UE assist to maneuver. Obstacle course training with stepping over tadeo and foam roll was performed with good step length over targets. He will continue to be progressed over the next few weeks.   Prognosis: fair    Goals  Plan Goals: STG (5 visits)  1. Patient will report compliance with initial HEP. MET  2. Patient to improve DIXON balance score to >/= 35 /56 to decrease patient's risk of falls. ONGOING  3. Patient to perform TUG within 20 sec without LOB for improved functional mobility.ONGOING      LTG (8 visits)  1. Patient will be I with final HEP. ONGOING  2. Patient to improve DIXON balance score to >/= 39 /56 to decrease patient's risk of falls.ONGOING  3. Patient to perform TUG within 18 sec without LOB for improved functional mobility.ONGOING      Plan  Therapy options: will be seen for skilled therapy services  Planned  modality interventions: TENS  Planned therapy interventions: ADL retraining, balance/weight-bearing training, flexibility, gait training, manual therapy, neuromuscular re-education, motor coordination training, postural training, strengthening, stretching, therapeutic activities, transfer training and home exercise program  Frequency: 1x week  Duration in visits: 4  Treatment plan discussed with: patient and family  Plan details: Patient will continue to be seen 1x/wk x 4 wks with treatment to include strengthening, stretching, manual therapy, neuromuscular re-education, balance, gait and endurance training.           Timed:  Manual Therapy:            0     mins  13457;  Therapeutic Exercise:    20    mins  32136;     Neuromuscular Tahmina:    25    mins  09377;    Therapeutic Activity:      0     mins  18601;     Gait Trainin    mins  52304;     Electrical Stimulation:    0    mins  23334 ( );     Untimed:  Canalith Repositioning techniques _0_ 85833      Timed Treatment:   45   mins   Total Treatment:     45   mins      Vianca Durant PT, DPT, MSCS, CDP, CSRS  [place] License #: [id number]  Physical Therapist

## 2024-06-18 ENCOUNTER — TREATMENT (OUTPATIENT)
Dept: PHYSICAL THERAPY | Facility: CLINIC | Age: 89
End: 2024-06-18
Payer: MEDICARE

## 2024-06-18 DIAGNOSIS — R26.89 IMPAIRMENT OF BALANCE: Primary | ICD-10-CM

## 2024-06-18 NOTE — PROGRESS NOTES
Physical Therapy Daily Note  Visit: 9  Date of Initial Visit: Type: THERAPY  Noted: 3/28/2024    Patient: Chaitanya Browne   : 1923  Diagnosis/ICD-10 Code:  Impairment of balance [R26.89]  Referring practitioner: Dirk Russ MD  Date of Initial Visit: Type: THERAPY  Noted: 3/28/2024  Today's Date: 2024  Patient seen for 9 sessions      Subjective:   Patient reports: Patient had questions about his HEP from last session.  Pain: 0      Objective   See Exercise, Manual, and Modality Logs for complete treatment.    Exercise 1  Exercise Name 1: (P) nustep  Equipment/Resistance 1: (P) 6  Time: (P) 20 mins  Exercise 2  Exercise Name 2: (P) Obstacle course including: stepping over hurdles, onto foam roll, side steps onto aerobic step, in // bars with CGA thru belt  Equipment/Resistance 2: (P) aerobic step, gait belt, hurdles  Sets/Reps 2: (P) 6 laps  Exercise 3  Exercise Name 3: (P) Forward and cross midline toe taps on  cones with BUE supported in // bars  Equipment/Resistance 3: (P) cones, // bars, gait belt CGA  Exercise 4  Exercise Name 4: (P) Step ups from foam to aerobic step, wtih BUE support on // bars  Equipment/Resistance 4: (P) gait belt CGA, aerobic step, foam  Sets/Reps 4: (P) 10 ea, tactile cues for sequencing  Exercise 5  Exercise Name 5: (P) extra time to review HEP from last session  Equipment/Resistance 5: (P) foam block  Time 5: (P) 5             PT Neuro          Assessment & Plan       Assessment  Assessment details: Session focused on cardiovascular conditioning, reviewing HEP, and BLE coordination/increasing step length. Next session will streamline HEP for potential break from PT. Continue skilled PT services to achieve highest level of functioning.     Plan  Plan details: Pt to benefit from skilled PT services to improve gait, balance, strength, and overall functional mobility.         Timed:  Manual Therapy:            0     mins  27378;  Therapeutic Exercise:    25    mins   78777;     Neuromuscular Tahmina:    20    mins  78333;    Therapeutic Activity:      0     mins  99937;     Gait Trainin    mins  13742;     Electrical Stimulation:    0    mins  50998 ( );     Untimed:  Canalith Repositioning techniques _0_ 41946      Timed Treatment:   45   mins   Total Treatment:     45   mins    Lucila Nieto Physical Therapist Student completed treatment under my direct supervision.     2024    Vianca Durant, PT, DPT, MSCS, CDP, CSRS  [place] License #: [id number]  Physical Therapist

## 2024-06-27 ENCOUNTER — TREATMENT (OUTPATIENT)
Dept: PHYSICAL THERAPY | Facility: CLINIC | Age: 89
End: 2024-06-27
Payer: MEDICARE

## 2024-06-27 DIAGNOSIS — R26.89 IMPAIRMENT OF BALANCE: Primary | ICD-10-CM

## 2024-06-27 NOTE — PROGRESS NOTES
Physical Therapy Daily Note/Discharge Summary  Visit: 10  Date of Initial Visit: Type: THERAPY  Noted: 3/28/2024    Patient: Chaitanya Browne   : 1923  Diagnosis/ICD-10 Code:  Impairment of balance [R26.89]  Referring practitioner: Dirk Russ MD  Date of Initial Visit: Type: THERAPY  Noted: 3/28/2024  Today's Date: 2024  Patient seen for 10 sessions      Subjective:   Patient reports: he has a question to ask after warm up.   Pain: 0/10  Clinical Progress: improved  Home Program Compliance: Yes  Treatment has included: therapeutic exercise and neuromuscular re-education    Objective   See Exercise, Manual, and Modality Logs for complete treatment.    PT Neuro          Assessment & Plan       Assessment  Assessment details: Patient given instruction on exercise schedule for home. He will continue with caregiver and daughter assist. Patient verbally understands and will return if any changes.     Plan  Plan details: Patient will be discharged to independent management of program.         Timed:  Manual Therapy:            0     mins  11691;  Therapeutic Exercise:    20    mins  23355;     Neuromuscular Tahmina:    25    mins  08487;    Therapeutic Activity:      0     mins  13005;     Gait Trainin    mins  22852;     Electrical Stimulation:    0    mins  00933 ( );     Untimed:  Canalith Repositioning techniques _0_ 93807      Timed Treatment:   45   mins   Total Treatment:     45   mins      Vianca Durant PT, DPT, MSCS, CDP, CSRS  [place] License #: [id number]  Physical Therapist

## 2024-07-18 NOTE — ASSESSMENT & PLAN NOTE
His nutritional status has improved.    Paragard arrived in office, spoke to patient. Patient will call Dr. Pearce office when she starts cycle

## 2024-08-29 ENCOUNTER — OFFICE VISIT (OUTPATIENT)
Dept: INTERNAL MEDICINE | Facility: CLINIC | Age: 89
End: 2024-08-29
Payer: MEDICARE

## 2024-08-29 VITALS
OXYGEN SATURATION: 95 % | TEMPERATURE: 98.7 F | SYSTOLIC BLOOD PRESSURE: 118 MMHG | HEART RATE: 87 BPM | HEIGHT: 65 IN | WEIGHT: 168 LBS | DIASTOLIC BLOOD PRESSURE: 70 MMHG | BODY MASS INDEX: 27.99 KG/M2

## 2024-08-29 DIAGNOSIS — E44.1 MILD PROTEIN-CALORIE MALNUTRITION: ICD-10-CM

## 2024-08-29 DIAGNOSIS — E66.3 OVERWEIGHT (BMI 25.0-29.9): ICD-10-CM

## 2024-08-29 DIAGNOSIS — N18.31 STAGE 3A CHRONIC KIDNEY DISEASE: ICD-10-CM

## 2024-08-29 DIAGNOSIS — I10 ESSENTIAL HYPERTENSION: ICD-10-CM

## 2024-08-29 DIAGNOSIS — E78.5 HYPERLIPIDEMIA LDL GOAL <70: Primary | ICD-10-CM

## 2024-08-29 DIAGNOSIS — Z91.81 AT HIGH RISK FOR FALLS: Chronic | ICD-10-CM

## 2024-08-29 DIAGNOSIS — R26.89 IMPAIRMENT OF BALANCE: ICD-10-CM

## 2024-08-29 PROCEDURE — 99214 OFFICE O/P EST MOD 30 MIN: CPT | Performed by: INTERNAL MEDICINE

## 2024-08-29 PROCEDURE — 1160F RVW MEDS BY RX/DR IN RCRD: CPT | Performed by: INTERNAL MEDICINE

## 2024-08-29 PROCEDURE — G2211 COMPLEX E/M VISIT ADD ON: HCPCS | Performed by: INTERNAL MEDICINE

## 2024-08-29 PROCEDURE — 1159F MED LIST DOCD IN RCRD: CPT | Performed by: INTERNAL MEDICINE

## 2024-08-29 PROCEDURE — 1126F AMNT PAIN NOTED NONE PRSNT: CPT | Performed by: INTERNAL MEDICINE

## 2024-08-29 RX ORDER — TRIAMCINOLONE ACETONIDE 1 MG/G
OINTMENT TOPICAL
COMMUNITY
Start: 2024-07-18 | End: 2024-08-29

## 2024-08-29 NOTE — ASSESSMENT & PLAN NOTE
Encourage to get up slowly and get bearings before taking first step. Keep home well lit with clear floors to avoid tripping. Use assist devices for all walking especially from bed to bathroom.

## 2024-08-29 NOTE — PROGRESS NOTES
Chief Complaint   Patient presents with    Hyperlipidemia     6 month follow-up    Obesity       History of Present Illness  89 y.o. male presents for follow up of some improving weight and blood pressure. He has some weakness    Review of Systems   Constitutional:  Positive for appetite change (decreased appetite) and unexpected weight change (weight loss). Negative for chills and diaphoresis.   HENT:  Positive for hearing loss.    Respiratory:  Negative for wheezing.    Cardiovascular:  Negative for chest pain.   Gastrointestinal:  Negative for abdominal pain.   Musculoskeletal:  Positive for gait problem.   Neurological:  Positive for weakness.     .    PMSFH:  The following portions of the patient's history were reviewed and updated as appropriate: allergies, current medications, past family history, past medical history, past social history, past surgical history and problem list.      Current Outpatient Medications:     aspirin 81 MG EC tablet, Take 1 tablet by mouth Every Other Day., Disp: , Rfl:     azelastine (ASTELIN) 0.1 % nasal spray, 2 sprays into the nostril(s) as directed by provider 2 (Two) Times a Day. Use in each nostril as directed, Disp: 90 each, Rfl: 3    bumetanide (Bumex) 1 MG tablet, 1 mg BID, may take extra 1 mg with 3-5 lb weight gain, dyspnea, or worsening edema., Disp: 200 tablet, Rfl: 1    Cemiplimab-rwlc (LIBTAYO IV), Infuse  into a venous catheter., Disp: , Rfl:     Cholecalciferol (Vitamin D3) 25 MCG (1000 UT) capsule, Take  by mouth., Disp: , Rfl:     finasteride (PROSCAR) 5 MG tablet, Take 1 tablet by mouth Daily., Disp: 90 tablet, Rfl: 3    fluocinonide (LIDEX) 0.05 % external solution, Apply 1 application topically to the appropriate area as directed As Needed., Disp: , Rfl:     ketoconazole (NIZORAL) 2 % cream, Apply 1 application  topically to the appropriate area as directed Daily. For 3 weeks to groin and prn return of rash, Disp: 60 g, Rfl: 1    ketoconazole (NIZORAL) 2 %  "shampoo, Apply  topically to the appropriate area as directed As Needed., Disp: , Rfl:     Menthol, Topical Analgesic, (Biofreeze) 4 % gel, Apply  topically., Disp: , Rfl:     metOLazone (ZAROXOLYN) 2.5 MG tablet, Take 1 tablet by mouth As Needed (increased swelling/weight gain >2-3 pounds in 1 day, use sparingly)., Disp: 30 tablet, Rfl: 2    pravastatin (PRAVACHOL) 40 MG tablet, Take 1 tablet by mouth Daily. 3x a week, Disp: 90 tablet, Rfl: 3    tacrolimus (PROTOPIC) 0.1 % ointment, Apply 1 Application topically to the appropriate area as directed Daily As Needed., Disp: , Rfl:     VITALS:  /70   Pulse 87   Temp 98.7 °F (37.1 °C) (Temporal)   Ht 165.1 cm (65\")   Wt 76.2 kg (168 lb)   SpO2 95%   BMI 27.96 kg/m²     Physical Exam  Constitutional:       Appearance: Normal appearance.   Cardiovascular:      Rate and Rhythm: Normal rate and regular rhythm.   Pulmonary:      Breath sounds: No wheezing.   Abdominal:      General: Bowel sounds are normal.      Palpations: Abdomen is soft.   Neurological:      Mental Status: He is alert and oriented to person, place, and time.      Comments: Slow get up and go and mildly unsteady gait          LABS:    CMP:  Lab Results   Component Value Date    BUN 17 09/26/2023    CREATININE 1.42 (H) 09/26/2023    EGFRIFNONA 50 (L) 02/22/2022    EGFRIFAFRI 60 (L) 02/22/2022     09/26/2023    K 4.3 09/26/2023     09/26/2023    CALCIUM 9.5 09/26/2023    ALBUMIN 2.9 (L) 08/22/2023    BILITOT 2.1 (H) 08/22/2023    ALKPHOS 75 08/22/2023    AST 24 08/22/2023    ALT 7 08/22/2023     CBC:  Lab Results   Component Value Date    WBC 5.63 09/12/2023    RBC 3.46 (L) 09/12/2023    HGB 10.9 (L) 09/12/2023    HCT 32.3 (L) 09/12/2023    MCV 93.4 09/12/2023    MCH 31.5 09/12/2023    MCHC 33.7 09/12/2023    RDW 12.6 09/12/2023     09/12/2023     HGBA1C (LAST 3):  Lab Results   Component Value Date    HGBA1C 5.90 (H) 08/23/2022    HGBA1C 5.90 (H) 02/22/2022    HGBA1C 6.14 (H) " 02/25/2021     Procedures         ASSESSMENT/PLAN:  Diagnoses and all orders for this visit:    1. Hyperlipidemia LDL goal <70 (Primary)  Assessment & Plan:   Lipid abnormalities are stable    Plan:  Continue same medication/s without change.      Discussed medication dosage, use, side effects, and goals of treatment in detail.    Counseled patient on lifestyle modifications to help control hyperlipidemia.     Patient Treatment Goals:   LDL goal is less than 70    Followup in 6 months.      2. Essential hypertension  Assessment & Plan:  Hypertension is stable and controlled  Continue current treatment regimen.  Blood pressure will be reassessed in 3 months.      3. Overweight (BMI 25.0-29.9)  Assessment & Plan:  Patient's (Body mass index is 27.96 kg/m².) indicates that they are overweight with health conditions that include hypertension, coronary heart disease, impaired fasting glucose, dyslipidemias, GERD, and osteoarthritis . Weight is improving with lifestyle modifications. BMI is above average; BMI management plan is completed. We discussed portion control and increasing exercise.       4. Stage 3a chronic kidney disease  Assessment & Plan:  Renal condition is stable.  Continue current treatment regimen.  Renal condition will be reassessed in 6 months.      5. At high risk for falls  Assessment & Plan:  Encourage to get up slowly and get bearings before taking first step. Keep home well lit with clear floors to avoid tripping. Use assist devices for all walking especially from bed to bathroom.      Orders:  -     Ambulatory Referral to Physical Therapy for Evaluation & Treatment    6. Impairment of balance  Comments:  Use rolling walker and resume PT  Orders:  -     Ambulatory Referral to Physical Therapy for Evaluation & Treatment    7. Mild protein-calorie malnutrition  Comments:  Add protein drink.Protality        FOLLOW-UP:  Return in about 6 months (around 2/28/2025) for Medicare  Wellness.      Electronically signed by:    Dirk Russ MD

## 2024-08-29 NOTE — PROGRESS NOTES
Subjective:     Encounter Date:09/06/2024      Patient ID: Chaitanya Browne is a 89 y.o.   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     REFERRING INTERNIST: Dirk Russ MD  UROLOGIST: Ck Woods MD  PULMONOLOGIST:  COLLIN Avila DO.  ONCOLOGIST: David Leggett MD.       Chief Complaint:   Chief Complaint   Patient presents with    Congestive Heart Failure     Problem List:  Diastolic congestive heart failure:  Treadmill stress test, 10/10/2011: 7 METS, CYRUS -32, acceptable exercise stress test without anginal type chest discomfort or ischemic EKG changes with acceptable Maxwell treadmill score and normal blood pressure response following exercise to 109% predicted maximum heart rate 132% of predicted exercise capacity  Echocardiogram, 7/14/2012: LVEF 0.55-0.60, abnormal LV.diastolic filling is observed consistent with impaired LV relaxation, moderate mitral annular calcification with no evidence of mitral stenosis or significant regurgitation  Echocardiogram, 9/3/2019: Mild to moderate MR, LVEF 0.72, LV diastolic dysfunction grade 1 consistent with impaired relaxation, LV wall thickness consistent with mild concentric hypertrophy, LA borderline dilated, normal RV cavity size, wall thickness, systolic function and septal motion noted, mild mitral stenosis is present with functional MAC.  Aortic valve exhibits moderate sclerosis without stenosis.  No pulmonary hypertension, no pericardial effusion   BNP normal October 2019  Residual CCS class 0 angina pectoris/NYHA class II biventricular CHF, November 2019  Residual class I symptoms, February 2021, August 2021, September 2021, March 2022, October 2022, August 2023, March 2024, September 2024  Valvular heart disease/mild to moderate mitral regurgitation, mild mitral stenosis, September 2019  Hypertension  Hyperlipidemia; on  statin therapy  Chronic kidney disease stage III  Anemia  Asthma/COPD  Lower extremity edema with negative venous duplex July 2012  BPH  Bilateral carpal tunnel syndrome  History of basal cell carcinoma on left side of nose  [facility] 14-day hospitalization September 2019 for TURP, complicated by postoperative aspiration pneumonia, hematuria, and anemia  Prediabetes with hemoglobin A1c 6.2% February 2020, 5.9% February 2022, 5.9% August 2022  Squamous cell carcinoma with recent chemoinfusion summer 2022-data deficit  3-day [facility] hospitalization August 2023 for gross hematuria  Surgical history:   Appendectomy  TURP 1989  Peritonitis as a child  Basal cell carcinoma excision from nose  I&D great toe  Cystoscopy  TURP 2019  Bilateral cataracts    No Known Allergies    Current Outpatient Medications   Medication Instructions    aspirin 81 mg, Oral, Every Other Day    azelastine (ASTELIN) 0.1 % nasal spray 2 sprays, Nasal, 2 Times Daily, Use in each nostril as directed    bumetanide (Bumex) 1 MG tablet 1 mg BID, may take extra 1 mg with 3-5 lb weight gain, dyspnea, or worsening edema.    Cemiplimab-rwlc (LIBTAYO IV) Intravenous    Cholecalciferol (Vitamin D3) 25 MCG (1000 UT) capsule Oral    finasteride (PROSCAR) 5 mg, Oral, Daily    fluocinonide (LIDEX) 0.05 % external solution 1 application , Topical, As Needed    ketoconazole (NIZORAL) 2 % cream 1 application , Topical, Daily, For 3 weeks to groin and prn return of rash    ketoconazole (NIZORAL) 2 % shampoo Topical, As Needed    Menthol, Topical Analgesic, (Biofreeze) 4 % gel Apply externally    metOLazone (ZAROXOLYN) 2.5 mg, Oral, As Needed    pravastatin (PRAVACHOL) 40 mg, Oral, Daily, 3x a week    tacrolimus (PROTOPIC) 0.1 % ointment 1 Application, Topical, Daily PRN         HISTORY OF PRESENT ILLNESS:  The patient is here for 6-month follow-up.  He uses metolazone sparingly but has not had to use any for a long time.  He brought his log of blood pressure, heart rates,  "and weights with him today and everything has been very consistent for a long time.  He follows closely with Dr. Leggett regarding his squamous cell carcinoma.  Patient's labs were good.  He denies any shortness of breath, increased edema, palpitations, presyncope, or syncope.  He was told that he needed to eat more protein so he has been using chocolate protein shakes to help.  He does some exercises with his physical therapist and ambulates with his rollator.  He is accompanied in office today by his daughter.  He uses the zip up kind of compression stockings and feels like they are effective.  Occasionally the patient will have some intercostal pain and he feels like it is musculoskeletal because if he presses in, he can tell it is a little sore.      ROS   All other systems reviewed and otherwise negative.    Procedures       Objective:       Vitals:    09/06/24 1256   BP: 106/62   BP Location: Right arm   Patient Position: Sitting   Cuff Size: Adult   Pulse: 71   SpO2: 95%   Weight: 77.1 kg (170 lb)   Height: 149.9 cm (59\")     Body mass index is 34.34 kg/m².  Wt Readings from Last 2 Encounters:   09/06/24 77.1 kg (170 lb)   08/29/24 76.2 kg (168 lb)        Constitutional:       Appearance: Healthy appearance. Not in distress.      Comments: Ambulating with walker   Neck:      Vascular: No JVR. JVD normal.   Pulmonary:      Effort: Pulmonary effort is normal.      Breath sounds: Normal breath sounds. No wheezing. No rhonchi. No rales.   Chest:      Chest wall: Not tender to palpatation.   Cardiovascular:      PMI at left midclavicular line. Normal rate. Regular rhythm. Normal S1. Normal S2.       Murmurs: There is a grade 1/6 systolic murmur at the ULSB, radiating to the neck.      No gallop.  No click. No rub.      Comments: Compression stockings in place  Pulses:     Intact distal pulses.   Edema:     Peripheral edema absent.   Abdominal:      General: Bowel sounds are normal.      Palpations: Abdomen is soft. "      Tenderness: There is no abdominal tenderness.   Musculoskeletal: Normal range of motion.         General: No tenderness. Skin:     General: Skin is warm and dry.   Neurological:      General: No focal deficit present.      Mental Status: Alert and oriented to person, place and time.           Lab Review:   Lab Results   Component Value Date    GLUCOSE 85 09/26/2023    BUN 17 09/26/2023    CREATININE 1.42 (H) 09/26/2023    EGFRIFNONA 50 (L) 02/22/2022    EGFRIFAFRI 60 (L) 02/22/2022    BCR 12.0 09/26/2023    CO2 30.9 (H) 09/26/2023    CALCIUM 9.5 09/26/2023    ALBUMIN 2.9 (L) 08/22/2023    AST 24 08/22/2023    ALT 7 08/22/2023       Lab Results   Component Value Date    WBC 5.63 09/12/2023    HGB 10.9 (L) 09/12/2023    HCT 32.3 (L) 09/12/2023    MCV 93.4 09/12/2023     09/12/2023       Lab Results   Component Value Date    HGBA1C 5.90 (H) 08/23/2022       Lab Results   Component Value Date    TSH 1.080 08/06/2024       Lab Results   Component Value Date    CHOL 187 08/23/2022    CHOL 187 02/22/2022     Lab Results   Component Value Date    TRIG 81 08/23/2022    TRIG 112 02/22/2022     Lab Results   Component Value Date    HDL 74 (H) 08/23/2022    HDL 79 (H) 02/22/2022     Lab Results   Component Value Date    LDL 98 08/23/2022    LDL 89 02/22/2022             Results for orders placed during the hospital encounter of 09/02/19    Adult Transthoracic Echo Complete W/ Cont if Necessary Per Protocol    Interpretation Summary  · Mild-to-moderate mitral valve regurgitation is present.  · Estimated EF = 72%.  · Left ventricular systolic function is hyperdynamic (EF > 70).  · Left ventricular diastolic dysfunction (grade I) consistent with impaired relaxation.  · Left ventricular wall thickness is consistent with mild concentric hypertrophy.  · Left atrial cavity size is borderline dilated.  · Normal right ventricular cavity size, wall thickness, systolic function and septal motion noted.  · Mild mitral valve  stenosis is present with functional MAC.  · The aortic valve exhibits moderate sclerosis without stenosis.  · No evidence of pulmonary hypertension is present.  · There is no evidence of pericardial effusion.            Advance Care Planning   ACP discussion was held with the patient during this visit. Patient has an advance directive (not in EMR), copy requested.      Assessment:     Overall continued acceptable course with no new interim cardiopulmonary complaints with fair functional status on limited activity. We will defer additional diagnostic or therapeutic intervention from a cardiac perspective at this time.Hopefully I will get to review the patient's next laboratory testing results.         Diagnosis Plan   1. Chronic diastolic CHF (congestive heart failure)  No recurrent angina pectoris or CHF on current activity schedule; continue current treatment       2. Essential hypertension  Controlled, continue current cardiac medications      3. Hyperlipidemia LDL goal <70  No new labs to review, continue pravastatin             Plan:         Patient to continue current medications and close follow up with the above providers.  Tentative cardiology follow up in March 2025 or patient may return sooner PRN.      Electronically signed by BRUCE Lopez, 09/06/24, 1:14 PM EDT.

## 2024-08-30 NOTE — ASSESSMENT & PLAN NOTE
Patient's (Body mass index is 27.96 kg/m².) indicates that they are overweight with health conditions that include hypertension, coronary heart disease, impaired fasting glucose, dyslipidemias, GERD, and osteoarthritis . Weight is improving with lifestyle modifications. BMI is above average; BMI management plan is completed. We discussed portion control and increasing exercise.

## 2024-09-06 ENCOUNTER — OFFICE VISIT (OUTPATIENT)
Dept: CARDIOLOGY | Facility: CLINIC | Age: 89
End: 2024-09-06
Payer: MEDICARE

## 2024-09-06 VITALS
DIASTOLIC BLOOD PRESSURE: 62 MMHG | WEIGHT: 170 LBS | HEIGHT: 60 IN | OXYGEN SATURATION: 95 % | BODY MASS INDEX: 33.38 KG/M2 | HEART RATE: 71 BPM | SYSTOLIC BLOOD PRESSURE: 106 MMHG

## 2024-09-06 DIAGNOSIS — R60.0 EDEMA LEG: ICD-10-CM

## 2024-09-06 DIAGNOSIS — I10 ESSENTIAL HYPERTENSION: ICD-10-CM

## 2024-09-06 DIAGNOSIS — I50.32 CHRONIC DIASTOLIC CHF (CONGESTIVE HEART FAILURE): Primary | ICD-10-CM

## 2024-09-06 DIAGNOSIS — E78.5 HYPERLIPIDEMIA LDL GOAL <70: ICD-10-CM

## 2024-09-06 RX ORDER — BUMETANIDE 1 MG/1
TABLET ORAL
Qty: 200 TABLET | Refills: 1 | Status: SHIPPED | OUTPATIENT
Start: 2024-09-06

## 2024-09-10 ENCOUNTER — HOSPITAL ENCOUNTER (OUTPATIENT)
Dept: PHYSICAL THERAPY | Facility: HOSPITAL | Age: 89
Setting detail: THERAPIES SERIES
Discharge: HOME | End: 2024-09-10
Payer: MEDICARE

## 2024-09-10 DIAGNOSIS — R26.89 IMPAIRMENT OF BALANCE: Primary | ICD-10-CM

## 2024-09-10 DIAGNOSIS — R53.1 WEAKNESS: ICD-10-CM

## 2024-09-10 PROCEDURE — 97110 THERAPEUTIC EXERCISES: CPT | Performed by: PHYSICAL THERAPIST

## 2024-09-10 PROCEDURE — 97162 PT EVAL MOD COMPLEX 30 MIN: CPT | Performed by: PHYSICAL THERAPIST

## 2024-09-10 NOTE — THERAPY EVALUATION
Physical Therapy Initial Evaluation and Plan of Care     [facility] [place]          [address]     Patient: Chaitanya Browne   : 1923  MRN: [id number]   Diagnosis/ICD-10 Code:      ICD-10-CM ICD-9-CM   1. Impairment of balance  R26.89 781.2   2. Weakness  R53.1 780.79      Referring practitioner: Dirk Russ MD  Today's Date: 9/10/2024      Subjective:     Subjective Questionnaire: DIXON 33/56  TUG 26.17 sec       Subjective Evaluation    History of Present Illness  Mechanism of injury: Patient returns for strengthening, endurance, and balance. Recent steroid injections in his hands. He has been using the foam block. Step going in and out of the house seems to be more challenging.     Pain  Current pain ratin  At best pain ratin  At worst pain ratin  Location: back           Objective          Strength/Myotome Testing     Left Hip   Planes of Motion   Flexion: 4  Abduction: 3    Right Hip   Planes of Motion   Flexion: 4  Abduction: 3    Left Knee   Flexion: 4  Extension: 4    Right Knee   Flexion: 4  Extension: 4    Left Ankle/Foot   Dorsiflexion: 4+    Right Ankle/Foot   Dorsiflexion: 4+    Ambulation     Comments   Ambulates with forward flexion and rollator. Small shortened step length        PT Neuro         Assessment & Plan       Assessment  Impairments: abnormal coordination, abnormal gait, activity intolerance, impaired balance, impaired physical strength and safety issue   Functional limitations: carrying objects, walking, standing and stooping   Assessment details: Patient is a 89 YOM that presents to therapy for balance and strengthening improvements. He is reporting difficulty performing 2 steps in/out of his home, as well as generalized weakness throughout. He would like to be able to keep living independently at home with caregiver assist. He will require skilled PT intervention to address deficits in order to improve global mobility and  function.   Prognosis: fair    Goals  Plan Goals: STG (4 visits)  1. Patient will report compliance with initial HEP.   2. Patient to improve DIXON balance score to >/= 35 /56 to decrease patient's risk of falls.  3. Patient to perform TUG within 22 sec without LOB for improved functional mobility.  4. Patient to ambulate 10 meters within 12 sec without LOB for improved gait nida and functional mobility.    LTG (8 visits)  1. Patient will be I with final HEP.   2. Patient to improve DIXON balance score to >/= 37 /56 to decrease patient's risk of falls.  3. Patient to perform TUG within 18 sec without LOB for improved functional mobility.  4. Patient to ambulate 10 meters within 11 sec without LOB for improved gait nida and functional mobility.        Plan  Therapy options: will be seen for skilled therapy services  Planned modality interventions: TENS  Planned therapy interventions: ADL retraining, balance/weight-bearing training, flexibility, gait training, manual therapy, neuromuscular re-education, motor coordination training, postural training, strengthening, stretching, therapeutic activities, transfer training and home exercise program  Frequency: 1x week  Duration in visits: 10  Treatment plan discussed with: patient and family  Plan details: Patient will be seen 1x/wk x 10 wks with treatment to include strengthening, stretching, manual therapy, neuromuscular re-education, balance, gait and endurance training.           Timed:  Manual Therapy:    0     mins  74335;  Therapeutic Exercise:    10     mins  58695;     Neuromuscular Tahmina:    0    mins  59049;    Therapeutic Activity:     0     mins  82416;     Gait Trainin     mins  64141;     Electrical Stimulation:    0     mins  69423 ( );    Untimed:  Canalith Repositioning    0     mins 52794    Timed Treatment:   10   mins   Total Treatment:     45   mins    PT SIGNATURE: Vianca Durant PT, DPT, MSCS, CSRS  [place] License #[id number]  DATE  TREATMENT INITIATED: 9/10/2024      Medicare Initial Certification Certification Period: 9/10/8513slfy71/8/2024  I certify that the therapy services are furnished while this patient is under my care.  The services outlined above are required by this patient, and will be reviewed every 90 days.     PHYSICIAN: Dirk Russ MD  NPI: [id number]                                         DATE:     Please sign and return via fax to [phone].   Thank you,   [facility] Physical Therapy.

## 2024-09-17 ENCOUNTER — HOSPITAL ENCOUNTER (OUTPATIENT)
Dept: PHYSICAL THERAPY | Facility: HOSPITAL | Age: 89
Setting detail: THERAPIES SERIES
Discharge: HOME | End: 2024-09-17
Payer: MEDICARE

## 2024-09-17 DIAGNOSIS — R26.89 IMPAIRMENT OF BALANCE: Primary | ICD-10-CM

## 2024-09-17 DIAGNOSIS — R53.1 WEAKNESS: ICD-10-CM

## 2024-09-17 PROCEDURE — 97110 THERAPEUTIC EXERCISES: CPT | Performed by: PHYSICAL THERAPIST

## 2024-09-17 PROCEDURE — 97112 NEUROMUSCULAR REEDUCATION: CPT | Performed by: PHYSICAL THERAPIST

## 2024-10-01 ENCOUNTER — HOSPITAL ENCOUNTER (OUTPATIENT)
Dept: PHYSICAL THERAPY | Facility: HOSPITAL | Age: 89
Setting detail: THERAPIES SERIES
Discharge: HOME | End: 2024-10-01
Payer: MEDICARE

## 2024-10-01 DIAGNOSIS — R53.1 WEAKNESS: ICD-10-CM

## 2024-10-01 DIAGNOSIS — R26.9 GAIT ABNORMALITY: ICD-10-CM

## 2024-10-01 DIAGNOSIS — R26.89 IMPAIRMENT OF BALANCE: Primary | ICD-10-CM

## 2024-10-01 PROCEDURE — 97112 NEUROMUSCULAR REEDUCATION: CPT | Performed by: PHYSICAL THERAPIST

## 2024-10-01 PROCEDURE — 97110 THERAPEUTIC EXERCISES: CPT | Performed by: PHYSICAL THERAPIST

## 2024-10-01 NOTE — THERAPY TREATMENT NOTE
Physical Therapy Daily Note      Patient: Chaitanya Browne   : 1923  MRN: [id number]   Diagnosis/ICD-10 Code:      ICD-10-CM ICD-9-CM   1. Impairment of balance  R26.89 781.2   2. Weakness  R53.1 780.79   3. Gait abnormality  R26.9 781.2      Referring practitioner: Dirk Russ MD  Today's Date: 10/1/2024    Subjective:   Patient reports: he is ready to get started.   Pain: 0/10  Treatment has included: therapeutic exercise and neuromuscular re-education    Objective   See Exercise, Manual, and Modality Logs for complete treatment.    PT Neuro   Exercise 1  Exercise Name 1: Nu-Step  Equipment/Resistance 1: L6  Time: 15 min - CV endurance  Exercise 2  Exercise Name 2: step up/downs  Sets/Reps 2: 10  Exercise 3  Exercise Name 3: forward bosu lunges  Sets/Reps 3: 20  Exercise 4  Exercise Name 4: lateral bosu lunges  Sets/Reps 4: 15 ea  Exercise 5  Exercise Name 5: lateral step over HFR with 1# bar punches  Sets/Reps 5: 10 ea    Assessment & Plan       Assessment  Assessment details: Patient demonstrates challenge with lateral stepping while holding object in hand. Slight hip drop with his step. Patient will continue to be progressed as indicated for LE strengthening and balance.     Plan  Plan details: Patient to continue with PT services to improve gait, balance, strength, transfers and overall functional mobility.          Timed:  Manual Therapy:            0     mins  88484;  Therapeutic Exercise:    20    mins  39442;     Neuromuscular Tahmina:    25    mins  47575;    Therapeutic Activity:      0     mins  53878;     Gait Trainin    mins  22149;     Electrical Stimulation:    0    mins  93796 ( );     Untimed:  Canalith Repositioning techniques _[id number]      Timed Treatment:   45   mins   Total Treatment:     45   mins      Vianca Durant, PT, DPT, MSCS, CSRS  [place] License #: [id number]  Physical Therapist

## 2024-10-10 ENCOUNTER — HOSPITAL ENCOUNTER (OUTPATIENT)
Dept: PHYSICAL THERAPY | Facility: HOSPITAL | Age: 89
Setting detail: THERAPIES SERIES
Discharge: HOME | End: 2024-10-10
Payer: MEDICARE

## 2024-10-10 DIAGNOSIS — R26.89 IMPAIRMENT OF BALANCE: Primary | ICD-10-CM

## 2024-10-10 DIAGNOSIS — R53.1 WEAKNESS: ICD-10-CM

## 2024-10-10 PROCEDURE — 97112 NEUROMUSCULAR REEDUCATION: CPT | Performed by: PHYSICAL THERAPIST

## 2024-10-10 PROCEDURE — 97110 THERAPEUTIC EXERCISES: CPT | Performed by: PHYSICAL THERAPIST

## 2024-10-10 NOTE — THERAPY PROGRESS REPORT/RE-CERT
PT Progress Note  [facility]  [address]    Patient: Chaitanya Browne   : 1923  Diagnosis/ICD-10 Code:      ICD-10-CM ICD-9-CM   1. Impairment of balance  R26.89 781.2   2. Weakness  R53.1 780.79      MRN: [id number]    Referring practitioner: Dirk Russ MD  Today's Date: 10/10/2024    Subjective:   Patient reports: daughter states he had a TIA at some point yesterday. He had confusion, attempts to put his pants on with both legs in one pant leg, along with shirt on backwards. Increased fatigue and more sleepiness.   Pain: 0/10  Clinical Progress: worse  Home Program Compliance: Yes  Treatment has included: therapeutic exercise and neuromuscular re-education    Objective   See Exercise, Manual, and Modality Logs for complete treatment.    PT Neuro  DIXON/56  TU.22 sec   10M: 13.28 sec        Assessment & Plan       Assessment  Impairments: abnormal coordination, abnormal gait, activity intolerance, impaired balance, impaired physical strength and safety issue   Functional limitations: carrying objects, walking, standing and stooping   Assessment details: Patient presents to clinic today with increased sleepiness/fatigue. Daughter states that his behavior suggests another TIA with subsequent weakness and confusion. He unfortunately has not met any goals today in light of his current circumstances. He will require ongoing therapy to ensure he regains strength and balance. In order to continue living at home, climb 2 steps, along with independent dressing.   Prognosis: fair    Goals  Plan Goals:  (4 visits)  1. Patient will report compliance with initial HEP. MET  2. Patient to improve DIXON balance score to >/= 35 /56 to decrease patient's risk of falls. ONGOING  3. Patient to perform TUG within 22 sec without LOB for improved functional mobility.ONGOING  4. Patient to ambulate 10 meters within 12 sec without LOB for improved gait nida and functional  mobility.ONGOING     LTG (8 visits)  1. Patient will be I with final HEP. ONGOING  2. Patient to improve DIXON balance score to >/= 37 /56 to decrease patient's risk of falls.ONGOING  3. Patient to perform TUG within 18 sec without LOB for improved functional mobility.ONGOING  4. Patient to ambulate 10 meters within 11 sec without LOB for improved gait nida and functional mobility.ONGOING         Plan  Therapy options: will be seen for skilled therapy services  Planned modality interventions: TENS  Planned therapy interventions: ADL retraining, balance/weight-bearing training, flexibility, gait training, manual therapy, neuromuscular re-education, motor coordination training, postural training, strengthening, stretching, therapeutic activities, transfer training and home exercise program  Duration in visits: 6  Treatment plan discussed with: patient and family  Plan details: Patient will be seen 1x/wk x 6 wks with treatment to include strengthening, stretching, manual therapy, neuromuscular re-education, balance, gait and endurance training.           Progress toward previous goals: Partially Met      Recommendations: Continue as planned  Timeframe: 6 weeks    PT Signature: Vianca Durant, PT, DPT, MSCS, CSRS  [place] License #: [id number]    Based upon review of the patient's progress and continued therapy plan, it is my medical opinion that Chaitanya Browne should continue physical therapy treatment at [facility].    Signature: __________________________________  Dirk Russ MD    Timed:  Manual Therapy:    0     mins  20133;  Therapeutic Exercise:    15     mins  74296;     Neuromuscular Tahmina:    23    mins  28870;    Therapeutic Activity:     0     mins  73487;     Gait Trainin     mins  70044;     Electrical Stimulation:    0     mins  19062 ( );    Untimed:  Canalith Repositioning   0 mins  45653    Timed Treatment:   38   mins   Total Treatment:     38    mins

## 2024-10-15 ENCOUNTER — APPOINTMENT (OUTPATIENT)
Dept: PHYSICAL THERAPY | Facility: HOSPITAL | Age: 89
End: 2024-10-15
Payer: MEDICARE

## 2024-10-17 ENCOUNTER — HOSPITAL ENCOUNTER (OUTPATIENT)
Dept: PHYSICAL THERAPY | Facility: HOSPITAL | Age: 89
Setting detail: THERAPIES SERIES
Discharge: HOME | End: 2024-10-17
Payer: MEDICARE

## 2024-10-17 DIAGNOSIS — R26.89 IMPAIRMENT OF BALANCE: Primary | ICD-10-CM

## 2024-10-17 DIAGNOSIS — R53.1 WEAKNESS: ICD-10-CM

## 2024-10-17 PROCEDURE — 97112 NEUROMUSCULAR REEDUCATION: CPT | Performed by: PHYSICAL THERAPIST

## 2024-10-17 PROCEDURE — 97110 THERAPEUTIC EXERCISES: CPT | Performed by: PHYSICAL THERAPIST

## 2024-10-17 NOTE — THERAPY TREATMENT NOTE
Physical Therapy Daily Note      Patient: Chaitanya Browne   : 1923  MRN: [id number]   Diagnosis/ICD-10 Code:      ICD-10-CM ICD-9-CM   1. Impairment of balance  R26.89 781.2   2. Weakness  R53.1 780.79      Referring practitioner: Dirk Russ MD  Today's Date: 10/17/2024    Subjective:   Patient reports: he is feeling better.   Pain: 0/10  Treatment has included: therapeutic exercise and neuromuscular re-education    Objective   See Exercise, Manual, and Modality Logs for complete treatment.    PT Neuro          Assessment & Plan       Assessment  Assessment details: Patient demonstrates overall improvement in movement compared to last week, along with lowered fatigue level. He was able to perform all exercises with one minimal rest break. Continue to progress as indicated.     Plan  Plan details: Patient to continue with PT services to improve gait, balance, strength, transfers and overall functional mobility.          Timed:  Manual Therapy:            0     mins  05665;  Therapeutic Exercise:    25    mins  08986;     Neuromuscular Tahmina:    15    mins  66982;    Therapeutic Activity:      0     mins  97413;     Gait Trainin    mins  27086;     Electrical Stimulation:    0    mins  42524 ( );     Untimed:  Canalith Repositioning techniques _0_ 54142      Timed Treatment:   40   mins   Total Treatment:     40   mins      Vianca Durant PT, DPT, MSCS, CSRS  [place] License #: [id number]  Physical Therapist

## 2024-10-22 ENCOUNTER — APPOINTMENT (OUTPATIENT)
Dept: PHYSICAL THERAPY | Facility: HOSPITAL | Age: 89
End: 2024-10-22
Payer: MEDICARE

## 2024-10-24 ENCOUNTER — HOSPITAL ENCOUNTER (OUTPATIENT)
Dept: PHYSICAL THERAPY | Facility: HOSPITAL | Age: 89
Setting detail: THERAPIES SERIES
Discharge: HOME | End: 2024-10-24
Payer: MEDICARE

## 2024-10-24 DIAGNOSIS — R26.89 IMPAIRMENT OF BALANCE: Primary | ICD-10-CM

## 2024-10-24 PROCEDURE — 97110 THERAPEUTIC EXERCISES: CPT | Performed by: PHYSICAL THERAPIST

## 2024-10-24 PROCEDURE — 97112 NEUROMUSCULAR REEDUCATION: CPT | Performed by: PHYSICAL THERAPIST

## 2024-10-24 NOTE — THERAPY TREATMENT NOTE
Physical Therapy Daily Note      Patient: Chaitanya Browne   : 1923  MRN: [id number]   Diagnosis/ICD-10 Code:      ICD-10-CM ICD-9-CM   1. Impairment of balance  R26.89 781.2      Referring practitioner: Dirk Russ MD  Today's Date: 10/24/2024    Subjective:   Patient reports: he is ready to exercise.   Pain: 0/10  Treatment has included: therapeutic exercise and neuromuscular re-education    Objective   See Exercise, Manual, and Modality Logs for complete treatment.    PT Neuro     Balance Skills Training  63484 -  PT Neuromuscular Reeducation Minutes: 15    Assessment & Plan       Assessment  Assessment details: Patient demonstrates improved balance compared to previous 2 visits where family was concerned that he had another TIA. Patient's daughter believes he is now back to baseline at home. He will continue to be progressed as indicated for balance and strengthening.         Timed:  Manual Therapy:            0     mins  32699;  Therapeutic Exercise:    25    mins  56160;     Neuromuscular Tahmina:    15    mins  96181;    Therapeutic Activity:      0     mins  57796;     Gait Trainin    mins  67790;     Electrical Stimulation:    0    mins  29509 ( );     Untimed:  Canalith Repositioning techniques _0_ 12709      Timed Treatment:   40   mins   Total Treatment:     40   mins      Vianca Durant, PT, DPT, MSCS, CSRS  [place] License #: [id number]  Physical Therapist

## 2024-10-29 ENCOUNTER — APPOINTMENT (OUTPATIENT)
Dept: PHYSICAL THERAPY | Facility: HOSPITAL | Age: 89
End: 2024-10-29
Payer: MEDICARE

## 2024-10-31 ENCOUNTER — APPOINTMENT (OUTPATIENT)
Dept: PHYSICAL THERAPY | Facility: HOSPITAL | Age: 89
End: 2024-10-31
Payer: MEDICARE

## 2024-11-06 ENCOUNTER — OFFICE VISIT (OUTPATIENT)
Dept: INTERNAL MEDICINE | Facility: CLINIC | Age: 89
End: 2024-11-06
Payer: MEDICARE

## 2024-11-06 VITALS
DIASTOLIC BLOOD PRESSURE: 52 MMHG | HEIGHT: 60 IN | SYSTOLIC BLOOD PRESSURE: 98 MMHG | BODY MASS INDEX: 33.26 KG/M2 | TEMPERATURE: 98.2 F | HEART RATE: 77 BPM | WEIGHT: 169.4 LBS | OXYGEN SATURATION: 97 %

## 2024-11-06 DIAGNOSIS — S41.111A LACERATION OF RIGHT UPPER ARM, INITIAL ENCOUNTER: Primary | ICD-10-CM

## 2024-11-06 RX ORDER — AMOXICILLIN 500 MG/1
500 CAPSULE ORAL 3 TIMES DAILY
Qty: 15 CAPSULE | Refills: 0 | Status: SHIPPED | OUTPATIENT
Start: 2024-11-06 | End: 2024-11-11

## 2024-11-06 NOTE — PROGRESS NOTES
"Chief Complaint  Chaitanya Browne is a 89 y.o. male presenting for Fall (Fall on 11/5/24), Laceration (Right arm ), and Hand Injury (Left hand swelling).     Patient has a past medical history of CHF, arteriosclerotic heart disease, hypertension, hyperlipidemia, impaired fasting glucose, vitamin D deficiency, colon polyp, dysphagia, ABBY, urinary retention, UTI, bladder mass, BPH, CKD 3A, anemia, iron deficiency anemia, skin cancers, carpal tunnel syndrome, edema and decreased mobility.    History of Present Illness  Patient is here for acute visit accompanied by daughter.     Patient had aide present yesterday around 9:45 PM.  He was walking down 2 steps, slipped on one of the steps and fell.  Leg was not close by to catch him.  He did not feel unwell or lightheaded, he feels this was a  purely mechanical fall.  No recent illness, feeling well overall.  They called the fire department to help get him on his feet, and he reports being able to walk unassisted afterwards.  No injury to the head, no loss of consciousness.  He feels well today, but endorses some soreness of his right upper arm.    The following portions of the patient's history were reviewed and updated as appropriate: allergies, current medications, past family history, past medical history, past social history, past surgical history, and problem list.    Image captured per patient verbal consent:          Objective  BP 98/52 (BP Location: Left arm, Patient Position: Sitting, Cuff Size: Adult)   Pulse 77   Temp 98.2 °F (36.8 °C) (Temporal)   Ht 149.9 cm (59\")   Wt 76.8 kg (169 lb 6.4 oz)   SpO2 97%   BMI 34.21 kg/m²     Physical Exam  Constitutional:       Appearance: Normal appearance.   HENT:      Head: Normocephalic and atraumatic.   Eyes:      Extraocular Movements: Extraocular movements intact.      Conjunctiva/sclera: Conjunctivae normal.   Musculoskeletal:         General: No tenderness.      Cervical back: Normal range of motion and neck " supple.      Comments: Right shoulder: Fairly good ROM.  No pain noted  Right elbow: No pain on exam, fairly good ROM for flexion and extension.  No tenderness to palpation.  No injury to his right hand noted.  Left hand: Small superficial bruise, nontender on exam   Skin:     General: Skin is warm and dry.      Findings: Ecchymosis, signs of injury and laceration present.          Neurological:      General: No focal deficit present.      Mental Status: He is alert and oriented to person, place, and time.   Psychiatric:         Behavior: Behavior normal.         Thought Content: Thought content normal.         Assessment/Plan   1. Laceration of right upper arm, initial encounter  Appears to be purely mechanical fall.  He does not have a tendency to fall, no recent falls except yesterday.  No concern for loss of consciousness, no concern for fracture.  I was able to adapt the skin flap to the wound, but this has been exposed and is likely to get infected.  For that reason I will give prophylactic penicillin for 5 days.  Will refer to wound care per discussion with patient.  Patient also has seen wound care for similar issue in the past.  Of note patient is up-to-date on tetanus vaccine, last administered in 2008 and 2023.  Nursing staff to apply dressings before patient leaves.  - amoxicillin (AMOXIL) 500 MG capsule; Take 1 capsule by mouth 3 (Three) Times a Day for 5 days.  Dispense: 15 capsule; Refill: 0  - Ambulatory Referral to Wound Clinic    Total time spent on chart review, charting and face-to-face with patient 32 minutes    Return if symptoms worsen or fail to improve, for Next scheduled follow up.    Future Appointments         Provider Department Center    11/7/2024 11:00 AM Vianca Durant, PT [facility] AT [facility]    11/14/2024 11:00 AM Vianca Durant, PT [facility] AT [facility]    11/19/2024 10:15 AM  Vianca Durant, PT [facility] OUTPATIENT THERAPY SERVICES AT [facility]    11/26/2024 12:45 PM Vianca Durant, PT [facility] OUTPATIENT THERAPY SERVICES AT [facility]    3/6/2025 11:00 AM Dirk Russ MD [facility] INTERNAL MEDICINE [facility]    3/7/2025 1:00 PM (Arrive by 12:30 PM) Kaley Oliveros, BRUCE [facility] CARDIOLOGY [facility]              Gio Trujillo MD  Family Medicine  11/06/2024

## 2024-11-07 ENCOUNTER — HOSPITAL ENCOUNTER (OUTPATIENT)
Dept: PHYSICAL THERAPY | Facility: HOSPITAL | Age: 89
Setting detail: THERAPIES SERIES
Discharge: HOME | End: 2024-11-07
Payer: MEDICARE

## 2024-11-07 DIAGNOSIS — R26.9 GAIT ABNORMALITY: ICD-10-CM

## 2024-11-07 DIAGNOSIS — R26.89 IMPAIRMENT OF BALANCE: Primary | ICD-10-CM

## 2024-11-07 DIAGNOSIS — R53.1 WEAKNESS: ICD-10-CM

## 2024-11-07 PROCEDURE — 97112 NEUROMUSCULAR REEDUCATION: CPT | Performed by: PHYSICAL THERAPIST

## 2024-11-07 PROCEDURE — 97110 THERAPEUTIC EXERCISES: CPT | Performed by: PHYSICAL THERAPIST

## 2024-11-07 NOTE — THERAPY TREATMENT NOTE
Physical Therapy Daily Note      Patient: Chaitanya Browne   : 1923  MRN: [id number]   Diagnosis/ICD-10 Code:      ICD-10-CM ICD-9-CM   1. Impairment of balance  R26.89 781.2   2. Weakness  R53.1 780.79   3. Gait abnormality  R26.9 781.2      Referring practitioner: Dirk Russ MD  Today's Date: 2024    Subjective:   Patient reports: he fell and cut his R arm.   Pain: 0/10  Treatment has included: therapeutic exercise and neuromuscular re-education    Objective   See Exercise, Manual, and Modality Logs for complete treatment.    PT Neuro          Assessment & Plan       Assessment  Assessment details: Patient presents to clinic with recent fall. R upper arm laceration is noted. His daughter is concerned about removing what dressing is in place. They have a referral for wound care but they have not yet called to schedule an appt. Patient will be sent downstairs to [facility] for a provider to look at the wound and properly dress it. Patient will continue to be progressed as indicated.     Plan  Plan details: Patient to continue with PT services to improve gait, balance, strength, transfers and overall functional mobility.                Vianca Durant, PT, DPT, MSCS, CSRS  [place] License #: [id number]  Physical Therapist

## 2024-11-11 ENCOUNTER — HOSPITAL ENCOUNTER (OUTPATIENT)
Dept: PHYSICAL THERAPY | Facility: HOSPITAL | Age: 89
Setting detail: THERAPIES SERIES
Discharge: HOME | End: 2024-11-11
Payer: MEDICARE

## 2024-11-11 DIAGNOSIS — S41.111D SKIN TEAR OF RIGHT UPPER ARM WITHOUT COMPLICATION, SUBSEQUENT ENCOUNTER: Primary | ICD-10-CM

## 2024-11-11 PROCEDURE — 97597 DBRDMT OPN WND 1ST 20 CM/<: CPT

## 2024-11-11 PROCEDURE — 97161 PT EVAL LOW COMPLEX 20 MIN: CPT

## 2024-11-11 NOTE — THERAPY EVALUATION
Outpatient Rehabilitation - Wound/Debridement Initial Eval  [facility]     Patient Name: Chaitanya Browne  : 1923  MRN: [id number]  Today's Date: 2024                  Admit Date: 2024    Visit Dx:    ICD-10-CM ICD-9-CM   1. Skin tear of right upper arm without complication, subsequent encounter  S41.111D V58.89     880.03       Patient Active Problem List   Diagnosis    Facial basal cell cancer    Hyperlipidemia LDL goal <70    Essential hypertension    ASHD (arteriosclerotic heart disease)    Gait abnormality    Impaired fasting glucose    Polyp, colonic    Vitamin D deficiency    Acute midline low back pain without sciatica    Proteinuria    Right carpal tunnel syndrome    Overweight (BMI 25.0-29.9)    Hematuria, unspecified    Medicare annual wellness visit, subsequent    Edema    Cough    Allergic rhinitis    Gross hematuria    Acute UTI (urinary tract infection)    Acute renal insufficiency    Acute bronchitis    Acute urinary retention    Debility    Dysphagia    BPH with obstruction/lower urinary tract symptoms    Prostatitis    Chronic diastolic CHF (congestive heart failure)    Iron deficiency anemia due to chronic blood loss    Simple chronic bronchitis    Shortness of breath    Stage 3a chronic kidney disease    Skin tear of left forearm without complication    Squamous cell carcinoma of nose    At high risk for falls    Stage II skin ulcer    Cellulitis of right upper extremity    Bladder mass    Anemia    Impacted cerumen of right ear    Nasal congestion        Past Medical History:   Diagnosis Date    Abnormal heart rhythm     Anemia     Aortic valve sclerosis     Arthritis     Asthma     Basal cell carcinoma (BCC) of left side of nose     BPH (benign prostatic hyperplasia)     Cataracts, bilateral     bilateralcataract extraction and lens implantation     CHF (congestive heart failure) 2019    Diastolic dysfunction     Easy bruising     Edema due to malnutrition 2020     Heart murmur     High cholesterol     History of disease     bilateral lacrimal gland dysfunction per Dr Fajardo    Hypertension     Infection     infected big toe; I and D DR Alvares 2013    Phlebitis     Pneumonia 2009    Squamous acanthoma of face         Past Surgical History:   Procedure Laterality Date    APPENDECTOMY      CATARACT EXTRACTION, BILATERAL  2013    and lens implantation    CYSTOSCOPY N/A 09/06/2019    Procedure: CYSTOSCOPY;  Surgeon: Ck Woods MD;  Location:  [facility];  Service: Urology    CYSTOSCOPY TRANSURETHRAL RESECTION OF PROSTATE  1989    CYSTOSCOPY TRANSURETHRAL RESECTION OF PROSTATE N/A 09/11/2019    Procedure: CYSTOSCOPY TRANSURETHRAL RESECTION OF PROSTATE;  Surgeon: Ck Woods MD;  Location:  [facility];  Service: Urology    HEAD & NECK SKIN LESION EXCISIONAL BIOPSY      Basal cell removed from nose     INCISION AND DRAINAGE FOOT  2013    infected big toe Dr Alvares    PROSTATE SURGERY  9-2019    TRANSURETHRAL RESECTION OF BLADDER TUMOR N/A 09/06/2019    Procedure: EVACUATION OF BLOOD CLOT, FULGERATION OF PROSTATE;  Surgeon: Ck Woods MD;  Location:  [facility];  Service: Urology        Patient History       Row Name 11/11/24 1500             History    Chief Complaint Ulcer, wound or other skin conditions  -      Date Current Problem(s) Began 11/05/24  -      Brief Description of Current Complaint Pt fell sustaining skin tears to posterior RUE.  Wound was initially cleaned and dressed by EMT, then PCP the next day.  Dressing became adherent, so pt was seen at [facility] on 11/7/24.  Pt's daughter has been cleaning area with supplies from prior PT wound episode.  Using theraworx foam to gently clean and nonstick pad with mupriocin ointment.  -                User Key  (r) = Recorded By, (t) = Taken By, (c) = Cosigned By      Initials Name Provider Type    Jennifer Diaz, PT Physical Therapist                    EVALUATION   PT Ortho       Row Name 11/11/24  1500       Subjective    Subjective Comments Pt denies pain, states wound area itches.  -JM       Subjective Pain    Able to rate subjective pain? yes  -JM    Pre-Treatment Pain Level 0  -JM    Post-Treatment Pain Level 0  -JM       Transfers    Comment, (Transfers) remained in transport chair for tx  -              User Key  (r) = Recorded By, (t) = Taken By, (c) = Cosigned By      Initials Name Provider Type    Jennifer Diaz PT Physical Therapist                   LDA Wound       Row Name 11/11/24 1500             Wound 11/11/24 1500 Right posterior arm    Wound - Properties Group Placement Date: 11/11/24  - Placement Time: 1500  -JM Side: Right  - Orientation: posterior  - Location: arm  - Primary Wound Type: Skin tear  -JM    Wound Image Images linked: 1  -JM      Dressing Appearance intact;moist drainage  nonstick pads, gauze, coban  -      Base maroon/purple;moist;pink  residual skin flap partially pink and appears mostly viable  -      Periwound intact;ecchymotic;pink  -      Periwound Temperature warm  -      Periwound Skin Turgor soft  -      Edges irregular;jagged  -      Wound Length (cm) 4.5 cm  small medial satellite 1cm x1cm x0  -JM      Wound Width (cm) 3.2 cm  -JM      Wound Depth (cm) 0.1 cm  -JM      Wound Surface Area (cm^2) 14.4 cm^2  -JM      Wound Volume (cm^3) 1.44 cm^3  -JM      Drainage Characteristics/Odor serous;green  -JM      Drainage Amount small  -JM      Care, Wound cleansed with;wound cleanser;debrided  -      Dressing Care dressing applied;silver impregnated;foam;gauze  small strips of PF tape to splint skin flap, mepilex ag, kerlix, size 5 spandage  -      Periwound Care cleansed with pH balanced cleanser;dry periwound area maintained  -      Retired Wound - Properties Group Placement Date: 11/11/24  - Placement Time: 1500  -JM Side: Right  - Orientation: posterior  - Location: arm  - Primary Wound Type: Skin tear  -JM    Retired Wound -  Properties Group Placement Date: 11/11/24  - Placement Time: 1500  -JM Side: Right  -JM Orientation: posterior  -JM Location: arm  -JM Primary Wound Type: Skin tear  -JM    Retired Wound - Properties Group Date first assessed: 11/11/24  - Time first assessed: 1500  -JM Side: Right  -JM Location: arm  -JM Primary Wound Type: Skin tear  -              User Key  (r) = Recorded By, (t) = Taken By, (c) = Cosigned By      Initials Name Provider Type    Jennifer Diaz, PT Physical Therapist                      WOUND DEBRIDEMENT  Total area of Debridement: 1cmsq  Debridement Site 1  Location- Site 1: RUE  Selective Debridement- Site 1: Wound Surface <20cmsq  Instruments- Site 1: tweezers  Excised Tissue Description- Site 1: scant, slough, other (comment) (repositioned rolling lateral edge)  Bleeding- Site 1: seeping, held pressure, 1 minute              Therapy Education       Row Name 11/11/24 1500             Therapy Education    Education Details Change dressing every 3 days, clean area with theraworx foam and gently pat with gauze.  Try to keep PF tape strips in place until they fall off.  Keep dressings dry when showering.  Plan to continue home dressing changes and call for tx as needed pending response to initial tx.  -JM      Given Bandaging/dressing change  -      Program New  -      How Provided Verbal;Demonstration  -      Provided to Patient;Caregiver  daughter  -      Level of Understanding Verbalized  -                User Key  (r) = Recorded By, (t) = Taken By, (c) = Cosigned By      Initials Name Provider Type    Jennifer Diaz, PT Physical Therapist                    Recommendation and Plan   PT Assessment/Plan       Row Name 11/11/24 1500          PT Assessment    Functional Limitations Performance in self-care ADL;Other (comment)  wound management limitations  -     Impairments Edema;Integumentary integrity  -     Assessment Comments Pt presents with open wound to  posterior R upper arm s/p fall with skin tear.  Residual skin flap appears mostly viable and PT was able to reposition rolling wound edges almost fully into place and secured with small strips of PF tape.  Initiated use of ag foam dressings.  Pt with very small amt of green drainage noted on previous dressing but pt now on oral abx and using mupriocin ointment.  Instructed daughter (former RN), to monitor closely for any S&S of infection.  Pt will benefit from skilled PT for debridement and dressings management.  Daughter to call for next appt pending response over next week with dressing changes.  -ENOC     Rehab Potential Good  -     Patient/caregiver participated in establishment of treatment plan and goals Yes  -     Patient would benefit from skilled therapy intervention Yes  -        PT Plan    PT Frequency 1x/week  -ENOC     Predicted Duration of Therapy Intervention (PT) 4-6 visits  -ENOC     Planned CPT's? PT ENRIKE DEBRIDE OPEN WOUND UP TO 20 CM: 42544;PT NONSELECT DEBRIDE 15 MIN: 68521;PT NLFU MIST: 02179;PT SELF CARE/MGMT/TRAIN 15 MIN: 20306;PT EVAL LOW COMPLEXITY: 66659  -     Physical Therapy Interventions (Optional Details) patient/family education;wound care  -     PT Plan Comments debridement, dressings, family to call for appt PRN in next 1-2 weeks  -ENOC               User Key  (r) = Recorded By, (t) = Taken By, (c) = Cosigned By      Initials Name Provider Type    Jennifer Diaz, PT Physical Therapist                      Goals   PT OP Goals       Row Name 11/11/24 1625 11/11/24 1500       PT Short Term Goals    STG 1 -- Pt/caregiver will verbalize s/sx of infection.  -ENOC    STG 1 Progress -- New  -    STG 2 -- Pt will demonstrate 25% reduction in wound dimensions to both areas to indicate healing progress.  -ENOC    STG 2 Progress -- New  -ENOC       Long Term Goals    LTG 1 -- Pt/caregiver will demonstrate independence with clean home dressing changes.  -ENOC    LTG 1 Progress -- New  -ENOC     LTG 2 -- Pt will demonstrate 85% reduction in wound dimensions to both areas to indicate healing progress.  -    LTG 2 Progress -- New  -       Time Calculation    PT Goal Re-Cert Due Date 02/09/25  -ENOC 02/09/25  -ENOC              User Key  (r) = Recorded By, (t) = Taken By, (c) = Cosigned By      Initials Name Provider Type    Jennifer Diaz, PT Physical Therapist                    Time Calculation: Start Time: 1500  Untimed Charges  PT Eval/Re-eval Minutes: 45  Wound Care: 71195 Selective debridement  45336-Rxswvkcox debridement: 15  Total Minutes  Untimed Charges Total Minutes: 60   Total Minutes: 60  Therapy Charges for Today       Code Description Service Date Service Provider Modifiers Qty    00459876420 HC ENRIKE DEBRIDE OPEN WOUND UP TO 20CM 11/11/2024 Jennifer Farfan, PT GP 1    80454919814 HC PT EVAL LOW COMPLEXITY 3 11/11/2024 Jennifer Farfan, PT GP 1                  Jennifer Farfan, PT  11/11/2024

## 2024-11-14 ENCOUNTER — HOSPITAL ENCOUNTER (OUTPATIENT)
Dept: PHYSICAL THERAPY | Facility: HOSPITAL | Age: 89
Setting detail: THERAPIES SERIES
Discharge: HOME | End: 2024-11-14
Payer: MEDICARE

## 2024-11-14 DIAGNOSIS — R26.89 IMPAIRMENT OF BALANCE: Primary | ICD-10-CM

## 2024-11-14 DIAGNOSIS — R53.1 WEAKNESS: ICD-10-CM

## 2024-11-14 PROCEDURE — 97110 THERAPEUTIC EXERCISES: CPT | Performed by: PHYSICAL THERAPIST

## 2024-11-14 NOTE — THERAPY TREATMENT NOTE
Physical Therapy Daily Note      Patient: Chaitanay Browne   : 1923  MRN: [id number]   Diagnosis/ICD-10 Code:      ICD-10-CM ICD-9-CM   1. Impairment of balance  R26.89 781.2   2. Weakness  R53.1 780.79      Referring practitioner: Dirk Russ MD  Today's Date: 2024    Subjective:   Patient reports: his wrist is bothering him from where he fell.   Pain: 2/10-L wrist   Treatment has included: therapeutic exercise    Objective   See Exercise, Manual, and Modality Logs for complete treatment.    PT Neuro          Assessment & Plan       Assessment  Assessment details: Patient demonstrates painful extensor tendon near base of carpal joint. Slight swelling noted by patient and daughter. He was educated on gentle AROM and PROM stretching, as well as instructed to use ice for swelling and soreness. Patient will continue to be progressed as indicated.     Plan  Plan details: Patient to continue with PT services to improve gait, balance, strength, transfers and overall functional mobility.          Vianca Durant, PT, DPT, MSCS, CSRS  [place] License #: [id number]  Physical Therapist

## 2024-11-19 ENCOUNTER — APPOINTMENT (OUTPATIENT)
Dept: PHYSICAL THERAPY | Facility: HOSPITAL | Age: 89
End: 2024-11-19
Payer: MEDICARE

## 2024-11-26 ENCOUNTER — HOSPITAL ENCOUNTER (OUTPATIENT)
Dept: PHYSICAL THERAPY | Facility: HOSPITAL | Age: 89
Setting detail: THERAPIES SERIES
Discharge: HOME | End: 2024-11-26
Payer: MEDICARE

## 2024-11-26 DIAGNOSIS — R53.1 WEAKNESS: ICD-10-CM

## 2024-11-26 DIAGNOSIS — R26.89 IMPAIRMENT OF BALANCE: Primary | ICD-10-CM

## 2024-11-26 PROCEDURE — 97112 NEUROMUSCULAR REEDUCATION: CPT | Performed by: PHYSICAL THERAPIST

## 2024-11-26 PROCEDURE — 97110 THERAPEUTIC EXERCISES: CPT | Performed by: PHYSICAL THERAPIST

## 2024-11-26 NOTE — THERAPY PROGRESS REPORT/RE-CERT
PT Progress Note  [facility] [place] [facility]  [address]    Patient: Chaitanya Browne   : 1923  Diagnosis/ICD-10 Code:      ICD-10-CM ICD-9-CM   1. Impairment of balance  R26.89 781.2   2. Weakness  R53.1 780.79      MRN: [id number]    Referring practitioner: Dirk Russ MD  Today's Date: 2024    Subjective:   Patient reports: he is doing good but is now sore in his L pec region.  Pain: 0/10  Clinical Progress: improved  Home Program Compliance: Yes  Treatment has included: therapeutic exercise and neuromuscular re-education    Objective   See Exercise, Manual, and Modality Logs for complete treatment.    PT Neuro  Exercise 1  Exercise Name 1: Nu-Step L6  Time 1: 15 min - endurance  Exercise 2  Exercise Name 2: standing YTB mid rows  Reps 2: 15  Exercise 3  Exercise Name 3: therabar breaks  Reps 3: 10  Exercise 4  Exercise Name 4: lateral step ups with same side reach  Reps 4: 12 ea side  Exercise 5  Exercise Name 5: discussion of wrist stretches  Time 5: 5 min  Exercise 6  Exercise Name 6: shoulder extension stretch in sitting  Reps 6: 10      Assessment & Plan       Assessment  Impairments: abnormal coordination, abnormal gait, activity intolerance, impaired balance, impaired physical strength and safety issue   Functional limitations: carrying objects, walking, standing and stooping   Assessment details: Patient demonstrates good performance of exercises and stretches today. Patient continues with soreness from recent fall and will require ongoing therapy to address imbalance and soreness on the L side. Continue to progress as indicated.   Prognosis: fair    Goals  Plan Goals:  (4 visits)  1. Patient will report compliance with initial HEP. MET  2. Patient to improve DIXON balance score to >/= 35 /56 to decrease patient's risk of falls. ONGOING  3. Patient to perform TUG within 22 sec without LOB for improved functional mobility.ONGOING  4. Patient to ambulate 10 meters  within 12 sec without LOB for improved gait nida and functional mobility.ONGOING     LTG (8 visits)  1. Patient will be I with final HEP. ONGOING  2. Patient to improve DIXON balance score to >/= 37 /56 to decrease patient's risk of falls.ONGOING  3. Patient to perform TUG within 18 sec without LOB for improved functional mobility.ONGOING  4. Patient to ambulate 10 meters within 11 sec without LOB for improved gait nida and functional mobility.ONGOING         Plan  Therapy options: will be seen for skilled therapy services  Planned modality interventions: TENS  Planned therapy interventions: ADL retraining, balance/weight-bearing training, flexibility, gait training, manual therapy, neuromuscular re-education, motor coordination training, postural training, strengthening, stretching, therapeutic activities, transfer training and home exercise program  Frequency: 1x week  Duration in visits: 4  Treatment plan discussed with: patient  Plan details: Patient will continue with PT services for another 3-4 visits.           Progress toward previous goals: Partially Met      Recommendations: Continue as planned  Timeframe: 1 month    PT Signature: Vianca Durant, PT, DPT, MSCS, CSRS  [place] License #: [id number]    Based upon review of the patient's progress and continued therapy plan, it is my medical opinion that Chaitanya Browne should continue physical therapy treatment at [facility] PT at [facility].    Signature: __________________________________  Dirk Russ MD

## 2024-12-05 ENCOUNTER — HOSPITAL ENCOUNTER (OUTPATIENT)
Dept: PHYSICAL THERAPY | Facility: HOSPITAL | Age: 89
Setting detail: THERAPIES SERIES
Discharge: HOME | End: 2024-12-05
Payer: MEDICARE

## 2024-12-05 DIAGNOSIS — R26.89 IMPAIRMENT OF BALANCE: Primary | ICD-10-CM

## 2024-12-05 DIAGNOSIS — R53.1 WEAKNESS: ICD-10-CM

## 2024-12-05 PROCEDURE — 97110 THERAPEUTIC EXERCISES: CPT | Performed by: PHYSICAL THERAPIST

## 2024-12-05 NOTE — THERAPY TREATMENT NOTE
Physical Therapy Daily Note      Patient: Chaitanya Browne   : 1923  MRN: [id number]   Diagnosis/ICD-10 Code:      ICD-10-CM ICD-9-CM   1. Impairment of balance  R26.89 781.2   2. Weakness  R53.1 780.79      Referring practitioner: Dirk Russ MD  Today's Date: 2024    Subjective:   Patient reports: his wrist is still sore from falling on it   Pain: 0/10  Treatment has included: therapeutic exercise    Objective   See Exercise, Manual, and Modality Logs for complete treatment.    PT Neuro          Assessment & Plan       Assessment  Assessment details: Patient demonstrates good performance of exercises for wrist strengthening. He reported no pain or discomfort with any and will add card flipping at home to assist with FMC and wrist pro/sup. Patient will continue to be progressed as indicated.     Plan  Plan details: Patient to continue with PT services to improve gait, balance, strength, transfers and overall functional mobility.        Vianca Durant, PT, DPT, MSCS, CSRS  [place] License #: [id number]  Physical Therapist

## 2024-12-12 ENCOUNTER — HOSPITAL ENCOUNTER (OUTPATIENT)
Dept: PHYSICAL THERAPY | Facility: HOSPITAL | Age: 89
Setting detail: THERAPIES SERIES
Discharge: HOME | End: 2024-12-12
Payer: MEDICARE

## 2024-12-12 DIAGNOSIS — R26.89 IMPAIRMENT OF BALANCE: Primary | ICD-10-CM

## 2024-12-12 DIAGNOSIS — R53.1 WEAKNESS: ICD-10-CM

## 2024-12-12 PROCEDURE — 97110 THERAPEUTIC EXERCISES: CPT | Performed by: PHYSICAL THERAPIST

## 2024-12-12 NOTE — THERAPY TREATMENT NOTE
Physical Therapy Daily Note      Patient: Chaitanya Browne   : 1923  MRN: [id number]   Diagnosis/ICD-10 Code:      ICD-10-CM ICD-9-CM   1. Impairment of balance  R26.89 781.2   2. Weakness  R53.1 780.79      Referring practitioner: Dirk Russ MD  Today's Date: 2024    Subjective:   Patient reports: his wrist is almost starting to feel better.   Pain: 0/10  Treatment has included: therapeutic exercise    Objective   See Exercise, Manual, and Modality Logs for complete treatment.    PT Neuro          Assessment & Plan       Assessment  Assessment details: Patient demonstrates good improvement in global movement of wrist. He is able to perform all movements without onset of pain. Supination is more challenging particularly with resistance. Patient will continue to be progressed as indicated. One visits remaining.     Plan  Plan details: Patient to continue with PT services to improve gait, balance, strength, transfers and overall functional mobility.        Vianca Durant, PT, DPT, MSCS, CSRS  [place] License #: [id number]  Physical Therapist

## 2024-12-19 ENCOUNTER — HOSPITAL ENCOUNTER (OUTPATIENT)
Dept: PHYSICAL THERAPY | Facility: HOSPITAL | Age: 89
Setting detail: THERAPIES SERIES
Discharge: HOME | End: 2024-12-19
Payer: MEDICARE

## 2024-12-19 DIAGNOSIS — R26.89 IMPAIRMENT OF BALANCE: Primary | ICD-10-CM

## 2024-12-19 DIAGNOSIS — R53.1 WEAKNESS: ICD-10-CM

## 2024-12-19 PROCEDURE — 97110 THERAPEUTIC EXERCISES: CPT | Performed by: PHYSICAL THERAPIST

## 2024-12-19 NOTE — THERAPY TREATMENT NOTE
Physical Therapy Daily Note      Patient: Chaitanya Browne   : 1923  MRN: [id number]   Diagnosis/ICD-10 Code:      ICD-10-CM ICD-9-CM   1. Impairment of balance  R26.89 781.2   2. Weakness  R53.1 780.79      Referring practitioner: Dirk Russ MD  Today's Date: 2024    Subjective:   Patient reports: he wants to work on his wrist again.   Pain: 0/10  Treatment has included: therapeutic exercise    Objective   See Exercise, Manual, and Modality Logs for complete treatment.    PT Neuro          Assessment & Plan       Assessment  Assessment details: Patient is independent with management of HEP that include balance, LE strengthening and wrist strengthening exercises. He will continue at home over the winter and return in the spring should he need further intervention. Discharge at this time.     Plan  Plan details: Patient will be discharged at this time.               Vianca Durant, PT, DPT, MSCS, CSRS  [place] License #: [id number]  Physical Therapist

## 2025-01-01 ENCOUNTER — HOSPITAL ENCOUNTER (INPATIENT)
Facility: HOSPITAL | Age: OVER 89
LOS: 1 days | End: 2025-03-31
Attending: EMERGENCY MEDICINE | Admitting: INTERNAL MEDICINE
Payer: MEDICARE

## 2025-01-01 ENCOUNTER — APPOINTMENT (OUTPATIENT)
Dept: GENERAL RADIOLOGY | Facility: HOSPITAL | Age: OVER 89
End: 2025-01-01
Payer: MEDICARE

## 2025-01-01 ENCOUNTER — APPOINTMENT (OUTPATIENT)
Dept: PHYSICAL THERAPY | Facility: HOSPITAL | Age: OVER 89
End: 2025-01-01
Payer: MEDICARE

## 2025-01-01 ENCOUNTER — HOSPITAL ENCOUNTER (OUTPATIENT)
Dept: PHYSICAL THERAPY | Facility: HOSPITAL | Age: OVER 89
Setting detail: THERAPIES SERIES
Discharge: HOME | End: 2025-03-27
Payer: MEDICARE

## 2025-01-01 VITALS
DIASTOLIC BLOOD PRESSURE: 45 MMHG | BODY MASS INDEX: 29.99 KG/M2 | HEART RATE: 112 BPM | RESPIRATION RATE: 18 BRPM | TEMPERATURE: 94.7 F | HEIGHT: 65 IN | WEIGHT: 180 LBS | OXYGEN SATURATION: 94 % | SYSTOLIC BLOOD PRESSURE: 76 MMHG

## 2025-01-01 DIAGNOSIS — I21.3 ST ELEVATION MYOCARDIAL INFARCTION (STEMI), UNSPECIFIED ARTERY: ICD-10-CM

## 2025-01-01 DIAGNOSIS — E87.20 LACTIC ACIDOSIS: ICD-10-CM

## 2025-01-01 DIAGNOSIS — R26.89 IMPAIRMENT OF BALANCE: Primary | ICD-10-CM

## 2025-01-01 DIAGNOSIS — J96.01 ACUTE RESPIRATORY FAILURE WITH HYPOXIA: ICD-10-CM

## 2025-01-01 DIAGNOSIS — I46.9 CARDIAC ARREST: Primary | ICD-10-CM

## 2025-01-01 LAB
ALBUMIN SERPL-MCNC: 3.3 G/DL (ref 3.5–5.2)
ALBUMIN/GLOB SERPL: 1.1 G/DL
ALP SERPL-CCNC: 108 U/L (ref 39–117)
ALT SERPL W P-5'-P-CCNC: 47 U/L (ref 1–41)
ANION GAP SERPL CALCULATED.3IONS-SCNC: 24 MMOL/L (ref 5–15)
APTT PPP: 41.1 SECONDS (ref 60–90)
AST SERPL-CCNC: 88 U/L (ref 1–40)
BASOPHILS # BLD AUTO: 0.05 10*3/MM3 (ref 0–0.2)
BASOPHILS NFR BLD AUTO: 0.5 % (ref 0–1.5)
BILIRUB SERPL-MCNC: 0.7 MG/DL (ref 0–1.2)
BUN BLDA-MCNC: 38 MG/DL (ref 8–26)
BUN SERPL-MCNC: 26 MG/DL (ref 8–23)
BUN/CREAT SERPL: 18.7 (ref 7–25)
CA-I BLDA-SCNC: 0.91 MMOL/L (ref 1.2–1.32)
CALCIUM SPEC-SCNC: 8.8 MG/DL (ref 8.2–9.6)
CHLORIDE BLDA-SCNC: 109 MMOL/L (ref 98–109)
CHLORIDE SERPL-SCNC: 102 MMOL/L (ref 98–107)
CO2 BLDA-SCNC: 20 MMOL/L (ref 24–29)
CO2 SERPL-SCNC: 16 MMOL/L (ref 22–29)
CREAT BLDA-MCNC: 1.5 MG/DL (ref 0.6–1.3)
CREAT SERPL-MCNC: 1.39 MG/DL (ref 0.76–1.27)
D-LACTATE SERPL-SCNC: 8.8 MMOL/L (ref 0.5–2)
DEPRECATED RDW RBC AUTO: 53.1 FL (ref 37–54)
EGFRCR SERPLBLD CKD-EPI 2021: 40.8 ML/MIN/1.73
EGFRCR SERPLBLD CKD-EPI 2021: 44.7 ML/MIN/1.73
EOSINOPHIL # BLD AUTO: 0.05 10*3/MM3 (ref 0–0.4)
EOSINOPHIL NFR BLD AUTO: 0.5 % (ref 0.3–6.2)
ERYTHROCYTE [DISTWIDTH] IN BLOOD BY AUTOMATED COUNT: 14.2 % (ref 12.3–15.4)
GEN 5 1HR TROPONIN T REFLEX: 231 NG/L
GLOBULIN UR ELPH-MCNC: 3 GM/DL
GLUCOSE BLDC GLUCOMTR-MCNC: 256 MG/DL (ref 70–130)
GLUCOSE SERPL-MCNC: 272 MG/DL (ref 65–99)
HBA1C MFR BLD: 5.7 % (ref 4.8–5.6)
HCT VFR BLD AUTO: 43.2 % (ref 37.5–51)
HCT VFR BLDA CALC: 41 % (ref 38–51)
HGB BLD-MCNC: 13.1 G/DL (ref 13–17.7)
HGB BLDA-MCNC: 13.9 G/DL (ref 12–17)
HOLD SPECIMEN: NORMAL
IMM GRANULOCYTES # BLD AUTO: 0.33 10*3/MM3 (ref 0–0.05)
IMM GRANULOCYTES NFR BLD AUTO: 3.3 % (ref 0–0.5)
INR PPP: 1.4 (ref 0.89–1.12)
LYMPHOCYTES # BLD AUTO: 2.13 10*3/MM3 (ref 0.7–3.1)
LYMPHOCYTES NFR BLD AUTO: 21.5 % (ref 19.6–45.3)
MAGNESIUM SERPL-MCNC: 2.6 MG/DL (ref 1.7–2.3)
MCH RBC QN AUTO: 30.8 PG (ref 26.6–33)
MCHC RBC AUTO-ENTMCNC: 30.3 G/DL (ref 31.5–35.7)
MCV RBC AUTO: 101.4 FL (ref 79–97)
MONOCYTES # BLD AUTO: 0.84 10*3/MM3 (ref 0.1–0.9)
MONOCYTES NFR BLD AUTO: 8.5 % (ref 5–12)
NEUTROPHILS NFR BLD AUTO: 6.52 10*3/MM3 (ref 1.7–7)
NEUTROPHILS NFR BLD AUTO: 65.7 % (ref 42.7–76)
NRBC BLD AUTO-RTO: 0 /100 WBC (ref 0–0.2)
NT-PROBNP SERPL-MCNC: 688 PG/ML (ref 0–1800)
PLATELET # BLD AUTO: 90 10*3/MM3 (ref 140–450)
PMV BLD AUTO: 11.5 FL (ref 6–12)
POTASSIUM BLDA-SCNC: 3.9 MMOL/L (ref 3.5–4.9)
POTASSIUM SERPL-SCNC: 3.7 MMOL/L (ref 3.5–5.2)
PROCALCITONIN SERPL-MCNC: 0.11 NG/ML (ref 0–0.25)
PROT SERPL-MCNC: 6.3 G/DL (ref 6–8.5)
PROTHROMBIN TIME: 17.3 SECONDS (ref 12.2–14.5)
QT INTERVAL: 292 MS
QT INTERVAL: 302 MS
QT INTERVAL: 380 MS
QTC INTERVAL: 398 MS
QTC INTERVAL: 421 MS
QTC INTERVAL: 449 MS
RBC # BLD AUTO: 4.26 10*6/MM3 (ref 4.14–5.8)
SODIUM BLD-SCNC: 138 MMOL/L (ref 138–146)
SODIUM SERPL-SCNC: 142 MMOL/L (ref 136–145)
TROPONIN T % DELTA: 255
TROPONIN T NUMERIC DELTA: 166 NG/L
TROPONIN T SERPL HS-MCNC: 65 NG/L
TSH SERPL DL<=0.05 MIU/L-ACNC: 3.65 UIU/ML (ref 0.27–4.2)
UFH PPP CHRO-ACNC: 0.1 IU/ML (ref 0.3–0.7)
UFH PPP CHRO-ACNC: >1.1 IU/ML (ref 0.3–0.7)
WBC NRBC COR # BLD AUTO: 9.92 10*3/MM3 (ref 3.4–10.8)
WHOLE BLOOD HOLD COAG: NORMAL
WHOLE BLOOD HOLD SPECIMEN: NORMAL

## 2025-01-01 PROCEDURE — 85025 COMPLETE CBC W/AUTO DIFF WBC: CPT | Performed by: EMERGENCY MEDICINE

## 2025-01-01 PROCEDURE — 92950 HEART/LUNG RESUSCITATION CPR: CPT

## 2025-01-01 PROCEDURE — 84484 ASSAY OF TROPONIN QUANT: CPT | Performed by: EMERGENCY MEDICINE

## 2025-01-01 PROCEDURE — 25810000003 SODIUM CHLORIDE 0.9 % SOLUTION: Performed by: INTERNAL MEDICINE

## 2025-01-01 PROCEDURE — 85520 HEPARIN ASSAY: CPT

## 2025-01-01 PROCEDURE — 94003 VENT MGMT INPAT SUBQ DAY: CPT

## 2025-01-01 PROCEDURE — 25010000002 AMIODARONE PER 30 MG: Performed by: EMERGENCY MEDICINE

## 2025-01-01 PROCEDURE — 25010000002 EPINEPHRINE 1 MG/ML SOLUTION 30 ML VIAL: Performed by: EMERGENCY MEDICINE

## 2025-01-01 PROCEDURE — 80047 BASIC METABLC PNL IONIZED CA: CPT

## 2025-01-01 PROCEDURE — 25810000003 SODIUM CHLORIDE 0.9 % SOLUTION: Performed by: EMERGENCY MEDICINE

## 2025-01-01 PROCEDURE — 99291 CRITICAL CARE FIRST HOUR: CPT

## 2025-01-01 PROCEDURE — 94002 VENT MGMT INPAT INIT DAY: CPT

## 2025-01-01 PROCEDURE — 97110 THERAPEUTIC EXERCISES: CPT | Performed by: PHYSICAL THERAPIST

## 2025-01-01 PROCEDURE — 94799 UNLISTED PULMONARY SVC/PX: CPT

## 2025-01-01 PROCEDURE — 85730 THROMBOPLASTIN TIME PARTIAL: CPT | Performed by: EMERGENCY MEDICINE

## 2025-01-01 PROCEDURE — 80053 COMPREHEN METABOLIC PANEL: CPT | Performed by: EMERGENCY MEDICINE

## 2025-01-01 PROCEDURE — 83735 ASSAY OF MAGNESIUM: CPT | Performed by: EMERGENCY MEDICINE

## 2025-01-01 PROCEDURE — 85520 HEPARIN ASSAY: CPT | Performed by: EMERGENCY MEDICINE

## 2025-01-01 PROCEDURE — 84145 PROCALCITONIN (PCT): CPT | Performed by: EMERGENCY MEDICINE

## 2025-01-01 PROCEDURE — 93005 ELECTROCARDIOGRAM TRACING: CPT | Performed by: EMERGENCY MEDICINE

## 2025-01-01 PROCEDURE — 85014 HEMATOCRIT: CPT

## 2025-01-01 PROCEDURE — 25010000002 EPINEPHRINE 1 MG/ML SOLUTION 30 ML VIAL: Performed by: INTERNAL MEDICINE

## 2025-01-01 PROCEDURE — 25010000002 HEPARIN (PORCINE) PER 1000 UNITS: Performed by: EMERGENCY MEDICINE

## 2025-01-01 PROCEDURE — 83605 ASSAY OF LACTIC ACID: CPT | Performed by: EMERGENCY MEDICINE

## 2025-01-01 PROCEDURE — 83880 ASSAY OF NATRIURETIC PEPTIDE: CPT | Performed by: EMERGENCY MEDICINE

## 2025-01-01 PROCEDURE — 25010000002 EPINEPHRINE 1 MG/10ML SOLUTION PREFILLED SYRINGE: Performed by: EMERGENCY MEDICINE

## 2025-01-01 PROCEDURE — 84443 ASSAY THYROID STIM HORMONE: CPT | Performed by: INTERNAL MEDICINE

## 2025-01-01 PROCEDURE — 83036 HEMOGLOBIN GLYCOSYLATED A1C: CPT | Performed by: INTERNAL MEDICINE

## 2025-01-01 PROCEDURE — 36415 COLL VENOUS BLD VENIPUNCTURE: CPT

## 2025-01-01 PROCEDURE — 87040 BLOOD CULTURE FOR BACTERIA: CPT | Performed by: EMERGENCY MEDICINE

## 2025-01-01 PROCEDURE — 97112 NEUROMUSCULAR REEDUCATION: CPT | Performed by: PHYSICAL THERAPIST

## 2025-01-01 PROCEDURE — 25010000002 HEPARIN (PORCINE) 25000-0.45 UT/250ML-% SOLUTION: Performed by: EMERGENCY MEDICINE

## 2025-01-01 PROCEDURE — 71045 X-RAY EXAM CHEST 1 VIEW: CPT

## 2025-01-01 PROCEDURE — 85610 PROTHROMBIN TIME: CPT | Performed by: EMERGENCY MEDICINE

## 2025-01-01 PROCEDURE — 25010000002 MIDAZOLAM PER 1 MG

## 2025-01-01 RX ORDER — ACETAMINOPHEN 325 MG/1
650 TABLET ORAL EVERY 4 HOURS PRN
Status: DISCONTINUED | OUTPATIENT
Start: 2025-01-01 | End: 2025-01-01 | Stop reason: HOSPADM

## 2025-01-01 RX ORDER — INDOMETHACIN 25 MG/1
CAPSULE ORAL
Status: COMPLETED | OUTPATIENT
Start: 2025-01-01 | End: 2025-01-01

## 2025-01-01 RX ORDER — SODIUM CHLORIDE 0.9 % (FLUSH) 0.9 %
10 SYRINGE (ML) INJECTION AS NEEDED
Status: DISCONTINUED | OUTPATIENT
Start: 2025-01-01 | End: 2025-01-01 | Stop reason: HOSPADM

## 2025-01-01 RX ORDER — AMIODARONE HYDROCHLORIDE 150 MG/3ML
INJECTION, SOLUTION INTRAVENOUS
Status: COMPLETED | OUTPATIENT
Start: 2025-01-01 | End: 2025-01-01

## 2025-01-01 RX ORDER — ONDANSETRON 2 MG/ML
4 INJECTION INTRAMUSCULAR; INTRAVENOUS EVERY 6 HOURS PRN
Status: DISCONTINUED | OUTPATIENT
Start: 2025-01-01 | End: 2025-01-01 | Stop reason: HOSPADM

## 2025-01-01 RX ORDER — HEPARIN SODIUM 10000 [USP'U]/100ML
12 INJECTION, SOLUTION INTRAVENOUS
Status: DISCONTINUED | OUTPATIENT
Start: 2025-01-01 | End: 2025-01-01 | Stop reason: HOSPADM

## 2025-01-01 RX ORDER — MIDAZOLAM HYDROCHLORIDE 1 MG/ML
INJECTION, SOLUTION INTRAMUSCULAR; INTRAVENOUS
Status: COMPLETED
Start: 2025-01-01 | End: 2025-01-01

## 2025-01-01 RX ORDER — HEPARIN SODIUM 1000 [USP'U]/ML
2000 INJECTION, SOLUTION INTRAVENOUS; SUBCUTANEOUS AS NEEDED
Status: DISCONTINUED | OUTPATIENT
Start: 2025-01-01 | End: 2025-01-01

## 2025-01-01 RX ORDER — EPINEPHRINE 1 MG/ML
INJECTION, SOLUTION, CONCENTRATE INTRAVENOUS
Status: DISCONTINUED
Start: 2025-01-01 | End: 2025-01-01 | Stop reason: HOSPADM

## 2025-01-01 RX ORDER — ASPIRIN 300 MG/1
300 SUPPOSITORY RECTAL ONCE
Status: DISCONTINUED | OUTPATIENT
Start: 2025-01-01 | End: 2025-01-01 | Stop reason: HOSPADM

## 2025-01-01 RX ORDER — HEPARIN SODIUM 1000 [USP'U]/ML
4000 INJECTION, SOLUTION INTRAVENOUS; SUBCUTANEOUS ONCE
Status: COMPLETED | OUTPATIENT
Start: 2025-01-01 | End: 2025-01-01

## 2025-01-01 RX ORDER — NOREPINEPHRINE BITARTRATE 0.03 MG/ML
.02-.5 INJECTION, SOLUTION INTRAVENOUS
Status: DISCONTINUED | OUTPATIENT
Start: 2025-01-01 | End: 2025-01-01 | Stop reason: HOSPADM

## 2025-01-01 RX ORDER — HEPARIN SODIUM 1000 [USP'U]/ML
4000 INJECTION, SOLUTION INTRAVENOUS; SUBCUTANEOUS AS NEEDED
Status: DISCONTINUED | OUTPATIENT
Start: 2025-01-01 | End: 2025-01-01

## 2025-01-01 RX ORDER — ACETAMINOPHEN 650 MG/1
650 SUPPOSITORY RECTAL EVERY 4 HOURS PRN
Status: DISCONTINUED | OUTPATIENT
Start: 2025-01-01 | End: 2025-01-01

## 2025-01-01 RX ORDER — ACETAMINOPHEN 325 MG/1
650 TABLET ORAL EVERY 4 HOURS PRN
Status: DISCONTINUED | OUTPATIENT
Start: 2025-01-01 | End: 2025-01-01

## 2025-01-01 RX ORDER — NOREPINEPHRINE BITARTRATE 0.03 MG/ML
INJECTION, SOLUTION INTRAVENOUS
Status: COMPLETED
Start: 2025-01-01 | End: 2025-01-01

## 2025-01-01 RX ORDER — ACETAMINOPHEN 650 MG/1
650 SUPPOSITORY RECTAL EVERY 4 HOURS PRN
Status: DISCONTINUED | OUTPATIENT
Start: 2025-01-01 | End: 2025-01-01 | Stop reason: HOSPADM

## 2025-01-01 RX ADMIN — HEPARIN SODIUM 12 UNITS/KG/HR: 10000 INJECTION, SOLUTION INTRAVENOUS at 11:35

## 2025-01-01 RX ADMIN — HEPARIN SODIUM 4000 UNITS: 1000 INJECTION INTRAVENOUS; SUBCUTANEOUS at 11:33

## 2025-01-01 RX ADMIN — EPINEPHRINE 1 MG: 0.1 INJECTION INTRAVENOUS at 10:16

## 2025-01-01 RX ADMIN — EPINEPHRINE 0.7 MCG/KG/MIN: 1 INJECTION INTRAMUSCULAR; INTRAVENOUS; SUBCUTANEOUS at 22:13

## 2025-01-01 RX ADMIN — NOREPINEPHRINE BITARTRATE 0.2 MCG/KG/MIN: 0.03 INJECTION, SOLUTION INTRAVENOUS at 12:09

## 2025-01-01 RX ADMIN — MUPIROCIN 1 APPLICATION: 20 OINTMENT TOPICAL at 16:27

## 2025-01-01 RX ADMIN — EPINEPHRINE 0.7 MCG/KG/MIN: 1 INJECTION INTRAMUSCULAR; INTRAVENOUS; SUBCUTANEOUS at 15:34

## 2025-01-01 RX ADMIN — MIDAZOLAM HYDROCHLORIDE 4 MG: 1 INJECTION, SOLUTION INTRAMUSCULAR; INTRAVENOUS at 11:36

## 2025-01-01 RX ADMIN — EPINEPHRINE 0.7 MCG/KG/MIN: 1 INJECTION INTRAMUSCULAR; INTRAVENOUS; SUBCUTANEOUS at 19:09

## 2025-01-01 RX ADMIN — SODIUM BICARBONATE 50 MEQ: 84 INJECTION INTRAVENOUS at 10:18

## 2025-01-01 RX ADMIN — NOREPINEPHRINE BITARTRATE 0.2 MCG/KG/MIN: 0.03 INJECTION, SOLUTION INTRAVENOUS at 20:31

## 2025-01-01 RX ADMIN — AMIODARONE HYDROCHLORIDE 150 MG: 50 INJECTION, SOLUTION INTRAVENOUS at 10:41

## 2025-01-01 RX ADMIN — EPINEPHRINE 1 MG: 0.1 INJECTION INTRAVENOUS at 10:44

## 2025-01-01 RX ADMIN — EPINEPHRINE 1 MG: 0.1 INJECTION INTRAVENOUS at 10:29

## 2025-01-01 RX ADMIN — EPINEPHRINE 0.02 MCG/KG/MIN: 1 INJECTION INTRAMUSCULAR; INTRAVENOUS; SUBCUTANEOUS at 10:32

## 2025-02-04 ENCOUNTER — PATIENT MESSAGE (OUTPATIENT)
Dept: INTERNAL MEDICINE | Facility: CLINIC | Age: OVER 89
End: 2025-02-04
Payer: MEDICARE

## 2025-02-04 DIAGNOSIS — N40.1 BPH WITH OBSTRUCTION/LOWER URINARY TRACT SYMPTOMS: ICD-10-CM

## 2025-02-04 DIAGNOSIS — N13.8 BPH WITH OBSTRUCTION/LOWER URINARY TRACT SYMPTOMS: ICD-10-CM

## 2025-02-04 RX ORDER — FINASTERIDE 5 MG/1
5 TABLET, FILM COATED ORAL DAILY
Qty: 90 TABLET | Refills: 3 | Status: SHIPPED | OUTPATIENT
Start: 2025-02-04

## 2025-02-04 NOTE — TELEPHONE ENCOUNTER
Rx Refill Note  Requested Prescriptions     Pending Prescriptions Disp Refills    finasteride (PROSCAR) 5 MG tablet 90 tablet 3     Sig: Take 1 tablet by mouth Daily.      Last office visit with prescribing clinician: 8/29/2024   Last telemedicine visit with prescribing clinician: Visit date not found   Next office visit with prescribing clinician: 3/6/2025                         Would you like a call back once the refill request has been completed: [] Yes [] No    If the office needs to give you a call back, can they leave a voicemail: [] Yes [] No    Linda Urena RN  02/04/25, 10:32 EST

## 2025-02-11 ENCOUNTER — DOCUMENTATION (OUTPATIENT)
Dept: PHYSICAL THERAPY | Facility: HOSPITAL | Age: OVER 89
End: 2025-02-11
Payer: MEDICARE

## 2025-02-11 DIAGNOSIS — S41.111D SKIN TEAR OF RIGHT UPPER ARM WITHOUT COMPLICATION, SUBSEQUENT ENCOUNTER: Primary | ICD-10-CM

## 2025-02-11 NOTE — THERAPY DISCHARGE NOTE
Outpatient Rehabilitation - Wound/Debridement Discharge Summary       Patient Name: Chaitanya Browne  : 1923  MRN: [id number]  Today's Date: 2025                  Admit Date: (Not on file)    Visit Dx:    ICD-10-CM ICD-9-CM   1. Skin tear of right upper arm without complication, subsequent encounter  S41.111D V58.89     880.03       Patient Active Problem List   Diagnosis    Facial basal cell cancer    Hyperlipidemia LDL goal <70    Essential hypertension    ASHD (arteriosclerotic heart disease)    Gait abnormality    Impaired fasting glucose    Polyp, colonic    Vitamin D deficiency    Acute midline low back pain without sciatica    Proteinuria    Right carpal tunnel syndrome    Overweight (BMI 25.0-29.9)    Hematuria, unspecified    Medicare annual wellness visit, subsequent    Edema    Cough    Allergic rhinitis    Gross hematuria    Acute UTI (urinary tract infection)    Acute renal insufficiency    Acute bronchitis    Acute urinary retention    Debility    Dysphagia    BPH with obstruction/lower urinary tract symptoms    Prostatitis    Chronic diastolic CHF (congestive heart failure)    Iron deficiency anemia due to chronic blood loss    Simple chronic bronchitis    Shortness of breath    Stage 3a chronic kidney disease    Skin tear of left forearm without complication    Squamous cell carcinoma of nose    At high risk for falls    Stage II skin ulcer    Cellulitis of right upper extremity    Bladder mass    Anemia    Impacted cerumen of right ear    Nasal congestion        Past Medical History:   Diagnosis Date    Abnormal heart rhythm     Anemia     Aortic valve sclerosis     Arthritis     Asthma     Basal cell carcinoma (BCC) of left side of nose     BPH (benign prostatic hyperplasia)     Cataracts, bilateral     bilateralcataract extraction and lens implantation     CHF (congestive heart failure) 2019    Diastolic dysfunction     Easy bruising     Edema due to malnutrition 2020     Heart murmur     High cholesterol     History of disease     bilateral lacrimal gland dysfunction per Dr Fajardo    Hypertension     Infection     infected big toe; I and D DR Alvares 2013    Phlebitis     Pneumonia 2009    Squamous acanthoma of face         Past Surgical History:   Procedure Laterality Date    APPENDECTOMY      CATARACT EXTRACTION, BILATERAL  2013    and lens implantation    CYSTOSCOPY N/A 09/06/2019    Procedure: CYSTOSCOPY;  Surgeon: Ck Woods MD;  Location:  [facility];  Service: Urology    CYSTOSCOPY TRANSURETHRAL RESECTION OF PROSTATE  1989    CYSTOSCOPY TRANSURETHRAL RESECTION OF PROSTATE N/A 09/11/2019    Procedure: CYSTOSCOPY TRANSURETHRAL RESECTION OF PROSTATE;  Surgeon: kC Woods MD;  Location:  [facility];  Service: Urology    HEAD & NECK SKIN LESION EXCISIONAL BIOPSY      Basal cell removed from nose     INCISION AND DRAINAGE FOOT  2013    infected big toe Dr Alvares    PROSTATE SURGERY  9-2019    TRANSURETHRAL RESECTION OF BLADDER TUMOR N/A 09/06/2019    Procedure: EVACUATION OF BLOOD CLOT, FULGERATION OF PROSTATE;  Surgeon: Ck Woods MD;  Location:  [facility];  Service: Urology         Goals   PT OP Goals       Row Name 02/11/25 1000          PT Short Term Goals    STG 1 Pt/caregiver will verbalize s/sx of infection.  -     STG 1 Progress Not Met  -     STG 2 Pt will demonstrate 25% reduction in wound dimensions to both areas to indicate healing progress.  -     STG 2 Progress Not Met  -        Long Term Goals    LTG 1 Pt/caregiver will demonstrate independence with clean home dressing changes.  -     LTG 1 Progress Not Met  -     LTG 2 Pt will demonstrate 85% reduction in wound dimensions to both areas to indicate healing progress.  -     LTG 2 Progress Not Met  -               User Key  (r) = Recorded By, (t) = Taken By, (c) = Cosigned By      Initials Name Provider Type    Jennifer Diaz, PT Physical Therapist                   OP  Discharge Summary       Row Name 02/11/25 1019             OP PT Discharge Summary    Date of Discharge 02/09/25  -ENOC      Reason for Discharge other (comment)  expiration of certification  -ENOC      Outcomes Achieved Refer to plan of care for updates on goals achieved  -ENOC      Discharge Destination Home with caregiver assist;Home with home program  -      Discharge Instructions/Additional Comments Family was instructed in home dressing changes at initial eval, was instructed to call for next appt PRN, did not return for follow-up after initial eval.  Anticipate pt's wound healing with continued home dressing changes.  D/C from outpatient services.  -ENOC                User Key  (r) = Recorded By, (t) = Taken By, (c) = Cosigned By      Initials Name Provider Type    Jennifer Diaz, PT Physical Therapist                    Jennifer Farfan, PT  2/11/2025

## 2025-03-03 NOTE — PROGRESS NOTES
Subjective:     Encounter Date:03/07/2025      Patient ID: Chaitanya Browne is a 89 y.o.  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     REFERRING INTERNIST: Dirk Russ MD  UROLOGIST: Ck Woods MD  PULMONOLOGIST:  COLLIN Avila DO.  ONCOLOGIST: David Leggett MD.    Chief Complaint:   Chief Complaint   Patient presents with    Chronic diastolic CHF (congestive heart failure)     Problem List:  Diastolic congestive heart failure:  Treadmill stress test, 10/10/2011: 7 METS, CYRUS -32, acceptable exercise stress test without anginal type chest discomfort or ischemic EKG changes with acceptable Maxwell treadmill score and normal blood pressure response following exercise to 109% predicted maximum heart rate 132% of predicted exercise capacity  Echocardiogram, 7/14/2012: LVEF 0.55-0.60, abnormal LV.diastolic filling is observed consistent with impaired LV relaxation, moderate mitral annular calcification with no evidence of mitral stenosis or significant regurgitation  Echocardiogram, 9/3/2019: Mild to moderate MR, LVEF 0.72, LV diastolic dysfunction grade 1 consistent with impaired relaxation, LV wall thickness consistent with mild concentric hypertrophy, LA borderline dilated, normal RV cavity size, wall thickness, systolic function and septal motion noted, mild mitral stenosis is present with functional MAC.  Aortic valve exhibits moderate sclerosis without stenosis.  No pulmonary hypertension, no pericardial effusion   BNP normal October 2019  Residual CCS class 0 angina pectoris/NYHA class II biventricular CHF, November 2019  Residual class I symptoms, February 2021, August 2021, September 2021, March 2022, October 2022, August 2023, March 2024, September 2024, March 2025  Valvular heart disease/mild to moderate mitral regurgitation, mild mitral stenosis, September 2019  Hypertension  Hyperlipidemia;  on statin therapy  Chronic kidney disease stage III  Anemia  Asthma/COPD  Lower extremity edema with negative venous duplex July 2012  BPH  Bilateral carpal tunnel syndrome  History of basal cell carcinoma on left side of nose  [facility] 14-day hospitalization September 2019 for TURP, complicated by postoperative aspiration pneumonia, hematuria, and anemia  Prediabetes with hemoglobin A1c 6.2% February 2020, 5.9% February 2022, 5.9% August 2022  Squamous cell carcinoma with recent chemoinfusion summer 2022-data deficit  3-day [facility] hospitalization August 2023 for gross hematuria  Surgical history:   Appendectomy  TURP 1989  Peritonitis as a child  Basal cell carcinoma excision from nose  I&D great toe  Cystoscopy  TURP 2019  Bilateral cataracts    No Known Allergies    Current Outpatient Medications   Medication Instructions    aspirin 81 mg, Oral, Every Other Day    azelastine (ASTELIN) 0.1 % nasal spray 2 sprays, Nasal, 2 Times Daily, Use in each nostril as directed    bumetanide (Bumex) 1 MG tablet 1 mg BID, may take extra 1 mg with 3-5 lb weight gain, dyspnea, or worsening edema.    Cemiplimab-rwlc (LIBTAYO IV) Infuse  into a venous catheter.    Cholecalciferol (Vitamin D3) 25 MCG (1000 UT) capsule Take  by mouth.    finasteride (PROSCAR) 5 mg, Oral, Daily    fluocinonide (LIDEX) 0.05 % external solution As Needed    ketoconazole (NIZORAL) 2 % cream 1 application , Topical, Daily, For 3 weeks to groin and prn return of rash    ketoconazole (NIZORAL) 2 % shampoo As Needed    Menthol, Topical Analgesic, (Biofreeze) 4 % gel Apply  topically.    metOLazone (ZAROXOLYN) 2.5 mg, Oral, As Needed    mupirocin (BACTROBAN) 2 % ointment 1 Application, Topical, Daily    pravastatin (PRAVACHOL) 40 mg, Oral, Daily, 3x a week    tacrolimus (PROTOPIC) 0.1 % ointment 1 Application, Daily PRN    triamcinolone (KENALOG) 0.1 % ointment apply a thin layer twice a day x 2 weeks         HISTORY OF PRESENT ILLNESS:  The patient is here for  "6-month follow-up.  Patient denies any chest pain, shortness of breath, palpitations, dizziness, presyncope, or syncope.  He uses compression stockings daily.  His daughter is with him today.  She says that once every 4 months he has an episode where he has to use a couple extra Bumex for a couple days and then his lower extremity edema improves and he goes back to just using it at regular dose.  He celebrated his 89nd birthday recently and went out to dinner.  Patient worked with physical therapy and has had the same physical therapist for several years.  She always does different exercises with him so he can work his upper and lower extremities to stay strong.    ROS   All other systems reviewed and otherwise negative.      ECG 12 Lead    Date/Time: 3/7/2025 3:39 PM  Performed by: Kaley Oliveros APRN    Authorized by: Kaley Oliveros APRN  Rhythm comments: Normal sinus rhythm, septal infarct, age undetermined, 76 bpm,  ms, QRS 94 ms, QTc 492 ms, no change from last ECG March 2024             Objective:       Vitals:    03/07/25 1313 03/07/25 1315   BP: 116/72 112/68   BP Location: Right arm Right arm   Patient Position: Sitting Standing   Pulse:  76   Weight: 76.2 kg (168 lb)    Height: 165.1 cm (65\")      Body mass index is 27.96 kg/m².  Wt Readings from Last 2 Encounters:   03/07/25 76.2 kg (168 lb)   03/06/25 76.2 kg (168 lb)        Constitutional:       Appearance: Healthy appearance. Not in distress.      Comments: Ambulating with walker   Neck:      Vascular: No JVR. JVD normal.   Pulmonary:      Effort: Pulmonary effort is normal.      Breath sounds: Normal breath sounds. No wheezing. No rhonchi. No rales.   Chest:      Chest wall: Not tender to palpatation.   Cardiovascular:      PMI at left midclavicular line. Normal rate. Regular rhythm. Normal S1. Normal S2.       Murmurs: There is a grade 1/6 systolic murmur at the URSB and ULSB, radiating to the neck.      No gallop.  No click. No rub. "   Pulses:     Intact distal pulses.   Edema:     Peripheral edema absent.   Abdominal:      General: Bowel sounds are normal.      Palpations: Abdomen is soft.      Tenderness: There is no abdominal tenderness.   Musculoskeletal: Normal range of motion.         General: No tenderness. Skin:     General: Skin is warm and dry.   Neurological:      General: No focal deficit present.      Mental Status: Alert and oriented to person, place and time.           Lab Review:   Lab Results   Component Value Date    GLUCOSE 85 09/26/2023    BUN 17 09/26/2023    CREATININE 1.42 (H) 09/26/2023    EGFRIFNONA 50 (L) 02/22/2022    EGFRIFAFRI 60 (L) 02/22/2022    BCR 12.0 09/26/2023    CO2 30.9 (H) 09/26/2023    CALCIUM 9.5 09/26/2023    ALBUMIN 2.9 (L) 08/22/2023    AST 24 08/22/2023    ALT 7 08/22/2023       Lab Results   Component Value Date    WBC 5.63 09/12/2023    HGB 10.9 (L) 09/12/2023    HCT 32.3 (L) 09/12/2023    MCV 93.4 09/12/2023     09/12/2023       Lab Results   Component Value Date    HGBA1C 5.90 (H) 08/23/2022       Lab Results   Component Value Date    TSH 1.92 02/25/2025       Lab Results   Component Value Date    CHOL 187 08/23/2022    CHOL 187 02/22/2022     Lab Results   Component Value Date    TRIG 81 08/23/2022    TRIG 112 02/22/2022     Lab Results   Component Value Date    HDL 74 (H) 08/23/2022    HDL 79 (H) 02/22/2022     Lab Results   Component Value Date    LDL 98 08/23/2022    LDL 89 02/22/2022             Results for orders placed during the hospital encounter of 09/02/19    Adult Transthoracic Echo Complete W/ Cont if Necessary Per Protocol    Interpretation Summary  · Mild-to-moderate mitral valve regurgitation is present.  · Estimated EF = 72%.  · Left ventricular systolic function is hyperdynamic (EF > 70).  · Left ventricular diastolic dysfunction (grade I) consistent with impaired relaxation.  · Left ventricular wall thickness is consistent with mild concentric hypertrophy.  · Left atrial  cavity size is borderline dilated.  · Normal right ventricular cavity size, wall thickness, systolic function and septal motion noted.  · Mild mitral valve stenosis is present with functional MAC.  · The aortic valve exhibits moderate sclerosis without stenosis.  · No evidence of pulmonary hypertension is present.  · There is no evidence of pericardial effusion.            Advance Care Planning   ACP discussion was held with the patient during this visit. Patient has an advance directive (not in EMR), copy requested.      Assessment:     Overall continued acceptable course with no new interim cardiopulmonary complaints with fair functional status on limited activity. We will defer additional diagnostic or therapeutic intervention from a cardiac perspective at this time.Hopefully I will get to review the patient's next laboratory testing results.      Diagnosis Plan   1. Chronic diastolic CHF (congestive heart failure)  No recurrent angina pectoris or CHF on current activity schedule; continue current treatment       2. Essential hypertension  Controlled, continue current cardiac medications      3. Hyperlipidemia LDL goal <70  No new labs to review, continue pravastatin             Plan:         Patient to continue current medications and close follow up with the above providers.  Tentative cardiology follow up in September 2025 or patient may return sooner PRN.

## 2025-03-06 ENCOUNTER — OFFICE VISIT (OUTPATIENT)
Dept: INTERNAL MEDICINE | Facility: CLINIC | Age: OVER 89
End: 2025-03-06
Payer: MEDICARE

## 2025-03-06 VITALS
TEMPERATURE: 98 F | HEIGHT: 60 IN | HEART RATE: 86 BPM | BODY MASS INDEX: 32.98 KG/M2 | WEIGHT: 168 LBS | SYSTOLIC BLOOD PRESSURE: 102 MMHG | DIASTOLIC BLOOD PRESSURE: 48 MMHG

## 2025-03-06 DIAGNOSIS — M62.81 GENERALIZED MUSCLE WEAKNESS: ICD-10-CM

## 2025-03-06 DIAGNOSIS — H61.23 BILATERAL IMPACTED CERUMEN: ICD-10-CM

## 2025-03-06 DIAGNOSIS — I10 ESSENTIAL HYPERTENSION: ICD-10-CM

## 2025-03-06 DIAGNOSIS — E44.1 MILD PROTEIN-CALORIE MALNUTRITION: ICD-10-CM

## 2025-03-06 DIAGNOSIS — R73.01 IMPAIRED FASTING BLOOD SUGAR: ICD-10-CM

## 2025-03-06 DIAGNOSIS — I50.32 CHRONIC DIASTOLIC CHF (CONGESTIVE HEART FAILURE): ICD-10-CM

## 2025-03-06 DIAGNOSIS — N18.31 STAGE 3A CHRONIC KIDNEY DISEASE: ICD-10-CM

## 2025-03-06 DIAGNOSIS — H90.6 MIXED CONDUCTIVE AND SENSORINEURAL HEARING LOSS OF BOTH EARS: ICD-10-CM

## 2025-03-06 DIAGNOSIS — E66.811 OBESITY (BMI 30.0-34.9): ICD-10-CM

## 2025-03-06 DIAGNOSIS — R73.01 IMPAIRED FASTING GLUCOSE: ICD-10-CM

## 2025-03-06 DIAGNOSIS — C44.321 SQUAMOUS CELL CARCINOMA OF NOSE: ICD-10-CM

## 2025-03-06 DIAGNOSIS — E78.5 HYPERLIPIDEMIA LDL GOAL <70: ICD-10-CM

## 2025-03-06 DIAGNOSIS — Z91.81 AT HIGH RISK FOR FALLS: Chronic | ICD-10-CM

## 2025-03-06 DIAGNOSIS — E55.9 VITAMIN D DEFICIENCY: ICD-10-CM

## 2025-03-06 DIAGNOSIS — Z00.00 MEDICARE ANNUAL WELLNESS VISIT, SUBSEQUENT: Primary | ICD-10-CM

## 2025-03-06 NOTE — PROGRESS NOTES
Subjective   The ABCs of the Annual Wellness Visit  Medicare Wellness Visit      Chaitanya Browne is a 89 y.o. patient who presents for a Medicare Wellness Visit. He has acute issue of more weakness and unsteadiness and worse hearing with ear fullness.     The following portions of the patient's history were reviewed and   updated as appropriate: allergies, current medications, past family history, past medical history, past social history, past surgical history, and problem list.    Compared to one year ago, the patient's physical   health is the same.  Compared to one year ago, the patient's mental   health is the same.    Recent Hospitalizations:  He was not admitted to the hospital during the last year.     Current Medical Providers:  Patient Care Team:  Dirk Russ MD as PCP - General (Internal Medicine)  Kaley Oliveros APRN as Nurse Practitioner (Cardiology)  Ravindra Trevino MD as Consulting Physician (Ophthalmology)  Eva Escobedo PA as Physician Assistant (Dermatology)  David Leggett MD as Consulting Physician (Hematology and Oncology)    Outpatient Medications Prior to Visit   Medication Sig Dispense Refill    aspirin 81 MG EC tablet Take 1 tablet by mouth Every Other Day.      azelastine (ASTELIN) 0.1 % nasal spray 2 sprays into the nostril(s) as directed by provider 2 (Two) Times a Day. Use in each nostril as directed 90 each 3    bumetanide (Bumex) 1 MG tablet 1 mg BID, may take extra 1 mg with 3-5 lb weight gain, dyspnea, or worsening edema. 200 tablet 1    Cemiplimab-rwlc (LIBTAYO IV) Infuse  into a venous catheter.      Cholecalciferol (Vitamin D3) 25 MCG (1000 UT) capsule Take  by mouth.      finasteride (PROSCAR) 5 MG tablet Take 1 tablet by mouth Daily. 90 tablet 3    fluocinonide (LIDEX) 0.05 % external solution Apply 1 application topically to the appropriate area as directed As Needed.      ketoconazole (NIZORAL) 2 % cream Apply 1 application  topically to the appropriate  area as directed Daily. For 3 weeks to groin and prn return of rash 60 g 1    ketoconazole (NIZORAL) 2 % shampoo Apply  topically to the appropriate area as directed As Needed.      Menthol, Topical Analgesic, (Biofreeze) 4 % gel Apply  topically.      metOLazone (ZAROXOLYN) 2.5 MG tablet Take 1 tablet by mouth As Needed (increased swelling/weight gain >2-3 pounds in 1 day, use sparingly). 30 tablet 2    mupirocin (BACTROBAN) 2 % ointment Apply 1 Application topically to the appropriate area as directed Daily. 15 g 0    pravastatin (PRAVACHOL) 40 MG tablet Take 1 tablet by mouth Daily. 3x a week 90 tablet 3    tacrolimus (PROTOPIC) 0.1 % ointment Apply 1 Application topically to the appropriate area as directed Daily As Needed.       No facility-administered medications prior to visit.     No opioid medication identified on active medication list. I have reviewed chart for other potential  high risk medication/s and harmful drug interactions in the elderly.      Aspirin is on active medication list. Aspirin use is indicated based on review of current medical condition/s. Pros and cons of this therapy have been discussed today. Benefits of this medication outweigh potential harm.  Patient has been encouraged to continue taking this medication.  .      Patient Active Problem List   Diagnosis    Facial basal cell cancer    Hyperlipidemia LDL goal <70    Essential hypertension    ASHD (arteriosclerotic heart disease)    Gait abnormality    Impaired fasting glucose    Polyp, colonic    Vitamin D deficiency    Acute midline low back pain without sciatica    Proteinuria    Right carpal tunnel syndrome    Obesity (BMI 30.0-34.9)    Hematuria, unspecified    Medicare annual wellness visit, subsequent    Edema    Cough    Allergic rhinitis    Gross hematuria    Acute UTI (urinary tract infection)    Acute renal insufficiency    Acute bronchitis    Acute urinary retention    Debility    Dysphagia    BPH with obstruction/lower  "urinary tract symptoms    Prostatitis    Chronic diastolic CHF (congestive heart failure)    Iron deficiency anemia due to chronic blood loss    Simple chronic bronchitis    Shortness of breath    Stage 3a chronic kidney disease    Skin tear of left forearm without complication    Squamous cell carcinoma of nose    At high risk for falls    Stage II skin ulcer    Cellulitis of right upper extremity    Bladder mass    Anemia    Bilateral impacted cerumen    Nasal congestion    Generalized muscle weakness    Mixed conductive and sensorineural hearing loss of both ears     Advance Care Planning Advance Directive is on file.  ACP discussion was held with the patient during this visit. Patient has an advance directive in EMR which is still valid.             Objective   Vitals:    25 1057   BP: 102/48   Pulse: 86   Temp: 98 °F (36.7 °C)   Weight: 76.2 kg (168 lb)   Height: 149.9 cm (59.02\")   PainSc: 0-No pain       Estimated body mass index is 33.91 kg/m² as calculated from the following:    Height as of this encounter: 149.9 cm (59.02\").    Weight as of this encounter: 76.2 kg (168 lb).                Does the patient have evidence of cognitive impairment? No                                                                                                Health  Risk Assessment    Smoking Status:  Social History     Tobacco Use   Smoking Status Never    Passive exposure: Never   Smokeless Tobacco Never     Alcohol Consumption:  Social History     Substance and Sexual Activity   Alcohol Use No       Fall Risk Screen  STEADI Fall Risk Assessment was completed, and patient is at HIGH risk for falls. Assessment completed on:3/6/2025    Depression Screening   Little interest or pleasure in doing things? Not at all   Feeling down, depressed, or hopeless? Not at all   PHQ-2 Total Score 0      Health Habits and Functional and Cognitive Screenin/27/2025    10:22 AM   Functional & Cognitive Status   Do you have " difficulty preparing food and eating? No    Do you have difficulty bathing yourself, getting dressed or grooming yourself? Yes    Do you have difficulty using the toilet? Yes    Do you have difficulty moving around from place to place? No    Do you have trouble with steps or getting out of a bed or a chair? No    Current Diet Well Balanced Diet    Dental Exam Up to date    Eye Exam Up to date    Exercise (times per week) 6 times per week    Current Exercises Include Light Weights;Other    Do you need help using the phone?  No    Are you deaf or do you have serious difficulty hearing?  Yes    Do you need help to go to places out of walking distance? No    Do you need help shopping? Yes    Do you need help preparing meals?  Yes    Do you need help with housework?  Yes    Do you need help with laundry? Yes    Do you need help taking your medications? Yes    Do you need help managing money? Yes    Do you ever drive or ride in a car without wearing a seat belt? No    Have you felt unusual stress, anger or loneliness in the last month? No    Who do you live with? Alone    If you need help, do you have trouble finding someone available to you? No    Have you been bothered in the last four weeks by sexual problems? No    Do you have difficulty concentrating, remembering or making decisions? Yes        Patient-reported           Age-appropriate Screening Schedule:  Refer to the list below for future screening recommendations based on patient's age, sex and/or medical conditions. Orders for these recommended tests are listed in the plan section. The patient has been provided with a written plan.    Health Maintenance List  Health Maintenance   Topic Date Due    ZOSTER VACCINE (1 of 2) Never done    RSV Vaccine - Adults (1 - 1-dose 75+ series) Never done    LIPID PANEL  08/06/2025    ANNUAL WELLNESS VISIT  03/06/2026    BMI FOLLOWUP  03/06/2026    TDAP/TD VACCINES (3 - Td or Tdap) 04/03/2033    COVID-19 Vaccine  Completed     "INFLUENZA VACCINE  Completed    Pneumococcal Vaccine 50+  Completed                                                                                                                                                CMS Preventative Services Quick Reference  Risk Factors Identified During Encounter  Fall Risk-High or Moderate: Discussed Fall Prevention in the home and Information on Fall Prevention Shared in After Visit Summary  Hearing Problem:  follow audiology and ENT as needed for hearing loss and ear wax  Polypharmacy: Medication List reviewed    The above risks/problems have been discussed with the patient.  Pertinent information has been shared with the patient in the After Visit Summary.  An After Visit Summary and PPPS were made available to the patient.    Follow Up:   Next Medicare Wellness visit to be scheduled in 1 year.         Additional E&M Note during same encounter follows:  Patient has additional, significant, and separately identifiable condition(s)/problem(s) that require work above and beyond the Medicare Wellness Visit     Chief Complaint  Annual Exam (Not fasting)    Subjective   HPI  Chaitanya is also being seen today for an annual adult preventative physical exam.  and Chaitanya is also being seen today for additional medical problem/s.    Review of Systems   Constitutional:  Negative for chills, diaphoresis and fever.   HENT:  Positive for hearing loss.         Ear pressure    Eyes:  Negative for visual disturbance.   Respiratory:  Negative for cough.    Cardiovascular:  Negative for chest pain and palpitations.   Gastrointestinal:  Negative for abdominal pain.   Musculoskeletal:  Positive for gait problem.   Neurological:  Positive for weakness. Negative for dizziness.   Psychiatric/Behavioral:  Negative for decreased concentration and dysphoric mood. The patient is not nervous/anxious.               Objective   Vital Signs:  /48   Pulse 86   Temp 98 °F (36.7 °C)   Ht 149.9 cm (59.02\")   " Wt 76.2 kg (168 lb)   BMI 33.91 kg/m²   Physical Exam  Constitutional:       Appearance: Normal appearance.   HENT:      Ears:      Comments: Bilateral cerumen impaction   Eyes:      Conjunctiva/sclera: Conjunctivae normal.   Neck:      Vascular: No carotid bruit.   Cardiovascular:      Rate and Rhythm: Normal rate and regular rhythm.      Heart sounds: Murmur (II/VI SM) heard.   Pulmonary:      Breath sounds: No wheezing or rales.   Abdominal:      General: Bowel sounds are normal.      Palpations: Abdomen is soft.      Tenderness: There is no abdominal tenderness.   Musculoskeletal:         General: No swelling.   Neurological:      Mental Status: He is alert and oriented to person, place, and time.      Comments: Slow get up and go and unsteady gait helped with f rolling walker    Psychiatric:         Mood and Affect: Mood normal.         Behavior: Behavior normal.                       Assessment and Plan            Medicare annual wellness visit, subsequent  His mood is good overall and Slow get up and go and unsteady gait helped with f rolling walker He is following with Derm and oncology for his skin. He will see ENT for hearing loss. Age-appropriate Counseling:  Discussed preventative medicine issues with patient including regular exercise, healthy diet, stress reduction, adequate sleep and recommended age-appropriate screening studies.  Immunizations reviewed.           Stage 3a chronic kidney disease  Renal condition is stable.  Continue current treatment regimen.  Weight loss.  Monitor daily weight.  Renal condition will be reassessed in 6 months.    Orders:    Renal Function Panel; Future    Chronic diastolic CHF (congestive heart failure)  Congestive heart failure due to hypertension.  Heart failure is stable.  NYHA Class II.  Continue current treatment regimen.  Dietary sodium restriction.  Regular aerobic exercise.  Heart failure will be reassessed in 6 months.             Hyperlipidemia LDL goal  <70   Lipid abnormalities are stable    Plan:  Continue same medication/s without change.      Discussed medication dosage, use, side effects, and goals of treatment in detail.    Counseled patient on lifestyle modifications to help control hyperlipidemia.     Patient Treatment Goals:   LDL goal is less than 70    Followup in 1 year.         Essential hypertension  Hypertension is stable and controlled  Continue current treatment regimen.  Dietary sodium restriction.  Weight loss.  Regular aerobic exercise.  Blood pressure will be reassessed in 6 months.         Impaired fasting glucose  Discussed decreasing bad carbohydrates, specifically sweets, breads, potatoes, corn and high caloric drinks (juices, sodas, sweet tea).  Also recommend increasing physical activity. And add PT         Vitamin D deficiency  Follow labs     Orders:    Vitamin D,25-Hydroxy; Future    At high risk for falls  Encourage to get up slowly and get bearings before taking first step. Keep home well lit with clear floors to avoid tripping. Use assist devices for all walking especially from bed to bathroom.  Proceed with PT   Orders:    Ambulatory Referral to Physical Therapy for Evaluation & Treatment    Squamous cell carcinoma of nose  Follow with Dr Leggett and Eva Escobedo       Obesity (BMI 30.0-34.9)  Patient's (Body mass index is 33.91 kg/m².) indicates that they are obese (BMI >30) with health conditions that include hypertension, impaired fasting glucose, dyslipidemias, GERD, and osteoarthritis . Weight is unchanged. BMI  is above average; BMI management plan is completed. We discussed portion control and increasing exercise.          Generalized muscle weakness  Acute issue and  check labs prealbumin and go to PT and use rolling walker   Orders:    Ambulatory Referral to Physical Therapy for Evaluation & Treatment    Mild protein-calorie malnutrition  Patient's Body mass index is 33.91 kg/m².  BMI indicates mild protein-calorie  malnutrition with health conditions related to an underlying condition that include some malnutrition and trying to eat more healthy proteins  . Weight issue is improving with lifestyle modifications. BMI is  weight above but albumin (prealbumin) below  .  BMI management for patient includes the followin months and labs  .    Orders:    Prealbumin; Future    Impaired fasting blood sugar  Discussed decreasing bad carbohydrates, specifically sweets, breads, potatoes, corn and high caloric drinks (juices, sodas, sweet tea).  Also recommend increasing physical activity. And do PT   Orders:    Hemoglobin A1c; Future    Bilateral impacted cerumen  Acute issue and use debrox and see ENT   Orders:    Ambulatory Referral to ENT (Otolaryngology)    Mixed conductive and sensorineural hearing loss of both ears  Acute issue and use debrox and see ENT     Orders:    Ambulatory Referral to ENT (Otolaryngology)            Follow Up   Return in about 6 months (around 2025) for Recheck.  Patient was given instructions and counseling regarding his condition or for health maintenance advice. Please see specific information pulled into the AVS if appropriate.

## 2025-03-06 NOTE — ASSESSMENT & PLAN NOTE
Patient's (Body mass index is 33.91 kg/m².) indicates that they are obese (BMI >30) with health conditions that include hypertension, impaired fasting glucose, dyslipidemias, GERD, and osteoarthritis . Weight is unchanged. BMI  is above average; BMI management plan is completed. We discussed portion control and increasing exercise.

## 2025-03-07 ENCOUNTER — OFFICE VISIT (OUTPATIENT)
Dept: CARDIOLOGY | Facility: CLINIC | Age: OVER 89
End: 2025-03-07
Payer: MEDICARE

## 2025-03-07 VITALS
SYSTOLIC BLOOD PRESSURE: 112 MMHG | BODY MASS INDEX: 27.99 KG/M2 | HEART RATE: 76 BPM | DIASTOLIC BLOOD PRESSURE: 68 MMHG | WEIGHT: 168 LBS | HEIGHT: 65 IN

## 2025-03-07 DIAGNOSIS — I50.32 CHRONIC DIASTOLIC CHF (CONGESTIVE HEART FAILURE): Primary | ICD-10-CM

## 2025-03-07 DIAGNOSIS — I10 ESSENTIAL HYPERTENSION: ICD-10-CM

## 2025-03-07 DIAGNOSIS — E78.5 HYPERLIPIDEMIA LDL GOAL <70: ICD-10-CM

## 2025-03-07 DIAGNOSIS — R60.0 EDEMA LEG: ICD-10-CM

## 2025-03-07 PROBLEM — H90.6 MIXED CONDUCTIVE AND SENSORINEURAL HEARING LOSS OF BOTH EARS: Status: ACTIVE | Noted: 2025-01-01

## 2025-03-07 PROBLEM — H61.23 BILATERAL IMPACTED CERUMEN: Status: ACTIVE | Noted: 2023-12-26

## 2025-03-07 RX ORDER — BUMETANIDE 1 MG/1
TABLET ORAL
Qty: 200 TABLET | Refills: 1 | Status: SHIPPED | OUTPATIENT
Start: 2025-03-07

## 2025-03-07 RX ORDER — TRIAMCINOLONE ACETONIDE 1 MG/G
OINTMENT TOPICAL
COMMUNITY
Start: 2024-07-18

## 2025-03-07 NOTE — ASSESSMENT & PLAN NOTE
Acute issue and use debrox and see ENT     Orders:    Ambulatory Referral to ENT (Otolaryngology)

## 2025-03-07 NOTE — ASSESSMENT & PLAN NOTE
Discussed decreasing bad carbohydrates, specifically sweets, breads, potatoes, corn and high caloric drinks (juices, sodas, sweet tea).  Also recommend increasing physical activity. And add PT

## 2025-03-07 NOTE — ASSESSMENT & PLAN NOTE
His mood is good overall and Slow get up and go and unsteady gait helped with f rolling walker He is following with Derm and oncology for his skin. He will see ENT for hearing loss. Age-appropriate Counseling:  Discussed preventative medicine issues with patient including regular exercise, healthy diet, stress reduction, adequate sleep and recommended age-appropriate screening studies.  Immunizations reviewed.

## 2025-03-07 NOTE — ASSESSMENT & PLAN NOTE
Congestive heart failure due to hypertension.  Heart failure is stable.  NYHA Class II.  Continue current treatment regimen.  Dietary sodium restriction.  Regular aerobic exercise.  Heart failure will be reassessed in 6 months.

## 2025-03-07 NOTE — ASSESSMENT & PLAN NOTE
Acute issue and  check labs prealbumin and go to PT and use rolling walker   Orders:    Ambulatory Referral to Physical Therapy for Evaluation & Treatment

## 2025-03-07 NOTE — ASSESSMENT & PLAN NOTE
Encourage to get up slowly and get bearings before taking first step. Keep home well lit with clear floors to avoid tripping. Use assist devices for all walking especially from bed to bathroom.  Proceed with PT   Orders:    Ambulatory Referral to Physical Therapy for Evaluation & Treatment

## 2025-03-07 NOTE — ASSESSMENT & PLAN NOTE
Hypertension is stable and controlled  Continue current treatment regimen.  Dietary sodium restriction.  Weight loss.  Regular aerobic exercise.  Blood pressure will be reassessed in 6 months.

## 2025-03-07 NOTE — ASSESSMENT & PLAN NOTE
Renal condition is stable.  Continue current treatment regimen.  Weight loss.  Monitor daily weight.  Renal condition will be reassessed in 6 months.    Orders:    Renal Function Panel; Future

## 2025-03-07 NOTE — ASSESSMENT & PLAN NOTE
Lipid abnormalities are stable    Plan:  Continue same medication/s without change.      Discussed medication dosage, use, side effects, and goals of treatment in detail.    Counseled patient on lifestyle modifications to help control hyperlipidemia.     Patient Treatment Goals:   LDL goal is less than 70    Followup in 1 year.

## 2025-03-13 ENCOUNTER — HOSPITAL ENCOUNTER (OUTPATIENT)
Dept: PHYSICAL THERAPY | Facility: HOSPITAL | Age: OVER 89
Setting detail: THERAPIES SERIES
Discharge: HOME | End: 2025-03-13
Payer: MEDICARE

## 2025-03-13 DIAGNOSIS — R26.89 IMPAIRMENT OF BALANCE: Primary | ICD-10-CM

## 2025-03-13 PROCEDURE — 97116 GAIT TRAINING THERAPY: CPT | Performed by: PHYSICAL THERAPIST

## 2025-03-13 PROCEDURE — 97162 PT EVAL MOD COMPLEX 30 MIN: CPT | Performed by: PHYSICAL THERAPIST

## 2025-03-13 NOTE — THERAPY EVALUATION
Physical Therapy Initial Evaluation     [facility] [place]          [address]     Patient: Chaitanya Browne   : 1923  MRN: [id number]   Diagnosis/ICD-10 Code:      ICD-10-CM ICD-9-CM   1. Impairment of balance  R26.89 781.2      Referring practitioner: Dirk Russ MD  Today's Date: 3/13/2025      Subjective:     Subjective Questionnaire: DIXON 33/56  TUG 27.94 sec   10 Meter 14.78 sec     Eval Complexity: moderate     Subjective Evaluation    History of Present Illness  Mechanism of injury: Pt is having LE weakness and balance issues. He also has hand issues due to carpal tunnel. Struggles get up from chair. Daughter states walking is different.       Patient Occupation: retired dentist and professor Pain  No pain reported    Social Support  Lives in: one-story house  Lives with: alone             Objective          Strength/Myotome Testing     Left Hip   Planes of Motion   Flexion: 4  Abduction: 4    Right Hip   Planes of Motion   Flexion: 4  Abduction: 4    Left Knee   Flexion: 4  Extension: 4    Right Knee   Flexion: 4  Extension: 4    Left Ankle/Foot   Dorsiflexion: 4+    Right Ankle/Foot   Dorsiflexion: 4+    Ambulation     Comments   Mid foot strike with rollator         PT Neuro         Assessment & Plan       Assessment  Impairments: abnormal coordination, abnormal gait, activity intolerance, impaired balance, impaired physical strength and safety issue   Functional limitations: carrying objects, walking, standing and stooping   Assessment details: Patient is an 89 YOM that presents with walking and LE strength deficits. Patient is currently ambulating with a mid foot strike and shortened step length. This is placing him at a higher risk of falling. He does still live alone at home and in order for him to remain in his setting, patient will require skilled PT intervention to address LE weakness and gait deficits in order to decrease risk of falls, as  well as improve global mobility and function.   Prognosis: fair    Goals  Plan Goals: STG (4 visits)  1. Patient will report compliance with initial HEP.   2. Patient to improve DIXON balance score to >/= 37 /56 to decrease patient's risk of falls.  3. Patient to perform TUG within 25 sec without LOB for improved functional mobility.  4. Patient to ambulate 10 meters within 13 sec without LOB for improved gait nida and functional mobility.    LTG (8 visits)  1. Patient will be I with final HEP.   2. Patient to improve DIXON balance score to >/= 40 /56 to decrease patient's risk of falls.  3. Patient to perform TUG within 23 sec without LOB for improved functional mobility.  4. Patient to ambulate 10 meters within 12 sec without LOB for improved gait nida and functional mobility.      Plan  Therapy options: will be seen for skilled therapy services  Planned modality interventions: TENS  Planned therapy interventions: ADL retraining, balance/weight-bearing training, flexibility, gait training, manual therapy, neuromuscular re-education, motor coordination training, postural training, strengthening, stretching, therapeutic activities, transfer training and home exercise program  Frequency: 1x week  Duration in visits: 8  Treatment plan discussed with: patient  Plan details: Patient will be seen 1x/wk x 8wks with treatment to include strengthening, stretching, manual therapy, neuromuscular re-education, balance, gait and endurance training. CPT codes to include: 55860, 09308, 54034, 38523 and 51631          Therapy Charges for Today       Code Description Service Date Service Provider Modifiers Qty    85420780757 HC PT EVAL MOD COMPLEXITY 3 3/13/2025 Vianca Durant, PT GP 1    33847873564 HC GAIT TRAINING EA 15 MIN 3/13/2025 Vianca Durant, PT GP 1            PT SIGNATURE: Vianca Durant PT, DPT, MSCS, CSRS  [place] License #[id number]  DATE TREATMENT INITIATED: 3/13/2025      Medicare Initial Certification  Certification Period: 3/13/0717rvec9/10/2025  I certify that the therapy services are furnished while this patient is under my care.  The services outlined above are required by this patient, and will be reviewed every 90 days.     PHYSICIAN: Dirk Russ MD  NPI: [id number]                                         DATE:     Please sign and return via fax to [phone].   Thank you,   [facility].

## 2025-03-27 NOTE — THERAPY TREATMENT NOTE
Physical Therapy Daily Note      Patient: Chaitanya Browne   : 1923  MRN: [id number]   Diagnosis/ICD-10 Code:      ICD-10-CM ICD-9-CM   1. Impairment of balance  R26.89 781.2      Referring practitioner: Dirk Russ MD  Today's Date: 3/27/2025    Subjective:   Patient reports: he is feeling good.   Pain: 0/10  Treatment has included: therapeutic exercise and neuromuscular re-education    Objective   See Exercise, Manual, and Modality Logs for complete treatment.    PT Neuro     Balance Skills Training  57741 -  PT Neuromuscular Reeducation Minutes: 15    Assessment & Plan       Assessment  Assessment details: Patient demonstrates improved heel strike while walking. His visual cue on walker has assisted with compliance. Continue to progress as indicated.     Plan  Plan details: Patient to continue with PT services to improve gait, balance, strength, transfers and overall functional mobility.            Therapy Charges for Today       Code Description Service Date Service Provider Modifiers Qty    63304990412 HC PT NEUROMUSC RE EDUCATION EA 15 MIN 3/27/2025 Vianca Durant, PT GP 1    [id number] HC PT THER PROC EA 15 MIN 3/27/2025 Vianca Durant, PT GP 2            Vianca Durant, PT, DPT, MSCS, CSRS  [place] License #: [id number]  Physical Therapist

## 2025-03-30 PROBLEM — R57.0 CARDIOGENIC SHOCK: Status: ACTIVE | Noted: 2025-01-01

## 2025-03-30 PROBLEM — I46.9 CARDIAC ARREST: Status: ACTIVE | Noted: 2025-01-01

## 2025-03-30 NOTE — CONSULTS
[facility] at [facility]  Cardiology Consult Note  [facility] 2A ICU          Patient Identification:  Chaitanya Browne      [id number]  89 y.o.        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  Caregiver found him unconscious on the floor bedroom floor this morning, EMS was called found him to be in cardiac arrest, with ventricular tachycardia and ventricular fibrillation, they were able to obtain spontaneous circulation with ACLS protocol and fibrillation, it is unknown how long the patient had been unconscious on the floor.  In the ER he lost his pulse at least 2 times successfully defibrillated for ventricular fibrillation, started on epinephrine for blood pressure support.  Postcode EKG showed inferolateral ST elevation consistent with STEMI.  Discussion with patient's daughter in the ER revealed the patient was living independently prior to admission.    Past History:  Past Medical History:   Diagnosis Date    Abnormal heart rhythm     Anemia     Aortic valve sclerosis     Arthritis     Asthma     Basal cell carcinoma (BCC) of left side of nose     BPH (benign prostatic hyperplasia)     Cataracts, bilateral     bilateralcataract extraction and lens implantation 2013    CHF (congestive heart failure) 2019    Diastolic dysfunction     Easy bruising     Edema due to malnutrition 02/05/2020    Heart murmur     High cholesterol     History of disease     bilateral lacrimal gland dysfunction per Dr Fajardo    Hypertension     Infection     infected big toe; I and D DR Alvares 2013    Phlebitis     Pneumonia 2009    Squamous acanthoma of face      Past Surgical  History:   Procedure Laterality Date    APPENDECTOMY      CATARACT EXTRACTION, BILATERAL  2013    and lens implantation    CYSTOSCOPY N/A 09/06/2019    Procedure: CYSTOSCOPY;  Surgeon: Ck Woods MD;  Location:  [facility];  Service: Urology    CYSTOSCOPY TRANSURETHRAL RESECTION OF PROSTATE  1989    CYSTOSCOPY TRANSURETHRAL RESECTION OF PROSTATE N/A 09/11/2019    Procedure: CYSTOSCOPY TRANSURETHRAL RESECTION OF PROSTATE;  Surgeon: Ck Woods MD;  Location:  [facility];  Service: Urology    HEAD & NECK SKIN LESION EXCISIONAL BIOPSY      Basal cell removed from nose     INCISION AND DRAINAGE FOOT  2013    infected big toe Dr Alvares    PROSTATE SURGERY  9-2019    TRANSURETHRAL RESECTION OF BLADDER TUMOR N/A 09/06/2019    Procedure: EVACUATION OF BLOOD CLOT, FULGERATION OF PROSTATE;  Surgeon: Ck Woods MD;  Location:  [facility];  Service: Urology     No Known Allergies  Social History     Socioeconomic History    Marital status:    Tobacco Use    Smoking status: Never     Passive exposure: Never    Smokeless tobacco: Never   Vaping Use    Vaping status: Never Used   Substance and Sexual Activity    Alcohol use: No    Drug use: No    Sexual activity: Defer     Family History   Problem Relation Age of Onset    Coronary artery disease Father     Heart attack Father     Coronary artery disease Brother     Heart disease Brother     Atrial fibrillation Brother     Stroke Brother     Heart attack Brother     Heart attack Mother      Medications:  Medications Prior to Admission   Medication Sig Dispense Refill Last Dose/Taking    aspirin 81 MG EC tablet Take 1 tablet by mouth Every Other Day. (Patient taking differently: Take 1 tablet by mouth Every Other Day. OTC)   Past Week    azelastine (ASTELIN) 0.1 % nasal spray 2 sprays into the nostril(s) as directed by provider 2 (Two) Times a Day. Use in each nostril as directed (Patient taking differently: Administer 2 sprays into the nostril(s) as  "directed by provider 2 (Two) Times a Day As Needed for Rhinitis or Allergies. OTC) 90 each 3 Past Week    bumetanide (Bumex) 1 MG tablet 1 mg BID, may take extra 1 mg with 3-5 lb weight gain, dyspnea, or worsening edema. (Patient taking differently: Take 1 tablet by mouth 2 (Two) Times a Day. May take extra 1 mg with 3-5 lb weight gain, dyspnea, or worsening edema.) 200 tablet 1 3/29/2025    Cemiplimab-rwlc (LIBTAYO) 350 MG/7ML injection Infuse 7 mL into a venous catheter Every 21 (Twenty-One) Days.   Past Month    Cholecalciferol (Vitamin D3) 25 MCG (1000 UT) capsule Take 1 capsule by mouth Daily. OTC   3/29/2025    finasteride (PROSCAR) 5 MG tablet Take 1 tablet by mouth Daily. 90 tablet 3 3/29/2025    fluocinonide (LIDEX) 0.05 % external solution Apply 1 application topically to the appropriate area as directed As Needed.   Past Month    Menthol, Topical Analgesic, (Biofreeze) 4 % gel Apply 1 Application topically to the appropriate area as directed As Needed (Pain). OTC   Past Month    pravastatin (PRAVACHOL) 40 MG tablet Take 1 tablet by mouth Daily. 3x a week (Patient taking differently: Take 1 tablet by mouth 3 (Three) Times a Week. Mon-Wed-Fri at bedtime) 90 tablet 3 Past Week Bedtime     Current medications:  [Transfer Hold] aspirin, 300 mg, Rectal, Once  mupirocin, 1 Application, Each Nare, BID      Current IV drips:  EPINEPHrine, 0.02-0.5 mcg/kg/min, Last Rate: 0.5 mcg/kg/min (03/30/25 1201)  heparin, 12 Units/kg/hr, Last Rate: 12 Units/kg/hr (03/30/25 1139)  norepinephrine, 0.02-0.5 mcg/kg/min, Last Rate: 0.5 mcg/kg/min (03/30/25 1209)  Pharmacy to Dose Heparin,         Review of Systems:  Unable to obtain as the patient is unconscious intubated    Physical exam:    BP (!) 76/45   Pulse 92   Temp 94.3 °F (34.6 °C) (Rectal)   Resp 18   Ht 165.1 cm (65\")   Wt 81.6 kg (180 lb)   SpO2 100%   BMI 29.95 kg/m²  Body mass index is 29.95 kg/m².   Oxygen saturation   SpO2  Min: 76 %  Max: 100 %    General " Appearance:   Intubated, not sedated, nonresponsive  HENT:   oropharynx moist  ET tube in place, bloody discharge from the mouth area.  lips not cyanotic  Neck:  thyroid not enlarged  supple  Respiratory:  Intubated on the ventilator  Diffuse rhonchi  Cardiovascular:    regular rhythm  apical impulse normal  S1 normal, S2 normal  no S3, no S4   3/6 systolic murmur  no rub, no thrill  carotid pulses normal; no bruit  pedal pulses normal  lower extremity edema: 1+  Gastrointestinal:   Absent bowel sounds, nondistended  Musculoskeletal:  no clubbing of fingers.   normocephalic, head atraumatic  Skin:   Cool extremities      Cardiographics:     ECG  (personally reviewed) sinus tachycardia with first-degree AV block and 2-3 mm horizontal ST elevation inferior and precordial lateral leads with depression of 1 and aVL.   Telemetry:  (personally reviewed) sinus tachycardia   ECHO:   Results for orders placed during the hospital encounter of 09/02/19    Adult Transthoracic Echo Complete W/ Cont if Necessary Per Protocol    Interpretation Summary  · Mild-to-moderate mitral valve regurgitation is present.  · Estimated EF = 72%.  · Left ventricular systolic function is hyperdynamic (EF > 70).  · Left ventricular diastolic dysfunction (grade I) consistent with impaired relaxation.  · Left ventricular wall thickness is consistent with mild concentric hypertrophy.  · Left atrial cavity size is borderline dilated.  · Normal right ventricular cavity size, wall thickness, systolic function and septal motion noted.  · Mild mitral valve stenosis is present with functional MAC.  · The aortic valve exhibits moderate sclerosis without stenosis.  · No evidence of pulmonary hypertension is present.  · There is no evidence of pericardial effusion.      Lab Review:       Results from last 7 days   Lab Units 03/30/25  1018 03/30/25  1016   WBC 10*3/mm3  --  9.92   HEMOGLOBIN g/dL  --  13.1   HEMOGLOBIN, POC g/dL 13.9  --    HEMATOCRIT %  --   "43.2   HEMATOCRIT POC % 41  --             Results from last 7 days   Lab Units 03/30/25  1018 03/30/25  1016   SODIUM mmol/L  --  142   BUN mg/dL  --  26*   CREATININE mg/dL 1.50* 1.39*   GLUCOSE mg/dL  --  272*      Estimated Creatinine Clearance: 24.5 mL/min (A) (by C-G formula based on SCr of 1.5 mg/dL (H)).     Lab Results   Component Value Date    CHOL 187 08/23/2022    TRIG 81 08/23/2022    HDL 74 (H) 08/23/2022    LDL 98 08/23/2022    AST 88 (H) 03/30/2025    ALT 47 (H) 03/30/2025        Results from last 7 days   Lab Units 03/30/25  1016   INR  1.40*        Lab Results   Component Value Date    TSH 1.92 02/25/2025        Lab Results   Component Value Date    HGBA1C 5.90 (H) 08/23/2022           No results found for: \"DIGOXIN\"     Lab Results   Component Value Date    DDIMERQUANT 1.06 (H) 09/12/2023        Imaging:  All pertinent imaging studies were personally reviewed.    Assessment:      OHCA    Hyperlipidemia LDL goal <70    Essential hypertension    Cardiogenic shock      Recommendations:  1.  Out-of-hospital cardiac arrest:  Postcode EKG reveals inferolateral ST elevation consistent with STEMI versus global stunning post code  I had a long discussion with the ER physician, ICU attending, and the patient's daughter who was present in the ER, we brought the patient to the Cath Lab for possible cardiac catheterization and intervention, but upon my examination the patient had no neurologic reflexes including corneal reflex or withdrawal to noxious stimuli.  He also has bloody discharge coming from the oropharynx.    After reconsulting with the daughter and discussing the patient's poor prognosis regarding neurologic recovery, I recommended holding off on cardiac catheterization at this time, she is in complete agreement.    Discussed with Dr. Nielson, we will transfer patient back to the ICU for supportive measures, and continuing ongoing discussion with the patient's family regarding CODE STATUS and " goals of care.    Check echocardiogram    2.  Cardiogenic shock:  Support blood pressure with vasopressors including norepinephrine and epinephrine for goal MAP greater than 65  Lactic acidosis noted  Check echocardiogram  Hold off on inotropes for now    3.  STEMI:  EKG shows possible STEMI in inferior lateral leads  Continue aspirin and heparin, monitor platelets closely as they have dropped considerably, patient's daughter states they are usually around 270, currently 90  Defer cardiac catheterization at this time given unknown neurologic status, patient was found unconscious at home unknown how long he was down.    4.  CKD stage IIIb:  Renal function appears at baseline  Monitor closely    5.  Hyperlipidemia  Hold statins with elevated LFTs and they will likely rise due to shock liver      Discussed with patient's nurse, Dr. House and Nisreen, and patient's Daughter    Critical Care time spent in direct patient care:    65 minutes (excluding procedure time, if applicable) including high complexity decision making to assess and treat vital organ system failure in this individual who has impairment of one or more vital organ systems such that there is a high probability of imminent or life threatening deterioration in the patient’s condition. Failure to initiate the above interventions on an urgent basis would likely result in sudden, clinically significant or life threatening deterioration in the patient's condition.      Christopher Hinojosa MD  3/30/2025  13:04 EDT

## 2025-03-30 NOTE — H&P
INTENSIVIST   PROGRESS NOTE          SUBJECTIVE     Chaitanya 89 y.o. male is followed for:Cardiac Arrest       OHCA    Hyperlipidemia LDL goal <70    Essential hypertension    Cardiogenic shock    As an Intensivist, we have completed an accredited Critical Care program and maintain Board Certification and dedicate most of our professional work to critical/intensive care. We serve as the  or member of an interprofessional team, coordinating and guiding healthcare professionals to provide specialized care for critically ill patients. We manage and resuscitate patients with, or at risk for, critical illness and organ failure and initiate interventions to prevent imminent deterioration. (2024 Consensus Statement from the Society of Critical Care Medicine Defining Intensivist Task Force. [facility] 2025. DOI: 10.1097/[facility].[id number]     HPI:    Chaitanya is a 89 y.o. male, who presented to this Hospital on 3/30/2025 after an OHCA.     His caregiver found him down, called EMT, and they arrived after 5 minutes. ROSC occurred at least 25 min after.    On arrival to the ED, he was intubated, and his evaluation showed an AMI (ECG inferolateral ST elevation).     Furthermore, he had at least 2 more events of V Fibrillation for which he was shocked. He was started on an Epinephrine continuous intravenous infusion.    Cardiology was contacted and plan was for an emergent LHC.    He was hemodynamic unstable, and required one pressor, he was transferred to the 2A ICU, while they were finishing another LHC. By the time, he (the patient) was moved to the Cath Lab, he was requiring 2 pressors.    After further evaluation by the Cardiology Team, it was decided that a LHC would most likely be not beneficial, and higher risk for morbidity and mortality and it was canceled after they talked to the patient's daughter who is the healthcare surrogate.    Temp  Min: 94.3 °F (34.6 °C)  Max: 94.7 °F (34.8 °C)     PMH: He  has a past  medical history of Abnormal heart rhythm, Anemia, Aortic valve sclerosis, Arthritis, Asthma, Basal cell carcinoma (BCC) of left side of nose, BPH (benign prostatic hyperplasia), Cataracts, bilateral, CHF (congestive heart failure) (2019), Diastolic dysfunction, Easy bruising, Edema due to malnutrition (02/05/2020), Heart murmur, High cholesterol, History of disease, Hypertension, Infection, Phlebitis, Pneumonia (2009), and Squamous acanthoma of face.   PSxH: He  has a past surgical history that includes Appendectomy; Transurethral resection of prostate (1989); Cataract extraction, bilateral (2013); Incision and drainage foot (2013); Head & neck skin lesion excisional biopsy; Cystoscopy (N/A, 09/06/2019); Transurethral resection of bladder tumor (N/A, 09/06/2019); Transurethral resection of prostate (N/A, 09/11/2019); and Prostate surgery (9-2019).     Medications:  No current facility-administered medications on file prior to encounter.     Current Outpatient Medications on File Prior to Encounter   Medication Sig    aspirin 81 MG EC tablet Take 1 tablet by mouth Every Other Day. (Patient taking differently: Take 1 tablet by mouth Every Other Day. OTC)    azelastine (ASTELIN) 0.1 % nasal spray 2 sprays into the nostril(s) as directed by provider 2 (Two) Times a Day. Use in each nostril as directed (Patient taking differently: Administer 2 sprays into the nostril(s) as directed by provider 2 (Two) Times a Day As Needed for Rhinitis or Allergies. OTC)    bumetanide (Bumex) 1 MG tablet 1 mg BID, may take extra 1 mg with 3-5 lb weight gain, dyspnea, or worsening edema. (Patient taking differently: Take 1 tablet by mouth 2 (Two) Times a Day. May take extra 1 mg with 3-5 lb weight gain, dyspnea, or worsening edema.)    Cemiplimab-rwlc (LIBTAYO) 350 MG/7ML injection Infuse 7 mL into a venous catheter Every 21 (Twenty-One) Days.    Cholecalciferol (Vitamin D3) 25 MCG (1000 UT) capsule Take 1 capsule by mouth Daily. OTC     finasteride (PROSCAR) 5 MG tablet Take 1 tablet by mouth Daily.    fluocinonide (LIDEX) 0.05 % external solution Apply 1 application topically to the appropriate area as directed As Needed.    Menthol, Topical Analgesic, (Biofreeze) 4 % gel Apply 1 Application topically to the appropriate area as directed As Needed (Pain). OTC    pravastatin (PRAVACHOL) 40 MG tablet Take 1 tablet by mouth Daily. 3x a week (Patient taking differently: Take 1 tablet by mouth 3 (Three) Times a Week. Mon-Wed-Fri at bedtime)    [DISCONTINUED] ketoconazole (NIZORAL) 2 % cream Apply 1 application  topically to the appropriate area as directed Daily. For 3 weeks to groin and prn return of rash    [DISCONTINUED] ketoconazole (NIZORAL) 2 % shampoo Apply  topically to the appropriate area as directed As Needed.    [DISCONTINUED] metOLazone (ZAROXOLYN) 2.5 MG tablet Take 1 tablet by mouth As Needed (increased swelling/weight gain >2-3 pounds in 1 day, use sparingly).    [DISCONTINUED] mupirocin (BACTROBAN) 2 % ointment Apply 1 Application topically to the appropriate area as directed Daily.    [DISCONTINUED] tacrolimus (PROTOPIC) 0.1 % ointment Apply 1 Application topically to the appropriate area as directed Daily As Needed.    [DISCONTINUED] triamcinolone (KENALOG) 0.1 % ointment apply a thin layer twice a day x 2 weeks        Allergies: He has no known allergies.   FH: His family history includes Atrial fibrillation in his brother; Coronary artery disease in his brother and father; Heart attack in his brother, father, and mother; Heart disease in his brother; Stroke in his brother.   SH: He  reports that he has never smoked. He has never been exposed to tobacco smoke. He has never used smokeless tobacco. He reports that he does not drink alcohol and does not use drugs.     The patient's relevant past medical, surgical and social history were reviewed and updated in Epic as appropriate.        History     Last Reviewed by Dany Nielson MD  on 3/30/2025 at 12:55 PM    Sections Reviewed    Medical, Surgical, Family, Tobacco, Alcohol, Drug Use, Sexual Activity,   Social Documentation    Problem list reviewed by Dany Nielson MD on 3/30/2025 at 12:55 PM  Medicines reviewed by Dany Nielson MD on 3/30/2025 at 12:55 PM  Allergies reviewed by Dany Nielson MD on 3/30/2025 at 12:55 PM       Review of Systems  As described in the HPI.       OBJECTIVE     Physical Examination    Vitals:  Temp: 94.7 °F (34.8 °C) (25 1140) Temp  Min: 94.3 °F (34.6 °C)  Max: 94.7 °F (34.8 °C)   Temp core:      BP: (!) 78/53 (25 1501) BP  Min: 51/37  Max: 115/85   MAP (non-invasive) Noninvasive MAP (mmHg): 61 (25 1501) Noninvasive MAP (mmHg)  Av.3  Min: 41  Max: 95   Pulse: 116 (25 1516) Pulse  Min: 37  Max: 141   Resp: 18 (25 1140) Resp  Min: 18  Max: 18   SpO2: 98 % (25 1516) SpO2  Min: 76 %  Max: 100 %   Device: ventilator (25 1048)    Flow Rate:   No data recorded     Intake/Ouptut 24 hrs (7:00AM - 6:59 AM)  Intake & Output (last 2 days)       None            Medications (drips):  EPINEPHrine, Last Rate: 0.7 mcg/kg/min (25 1342)  heparin, Last Rate: 12 Units/kg/hr (25 1139)  norepinephrine, Last Rate: 0.2 mcg/kg/min (25 1209)  Pharmacy to Dose Heparin            25  1025   Weight: 81.6 kg (180 lb)        Invasive Mechanical Ventilator   Settings: Observed:   Mode: VC+/AC (RETURN FROM CATH LAB) (25 1258)    Resp Rate (Set): 18 (25 1048) Resp Rate (Observed) Vent: 21 (25 1516)   Vt (Set, mL): 540 mL (25) Vt, Exp (observed, mL): 395 mL (25)    Minute Ventilation (L/min) (Obs): 6.72 L/min (25)    I:E Ratio (Obs): 1:2 (25)       FiO2 (%): 100 % (25)     PEEP/CPAP (cm H2O): 5 cm H20 (25)               Telemetry:  Rhythm: ventricular rhythm (25 1035)  Atrial Rhythm: atrial fibrillation (RVR) (25 1020)       Constitutional:  No acute distress.   Cardiovascular: IRR.    Respiratory: Normal breath sounds  (+) Rhonchi   Abdominal:  Soft with no tenderness.   Extremities: No Edema   Neurological:   Coma.  Best Eye Response: 1-->(E1) none (03/30/25 1051)  Best Motor Response: 1-->(M1) none (03/30/25 1051)  Best Verbal Response: 1-->(V1) none (03/30/25 1051)  Carmen Coma Scale Score: 3 (03/30/25 1051)     Results Reviewed:  Laboratory  Microbiology  Radiology  Pathology    Hematology:  Results from last 7 days   Lab Units 03/30/25  1018 03/30/25  1016   WBC 10*3/mm3  --  9.92   HEMOGLOBIN g/dL  --  13.1   HEMOGLOBIN, POC g/dL 13.9  --    MCV fL  --  101.4*   PLATELETS 10*3/mm3  --  90*     Results from last 7 days   Lab Units 03/30/25  1016   NEUTROS ABS 10*3/mm3 6.52   LYMPHS ABS 10*3/mm3 2.13   EOS ABS 10*3/mm3 0.05     Chemistry:  Estimated Creatinine Clearance: 24.5 mL/min (A) (by C-G formula based on SCr of 1.5 mg/dL (H)).    Results from last 7 days   Lab Units 03/30/25  1018 03/30/25  1016   SODIUM mmol/L  --  142   POTASSIUM mmol/L  --  3.7   CHLORIDE mmol/L  --  102   CO2 mmol/L  --  16.0*   BUN mg/dL  --  26*   CREATININE mg/dL 1.50* 1.39*   GLUCOSE mg/dL  --  272*     Results from last 7 days   Lab Units 03/30/25  1016   CALCIUM mg/dL 8.8   MAGNESIUM mg/dL 2.6*       Hepatic Panel:  Results from last 7 days   Lab Units 03/30/25  1016   ALBUMIN g/dL 3.3*   TOTAL PROTEIN g/dL 6.3   BILIRUBIN mg/dL 0.7   AST (SGOT) U/L 88*   ALT (SGPT) U/L 47*   ALK PHOS U/L 108        Coagulation Labs:  Results from last 7 days   Lab Units 03/30/25  1016   PROTIME Seconds 17.3*   INR  1.40*   APTT seconds 41.1*        Cardiac Labs:  Results from last 7 days   Lab Units 03/30/25  1137 03/30/25  1016   PROBNP pg/mL  --  688.0   HSTROP T ng/L 231* 65*       Biomarkers:  Results from last 7 days   Lab Units 03/30/25  1016   LACTATE mmol/L 8.8*   PROCALCITONIN ng/mL 0.11         Arterial Blood Gases:        Images:  XR Chest 1  View  Result Date: 3/30/2025  Impression: 1.Endotracheal tube terminates 6.7 cm above the kai with tip near the thoracic inlet. 2.Elevated right hemidiaphragm with small right-sided pleural effusion. This appears chronic from 2023. Electronically Signed: Fishgareth Mueller  3/30/2025 10:42 AM EDT  Workstation ID: [id number]      Echo:  Results for orders placed during the hospital encounter of 09/02/19    Adult Transthoracic Echo Complete W/ Cont if Necessary Per Protocol    Interpretation Summary  · Mild-to-moderate mitral valve regurgitation is present.  · Estimated EF = 72%.  · Left ventricular systolic function is hyperdynamic (EF > 70).  · Left ventricular diastolic dysfunction (grade I) consistent with impaired relaxation.  · Left ventricular wall thickness is consistent with mild concentric hypertrophy.  · Left atrial cavity size is borderline dilated.  · Normal right ventricular cavity size, wall thickness, systolic function and septal motion noted.  · Mild mitral valve stenosis is present with functional MAC.  · The aortic valve exhibits moderate sclerosis without stenosis.  · No evidence of pulmonary hypertension is present.  · There is no evidence of pericardial effusion.      Results: Reviewed.  I reviewed the patient's new laboratory and imaging results.  I independently reviewed the patient's new images.    Medications: Reviewed.    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 Ventricular tachycardia/Ventricular fibrillation  Unknown time when it started. He was alone when it happened, he was found by his caregiver who called EMT. At least 5 minutes before  they arrived and another 20-25 minutes before ROSC.  ED evaluation, also showed an AMI.  Cardiogenic shock.  Pulmonary   Respiratory Failure  Intubated 03/30/25   Invasive Mechanical Ventilation   Cardiovascular  Chronic HFpEF  Mild to moderate MR  HTN per history  Dyslipidemia   Renal   CKD IIIa  Endocrine  Body mass index is 29.95 kg/m². Overweight: 25.0-29.9kg/m2   Prediabetes (A1C 5.7%-6.4%)    Lab Results   Lab Value Date/Time    HGBA1C 5.70 (H) 03/30/2025 1016    HGBA1C 5.90 (H) 08/23/2022 1138     Results from last 7 days   Lab Units 03/30/25  1018   GLUCOSE mg/dL 256*       Diet: NPO Diet NPO Type: Strict NPO  Orders Placed This Encounter      DIET MESSAGE 4 family members      Advance Directives: Code Status and Medical Interventions: No CPR (Do Not Attempt to Resuscitate); Limited Support; No artificial nutrition, No dialysis; Do not escalate care   Ordered at: 03/30/25 1348     Code Status (Patient has no pulse and is not breathing):    No CPR (Do Not Attempt to Resuscitate)     Medical Interventions (Patient has pulse or is breathing):    Limited Support     Medical Intervention Limits:    No artificial nutrition       No dialysis     Comments:    Do not escalate care     Level Of Support Discussed With:    Health Care Surrogate       Next of Kin (If No Surrogate)        VTE Prophylaxis:  Pharmacologic VTE prophylaxis orders are present.         In brief:  Discussed with Dr. Hinojosa. Based on his overall condition and prognosis, and requiring 2 pressors, the LHC was canceled.  Discussed with daughter  Neuro prognosis seems very poor for a significant recovery based on the time to obtain ROSC, presence of myoclonus, pressors requirement and oliguria/anuria.  She states he would not want to live like that, and he has mentioned before and it is on his living will: NO ARTIFICIAL NUTRITION. He would not want dialysis.  She is going to talk to the patient's son (her brother), to clarify CPR status, but in 2023  he was DNACPR.  She also agrees with NO ESCALATION OF CARE.  Once rest of family is here, we will talk next steps in his care, including compassionate extubation, palliative care and hospice care.  He is too unstable to move to the CT scanner for a CT-Head.  Disposition: Admit to ICU    Plan of care and goals reviewed during interdisciplinary rounds.  I discussed the patient's findings and my recommendations with family, nursing staff, and consulting provider    Time: was greater than  75  minutes. This is non-concurrent time.   (This excludes time spent performing separately reportable procedures and services).    He has a high risk of imminent or life-threatening  deterioration, which requires the highest level of physician preparedness to intervene urgently. I devoted my full attention to the direct care of this patient for the amount of time indicated above.     Time spent with family or surrogate(s) is included only if the patient was incapable of providing the necessary information or participating in medical decision making.    He has the following organ/system impairments  out of hospital cardiac arrest .          [x] Primary Attending Intensive Care Medicine - Nutrition Support   [] Consultant    Copied text in this note has been reviewed and is accurate as of 03/30/25

## 2025-03-30 NOTE — PLAN OF CARE
Goal Outcome Evaluation:   Pt arrived to ICU from ED around 1140. Pt in Afib with frequent PVCs, PACs, and short runs of V-tach. Pt sent to cath lab, however, family opted for no interventions. GOC discussed with daughter at bedside. Decided no CPR and no excalation of care. Epi @ 0.7. Levo @ 0.2, Heparin @12u/kg/hr. Daughter updated @ bedside.

## 2025-03-30 NOTE — PLAN OF CARE
Goal Outcome Evaluation:      Patient settings remain unchanged.  Will continue to wean as able.

## 2025-03-30 NOTE — PROGRESS NOTES
Pharmacy to Dose Heparin Infusion Note    Chaitanya Browne is a 89 y.o. male receiving heparin infusion.     Therapy for (VTE/Cardiac): Cardiac  ?  CPA w/ STEMI  Patient Weight: 81.6 kg  Initial Bolus (Y/N): Yes  Any Bolus (Y/N): Yes     Signs or Symptoms of Bleeding: No S/Sx overt bleeding noted - d/w RN.    Cardiac or Other (Not VTE)   Initial Bolus: 60 units/kg (Max 4,000 units)  Initial rate: 12 units/kg/hr (Max 1,000 units/hr)   Anti-Xa Bolus   Dose Infusion Hold   Time Infusion Rate Change (units/kg/hr) Repeat  Anti-Xa    < 0.11 50 units/kg  (4000 units Max) None Increase by  3 units/kg/hr 6 hours   0.11- 0.19 25 units/kg  (2000 units Max) None Increase by  2 units/kg/hr 6 hours   0.2 - 0.29 0 None Increase by  1 units/kg/hr 6 hours   0.3 - 0.5 0 None No Change 6 hours (after 2 consecutive levels in range check qAM)   0.51 - 0.6 0 None Decrease by  1 units/kg/hr 6 hours   0.61 - 0.8 0 30 minutes Decrease by  2 units/kg/hr 6 hours   0.81 - 1 0 60 minutes Decrease by  3 units/kg/hr 6 hours   >1 0 Hold  After Anti-Xa less than 0.5 decrease previous rate by  4 units/kg/hr  Every 2 hours until Anti-Xa  less than 0.5 then when infusion restarts in 6 hours     Results from last 7 days   Lab Units 03/30/25  1018 03/30/25  1016   INR   --  1.40*   HEMOGLOBIN g/dL  --  13.1   HEMOGLOBIN, POC g/dL 13.9  --    HEMATOCRIT %  --  43.2   HEMATOCRIT POC % 41  --    PLATELETS 10*3/mm3  --  90*       Date   Time   Anti-Xa Current Rate (units/kg/hr) Bolus   (units) Rate Change   (units/kg/hr) New Rate (units/kg/hr) Repeat  Anti-Xa Comments /  Pump Check    3/30 1145 Pending 0 4000 +12 12 1800 D/w ED RN & provider.  Pump & rate confirmed. Dose capping heparin bolus.                                                                                                                                                                                                                                 Prosper Mix RPH  3/30/2025  11:30 EDT

## 2025-03-31 NOTE — NURSING NOTE
Exam confirms with auscultation zero audible heart tones and zero audible respirations. Mr.Milton ENRIQUETA Browne was pronounced dead at 0130.  MD notified by Patient's RN.    Keily Patel RN  Clinical House Supervisor  3/31/2025 05:55 EDT

## 2025-03-31 NOTE — DISCHARGE SUMMARY
Admit date: 3/30/2025  Date/Time of Death:  3/31/2025 @ 0136    Admission Diagnosis:  Present on Admission:   OHCA   Hyperlipidemia LDL goal <70   Essential hypertension   Cardiogenic shock      Discharge Diagnosis:     OHCA    Hyperlipidemia LDL goal <70    Essential hypertension    Cardiogenic shock    AMI      Hospital Course:  Patient is a 89 y.o. male admitted on OHCA 2/t VF/VT.  Patient was found down by caregiver after uncertain period of time.  ACLS x 25 minutes before ROSC achieved.  Evidence of STEMI.  Given his lack of neurologic response, he as deemed not a candidate for catheterization.  Family was contacted and they made the patient a DNR w/ no escalation of care.  The patient  @ 0136.         Procedures Performed:  Procedure(s):  CASE ABORT       Consults:     Dr. Hinojosa, Cardiology    Condition on Discharge:        Time: Discharge 9 min

## 2025-04-01 NOTE — ED PROVIDER NOTES
Subjective   History of Present Illness  89-year-old male brought in by EMS in cardiac arrest.  The patient was found down and unresponsive by caretaker when she arrived at the patient's house this morning.  The patient's last known well time is not well-defined.  EMS was contacted and the patient was delivered here via EMS.  En route the patient initially had asystole, then was identified to have V-fib versus V. tach which received 2 shocks, and the patient also received 2 dose of epinephrine and a dose of lidocaine and route.  Upon the patient's initial arrival to the ER he reportedly had been pulseless for greater than 30 minutes. The daughter, who later arrived to the hospital, states that she spoke with him yesterday and he was normal with no acute complaints.  There was no recent fever or infectious symptoms.  No complaints of chest pain or abdominal pain.  He still lives alone and is highly active and per the daughter's report enjoys life. His blood sugar was noted to be normal.  No pulse or blood pressure was obtained prior to arrival.  Chest compressions continued to be in process upon arrival to the ER.  Upon first cessation of EMS directed chest compressions a pulse was detected in the left carotid.  The patient had relatively normal body temperature perhaps indicating a relatively short period of downtime.  No other acute complaints.  No history of coronary artery disease or previous coronary intervention per the daughter's report.      Review of Systems   Unable to perform ROS: Patient unresponsive (Review of systems obtained from daughter)   Constitutional:  Negative for activity change and appetite change.   HENT:  Negative for congestion and rhinorrhea.    Respiratory:  Negative for shortness of breath.    Cardiovascular:  Negative for chest pain.   Gastrointestinal:  Negative for abdominal pain, nausea and vomiting.   Psychiatric/Behavioral:  Negative for confusion and decreased concentration.         Past Medical History:   Diagnosis Date    Abnormal heart rhythm     Anemia     Aortic valve sclerosis     Arthritis     Asthma     Basal cell carcinoma (BCC) of left side of nose     BPH (benign prostatic hyperplasia)     Cataracts, bilateral     bilateralcataract extraction and lens implantation 2013    CHF (congestive heart failure) 2019    Diastolic dysfunction     Easy bruising     Edema due to malnutrition 02/05/2020    Heart murmur     High cholesterol     History of disease     bilateral lacrimal gland dysfunction per Dr Fajardo    Hypertension     Infection     infected big toe; I and D DR Alvares 2013    Phlebitis     Pneumonia 2009    Squamous acanthoma of face        No Known Allergies    Past Surgical History:   Procedure Laterality Date    APPENDECTOMY      CARDIAC CATHETERIZATION  3/30/2025    Procedure: CASE ABORT;  Surgeon: Christopher Hinojosa MD;  Location:  [facility];  Service: Cardiovascular;;    CATARACT EXTRACTION, BILATERAL  2013    and lens implantation    CYSTOSCOPY N/A 09/06/2019    Procedure: CYSTOSCOPY;  Surgeon: Ck Woods MD;  Location:  [facility];  Service: Urology    CYSTOSCOPY TRANSURETHRAL RESECTION OF PROSTATE  1989    CYSTOSCOPY TRANSURETHRAL RESECTION OF PROSTATE N/A 09/11/2019    Procedure: CYSTOSCOPY TRANSURETHRAL RESECTION OF PROSTATE;  Surgeon: Ck Woods MD;  Location:  [facility];  Service: Urology    HEAD & NECK SKIN LESION EXCISIONAL BIOPSY      Basal cell removed from nose     INCISION AND DRAINAGE FOOT  2013    infected big toe Dr Alvares    PROSTATE SURGERY  9-2019    TRANSURETHRAL RESECTION OF BLADDER TUMOR N/A 09/06/2019    Procedure: EVACUATION OF BLOOD CLOT, FULGERATION OF PROSTATE;  Surgeon: Ck Woods MD;  Location:  [facility];  Service: Urology       Family History   Problem Relation Age of Onset    Coronary artery disease Father     Heart attack Father     Coronary artery disease Brother     Heart disease Brother      Atrial fibrillation Brother     Stroke Brother     Heart attack Brother     Heart attack Mother        Social History     Socioeconomic History    Marital status:    Tobacco Use    Smoking status: Never     Passive exposure: Never    Smokeless tobacco: Never   Vaping Use    Vaping status: Never Used   Substance and Sexual Activity    Alcohol use: No    Drug use: No    Sexual activity: Defer           Objective   Physical Exam  Vitals and nursing note reviewed.   Constitutional:       General: He is in acute distress.      Appearance: He is well-developed. He is ill-appearing and toxic-appearing. He is not diaphoretic.      Interventions: He is intubated.   HENT:      Head: Normocephalic and atraumatic.      Right Ear: External ear normal.      Left Ear: External ear normal.      Nose: Nose normal.      Mouth/Throat:      Mouth: Mucous membranes are dry.   Eyes:      General: Lids are normal.      Comments: Pupils nonreactive bilaterally.   Neck:      Trachea: No tracheal deviation.   Cardiovascular:      Rate and Rhythm: Normal rate.      Pulses: No decreased pulses.      Comments: Reported to be pulseless prior to arrival per EMS.  Pulse was detected in the left carotid upon arrival to the ER after cessation of chest compressions being administered by Wilmer device.    Rate varied between being tachycardic to bradycardic.  Pulmonary:      Effort: No respiratory distress. He is intubated.      Breath sounds: Examination of the right-lower field reveals rales. Examination of the left-lower field reveals rales. Rales present. No decreased breath sounds, wheezing or rhonchi.      Comments: Mild crackles at lung base with decreased breath sounds with assist intubation via bag and ET tube.  Chest:      Comments: Abrasion/indentation over the central chest secondary to chest compression from Wilmer device.  Abdominal:      General: There is no distension.      Palpations: Abdomen is soft.   Musculoskeletal:          General: No deformity. Normal range of motion.      Cervical back: No edema.      Comments: No focal musculoskeletal deformity or deficit.   Lymphadenopathy:      Cervical: No cervical adenopathy.   Skin:     General: Skin is warm and dry.      Findings: Abrasion, laceration and wound present. No rash.      Comments: Abrasion/small laceration to the left elbow.   Neurological:      Mental Status: He is unresponsive.      GCS: GCS eye subscore is 1. GCS verbal subscore is 1. GCS motor subscore is 1.      Cranial Nerves: No cranial nerve deficit.      Sensory: No sensory deficit.      Comments: DCS of 3T.    A 6.5 ET tube in place.  Chest x-ray confirms appropriate placement with the tip superior to the kai.       Psychiatric:         Speech: Speech normal.         Behavior: Behavior normal.         Thought Content: Thought content normal.         Judgment: Judgment normal.         Critical Care    Performed by: Sarah House MD  Authorized by: Sarah House MD    Critical care provider statement:     Critical care time (minutes):  60    Critical care time was exclusive of:  Separately billable procedures and treating other patients    Critical care was necessary to treat or prevent imminent or life-threatening deterioration of the following conditions:  Cardiac failure    Critical care was time spent personally by me on the following activities:  Development of treatment plan with patient or surrogate, discussions with consultants, evaluation of patient's response to treatment, blood draw for specimens, examination of patient, obtaining history from patient or surrogate, ordering and performing treatments and interventions, ordering and review of laboratory studies, ordering and review of radiographic studies, pulse oximetry, re-evaluation of patient's condition and review of old charts    I assumed direction of critical care for this patient from another provider in my specialty: no      Care  discussed with: admitting provider               ED Course                                                       Medical Decision Making  Differential includes dysrhythmia, acute coronary syndrome, dehydration, renal sufficiency, electrolyte abnormality, acute factious process, other unspecified etiology.    Labs show normal white count, normal H&H, slightly elevated creatinine to 1.39, elevated blood sugar to 272, increased anion gap acidosis with a bicarb of 16 and an anion gap of 24.  Patient also has a significant elevated lactic acid level to 8.8 most likely accounting for the increased gap acidosis.    Troponin was elevated and trended up significantly on repeat evaluation.    EKG eventually showed sinus rhythm with ST elevation in the inferior lateral leads concerning for acute STEMI.    The patient's rhythm was extremely variable throughout the ER course.  He had episodes of ventricular tachycardia, sinus bradycardia, potential junctional rhythms, and atrial fibrillation.  He received synchronized electrical cardioversion for A-fib which was initially successful.  He received attempted synchronized electrocardioversion of ventricular tachycardia with a pulse without significant success.  Eventually amiodarone bolus was given and the ventricular tachycardia was stopped and the patient converted into a sinus rhythm.    He remained hypotensive throughout the ER course.  With systolics in the 50s to 80s.  He received IV fluids and was also initiated on an epinephrine drip.  Due to the prolonged downtime and the significant elevated lactic acid level the patient was also given an amp of bicarb while in the ER.    When the STEMI was identified this was communicated to Dr. Hinojosa who desired to proceed with Cath Lab intervention.  The patient was given heparin in the ER.    Due to the patient's instability the intensivist, Dr. Nielson was consulted for admission.    Problems Addressed:  Acute respiratory failure  with hypoxia: complicated acute illness or injury with systemic symptoms that poses a threat to life or bodily functions  Cardiac arrest: complicated acute illness or injury with systemic symptoms that poses a threat to life or bodily functions  Lactic acidosis: complicated acute illness or injury with systemic symptoms that poses a threat to life or bodily functions  ST elevation myocardial infarction (STEMI), unspecified artery: complicated acute illness or injury with systemic symptoms that poses a threat to life or bodily functions    Amount and/or Complexity of Data Reviewed  Independent Historian: friend     Details: Daughter provides additional history.  External Data Reviewed: labs, radiology and ECG.  Labs: ordered. Decision-making details documented in ED Course.  Radiology: ordered and independent interpretation performed. Decision-making details documented in ED Course.     Details: Chest x-ray interpreted by myself shows appropriate placement of ET tube in the trachea superior to the kai.  Poor inspiratory view, opacity versus atelectasis versus effusion at the right lung base.  ECG/medicine tests: ordered and independent interpretation performed. Decision-making details documented in ED Course.     Details: EKG performed 3/30/2025 at 10:14 AM shows a sinus rhythm, first-degree AV block acute ST elevation in inferior and lateral leads with mild ST depression in high lateral leads.    EKG performed 3/30/2025 at 1022 independently interpreted by myself shows A-fib, RVR, significant ST elevation in inferior, anterior and lateral leads.      EKG performed 3/30/2025 at 1027 independently inter by myself shows sinus tachycardia, significant ST elevation in inferior, anterior, and lateral leads.        Discussion of management or test interpretation with external provider(s): The patient was discussed with Dr. Hinojosa of the cardiology service.    The patient was discussed with Dr. Nielson of the ICU  service.    Risk  Prescription drug management.  Decision regarding hospitalization.        Final diagnoses:   Cardiac arrest   ST elevation myocardial infarction (STEMI), unspecified artery   Lactic acidosis   Acute respiratory failure with hypoxia       ED Disposition  ED Disposition       ED Disposition   Decision to Admit    Condition   --    Comment   Level of Care: Critical Care [6]   Admitting Physician: MACHO RUDOLPH [[id number]]                 Dirk Russ MD  [address] [address]  [phone]               Medication List      No changes were made to your prescriptions during this visit.            Sarah House MD  04/01/25 [id number]

## 2025-04-04 LAB — BACTERIA SPEC AEROBE CULT: NORMAL

## 2025-04-08 LAB
QT INTERVAL: 292 MS
QT INTERVAL: 302 MS
QT INTERVAL: 380 MS
QTC INTERVAL: 398 MS
QTC INTERVAL: 421 MS
QTC INTERVAL: 449 MS

## (undated) DEVICE — MODEL AT P65, P/N 701554-001KIT CONTENTS: HAND CONTROLLER, 3-WAY HIGH-PRESSURE STOPCOCK WITH ROTATING END AND PREMIUM HIGH-PRESSURE TUBING: Brand: ANGIOTOUCH® KIT

## (undated) DEVICE — MODEL BT2000 P/N 700287-012KIT CONTENTS: MANIFOLD WITH SALINE AND CONTRAST PORTS, SALINE TUBING WITH SPIKE AND HAND SYRINGE, TRANSDUCER: Brand: BT2000 AUTOMATED MANIFOLD KIT

## (undated) DEVICE — DRAINBAG,ANTI-REFLUX TOWER,L/F,2000ML,LL: Brand: MEDLINE

## (undated) DEVICE — CORD BOVIE 1P/U

## (undated) DEVICE — CATH FOLEY LUBRICATH 3WY 22F 30CC

## (undated) DEVICE — GW PERIPH VASC ADX J/TP SS .035 150CM 3MM

## (undated) DEVICE — EVAC BLDR UROVAC W ADAPT

## (undated) DEVICE — LP CUT RT/ANGL 26FR

## (undated) DEVICE — PK CYSTO-TUR BASIC 10

## (undated) DEVICE — ST EXT IV SMRTSTE 2VLV FIX M LL 6ML 41

## (undated) DEVICE — PAD GRND REM POLYHESIVE A/ DISP

## (undated) DEVICE — PK CATH CARD 10

## (undated) DEVICE — ST INF PRI SMRTSTE 20DRP 2VLV 24ML 117

## (undated) DEVICE — CATH DIAG EXPO M/ PK 6FR FL4/FR4 PIG 3PK

## (undated) DEVICE — CVR PROB ULTRASND/TRANSD W/GEL 7X11IN STRL

## (undated) DEVICE — Device

## (undated) DEVICE — PINNACLE INTRODUCER SHEATH: Brand: PINNACLE

## (undated) DEVICE — GLV SURG SENSICARE MICRO PF LF 8 STRL